# Patient Record
Sex: MALE | Race: WHITE | NOT HISPANIC OR LATINO | Employment: OTHER | URBAN - METROPOLITAN AREA
[De-identification: names, ages, dates, MRNs, and addresses within clinical notes are randomized per-mention and may not be internally consistent; named-entity substitution may affect disease eponyms.]

---

## 2017-12-07 ENCOUNTER — APPOINTMENT (OUTPATIENT)
Dept: GENERAL RADIOLOGY | Facility: HOSPITAL | Age: 82
End: 2017-12-07

## 2017-12-07 ENCOUNTER — HOSPITAL ENCOUNTER (OUTPATIENT)
Facility: HOSPITAL | Age: 82
Setting detail: OBSERVATION
Discharge: NURSING, REHAB OR LONG-TERM CARE | End: 2017-12-12
Attending: EMERGENCY MEDICINE | Admitting: INTERNAL MEDICINE

## 2017-12-07 ENCOUNTER — APPOINTMENT (OUTPATIENT)
Dept: CT IMAGING | Facility: HOSPITAL | Age: 82
End: 2017-12-07

## 2017-12-07 DIAGNOSIS — Z74.09 MOBILITY IMPAIRED: Primary | ICD-10-CM

## 2017-12-07 DIAGNOSIS — Z74.09 IMPAIRED FUNCTIONAL MOBILITY, BALANCE, GAIT, AND ENDURANCE: ICD-10-CM
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
## 2017-12-07 DIAGNOSIS — Z74.09 IMPAIRED MOBILITY AND ADLS: ICD-10-CM

## 2017-12-07 DIAGNOSIS — S70.02XA CONTUSION OF LEFT HIP, INITIAL ENCOUNTER: ICD-10-CM

## 2017-12-07 DIAGNOSIS — Z78.9 IMPAIRED MOBILITY AND ADLS: ICD-10-CM

## 2017-12-07 PROBLEM — F03.90 DEMENTIA (HCC): Status: ACTIVE | Noted: 2017-12-07

## 2017-12-07 PROBLEM — E03.9 HYPOTHYROID: Status: ACTIVE | Noted: 2017-12-07

## 2017-12-07 LAB
ANION GAP SERPL CALCULATED.3IONS-SCNC: 4 MMOL/L (ref 3–11)
BASOPHILS # BLD AUTO: 0.03 10*3/MM3 (ref 0–0.2)
BASOPHILS NFR BLD AUTO: 0.4 % (ref 0–1)
BUN BLD-MCNC: 15 MG/DL (ref 9–23)
BUN/CREAT SERPL: 10 (ref 7–25)
CALCIUM SPEC-SCNC: 8.9 MG/DL (ref 8.7–10.4)
CHLORIDE SERPL-SCNC: 108 MMOL/L (ref 99–109)
CO2 SERPL-SCNC: 25 MMOL/L (ref 20–31)
CREAT BLD-MCNC: 1.5 MG/DL (ref 0.6–1.3)
DEPRECATED RDW RBC AUTO: 47.7 FL (ref 37–54)
EOSINOPHIL # BLD AUTO: 0.17 10*3/MM3 (ref 0–0.3)
EOSINOPHIL NFR BLD AUTO: 2.3 % (ref 0–3)
ERYTHROCYTE [DISTWIDTH] IN BLOOD BY AUTOMATED COUNT: 13.6 % (ref 11.3–14.5)
GFR SERPL CREATININE-BSD FRML MDRD: 44 ML/MIN/1.73
GLUCOSE BLD-MCNC: 110 MG/DL (ref 70–100)
HCT VFR BLD AUTO: 40.4 % (ref 38.9–50.9)
HGB BLD-MCNC: 13.2 G/DL (ref 13.1–17.5)
IMM GRANULOCYTES # BLD: 0.01 10*3/MM3 (ref 0–0.03)
IMM GRANULOCYTES NFR BLD: 0.1 % (ref 0–0.6)
LYMPHOCYTES # BLD AUTO: 1.77 10*3/MM3 (ref 0.6–4.8)
LYMPHOCYTES NFR BLD AUTO: 23.5 % (ref 24–44)
MCH RBC QN AUTO: 31.4 PG (ref 27–31)
MCHC RBC AUTO-ENTMCNC: 32.7 G/DL (ref 32–36)
MCV RBC AUTO: 96 FL (ref 80–99)
MONOCYTES # BLD AUTO: 0.66 10*3/MM3 (ref 0–1)
MONOCYTES NFR BLD AUTO: 8.8 % (ref 0–12)
NEUTROPHILS # BLD AUTO: 4.89 10*3/MM3 (ref 1.5–8.3)
NEUTROPHILS NFR BLD AUTO: 64.9 % (ref 41–71)
PLATELET # BLD AUTO: 168 10*3/MM3 (ref 150–450)
PMV BLD AUTO: 10.6 FL (ref 6–12)
POTASSIUM BLD-SCNC: 4.4 MMOL/L (ref 3.5–5.5)
RBC # BLD AUTO: 4.21 10*6/MM3 (ref 4.2–5.76)
SODIUM BLD-SCNC: 137 MMOL/L (ref 132–146)
WBC NRBC COR # BLD: 7.53 10*3/MM3 (ref 3.5–10.8)

## 2017-12-07 PROCEDURE — G0378 HOSPITAL OBSERVATION PER HR: HCPCS

## 2017-12-07 PROCEDURE — 85025 COMPLETE CBC W/AUTO DIFF WBC: CPT | Performed by: HOSPITALIST

## 2017-12-07 PROCEDURE — 25010000002 FENTANYL CITRATE (PF) 100 MCG/2ML SOLUTION: Performed by: EMERGENCY MEDICINE

## 2017-12-07 PROCEDURE — 96374 THER/PROPH/DIAG INJ IV PUSH: CPT

## 2017-12-07 PROCEDURE — 96372 THER/PROPH/DIAG INJ SC/IM: CPT

## 2017-12-07 PROCEDURE — 94640 AIRWAY INHALATION TREATMENT: CPT

## 2017-12-07 PROCEDURE — 71010 HC CHEST PA OR AP: CPT

## 2017-12-07 PROCEDURE — 94799 UNLISTED PULMONARY SVC/PX: CPT

## 2017-12-07 PROCEDURE — 80048 BASIC METABOLIC PNL TOTAL CA: CPT | Performed by: HOSPITALIST

## 2017-12-07 PROCEDURE — 93005 ELECTROCARDIOGRAM TRACING: CPT | Performed by: HOSPITALIST

## 2017-12-07 PROCEDURE — 93010 ELECTROCARDIOGRAM REPORT: CPT | Performed by: INTERNAL MEDICINE

## 2017-12-07 PROCEDURE — 72192 CT PELVIS W/O DYE: CPT

## 2017-12-07 PROCEDURE — 94760 N-INVAS EAR/PLS OXIMETRY 1: CPT

## 2017-12-07 PROCEDURE — 73502 X-RAY EXAM HIP UNI 2-3 VIEWS: CPT

## 2017-12-07 PROCEDURE — 25010000002 HEPARIN (PORCINE) PER 1000 UNITS: Performed by: HOSPITALIST

## 2017-12-07 PROCEDURE — 99220 PR INITIAL OBSERVATION CARE/DAY 70 MINUTES: CPT | Performed by: HOSPITALIST

## 2017-12-07 PROCEDURE — 99285 EMERGENCY DEPT VISIT HI MDM: CPT

## 2017-12-07 RX ORDER — MELATONIN
1000 DAILY
Status: ON HOLD | COMMUNITY
End: 2019-01-01

## 2017-12-07 RX ORDER — HEPARIN SODIUM 5000 [USP'U]/ML
5000 INJECTION, SOLUTION INTRAVENOUS; SUBCUTANEOUS EVERY 8 HOURS SCHEDULED
Status: DISCONTINUED | OUTPATIENT
Start: 2017-12-07 | End: 2017-12-12 | Stop reason: HOSPADM

## 2017-12-07 RX ORDER — MIRTAZAPINE 30 MG/1
30 TABLET, FILM COATED ORAL NIGHTLY
COMMUNITY
End: 2019-02-22 | Stop reason: SDUPTHER

## 2017-12-07 RX ORDER — ATORVASTATIN CALCIUM 10 MG/1
10 TABLET, FILM COATED ORAL NIGHTLY
Status: DISCONTINUED | OUTPATIENT
Start: 2017-12-07 | End: 2017-12-12 | Stop reason: HOSPADM

## 2017-12-07 RX ORDER — PRAVASTATIN SODIUM 20 MG
20 TABLET ORAL DAILY
COMMUNITY
End: 2018-11-28 | Stop reason: SDUPTHER

## 2017-12-07 RX ORDER — SODIUM CHLORIDE 0.9 % (FLUSH) 0.9 %
1-10 SYRINGE (ML) INJECTION AS NEEDED
Status: DISCONTINUED | OUTPATIENT
Start: 2017-12-07 | End: 2017-12-12 | Stop reason: HOSPADM

## 2017-12-07 RX ORDER — IPRATROPIUM BROMIDE AND ALBUTEROL SULFATE 2.5; .5 MG/3ML; MG/3ML
3 SOLUTION RESPIRATORY (INHALATION)
Status: DISCONTINUED | OUTPATIENT
Start: 2017-12-07 | End: 2017-12-09

## 2017-12-07 RX ORDER — CETIRIZINE HYDROCHLORIDE 10 MG/1
10 TABLET ORAL DAILY PRN
Status: DISCONTINUED | OUTPATIENT
Start: 2017-12-07 | End: 2017-12-12 | Stop reason: HOSPADM

## 2017-12-07 RX ORDER — ACETAMINOPHEN 500 MG
500 TABLET ORAL EVERY 6 HOURS PRN
COMMUNITY
End: 2017-12-12 | Stop reason: HOSPADM

## 2017-12-07 RX ORDER — MONTELUKAST SODIUM 10 MG/1
10 TABLET ORAL NIGHTLY
COMMUNITY
End: 2019-02-20

## 2017-12-07 RX ORDER — HALOPERIDOL 0.5 MG/1
0.5 TABLET ORAL NIGHTLY
COMMUNITY
End: 2017-12-12 | Stop reason: HOSPADM

## 2017-12-07 RX ORDER — VITAMIN E 268 MG
400 CAPSULE ORAL DAILY
Status: ON HOLD | COMMUNITY
End: 2019-01-01

## 2017-12-07 RX ORDER — LEVOTHYROXINE SODIUM 0.07 MG/1
75 TABLET ORAL NIGHTLY
Status: DISCONTINUED | OUTPATIENT
Start: 2017-12-07 | End: 2017-12-12 | Stop reason: HOSPADM

## 2017-12-07 RX ORDER — LEVOTHYROXINE SODIUM 0.07 MG/1
75 TABLET ORAL DAILY
COMMUNITY
End: 2019-01-01 | Stop reason: SDUPTHER

## 2017-12-07 RX ORDER — PANTOPRAZOLE SODIUM 40 MG/1
40 TABLET, DELAYED RELEASE ORAL
Status: DISCONTINUED | OUTPATIENT
Start: 2017-12-08 | End: 2017-12-12 | Stop reason: HOSPADM

## 2017-12-07 RX ORDER — ASPIRIN 81 MG/1
81 TABLET, CHEWABLE ORAL DAILY
Status: DISCONTINUED | OUTPATIENT
Start: 2017-12-08 | End: 2017-12-12 | Stop reason: HOSPADM

## 2017-12-07 RX ORDER — ACETAMINOPHEN 325 MG/1
650 TABLET ORAL ONCE
Status: COMPLETED | OUTPATIENT
Start: 2017-12-07 | End: 2017-12-07

## 2017-12-07 RX ORDER — MONTELUKAST SODIUM 10 MG/1
10 TABLET ORAL DAILY
Status: DISCONTINUED | OUTPATIENT
Start: 2017-12-08 | End: 2017-12-12 | Stop reason: HOSPADM

## 2017-12-07 RX ORDER — ACETAMINOPHEN 325 MG/1
650 TABLET ORAL EVERY 6 HOURS PRN
Status: DISCONTINUED | OUTPATIENT
Start: 2017-12-07 | End: 2017-12-12 | Stop reason: HOSPADM

## 2017-12-07 RX ORDER — ASPIRIN 81 MG/1
81 TABLET, CHEWABLE ORAL DAILY
COMMUNITY
End: 2018-07-20

## 2017-12-07 RX ORDER — NITROGLYCERIN 0.4 MG/1
0.4 TABLET SUBLINGUAL
COMMUNITY

## 2017-12-07 RX ORDER — CLOPIDOGREL BISULFATE 75 MG/1
75 TABLET ORAL DAILY
Status: DISCONTINUED | OUTPATIENT
Start: 2017-12-08 | End: 2017-12-12 | Stop reason: HOSPADM

## 2017-12-07 RX ORDER — FENTANYL CITRATE 50 UG/ML
25 INJECTION, SOLUTION INTRAMUSCULAR; INTRAVENOUS ONCE
Status: COMPLETED | OUTPATIENT
Start: 2017-12-07 | End: 2017-12-07

## 2017-12-07 RX ORDER — TRAMADOL HYDROCHLORIDE 50 MG/1
50 TABLET ORAL EVERY 6 HOURS PRN
Status: DISCONTINUED | OUTPATIENT
Start: 2017-12-07 | End: 2017-12-12 | Stop reason: HOSPADM

## 2017-12-07 RX ORDER — CETIRIZINE HYDROCHLORIDE 10 MG/1
10 TABLET ORAL DAILY
COMMUNITY
End: 2017-12-29

## 2017-12-07 RX ORDER — FINASTERIDE 5 MG/1
5 TABLET, FILM COATED ORAL DAILY
Status: DISCONTINUED | OUTPATIENT
Start: 2017-12-08 | End: 2017-12-12 | Stop reason: HOSPADM

## 2017-12-07 RX ORDER — CLOPIDOGREL BISULFATE 75 MG/1
75 TABLET ORAL DAILY
COMMUNITY
End: 2018-07-20 | Stop reason: CLARIF

## 2017-12-07 RX ORDER — OMEPRAZOLE 20 MG/1
20 CAPSULE, DELAYED RELEASE ORAL DAILY
COMMUNITY
End: 2018-11-19 | Stop reason: SDUPTHER

## 2017-12-07 RX ORDER — VITAMIN E 268 MG
400 CAPSULE ORAL DAILY
Status: DISCONTINUED | OUTPATIENT
Start: 2017-12-08 | End: 2017-12-12 | Stop reason: HOSPADM

## 2017-12-07 RX ORDER — MIRTAZAPINE 15 MG/1
30 TABLET, FILM COATED ORAL NIGHTLY
Status: DISCONTINUED | OUTPATIENT
Start: 2017-12-07 | End: 2017-12-12 | Stop reason: HOSPADM

## 2017-12-07 RX ORDER — DUTASTERIDE 0.5 MG/1
0.5 CAPSULE, LIQUID FILLED ORAL DAILY
COMMUNITY
End: 2018-11-09 | Stop reason: SDUPTHER

## 2017-12-07 RX ADMIN — IPRATROPIUM BROMIDE AND ALBUTEROL SULFATE 3 ML: .5; 3 SOLUTION RESPIRATORY (INHALATION) at 20:18

## 2017-12-07 RX ADMIN — LEVOTHYROXINE SODIUM 75 MCG: 75 TABLET ORAL at 22:32

## 2017-12-07 RX ADMIN — HEPARIN SODIUM 5000 UNITS: 5000 INJECTION, SOLUTION INTRAVENOUS; SUBCUTANEOUS at 22:32

## 2017-12-07 RX ADMIN — ACETAMINOPHEN 650 MG: 325 TABLET, FILM COATED ORAL at 16:00

## 2017-12-07 RX ADMIN — ATORVASTATIN CALCIUM 10 MG: 10 TABLET, FILM COATED ORAL at 22:32

## 2017-12-07 RX ADMIN — FENTANYL CITRATE 25 MCG: 50 INJECTION INTRAMUSCULAR; INTRAVENOUS at 18:13

## 2017-12-07 NOTE — ED PROVIDER NOTES
Subjective   HPI Comments: Murali Dennis is a 88 y.o.male who presents to the ED after a fall. Shortly PTA the pt fell while standing. He hit his head on the wall and then landed on his buttocks. Since the accident his only pain is in his left hip and buttock. His family brought him to the ED for evaluation. At the ED he denies headache, neck pain, back pain, LoC or any other acute sx at this time.    The pt takes Plavix and Aspirin daily.      Patient is a 88 y.o. male presenting with fall.   History provided by:  Patient  Fall   Mechanism of injury: fall    Injury location:  Pelvis  Pelvic injury location:  L hip  Time since incident: Just PTA.  Arrived directly from scene: yes    Fall:     Fall occurred:  Standing    Impact surface:  Hard floor and wall    Point of impact:  Head and buttocks    Entrapped after fall: no    Protective equipment: none    Suspicion of alcohol use: no    Suspicion of drug use: no    Prior to arrival data:     Loss of consciousness: no      Amnesic to event: no    Associated symptoms: no back pain, no headaches, no loss of consciousness, no nausea, no neck pain and no vomiting        Review of Systems   Gastrointestinal: Negative for nausea and vomiting.   Musculoskeletal: Positive for arthralgias. Negative for back pain and neck pain.   Neurological: Negative for loss of consciousness and headaches.   All other systems reviewed and are negative.      Past Medical History:   Diagnosis Date   • Alzheimer disease    • Warren esophagus    • COPD (chronic obstructive pulmonary disease)    • Coronary artery disease    • Dementia    • Disease of thyroid gland    • GERD (gastroesophageal reflux disease)    • Sleep apnea    • Stroke        Allergies   Allergen Reactions   • Codeine    • Lipitor [Atorvastatin]    • Penicillins    • Prozac [Fluoxetine Hcl]    • Sulfa Antibiotics    • Valium [Diazepam]        Past Surgical History:   Procedure Laterality Date   • CARPAL TUNNEL RELEASE Right     • HEMORRHOIDECTOMY     • HERNIA REPAIR         History reviewed. No pertinent family history.    Social History     Social History   • Marital status:      Spouse name: N/A   • Number of children: N/A   • Years of education: N/A     Social History Main Topics   • Smoking status: Unknown If Ever Smoked   • Smokeless tobacco: None   • Alcohol use No   • Drug use: No   • Sexual activity: Not Asked     Other Topics Concern   • None     Social History Narrative   • None         Objective   Physical Exam   Constitutional: He is oriented to person, place, and time. He appears well-developed and well-nourished. No distress.   HENT:   Head: Normocephalic and atraumatic.   Mouth/Throat: No oropharyngeal exudate.   Scalp non tender.   Eyes: Conjunctivae are normal. No scleral icterus.   Neck: Normal range of motion. Neck supple. No JVD present.   Cardiovascular: Normal rate, regular rhythm and normal heart sounds.  Exam reveals no gallop and no friction rub.    No murmur heard.  Pulmonary/Chest: Effort normal and breath sounds normal. No respiratory distress. He has no wheezes. He has no rales.   Abdominal: Soft. Bowel sounds are normal. He exhibits no distension. There is no tenderness. There is no rebound and no guarding.   Musculoskeletal: Normal range of motion. He exhibits tenderness. He exhibits no edema or deformity.   TTP over the left greater trochanter. No shortening. No rotation. Pelvis stable. Chest non tender. Cervical spine non tender.   Neurological: He is alert and oriented to person, place, and time.   Skin: Skin is warm and dry. He is not diaphoretic.   Psychiatric: He has a normal mood and affect. His behavior is normal.   Nursing note and vitals reviewed.      Procedures         ED Course  ED Course     XR negative.  Pain improved on recheck but and ROM was decent but we tried standing him up and he couldn't due to pain. Family not excited about narcotics due to previous side effects.  Tylenol  given, pt again reports pain better but unable to stand.  CT ordered, is negative.  Family feels it would not be safe to take pt home tonight so I called the hospitalist for admission.                MDM  Number of Diagnoses or Management Options  Contusion of left hip, initial encounter:   Fall from standing, initial encounter:      Amount and/or Complexity of Data Reviewed  Tests in the radiology section of CPT®: ordered and reviewed  Independent visualization of images, tracings, or specimens: yes        Final diagnoses:   Contusion of left hip, initial encounter   Fall from standing, initial encounter       Documentation assistance provided by karla Vigil.  Information recorded by the karla was done at my direction and has been verified and validated by me.     Carlos Vigil  12/07/17 1340       Gregor Montoya MD  12/07/17 2008

## 2017-12-07 NOTE — PROGRESS NOTES
Continued Stay Note  [facility]     Patient Name: Murali Dennis  MRN: [id number]  Today's Date: 12/7/2017    Admit Date: 12/7/2017          Discharge Plan       12/07/17 1616    Case Management/Social Work Plan    Plan home    Patient/Family In Agreement With Plan yes    Additional Comments CM was requested to order rolling walker for pt by Dr. oMntoya as pt  needs more than his cane for stability at this time. Pt has no preference to DME provider. CM contacted Jane with [employer] and rolling walker will be delivered to pt's bedside prior to discharge home.              Discharge Codes     None            Flaca Guido

## 2017-12-07 NOTE — H&P
[facility] Medicine Services  HISTORY AND PHYSICAL    Patient Name: Murali Dennis  : 1929  MRN: [id number]  Primary Care Physician: No Known Provider    Subjective   Subjective     Chief Complaint:  S/P Fall    HPI:  Murali Dennis is a 88 y.o. male who lost his balance and fell today. His hip is sore. No fracture found in ED. But couldn't tolerate walking and has steps at home. Admitted for pain control and PT.    Per family has shuffling gait, rigidity, and difficulty arising from a chair. Has an intermittent tremor. Diagnosed with dementia, but no parkinson's.    Intermittent shortness of breath with wheezing. No chest pain. No sputum. NO f/c. No n/v. No difficulty going to the bathroom.  Review of Systems   Constitutional: Positive for activity change and fatigue.   HENT: Negative.    Respiratory: Positive for shortness of breath and wheezing.    Cardiovascular: Negative for chest pain, palpitations and leg swelling.   Gastrointestinal: Negative.    Genitourinary: Negative.    Neurological: Negative.           Otherwise 10-system ROS reviewed and is negative except as mentioned in the HPI.    Personal History     Past Medical History:   Diagnosis Date   • Alzheimer disease    • Warren esophagus    • COPD (chronic obstructive pulmonary disease)    • Coronary artery disease    • Dementia    • Disease of thyroid gland    • GERD (gastroesophageal reflux disease)    • Sleep apnea    • Stroke    Hx of CVA in past, possibly secondary to Afib, previously on coumadin, but took himself off of it    Past Surgical History:   Procedure Laterality Date   • CARPAL TUNNEL RELEASE Right    • HEMORRHOIDECTOMY     • HERNIA REPAIR     Hx of CABG    Family History: NC/Reviewed    Social History:  reports that he does not drink alcohol or use illicit drugs.    Medications:  Reviewed and reconciled    (Not in a hospital admission)    Allergies   Allergen Reactions   • Codeine    • Lipitor  [Atorvastatin]    • Penicillins    • Prozac [Fluoxetine Hcl]    • Sulfa Antibiotics    • Valium [Diazepam]        Objective   Objective     Vital Signs:   Temp:  [97.4 °F (36.3 °C)] 97.4 °F (36.3 °C)  Heart Rate:  [64-71] 64  Resp:  [15-22] 19  BP: (120-169)/(83-95) 161/85        Physical Exam   NAD, alert and oriented  OP clear, PERRL  Neck supple  RRR  CTAB  +BS, ND, NT  SHINE, but L hip tender, and LLE limited by pain  NO rashes  Normal to flat affect  NO c/c/e    Results Reviewed:  I have personally reviewed current lab, radiology, and data and agree.              Invalid input(s):  ALKPHOS, TROPONININT  No results found for: BNP  No results found for: PHART  Imaging Results (last 24 hours)     Procedure Component Value Units Date/Time    XR Hip With or Without Pelvis 2 - 3 View Left [[id number]] Collected:  12/07/17 1402     Updated:  12/07/17 1513    Narrative:       EXAMINATION:  AP VIEW OF THE PELVIS AND OBLIQUE VIEW OF THE LEFT  HIP-12/07/2017:     HISTORY: Hip pain.     FINDINGS: The bony pelvis is intact. There are mild degenerative changes  of both hips. Additional views of the left hip reveal no fracture,  dislocation or acute finding.       Impression:       Mild osteoarthritic changes of the left hip. There are no  acute findings.     D:  12/07/2017  E:  12/07/2017     This report was finalized on 12/7/2017 3:11 PM by Dr. Bismark Goff MD.       CT Pelvis Without Contrast [[id number]] Collected:  12/07/17 1637     Updated:  12/07/17 1654    Narrative:       EXAMINATION: CT PELVIS WO CONTRAST-12/07/2017:      INDICATION: Fall, left posterior/lateral hip pain, XR negative, unable  to bear weight due to pain.         TECHNIQUE: CT scan of the pelvis was performed without contrast. Images  were acquired in the axial plane and are displayed in the axial as well  as the coronal reconstructed projection.     The radiation dose reduction device was turned on for each scan per the  ALARA (As Low as Reasonably  Achievable) protocol.     COMPARISON: NONE.     FINDINGS: There is sigmoid diverticulosis. There is no pelvic mass or  fluid. There is no inguinal lymphadenopathy. There is no pelvic  fracture. The hips are also intact with no evidence of fracture or  dislocation.       Impression:       There are no pathological findings.     D:  12/07/2017  E:  12/07/2017           This report was finalized on 12/7/2017 4:51 PM by Dr. Bismark Goff MD.                Assessment/Plan   Assessment / Plan     Hospital Problem List     * (Principal)Contusion of left hip    Dementia    Hypothyroid            Assessment & Plan:  Fall/hip contusion  --pain control/PT  WEakness/ataxia  --neurology evaluation  --check B12/TSH  --?Parkinson's  Probable COPD with bronchospasm  --check CXR  --nebs  Hx of dementia  Hx of ANGELA/CPAP      DVT prophylaxis:  RENETTA    CODE STATUS:  No Order    Admission Status:  I believe this patient meets observation criteria.    Stiven Friedman MD   12/07/17   6:09 PM

## 2017-12-08 ENCOUNTER — APPOINTMENT (OUTPATIENT)
Dept: CARDIOLOGY | Facility: HOSPITAL | Age: 82
End: 2017-12-08

## 2017-12-08 PROBLEM — R53.1 GENERALIZED WEAKNESS: Status: ACTIVE | Noted: 2017-12-08

## 2017-12-08 PROBLEM — I50.9 CHRONIC CONGESTIVE HEART FAILURE (HCC): Status: ACTIVE | Noted: 2017-12-08

## 2017-12-08 PROBLEM — R42 DIZZINESS: Status: ACTIVE | Noted: 2017-12-08

## 2017-12-08 PROBLEM — I25.10 CAD (CORONARY ARTERY DISEASE): Status: ACTIVE | Noted: 2017-12-08

## 2017-12-08 PROBLEM — N18.9 CKD (CHRONIC KIDNEY DISEASE): Status: ACTIVE | Noted: 2017-12-08

## 2017-12-08 LAB
ANION GAP SERPL CALCULATED.3IONS-SCNC: 10 MMOL/L (ref 3–11)
BASOPHILS # BLD AUTO: 0.03 10*3/MM3 (ref 0–0.2)
BASOPHILS NFR BLD AUTO: 0.6 % (ref 0–1)
BH CV ECHO MEAS - BSA(HAYCOCK): 2.1 M^2
BH CV ECHO MEAS - BSA: 2.1 M^2
BH CV ECHO MEAS - BZI_BMI: 29.7 KILOGRAMS/M^2
BH CV ECHO MEAS - BZI_METRIC_HEIGHT: 175.3 CM
BH CV ECHO MEAS - BZI_METRIC_WEIGHT: 91.2 KG
BH CV XLRA MEAS LEFT DIST CCA EDV: 16.8 CM/SEC
BH CV XLRA MEAS LEFT DIST CCA PSV: 66.3 CM/SEC
BH CV XLRA MEAS LEFT DIST ICA EDV: 27.2 CM/SEC
BH CV XLRA MEAS LEFT DIST ICA PSV: 85.9 CM/SEC
BH CV XLRA MEAS LEFT ICA/CCA RATIO: 1.4
BH CV XLRA MEAS LEFT MID CCA EDV: 16.8 CM/SEC
BH CV XLRA MEAS LEFT MID CCA PSV: 76.8 CM/SEC
BH CV XLRA MEAS LEFT MID ICA EDV: 21.6 CM/SEC
BH CV XLRA MEAS LEFT MID ICA PSV: 93.6 CM/SEC
BH CV XLRA MEAS LEFT PROX CCA EDV: 14 CM/SEC
BH CV XLRA MEAS LEFT PROX CCA PSV: 86.6 CM/SEC
BH CV XLRA MEAS LEFT PROX ECA EDV: 8.4 CM/SEC
BH CV XLRA MEAS LEFT PROX ECA PSV: 90.1 CM/SEC
BH CV XLRA MEAS LEFT PROX ICA EDV: 25.1 CM/SEC
BH CV XLRA MEAS LEFT PROX ICA PSV: 94.3 CM/SEC
BH CV XLRA MEAS LEFT PROX SCLA EDV: 0 CM/SEC
BH CV XLRA MEAS LEFT PROX SCLA PSV: 51.9 CM/SEC
BH CV XLRA MEAS LEFT VERTEBRAL A EDV: 9.8 CM/SEC
BH CV XLRA MEAS LEFT VERTEBRAL A PSV: 49.6 CM/SEC
BH CV XLRA MEAS RIGHT DIST CCA EDV: 13.8 CM/SEC
BH CV XLRA MEAS RIGHT DIST CCA PSV: 59.4 CM/SEC
BH CV XLRA MEAS RIGHT DIST ICA EDV: 20.4 CM/SEC
BH CV XLRA MEAS RIGHT DIST ICA PSV: 71.5 CM/SEC
BH CV XLRA MEAS RIGHT ICA/CCA RATIO: 2
BH CV XLRA MEAS RIGHT MID CCA EDV: 16.2 CM/SEC
BH CV XLRA MEAS RIGHT MID CCA PSV: 66.8 CM/SEC
BH CV XLRA MEAS RIGHT MID ICA EDV: 29.9 CM/SEC
BH CV XLRA MEAS RIGHT MID ICA PSV: 116 CM/SEC
BH CV XLRA MEAS RIGHT PROX CCA EDV: 12.3 CM/SEC
BH CV XLRA MEAS RIGHT PROX CCA PSV: 67.8 CM/SEC
BH CV XLRA MEAS RIGHT PROX ECA EDV: 11.2 CM/SEC
BH CV XLRA MEAS RIGHT PROX ECA PSV: 166 CM/SEC
BH CV XLRA MEAS RIGHT PROX ICA EDV: 31.4 CM/SEC
BH CV XLRA MEAS RIGHT PROX ICA PSV: 97.4 CM/SEC
BH CV XLRA MEAS RIGHT PROX SCLA EDV: 0 CM/SEC
BH CV XLRA MEAS RIGHT PROX SCLA PSV: 46.2 CM/SEC
BH CV XLRA MEAS RIGHT VERTEBRAL A EDV: 10.5 CM/SEC
BH CV XLRA MEAS RIGHT VERTEBRAL A PSV: 26.2 CM/SEC
BUN BLD-MCNC: 16 MG/DL (ref 9–23)
BUN/CREAT SERPL: 10.7 (ref 7–25)
CALCIUM SPEC-SCNC: 9 MG/DL (ref 8.7–10.4)
CHLORIDE SERPL-SCNC: 105 MMOL/L (ref 99–109)
CO2 SERPL-SCNC: 25 MMOL/L (ref 20–31)
CREAT BLD-MCNC: 1.5 MG/DL (ref 0.6–1.3)
DEPRECATED RDW RBC AUTO: 46.5 FL (ref 37–54)
EOSINOPHIL # BLD AUTO: 0.33 10*3/MM3 (ref 0–0.3)
EOSINOPHIL NFR BLD AUTO: 6.7 % (ref 0–3)
ERYTHROCYTE [DISTWIDTH] IN BLOOD BY AUTOMATED COUNT: 13.3 % (ref 11.3–14.5)
FOLATE SERPL-MCNC: 20.52 NG/ML (ref 3.2–20)
GFR SERPL CREATININE-BSD FRML MDRD: 44 ML/MIN/1.73
GLUCOSE BLD-MCNC: 120 MG/DL (ref 70–100)
HCT VFR BLD AUTO: 38.5 % (ref 38.9–50.9)
HGB BLD-MCNC: 12.9 G/DL (ref 13.1–17.5)
IMM GRANULOCYTES # BLD: 0.01 10*3/MM3 (ref 0–0.03)
IMM GRANULOCYTES NFR BLD: 0.2 % (ref 0–0.6)
LYMPHOCYTES # BLD AUTO: 1.62 10*3/MM3 (ref 0.6–4.8)
LYMPHOCYTES NFR BLD AUTO: 32.7 % (ref 24–44)
MCH RBC QN AUTO: 31.9 PG (ref 27–31)
MCHC RBC AUTO-ENTMCNC: 33.5 G/DL (ref 32–36)
MCV RBC AUTO: 95.1 FL (ref 80–99)
MONOCYTES # BLD AUTO: 0.46 10*3/MM3 (ref 0–1)
MONOCYTES NFR BLD AUTO: 9.3 % (ref 0–12)
NEUTROPHILS # BLD AUTO: 2.51 10*3/MM3 (ref 1.5–8.3)
NEUTROPHILS NFR BLD AUTO: 50.5 % (ref 41–71)
PLATELET # BLD AUTO: 157 10*3/MM3 (ref 150–450)
PMV BLD AUTO: 10.7 FL (ref 6–12)
POTASSIUM BLD-SCNC: 3.8 MMOL/L (ref 3.5–5.5)
RBC # BLD AUTO: 4.05 10*6/MM3 (ref 4.2–5.76)
SODIUM BLD-SCNC: 140 MMOL/L (ref 132–146)
TSH SERPL DL<=0.05 MIU/L-ACNC: 1.79 MIU/ML (ref 0.35–5.35)
VIT B12 BLD-MCNC: 493 PG/ML (ref 211–911)
WBC NRBC COR # BLD: 4.96 10*3/MM3 (ref 3.5–10.8)

## 2017-12-08 PROCEDURE — 94799 UNLISTED PULMONARY SVC/PX: CPT

## 2017-12-08 PROCEDURE — 94640 AIRWAY INHALATION TREATMENT: CPT

## 2017-12-08 PROCEDURE — 97110 THERAPEUTIC EXERCISES: CPT

## 2017-12-08 PROCEDURE — G0378 HOSPITAL OBSERVATION PER HR: HCPCS

## 2017-12-08 PROCEDURE — 99233 SBSQ HOSP IP/OBS HIGH 50: CPT | Performed by: PHYSICIAN ASSISTANT

## 2017-12-08 PROCEDURE — 96361 HYDRATE IV INFUSION ADD-ON: CPT

## 2017-12-08 PROCEDURE — 82607 VITAMIN B-12: CPT | Performed by: HOSPITALIST

## 2017-12-08 PROCEDURE — G8978 MOBILITY CURRENT STATUS: HCPCS

## 2017-12-08 PROCEDURE — 97162 PT EVAL MOD COMPLEX 30 MIN: CPT

## 2017-12-08 PROCEDURE — 25010000002 HEPARIN (PORCINE) PER 1000 UNITS: Performed by: HOSPITALIST

## 2017-12-08 PROCEDURE — 82746 ASSAY OF FOLIC ACID SERUM: CPT | Performed by: HOSPITALIST

## 2017-12-08 PROCEDURE — 99204 OFFICE O/P NEW MOD 45 MIN: CPT | Performed by: PSYCHIATRY & NEUROLOGY

## 2017-12-08 PROCEDURE — 84443 ASSAY THYROID STIM HORMONE: CPT | Performed by: HOSPITALIST

## 2017-12-08 PROCEDURE — 80048 BASIC METABOLIC PNL TOTAL CA: CPT | Performed by: HOSPITALIST

## 2017-12-08 PROCEDURE — 96372 THER/PROPH/DIAG INJ SC/IM: CPT

## 2017-12-08 PROCEDURE — 93880 EXTRACRANIAL BILAT STUDY: CPT | Performed by: INTERNAL MEDICINE

## 2017-12-08 PROCEDURE — 85025 COMPLETE CBC W/AUTO DIFF WBC: CPT | Performed by: HOSPITALIST

## 2017-12-08 PROCEDURE — 93880 EXTRACRANIAL BILAT STUDY: CPT

## 2017-12-08 PROCEDURE — G8979 MOBILITY GOAL STATUS: HCPCS

## 2017-12-08 RX ORDER — SODIUM CHLORIDE 9 MG/ML
75 INJECTION, SOLUTION INTRAVENOUS CONTINUOUS
Status: DISCONTINUED | OUTPATIENT
Start: 2017-12-08 | End: 2017-12-09

## 2017-12-08 RX ADMIN — HEPARIN SODIUM 5000 UNITS: 5000 INJECTION, SOLUTION INTRAVENOUS; SUBCUTANEOUS at 21:12

## 2017-12-08 RX ADMIN — IPRATROPIUM BROMIDE AND ALBUTEROL SULFATE 3 ML: .5; 3 SOLUTION RESPIRATORY (INHALATION) at 07:44

## 2017-12-08 RX ADMIN — ASPIRIN 81 MG 81 MG: 81 TABLET ORAL at 09:53

## 2017-12-08 RX ADMIN — HEPARIN SODIUM 5000 UNITS: 5000 INJECTION, SOLUTION INTRAVENOUS; SUBCUTANEOUS at 05:07

## 2017-12-08 RX ADMIN — IPRATROPIUM BROMIDE AND ALBUTEROL SULFATE 3 ML: .5; 3 SOLUTION RESPIRATORY (INHALATION) at 11:49

## 2017-12-08 RX ADMIN — PANTOPRAZOLE SODIUM 40 MG: 40 TABLET, DELAYED RELEASE ORAL at 05:07

## 2017-12-08 RX ADMIN — VITAMIN E CAP 400 UNIT 400 UNITS: 400 CAP at 09:53

## 2017-12-08 RX ADMIN — CARBIDOPA AND LEVODOPA 1 TABLET: 10; 100 TABLET ORAL at 18:30

## 2017-12-08 RX ADMIN — MONTELUKAST SODIUM 10 MG: 10 TABLET, FILM COATED ORAL at 09:53

## 2017-12-08 RX ADMIN — LEVOTHYROXINE SODIUM 75 MCG: 75 TABLET ORAL at 21:12

## 2017-12-08 RX ADMIN — HEPARIN SODIUM 5000 UNITS: 5000 INJECTION, SOLUTION INTRAVENOUS; SUBCUTANEOUS at 15:35

## 2017-12-08 RX ADMIN — MIRTAZAPINE 30 MG: 15 TABLET, FILM COATED ORAL at 21:12

## 2017-12-08 RX ADMIN — SACUBITRIL AND VALSARTAN 1 TABLET: 24; 26 TABLET, FILM COATED ORAL at 00:06

## 2017-12-08 RX ADMIN — SODIUM CHLORIDE 75 ML/HR: 9 INJECTION, SOLUTION INTRAVENOUS at 16:27

## 2017-12-08 RX ADMIN — IPRATROPIUM BROMIDE AND ALBUTEROL SULFATE 3 ML: .5; 3 SOLUTION RESPIRATORY (INHALATION) at 15:36

## 2017-12-08 RX ADMIN — ATORVASTATIN CALCIUM 10 MG: 10 TABLET, FILM COATED ORAL at 21:11

## 2017-12-08 RX ADMIN — MIRTAZAPINE 30 MG: 15 TABLET, FILM COATED ORAL at 00:06

## 2017-12-08 RX ADMIN — CLOPIDOGREL BISULFATE 75 MG: 75 TABLET ORAL at 09:53

## 2017-12-08 RX ADMIN — FINASTERIDE 5 MG: 5 TABLET, FILM COATED ORAL at 09:53

## 2017-12-08 RX ADMIN — ACETAMINOPHEN 650 MG: 325 TABLET ORAL at 00:07

## 2017-12-08 NOTE — THERAPY EVALUATION
Acute Care - Physical Therapy Initial Evaluation  [facility]     Patient Name: Murali Dennis  : 1929  MRN: [id number]  Today's Date: 2017   Onset of Illness/Injury or Date of Surgery Date: 17  Date of Referral to PT: 17  Referring Physician: MD Rey      Admit Date: 2017     Visit Dx:    ICD-10-CM ICD-9-CM   1. Mobility impaired Z74.09 799.89   2. Contusion of left hip, initial encounter S70.02XA 924.01   3. Fall from standing, initial encounter W19.XXXA E888.9   4. Impaired functional mobility, balance, gait, and endurance Z74.09 V49.89     Patient Active Problem List   Diagnosis   • Contusion of left hip   • Dementia   • Hypothyroid     Past Medical History:   Diagnosis Date   • Alzheimer disease    • Warren esophagus    • COPD (chronic obstructive pulmonary disease)    • Coronary artery disease    • Dementia    • Disease of thyroid gland    • GERD (gastroesophageal reflux disease)    • Sleep apnea    • Stroke      Past Surgical History:   Procedure Laterality Date   • CARPAL TUNNEL RELEASE Right    • HEMORRHOIDECTOMY     • HERNIA REPAIR            PT ASSESSMENT (last 72 hours)      PT Evaluation       17 1100 17 2300    Rehab Evaluation    Document Type evaluation  -CLIVE     Subjective Information agree to therapy;complains of;pain  -CLIVE     Patient Effort, Rehab Treatment good  -CLIVE     Symptoms Noted During/After Treatment increased pain  -CLIVE     General Information    Patient Profile Review yes  -CLIVE     Onset of Illness/Injury or Date of Surgery Date 17  -CLIVE     Referring Physician MD Rey  -CLIVE     Pertinent History Of Current Problem patient admitted to the [facility]"" after falling at home and having pain in left hip with inability to ambulate  -CLIVE     Precautions/Limitations fall precautions  -CLIVE     Equipment Currently Used at Home walker, standard  -CLIVE walker, standard  -RH    Plans/Goals Discussed With patient;agreed upon  -CLIVE     Risks Reviewed  patient:;LOB;increased discomfort  -CLIVE     Benefits Reviewed patient:;improve function;increase strength;decrease risk of DVT  -CLIVE     Barriers to Rehab medically complex;previous functional deficit;cognitive status  -CLIVE     Living Environment    Lives With spouse  -CLIVE spouse  -RH    Living Arrangements house  -CLIVE house  -RH    Home Accessibility no concerns  -CLIVE no concerns  -RH    Stair Railings at Home  none  -RH    Type of Financial/Environmental Concern  none  -RH    Transportation Available  none  -RH    Clinical Impression    Date of Referral to PT 12/08/17  -CLIVE     PT Diagnosis impaired bed mobility transfer and gait, decreased strength and balance  -CLIVE     Patient/Family Goals Statement patient wants to go home  -CLIVE     Criteria for Skilled Therapeutic Interventions Met yes;treatment indicated  -CLIVE     Rehab Potential fair, will monitor progress closely  -CLIVE     Pain Assessment    Pain Assessment 0-10  -CLIVE     Pain Score 3  -CLIVE     Post Pain Score 5  -CLIVE     Pain Type Acute pain  -CLIVE     Pain Location Hip  -CLIVE     Pain Orientation Left  -CLIVE     Pain Intervention(s) Repositioned;Ambulation/increased activity  -CLIVE     Cognitive Assessment/Intervention    Current Cognitive/Communication Assessment impaired  -CLIVE     Orientation Status oriented to;person;place  -CLIVE     Follows Commands/Answers Questions 75% of the time;able to follow single-step instructions;needs cueing;needs repetition  -CLIVE     Personal Safety mild impairment;decreased awareness, need for assist;decreased awareness, need for safety;decreased insight to deficits  -CLIVE     Personal Safety Interventions fall prevention program maintained;gait belt;nonskid shoes/slippers when out of bed  -CLIVE     ROM (Range of Motion)    General ROM lower extremity range of motion deficits identified   LLE limited hip movement due to pain  -CLIVE     MMT (Manual Muscle Testing)    General MMT Assessment upper extremity strength deficits identified;lower extremity  strength deficits identified   generalized weakness 4-/5  -CLIVE     Bed Mobility, Assessment/Treatment    Bed Mobility, Scoot/Bridge, [place] minimum assist (75% patient effort)  -CLIVE     Bed Mob, Supine to Sit, [place] minimum assist (75% patient effort)  -CLIVE     Bed Mob, Sit to Supine, [place] minimum assist (75% patient effort)  -CLIVE     Bed Mobility, Safety Issues cognitive deficits limit understanding  -CLIVE     Bed Mobility, Impairments strength decreased;impaired balance  -CLIVE     Transfer Assessment/Treatment    Transfers, Sit-Stand [place] minimum assist (75% patient effort);1 person + 1 person to manage equipment   from high bed  -CLIVE     Transfers, Stand-Sit [place] minimum assist (75% patient effort);1 person + 1 person to manage equipment  -CLIVE     Transfer, Safety Issues balance decreased during turns;step length decreased;weight-shifting ability decreased   painful to bear weight on left LE   -CLIVE     Transfer, Impairments strength decreased;impaired balance  -CLIVE     Transfer, Comment patient needs cues for walker use and safety  -CLIVE     Gait Assessment/Treatment    Gait, [place] Level minimum assist (75% patient effort);2 person assist required  -CLIVE     Gait, Assistive Device rolling walker  -CLIVE     Gait, Distance (Feet) 40  -CLIVE     Gait, Gait Pattern Analysis swing-to gait  -CLIVE     Gait, Gait Deviations antalgic;step length decreased  -CLIVE     Gait, Safety Issues balance decreased during turns;step length decreased;weight-shifting ability decreased   decreased weight to LLE  -CLIVE     Gait, Impairments strength decreased;impaired balance  -CLIVE     Gait, Comment patient able to ambulate 40 ft but C/O pain to left hip   -CLIVE     Motor Skills/Interventions    Additional Documentation Balance Skills Training (Group)  -CLIEV     Balance Skills Training    Sitting-Level of Assistance Contact guard  -CLIVE     Sitting-Balance Support Feet supported  -CLIVE     Standing-Level of Assistance Minimum  assistance;x2  -CLIVE     Static Standing Balance Support assistive device  -CLIVE     Gait Balance-Level of Assistance Minimum assistance;x2  -CLIVE     Gait Balance Support assistive device  -CLIVE     Therapy Exercises    Bilateral Lower Extremities AROM:;10 reps;sitting;ankle pumps/circles;hip flexion;LAQ  -CLIVE     Positioning and Restraints    Pre-Treatment Position in bed  -CLIVE     Post Treatment Position bed  -CLIVE     In Bed notified nsg;supine;call light within reach;exit alarm on  -CLIVE       User Key  (r) = Recorded By, (t) = Taken By, (c) = Cosigned By    Initials Name Provider Type    CLIVE Kathleen PT Physical Therapist    MANJULA Crenshaw RN Registered Nurse          Physical Therapy Education     Title: PT OT SLP Therapies (Active)     Topic: Physical Therapy (Active)     Point: Mobility training (Active)    Learning Progress Summary    Learner Readiness Method Response Comment Documented by Status   Patient Acceptance E NR   12/08/17 1138 Active               Point: Home exercise program (Active)    Learning Progress Summary    Learner Readiness Method Response Comment Documented by Status   Patient Acceptance E NR   12/08/17 1138 Active               Point: Body mechanics (Active)    Learning Progress Summary    Learner Readiness Method Response Comment Documented by Status   Patient Acceptance E NR   12/08/17 1138 Active               Point: Precautions (Active)    Learning Progress Summary    Learner Readiness Method Response Comment Documented by Status   Patient Acceptance E NR   12/08/17 1138 Active                      User Key     Initials Effective Dates Name Provider Type Discipline     06/19/15 -  Nevin Kathleen PT Physical Therapist PT                PT Recommendation and Plan  Anticipated Equipment Needs At Discharge: front wheeled walker  Anticipated Discharge Disposition: skilled nursing facility  PT Frequency: daily, per priority policy  Plan of Care Review  Plan Of Care  Reviewed With: patient  Outcome Summary/Follow up Plan: patient able to ambulate 40 ft with walker with min assist patient has decreased weight bearing on LLE due to pain. Recommend SNF for continued rehab post hosp stay          IP PT Goals       12/08/17 1139          Bed Mobility PT LTG    Bed Mobility PT LTG, Date Established 12/08/17  -CLIVE      Bed Mobility PT LTG, Time to Achieve 2 wks  -CLIVE      Bed Mobility PT LTG, Activity Type supine to sit/sit to supine  -CLIVE      Bed Mobility PT LTG, [place] Level independent  -CLIVE      Bed Mobility PT LTG, Outcome goal ongoing  -CLIVE      Transfer Training PT LTG    Transfer Training PT LTG, Date Established 12/08/17  -CLIVE      Transfer Training PT LTG, Time to Achieve 2 wks  -CLIVE      Transfer Training PT LTG, Activity Type sit to stand/stand to sit  -CLIVE      Transfer Training PT LTG, [place] Level contact guard assist  -CLIVE      Transfer Training PT LTG, Assist Device walker, rolling  -CLIVE      Transfer Training PT LTG, Outcome goal ongoing  -CLIVE      Gait Training PT LTG    Gait Training Goal PT LTG, Date Established 12/08/17  -CLIVE      Gait Training Goal PT LTG, Time to Achieve 2 wks  -CLIVE      Gait Training Goal PT LTG, [place] Level contact guard assist  -CLIVE      Gait Training Goal PT LTG, Assist Device walker, rolling  -CLIVE      Gait Training Goal PT LTG, Distance to Achieve 300  -CLIVE      Gait Training Goal PT LTG, Outcome goal ongoing  -CLIVE        User Key  (r) = Recorded By, (t) = Taken By, (c) = Cosigned By    Initials Name Provider Type    CLIVE Kathleen, PT Physical Therapist                Outcome Measures       12/08/17 1100          How much help from another person do you currently need...    Turning from your back to your side while in flat bed without using bedrails? 3  -CLIVE      Moving from lying on back to sitting on the side of a flat bed without bedrails? 3  -CLIVE      Moving to and from a bed to a chair (including a wheelchair)? 3  -CLIVE       Standing up from a chair using your arms (e.g., wheelchair, bedside chair)? 3  -CLIVE      Climbing 3-5 steps with a railing? 1  -CLIVE      To walk in hospital room? 3  -CLIVE      AM-PAC 6 Clicks Score 16  -CLIVE      Functional Assessment    Outcome Measure Options AM-PAC 6 Clicks Basic Mobility (PT)  -CLIVE        User Key  (r) = Recorded By, (t) = Taken By, (c) = Cosigned By    Initials Name Provider Type    CLIVE Kathleen PT Physical Therapist           Time Calculation:         PT Charges       12/08/17 1141          Time Calculation    Start Time 1100  -CLIVE      PT Received On 12/08/17  -CLIVE      PT Goal Re-Cert Due Date 12/18/17  -CLIVE      Time Calculation- PT    Total Timed Code Minutes- PT 18 minute(s)  -CLIVE        User Key  (r) = Recorded By, (t) = Taken By, (c) = Cosigned By    Initials Name Provider Type    CLIVE Kathleen, PT Physical Therapist          Therapy Charges for Today     Code Description Service Date Service Provider Modifiers Qty    00991081942 HC PT MOBILITY CURRENT 12/8/2017 Nevin Kathleen, PT GP, CK 1    72351059229 HC PT MOBILITY PROJECTED 12/8/2017 Nevin Kathleen, PT GP, CJ 1    68872729810 HC PT EVAL MOD COMPLEXITY 3 12/8/2017 Nevin Kathleen, PT GP 1    04535308906 HC PT THER PROC EA 15 MIN 12/8/2017 Nevin Kathleen, PT GP 1    42100005490 HC PT THER SUPP EA 15 MIN 12/8/2017 Nevin Kathleen, PT GP 1          PT G-Codes  Outcome Measure Options: AM-PAC 6 Clicks Basic Mobility (PT)  Score: 16  Functional Limitation: Mobility: Walking and moving around  Mobility: Walking and Moving Around Current Status (): At least 40 percent but less than 60 percent impaired, limited or restricted  Mobility: Walking and Moving Around Goal Status (): At least 20 percent but less than 40 percent impaired, limited or restricted      Nevin Kathleen, PT  12/8/2017

## 2017-12-08 NOTE — PROGRESS NOTES
Continued Stay Note  [facility]     Patient Name: Murali Dennis  MRN: [id number]  Today's Date: 12/8/2017    Admit Date: 12/7/2017          Discharge Plan       12/08/17 1133    Case Management/Social Work Plan    Plan Social work notified Jane Denson that the patient rolling walker is missing from the ER and social work will notify her if another walker needs to be provided to the patient if the original one is not found by security.    Patient/Family In Agreement With Plan yes              Discharge Codes     None            BROCK Tran

## 2017-12-08 NOTE — PROGRESS NOTES
Adult Nutrition  Assessment/PES    Patient Name:  Muarli Dennis  YOB: 1929  MRN: [id number]  Admit Date:  12/7/2017    Assessment Date:  12/8/2017    Comments:            Reason for Assessment       12/08/17 1500    Reason for Assessment    Reason For Assessment/Visit identified at risk by screening criteria    Identified At Risk By Screening Criteria MST SCORE 2+    Time Spent (min) 20    Neurological Alzheimer's;Dementia    Other diagnosis Contusion of Left hip secondary to fall.              Nutrition/Diet History       12/08/17 1501    Nutrition/Diet History    Reported/Observed By Patient    Appetite Good            Anthropometrics       12/08/17 1502    Anthropometrics    RD Documented Weight on Admission 91.4 kg (201 lb 8 oz)   Bed weight on admission.    Usual Body Weight (UBW)    Usual Body Weight --   Pt reports no weight changes.                   Nutrition Prescription Ordered       12/08/17 1503    Nutrition Prescription PO    Current PO Diet Regular            Evaluation of Received Nutrient/Fluid Intake       12/08/17 1504    PO Evaluation    Number of Days PO Intake Evaluated Insufficient Data            Problem/Interventions:        Problem 1       12/08/17 1504    Nutrition Diagnoses Problem 1    Problem 1 No Nutrition Diagnosis at this Time                    Intervention Goal       12/08/17 1504    Intervention Goal    General Nutrition support treatment    PO Establish PO            Nutrition Intervention       12/08/17 1504    Nutrition Intervention    RD/Tech Action Follow Tx progress;Care plan reviewd;Advise alternate selection;Interview for preference              Education/Evaluation       12/08/17 1504    Monitor/Evaluation    Monitor Per protocol        Electronically signed by:  Mouna Noe  12/08/17 3:04 PM

## 2017-12-08 NOTE — PROGRESS NOTES
Discharge Planning Assessment  [facility]     Patient Name: Murali Dennis  MRN: [id number]  Today's Date: 12/8/2017    Admit Date: 12/7/2017          Discharge Needs Assessment       12/08/17 1600    Living Environment    Lives With spouse    Living Arrangements house    Home Accessibility no concerns    Type of Financial/Environmental Concern none    Transportation Available car    Living Environment    Quality Of Family Relationships supportive    Able to Return to Prior Living Arrangements yes    Discharge Needs Assessment    Concerns To Be Addressed no discharge needs identified    Readmission Within The Last 30 Days no previous admission in last 30 days    Anticipated Changes Related to Illness none    Equipment Currently Used at Home walker, rolling    Equipment Needed After Discharge walker, rolling            Discharge Plan       12/08/17 1601    Case Management/Social Work Plan    Plan Social work spoke with Mr. Dennis and his spouse and they are visiting [place]. Mr. Dennis may benefit from rehab and he is currently in observation status.  The family requested that he be referred to [facility] for rehab and social work called Glendy King and made a referral at the families request on Friday 12/8/17. Glendy King requested that the weekend phone number be contacted over the weekend to check on the status of the referral to [facility].    Patient/Family In Agreement With Plan yes        Discharge Placement     No information found                Demographic Summary     None            Functional Status       12/08/17 1559    Functional Status Current    Ambulation 1-->assistive equipment    Transferring 1-->assistive equipment    Toileting 1-->assistive equipment    Bathing 2-->assistive person    Dressing 2-->assistive person    Eating 0-->independent    Communication 0-->understands/communicates without difficulty    Swallowing (if score 2 or more for any item,  consult Rehab Services) 0-->swallows foods/liquids without difficulty    Change in Functional Status Since Onset of Current Illness/Injury yes    Functional Status Prior    Ambulation 1-->assistive equipment    Transferring 1-->assistive equipment    Toileting 1-->assistive equipment    Bathing 2-->assistive person    Dressing 2-->assistive person    Eating 0-->independent    Communication 0-->understands/communicates without difficulty    Swallowing 0-->swallows foods/liquids without difficulty    IADL    Medications assistive person    Meal Preparation assistive person    Housekeeping assistive person    Laundry assistive person    Shopping assistive person    Oral Care assistive person    Activity Tolerance    Current Activity Limitations none    Usual Activity Tolerance good    Current Activity Tolerance good    Cognitive/Perceptual/Developmental    Current Mental Status/Cognitive Functioning no deficits noted    Developmental Stage (Eriksson's Stages of Development) Stage 8 (65 years-death/Late Adulthood) Integrity vs. Despair            Psychosocial     None            Abuse/Neglect     None            Legal     None            Substance Abuse     None            Patient Forms     None          BROCK Tran

## 2017-12-08 NOTE — PLAN OF CARE
Problem: Fall Risk (Adult)  Goal: Identify Related Risk Factors and Signs and Symptoms  Outcome: Ongoing (interventions implemented as appropriate)    12/07/17 2005   Fall Risk   Fall Risk: Related Risk Factors history of falls;confusion/agitation;environment unfamiliar   Fall Risk: Signs and Symptoms presence of risk factors

## 2017-12-08 NOTE — CONSULTS
Neurology    Referring Provider: Dr. Friedman    Reason for Consultation: possible parkinson's      Chief complaint: gait disorder and falls    Subjective .     History of present illness:  Mr. Dennis is an 88-year-old male with a past medical history significant for Alzheimer's disease, CAD, COPD who is admitted to the hospitalist service for a fall and gait disorder.  He apparently lost his balance at home and fell, injuring his hip but no fracture found on x-ray imaging.  Family has reported long-standing history of shuffling gait when he walks and difficulty arising from a seated position.  They also complain of tremors in his hands.  He and his wife are from [place] but they are here visiting her daughter.  Reportedly he was diagnosed with Alzheimer's disease but when he was evaluated by neurologist in [place] was found to not have Parkinson's disease.    Review of Systems: Positive for fall and gait disorder.Otherwise complete review of systems was discussed with the patient and found to be negative except for that mentioned in history of present illness or in the initial H&P dated 12/07/2017    History  Past Medical History:   Diagnosis Date   • Alzheimer disease    • Warren esophagus    • COPD (chronic obstructive pulmonary disease)    • Coronary artery disease    • Dementia    • Disease of thyroid gland    • GERD (gastroesophageal reflux disease)    • Sleep apnea    • Stroke    ,   Past Surgical History:   Procedure Laterality Date   • CARPAL TUNNEL RELEASE Right    • HEMORRHOIDECTOMY     • HERNIA REPAIR     , History reviewed. No pertinent family history.,   Social History   Substance Use Topics   • Smoking status: Current Every Day Smoker   • Smokeless tobacco: Former User   • Alcohol use No   ,   Prescriptions Prior to Admission   Medication Sig Dispense Refill Last Dose   • acetaminophen (TYLENOL) 500 MG tablet Take 500 mg by mouth Every 6 (Six) Hours As Needed for Mild Pain .      • aspirin 81 MG  "chewable tablet Chew 81 mg Daily.      • cetirizine (zyrTEC) 10 MG tablet Take 10 mg by mouth Daily.      • cholecalciferol (VITAMIN D3) 1000 units tablet Take 1,000 Units by mouth Daily.      • clopidogrel (PLAVIX) 75 MG tablet Take 75 mg by mouth Daily.      • dutasteride (AVODART) 0.5 MG capsule Take 0.5 mg by mouth Daily.      • haloperidol (HALDOL) 0.5 MG tablet Take 0.5 mg by mouth Every Night. 1-2      • levothyroxine (SYNTHROID, LEVOTHROID) 75 MCG tablet Take 75 mcg by mouth Daily.      • magnesium oxide (MAGOX) 400 (241.3 Mg) MG tablet tablet Take 400 mg by mouth Daily.      • mirtazapine (REMERON) 30 MG tablet Take 30 mg by mouth Every Night.      • montelukast (SINGULAIR) 10 MG tablet Take 10 mg by mouth Every Night.      • Multiple Vitamins-Minerals (EYE HEALTH) capsule Take  by mouth.      • nitroglycerin (NITROSTAT) 0.4 MG SL tablet Place 0.4 mg under the tongue Every 5 (Five) Minutes As Needed for Chest Pain. Take no more than 3 doses in 15 minutes.      • omeprazole (priLOSEC) 20 MG capsule Take 20 mg by mouth Daily.      • pravastatin (PRAVACHOL) 20 MG tablet Take 20 mg by mouth Daily.      • sacubitril-valsartan (ENTRESTO) 24-26 MG tablet Take 1 tablet by mouth Every Night.      • vitamin E 400 UNIT capsule Take 400 Units by mouth Daily.       and Allergies:  Codeine; Lipitor [atorvastatin]; Penicillins; Prozac [fluoxetine hcl]; Sulfa antibiotics; and Valium [diazepam]    Objective     Vital Signs   Blood pressure 139/75, pulse 69, temperature 97.5 °F (36.4 °C), temperature source Oral, resp. rate 16, height 175.3 cm (69\"), weight 91.4 kg (201 lb 8 oz), SpO2 96 %.    Physical Exam:      Gen: Lying in bed with eyes open. In NAD. Appears stated age   Eyes: PERRL, conjuntivae/lids normal   ENT: External canals normal bilaterally. Dentition normal   Neck: Supple. No thyroid enlargement noted   Respiratory: CTA bilaterally. Respirations unlabored   CV: RRR, S1 and S2 nml. Radial pulses 2+ bilaterally. "    Skin: No rashes noted on exposed skin. Normal tugor.   MSK: Normal bulkIn the upper and proximal lower extremities.  There appears to be some mild atrophy in the distal bilateral lower extremities.  Hammertoes and high arches noted bilaterally. Nml ROM     Neurologic:   Mental status: Awake, alert, Follows commands. Speech fluent.Mild hypomimia    CN: PERRL, EOM intact, sensation intact in upper/mid/lower face bilaterally, facial movements symmetric, hearing intact to finger rub bilaterally, palate elevates symmetrically, tongue movements and SCMs strong bilaterally    Motor: Strength full (5/5) throughout in BUE and BLE    Reflexes: 1+ throughout.  Positive glabellar reflex.  No palmomental reflex elicited    Sensory: Intact to LT throughout   Coordination: Mild intention tremor seen in the upper extremities bilaterally with hands outstretched.  No significant resting tremor seen.  There is mild cogwheeling at both wrists but the underlying tone appears normal   Gait: Not tested        Results Reviewed:     Labs reviewed  EKG report reviewed  Chest x-ray report reviewed                 Assessment/Plan     1.  Tremor = his tremor appears to be more notable with action and posture rather than at rest. He does have mild cogwheeling and hypomimia, however. Some frontal release signs noted. Unclear if this is parkinsonism or purely intention tremor. Recommend trial of Sinemet 10/100mg 1 tab TID and monitor. Can titrate dose as needed.     2.  Gait disorder = differential includes parkinsonism vs gait abnormalities due to peripheral neuropathy vs other causes. On exam, he exhibits hammertoes, high arches, and mild atrophy in the distal BLE, suspicious for long-standing polyneuropathic process. Consider EMG/NCV if patient is still in-house on Monday (otherwise can be done as an outpatient). Recommend rehab eval. Trial of Sinemet as above.        Ave Helms MD  12/08/17  4:44 PM

## 2017-12-08 NOTE — PLAN OF CARE
Problem: Patient Care Overview (Adult)  Goal: Plan of Care Review  Outcome: Ongoing (interventions implemented as appropriate)    12/08/17 1139   Coping/Psychosocial Response Interventions   Plan Of Care Reviewed With patient   Outcome Evaluation   Outcome Summary/Follow up Plan patient able to ambulate 40 ft with walker with min assist patient has decreased weight bearing on LLE due to pain. Recommend SNF for continued rehab post hosp stay         Problem: Inpatient Physical Therapy  Goal: Bed Mobility Goal LTG- PT  Outcome: Ongoing (interventions implemented as appropriate)    12/08/17 1139   Bed Mobility PT LTG   Bed Mobility PT LTG, Date Established 12/08/17   Bed Mobility PT LTG, Time to Achieve 2 wks   Bed Mobility PT LTG, Activity Type supine to sit/sit to supine   Bed Mobility PT LTG, [place] Level independent   Bed Mobility PT LTG, Outcome goal ongoing       Goal: Transfer Training Goal 1 LTG- PT  Outcome: Ongoing (interventions implemented as appropriate)    12/08/17 1139   Transfer Training PT LTG   Transfer Training PT LTG, Date Established 12/08/17   Transfer Training PT LTG, Time to Achieve 2 wks   Transfer Training PT LTG, Activity Type sit to stand/stand to sit   Transfer Training PT LTG, [place] Level contact guard assist   Transfer Training PT LTG, Assist Device walker, rolling   Transfer Training PT LTG, Outcome goal ongoing       Goal: Gait Training Goal LTG- PT  Outcome: Ongoing (interventions implemented as appropriate)    12/08/17 1139   Gait Training PT LTG   Gait Training Goal PT LTG, Date Established 12/08/17   Gait Training Goal PT LTG, Time to Achieve 2 wks   Gait Training Goal PT LTG, [place] Level contact guard assist   Gait Training Goal PT LTG, Assist Device walker, rolling   Gait Training Goal PT LTG, Distance to Achieve 300   Gait Training Goal PT LTG, Outcome goal ongoing

## 2017-12-09 ENCOUNTER — APPOINTMENT (OUTPATIENT)
Dept: CARDIOLOGY | Facility: HOSPITAL | Age: 82
End: 2017-12-09

## 2017-12-09 LAB
ANION GAP SERPL CALCULATED.3IONS-SCNC: 8 MMOL/L (ref 3–11)
ASCENDING AORTA: 3.5 CM
BH CV ECHO MEAS - AI DEC SLOPE: 149.5 CM/SEC^2
BH CV ECHO MEAS - AI MAX PG: 49 MMHG
BH CV ECHO MEAS - AI MAX VEL: 350 CM/SEC
BH CV ECHO MEAS - AI P1/2T: 685.7 MSEC
BH CV ECHO MEAS - AO MAX PG (FULL): 4.6 MMHG
BH CV ECHO MEAS - AO MAX PG: 7 MMHG
BH CV ECHO MEAS - AO MEAN PG (FULL): 3 MMHG
BH CV ECHO MEAS - AO MEAN PG: 4 MMHG
BH CV ECHO MEAS - AO ROOT AREA (BSA CORRECTED): 1.8
BH CV ECHO MEAS - AO ROOT AREA: 10.8 CM^2
BH CV ECHO MEAS - AO ROOT DIAM: 3.7 CM
BH CV ECHO MEAS - AO V2 MAX: 132 CM/SEC
BH CV ECHO MEAS - AO V2 MEAN: 95.1 CM/SEC
BH CV ECHO MEAS - AO V2 VTI: 28.7 CM
BH CV ECHO MEAS - ASC AORTA: 3.5 CM
BH CV ECHO MEAS - AVA(I,A): 2.1 CM^2
BH CV ECHO MEAS - AVA(I,D): 2.1 CM^2
BH CV ECHO MEAS - AVA(V,A): 1.8 CM^2
BH CV ECHO MEAS - AVA(V,D): 1.8 CM^2
BH CV ECHO MEAS - BSA(HAYCOCK): 2.1 M^2
BH CV ECHO MEAS - BSA: 2.1 M^2
BH CV ECHO MEAS - BZI_BMI: 30.1 KILOGRAMS/M^2
BH CV ECHO MEAS - BZI_METRIC_HEIGHT: 175.3 CM
BH CV ECHO MEAS - BZI_METRIC_WEIGHT: 92.5 KG
BH CV ECHO MEAS - EDV(CUBED): 195.1 ML
BH CV ECHO MEAS - EDV(TEICH): 166.6 ML
BH CV ECHO MEAS - EF(CUBED): 19.3 %
BH CV ECHO MEAS - EF(TEICH): 15.2 %
BH CV ECHO MEAS - ESV(CUBED): 157.5 ML
BH CV ECHO MEAS - ESV(TEICH): 141.3 ML
BH CV ECHO MEAS - FS: 6.9 %
BH CV ECHO MEAS - IVS/LVPW: 0.97
BH CV ECHO MEAS - IVSD: 1 CM
BH CV ECHO MEAS - LA DIMENSION: 4.8 CM
BH CV ECHO MEAS - LA/AO: 1.3
BH CV ECHO MEAS - LV MASS(C)D: 235.2 GRAMS
BH CV ECHO MEAS - LV MASS(C)DI: 112.9 GRAMS/M^2
BH CV ECHO MEAS - LV MAX PG: 2.4 MMHG
BH CV ECHO MEAS - LV MEAN PG: 1 MMHG
BH CV ECHO MEAS - LV V1 MAX: 77.5 CM/SEC
BH CV ECHO MEAS - LV V1 MEAN: 58.1 CM/SEC
BH CV ECHO MEAS - LV V1 VTI: 18.8 CM
BH CV ECHO MEAS - LVIDD: 5.8 CM
BH CV ECHO MEAS - LVIDS: 5 CM
BH CV ECHO MEAS - LVOT AREA (M): 3.1 CM^2
BH CV ECHO MEAS - LVOT AREA: 3.1 CM^2
BH CV ECHO MEAS - LVOT DIAM: 2 CM
BH CV ECHO MEAS - LVPWD: 1 CM
BH CV ECHO MEAS - MV A MAX VEL: 88.4 CM/SEC
BH CV ECHO MEAS - MV DEC TIME: 0.22 SEC
BH CV ECHO MEAS - MV E MAX VEL: 46.4 CM/SEC
BH CV ECHO MEAS - MV E/A: 0.52
BH CV ECHO MEAS - PA MAX PG: 2.7 MMHG
BH CV ECHO MEAS - PA V2 MAX: 80.9 CM/SEC
BH CV ECHO MEAS - RVDD: 2.2 CM
BH CV ECHO MEAS - SI(AO): 148.1 ML/M^2
BH CV ECHO MEAS - SI(CUBED): 18.1 ML/M^2
BH CV ECHO MEAS - SI(LVOT): 28.4 ML/M^2
BH CV ECHO MEAS - SI(TEICH): 12.1 ML/M^2
BH CV ECHO MEAS - SV(AO): 308.6 ML
BH CV ECHO MEAS - SV(CUBED): 37.6 ML
BH CV ECHO MEAS - SV(LVOT): 59.1 ML
BH CV ECHO MEAS - SV(TEICH): 25.2 ML
BH CV XLRA - RV BASE: 3.1 CM
BH CV XLRA - RV LENGTH: 5.8 CM
BH CV XLRA - RV MID: 3.1 CM
BH CV XLRA - TDI S': 8.5 CM/SEC
BUN BLD-MCNC: 17 MG/DL (ref 9–23)
BUN/CREAT SERPL: 11.3 (ref 7–25)
CALCIUM SPEC-SCNC: 8.9 MG/DL (ref 8.7–10.4)
CHLORIDE SERPL-SCNC: 105 MMOL/L (ref 99–109)
CO2 SERPL-SCNC: 27 MMOL/L (ref 20–31)
CREAT BLD-MCNC: 1.5 MG/DL (ref 0.6–1.3)
GFR SERPL CREATININE-BSD FRML MDRD: 44 ML/MIN/1.73
GLUCOSE BLD-MCNC: 121 MG/DL (ref 70–100)
LV EF 2D ECHO EST: 28 %
POTASSIUM BLD-SCNC: 3.8 MMOL/L (ref 3.5–5.5)
SODIUM BLD-SCNC: 140 MMOL/L (ref 132–146)

## 2017-12-09 PROCEDURE — 96372 THER/PROPH/DIAG INJ SC/IM: CPT

## 2017-12-09 PROCEDURE — 80048 BASIC METABOLIC PNL TOTAL CA: CPT | Performed by: PHYSICIAN ASSISTANT

## 2017-12-09 PROCEDURE — G0378 HOSPITAL OBSERVATION PER HR: HCPCS

## 2017-12-09 PROCEDURE — 25010000002 SULFUR HEXAFLUORIDE MICROSPH 60.7-25 MG RECONSTITUTED SUSPENSION: Performed by: INTERNAL MEDICINE

## 2017-12-09 PROCEDURE — 97166 OT EVAL MOD COMPLEX 45 MIN: CPT

## 2017-12-09 PROCEDURE — 94760 N-INVAS EAR/PLS OXIMETRY 1: CPT

## 2017-12-09 PROCEDURE — 25010000002 HEPARIN (PORCINE) PER 1000 UNITS: Performed by: HOSPITALIST

## 2017-12-09 PROCEDURE — 99232 SBSQ HOSP IP/OBS MODERATE 35: CPT | Performed by: PHYSICIAN ASSISTANT

## 2017-12-09 PROCEDURE — G8988 SELF CARE GOAL STATUS: HCPCS

## 2017-12-09 PROCEDURE — 94640 AIRWAY INHALATION TREATMENT: CPT

## 2017-12-09 PROCEDURE — 93306 TTE W/DOPPLER COMPLETE: CPT | Performed by: INTERNAL MEDICINE

## 2017-12-09 PROCEDURE — G8987 SELF CARE CURRENT STATUS: HCPCS

## 2017-12-09 PROCEDURE — 96361 HYDRATE IV INFUSION ADD-ON: CPT

## 2017-12-09 PROCEDURE — 93306 TTE W/DOPPLER COMPLETE: CPT

## 2017-12-09 RX ORDER — LISINOPRIL 2.5 MG/1
2.5 TABLET ORAL
Status: DISCONTINUED | OUTPATIENT
Start: 2017-12-09 | End: 2017-12-10

## 2017-12-09 RX ORDER — IPRATROPIUM BROMIDE AND ALBUTEROL SULFATE 2.5; .5 MG/3ML; MG/3ML
3 SOLUTION RESPIRATORY (INHALATION) EVERY 4 HOURS PRN
Status: DISCONTINUED | OUTPATIENT
Start: 2017-12-09 | End: 2017-12-12 | Stop reason: HOSPADM

## 2017-12-09 RX ADMIN — CARBIDOPA AND LEVODOPA 1 TABLET: 10; 100 TABLET ORAL at 08:34

## 2017-12-09 RX ADMIN — ASPIRIN 81 MG 81 MG: 81 TABLET ORAL at 08:33

## 2017-12-09 RX ADMIN — CARBIDOPA AND LEVODOPA 1 TABLET: 10; 100 TABLET ORAL at 23:24

## 2017-12-09 RX ADMIN — LEVOTHYROXINE SODIUM 75 MCG: 75 TABLET ORAL at 20:42

## 2017-12-09 RX ADMIN — HEPARIN SODIUM 5000 UNITS: 5000 INJECTION, SOLUTION INTRAVENOUS; SUBCUTANEOUS at 14:50

## 2017-12-09 RX ADMIN — VITAMIN E CAP 400 UNIT 400 UNITS: 400 CAP at 08:33

## 2017-12-09 RX ADMIN — SULFUR HEXAFLUORIDE 2 ML: KIT at 15:23

## 2017-12-09 RX ADMIN — LISINOPRIL 2.5 MG: 2.5 TABLET ORAL at 11:46

## 2017-12-09 RX ADMIN — FINASTERIDE 5 MG: 5 TABLET, FILM COATED ORAL at 08:33

## 2017-12-09 RX ADMIN — CARBIDOPA AND LEVODOPA 1 TABLET: 10; 100 TABLET ORAL at 18:40

## 2017-12-09 RX ADMIN — HEPARIN SODIUM 5000 UNITS: 5000 INJECTION, SOLUTION INTRAVENOUS; SUBCUTANEOUS at 20:42

## 2017-12-09 RX ADMIN — MIRTAZAPINE 30 MG: 15 TABLET, FILM COATED ORAL at 20:42

## 2017-12-09 RX ADMIN — IPRATROPIUM BROMIDE AND ALBUTEROL SULFATE 3 ML: .5; 3 SOLUTION RESPIRATORY (INHALATION) at 07:51

## 2017-12-09 RX ADMIN — MONTELUKAST SODIUM 10 MG: 10 TABLET, FILM COATED ORAL at 08:33

## 2017-12-09 RX ADMIN — CLOPIDOGREL BISULFATE 75 MG: 75 TABLET ORAL at 08:33

## 2017-12-09 RX ADMIN — PANTOPRAZOLE SODIUM 40 MG: 40 TABLET, DELAYED RELEASE ORAL at 05:48

## 2017-12-09 RX ADMIN — ATORVASTATIN CALCIUM 10 MG: 10 TABLET, FILM COATED ORAL at 20:42

## 2017-12-09 RX ADMIN — HEPARIN SODIUM 5000 UNITS: 5000 INJECTION, SOLUTION INTRAVENOUS; SUBCUTANEOUS at 05:48

## 2017-12-09 NOTE — PLAN OF CARE
Problem: Patient Care Overview (Adult)  Goal: Plan of Care Review  Outcome: Ongoing (interventions implemented as appropriate)  Goal: Adult Individualization and Mutuality  Outcome: Ongoing (interventions implemented as appropriate)  Goal: Discharge Needs Assessment  Outcome: Ongoing (interventions implemented as appropriate)    Problem: Fall Risk (Adult)  Goal: Identify Related Risk Factors and Signs and Symptoms  Outcome: Ongoing (interventions implemented as appropriate)  Goal: Absence of Falls  Outcome: Ongoing (interventions implemented as appropriate)    Problem: Skin Integrity Impairment, Risk/Actual (Adult)  Goal: Identify Related Risk Factors and Signs and Symptoms  Outcome: Ongoing (interventions implemented as appropriate)  Goal: Skin Integrity/Wound Healing  Outcome: Ongoing (interventions implemented as appropriate)

## 2017-12-09 NOTE — PLAN OF CARE
Problem: Patient Care Overview (Adult)  Goal: Plan of Care Review  Outcome: Ongoing (interventions implemented as appropriate)    12/09/17 1055   Coping/Psychosocial Response Interventions   Plan Of Care Reviewed With patient   Outcome Evaluation   Outcome Summary/Follow up Plan OT IE completed. Pt presents with generalized weakness, confusion and unsteady gait. Min A bed mobility; Mod A transfer. OT to follow. Recommend IRF at d/c for best outcome and to support safe return to home with spouse.         Problem: Inpatient Occupational Therapy  Goal: Bed Mobility Goal LTG- OT  Outcome: Ongoing (interventions implemented as appropriate)    12/09/17 1055   Bed Mobility OT LTG   Bed Mobility OT LTG, Time to Achieve by discharge   Bed Mobility OT LTG, Activity Type all bed mobility   Bed Mobility OT LTG, [place] Level supervision required;verbal cues required       Goal: Transfer Training Goal 1 LTG- OT  Outcome: Ongoing (interventions implemented as appropriate)    12/09/17 1055   Transfer Training OT LTG   Transfer Training OT LTG, Time to Achieve by discharge   Transfer Training OT LTG, Activity Type sit to stand/stand to sit;bed to chair /chair to bed;toilet   Transfer Training OT LTG, [place] Level minimum assist (75% patient effort);verbal cues required   Transfer Training OT LTG, Assist Device walker, rolling       Goal: Strength Goal LTG- OT  Outcome: Ongoing (interventions implemented as appropriate)    12/09/17 1055   Strength OT LTG   Strength Goal OT LTG, Functional Goal Pt completes written HEP 3x10 reps with SBA/demonstration and cues prn to support ADLs       Goal: Toileting Goal LTG- OT  Outcome: Ongoing (interventions implemented as appropriate)    12/09/17 1055   Toileting OT LTG   Toileting Goal OT LTG, Time to Achieve by discharge   Toileting Goal OT LTG, [place] Level minimum assist (75% patient effort);verbal cues required   Toileting Goal OT LTG, Additional Goal Pt able to manage  clothing and hygiene with Min A to steady and cues for task completion       Goal: Functional Mobility Goal LTG- OT  Outcome: Ongoing (interventions implemented as appropriate)    12/09/17 1055   Functional Mobility OT LTG   Functional Mobility Goal OT LTG, Time to Achieve by discharge   Functional Mobility Goal OT LTG, [place] Level min assist   Functional Mobility Goal OT LTG, Assist Device rolling walker   Functional Mobility Goal OT LTG, Distance to Achieve to the bathroom

## 2017-12-09 NOTE — PROGRESS NOTES
[facility]  PROGRESS NOTE    Patient Name: Murali Dennis  : 1929  MRN: [id number]    Date of Admission: 2017  Length of Stay: 0  Primary Care Physician: No Known Provider    Subjective   Subjective     CC:  Hip pain    HPI:  Patient sitting up in chair at time of visit, no family present.  Patient has some dementia, is oriented to personat a hospital in [place].  Patient is feeling very well today no further complaints of dizziness or lightheadedness ambulated with PT earlier.  Is not complaining of hip pain.  No shortness of air, chest discomfort.    Review of Systems  Gen- No fevers, chills or wt gain  CV- No chest pain, palpitations, orthopnea  Resp- No cough, dyspnea  GI- No N/V/D, abd pain  MSK- left hip pain only with ambulation has improved  Neuro- dizziness without syncope upon ambulation has improved    Otherwise ROS is negative except as mentioned in the HPI.    Objective   Objective     Vital Signs:   Temp:  [97.5 °F (36.4 °C)-98.2 °F (36.8 °C)] 97.7 °F (36.5 °C)  Heart Rate:  [64-96] 70  Resp:  [16-18] 18  BP: (103-146)/(63-90) 130/80        Physical Exam:  Constitutional: Sitting up in chair, no acute distress, awake, alert, looks comfortable  Eyes: PERRLA, sclerae anicteric, no conjunctival injection  HENT: NCAT, mucous membranes dry  Neck: Supple, trachea midline, no carotid bruits  Respiratory: Clear to auscultation bilaterally, nonlabored respirations   Cardiovascular: RRR, no murmurs, rubs, or gallops, palpable pedal pulses bilaterally  Gastrointestinal: Positive bowel sounds, soft, nontender, nondistended  Musculoskeletal:  No bilateral ankle edema, no clubbing or cyanosis to extremities, no malrotation of Left LE  Psychiatric: Appropriate affect, cooperative, cheerful  Neurologic: Oriented x person and place, strength symmetric in all extremities, Cranial Nerves grossly intact to confrontation, Muscle strength symmetric bilat UE/LE.  speech clear.  No tremor noted today   Skin: warm, dry. No rashes    Results Reviewed:  I have personally reviewed current lab, radiology, and data and agree.      Results from last 7 days  Lab Units 12/08/17  0519 12/07/17  1812   WBC 10*3/mm3 4.96 7.53   HEMOGLOBIN g/dL 12.9* 13.2   HEMATOCRIT % 38.5* 40.4   PLATELETS 10*3/mm3 157 168       Results from last 7 days  Lab Units 12/09/17  0328 12/08/17  0519 12/07/17  1812   SODIUM mmol/L 140 140 137   POTASSIUM mmol/L 3.8 3.8 4.4   CHLORIDE mmol/L 105 105 108   CO2 mmol/L 27.0 25.0 25.0   BUN mg/dL 17 16 15   CREATININE mg/dL 1.50* 1.50* 1.50*   GLUCOSE mg/dL 121* 120* 110*   CALCIUM mg/dL 8.9 9.0 8.9     No results found for: BNP  No results found for: PHART    Microbiology Results Abnormal     None          Imaging Results (last 24 hours)     ** No results found for the last 24 hours. **             I have reviewed the medications.    Assessment/Plan   Assessment / Plan     Hospital Problem List     * (Principal)Contusion of left hip    Dizziness    Dementia    Hypothyroid    CKD (chronic kidney disease)    Chronic congestive heart failure    CAD (coronary artery disease)    Generalized weakness             Brief Hospital Course to date:  Murali Dennis is a 88 y.o. male with hx of CHF (data deficient), CAD s/p CABG who fell 2nd to dizziness with ambulation now with left hip pain 2nd to contusion      Assessment & Plan  -Neurology Consulted, appreciate recommendations, Recommends trial Sinemet 10/100mg 1 tab TID, titrate prn. D/t Gait d/o and Physical findings of hammertoes, high arches, and mild atrophy in the distal BLE, suspicious for long-standing polyneuropathic process. Consider EMG/NCV if patient is still in-house on Monday (otherwise can be done as an outpatient). Recommend rehab eval.   -B12/folate and TSH ok  -Hip views negative, needs PT/OT. Supportive care  -? Baseline, Cr stable x48hr has hx CKD.   -Holding Entresto for now, suspect over  diuresing/orthostatic hypotension leading to dizziness/fall.  -Dizziness has resolved with 24 hours of gentle hydration  -Will start low dose Lisinopril see how he does, follow up with PCP for further management of CHF (currently compensated).  -Pending results of echo and exam consider low-dose Lasix  --recent orthostatic BP negative, carotid duplex showed no obstructive stenosis, await echo   -strict I/O, daily wt  -PT recommends SNF for rehab, will consult OT and CM. Pt agrees to rehab inpt or outpt depending on what insurance willling to arrange  -Obtaining records from PCP  -reviewed Hip and CXR imaging without acute findings.   DVT Prophylaxis:  heparin    CODE STATUS: Full Code    Disposition: I expect the patient to be discharged to rehabilitation when bed available      Casie M Mayne, PA-C  12/09/17  10:30 AM

## 2017-12-09 NOTE — THERAPY EVALUATION
Acute Care - Occupational Therapy Initial Evaluation  [facility]     Patient Name: Murali Dennis  : 1929  MRN: [id number]  Today's Date: 2017  Onset of Illness/Injury or Date of Surgery Date: 17  Date of Referral to OT: 17  Referring Physician: Mayne, PA-C    Admit Date: 2017       ICD-10-CM ICD-9-CM   1. Mobility impaired Z74.09 799.89   2. Contusion of left hip, initial encounter S70.02XA 924.01   3. Fall from standing, initial encounter W19.XXXA E888.9   4. Impaired functional mobility, balance, gait, and endurance Z74.09 V49.89   5. Impaired mobility and ADLs Z74.09 799.89     Patient Active Problem List   Diagnosis   • Contusion of left hip   • Dementia   • Hypothyroid   • CKD (chronic kidney disease)   • Chronic congestive heart failure   • CAD (coronary artery disease)   • Dizziness   • Generalized weakness     Past Medical History:   Diagnosis Date   • Alzheimer disease    • Warren esophagus    • COPD (chronic obstructive pulmonary disease)    • Coronary artery disease    • Dementia    • Disease of thyroid gland    • GERD (gastroesophageal reflux disease)    • Sleep apnea    • Stroke      Past Surgical History:   Procedure Laterality Date   • CARPAL TUNNEL RELEASE Right    • HEMORRHOIDECTOMY     • HERNIA REPAIR            OT ASSESSMENT FLOWSHEET (last 72 hours)      OT Evaluation       17 1004 17 1600 17 1559 17 1100 17 2300    Rehab Evaluation    Document Type evaluation  -TB   evaluation  -CLIVE     Subjective Information no complaints;agree to therapy  -TB   agree to therapy;complains of;pain  -CLIVE     Patient Effort, Rehab Treatment good  -TB   good  -CLIVE     Symptoms Noted During/After Treatment increased pain  -TB   increased pain  -CLIVE     General Information    Patient Profile Review yes  -TB   yes  -CLIVE     Onset of Illness/Injury or Date of Surgery Date 17  -TB   17  -CLIVE     Referring Physician Mayne, PA-C  -TB   MD Rey  -CLIVE      General Observations Pt rec'vd in high fowlers, room air, IV access intact; no family present  -TB        Pertinent History Of Current Problem Pt to ED s/p fall with c/o acute L hip pain and inability to walk; XR (-) for fracture. Pt admitted for further evaluation of falls  -TB   patient admitted to the hosp after falling at home and having pain in left hip with inability to ambulate  -CLIVE     Precautions/Limitations fall precautions   exit alarms  -TB   fall precautions  -CLIVE     Prior Level of Function min assist:;all household mobility;transfer;bed mobility;ADL's   Pt is poor historian; no family present to clarify  -TB        Equipment Currently Used at Home walker, rolling  -TB walker, rolling  -AW  walker, standard  -CLIVE walker, standard  -RH    Plans/Goals Discussed With patient;agreed upon  -TB   patient;agreed upon  -CLIVE     Risks Reviewed patient:;LOB;increased discomfort;lines disloged  -TB   patient:;LOB;increased discomfort  -CLIVE     Benefits Reviewed patient:;improve function;increase independence;decrease pain;increase knowledge  -TB   patient:;improve function;increase strength;decrease risk of DVT  -CLIVE     Barriers to Rehab medically complex;previous functional deficit;cognitive status  -TB   medically complex;previous functional deficit;cognitive status  -CLIVE     Living Environment    Lives With spouse  -TB spouse  -AW  spouse  -CLIVE spouse  -RH    Living Arrangements house  -TB house  -AW  house  -CLIVE house  -RH    Home Accessibility no concerns  -TB no concerns  -AW  no concerns  -CLIVE no concerns  -RH    Stair Railings at Home     none  -RH    Type of Financial/Environmental Concern  none  -AW   none  -RH    Transportation Available  car  -AW   none  -RH    Living Environment Comment Pt lives with spouse in [place]; visiting dtr in [place] at time of fall   Pt is poor historian; no family present to clarify  -TB        Clinical Impression    Date of Referral to OT 12/08/17  -TB        OT Diagnosis  Impaired mobility, transfer and ADL  -TB        Patient/Family Goals Statement Family requesting rehab at d/c  -TB        Criteria for Skilled Therapeutic Interventions Met yes;treatment indicated  -TB        Rehab Potential fair, will monitor progress closely  -TB        Therapy Frequency daily  -TB        Anticipated Equipment Needs At Discharge --   TBD; Pt is poor historian; no family present to clarify  -TB        Anticipated Discharge Disposition inpatient rehabilitation facility  -TB        Functional Level Prior    Ambulation   1-->assistive equipment  -AW  1-->assistive equipment  -RH    Transferring   1-->assistive equipment  -AW  1-->assistive equipment  -RH    Toileting   1-->assistive equipment  -AW  1-->assistive equipment  -RH    Bathing   2-->assistive person  -AW  2-->assistive person  -RH    Dressing   2-->assistive person  -AW  2-->assistive person  -RH    Eating   0-->independent  -AW  0-->independent  -RH    Communication   0-->understands/communicates without difficulty  -AW  0-->understands/communicates without difficulty  -RH    Swallowing   0-->swallows foods/liquids without difficulty  -AW  0-->swallows foods/liquids without difficulty  -RH    Prior Functional Level Comment Min A at baseline  -TB    N/A  -RH    Vital Signs    Pre Systolic BP Rehab --   stable; RN cleared OT  -TB        Post SpO2 (%) 92  -TB        O2 Delivery Post Treatment room air  -TB        Pre Patient Position Supine  -TB        Intra Patient Position Standing  -TB        Post Patient Position Sitting  -TB        Pain Assessment    Pain Assessment Meek-Valles FACES  -TB   0-10  -CLIVE     Meek-Valles FACES Pain Rating 2  -TB        Pain Score    3  -CLIVE     Post Pain Score    5  -CLIVE     Pain Type Acute pain  -TB   Acute pain  -CLIVE     Pain Location Hip  -TB   Hip  -CLIVE     Pain Orientation Left  -TB   Left  -CLIVE     Pain Intervention(s) Ambulation/increased activity;Repositioned  -TB   Repositioned;Ambulation/increased activity  " -CLIVE     Response to Interventions Pt denied pain at rest; minor pain in standing  -TB        Vision Assessment/Intervention    Visual Impairment WFL  -TB        Cognitive Assessment/Intervention    Current Cognitive/Communication Assessment impaired  -TB   impaired  -CLIVE     Orientation Status oriented to;person;place;required verbal cueing (specifiy in comments);disoriented to;time;situation   \"hospital\"  -TB   oriented to;person;place  -CLIVE     Follows Commands/Answers Questions able to follow single-step instructions;needs cueing;needs repetition;75% of the time  -TB   75% of the time;able to follow single-step instructions;needs cueing;needs repetition  -CLIVE     Personal Safety decreased awareness, need for assist;decreased awareness, need for safety;decreased insight to deficits;moderate impairment;impulsive  -TB   mild impairment;decreased awareness, need for assist;decreased awareness, need for safety;decreased insight to deficits  -CLIVE     Personal Safety Interventions fall prevention program maintained;gait belt;nonskid shoes/slippers when out of bed;other (see comments)   exit alarms  -TB   fall prevention program maintained;gait belt;nonskid shoes/slippers when out of bed  -CLIVE     Short/Long Term Memory moderate impairment, short term memory;mild impairment, long term memory  -TB        ROM (Range of Motion)    General ROM no range of motion deficits identified  -TB   lower extremity range of motion deficits identified   LLE limited hip movement due to pain  -CLIVE     MMT (Manual Muscle Testing)    General MMT Assessment upper extremity strength deficits identified  -TB   upper extremity strength deficits identified;lower extremity strength deficits identified   generalized weakness 4-/5  -CLIVE     General MMT Assessment Detail B UE 4-/5  -TB        Bed Mobility, Assessment/Treatment    Bed Mobility, Scoot/Bridge, [place] minimum assist (75% patient effort);verbal cues required  -TB   minimum assist (75% " patient effort)  -CLIVE     Bed Mob, Supine to Sit, [place] minimum assist (75% patient effort);verbal cues required  -TB   minimum assist (75% patient effort)  -CLIVE     Bed Mob, Sit to Supine, [place] not tested  -TB   minimum assist (75% patient effort)  -CLIVE     Bed Mobility, Safety Issues cognitive deficits limit understanding  -TB   cognitive deficits limit understanding  -CLIVE     Bed Mobility, Impairments strength decreased;impaired balance;pain  -TB   strength decreased;impaired balance  -CLIVE     Bed Mobility, Comment cues and assist to sequence  -TB        Transfer Assessment/Treatment    Transfers, Bed-Chair [place] minimum assist (75% patient effort);verbal cues required  -TB        Transfers, Bed-Chair-Bed, Assist Device rolling walker  -TB        Transfers, Sit-Stand [place] moderate assist (50% patient effort);verbal cues required  -TB   minimum assist (75% patient effort);1 person + 1 person to manage equipment   from high bed  -CLIVE     Transfers, Stand-Sit [place] minimum assist (75% patient effort);verbal cues required  -TB   minimum assist (75% patient effort);1 person + 1 person to manage equipment  -CLIVE     Transfers, Sit-Stand-Sit, Assist Device rolling walker  -TB        Transfer, Safety Issues loses balance backward;balance decreased during turns;sequencing ability decreased;step length decreased  -TB   balance decreased during turns;step length decreased;weight-shifting ability decreased   painful to bear weight on left LE   -CLIVE     Transfer, Impairments strength decreased;impaired balance;pain  -TB   strength decreased;impaired balance  -CLIVE     Transfer, Comment cues and assist for hand placement/sequencing and RW safety  -TB   patient needs cues for walker use and safety  -CLIVE     Functional Mobility    Functional Mobility- Ind. Level minimum assist (75% patient effort);verbal cues required  -TB        Functional Mobility- Device rolling walker  -TB        Functional  Mobility-Distance (Feet) 5  -TB        Functional Mobility- Safety Issues loses balance backward;balance decreased during turns;sequencing ability decreased;step length decreased  -TB        Functional Mobility- Comment cues and assist for sequencing and RW safety  -TB        Upper Body Dressing Assessment/Training    UB Dressing Assess/Train, Clothing Type donning:;hospital gown  -TB        UB Dressing Assess/Train, Position sitting  -TB        UB Dressing Assess/Train, [place] minimum assist (75% patient effort);verbal cues required  -TB        UB Dressing Assess/Train, Comment assist for lines  -TB        Lower Body Dressing Assessment/Training    LB Dressing Assess/Train, Clothing Type donning:;slipper socks  -TB        LB Dressing Assess/Train, Position sitting  -TB        LB Dressing Assess/Train, [place] contact guard assist  -TB        LB Dressing Assess/Train, Comment No AE need indicated  -TB        Toileting Assessment/Training    Toileting Assess/Train, Assistive Device urinal  -TB        Toileting Assess/Train, Position sitting  -TB        Toileting Assess/Train, Indepen Level minimum assist (75% patient effort)  -TB        Grooming Assessment/Training    Grooming Assess/Train, Position sitting  -TB        Grooming Assess/Train, Indepen Level set up required  -TB        Grooming Assess/Train, Comment to wash hands  -TB        Motor Skills/Interventions    Additional Documentation    Balance Skills Training (Group)  -CLIVE     Balance Skills Training    Sitting-Level of Assistance Contact guard  -TB   Contact guard  -CLIVE     Sitting-Balance Support Feet supported  -TB   Feet supported  -CLIVE     Standing-Level of Assistance Minimum assistance  -TB   Minimum assistance;x2  -CLIVE     Static Standing Balance Support assistive device  -TB   assistive device  -CLIVE     Gait Balance-Level of Assistance    Minimum assistance;x2  -CLIVE     Gait Balance Support    assistive device  -CLIVE     Therapy Exercises     Bilateral Lower Extremities    AROM:;10 reps;sitting;ankle pumps/circles;hip flexion;LAQ  -CLIVE     Bilateral Upper Extremity AROM:;sitting;shoulder extension/flexion;shoulder abduction/adduction;shoulder horizontal abd/add;shoulder ER/IR;elbow flexion/extension;pronation/supination;hand pumps  -TB        Fine Motor Coordination Training    Opposition Right:;Left:;intact  -TB        Sensory Assessment/Intervention    Light Touch LUE;RUE  -TB        LUE Light Touch WNL  -TB        RUE Light Touch WNL  -TB        Positioning and Restraints    Pre-Treatment Position in bed  -TB   in bed  -CLIVE     Post Treatment Position chair  -TB   bed  -CLIVE     In Bed    notified nsg;supine;call light within reach;exit alarm on  -CLIVE     In Chair notified nsg;reclined;call light within reach;encouraged to call for assist;exit alarm on;legs elevated;with other staff   PCT present   -TB          User Key  (r) = Recorded By, (t) = Taken By, (c) = Cosigned By    Initials Name Effective Dates     Michelle Perez OT 06/22/15 -     CLIVE Kathleen, PT 06/19/15 -     AW BROCK Tran 03/14/16 -     MANJULA Crenshaw RN 08/04/17 -            Occupational Therapy Education     Title: PT OT SLP Therapies (Done)     Topic: Occupational Therapy (Done)     Point: ADL training (Done)    Description: Instruct learner(s) on proper safety adaptation and remediation techniques during self care or transfers.   Instruct in proper use of assistive devices.    Learning Progress Summary    Learner Readiness Method Response Comment Documented by Status   Patient Acceptance E,D,TB VU,NR Education initiated for benefits of OOB activity/therapy, role of OT and recommendations for IRF at d/c to support safe return to home with spouse. TB 12/09/17 1054 Done                      User Key     Initials Effective Dates Name Provider Type Discipline     06/22/15 -  Michelle Perez OT Occupational Therapist OT                  OT  Recommendation and Plan  Anticipated Equipment Needs At Discharge:  (TBD; Pt is poor historian; no family present to clarify)  Anticipated Discharge Disposition: inpatient rehabilitation facility  Therapy Frequency: daily  Plan of Care Review  Plan Of Care Reviewed With: patient  Outcome Summary/Follow up Plan: OT IE completed. Pt presents with generalized weakness, confusion and unsteady gait. Min A bed mobility; Mod A transfer. OT to follow. Recommend IRF at d/c for best outcome and to support safe return to home with spouse.          OT Goals       12/09/17 1055          Bed Mobility OT LTG    Bed Mobility OT LTG, Time to Achieve by discharge  -TB      Bed Mobility OT LTG, Activity Type all bed mobility  -TB      Bed Mobility OT LTG, [place] Level supervision required;verbal cues required  -TB      Transfer Training OT LTG    Transfer Training OT LTG, Time to Achieve by discharge  -TB      Transfer Training OT LTG, Activity Type sit to stand/stand to sit;bed to chair /chair to bed;toilet  -TB      Transfer Training OT LTG, [place] Level minimum assist (75% patient effort);verbal cues required  -TB      Transfer Training OT LTG, Assist Device walker, rolling  -TB      Strength OT LTG    Strength Goal OT LTG, Functional Goal Pt completes written HEP 3x10 reps with SBA/demonstration and cues prn to support ADLs  -TB      Toileting OT LTG    Toileting Goal OT LTG, Time to Achieve by discharge  -TB      Toileting Goal OT LTG, [place] Level minimum assist (75% patient effort);verbal cues required  -TB      Toileting Goal OT LTG, Additional Goal Pt able to manage clothing and hygiene with Min A to steady and cues for task completion  -TB      Functional Mobility OT LTG    Functional Mobility Goal OT LTG, Time to Achieve by discharge  -TB      Functional Mobility Goal OT LTG, [place] Level min assist  -TB      Functional Mobility Goal OT LTG, Assist Device rolling walker  -TB      Functional Mobility  Goal OT LTG, Distance to Achieve to the bathroom  -TB        User Key  (r) = Recorded By, (t) = Taken By, (c) = Cosigned By    Initials Name Provider Type    TB Michelle Perez OT Occupational Therapist                Outcome Measures       12/09/17 1004 12/08/17 1100       How much help from another person do you currently need...    Turning from your back to your side while in flat bed without using bedrails?  3  -CLIVE     Moving from lying on back to sitting on the side of a flat bed without bedrails?  3  -CLIVE     Moving to and from a bed to a chair (including a wheelchair)?  3  -CLIVE     Standing up from a chair using your arms (e.g., wheelchair, bedside chair)?  3  -CLIVE     Climbing 3-5 steps with a railing?  1  -CLIVE     To walk in hospital room?  3  -CLIVE     AM-PAC 6 Clicks Score  16  -CLIVE     How much help from another is currently needed...    Putting on and taking off regular lower body clothing? 3  -TB      Bathing (including washing, rinsing, and drying) 2  -TB      Toileting (which includes using toilet bed pan or urinal) 2  -TB      Putting on and taking off regular upper body clothing 3  -TB      Taking care of personal grooming (such as brushing teeth) 3  -TB      Eating meals 3  -TB      Score 16  -TB      Functional Assessment    Outcome Measure Options AM-PAC 6 Clicks Daily Activity (OT)  -TB AM-PAC 6 Clicks Basic Mobility (PT)  -CLIVE       User Key  (r) = Recorded By, (t) = Taken By, (c) = Cosigned By    Initials Name Provider Type    TB Michelle Perez OT Occupational Therapist    CLIVE Kathleen, PT Physical Therapist          Time Calculation:   OT Start Time: 1004    Therapy Charges for Today     Code Description Service Date Service Provider Modifiers Qty    85598490533 HC OT SELFCARE CURRENT 12/9/2017 RAUL Fry CK 1    75878919383 HC OT SELFCARE PROJECTED 12/9/2017 RAUL Fry CK 1    [id number]  OT EVAL MOD COMPLEXITY 4 12/9/2017 Michelle  Nina Perez, OT GO, KX 1          OT G-codes  OT Professional Judgement Used?: Yes  OT Functional Scales Options: AM-PAC 6 Clicks Daily Activity (OT)  Score: 16  Functional Limitation: Self care  Self Care Current Status (): At least 40 percent but less than 60 percent impaired, limited or restricted  Self Care Goal Status (): At least 40 percent but less than 60 percent impaired, limited or restricted    Michelle Perez OT  12/9/2017

## 2017-12-10 PROCEDURE — 99213 OFFICE O/P EST LOW 20 MIN: CPT | Performed by: PSYCHIATRY & NEUROLOGY

## 2017-12-10 PROCEDURE — 99233 SBSQ HOSP IP/OBS HIGH 50: CPT | Performed by: INTERNAL MEDICINE

## 2017-12-10 PROCEDURE — 25010000002 HEPARIN (PORCINE) PER 1000 UNITS: Performed by: HOSPITALIST

## 2017-12-10 PROCEDURE — G0378 HOSPITAL OBSERVATION PER HR: HCPCS

## 2017-12-10 PROCEDURE — 96372 THER/PROPH/DIAG INJ SC/IM: CPT

## 2017-12-10 RX ADMIN — HEPARIN SODIUM 5000 UNITS: 5000 INJECTION, SOLUTION INTRAVENOUS; SUBCUTANEOUS at 22:10

## 2017-12-10 RX ADMIN — MIRTAZAPINE 30 MG: 15 TABLET, FILM COATED ORAL at 20:33

## 2017-12-10 RX ADMIN — CARBIDOPA AND LEVODOPA 1 TABLET: 25; 100 TABLET ORAL at 16:50

## 2017-12-10 RX ADMIN — ATORVASTATIN CALCIUM 10 MG: 10 TABLET, FILM COATED ORAL at 20:32

## 2017-12-10 RX ADMIN — CLOPIDOGREL BISULFATE 75 MG: 75 TABLET ORAL at 08:29

## 2017-12-10 RX ADMIN — FINASTERIDE 5 MG: 5 TABLET, FILM COATED ORAL at 08:29

## 2017-12-10 RX ADMIN — HEPARIN SODIUM 5000 UNITS: 5000 INJECTION, SOLUTION INTRAVENOUS; SUBCUTANEOUS at 14:03

## 2017-12-10 RX ADMIN — CARBIDOPA AND LEVODOPA 1 TABLET: 10; 100 TABLET ORAL at 05:37

## 2017-12-10 RX ADMIN — MONTELUKAST SODIUM 10 MG: 10 TABLET, FILM COATED ORAL at 08:29

## 2017-12-10 RX ADMIN — LEVOTHYROXINE SODIUM 75 MCG: 75 TABLET ORAL at 20:33

## 2017-12-10 RX ADMIN — CARBIDOPA AND LEVODOPA 1 TABLET: 25; 100 TABLET ORAL at 20:33

## 2017-12-10 RX ADMIN — HEPARIN SODIUM 5000 UNITS: 5000 INJECTION, SOLUTION INTRAVENOUS; SUBCUTANEOUS at 05:36

## 2017-12-10 RX ADMIN — PANTOPRAZOLE SODIUM 40 MG: 40 TABLET, DELAYED RELEASE ORAL at 05:37

## 2017-12-10 RX ADMIN — ASPIRIN 81 MG 81 MG: 81 TABLET ORAL at 08:29

## 2017-12-10 RX ADMIN — VITAMIN E CAP 400 UNIT 400 UNITS: 400 CAP at 08:29

## 2017-12-10 RX ADMIN — LISINOPRIL 2.5 MG: 2.5 TABLET ORAL at 08:28

## 2017-12-10 NOTE — PROGRESS NOTES
[facility] Medicine Services  PROGRESS NOTE    Patient Name: Murali Dennis  : 1929  MRN: [id number]    Date of Admission: 2017  Length of Stay: 0  Primary Care Physician: No Known Provider    Subjective   Subjective     CC:  Hip pain    HPI:    Denies current pain. Unsure why he is hospitalized but says he is comfortable, eating ok.    Review of Systems  Gen- No fevers, chills  CV- No chest pain, palpitations  Resp- No cough, dyspnea  GI- No N/V/D, abd pain      Otherwise ROS is negative except as mentioned in the HPI.    Objective   Objective     Vital Signs:   Temp:  [97.5 °F (36.4 °C)-98 °F (36.7 °C)] 97.5 °F (36.4 °C)  Heart Rate:  [71-76] 74  Resp:  [18-20] 20  BP: (109-134)/(55-77) 134/72        Physical Exam:  Constitutional -no acute distress, non toxic, up in chair  HEENT-NCAT, mucous membranes moist  CV-RRR, S1 S2 normal, no m/r/g  Resp-CTAB, no wheezes, rhonchi or rales  Abd-soft, non-tender, non-distended, normo active bowel sounds  Ext-No lower extremity cyanosis, clubbing or edema bilaterally  Neuro-alert but with mild confusion, speech clear, moves all extremities, mild cogwheeling in both upper extremities.   Psych-normal affect   Skin- No rash on exposed UE or LE bilaterally      Results Reviewed:  I have personally reviewed current lab, radiology, and data and agree.      Results from last 7 days  Lab Units 17  0519 17  1812   WBC 10*3/mm3 4.96 7.53   HEMOGLOBIN g/dL 12.9* 13.2   HEMATOCRIT % 38.5* 40.4   PLATELETS 10*3/mm3 157 168       Results from last 7 days  Lab Units 17  0328 17  0519 17  1812   SODIUM mmol/L 140 140 137   POTASSIUM mmol/L 3.8 3.8 4.4   CHLORIDE mmol/L 105 105 108   CO2 mmol/L 27.0 25.0 25.0   BUN mg/dL 17 16 15   CREATININE mg/dL 1.50* 1.50* 1.50*   GLUCOSE mg/dL 121* 120* 110*   CALCIUM mg/dL 8.9 9.0 8.9     No results found for: BNP  No results found for: PHART    Microbiology Results Abnormal      None          Imaging Results (last 24 hours)     ** No results found for the last 24 hours. **        Results for orders placed during the hospital encounter of 12/07/17   Adult Transthoracic Echo Complete W/ Cont if Necessary Per Protocol    Narrative · The left ventricular cavity is mildly dilated.  · Left atrial cavity size is moderately dilated.  · Mild aortic valve regurgitation is present.  · Mild-to-moderate mitral valve regurgitation is present  · Left ventricular systolic function is severely decreased. Estimated EF =   28%.  · Left ventricular diastolic dysfunction (grade I a) consistent with   impaired relaxation.  · Mild tricuspid valve regurgitation is present.  · There is no evidence of pericardial effusion.  · No evidence of pulmonary hypertension is present.  · The aortic valve exhibits sclerosis  · Normal right ventricular cavity size and wall thickness noted with   borderline low right ventricular systolic function.          I have reviewed the medications.      aspirin 81 mg Oral Daily   atorvastatin 10 mg Oral Nightly   carbidopa-levodopa 1 tablet Oral TID   clopidogrel 75 mg Oral Daily   finasteride 5 mg Oral Daily   heparin (porcine) 5,000 Units Subcutaneous Q8H   levothyroxine 75 mcg Oral Nightly   lisinopril 2.5 mg Oral Q24H   mirtazapine 30 mg Oral Nightly   montelukast 10 mg Oral Daily   pantoprazole 40 mg Oral Q AM   vitamin E 400 Units Oral Daily         Assessment/Plan   Assessment / Plan     Hospital Problem List     * (Principal)Contusion of left hip    Dementia    Hypothyroid    CKD (chronic kidney disease)    Chronic congestive heart failure    CAD (coronary artery disease)    Dizziness    Generalized weakness             Brief Hospital Course to date:  Murali Dennis is a 88 y.o. male with hx of CHF (data deficient), CAD s/p CABG who fell 2nd to dizziness with ambulation now with left hip pain 2nd to contusion      Assessment & Plan    Tremor  - suspect parkinsons, trial sinemet,  can titrate as tolerated    Gait disorder  - may be multifactorial including parkinsons  - possible EMG/NCV Monday  - PT/OT fall precautions    Fall  - imaging negative for fracture    CHF, systolic  - EF 28%  - currently asymptomatic  - has been treated by PCP since CABG in 2003  - will try to get back on entresto in am (home med)  - needs [facility] ([facility])    Hypothyroid  - tsh wnl    Dementia  - intolerant to Namenda or Aricept    CKD  - monitor creatinine    DVT Prophylaxis:  heparin    CODE STATUS: Full Code    Disposition: I expect the patient to be discharged to rehabilitation when bed available      Discussed with spouse and daughter by phone.    Trevin Souza MD  12/10/17  10:05 AM

## 2017-12-10 NOTE — PROGRESS NOTES
Daily Progress Note  Neurology     LOS: 0 days     Subjective     Chief Complaint: Gait disorder and falls    Interval History:  No acute events overnight.  Family feels that his tremor has lessened in intensity with the initiation of Sinemet    ROS: Negative for fever    Objective     Vital signs in last 24 hours:  Temp:  [97.5 °F (36.4 °C)-98 °F (36.7 °C)] 97.5 °F (36.4 °C)  Heart Rate:  [74-76] 74  Resp:  [18-20] 20  BP: (109-134)/(55-72) 134/72      Physical Exam:   General: Sitting in bedside chair with eyes open. In NAD.     Respiratory: Respirations unlabored   CV: RRR       Neurologic Exam:   Mental status: Awake, alert, Follows commands.    CN: II-XII intact to detailed exam    Coordination: Mild cogwheeling present at both wrists but appears somewhat less intense compared to previous evaluation                      Results from last 7 days  Lab Units 12/08/17  0519   WBC 10*3/mm3 4.96   HEMOGLOBIN g/dL 12.9*   HEMATOCRIT % 38.5*   PLATELETS 10*3/mm3 157           Results Review:  Labs reviewed      Assessment/Plan     Principal Problem:    Contusion of left hip  Active Problems:    Dementia    Hypothyroid    CKD (chronic kidney disease)    Chronic congestive heart failure    CAD (coronary artery disease)    Dizziness    Generalized weakness        1.  Tremor = his tremor appears to be more notable with action and posture rather than at rest. He does have mild cogwheeling and hypomimia, however. Some frontal release signs noted. Unclear if this is parkinsonism or purely intention tremor. Some improvement with trial of Sinemet. Will increase dose to 25/100mg 1 tab TID and monitor.      2.  Gait disorder = differential includes parkinsonism vs gait abnormalities due to peripheral neuropathy vs other causes. On exam, he exhibits hammertoes, high arches, and mild atrophy in the distal BLE, suspicious for long-standing polyneuropathic process. Will get EMG/NCV in AM.      Ave Helms MD  12/10/17  1:24  PM

## 2017-12-10 NOTE — PLAN OF CARE
Problem: Patient Care Overview (Adult)  Goal: Plan of Care Review  Outcome: Ongoing (interventions implemented as appropriate)    12/10/17 1647   Coping/Psychosocial Response Interventions   Plan Of Care Reviewed With patient;family   Outcome Evaluation   Outcome Summary/Follow up Plan Pt says he feels much better today, got up and walked, no pain, VSS, waiting on placement.    Patient Care Overview   Progress improving         Problem: Fall Risk (Adult)  Goal: Absence of Falls  Outcome: Ongoing (interventions implemented as appropriate)    12/10/17 1647   Fall Risk (Adult)   Absence of Falls making progress toward outcome         Problem: Skin Integrity Impairment, Risk/Actual (Adult)  Goal: Identify Related Risk Factors and Signs and Symptoms  Outcome: Ongoing (interventions implemented as appropriate)    12/10/17 1647   Skin Integrity Impairment, Risk/Actual   Skin Integrity Impairment, Risk/Actual: Related Risk Factors immobility;age extremes;fluid/nutrition status       Goal: Skin Integrity/Wound Healing  Outcome: Ongoing (interventions implemented as appropriate)    12/10/17 1647   Skin Integrity Impairment, Risk/Actual (Adult)   Skin Integrity/Wound Healing making progress toward outcome

## 2017-12-10 NOTE — PLAN OF CARE
Problem: Patient Care Overview (Adult)  Goal: Plan of Care Review  Outcome: Ongoing (interventions implemented as appropriate)    12/10/17 0246   Coping/Psychosocial Response Interventions   Plan Of Care Reviewed With patient   Outcome Evaluation   Outcome Summary/Follow up Plan pt did not rest well, no c/o pain, VSS, wants to be discharged so he can start to get better.    Patient Care Overview   Progress no change       Goal: Adult Individualization and Mutuality  Outcome: Ongoing (interventions implemented as appropriate)  Goal: Discharge Needs Assessment  Outcome: Ongoing (interventions implemented as appropriate)    Problem: Fall Risk (Adult)  Goal: Identify Related Risk Factors and Signs and Symptoms  Outcome: Outcome(s) achieved Date Met:  12/10/17  Goal: Absence of Falls  Outcome: Ongoing (interventions implemented as appropriate)    Problem: Skin Integrity Impairment, Risk/Actual (Adult)  Goal: Identify Related Risk Factors and Signs and Symptoms  Outcome: Ongoing (interventions implemented as appropriate)  Goal: Skin Integrity/Wound Healing  Outcome: Ongoing (interventions implemented as appropriate)

## 2017-12-10 NOTE — PROGRESS NOTES
Continued Stay Note  [facility]     Patient Name: Murali Dennis  MRN: [id number]  Today's Date: 12/10/2017    Admit Date: 12/7/2017          Discharge Plan       12/10/17 1005    Case Management/Social Work Plan    Plan [facility] update    Patient/Family In Agreement With Plan other (see comments)    Additional Comments Per Viet Thakur at [facility]. Weekend admitting MD at [facility] stated pt is not appropriate for [facility] Acute unit. However, she wanted pt to be re-evaluated on Monday for acute again. Pt remains Observation status per insurance. CM will need to f/u w [facility] weekday rep on Monday to see if there are changes in bed placement w them re: acute bed.               Discharge Codes     None            Lorraine Hennessy RN

## 2017-12-11 ENCOUNTER — APPOINTMENT (OUTPATIENT)
Dept: NEUROLOGY | Facility: HOSPITAL | Age: 82
End: 2017-12-11
Attending: PSYCHIATRY & NEUROLOGY

## 2017-12-11 LAB
ANION GAP SERPL CALCULATED.3IONS-SCNC: 10 MMOL/L (ref 3–11)
BUN BLD-MCNC: 19 MG/DL (ref 9–23)
BUN/CREAT SERPL: 11.9 (ref 7–25)
CALCIUM SPEC-SCNC: 9 MG/DL (ref 8.7–10.4)
CHLORIDE SERPL-SCNC: 106 MMOL/L (ref 99–109)
CO2 SERPL-SCNC: 24 MMOL/L (ref 20–31)
CREAT BLD-MCNC: 1.6 MG/DL (ref 0.6–1.3)
GFR SERPL CREATININE-BSD FRML MDRD: 41 ML/MIN/1.73
GLUCOSE BLD-MCNC: 116 MG/DL (ref 70–100)
POTASSIUM BLD-SCNC: 3.9 MMOL/L (ref 3.5–5.5)
SODIUM BLD-SCNC: 140 MMOL/L (ref 132–146)

## 2017-12-11 PROCEDURE — G0378 HOSPITAL OBSERVATION PER HR: HCPCS

## 2017-12-11 PROCEDURE — 97116 GAIT TRAINING THERAPY: CPT

## 2017-12-11 PROCEDURE — 99225 PR SBSQ OBSERVATION CARE/DAY 25 MINUTES: CPT | Performed by: NURSE PRACTITIONER

## 2017-12-11 PROCEDURE — 99212 OFFICE O/P EST SF 10 MIN: CPT | Performed by: PSYCHIATRY & NEUROLOGY

## 2017-12-11 PROCEDURE — 25010000002 HEPARIN (PORCINE) PER 1000 UNITS: Performed by: HOSPITALIST

## 2017-12-11 PROCEDURE — 80048 BASIC METABOLIC PNL TOTAL CA: CPT | Performed by: INTERNAL MEDICINE

## 2017-12-11 PROCEDURE — 96372 THER/PROPH/DIAG INJ SC/IM: CPT

## 2017-12-11 PROCEDURE — 95886 MUSC TEST DONE W/N TEST COMP: CPT

## 2017-12-11 PROCEDURE — 95909 NRV CNDJ TST 5-6 STUDIES: CPT

## 2017-12-11 RX ADMIN — ATORVASTATIN CALCIUM 10 MG: 10 TABLET, FILM COATED ORAL at 21:16

## 2017-12-11 RX ADMIN — PANTOPRAZOLE SODIUM 40 MG: 40 TABLET, DELAYED RELEASE ORAL at 05:46

## 2017-12-11 RX ADMIN — CARBIDOPA AND LEVODOPA 1 TABLET: 25; 100 TABLET ORAL at 15:45

## 2017-12-11 RX ADMIN — VITAMIN E CAP 400 UNIT 400 UNITS: 400 CAP at 08:59

## 2017-12-11 RX ADMIN — HEPARIN SODIUM 5000 UNITS: 5000 INJECTION, SOLUTION INTRAVENOUS; SUBCUTANEOUS at 21:18

## 2017-12-11 RX ADMIN — MIRTAZAPINE 30 MG: 15 TABLET, FILM COATED ORAL at 21:15

## 2017-12-11 RX ADMIN — ASPIRIN 81 MG 81 MG: 81 TABLET ORAL at 09:00

## 2017-12-11 RX ADMIN — LEVOTHYROXINE SODIUM 75 MCG: 75 TABLET ORAL at 21:16

## 2017-12-11 RX ADMIN — SACUBITRIL AND VALSARTAN 1 TABLET: 24; 26 TABLET, FILM COATED ORAL at 21:16

## 2017-12-11 RX ADMIN — MONTELUKAST SODIUM 10 MG: 10 TABLET, FILM COATED ORAL at 09:00

## 2017-12-11 RX ADMIN — CLOPIDOGREL BISULFATE 75 MG: 75 TABLET ORAL at 09:00

## 2017-12-11 RX ADMIN — CARBIDOPA AND LEVODOPA 1 TABLET: 25; 100 TABLET ORAL at 09:02

## 2017-12-11 RX ADMIN — CARBIDOPA AND LEVODOPA 1 TABLET: 25; 100 TABLET ORAL at 21:16

## 2017-12-11 RX ADMIN — FINASTERIDE 5 MG: 5 TABLET, FILM COATED ORAL at 09:00

## 2017-12-11 RX ADMIN — HEPARIN SODIUM 5000 UNITS: 5000 INJECTION, SOLUTION INTRAVENOUS; SUBCUTANEOUS at 15:45

## 2017-12-11 RX ADMIN — HEPARIN SODIUM 5000 UNITS: 5000 INJECTION, SOLUTION INTRAVENOUS; SUBCUTANEOUS at 05:46

## 2017-12-11 NOTE — PROGRESS NOTES
Continued Stay Note  [facility]     Patient Name: Murali Dennis  MRN: [id number]  Today's Date: 12/11/2017    Admit Date: 12/7/2017          Discharge Plan       12/11/17 1418    Case Management/Social Work Plan    Plan discharge plan    Patient/Family In Agreement With Plan yes    Additional Comments Spoke with Glendy from [facility] and they can offer pt a bed tomorrow(Tues) on acute, pulmonary unit. Spoke with pt and pt family in room and they accepted bed offer. Family will transport via private vehicle. CM will follow up Tues              Discharge Codes     None        Expected Discharge Date and Time     Expected Discharge Date Expected Discharge Time    Dec 12, 2017             Louise Bautista RN

## 2017-12-11 NOTE — THERAPY TREATMENT NOTE
Acute Care - Physical Therapy Treatment Note  [facility]     Patient Name: Murali Dennis  : 1929  MRN: [id number]  Today's Date: 2017  Onset of Illness/Injury or Date of Surgery Date: 17  Date of Referral to PT: 17  Referring Physician: Mayne, PA-C    Admit Date: 2017    Visit Dx:    ICD-10-CM ICD-9-CM   1. Mobility impaired Z74.09 799.89   2. Contusion of left hip, initial encounter S70.02XA 924.01   3. Fall from standing, initial encounter W19.XXXA E888.9   4. Impaired functional mobility, balance, gait, and endurance Z74.09 V49.89   5. Impaired mobility and ADLs Z74.09 799.89     Patient Active Problem List   Diagnosis   • Contusion of left hip   • Dementia   • Hypothyroid   • CKD (chronic kidney disease)   • Chronic congestive heart failure   • CAD (coronary artery disease)   • Dizziness   • Generalized weakness               Adult Rehabilitation Note       17 0945          Rehab Assessment/Intervention    Discipline physical therapy assistant  -AS      Document Type therapy note (daily note)  -AS      Subjective Information agree to therapy;complains of;pain  -AS      Patient Effort, Rehab Treatment good  -AS      Precautions/Limitations fall precautions;other (see comments)   exit alarm  -AS      Recorded by [AS] Vicky Rose PTA      Pain Assessment    Pain Assessment Meek-Valles FACES  -AS      Meek-Valles FACES Pain Rating 2  -AS      Pain Type Acute pain  -AS      Pain Location Hip  -AS      Pain Orientation Left  -AS      Pain Intervention(s) Repositioned;Ambulation/increased activity  -AS      Recorded by [AS] Vicky Rose PTA      Cognitive Assessment/Intervention    Current Cognitive/Communication Assessment functional  -AS      Orientation Status oriented to;person;place  -AS      Follows Commands/Answers Questions 75% of the time;able to follow single-step instructions  -AS      Personal Safety mild impairment;decreased awareness, need for  assist;decreased awareness, need for safety  -AS      Personal Safety Interventions fall prevention program maintained;gait belt;nonskid shoes/slippers when out of bed;other (see comments)   exit alarm  -AS      Recorded by [AS] Vicky Rose PTA      Bed Mobility, Assessment/Treatment    Bed Mobility, Comment sitting EOB prior to arrival  -AS      Recorded by [AS] Vicky Rose PTA      Transfer Assessment/Treatment    Transfers, Sit-Stand Gable verbal cues required;contact guard assist;2 person assist required  -AS      Transfers, Stand-Sit Gable verbal cues required;contact guard assist;2 person assist required  -AS      Transfers, Sit-Stand-Sit, Assist Device rolling walker  -AS      Transfer, Safety Issues step length decreased;loses balance backward  -AS      Transfer, Impairments strength decreased;impaired balance  -AS      Transfer, Comment LOB posterior on initial stand assist to correct  -AS      Recorded by [AS] Vicky Rose PTA      Gait Assessment/Treatment    Gait, Gable Level verbal cues required;contact guard assist;2 person assist required  -AS      Gait, Assistive Device rolling walker  -AS      Gait, Distance (Feet) 90  -AS      Gait, Gait Deviations lucas decreased;forward flexed posture;step length decreased  -AS      Gait, Safety Issues step length decreased;weight-shifting ability decreased;balance decreased during turns  -AS      Gait, Impairments strength decreased;impaired balance  -AS      Gait, Comment verbal cues for walker placement and posture, assist to control walker at times  -AS      Recorded by [AS] Vicky Rose PTA      Therapy Exercises    Bilateral Lower Extremities AROM:;10 reps;LAQ  -AS      Recorded by [AS] Vicky Rose PTA      Positioning and Restraints    Pre-Treatment Position in bed  -AS      Post Treatment Position chair  -AS      In Chair reclined;call light within reach;encouraged to call for assist;exit alarm  on;with family/caregiver  -AS      Recorded by [AS] Vicky Rose PTA        User Key  (r) = Recorded By, (t) = Taken By, (c) = Cosigned By    Initials Name Effective Dates    AS Vicky Rose PTA 06/22/15 -                 IP PT Goals       12/11/17 1018 12/08/17 1139       Bed Mobility PT LTG    Bed Mobility PT LTG, Date Established  12/08/17  -CLIVE     Bed Mobility PT LTG, Time to Achieve  2 wks  -CLIVE     Bed Mobility PT LTG, Activity Type  supine to sit/sit to supine  -CLIVE     Bed Mobility PT LTG, [place] Level  independent  -CLIVE     Bed Mobility PT LTG, Outcome  goal ongoing  -CLIVE     Transfer Training PT LTG    Transfer Training PT LTG, Date Established  12/08/17  -CLIVE     Transfer Training PT LTG, Time to Achieve  2 wks  -CLIVE     Transfer Training PT LTG, Activity Type  sit to stand/stand to sit  -CLIVE     Transfer Training PT LTG, [place] Level  contact guard assist  -CLIVE     Transfer Training PT LTG, Assist Device  walker, rolling  -CLIVE     Transfer Training PT LTG, Outcome  goal ongoing  -CLIVE     Gait Training PT LTG    Gait Training Goal PT LTG, Date Established  12/08/17  -CLIVE     Gait Training Goal PT LTG, Time to Achieve  2 wks  -CLIVE     Gait Training Goal PT LTG, [place] Level  contact guard assist  -CLIVE     Gait Training Goal PT LTG, Assist Device  walker, rolling  -CLIVE     Gait Training Goal PT LTG, Distance to Achieve  300  -CLIVE     Gait Training Goal PT LTG, Date Goal Reviewed 12/11/17  -AS      Gait Training Goal PT LTG, Outcome goal ongoing  -AS goal ongoing  -CLIVE       User Key  (r) = Recorded By, (t) = Taken By, (c) = Cosigned By    Initials Name Provider Type    CLIVE Nevin Kathleen, PT Physical Therapist    AS Vicky Rose PTA Physical Therapy Assistant          Physical Therapy Education     Title: PT OT SLP Therapies (Done)     Topic: Physical Therapy (Resolved)     Point: Mobility training (Resolved)    Learning Progress Summary    Learner Readiness Method Response Comment  Documented by Status   Patient Acceptance E NR   12/08/17 1138 Active               Point: Home exercise program (Resolved)    Learning Progress Summary    Learner Readiness Method Response Comment Documented by Status   Patient Acceptance E NR   12/08/17 1138 Active               Point: Body mechanics (Resolved)    Learning Progress Summary    Learner Readiness Method Response Comment Documented by Status   Patient Acceptance E NR   12/08/17 1138 Active               Point: Precautions (Resolved)    Learning Progress Summary    Learner Readiness Method Response Comment Documented by Status   Patient Acceptance E NR   12/08/17 1138 Active                      User Key     Initials Effective Dates Name Provider Type Discipline     06/19/15 -  eNvin Kathleen, PT Physical Therapist PT                    PT Recommendation and Plan  Anticipated Equipment Needs At Discharge: front wheeled walker  Anticipated Discharge Disposition: skilled nursing facility  PT Frequency: daily, per priority policy  Plan of Care Review  Plan Of Care Reviewed With: patient  Progress: improving  Outcome Summary/Follow up Plan: patient with improve gait mechanics with verbal cueing, complaints of left hip pain during mobility. Resolved once sitting in recliner. Walker adjusted to correct height.          Outcome Measures       12/11/17 0945 12/09/17 1004 12/08/17 1100    How much help from another person do you currently need...    Turning from your back to your side while in flat bed without using bedrails? 3  -AS  3  -CLIVE    Moving from lying on back to sitting on the side of a flat bed without bedrails? 3  -AS  3  -CLIVE    Moving to and from a bed to a chair (including a wheelchair)? 3  -AS  3  -CLIVE    Standing up from a chair using your arms (e.g., wheelchair, bedside chair)? 3  -AS  3  -CLIVE    Climbing 3-5 steps with a railing? 2  -AS  1  -CLIVE    To walk in hospital room? 3  -AS  3  -CLIVE    AM-PAC 6 Clicks Score 17  -AS  16  -CLIVE     How much help from another is currently needed...    Putting on and taking off regular lower body clothing?  3  -TB     Bathing (including washing, rinsing, and drying)  2  -TB     Toileting (which includes using toilet bed pan or urinal)  2  -TB     Putting on and taking off regular upper body clothing  3  -TB     Taking care of personal grooming (such as brushing teeth)  3  -TB     Eating meals  3  -TB     Score  16  -TB     Functional Assessment    Outcome Measure Options AM-PAC 6 Clicks Basic Mobility (PT)  -AS AM-PAC 6 Clicks Daily Activity (OT)  -TB AM-PAC 6 Clicks Basic Mobility (PT)  -CLIVE      User Key  (r) = Recorded By, (t) = Taken By, (c) = Cosigned By    Initials Name Provider Type    TB Michelle Perez, OT Occupational Therapist    CLIVE Nevin Kathleen, PT Physical Therapist    AS Vicky Rose PTA Physical Therapy Assistant           Time Calculation:         PT Charges       12/11/17 1020          Time Calculation    Start Time 0945  -AS      PT Received On 12/11/17  -AS      PT Goal Re-Cert Due Date 12/18/17  -AS      Time Calculation- PT    Total Timed Code Minutes- PT 15 minute(s)  -AS        User Key  (r) = Recorded By, (t) = Taken By, (c) = Cosigned By    Initials Name Provider Type    AS Vicky Rose PTA Physical Therapy Assistant          Therapy Charges for Today     Code Description Service Date Service Provider Modifiers Qty    [id number] HC GAIT TRAINING EA 15 MIN 12/11/2017 Vicky Rose PTA GP, KX 1          PT G-Codes  Outcome Measure Options: AM-PAC 6 Clicks Basic Mobility (PT)  Score: 16  Functional Limitation: Mobility: Walking and moving around  Mobility: Walking and Moving Around Current Status (): At least 40 percent but less than 60 percent impaired, limited or restricted  Mobility: Walking and Moving Around Goal Status (): At least 20 percent but less than 40 percent impaired, limited or restricted    Vicky Rose PTA  12/11/2017

## 2017-12-11 NOTE — PLAN OF CARE
Problem: Patient Care Overview (Adult)  Goal: Plan of Care Review  Outcome: Ongoing (interventions implemented as appropriate)    12/10/17 1647 12/10/17 2206   Coping/Psychosocial Response Interventions   Plan Of Care Reviewed With --  patient   Patient Care Overview   Progress improving --        Goal: Adult Individualization and Mutuality  Outcome: Ongoing (interventions implemented as appropriate)    12/10/17 0246   Individualization   Patient Specific Preferences one pillow under head, extra blankets, lights out   Patient Specific Goals to get better and go home   Patient Specific Interventions pain control, frequent checks, warm room   Mutuality/Individual Preferences   What Anxieties, Fears or Concerns Do You Have About Your Health or Care? placement concerns    What Questions Do You Have About Your Health or Care? none at this time   What Information Would Help Us Give You More Personalized Care? none       Goal: Discharge Needs Assessment  Outcome: Ongoing (interventions implemented as appropriate)    12/08/17 1600 12/09/17 0309 12/09/17 1004   Discharge Needs Assessment   Concerns To Be Addressed --  --  --    Readmission Within The Last 30 Days no previous admission in last 30 days --  --    Equipment Needed After Discharge walker, rolling --  --    Discharge Facility/Level Of Care Needs --  rehabilitation facility --    Discharge Disposition --  --  --    Current Health   Anticipated Changes Related to Illness --  inability to care for self --    Living Environment   Transportation Available --  --  --    Self-Care   Equipment Currently Used at Home --  --  walker, rolling     12/10/17 0246   Discharge Needs Assessment   Concerns To Be Addressed discharge planning concerns   Readmission Within The Last 30 Days --    Equipment Needed After Discharge --    Discharge Facility/Level Of Care Needs --    Discharge Disposition still a patient   Current Health   Anticipated Changes Related to Illness --    Living  Environment   Transportation Available car;family or friend will provide   Self-Care   Equipment Currently Used at Home --          Problem: Fall Risk (Adult)  Goal: Absence of Falls  Outcome: Ongoing (interventions implemented as appropriate)    12/10/17 1647   Fall Risk (Adult)   Absence of Falls making progress toward outcome         Problem: Skin Integrity Impairment, Risk/Actual (Adult)  Goal: Identify Related Risk Factors and Signs and Symptoms  Outcome: Ongoing (interventions implemented as appropriate)    12/10/17 1647   Skin Integrity Impairment, Risk/Actual   Skin Integrity Impairment, Risk/Actual: Related Risk Factors immobility;age extremes;fluid/nutrition status       Goal: Skin Integrity/Wound Healing  Outcome: Ongoing (interventions implemented as appropriate)    12/10/17 1647   Skin Integrity Impairment, Risk/Actual (Adult)   Skin Integrity/Wound Healing making progress toward outcome

## 2017-12-11 NOTE — PROGRESS NOTES
[facility] Medicine Services  PROGRESS NOTE    Patient Name: Murali Dennis  : 1929  MRN: [id number]    Date of Admission: 2017  Length of Stay: 0  Primary Care Physician: No Known Provider    Subjective   Subjective     CC:  Hip pain    HPI:  Late note entry saw patient at 1100. Patient sitting up in chair in NAD with wife at bedside. He slept well last night with cpap per nurse. Wife states confusion better this am. Appetite good. Walked with PT in ash.    Review of Systems  Gen- No fevers, chills  CV- No chest pain, palpitations  Resp- No cough, dyspnea  GI- No N/V/D, abd pain      Otherwise ROS is negative except as mentioned in the HPI.    Objective   Objective     Vital Signs:   Temp:  [97.8 °F (36.6 °C)-98 °F (36.7 °C)] 98 °F (36.7 °C)  Heart Rate:  [68-74] 68  Resp:  [18] 18  BP: (135-144)/(79-91) 144/79        Physical Exam:  Constitutional -no acute distress, non toxic, up in chair  HEENT-NCAT, mucous membranes moist  CV-RRR, S1 S2 normal, no m/r/g  Resp-CTAB, no wheezes, rhonchi or rales  Abd-soft, non-tender, non-distended, normo active bowel sounds  Ext-No lower extremity cyanosis, clubbing or edema bilaterally  Neuro-alert but with mild confusion, speech clear, moves all extremities, mild cogwheeling in both upper extremities.   Psych-normal affect   Skin- No rash on exposed UE or LE bilaterally      Results Reviewed:  I have personally reviewed current lab, radiology, and data and agree.      Results from last 7 days  Lab Units 17  0519 17  1812   WBC 10*3/mm3 4.96 7.53   HEMOGLOBIN g/dL 12.9* 13.2   HEMATOCRIT % 38.5* 40.4   PLATELETS 10*3/mm3 157 168       Results from last 7 days  Lab Units 17  0337 17  0328 17  0519   SODIUM mmol/L 140 140 140   POTASSIUM mmol/L 3.9 3.8 3.8   CHLORIDE mmol/L 106 105 105   CO2 mmol/L 24.0 27.0 25.0   BUN mg/dL 19 17 16   CREATININE mg/dL 1.60* 1.50* 1.50*   GLUCOSE mg/dL 116* 121* 120*    CALCIUM mg/dL 9.0 8.9 9.0     No results found for: BNP  No results found for: PHART    Microbiology Results Abnormal     None          Imaging Results (last 24 hours)     ** No results found for the last 24 hours. **        Results for orders placed during the hospital encounter of 12/07/17   Adult Transthoracic Echo Complete W/ Cont if Necessary Per Protocol    Narrative · The left ventricular cavity is mildly dilated.  · Left atrial cavity size is moderately dilated.  · Mild aortic valve regurgitation is present.  · Mild-to-moderate mitral valve regurgitation is present  · Left ventricular systolic function is severely decreased. Estimated EF =   28%.  · Left ventricular diastolic dysfunction (grade I a) consistent with   impaired relaxation.  · Mild tricuspid valve regurgitation is present.  · There is no evidence of pericardial effusion.  · No evidence of pulmonary hypertension is present.  · The aortic valve exhibits sclerosis  · Normal right ventricular cavity size and wall thickness noted with   borderline low right ventricular systolic function.          I have reviewed the medications.      aspirin 81 mg Oral Daily   atorvastatin 10 mg Oral Nightly   carbidopa-levodopa 1 tablet Oral TID   clopidogrel 75 mg Oral Daily   finasteride 5 mg Oral Daily   heparin (porcine) 5,000 Units Subcutaneous Q8H   levothyroxine 75 mcg Oral Nightly   mirtazapine 30 mg Oral Nightly   montelukast 10 mg Oral Daily   pantoprazole 40 mg Oral Q AM   sacubitril-valsartan 1 tablet Oral Nightly   vitamin E 400 Units Oral Daily         Assessment/Plan   Assessment / Plan     Hospital Problem List     * (Principal)Contusion of left hip    Dementia    Hypothyroid    CKD (chronic kidney disease)    Chronic congestive heart failure    CAD (coronary artery disease)    Dizziness    Generalized weakness             Brief Hospital Course to date:  Murali Dennis is a 88 y.o. male with hx of CHF (data deficient), CAD s/p CABG who fell 2nd  to dizziness with ambulation now with left hip pain 2nd to contusion      Assessment & Plan    Tremor  - suspect parkinsons, trial sinemet, can titrate as tolerated    Gait disorder  - may be multifactorial including parkinsons  -  EMG/NCV Monday  - PT/OT fall precautions    Fall  - imaging negative for fracture    CHF, systolic  - EF 28%  - currently asymptomatic  - has been treated by PCP since CABG in 2003  -  entresto restarted (home med)  - [facility]) wife unsure if they are moving her. Will be here for at least 6 weeks     Hypothyroid  - tsh wnl    Dementia  - intolerant to Namenda or Aricept    CKD  - monitor creatinine    DVT Prophylaxis:  heparin    CODE STATUS: Full Code    Disposition: I expect the patient to be discharged to rehabilitation when bed available, Glendy at [facility] says may be approved tomorrow.       Discussed with spouse and daughter by phone.    Connie Youngblood, [place]CADIO  12/11/17  12:51 PM

## 2017-12-11 NOTE — PLAN OF CARE
Problem: Fall Risk (Adult)  Goal: Absence of Falls  Outcome: Ongoing (interventions implemented as appropriate)    Problem: Skin Integrity Impairment, Risk/Actual (Adult)  Goal: Identify Related Risk Factors and Signs and Symptoms  Outcome: Ongoing (interventions implemented as appropriate)

## 2017-12-11 NOTE — THERAPY TREATMENT NOTE
Acute Care - Occupational Therapy Treatment Note  [facility]     Patient Name: Murali Dennis  : 1929  MRN: [id number]  Today's Date: 2017  Onset of Illness/Injury or Date of Surgery Date: 17  Date of Referral to OT: 17  Referring Physician: Mayne, PA-C      Admit Date: 2017    Visit Dx:     ICD-10-CM ICD-9-CM   1. Mobility impaired Z74.09 799.89   2. Contusion of left hip, initial encounter S70.02XA 924.01   3. Fall from standing, initial encounter W19.XXXA E888.9   4. Impaired functional mobility, balance, gait, and endurance Z74.09 V49.89   5. Impaired mobility and ADLs Z74.09 799.89     Patient Active Problem List   Diagnosis   • Contusion of left hip   • Dementia   • Hypothyroid   • CKD (chronic kidney disease)   • Chronic congestive heart failure   • CAD (coronary artery disease)   • Dizziness   • Generalized weakness             Adult Rehabilitation Note       17 0946 17 0945       Rehab Assessment/Intervention    Discipline occupational therapy assistant  -AC physical therapy assistant  -AS     Document Type therapy note (daily note)  -AC therapy note (daily note)  -AS     Subjective Information agree to therapy;complains of;pain  -AC agree to therapy;complains of;pain  -AS     Patient Effort, Rehab Treatment good  -AC good  -AS     Precautions/Limitations fall precautions  -AC fall precautions;other (see comments)   exit alarm  -AS     Recorded by [AC] Kateryna Hernandez OT [AS] Vicky Rose PTA     Pain Assessment    Pain Assessment Meek-Valles FACES  -AC Meek-Baker FACES  -AS     Meek-Valles FACES Pain Rating 2  - 2  -AS     Pain Type Acute pain  -AC Acute pain  -AS     Pain Location Hip  -AC Hip  -AS     Pain Orientation Left  -AC Left  -AS     Pain Intervention(s) Repositioned  -AC Repositioned;Ambulation/increased activity  -AS     Recorded by [AC] Kateryna Hernandez OT [AS] Vicky Rose PTA     Cognitive Assessment/Intervention    Current  Cognitive/Communication Assessment impaired   [place], confusion  -AC functional  -AS     Orientation Status oriented to;person   pt knew yr, but not month  -AC oriented to;person;place  -AS     Follows Commands/Answers Questions 75% of the time;needs cueing;needs repetition  -AC 75% of the time;able to follow single-step instructions  -AS     Personal Safety mild impairment;decreased awareness, need for assist;decreased awareness, need for safety  -AC mild impairment;decreased awareness, need for assist;decreased awareness, need for safety  -AS     Personal Safety Interventions fall prevention program maintained;gait belt;nonskid shoes/slippers when out of bed  -AC fall prevention program maintained;gait belt;nonskid shoes/slippers when out of bed;other (see comments)   exit alarm  -AS     Recorded by [AC] Kateryna Hernandez, OT [AS] Vicky Rose PTA     Bed Mobility, Assessment/Treatment    Bed Mobility, Comment sititng EOB upon arrival  -AC sitting EOB prior to arrival  -AS     Recorded by [AC] Kateryna Hernandez, RAUL [AS] Vicky Rose, JONNY     Transfer Assessment/Treatment    Transfers, Sit-Stand Romance verbal cues required;contact guard assist;2 person assist required  -AC verbal cues required;contact guard assist;2 person assist required  -AS     Transfers, Stand-Sit Romance verbal cues required;contact guard assist;2 person assist required  -AC verbal cues required;contact guard assist;2 person assist required  -AS     Transfers, Sit-Stand-Sit, Assist Device rolling walker  -AC rolling walker  -AS     Transfer, Safety Issues  step length decreased;loses balance backward  -AS     Transfer, Impairments impaired balance;strength decreased  -AC strength decreased;impaired balance  -AS     Transfer, Comment Vcs for hand placement, mild lOB upon standing  -AC LOB posterior on initial stand assist to correct  -AS     Recorded by [AC] Kateryna Hernandez, OT [AS] Vicky Rose, JONNY     Gait  Assessment/Treatment    Gait, [place] Level  verbal cues required;contact guard assist;2 person assist required  -AS     Gait, Assistive Device  rolling walker  -AS     Gait, Distance (Feet)  90  -AS     Gait, Gait Deviations  lucas decreased;forward flexed posture;step length decreased  -AS     Gait, Safety Issues  step length decreased;weight-shifting ability decreased;balance decreased during turns  -AS     Gait, Impairments  strength decreased;impaired balance  -AS     Gait, Comment  verbal cues for walker placement and posture, assist to control walker at times  -AS     Recorded by  [AS] Vicky Rose PTA     Functional Mobility    Functional Mobility- Ind. Level verbal cues required;contact guard assist  -AC      Functional Mobility- Device rolling walker  -AC      Functional Mobility-Distance (Feet) 90  -AC      Functional Mobility- Comment Vcs for upright posture  -AC      Recorded by [AC] Kateryna Hernandez OT      Motor Skills/Interventions    Additional Documentation Balance Skills Training (Group)  -AC      Recorded by [AC] Kateryna Hernandez OT      Balance Skills Training    Sitting-Level of Assistance Distant supervision  -AC      Sitting-Balance Support Right upper extremity supported;Left upper extremity supported  -AC      Standing-Level of Assistance Contact guard;x2  -AC      Static Standing Balance Support assistive device  -AC      Gait Balance-Level of Assistance Contact guard;x2  -AC      Gait Balance Support assistive device  -AC      Recorded by [AC] Kateryna Hernandez OT      Therapy Exercises    Bilateral Lower Extremities  AROM:;10 reps;LAQ  -AS     Bilateral Upper Extremity AROM:;10 reps;sitting;elbow flexion/extension;shoulder extension/flexion;shoulder horizontal abd/add  -AC      Recorded by [AC] Kateryna Hernandez OT [AS] Vicky Rose PTA     Positioning and Restraints    Pre-Treatment Position in bed  -AC in bed  -AS     Post Treatment Position chair  -AC chair  -AS     In  Chair reclined;sitting;call light within reach;exit alarm on;with family/caregiver  -AC reclined;call light within reach;encouraged to call for assist;exit alarm on;with family/caregiver  -AS     Recorded by [AC] Kateryna Hernandez OT [AS] Vicky Rose PTA       User Key  (r) = Recorded By, (t) = Taken By, (c) = Cosigned By    Initials Name Effective Dates    AC Kateryna Hernandez OT 06/23/15 -     AS Vicky Rose PTA 06/22/15 -                 OT Goals       12/11/17 1129 12/09/17 1055       Bed Mobility OT LTG    Bed Mobility OT LTG, Time to Achieve  by discharge  -TB     Bed Mobility OT LTG, Activity Type  all bed mobility  -TB     Bed Mobility OT LTG, [place] Level  supervision required;verbal cues required  -TB     Bed Mobility OT LTG, Outcome goal ongoing  -AC      Transfer Training OT LTG    Transfer Training OT LTG, Time to Achieve  by discharge  -TB     Transfer Training OT LTG, Activity Type  sit to stand/stand to sit;bed to chair /chair to bed;toilet  -TB     Transfer Training OT LTG, [place] Level  minimum assist (75% patient effort);verbal cues required  -TB     Transfer Training OT LTG, Assist Device  walker, rolling  -TB     Transfer Training OT LTG, Outcome goal partially met   met STS and bed to chair  -AC      Strength OT LTG    Strength Goal OT LTG, Functional Goal  Pt completes written HEP 3x10 reps with SBA/demonstration and cues prn to support ADLs  -TB     Strength Goal OT LTG, Outcome goal ongoing  -AC      Toileting OT LTG    Toileting Goal OT LTG, Time to Achieve  by discharge  -TB     Toileting Goal OT LTG, [place] Level  minimum assist (75% patient effort);verbal cues required  -TB     Toileting Goal OT LTG, Additional Goal  Pt able to manage clothing and hygiene with Min A to steady and cues for task completion  -TB     Toileting Goal OT LTG, Outcome goal ongoing  -AC      Functional Mobility OT LTG    Functional Mobility Goal OT LTG, Time to Achieve  by discharge   -     Functional Mobility Goal OT LTG, [place] Level  min assist  -     Functional Mobility Goal OT LTG, Assist Device  rolling walker  -     Functional Mobility Goal OT LTG, Distance to Achieve  to the bathroom  -     Functional Mobility Goal OT LTG, Outcome goal met  -        User Key  (r) = Recorded By, (t) = Taken By, (c) = Cosigned By    Initials Name Provider Type     Michelle Perez, OT Occupational Therapist    AC Kateryna Hernandez, OT Occupational Therapist          Occupational Therapy Education     Title: PT OT SLP Therapies (Active)     Topic: Occupational Therapy (Active)     Point: ADL training (Active)    Description: Instruct learner(s) on proper safety adaptation and remediation techniques during self care or transfers.   Instruct in proper use of assistive devices.    Learning Progress Summary    Learner Readiness Method Response Comment Documented by Status   Patient Acceptance E NR benefits of Ue ther ex, safety with use of walker  12/11/17 1128 Active    Acceptance E,D,TB VU,NR Education initiated for benefits of OOB activity/therapy, role of OT and recommendations for IRF at d/c to support safe return to home with spouse.  12/09/17 1054 Done                      User Key     Initials Effective Dates Name Provider Type Discipline     06/22/15 -  Michelle Perez, OT Occupational Therapist OT     06/23/15 -  Kateryna Hernandez, OT Occupational Therapist OT                  OT Recommendation and Plan  Anticipated Equipment Needs At Discharge:  (TBD; Pt is poor historian; no family present to clarify)  Anticipated Discharge Disposition: inpatient rehabilitation facility  Therapy Frequency: daily  Plan of Care Review  Plan Of Care Reviewed With: patient  Progress: improving  Outcome Summary/Follow up Plan: Pt with good progress meeting functional mobility goal, and gave good effort with UE ther ex.  Pt with L hip pain and decreased strength and safety awareness limiting  independence        Outcome Measures       12/11/17 0946 12/11/17 0945 12/09/17 1004    How much help from another person do you currently need...    Turning from your back to your side while in flat bed without using bedrails?  3  -AS     Moving from lying on back to sitting on the side of a flat bed without bedrails?  3  -AS     Moving to and from a bed to a chair (including a wheelchair)?  3  -AS     Standing up from a chair using your arms (e.g., wheelchair, bedside chair)?  3  -AS     Climbing 3-5 steps with a railing?  2  -AS     To walk in hospital room?  3  -AS     AM-PAC 6 Clicks Score  17  -AS     How much help from another is currently needed...    Putting on and taking off regular lower body clothing? 4  -AC  3  -TB    Bathing (including washing, rinsing, and drying) 2  -AC  2  -TB    Toileting (which includes using toilet bed pan or urinal) 2  -AC  2  -TB    Putting on and taking off regular upper body clothing 3  -AC  3  -TB    Taking care of personal grooming (such as brushing teeth) 3  -AC  3  -TB    Eating meals 3  -AC  3  -TB    Score 17  -AC  16  -TB    Functional Assessment    Outcome Measure Options AM-PAC 6 Clicks Daily Activity (OT)  -AC AM-PAC 6 Clicks Basic Mobility (PT)  -AS AM-PAC 6 Clicks Daily Activity (OT)  -TB      User Key  (r) = Recorded By, (t) = Taken By, (c) = Cosigned By    Initials Name Provider Type    TB Michelle Perez, OT Occupational Therapist    ELIS Hernandez, OT Occupational Therapist    AS Vicky Rose [facility] Physical Therapy Assistant           Time Calculation:         Time Calculation- OT       12/11/17 1134          Time Calculation- OT    OT Start Time 0946  -      Total Timed Code Minutes- OT 15 minute(s)  -      OT Received On 12/11/17  -      OT Goal Re-Cert Due Date 12/19/17  -        User Key  (r) = Recorded By, (t) = Taken By, (c) = Cosigned By    Initials Name Provider Type    ELIS Hernandez, OT Occupational Therapist               OT  G-codes  OT Professional Judgement Used?: Yes  OT Functional Scales Options: AM-PAC 6 Clicks Daily Activity (OT)  Score: 16  Functional Limitation: Self care  Self Care Current Status (): At least 40 percent but less than 60 percent impaired, limited or restricted  Self Care Goal Status (): At least 40 percent but less than 60 percent impaired, limited or restricted    Kateryna Hernandez OT  12/11/2017

## 2017-12-11 NOTE — PROGRESS NOTES
Daily Progress Note  Neurology     LOS: 0 days     Subjective     Chief Complaint: Gait disorder and falls    Interval History:  No acute events overnight.  His tremor appears improved with slight increase in Sinemet    ROS: Negative for fever    Objective     Vital signs in last 24 hours:  Temp:  [97.8 °F (36.6 °C)-98 °F (36.7 °C)] 98 °F (36.7 °C)  Heart Rate:  [68-74] 68  Resp:  [18] 18  BP: (135-144)/(79-91) 144/79      Physical Exam:   General: Sitting in bedside chair with eyes open. In NAD.     Respiratory: Respirations unlabored   CV: RRR       Neurologic Exam:   Mental status: Awake, alert, Follows commands.    CN: II-XII intact                      Results from last 7 days  Lab Units 12/08/17  0519   WBC 10*3/mm3 4.96   HEMOGLOBIN g/dL 12.9*   HEMATOCRIT % 38.5*   PLATELETS 10*3/mm3 157           Results Review:  Labs reviewed      Assessment/Plan     Principal Problem:    Contusion of left hip  Active Problems:    Dementia    Hypothyroid    CKD (chronic kidney disease)    Chronic congestive heart failure    CAD (coronary artery disease)    Dizziness    Generalized weakness        1.  Tremor = Some improvement with trial of Sinemet. Continue 25/100mg 1 tab TID and monitor.      2.  Gait disorder = differential includes parkinsonism vs gait abnormalities due to peripheral neuropathy. 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 Agree with rehab eval      No further recommendations. Okay from neuro standpoint for discharge when okay with primary team. Recommend follow-up in [facility] when [place]      Ave Helms MD  12/11/17  3:36 PM

## 2017-12-11 NOTE — PLAN OF CARE
Problem: Inpatient Occupational Therapy  Goal: Bed Mobility Goal LTG- OT  Outcome: Ongoing (interventions implemented as appropriate)    12/09/17 1055 12/11/17 1129   Bed Mobility OT LTG   Bed Mobility OT LTG, Time to Achieve by discharge --    Bed Mobility OT LTG, Activity Type all bed mobility --    Bed Mobility OT LTG, [place] Level supervision required;verbal cues required --    Bed Mobility OT LTG, Outcome --  goal ongoing       Goal: Transfer Training Goal 1 LTG- OT  Outcome: Ongoing (interventions implemented as appropriate)    12/09/17 1055 12/11/17 1129   Transfer Training OT LTG   Transfer Training OT LTG, Time to Achieve by discharge --    Transfer Training OT LTG, Activity Type sit to stand/stand to sit;bed to chair /chair to bed;toilet --    Transfer Training OT LTG, [place] Level minimum assist (75% patient effort);verbal cues required --    Transfer Training OT LTG, Assist Device walker, rolling --    Transfer Training OT LTG, Outcome --  goal partially met  (met STS and bed to chair)       Goal: Strength Goal LTG- OT  Outcome: Ongoing (interventions implemented as appropriate)    12/09/17 1055 12/11/17 1129   Strength OT LTG   Strength Goal OT LTG, Functional Goal Pt completes written HEP 3x10 reps with SBA/demonstration and cues prn to support ADLs --    Strength Goal OT LTG, Outcome --  goal ongoing       Goal: Toileting Goal LTG- OT  Outcome: Ongoing (interventions implemented as appropriate)    12/09/17 1055 12/11/17 1129   Toileting OT LTG   Toileting Goal OT LTG, Time to Achieve by discharge --    Toileting Goal OT LTG, [place] Level minimum assist (75% patient effort);verbal cues required --    Toileting Goal OT LTG, Additional Goal Pt able to manage clothing and hygiene with Min A to steady and cues for task completion --    Toileting Goal OT LTG, Outcome --  goal ongoing       Goal: Functional Mobility Goal LTG- OT  Outcome: Outcome(s) achieved Date Met:  12/11/17 12/09/17  1055 12/11/17 1129   Functional Mobility OT LTG   Functional Mobility Goal OT LTG, Time to Achieve by discharge --    Functional Mobility Goal OT LTG, [place] Level min assist --    Functional Mobility Goal OT LTG, Assist Device rolling walker --    Functional Mobility Goal OT LTG, Distance to Achieve to the bathroom --    Functional Mobility Goal OT LTG, Outcome --  goal met

## 2017-12-11 NOTE — PLAN OF CARE
Problem: Patient Care Overview (Adult)  Goal: Plan of Care Review  Outcome: Ongoing (interventions implemented as appropriate)    12/11/17 1018   Coping/Psychosocial Response Interventions   Plan Of Care Reviewed With patient   Outcome Evaluation   Outcome Summary/Follow up Plan patient with improve gait mechanics with verbal cueing, complaints of left hip pain during mobility. Resolved once sitting in recliner. Walker adjusted to correct height.   Patient Care Overview   Progress improving         Problem: Inpatient Physical Therapy  Goal: Gait Training Goal LTG- PT  Outcome: Ongoing (interventions implemented as appropriate)    12/08/17 1139 12/11/17 1018   Gait Training PT LTG   Gait Training Goal PT LTG, Date Established 12/08/17 --    Gait Training Goal PT LTG, Time to Achieve 2 wks --    Gait Training Goal PT LTG, [place] Level contact guard assist --    Gait Training Goal PT LTG, Assist Device walker, rolling --    Gait Training Goal PT LTG, Distance to Achieve 300 --    Gait Training Goal PT LTG, Date Goal Reviewed --  12/11/17   Gait Training Goal PT LTG, Outcome --  goal ongoing

## 2017-12-11 NOTE — PLAN OF CARE
Problem: Patient Care Overview (Adult)  Goal: Plan of Care Review  Outcome: Ongoing (interventions implemented as appropriate)    12/11/17 1131   Coping/Psychosocial Response Interventions   Plan Of Care Reviewed With patient   Outcome Evaluation   Outcome Summary/Follow up Plan Pt with good progress meeting functional mobility goal, and gave good effort with UE ther ex. Pt with L hip pain and decreased strength and safety awareness limiting independence   Patient Care Overview   Progress improving

## 2017-12-12 VITALS
OXYGEN SATURATION: 93 % | HEIGHT: 69 IN | WEIGHT: 185.6 LBS | RESPIRATION RATE: 18 BRPM | BODY MASS INDEX: 27.49 KG/M2 | DIASTOLIC BLOOD PRESSURE: 86 MMHG | SYSTOLIC BLOOD PRESSURE: 136 MMHG | HEART RATE: 65 BPM | TEMPERATURE: 97.6 F

## 2017-12-12 LAB
ANION GAP SERPL CALCULATED.3IONS-SCNC: 7 MMOL/L (ref 3–11)
BUN BLD-MCNC: 22 MG/DL (ref 9–23)
BUN/CREAT SERPL: 14.7 (ref 7–25)
CALCIUM SPEC-SCNC: 9.2 MG/DL (ref 8.7–10.4)
CHLORIDE SERPL-SCNC: 105 MMOL/L (ref 99–109)
CO2 SERPL-SCNC: 25 MMOL/L (ref 20–31)
CREAT BLD-MCNC: 1.5 MG/DL (ref 0.6–1.3)
GFR SERPL CREATININE-BSD FRML MDRD: 44 ML/MIN/1.73
GLUCOSE BLD-MCNC: 111 MG/DL (ref 70–100)
POTASSIUM BLD-SCNC: 3.7 MMOL/L (ref 3.5–5.5)
SODIUM BLD-SCNC: 137 MMOL/L (ref 132–146)

## 2017-12-12 PROCEDURE — 99217 PR OBSERVATION CARE DISCHARGE MANAGEMENT: CPT | Performed by: NURSE PRACTITIONER

## 2017-12-12 PROCEDURE — 96372 THER/PROPH/DIAG INJ SC/IM: CPT

## 2017-12-12 PROCEDURE — 25010000002 HEPARIN (PORCINE) PER 1000 UNITS: Performed by: HOSPITALIST

## 2017-12-12 PROCEDURE — G0378 HOSPITAL OBSERVATION PER HR: HCPCS

## 2017-12-12 PROCEDURE — 80048 BASIC METABOLIC PNL TOTAL CA: CPT | Performed by: NURSE PRACTITIONER

## 2017-12-12 RX ORDER — ACETAMINOPHEN 325 MG/1
650 TABLET ORAL EVERY 6 HOURS PRN
Status: ON HOLD
Start: 2017-12-12 | End: 2019-02-24

## 2017-12-12 RX ORDER — IPRATROPIUM BROMIDE AND ALBUTEROL SULFATE 2.5; .5 MG/3ML; MG/3ML
3 SOLUTION RESPIRATORY (INHALATION) EVERY 4 HOURS PRN
Qty: 360 ML
Start: 2017-12-12

## 2017-12-12 RX ADMIN — HEPARIN SODIUM 5000 UNITS: 5000 INJECTION, SOLUTION INTRAVENOUS; SUBCUTANEOUS at 06:30

## 2017-12-12 RX ADMIN — CLOPIDOGREL BISULFATE 75 MG: 75 TABLET ORAL at 08:24

## 2017-12-12 RX ADMIN — VITAMIN E CAP 400 UNIT 400 UNITS: 400 CAP at 08:25

## 2017-12-12 RX ADMIN — MONTELUKAST SODIUM 10 MG: 10 TABLET, FILM COATED ORAL at 08:25

## 2017-12-12 RX ADMIN — CARBIDOPA AND LEVODOPA 1 TABLET: 25; 100 TABLET ORAL at 08:23

## 2017-12-12 RX ADMIN — PANTOPRAZOLE SODIUM 40 MG: 40 TABLET, DELAYED RELEASE ORAL at 06:31

## 2017-12-12 RX ADMIN — FINASTERIDE 5 MG: 5 TABLET, FILM COATED ORAL at 08:24

## 2017-12-12 RX ADMIN — ASPIRIN 81 MG 81 MG: 81 TABLET ORAL at 08:23

## 2017-12-12 NOTE — PLAN OF CARE
Problem: Fall Risk (Adult)  Goal: Absence of Falls  Outcome: Outcome(s) achieved Date Met:  12/12/17    Problem: Skin Integrity Impairment, Risk/Actual (Adult)  Goal: Identify Related Risk Factors and Signs and Symptoms  Outcome: Outcome(s) achieved Date Met:  12/12/17  Goal: Skin Integrity/Wound Healing  Outcome: Outcome(s) achieved Date Met:  12/12/17

## 2017-12-12 NOTE — DISCHARGE SUMMARY
[facility] Medicine Services  DISCHARGE SUMMARY    Patient Name: Murali Dennis  : 1929  MRN: [id number]    Date of Admission: 2017  Date of Discharge:    Length of Stay: 0  Primary Care Physician: No Known Provider    Consults     Date and Time Order Name Status Description    2017 1807 Inpatient Consult to Neurology Completed         Hospital Course     Presenting Problem:   Contusion of left hip, initial encounter [S70.02XA]    Active Hospital Problems (** Indicates Principal Problem)    Diagnosis Date Noted   • **Contusion of left hip [S70.02XA] 2017   • CKD (chronic kidney disease) [N18.9] 2017   • Chronic congestive heart failure [I50.9] 2017   • CAD (coronary artery disease) [I25.10] 2017   • Dizziness [R42] 2017   • Generalized weakness [R53.1] 2017   • Dementia [F03.90] 2017   • Hypothyroid [E03.9] 2017      Resolved Hospital Problems    Diagnosis Date Noted Date Resolved   No resolved problems to display.          Hospital Course:  Murali Dennis is a 88 y.o. male with history of CHF, CAD, CK D who presented to the emergency department after a fall.  He lost his balance and fell.  Complaint of left hip pain.  X-rays obtained in emergency department were negative for fracture.  Family did mention that he suffers from a shuffling gait with intermittent tremors.  Had never been diagnosed with Parkinson's.  He was admitted with a neurology consultation.  Neurology started him on Sinemet due to his tremor and gait disorder.  Tremor has improved some.  He is currently tolerating Sinemet.  Differentials for his gait disorder include Parkinsonian syndrome versus gait abnormalities due to peripheral neuropathy.  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  Physical therapy has worked with him this admission and feels he would benefit from  inpatient rehabilitation.  This will take place at [facility].    He does have history of congestive heart failure.  Has been asymptomatic this admission.  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 He has never seen a cardiologist. He does take Entresto.  Echocardiogram this hospitalization revealed an ejection fraction of 28%.  Patient and wife are in town for at least the next 6 weeks.  He needs a cardiology follow-up due to his CHF.  Recommend he be seen in the heart and valve clinic next week.  Beta-blockade therapy was not initiated this hospitalization due to marginal blood pressure.  He was also started on Sinemet which can cause orthostasis.  Did not want to worsen this by adding beta-blockade at this time.  We'll defer to heart and valve clinic/cardiology.           Day of Discharge     HPI: Feels constipated.  Did have a small bowel movement this morning.  No other complaints.      Review of Systems  Gen- No fevers, chills  CV- No chest pain, palpitations  Resp- No cough, dyspnea  GI- No N/V/D, abd pain      Otherwise ROS is negative except as mentioned in the HPI.    Vital Signs:   Temp:  [97.4 °F (36.3 °C)-97.6 °F (36.4 °C)] 97.6 °F (36.4 °C)  Heart Rate:  [65-70] 65  Resp:  [18-20] 18  BP: (136-153)/(84-87) 136/86     Physical Exam:  Gen-no acute distress, sitting up in bed eating breakfast  CV-RRR, S1 S2 normal, no m/r/g  Resp-CTAB, no wheezes  Abd-soft, NT, ND, +BS  Ext-no edema  Neuro-A&Ox3, no focal deficits  Psych-appropriate mood      Pertinent  and/or Most Recent Results         Results from last 7 days  Lab Units 12/12/17  0337 12/11/17  0337 12/09/17  0328 12/08/17  0519 12/07/17  1812   WBC 10*3/mm3  --   --   --  4.96 7.53   HEMOGLOBIN g/dL  --   --   --  12.9* 13.2   HEMATOCRIT %  --   --   --  38.5* 40.4   PLATELETS 10*3/mm3  --   --   --  157 168   SODIUM mmol/L 137 140 140 140 137   POTASSIUM mmol/L 3.7 3.9 3.8 3.8 4.4   CHLORIDE mmol/L 105 106 105 105 108    CO2 mmol/L 25.0 24.0 27.0 25.0 25.0   BUN mg/dL 22 19 17 16 15   CREATININE mg/dL 1.50* 1.60* 1.50* 1.50* 1.50*   GLUCOSE mg/dL 111* 116* 121* 120* 110*   CALCIUM mg/dL 9.2 9.0 8.9 9.0 8.9           Results from last 7 days  Lab Units 12/08/17  0519   TSH mIU/mL 1.787             Imaging Results (all)     Procedure Component Value Units Date/Time    XR Hip With or Without Pelvis 2 - 3 View Left [[id number]] Collected:  12/07/17 1402     Updated:  12/07/17 1513    Narrative:       EXAMINATION:  AP VIEW OF THE PELVIS AND OBLIQUE VIEW OF THE LEFT  HIP-12/07/2017:     HISTORY: Hip pain.     FINDINGS: The bony pelvis is intact. There are mild degenerative changes  of both hips. Additional views of the left hip reveal no fracture,  dislocation or acute finding.       Impression:       Mild osteoarthritic changes of the left hip. There are no  acute findings.     D:  12/07/2017  E:  12/07/2017     This report was finalized on 12/7/2017 3:11 PM by Dr. Bismark Goff MD.       CT Pelvis Without Contrast [[id number]] Collected:  12/07/17 1637     Updated:  12/07/17 1654    Narrative:       EXAMINATION: CT PELVIS WO CONTRAST-12/07/2017:      INDICATION: Fall, left posterior/lateral hip pain, XR negative, unable  to bear weight due to pain.         TECHNIQUE: CT scan of the pelvis was performed without contrast. Images  were acquired in the axial plane and are displayed in the axial as well  as the coronal reconstructed projection.     The radiation dose reduction device was turned on for each scan per the  ALARA (As Low as Reasonably Achievable) protocol.     COMPARISON: NONE.     FINDINGS: There is sigmoid diverticulosis. There is no pelvic mass or  fluid. There is no inguinal lymphadenopathy. There is no pelvic  fracture. The hips are also intact with no evidence of fracture or  dislocation.       Impression:       There are no pathological findings.     D:  12/07/2017  E:  12/07/2017           This report was finalized on  12/7/2017 4:51 PM by Dr. Bismark Goff MD.       XR Chest 1 View [[id number]] Collected:  12/07/17 1811     Updated:  12/07/17 1852    Narrative:       EXAM:    XR Chest, 1 View    CLINICAL HISTORY:    88 years old, male; Contusion of left hip, initial encounter; Unspecified   fall, initial encounter; Other reduced mobility; Signs and symptoms; Other:   Dyspnea    TECHNIQUE:    Frontal view of the chest.    COMPARISON:    No relevant prior studies available.    FINDINGS:    Lungs:  There is mild nonspecific prominence of the pulmonary vasculature. No   pneumonia.    Pleural space:  Unremarkable.  No pneumothorax.    Heart:  The heart demonstrates mild diffuse enlargement.    Mediastinum:  Unremarkable.    Bones/joints:  There are sternal wires consistent with previous sternotomy   incision.  There are degenerative changes of the right and left shoulder.      Impression:         Remainder with mild congestive changes.    THIS DOCUMENT HAS BEEN ELECTRONICALLY SIGNED BY WALDO CASTAÑEDA MD          Results for orders placed during the hospital encounter of 12/07/17   Adult Transthoracic Echo Complete W/ Cont if Necessary Per Protocol    Narrative · The left ventricular cavity is mildly dilated.  · Left atrial cavity size is moderately dilated.  · Mild aortic valve regurgitation is present.  · Mild-to-moderate mitral valve regurgitation is present  · Left ventricular systolic function is severely decreased. Estimated EF =   28%.  · Left ventricular diastolic dysfunction (grade I a) consistent with   impaired relaxation.  · Mild tricuspid valve regurgitation is present.  · There is no evidence of pericardial effusion.  · No evidence of pulmonary hypertension is present.  · The aortic valve exhibits sclerosis  · Normal right ventricular cavity size and wall thickness noted with   borderline low right ventricular systolic function.            Discharge Details      Murali Dennis   Home Medication Instructions  [facility]:[id number]    Printed on:12/12/17 5991   Medication Information                      acetaminophen (TYLENOL) 325 MG tablet  Take 2 tablets by mouth Every 6 (Six) Hours As Needed for Mild Pain .             aspirin 81 MG chewable tablet  Chew 81 mg Daily.             carbidopa-levodopa (SINEMET)  MG per tablet  Take 1 tablet by mouth 3 (Three) Times a Day.             cetirizine (zyrTEC) 10 MG tablet  Take 10 mg by mouth Daily.             cholecalciferol (VITAMIN D3) 1000 units tablet  Take 1,000 Units by mouth Daily.             clopidogrel (PLAVIX) 75 MG tablet  Take 75 mg by mouth Daily.             dutasteride (AVODART) 0.5 MG capsule  Take 0.5 mg by mouth Daily.             ipratropium-albuterol (DUO-NEB) 0.5-2.5 mg/mL nebulizer  Take 3 mL by nebulization Every 4 (Four) Hours As Needed for Shortness of Air (CBS PROTOCOL).             levothyroxine (SYNTHROID, LEVOTHROID) 75 MCG tablet  Take 75 mcg by mouth Daily.             magnesium oxide (MAGOX) 400 (241.3 Mg) MG tablet tablet  Take 400 mg by mouth Daily.             mirtazapine (REMERON) 30 MG tablet  Take 30 mg by mouth Every Night.             montelukast (SINGULAIR) 10 MG tablet  Take 10 mg by mouth Every Night.             Multiple Vitamins-Minerals (EYE HEALTH) capsule  Take  by mouth.             nitroglycerin (NITROSTAT) 0.4 MG SL tablet  Place 0.4 mg under the tongue Every 5 (Five) Minutes As Needed for Chest Pain. Take no more than 3 doses in 15 minutes.             omeprazole (priLOSEC) 20 MG capsule  Take 20 mg by mouth Daily.             pravastatin (PRAVACHOL) 20 MG tablet  Take 20 mg by mouth Daily.             sacubitril-valsartan (ENTRESTO) 24-26 MG tablet  Take 1 tablet by mouth Every Night.             vitamin E 400 UNIT capsule  Take 400 Units by mouth Daily.    Miralax 17GM powder  Take once daily                   Discharge Disposition:  [facility]    Discharge Diet: cardiac, low sodium    Discharge Activity: as  tolerated      No future appointments.    Additional Instructions for the Follow-ups that You Need to Schedule     Discharge Follow-up with Specialty: cardiology ([facility] or [facility]); 1 Month    As directed    Specialty:  cardiology ([facility] or [facility])    Follow Up:  1 Month    Follow Up Details:  CHF, known diagnosis, new patient visit           Discharge Follow-up with Specialty: heart and valve clinic in 1 week, needs neurology follow up when back home [place], PCP can arrange    As directed    Specialty:  heart and valve clinic in 1 week, needs neurology follow up when back home in [place], PCP can arrange                     Time Spent on Discharge: 45 minutes    ARCADIO Mejia  12/12/17  8:54 AM

## 2017-12-12 NOTE — PROGRESS NOTES
Continued Stay Note  [facility]     Patient Name: Murali Dennis  MRN: [id number]  Today's Date: 12/12/2017    Admit Date: 12/7/2017          Discharge Plan       12/12/17 1155    Case Management/Social Work Plan    Plan discharge summary    Patient/Family In Agreement With Plan yes    Additional Comments Discharge summary faxed to [facility] pulmonary unit at [phone].      12/12/17 0947    Case Management/Social Work Plan    Plan discharge plan    Patient/Family In Agreement With Plan yes    Additional Comments Per provider note, pt medically ready for discharge today and family will provide transportation to [facility], pulmonary unit. Nurse will need to call report to [phone] and send a copy of discharge summary and any scripts with pt.               Discharge Codes     None        Expected Discharge Date and Time     Expected Discharge Date Expected Discharge Time    Dec 12, 2017             Louise Bautista RN

## 2017-12-12 NOTE — PROGRESS NOTES
Continued Stay Note  [facility]     Patient Name: Murali Dennis  MRN: [id number]  Today's Date: 12/12/2017    Admit Date: 12/7/2017          Discharge Plan       12/12/17 0947    Case Management/Social Work Plan    Plan discharge plan    Patient/Family In Agreement With Plan yes    Additional Comments Per provider note, pt medically ready for discharge today and family will provide transportation to [facility], pulmonary unit. Nurse will need to call report to [phone] and send a copy of discharge summary and any scripts with pt.               Discharge Codes     None        Expected Discharge Date and Time     Expected Discharge Date Expected Discharge Time    Dec 12, 2017             Louise Bautista RN

## 2017-12-12 NOTE — NURSING NOTE
Discharged to [facility] for rehab. Report was called prior to his discharge. His discharge summary was sent along with his follow-up appointments  + the family was informed that Dr. Dyer office will call them with a date for his follow-up appointment.

## 2017-12-12 NOTE — PLAN OF CARE
Problem: Patient Care Overview (Adult)  Goal: Plan of Care Review  Outcome: Outcome(s) achieved Date Met:  12/12/17  Goal: Adult Individualization and Mutuality  Outcome: Outcome(s) achieved Date Met:  12/12/17  Goal: Discharge Needs Assessment  Outcome: Outcome(s) achieved Date Met:  12/12/17

## 2017-12-12 NOTE — DISCHARGE PLACEMENT REQUEST
"Murali Dennis (88 y.o. Male)     To [facility]    From [facility]() [phone]    Date of Birth Social Security Number Address Home Phone MRN    [id number]  [id number] [place] [address] [phone] [id number]    Spiritism Marital Status          None        Admission Date Admission Type Admitting Provider Attending Provider Department, Room/Bed    17 Emergency Trevin Souza MD Sloan, Walker E, MD [facility] 5H, S582/1    Discharge Date Discharge Disposition Discharge Destination         Rehab Facility or Unit (DC - External)             Attending Provider: Trevin Souza MD     Allergies:  Codeine, Lipitor [Atorvastatin], Penicillins, Prozac [Fluoxetine Hcl], Sulfa Antibiotics, Valium [Diazepam]    Isolation:  None   Infection:  None   Code Status:  FULL    Ht:  175.3 cm (69\")   Wt:  84.2 kg (185 lb 9.6 oz)    Admission Cmt:  None   Principal Problem:  Contusion of left hip [S70.02XA]                 Active Insurance as of 2017     Primary Coverage     Payor Plan Insurance Group Employer/Plan Group    MEDICARE MEDICARE A & B      Payor Plan Address Payor Plan Phone Number Effective From Effective To    [address] [phone] [id number]     [place] [id number]       Subscriber Name Subscriber Birth Date Member ID       MURALI DENNIS 1929 [id number]                 Emergency Contacts      (Rel.) Home Phone Work Phone Mobile Phone    SnonyNelly (Power of ) [phone] -- [phone]    TalaveraSanta rockwell (Daughter) [phone] -- --               Discharge Summary      ARCADIO Mejia at 2017  8:53 AM              [facility] Services  DISCHARGE SUMMARY    Patient Name: Murali Dennis  : 1929  MRN: [id number]    Date of Admission: 2017  Date of Discharge:    Length of Stay: 0  Primary Care Physician: No Known Provider    Consults     Date and Time " Order Name Status Description    12/7/2017 1807 Inpatient Consult to Neurology Completed         Hospital Course     Presenting Problem:   Contusion of left hip, initial encounter [S70.02XA]    Active Hospital Problems (** Indicates Principal Problem)    Diagnosis Date Noted   • **Contusion of left hip [S70.02XA] 12/07/2017   • CKD (chronic kidney disease) [N18.9] 12/08/2017   • Chronic congestive heart failure [I50.9] 12/08/2017   • CAD (coronary artery disease) [I25.10] 12/08/2017   • Dizziness [R42] 12/08/2017   • Generalized weakness [R53.1] 12/08/2017   • Dementia [F03.90] 12/07/2017   • Hypothyroid [E03.9] 12/07/2017      Resolved Hospital Problems    Diagnosis Date Noted Date Resolved   No resolved problems to display.          Hospital Course:  Murali Dennis is a 88 y.o. male with history of CHF, CAD, CK D who presented to the emergency department after a fall.  He lost his balance and fell.  Complaint of left hip pain.  X-rays obtained in emergency department were negative for fracture.  Family did mention that he suffers from a shuffling gait with intermittent tremors.  Had never been diagnosed with Parkinson's.  He was admitted with a neurology consultation.  Neurology started him on Sinemet due to his tremor and gait disorder.  Tremor has improved some.  He is currently tolerating Sinemet.  Differentials for his gait disorder include Parkinsonian syndrome versus gait abnormalities due to peripheral neuropathy.  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  Physical therapy has worked with him this admission and feels he would benefit from inpatient rehabilitation.  This will take place at [facility].    He does have history of congestive heart failure.  Has been asymptomatic this admission.  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 He has never seen a cardiologist. He does take Entresto.   Echocardiogram this hospitalization revealed an ejection fraction of 28%.  Patient and wife are in town for at least the next 6 weeks.  He needs a cardiology follow-up due to his CHF.  Recommend he be seen in the heart and valve clinic next week.  Beta-blockade therapy was not initiated this hospitalization due to marginal blood pressure.  He was also started on Sinemet which can cause orthostasis.  Did not want to worsen this by adding beta-blockade at this time.  We'll defer to heart and valve clinic/cardiology.           Day of Discharge     HPI: Feels constipated.  Did have a small bowel movement this morning.  No other complaints.      Review of Systems  Gen- No fevers, chills  CV- No chest pain, palpitations  Resp- No cough, dyspnea  GI- No N/V/D, abd pain      Otherwise ROS is negative except as mentioned in the HPI.    Vital Signs:   Temp:  [97.4 °F (36.3 °C)-97.6 °F (36.4 °C)] 97.6 °F (36.4 °C)  Heart Rate:  [65-70] 65  Resp:  [18-20] 18  BP: (136-153)/(84-87) 136/86     Physical Exam:  Gen-no acute distress, sitting up in bed eating breakfast  CV-RRR, S1 S2 normal, no m/r/g  Resp-CTAB, no wheezes  Abd-soft, NT, ND, +BS  Ext-no edema  Neuro-A&Ox3, no focal deficits  Psych-appropriate mood      Pertinent  and/or Most Recent Results         Results from last 7 days  Lab Units 12/12/17  0337 12/11/17  0337 12/09/17  0328 12/08/17  0519 12/07/17  1812   WBC 10*3/mm3  --   --   --  4.96 7.53   HEMOGLOBIN g/dL  --   --   --  12.9* 13.2   HEMATOCRIT %  --   --   --  38.5* 40.4   PLATELETS 10*3/mm3  --   --   --  157 168   SODIUM mmol/L 137 140 140 140 137   POTASSIUM mmol/L 3.7 3.9 3.8 3.8 4.4   CHLORIDE mmol/L 105 106 105 105 108   CO2 mmol/L 25.0 24.0 27.0 25.0 25.0   BUN mg/dL 22 19 17 16 15   CREATININE mg/dL 1.50* 1.60* 1.50* 1.50* 1.50*   GLUCOSE mg/dL 111* 116* 121* 120* 110*   CALCIUM mg/dL 9.2 9.0 8.9 9.0 8.9           Results from last 7 days  Lab Units 12/08/17  0519   TSH mIU/mL 1.787              Imaging Results (all)     Procedure Component Value Units Date/Time    XR Hip With or Without Pelvis 2 - 3 View Left [[id number]] Collected:  12/07/17 1402     Updated:  12/07/17 1513    Narrative:       EXAMINATION:  AP VIEW OF THE PELVIS AND OBLIQUE VIEW OF THE LEFT  HIP-12/07/2017:     HISTORY: Hip pain.     FINDINGS: The bony pelvis is intact. There are mild degenerative changes  of both hips. Additional views of the left hip reveal no fracture,  dislocation or acute finding.       Impression:       Mild osteoarthritic changes of the left hip. There are no  acute findings.     D:  12/07/2017  E:  12/07/2017     This report was finalized on 12/7/2017 3:11 PM by Dr. Bismark Goff MD.       CT Pelvis Without Contrast [[id number]] Collected:  12/07/17 1637     Updated:  12/07/17 1654    Narrative:       EXAMINATION: CT PELVIS WO CONTRAST-12/07/2017:      INDICATION: Fall, left posterior/lateral hip pain, XR negative, unable  to bear weight due to pain.         TECHNIQUE: CT scan of the pelvis was performed without contrast. Images  were acquired in the axial plane and are displayed in the axial as well  as the coronal reconstructed projection.     The radiation dose reduction device was turned on for each scan per the  ALARA (As Low as Reasonably Achievable) protocol.     COMPARISON: NONE.     FINDINGS: There is sigmoid diverticulosis. There is no pelvic mass or  fluid. There is no inguinal lymphadenopathy. There is no pelvic  fracture. The hips are also intact with no evidence of fracture or  dislocation.       Impression:       There are no pathological findings.     D:  12/07/2017  E:  12/07/2017           This report was finalized on 12/7/2017 4:51 PM by Dr. Bismark Goff MD.       XR Chest 1 View [[id number]] Collected:  12/07/17 1811     Updated:  12/07/17 1852    Narrative:       EXAM:    XR Chest, 1 View    CLINICAL HISTORY:    88 years old, male; Contusion of left hip, initial encounter; Unspecified    fall, initial encounter; Other reduced mobility; Signs and symptoms; Other:   Dyspnea    TECHNIQUE:    Frontal view of the chest.    COMPARISON:    No relevant prior studies available.    FINDINGS:    Lungs:  There is mild nonspecific prominence of the pulmonary vasculature. No   pneumonia.    Pleural space:  Unremarkable.  No pneumothorax.    Heart:  The heart demonstrates mild diffuse enlargement.    Mediastinum:  Unremarkable.    Bones/joints:  There are sternal wires consistent with previous sternotomy   incision.  There are degenerative changes of the right and left shoulder.      Impression:         Remainder with mild congestive changes.    THIS DOCUMENT HAS BEEN ELECTRONICALLY SIGNED BY WALDO CASTAÑEDA MD          Results for orders placed during the hospital encounter of 12/07/17   Adult Transthoracic Echo Complete W/ Cont if Necessary Per Protocol    Narrative · The left ventricular cavity is mildly dilated.  · Left atrial cavity size is moderately dilated.  · Mild aortic valve regurgitation is present.  · Mild-to-moderate mitral valve regurgitation is present  · Left ventricular systolic function is severely decreased. Estimated EF =   28%.  · Left ventricular diastolic dysfunction (grade I a) consistent with   impaired relaxation.  · Mild tricuspid valve regurgitation is present.  · There is no evidence of pericardial effusion.  · No evidence of pulmonary hypertension is present.  · The aortic valve exhibits sclerosis  · Normal right ventricular cavity size and wall thickness noted with   borderline low right ventricular systolic function.            Discharge Details      Murali Dennis   Home Medication Instructions ORIANA:[id number]    Printed on:12/12/17 7406   Medication Information                      acetaminophen (TYLENOL) 325 MG tablet  Take 2 tablets by mouth Every 6 (Six) Hours As Needed for Mild Pain .             aspirin 81 MG chewable tablet  Chew 81 mg Daily.              carbidopa-levodopa (SINEMET)  MG per tablet  Take 1 tablet by mouth 3 (Three) Times a Day.             cetirizine (zyrTEC) 10 MG tablet  Take 10 mg by mouth Daily.             cholecalciferol (VITAMIN D3) 1000 units tablet  Take 1,000 Units by mouth Daily.             clopidogrel (PLAVIX) 75 MG tablet  Take 75 mg by mouth Daily.             dutasteride (AVODART) 0.5 MG capsule  Take 0.5 mg by mouth Daily.             ipratropium-albuterol (DUO-NEB) 0.5-2.5 mg/mL nebulizer  Take 3 mL by nebulization Every 4 (Four) Hours As Needed for Shortness of Air (CBS PROTOCOL).             levothyroxine (SYNTHROID, LEVOTHROID) 75 MCG tablet  Take 75 mcg by mouth Daily.             magnesium oxide (MAGOX) 400 (241.3 Mg) MG tablet tablet  Take 400 mg by mouth Daily.             mirtazapine (REMERON) 30 MG tablet  Take 30 mg by mouth Every Night.             montelukast (SINGULAIR) 10 MG tablet  Take 10 mg by mouth Every Night.             Multiple Vitamins-Minerals (EYE HEALTH) capsule  Take  by mouth.             nitroglycerin (NITROSTAT) 0.4 MG SL tablet  Place 0.4 mg under the tongue Every 5 (Five) Minutes As Needed for Chest Pain. Take no more than 3 doses in 15 minutes.             omeprazole (priLOSEC) 20 MG capsule  Take 20 mg by mouth Daily.             pravastatin (PRAVACHOL) 20 MG tablet  Take 20 mg by mouth Daily.             sacubitril-valsartan (ENTRESTO) 24-26 MG tablet  Take 1 tablet by mouth Every Night.             vitamin E 400 UNIT capsule  Take 400 Units by mouth Daily.    Miralax 17GM powder  Take once daily                   Discharge Disposition:  [facility]    Discharge Diet: cardiac, low sodium    Discharge Activity: as tolerated      No future appointments.    Additional Instructions for the Follow-ups that You Need to Schedule     Discharge Follow-up with Specialty: cardiology ([facility] or [facility]); 1 Month    As directed    Specialty:  cardiology ([facility] or [facility])    Follow Up:  1 Month    Follow  Up Details:  CHF, known diagnosis, new patient visit           Discharge Follow-up with Specialty: heart and valve clinic in 1 week, needs neurology follow up [place], PCP can arrange    As directed    Specialty:  heart and valve clinic in 1 week, needs neurology follow up when back home in [place], PCP can arrange                     Time Spent on Discharge: 45 minutes    ARCADIO Mejia  12/12/17  8:54 AM         Electronically signed by ARCADIO Mejia at 12/12/2017 10:12 AM

## 2017-12-13 NOTE — THERAPY DISCHARGE NOTE
Acute Care - Occupational Therapy Discharge Summary  [facility]     Patient Name: Murali Dennis  : 1929  MRN: [id number]    Today's Date: 2017  Onset of Illness/Injury or Date of Surgery Date: 17    Date of Referral to OT: 17  Referring Physician: Mayne, PA-C      Admit Date: 2017        OT Recommendation and Plan    Visit Dx:    ICD-10-CM ICD-9-CM   1. Mobility impaired Z74.09 799.89   2. Contusion of left hip, initial encounter S70.02XA 924.01   3. Fall from standing, initial encounter W19.XXXA E888.9   4. Impaired functional mobility, balance, gait, and endurance Z74.09 V49.89   5. Impaired mobility and ADLs Z74.09 799.89                     OT Goals       17 1129 17 1055       Bed Mobility OT LTG    Bed Mobility OT LTG, Time to Achieve  by discharge  -TB     Bed Mobility OT LTG, Activity Type  all bed mobility  -TB     Bed Mobility OT LTG, [place] Level  supervision required;verbal cues required  -TB     Bed Mobility OT LTG, Outcome goal ongoing  -AC      Transfer Training OT LTG    Transfer Training OT LTG, Time to Achieve  by discharge  -TB     Transfer Training OT LTG, Activity Type  sit to stand/stand to sit;bed to chair /chair to bed;toilet  -TB     Transfer Training OT LTG, [place] Level  minimum assist (75% patient effort);verbal cues required  -TB     Transfer Training OT LTG, Assist Device  walker, rolling  -TB     Transfer Training OT LTG, Outcome goal partially met   met STS and bed to chair  -AC      Strength OT LTG    Strength Goal OT LTG, Functional Goal  Pt completes written HEP 3x10 reps with SBA/demonstration and cues prn to support ADLs  -TB     Strength Goal OT LTG, Outcome goal ongoing  -AC      Toileting OT LTG    Toileting Goal OT LTG, Time to Achieve  by discharge  -TB     Toileting Goal OT LTG, [place] Level  minimum assist (75% patient effort);verbal cues required  -TB     Toileting Goal OT LTG, Additional Goal  Pt able to  manage clothing and hygiene with Min A to steady and cues for task completion  -TB     Toileting Goal OT LTG, Outcome goal ongoing  -AC      Functional Mobility OT LTG    Functional Mobility Goal OT LTG, Time to Achieve  by discharge  -TB     Functional Mobility Goal OT LTG, [place] Level  min assist  -TB     Functional Mobility Goal OT LTG, Assist Device  rolling walker  -TB     Functional Mobility Goal OT LTG, Distance to Achieve  to the bathroom  -TB     Functional Mobility Goal OT LTG, Outcome goal met  -AC        User Key  (r) = Recorded By, (t) = Taken By, (c) = Cosigned By    Initials Name Provider Type    TB Michelle Perez, OT Occupational Therapist    AC Kateryna Hernandez, OT Occupational Therapist                Outcome Measures       12/11/17 0946 12/11/17 0945       How much help from another person do you currently need...    Turning from your back to your side while in flat bed without using bedrails?  3  -AS     Moving from lying on back to sitting on the side of a flat bed without bedrails?  3  -AS     Moving to and from a bed to a chair (including a wheelchair)?  3  -AS     Standing up from a chair using your arms (e.g., wheelchair, bedside chair)?  3  -AS     Climbing 3-5 steps with a railing?  2  -AS     To walk in hospital room?  3  -AS     AM-PAC 6 Clicks Score  17  -AS     How much help from another is currently needed...    Putting on and taking off regular lower body clothing? 4  -AC      Bathing (including washing, rinsing, and drying) 2  -AC      Toileting (which includes using toilet bed pan or urinal) 2  -AC      Putting on and taking off regular upper body clothing 3  -AC      Taking care of personal grooming (such as brushing teeth) 3  -AC      Eating meals 3  -AC      Score 17  -AC      Functional Assessment    Outcome Measure Options AM-PAC 6 Clicks Daily Activity (OT)  -AC AM-PAC 6 Clicks Basic Mobility (PT)  -AS       User Key  (r) = Recorded By, (t) = Taken By, (c) =  Cosigned By    Initials Name Provider Type    AC Kateryna Hernandez, OT Occupational Therapist    AS Vicky Rose PTA Physical Therapy Assistant              OT Discharge Summary  Reason for Discharge: Discharge from facility  Outcomes Achieved: Refer to plan of care for updates on goals achieved  Discharge Destination: Inpatient rehabilitation facility      Sonal Glass OT  12/13/2017

## 2017-12-29 ENCOUNTER — HOSPITAL ENCOUNTER (OUTPATIENT)
Dept: CARDIOLOGY | Facility: HOSPITAL | Age: 82
Discharge: HOME | End: 2017-12-29
Admitting: NURSE PRACTITIONER

## 2017-12-29 ENCOUNTER — OFFICE VISIT (OUTPATIENT)
Dept: CARDIOLOGY | Facility: HOSPITAL | Age: 82
End: 2017-12-29

## 2017-12-29 VITALS
TEMPERATURE: 97.8 F | BODY MASS INDEX: 27.55 KG/M2 | SYSTOLIC BLOOD PRESSURE: 92 MMHG | HEART RATE: 85 BPM | WEIGHT: 186 LBS | DIASTOLIC BLOOD PRESSURE: 54 MMHG | RESPIRATION RATE: 18 BRPM | HEIGHT: 69 IN | OXYGEN SATURATION: 97 %

## 2017-12-29 DIAGNOSIS — G45.9 TRANSIENT CEREBRAL ISCHEMIA, UNSPECIFIED TYPE: ICD-10-CM

## 2017-12-29 DIAGNOSIS — N18.9 CHRONIC KIDNEY DISEASE, UNSPECIFIED CKD STAGE: ICD-10-CM

## 2017-12-29 DIAGNOSIS — Z86.79 HISTORY OF ATRIAL FIBRILLATION: ICD-10-CM

## 2017-12-29 DIAGNOSIS — I50.9 CHRONIC CONGESTIVE HEART FAILURE, UNSPECIFIED CONGESTIVE HEART FAILURE TYPE: ICD-10-CM

## 2017-12-29 DIAGNOSIS — I25.10 CORONARY ARTERY DISEASE INVOLVING NATIVE CORONARY ARTERY OF NATIVE HEART WITHOUT ANGINA PECTORIS: ICD-10-CM

## 2017-12-29 DIAGNOSIS — I50.22 CHRONIC SYSTOLIC CONGESTIVE HEART FAILURE (HCC): Primary | ICD-10-CM

## 2017-12-29 DIAGNOSIS — I95.9 HYPOTENSION, UNSPECIFIED HYPOTENSION TYPE: ICD-10-CM

## 2017-12-29 PROBLEM — I48.91 A-FIB (HCC): Status: ACTIVE | Noted: 2017-12-29

## 2017-12-29 LAB
ANION GAP SERPL CALCULATED.3IONS-SCNC: 10 MMOL/L (ref 3–11)
BUN BLD-MCNC: 39 MG/DL (ref 9–23)
BUN/CREAT SERPL: 17.7 (ref 7–25)
CALCIUM SPEC-SCNC: 8.7 MG/DL (ref 8.7–10.4)
CHLORIDE SERPL-SCNC: 103 MMOL/L (ref 99–109)
CO2 SERPL-SCNC: 22 MMOL/L (ref 20–31)
CREAT BLD-MCNC: 2.2 MG/DL (ref 0.6–1.3)
GFR SERPL CREATININE-BSD FRML MDRD: 28 ML/MIN/1.73
GLUCOSE BLD-MCNC: 135 MG/DL (ref 70–100)
POTASSIUM BLD-SCNC: 5.2 MMOL/L (ref 3.5–5.5)
SODIUM BLD-SCNC: 135 MMOL/L (ref 132–146)

## 2017-12-29 PROCEDURE — 93010 ELECTROCARDIOGRAM REPORT: CPT | Performed by: INTERNAL MEDICINE

## 2017-12-29 PROCEDURE — 80048 BASIC METABOLIC PNL TOTAL CA: CPT | Performed by: NURSE PRACTITIONER

## 2017-12-29 PROCEDURE — 99204 OFFICE O/P NEW MOD 45 MIN: CPT | Performed by: NURSE PRACTITIONER

## 2017-12-29 PROCEDURE — 93005 ELECTROCARDIOGRAM TRACING: CPT | Performed by: NURSE PRACTITIONER

## 2017-12-29 RX ORDER — LORATADINE 10 MG/1
1 CAPSULE, LIQUID FILLED ORAL DAILY PRN
COMMUNITY

## 2017-12-29 NOTE — PROGRESS NOTES
[facility]  Heart and Valve Center      Encounter Date:12/29/2017     Murali Dennis  [address] [place] [address]  [phone]    4/20/1929    No Known Provider    Murali Dennis is a 88 y.o. male.      Subjective:     Chief Complaint:  Establish Care (Chf)       HPI     88-year-old male admitted to [facility] 12/7/17 with contusion of left hip status post fall. Patient is a resident in [place].  Recently came to [place] while his wife was having surgery.  He is staying with a daughter.  Patient has a history of CHF, CAD, chronic kidney disease.  X-rays were negative for fracture.  Neurology consult did during hospital stay due to reported history of shuffling gait and intermittent tremors.  He was started on Sinemet.  Differentials for his gait disorder included Parkinsonian syndrome versus gait abnormalities due to peripheral neuropathy.  He had an EMG/MCV significant for moderate to severe polyneuropathy and his bilateral lower extremities.  Patient was discharged to [facility] for inpatient rehabilitation.  Patient reports his tremor has significantly improved.  Rehabilitation has improved with better activity tolerance and steady gait.  Feels that his strength has improved as well.    Patient does have a history of congestive heart failure.  He is asymptomatic during admission.  History of CABG in 2003 never seen by cardiologist.  Currently on Entresto.  EF during hospitalization was 28%.  Reports his primary care provider had been in managing his CHF.  Denies seeing a cardiologist in many years.  Reports having worsening dyspnea over the last 6 months.  That is when Entresto was started.  During his visit at [facility] he'll his Entresto was decreased to once a day due to hypotension.  Wife reports that she will give him intermittent as needed Lasix 20 mg for abdominal swelling.  Reports using once every one to 2 weeks.  Beta blocker has not been initiated due to  marginal blood pressures during hospital stay. Pt reports he has not been able to tolerate BB in the past due to bradycarida.  Pt reports a hx of post operative AFib after CABG surgery 2003.  Reports a hx of TIAs.  NO reported recent AFib, but has not worn heart monitor.  Wife states she has not had a recent stress test, LHC, or an echo since 2003.  Orthostatics are monitor closely due to adding Sinemet.  Patient may refer to the heart and valve Center for follow-up evaluation. Pt plans to see  for evaluation  In 1-2 weeks.    Pt denies CP, pressure, palpations.  Dyspnea over the 6 months has not worsened, but has not improved with Enstresto.  Described at moderate with moderate exertion.  Denies dizziness, syncope, edema.  Weight has been stable.  No hx of Lifevest or ICD.      Patient Active Problem List    Diagnosis   • A-fib [I48.91]     Overview Note:     · Postoperative 2003     • TIA (transient ischemic attack) [G45.9]   • CKD (chronic kidney disease) [N18.9]   • Chronic congestive heart failure [I50.9]   • Coronary artery disease involving native coronary artery of native heart without angina pectoris [I25.10]   • Dizziness [R42]   • Generalized weakness [R53.1]   • Contusion of left hip [S70.02XA]   • Dementia [F03.90]   • Hypothyroid [E03.9]         Past Surgical History:   Procedure Laterality Date   • CARDIAC CATHETERIZATION  2003   • CARPAL TUNNEL RELEASE Right    • CATARACT EXTRACTION Bilateral    • CORONARY ARTERY BYPASS GRAFT  2003    Triple Bypass, @ [facility] @ [place], [place]   • HEMORRHOIDECTOMY     • HERNIA REPAIR         Allergies   Allergen Reactions   • Codeine    • Lipitor [Atorvastatin]    • Penicillins Swelling   • Prozac [Fluoxetine Hcl]    • Valium [Diazepam]    • Sulfa Antibiotics Rash         Current Outpatient Prescriptions:   •  acetaminophen (TYLENOL) 325 MG tablet, Take 2 tablets by mouth Every 6 (Six) Hours As Needed for Mild Pain ., Disp: , Rfl:   •  aspirin  81 MG chewable tablet, Chew 81 mg Daily., Disp: , Rfl:   •  carbidopa-levodopa (SINEMET)  MG per tablet, Take 1 tablet by mouth 3 (Three) Times a Day., Disp: , Rfl:   •  cholecalciferol (VITAMIN D3) 1000 units tablet, Take 1,000 Units by mouth Daily., Disp: , Rfl:   •  clopidogrel (PLAVIX) 75 MG tablet, Take 75 mg by mouth Daily., Disp: , Rfl:   •  dutasteride (AVODART) 0.5 MG capsule, Take 0.5 mg by mouth Daily., Disp: , Rfl:   •  ipratropium-albuterol (DUO-NEB) 0.5-2.5 mg/mL nebulizer, Take 3 mL by nebulization Every 4 (Four) Hours As Needed for Shortness of Air (CBS PROTOCOL)., Disp: 360 mL, Rfl:   •  levothyroxine (SYNTHROID, LEVOTHROID) 75 MCG tablet, Take 75 mcg by mouth Daily., Disp: , Rfl:   •  Loratadine 10 MG capsule, Take 1 capsule by mouth Daily., Disp: , Rfl:   •  magnesium oxide (MAGOX) 400 (241.3 Mg) MG tablet tablet, Take 400 mg by mouth Daily., Disp: , Rfl:   •  mirtazapine (REMERON) 30 MG tablet, Take 30 mg by mouth Every Night., Disp: , Rfl:   •  montelukast (SINGULAIR) 10 MG tablet, Take 10 mg by mouth Every Night., Disp: , Rfl:   •  Multiple Vitamins-Minerals (EYE HEALTH) capsule, Take  by mouth., Disp: , Rfl:   •  nitroglycerin (NITROSTAT) 0.4 MG SL tablet, Place 0.4 mg under the tongue Every 5 (Five) Minutes As Needed for Chest Pain. Take no more than 3 doses in 15 minutes., Disp: , Rfl:   •  omeprazole (priLOSEC) 20 MG capsule, Take 20 mg by mouth Daily., Disp: , Rfl:   •  pravastatin (PRAVACHOL) 20 MG tablet, Take 20 mg by mouth Daily., Disp: , Rfl:   •  sacubitril-valsartan (ENTRESTO) 24-26 MG tablet, Take 1 tablet by mouth Every Night., Disp: , Rfl:   •  vitamin E 400 UNIT capsule, Take 400 Units by mouth Daily., Disp: , Rfl:     The following portions of the patient's history were reviewed and updated as appropriate: allergies, current medications, past family history, past medical history, past social history, past surgical history and problem list.    Review of Systems    Constitution: Negative for chills, decreased appetite, diaphoresis, fever, weakness, malaise/fatigue, night sweats, weight gain and weight loss.   HENT: Positive for hearing loss. Negative for congestion and nosebleeds.    Eyes: Negative for blurred vision, visual disturbance and visual halos.   Cardiovascular: Positive for dyspnea on exertion and irregular heartbeat. Negative for chest pain, claudication, cyanosis, leg swelling, near-syncope, orthopnea, palpitations, paroxysmal nocturnal dyspnea and syncope.   Respiratory: Positive for sputum production. Negative for cough, hemoptysis, shortness of breath, sleep disturbances due to breathing, snoring and wheezing.    Endocrine: Positive for cold intolerance. Negative for heat intolerance, polydipsia, polyphagia and polyuria.   Hematologic/Lymphatic: Bruises/bleeds easily.   Skin: Positive for color change. Negative for dry skin, itching and rash.   Musculoskeletal: Positive for falls and muscle weakness. Negative for joint pain, joint swelling and myalgias.   Gastrointestinal: Positive for heartburn. Negative for bloating, abdominal pain, constipation, diarrhea, dysphagia, melena, nausea and vomiting.   Genitourinary: Negative for dysuria, flank pain, hematuria and nocturia.   Neurological: Positive for excessive daytime sleepiness and loss of balance. Negative for difficulty with concentration, dizziness and headaches.   Psychiatric/Behavioral: Positive for altered mental status, depression and memory loss. The patient is nervous/anxious.    Allergic/Immunologic: Positive for environmental allergies.        Seasonal allergies         Objective:     Vitals:    12/29/17 0939 12/29/17 0945 12/29/17 0947   BP: 108/63 100/62 92/54   BP Location: Right arm Left arm Left arm   Patient Position: Sitting Sitting Standing   Pulse: (!) 123 74 85   Resp: 18     Temp: 97.8 °F (36.6 °C)     TempSrc: Temporal Artery      SpO2: 97%     Weight: 84.4 kg (186 lb)     Height: 175.3  "cm (69\")           Physical Exam   Constitutional: He is oriented to person, place, and time. He appears well-developed and well-nourished. No distress.   HENT:   Head: Normocephalic and atraumatic.   Mouth/Throat: Oropharynx is clear and moist.   Eyes: Conjunctivae are normal. Pupils are equal, round, and reactive to light. No scleral icterus.   Neck: No hepatojugular reflux and no JVD present. Carotid bruit is not present. No tracheal deviation present. No thyromegaly present.   Cardiovascular: Normal rate, regular rhythm, normal heart sounds and intact distal pulses.  Exam reveals no friction rub.    No murmur heard.  Pulmonary/Chest: Effort normal and breath sounds normal.   Abdominal: Soft. Bowel sounds are normal. He exhibits no distension. There is no tenderness.   Musculoskeletal: He exhibits no edema.   Lymphadenopathy:     He has no cervical adenopathy.   Neurological: He is alert and oriented to person, place, and time.   Skin: Skin is warm, dry and intact. No rash noted. No cyanosis or erythema. No pallor.   Psychiatric: He has a normal mood and affect. His behavior is normal. Thought content normal.   Vitals reviewed.      Lab and Diagnostic Review:  Results for orders placed during the hospital encounter of 12/07/17   Adult Transthoracic Echo Complete W/ Cont if Necessary Per Protocol     Narrative · The left ventricular cavity is mildly dilated.  · Left atrial cavity size is moderately dilated.  · Mild aortic valve regurgitation is present.  · Mild-to-moderate mitral valve regurgitation is present  · Left ventricular systolic function is severely decreased. Estimated EF =   28%.  · Left ventricular diastolic dysfunction (grade I a) consistent with   impaired relaxation.  · Mild tricuspid valve regurgitation is present.  · There is no evidence of pericardial effusion.  · No evidence of pulmonary hypertension is present.  · The aortic valve exhibits sclerosis  · Normal right ventricular cavity size and " wall thickness noted with   borderline low right ventricular systolic function.     Lab Results   Component Value Date    GLUCOSE 111 (H) 12/12/2017    CALCIUM 9.2 12/12/2017     12/12/2017    K 3.7 12/12/2017    CO2 25.0 12/12/2017     12/12/2017    BUN 22 12/12/2017    CREATININE 1.50 (H) 12/12/2017    EGFRIFNONA 44 (L) 12/12/2017    BCR 14.7 12/12/2017    ANIONGAP 7.0 12/12/2017     Lab Results   Component Value Date    TSH 1.787 12/08/2017     Lab Results   Component Value Date    WBC 4.96 12/08/2017    HGB 12.9 (L) 12/08/2017    HCT 38.5 (L) 12/08/2017    MCV 95.1 12/08/2017     12/08/2017     Results for orders placed or performed in visit on 12/29/17   Basic Metabolic Panel   Result Value Ref Range    Glucose 135 (H) 70 - 100 mg/dL    BUN 39 (H) 9 - 23 mg/dL    Creatinine 2.20 (H) 0.60 - 1.30 mg/dL    Sodium 135 132 - 146 mmol/L    Potassium 5.2 3.5 - 5.5 mmol/L    Chloride 103 99 - 109 mmol/L    CO2 22.0 20.0 - 31.0 mmol/L    Calcium 8.7 8.7 - 10.4 mg/dL    eGFR Non African Amer 28 (L) >60 mL/min/1.73    BUN/Creatinine Ratio 17.7 7.0 - 25.0    Anion Gap 10.0 3.0 - 11.0 mmol/L       Assessment and Plan:         1. Chronic systolic congestive heart failure  EF during hospital stay 28%, no life vest/no ICD.  Baseline EF unknown.  Requesting records.  Discuss indications for lifevest and ICD.  Pt wants to be evalutated by Dr. Ugarte    - ECG 12 Lead; sinus rhythm 85 bpm    Heart failure education discussed: What is heart failure, causes, signs and symptoms, medication management, daily weight monitoring, low-sodium diet of less than 2 g per day, daily exercise, role the heart failure center.    2. Coronary artery disease involving native coronary artery of native heart without angina pectoris  Bypass surgery 2003.  Currently does not have a cardiologist.  Asa, statin    ? Needs for repeat ischemic eval per Dr. Ugarte.    3. Chronic kidney disease, unspecified CKD stage    - Basic Metabolic  Panel  Worsening Creatinine.  Hold Enstresto for 2 days.  Then restart 1/2 tablet daily  Only use Lasix for weight gain 3-5 lbs.    Recheck BmP 1 week.    4. Hypotension, unspecified hypotension type      5. History of atrial fibrillation  Post of afib  Pt may need cardiac monitor to evaluation arrythmia with hx of TIA, high risk for afib recurrance.    6. Transient cerebral ischemia, unspecified type    F/u Dr. Ugarte as scheduled.  [facility]/u [facility] as needed or as determined by Dr. Ugarte.  Return to [place] pending.            *Please note that portions of this note were completed with a voice recognition program. Efforts were made to edit the dictations, but occasionally words are mistranscribed.

## 2018-01-02 ENCOUNTER — DOCUMENTATION (OUTPATIENT)
Dept: CARDIOLOGY | Facility: HOSPITAL | Age: 83
End: 2018-01-02

## 2018-01-02 NOTE — PROGRESS NOTES
See medical records from primary care provider.  Reviewed echo report from 8/2/17  EF 60-65%, mild TR, mild AR

## 2018-01-03 ENCOUNTER — LAB (OUTPATIENT)
Dept: LAB | Facility: HOSPITAL | Age: 83
End: 2018-01-03

## 2018-01-03 DIAGNOSIS — I50.22 CHRONIC SYSTOLIC CONGESTIVE HEART FAILURE (HCC): ICD-10-CM

## 2018-01-03 DIAGNOSIS — N18.9 CHRONIC KIDNEY DISEASE, UNSPECIFIED CKD STAGE: ICD-10-CM

## 2018-01-03 LAB
ANION GAP SERPL CALCULATED.3IONS-SCNC: 11 MMOL/L (ref 3–11)
BUN BLD-MCNC: 19 MG/DL (ref 9–23)
BUN/CREAT SERPL: 11.9 (ref 7–25)
CALCIUM SPEC-SCNC: 9.1 MG/DL (ref 8.7–10.4)
CHLORIDE SERPL-SCNC: 101 MMOL/L (ref 99–109)
CO2 SERPL-SCNC: 24 MMOL/L (ref 20–31)
CREAT BLD-MCNC: 1.6 MG/DL (ref 0.6–1.3)
GFR SERPL CREATININE-BSD FRML MDRD: 41 ML/MIN/1.73
GLUCOSE BLD-MCNC: 118 MG/DL (ref 70–100)
POTASSIUM BLD-SCNC: 4.7 MMOL/L (ref 3.5–5.5)
SODIUM BLD-SCNC: 136 MMOL/L (ref 132–146)

## 2018-01-03 PROCEDURE — 80048 BASIC METABOLIC PNL TOTAL CA: CPT

## 2018-01-03 PROCEDURE — 36415 COLL VENOUS BLD VENIPUNCTURE: CPT

## 2018-01-05 ENCOUNTER — TELEPHONE (OUTPATIENT)
Dept: CARDIOLOGY | Facility: HOSPITAL | Age: 83
End: 2018-01-05

## 2018-01-05 NOTE — TELEPHONE ENCOUNTER
Pt c/o weakness.  Offered appointment on Monday, to be scheduled.  Encouraged pt to go to ED if s/s worsen.    ----- Message from Jaylon Hernández MA sent at 1/5/2018  2:39 PM EST -----  Regarding: FW: dizziness  Contact: [phone]  Called pt and pt's wife said that pt had a spell of dizziness when he was at therapy this morning. She stated that pt is still dizzy and not feeling well. Pt had mentioned to his daughter that he feels like his heart has stopped and started back. Please see message below.  ----- Message -----     From: Dana Whitehead     Sent: 1/5/2018   2:16 PM       To: Jaylon Lopez MA  Subject: dizziness                                        Patient's wife Della called asking to speak to Lucina. Says Murali was dizzy and not feeling well today.

## 2018-01-08 ENCOUNTER — HOSPITAL ENCOUNTER (OUTPATIENT)
Dept: CARDIOLOGY | Facility: HOSPITAL | Age: 83
Discharge: HOME | End: 2018-01-08
Admitting: NURSE PRACTITIONER

## 2018-01-08 ENCOUNTER — OFFICE VISIT (OUTPATIENT)
Dept: CARDIOLOGY | Facility: HOSPITAL | Age: 83
End: 2018-01-08

## 2018-01-08 VITALS
DIASTOLIC BLOOD PRESSURE: 73 MMHG | WEIGHT: 188 LBS | HEIGHT: 69 IN | TEMPERATURE: 98.9 F | RESPIRATION RATE: 16 BRPM | OXYGEN SATURATION: 96 % | SYSTOLIC BLOOD PRESSURE: 129 MMHG | HEART RATE: 71 BPM | BODY MASS INDEX: 27.85 KG/M2

## 2018-01-08 DIAGNOSIS — I50.23 ACUTE ON CHRONIC SYSTOLIC HEART FAILURE (HCC): ICD-10-CM

## 2018-01-08 DIAGNOSIS — I48.91 ATRIAL FIBRILLATION, UNSPECIFIED TYPE (HCC): Primary | ICD-10-CM

## 2018-01-08 DIAGNOSIS — G45.9 TRANSIENT CEREBRAL ISCHEMIA, UNSPECIFIED TYPE: ICD-10-CM

## 2018-01-08 DIAGNOSIS — N18.9 CHRONIC KIDNEY DISEASE, UNSPECIFIED CKD STAGE: ICD-10-CM

## 2018-01-08 DIAGNOSIS — I48.91 ATRIAL FIBRILLATION, UNSPECIFIED TYPE (HCC): ICD-10-CM

## 2018-01-08 DIAGNOSIS — I25.10 CORONARY ARTERY DISEASE INVOLVING NATIVE CORONARY ARTERY OF NATIVE HEART WITHOUT ANGINA PECTORIS: ICD-10-CM

## 2018-01-08 LAB
ANION GAP SERPL CALCULATED.3IONS-SCNC: 5 MMOL/L (ref 3–11)
BUN BLD-MCNC: 26 MG/DL (ref 9–23)
BUN/CREAT SERPL: 15.3 (ref 7–25)
CALCIUM SPEC-SCNC: 9.2 MG/DL (ref 8.7–10.4)
CHLORIDE SERPL-SCNC: 110 MMOL/L (ref 99–109)
CO2 SERPL-SCNC: 25 MMOL/L (ref 20–31)
CREAT BLD-MCNC: 1.7 MG/DL (ref 0.6–1.3)
GFR SERPL CREATININE-BSD FRML MDRD: 38 ML/MIN/1.73
GLUCOSE BLD-MCNC: 104 MG/DL (ref 70–100)
POTASSIUM BLD-SCNC: 5.6 MMOL/L (ref 3.5–5.5)
SODIUM BLD-SCNC: 140 MMOL/L (ref 132–146)

## 2018-01-08 PROCEDURE — 99214 OFFICE O/P EST MOD 30 MIN: CPT | Performed by: NURSE PRACTITIONER

## 2018-01-08 PROCEDURE — 93005 ELECTROCARDIOGRAM TRACING: CPT | Performed by: NURSE PRACTITIONER

## 2018-01-08 PROCEDURE — 93010 ELECTROCARDIOGRAM REPORT: CPT | Performed by: INTERNAL MEDICINE

## 2018-01-08 PROCEDURE — 80048 BASIC METABOLIC PNL TOTAL CA: CPT | Performed by: NURSE PRACTITIONER

## 2018-01-08 NOTE — PROGRESS NOTES
[facility]  Heart and Valve [facility]      Encounter Date:01/08/2018     Murali Dennis  [address] [place] [address]  [phone]    4/20/1929    No Known Provider    Murali Dennis is a 88 y.o. male.      Subjective:     Chief Complaint:  Follow-up (SHF.  c/o dizziness and weakness.)       HPI     88-year-old male admitted to [facility] 12/7/17 with contusion of left hip status post fall. Patient is a resident in [place].  Recently came to [place] while his wife was having surgery.  He is staying with a daughter.  Patient has a history of CHF, CAD, chronic kidney disease, PAF.  Hx of unsteady gait.  Currently on plavix and ASA.  Hx of coumadin use, but stopped due to pts choice.   Neurology consulted during hospital stay due to reported history of shuffling gait and intermittent tremors.  He was started on Sinemet.  He had an EMG/MCV significant for moderate to severe polyneuropathy and his bilateral lower extremities.  Patient was discharged to [facility] for inpatient rehabilitation.  Patient reports his tremor has significantly improved on Sinemet.       Patient does have a history of congestive heart failure.  He was asymptomatic during admission.  History of CABG in 2003, is not followed  by cardiologist.  Currently on Entresto.  EF during hospitalization was 28%.  Reviewed requested medical records with EF normal in 8/2017.  Reports his primary care provider had been in managing his CHF.  Denies seeing a cardiologist in many years.  Reports having worsening dyspnea over the last 6 months.   During his visit at [facility] he'll his Entresto was decreased to once a day due to hypotension.  Wife reports that she will give him intermittent as needed Lasix 20 mg for abdominal swelling.  Reports using once every one to 2 weeks.  Beta blocker has not been initiated due to marginal blood pressures during hospital stay. Pt reports he has not been able to tolerate BB in the past  due to bradycarida (per medical records).   Pt reports a hx of post operative AFib after CABG surgery 2003.  Reports a hx of TIAs.  NO reported recent AFib, but has not worn heart monitor.  Wife states she has not had a recent stress test, LHC, or an echo since 2003.     Pt plans to see Dr. Ugarte in 1 week.    Pt call requesting to be seen.  Last week after physical therapy, he felt weak and SOB. Reports intermittent dizziness.  Family states he has not been himself.       Pt denies CP, pressure, palpations, edema, or abdominal fullness.  Dyspnea over the 6 months has not worsened, but has not improved with Enstresto.  Described at moderate with moderate exertion.    Weight has been stable.  No hx of Lifevest or ICD.      Patient Active Problem List    Diagnosis   • A-fib [I48.91]     Overview Note:     · Postoperative 2003     • TIA (transient ischemic attack) [G45.9]   • CKD (chronic kidney disease) [N18.9]   • Chronic congestive heart failure [I50.9]     Overview Note:     ·  echo report from 8/2/17 EF 60-65%, mild TR, mild AR ([place], [place])  · Echocardiogram 12/9/17: EF 28%, mild to moderate MR  ·      • Coronary artery disease involving native coronary artery of native heart without angina pectoris [I25.10]   • Dizziness [R42]   • Generalized weakness [R53.1]   • Contusion of left hip [S70.02XA]   • Dementia [F03.90]   • Hypothyroid [E03.9]           Past Surgical History:   Procedure Laterality Date   • CARDIAC CATHETERIZATION  2003   • CARPAL TUNNEL RELEASE Right    • CATARACT EXTRACTION Bilateral    • CORONARY ARTERY BYPASS GRAFT  2003    Triple Bypass, @ [facility] @ [place], [place]   • HEMORRHOIDECTOMY     • HERNIA REPAIR         Allergies   Allergen Reactions   • Codeine    • Lipitor [Atorvastatin]    • Penicillins Swelling   • Prozac [Fluoxetine Hcl]    • Valium [Diazepam]    • Sulfa Antibiotics Rash         Current Outpatient Prescriptions:   •  acetaminophen (TYLENOL)  325 MG tablet, Take 2 tablets by mouth Every 6 (Six) Hours As Needed for Mild Pain ., Disp: , Rfl:   •  aspirin 81 MG chewable tablet, Chew 81 mg Daily., Disp: , Rfl:   •  carbidopa-levodopa (SINEMET)  MG per tablet, Take 1 tablet by mouth 3 (Three) Times a Day., Disp: , Rfl:   •  cholecalciferol (VITAMIN D3) 1000 units tablet, Take 1,000 Units by mouth Daily., Disp: , Rfl:   •  clopidogrel (PLAVIX) 75 MG tablet, Take 75 mg by mouth Daily., Disp: , Rfl:   •  dutasteride (AVODART) 0.5 MG capsule, Take 0.5 mg by mouth Daily., Disp: , Rfl:   •  ipratropium-albuterol (DUO-NEB) 0.5-2.5 mg/mL nebulizer, Take 3 mL by nebulization Every 4 (Four) Hours As Needed for Shortness of Air (CBS PROTOCOL)., Disp: 360 mL, Rfl:   •  levothyroxine (SYNTHROID, LEVOTHROID) 75 MCG tablet, Take 75 mcg by mouth Daily., Disp: , Rfl:   •  Loratadine 10 MG capsule, Take 1 capsule by mouth Daily., Disp: , Rfl:   •  magnesium oxide (MAGOX) 400 (241.3 Mg) MG tablet tablet, Take 400 mg by mouth Daily., Disp: , Rfl:   •  mirtazapine (REMERON) 30 MG tablet, Take 30 mg by mouth Every Night., Disp: , Rfl:   •  montelukast (SINGULAIR) 10 MG tablet, Take 10 mg by mouth Every Night., Disp: , Rfl:   •  Multiple Vitamins-Minerals (EYE HEALTH) capsule, Take  by mouth., Disp: , Rfl:   •  nitroglycerin (NITROSTAT) 0.4 MG SL tablet, Place 0.4 mg under the tongue Every 5 (Five) Minutes As Needed for Chest Pain. Take no more than 3 doses in 15 minutes., Disp: , Rfl:   •  omeprazole (priLOSEC) 20 MG capsule, Take 20 mg by mouth Daily., Disp: , Rfl:   •  pravastatin (PRAVACHOL) 20 MG tablet, Take 20 mg by mouth Daily., Disp: , Rfl:   •  sacubitril-valsartan (ENTRESTO) 24-26 MG tablet, Take 0.5 tablets by mouth Every Night., Disp: , Rfl:   •  vitamin E 400 UNIT capsule, Take 400 Units by mouth Daily., Disp: , Rfl:     The following portions of the patient's history were reviewed and updated as appropriate: allergies, current medications, past family history,  past medical history, past social history, past surgical history and problem list.    Review of Systems   Constitution: Positive for weakness and malaise/fatigue. Negative for chills, decreased appetite, diaphoresis, fever, night sweats, weight gain and weight loss.   HENT: Negative for congestion and nosebleeds.    Eyes: Negative for blurred vision, visual disturbance and visual halos.   Cardiovascular: Positive for dyspnea on exertion, irregular heartbeat and near-syncope. Negative for chest pain, claudication, cyanosis, leg swelling, orthopnea, palpitations, paroxysmal nocturnal dyspnea and syncope.   Respiratory: Positive for shortness of breath. Negative for cough, hemoptysis, sleep disturbances due to breathing, snoring, sputum production and wheezing.    Endocrine: Positive for cold intolerance. Negative for heat intolerance, polydipsia, polyphagia and polyuria.   Hematologic/Lymphatic: Bruises/bleeds easily.   Skin: Negative for dry skin, itching and rash.   Musculoskeletal: Positive for falls (December 7, 2017) and muscle weakness. Negative for joint pain, joint swelling and myalgias.   Gastrointestinal: Positive for nausea and vomiting. Negative for bloating, abdominal pain, constipation, diarrhea, dysphagia, heartburn and melena.   Genitourinary: Negative for dysuria, flank pain, hematuria and nocturia.   Neurological: Positive for excessive daytime sleepiness, dizziness, light-headedness and loss of balance. Negative for difficulty with concentration and headaches.   Psychiatric/Behavioral: Positive for altered mental status, depression and memory loss. The patient is nervous/anxious.    Allergic/Immunologic: Positive for environmental allergies.       Objective:     Vitals:    01/08/18 1046 01/08/18 1050 01/08/18 1051   BP: 140/69 128/66 129/73   BP Location: Right arm Left arm Left arm   Patient Position: Sitting Sitting Standing   Pulse: 68 69 71   Resp: 16     Temp: 98.9 °F (37.2 °C)     TempSrc:  "Temporal Artery      SpO2: 96%     Weight: 85.3 kg (188 lb)     Height: 175.3 cm (69\")           Physical Exam   Constitutional: He is oriented to person, place, and time. He appears well-developed and well-nourished. No distress.   HENT:   Head: Normocephalic and atraumatic.   Mouth/Throat: Oropharynx is clear and moist.   Eyes: Conjunctivae are normal. Pupils are equal, round, and reactive to light. No scleral icterus.   Neck: No hepatojugular reflux and no JVD present. Carotid bruit is not present. No tracheal deviation present. No thyromegaly present.   Cardiovascular: Normal rate, regular rhythm, normal heart sounds and intact distal pulses.  Exam reveals no friction rub.    No murmur heard.  Pulmonary/Chest: Effort normal and breath sounds normal.   Abdominal: Soft. Bowel sounds are normal. He exhibits no distension. There is no tenderness.   Musculoskeletal: He exhibits no edema.   Lymphadenopathy:     He has no cervical adenopathy.   Neurological: He is alert and oriented to person, place, and time.   Skin: Skin is warm, dry and intact. No rash noted. No cyanosis or erythema. No pallor.   Psychiatric: He has a normal mood and affect. His behavior is normal. Thought content normal.   Vitals reviewed.      Lab and Diagnostic Review:    Assessment and Plan:         1. Acute on chronic systolic heart failure  EF depressed 28%, from normal EF 8/2017  No s/s volume overload today  Entresto, restart 24/26 mg 1/2 tab BID  Hx of bradycardia with BB.    Lasix PRN  Pt to f/u with Dr. Perez next week  Possible ischemic eval    - Basic Metabolic Panel    2. Coronary artery disease involving native coronary artery of native heart without angina pectoris  S/p CAGB 2003  Newly depressed EF  ? Ischemic eval per Dr. Perez  Discussed life vest (indications, risks SCD, ICD indications).  Pt declined.  Family wants to follow pts wishes at this time (hx of dementia).    3. Atrial fibrillation, unspecified type  Hx of reported " AFib after bypass. Unclear if recurrent    - ECG 12 Lead; SR 68 bpm today with PVC  Plavix and ASA  Chadsvasc 6.  Discuss Noac use.  Pt declined changing meds until evaluated by Dr. Ugarte  Hx of falls.      4. Transient cerebral ischemia, unspecified type      5. Chronic kidney disease, unspecified CKD stage    F/u with Dr. Ugarte as scheduled.  [facility]u [facility] as needed or as determined by Dr. Ugarte.        *Please note that portions of this note were completed with a voice recognition program. Efforts were made to edit the dictations, but occasionally words are mistranscribed.

## 2018-01-09 DIAGNOSIS — E87.5 HYPERKALEMIA: Primary | ICD-10-CM

## 2018-01-09 NOTE — PROGRESS NOTES
Called and reviewed labs    Results for orders placed or performed in visit on 01/08/18   Basic Metabolic Panel   Result Value Ref Range    Glucose 104 (H) 70 - 100 mg/dL    BUN 26 (H) 9 - 23 mg/dL    Creatinine 1.70 (H) 0.60 - 1.30 mg/dL    Sodium 140 132 - 146 mmol/L    Potassium 5.6 (H) 3.5 - 5.5 mmol/L    Chloride 110 (H) 99 - 109 mmol/L    CO2 25.0 20.0 - 31.0 mmol/L    Calcium 9.2 8.7 - 10.4 mg/dL    eGFR Non African Amer 38 (L) >60 mL/min/1.73    BUN/Creatinine Ratio 15.3 7.0 - 25.0    Anion Gap 5.0 3.0 - 11.0 mmol/L     Pt will hold multivitamin at this time.  Confirmed he is not on any other potassium supplements.  He will continue Enstresto 1/2 tab BID.  Repeat lab work in 1 week.

## 2018-01-15 ENCOUNTER — TRANSCRIBE ORDERS (OUTPATIENT)
Dept: LAB | Facility: HOSPITAL | Age: 83
End: 2018-01-15

## 2018-01-15 ENCOUNTER — APPOINTMENT (OUTPATIENT)
Dept: LAB | Facility: HOSPITAL | Age: 83
End: 2018-01-15

## 2018-01-15 DIAGNOSIS — N18.9 CHRONIC KIDNEY DISEASE, UNSPECIFIED CKD STAGE: Primary | ICD-10-CM

## 2018-01-15 LAB
ANION GAP SERPL CALCULATED.3IONS-SCNC: 10 MMOL/L (ref 3–11)
BNP SERPL-MCNC: 105 PG/ML (ref 0–100)
BUN BLD-MCNC: 33 MG/DL (ref 9–23)
BUN/CREAT SERPL: 20.6 (ref 7–25)
CALCIUM SPEC-SCNC: 9.1 MG/DL (ref 8.7–10.4)
CHLORIDE SERPL-SCNC: 107 MMOL/L (ref 99–109)
CO2 SERPL-SCNC: 23 MMOL/L (ref 20–31)
CREAT BLD-MCNC: 1.6 MG/DL (ref 0.6–1.3)
GFR SERPL CREATININE-BSD FRML MDRD: 41 ML/MIN/1.73
GLUCOSE BLD-MCNC: 118 MG/DL (ref 70–100)
POTASSIUM BLD-SCNC: 4.5 MMOL/L (ref 3.5–5.5)
SODIUM BLD-SCNC: 140 MMOL/L (ref 132–146)

## 2018-01-15 PROCEDURE — 80048 BASIC METABOLIC PNL TOTAL CA: CPT | Performed by: INTERNAL MEDICINE

## 2018-01-15 PROCEDURE — 83880 ASSAY OF NATRIURETIC PEPTIDE: CPT | Performed by: INTERNAL MEDICINE

## 2018-01-15 PROCEDURE — 36415 COLL VENOUS BLD VENIPUNCTURE: CPT | Performed by: INTERNAL MEDICINE

## 2018-01-18 ENCOUNTER — TRANSCRIBE ORDERS (OUTPATIENT)
Dept: ADMINISTRATIVE | Facility: HOSPITAL | Age: 83
End: 2018-01-18

## 2018-01-18 DIAGNOSIS — R94.39 ABNORMAL STRESS TEST: Primary | ICD-10-CM

## 2018-01-19 ENCOUNTER — HOSPITAL ENCOUNTER (OUTPATIENT)
Facility: HOSPITAL | Age: 83
Discharge: HOME | End: 2018-01-19
Attending: INTERNAL MEDICINE | Admitting: INTERNAL MEDICINE

## 2018-01-19 VITALS
TEMPERATURE: 97.8 F | OXYGEN SATURATION: 99 % | HEART RATE: 61 BPM | SYSTOLIC BLOOD PRESSURE: 135 MMHG | DIASTOLIC BLOOD PRESSURE: 80 MMHG | RESPIRATION RATE: 16 BRPM

## 2018-01-19 DIAGNOSIS — R94.39 ABNORMAL STRESS TEST: ICD-10-CM

## 2018-01-19 LAB
DEPRECATED RDW RBC AUTO: 47.7 FL (ref 37–54)
ERYTHROCYTE [DISTWIDTH] IN BLOOD BY AUTOMATED COUNT: 13.4 % (ref 11.3–14.5)
HCT VFR BLD AUTO: 39.1 % (ref 38.9–50.9)
HGB BLD-MCNC: 13 G/DL (ref 13.1–17.5)
MCH RBC QN AUTO: 32.2 PG (ref 27–31)
MCHC RBC AUTO-ENTMCNC: 33.2 G/DL (ref 32–36)
MCV RBC AUTO: 96.8 FL (ref 80–99)
PLATELET # BLD AUTO: 183 10*3/MM3 (ref 150–450)
PMV BLD AUTO: 10.4 FL (ref 6–12)
RBC # BLD AUTO: 4.04 10*6/MM3 (ref 4.2–5.76)
WBC NRBC COR # BLD: 5.46 10*3/MM3 (ref 3.5–10.8)

## 2018-01-19 PROCEDURE — 92978 ENDOLUMINL IVUS OCT C 1ST: CPT | Performed by: INTERNAL MEDICINE

## 2018-01-19 PROCEDURE — C1769 GUIDE WIRE: HCPCS | Performed by: INTERNAL MEDICINE

## 2018-01-19 PROCEDURE — C1884 EMBOLIZATION PROTECT SYST: HCPCS | Performed by: INTERNAL MEDICINE

## 2018-01-19 PROCEDURE — 25010000002 BIVALIRUDIN PER 1 MG: Performed by: INTERNAL MEDICINE

## 2018-01-19 PROCEDURE — 36415 COLL VENOUS BLD VENIPUNCTURE: CPT

## 2018-01-19 PROCEDURE — C1894 INTRO/SHEATH, NON-LASER: HCPCS | Performed by: INTERNAL MEDICINE

## 2018-01-19 PROCEDURE — 25010000002 HEPARIN (PORCINE) PER 1000 UNITS: Performed by: INTERNAL MEDICINE

## 2018-01-19 PROCEDURE — 93005 ELECTROCARDIOGRAM TRACING: CPT | Performed by: INTERNAL MEDICINE

## 2018-01-19 PROCEDURE — C1753 CATH, INTRAVAS ULTRASOUND: HCPCS | Performed by: INTERNAL MEDICINE

## 2018-01-19 PROCEDURE — C1874 STENT, COATED/COV W/DEL SYS: HCPCS | Performed by: INTERNAL MEDICINE

## 2018-01-19 PROCEDURE — 93459 L HRT ART/GRFT ANGIO: CPT | Performed by: INTERNAL MEDICINE

## 2018-01-19 PROCEDURE — 0 IOPAMIDOL PER 1 ML: Performed by: INTERNAL MEDICINE

## 2018-01-19 PROCEDURE — 85027 COMPLETE CBC AUTOMATED: CPT | Performed by: INTERNAL MEDICINE

## 2018-01-19 PROCEDURE — C1887 CATHETER, GUIDING: HCPCS | Performed by: INTERNAL MEDICINE

## 2018-01-19 PROCEDURE — C9604 PERC D-E COR REVASC T CABG S: HCPCS | Performed by: INTERNAL MEDICINE

## 2018-01-19 DEVICE — XIENCE ALPINE EVEROLIMUS ELUTING CORONARY STENT SYSTEM 4.00 MM X 18 MM / RAPID-EXCHANGE
Type: IMPLANTABLE DEVICE | Status: FUNCTIONAL
Brand: XIENCE ALPINE

## 2018-01-19 RX ORDER — SODIUM CHLORIDE 9 MG/ML
250 INJECTION, SOLUTION INTRAVENOUS CONTINUOUS
Status: ACTIVE | OUTPATIENT
Start: 2018-01-19 | End: 2018-01-19

## 2018-01-19 RX ORDER — ACETAMINOPHEN 325 MG/1
650 TABLET ORAL EVERY 4 HOURS PRN
Status: DISCONTINUED | OUTPATIENT
Start: 2018-01-19 | End: 2018-01-19 | Stop reason: HOSPADM

## 2018-01-19 RX ORDER — ASPIRIN 325 MG
325 TABLET, DELAYED RELEASE (ENTERIC COATED) ORAL DAILY
Status: DISCONTINUED | OUTPATIENT
Start: 2018-01-19 | End: 2018-01-19 | Stop reason: HOSPADM

## 2018-01-19 RX ORDER — LIDOCAINE HYDROCHLORIDE 10 MG/ML
INJECTION, SOLUTION EPIDURAL; INFILTRATION; INTRACAUDAL; PERINEURAL AS NEEDED
Status: DISCONTINUED | OUTPATIENT
Start: 2018-01-19 | End: 2018-01-19 | Stop reason: HOSPADM

## 2018-01-19 RX ADMIN — ASPIRIN 325 MG: 325 TABLET, DELAYED RELEASE ORAL at 07:24

## 2018-02-27 ENCOUNTER — OFFICE VISIT (OUTPATIENT)
Dept: NEUROLOGY | Facility: CLINIC | Age: 83
End: 2018-02-27

## 2018-02-27 VITALS — BODY MASS INDEX: 27.85 KG/M2 | WEIGHT: 188 LBS | HEIGHT: 69 IN

## 2018-02-27 DIAGNOSIS — R41.3 MEMORY LOSS: ICD-10-CM

## 2018-02-27 DIAGNOSIS — R26.89 IMPAIRMENT OF BALANCE: Primary | ICD-10-CM

## 2018-02-27 PROCEDURE — 99215 OFFICE O/P EST HI 40 MIN: CPT | Performed by: PHYSICIAN ASSISTANT

## 2018-02-27 NOTE — PROGRESS NOTES
Subjective     Chief Complaint: balance impairment     History of Present Illness   Murali Dennis is a 88 y.o. male who comes to clinic today for evaluation of balance impairment. He was hospitalized at [facility] in 12/17 after he fell and hit his hip. His family has noted symptoms of balance impairment since early 2017. He notes a shuffling gait as well as bradykinesia. He also notes an associated tremor in his hands bilaterally, though this is primarily intentional. He denies any modifying factors.     Additionally, his family has noted cognitive impairment for several years marked initially by forgetfulness. He was reportedly diagnosed with Alzheimer's Disease in [place] in 2003. His symptoms have worsened over time, though his family note marked fluctuations. Additional symptoms have included impairments in executive function. There have been associated  symptoms of visual hallucinations. He notes  impairments in ADL's. His family manages his medications and finances. He is currently residing with his wife in [place], though they are currently staying with family in [place].     Prior evaluation has included screening blood work  which was notable for an elevated BUN and Creatinine, though was otherwise unremarkable. 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 He is currently taking Sinemet 25/100mg BID, which has been significantly beneficial for his tremor. He previously took donepezil and Namenda, though these were discontinued due to an episode of bradykinesia.     He denies any numbness, tingling, or pain in his lower extremities.       I have reviewed and confirmed the past family, social and medical history as accurate on 2/27/18.     Review of Systems   Constitutional: Negative.    HENT: Negative.    Eyes: Negative.    Respiratory: Negative.    Cardiovascular: Negative.    Gastrointestinal: Negative.    Endocrine: Negative.    Genitourinary: Negative.   "  Musculoskeletal: Negative.    Skin: Negative.    Allergic/Immunologic: Negative.    Neurological:        As noted in HPI   Hematological: Negative.    Psychiatric/Behavioral:        As noted in HPI       Objective     Ht 175.3 cm (69\")  Wt 85.3 kg (188 lb)  BMI 27.76 kg/m2    General appearance today is normal.       Physical Exam   Neurological: He has normal strength. He has a normal Finger-Nose-Finger Test.   Reflex Scores:       Tricep reflexes are 1+ on the right side and 1+ on the left side.       Bicep reflexes are 1+ on the right side and 1+ on the left side.       Brachioradialis reflexes are 1+ on the right side and 1+ on the left side.       Patellar reflexes are 1+ on the right side and 1+ on the left side.  Psychiatric: His speech is normal.        Neurologic Exam     Mental Status   Speech: speech is normal   Level of consciousness: alert  Normal comprehension.     Cranial Nerves   Cranial nerves II through XII intact.     Motor Exam   Muscle bulk: normal  Overall muscle tone: normal    Strength   Strength 5/5 throughout.     Sensory Exam   Light touch normal.     Gait, Coordination, and Reflexes     Gait  Gait: shuffling    Coordination   Finger to nose coordination: normal    Tremor   Resting tremor: absent    Reflexes   Right brachioradialis: 1+  Left brachioradialis: 1+  Right biceps: 1+  Left biceps: 1+  Right triceps: 1+  Left triceps: 1+  Right patellar: 1+  Left patellar: 1+            Assessment/Plan   Murali was seen today for tremors.    Diagnoses and all orders for this visit:    Impairment of balance  -     CT Head Without Contrast  -     Ambulatory Referral to Physical Therapy    Memory loss          Discussion/Summary   Murali Dennis comes to clinic today for evaluation of multiple issues. As for his balance impairment, it is possible that his symptoms are related to a parkinsonism. However, I am concerned that his history of neuropathy could also be contributing to his balance " impairment. This was discussed in detail. It was elected to obtain a head CT . After discussing potential treatment options, it was elected to continue on Sinemet unchanged as this has been significantly beneficial. I have also made a referral to PT.    As for his tremor, it is possible that this could also be related to a parkinsonism. 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     In terms of his cognitive impairment, I am concerned about a dementia, possibly due to underlying Lewy Body Disease.     He will then follow up in 2 months, at which point we will more formally evaluate his memory.        I spent 30 minutes out of 45 minutes face to face with the patient and family and discussing diagnosis, prognosis, diagnostic testing, evaluation, current status, treatment options and management.    As part of this visit I reviewed prior lab results, obtained additional history from the family which is incorporated in the HPI and reviewed records from prior hospitalizations which is incorporated in the HPI.      Francesca Hardy PA-C

## 2018-03-05 ENCOUNTER — HOSPITAL ENCOUNTER (OUTPATIENT)
Dept: CT IMAGING | Facility: HOSPITAL | Age: 83
Discharge: HOME | End: 2018-03-05
Admitting: PHYSICIAN ASSISTANT

## 2018-03-05 PROCEDURE — 70450 CT HEAD/BRAIN W/O DYE: CPT

## 2018-05-04 ENCOUNTER — OFFICE VISIT (OUTPATIENT)
Dept: NEUROLOGY | Facility: CLINIC | Age: 83
End: 2018-05-04

## 2018-05-04 VITALS — HEIGHT: 69 IN | BODY MASS INDEX: 26.96 KG/M2 | WEIGHT: 182 LBS

## 2018-05-04 DIAGNOSIS — F02.818 LATE ONSET ALZHEIMER'S DISEASE WITH BEHAVIORAL DISTURBANCE (HCC): Primary | ICD-10-CM

## 2018-05-04 DIAGNOSIS — G30.1 LATE ONSET ALZHEIMER'S DISEASE WITH BEHAVIORAL DISTURBANCE (HCC): Primary | ICD-10-CM

## 2018-05-04 PROCEDURE — 99214 OFFICE O/P EST MOD 30 MIN: CPT | Performed by: PSYCHIATRY & NEUROLOGY

## 2018-05-04 RX ORDER — CARVEDILOL 25 MG/1
25 TABLET ORAL 2 TIMES DAILY WITH MEALS
Status: ON HOLD | COMMUNITY
End: 2019-02-25

## 2018-05-04 NOTE — PROGRESS NOTES
Murali Dennis    Subjective     CC: cognitive impairment, balance impairment    History of Present Illness   Murali Dennis returns to clinic today with a complex history. He was reportedly diagnosed with Alzheimer's Disease in [place] in 2003. He has had a gradually progressive cognitive decline over time with associated ADL impairment and visual hallucinations. Donepezil and memantine were stopped previously due to bradycardia.    He has also had symptoms since 2017 of shuffling gait, hand tremor and bradykinesia. This has reportedly improved with institution of SInemet.     Prior evaluation has included screening blood work  and a head CT  which was unremarkable . EMG/NCV studies on 12/11/17 showed a moderate-severe axonal sensorimotor neuropathy during a prior hospitalization.       Since his last visit on 2/27/18 he has been largely unchanged. He has not started with PT yet. He is more interactive and his hallucinations are improved.     I have reviewed and confirmed the past family, social and medical history as accurate on 5/4/18.     Review of Systems   Constitutional: Negative.        Objective     There were no vitals taken for this visit.     Neurologic Exam     Mental Status   Oriented to person.   Disoriented to place.   Disoriented to time.   Attention: normal.   Speech: speech is normal   Level of consciousness: alert    Cranial Nerves   Cranial nerves II through XII intact.     Motor Exam   Muscle bulk: normal  Overall muscle tone: normal    Strength   Strength 5/5 throughout.       MMSE=      Assessment/Plan   Murali was seen today for tremors.    Diagnoses and all orders for this visit:    Late onset Alzheimer's disease with behavioral disturbance          Murali Dennis returns to clinic today with a history of Dementia . This is likely advanced. I am also uncertain regarding a diagnosis of PD, though his wife feels that Sinemet has been helpful and would like to increase his dosing  from bid to tid, which I think is reasonable. I also encouraged PT. He will then follow-up in 1-2 months at which time we can further consider any benefit of Sinemet and possible cautious reintroduction of Namenda, or possibly Exelon patch.       I spent 25 minutes face to face with the patient and family. I   spent 15 minutes of this time counseling and discussing diagnosis, diagnostic testing, evaluation, current status, treatment options and management.    As part of this visit I obtained additional history from the family which is incorporated in the HPI.    Bruno Garvin MD

## 2018-05-14 ENCOUNTER — TELEPHONE (OUTPATIENT)
Dept: NEUROLOGY | Facility: CLINIC | Age: 83
End: 2018-05-14

## 2018-05-14 NOTE — TELEPHONE ENCOUNTER
In PT chart, SINEMET has been discontinued. Wanted to verify with you its ok to send in refill. thanks

## 2018-05-14 NOTE — TELEPHONE ENCOUNTER
----- Message from Liliana Jimenez sent at 5/14/2018  8:47 AM EDT -----  Contact: Wife  Bharathi     Patient's wife needs SINEMET called in. States Dr. Garvin said for him to have it 3x daily. Callback number is [phone]. They are in [place].

## 2018-05-16 ENCOUNTER — TELEPHONE (OUTPATIENT)
Dept: NEUROLOGY | Facility: CLINIC | Age: 83
End: 2018-05-16

## 2018-05-16 NOTE — TELEPHONE ENCOUNTER
----- Message from Liliana Jimenez sent at 5/15/2018  3:47 PM EDT -----  Contact: WIFE   MICHAEL     RX had not been called in yet. There is communication between michael and Nithya.

## 2018-05-16 NOTE — TELEPHONE ENCOUNTER
Looks like a new Rx has already been sent in by . Called the pharmacy to verify this and pharmacist stated it is ready to be picked up!Pt notified.

## 2018-07-20 ENCOUNTER — OFFICE VISIT (OUTPATIENT)
Dept: NEUROLOGY | Facility: CLINIC | Age: 83
End: 2018-07-20

## 2018-07-20 VITALS
BODY MASS INDEX: 26.97 KG/M2 | SYSTOLIC BLOOD PRESSURE: 126 MMHG | WEIGHT: 182.1 LBS | DIASTOLIC BLOOD PRESSURE: 82 MMHG | HEIGHT: 69 IN

## 2018-07-20 DIAGNOSIS — F02.818 LATE ONSET ALZHEIMER'S DISEASE WITH BEHAVIORAL DISTURBANCE (HCC): Primary | ICD-10-CM

## 2018-07-20 DIAGNOSIS — G30.1 LATE ONSET ALZHEIMER'S DISEASE WITH BEHAVIORAL DISTURBANCE (HCC): Primary | ICD-10-CM

## 2018-07-20 PROCEDURE — 99214 OFFICE O/P EST MOD 30 MIN: CPT | Performed by: PHYSICIAN ASSISTANT

## 2018-07-20 RX ORDER — PRAMIPEXOLE DIHYDROCHLORIDE 0.25 MG/1
0.75 TABLET ORAL
COMMUNITY
Start: 2018-05-29 | End: 2018-07-20 | Stop reason: SINTOL

## 2018-07-20 RX ORDER — MEMANTINE HYDROCHLORIDE 10 MG/1
10 TABLET ORAL 2 TIMES DAILY
Qty: 30 TABLET | Refills: 11 | Status: SHIPPED | OUTPATIENT
Start: 2018-07-20 | End: 2018-11-14 | Stop reason: SDUPTHER

## 2018-07-20 NOTE — PROGRESS NOTES
"Subjective     Chief Complaint: cognitive impairment, balance impairment     History of Present Illness   Murali Dennis is a 89 y.o. male who returns to clinic today with a complex history. He was reportedly diagnosed with Alzheimer's Disease in [place] in 2003. He has had a gradually progressive cognitive decline over time with associated ADL impairment and visual hallucinations. Donepezil and memantine were stopped previously due to bradycardia.     He has also had symptoms since 2017 of shuffling gait, hand tremor and bradykinesia. This has reportedly improved with institution of SInemet.      Prior evaluation has included screening blood work  and a head CT  which was unremarkable . EMG/NCV studies on 12/11/17 showed a moderate-severe axonal sensorimotor neuropathy during a prior hospitalization.     Today: Since his last visit in 5/18, he and his family feel that his cognition is unchanged. His gait, tremor and bradykinesia have improved since his Sinemet was increased to TID.        I have reviewed and confirmed the past family, social and medical history as accurate on 7/20/18.     Review of Systems   Constitutional: Negative.    HENT: Negative.    Eyes: Negative.    Respiratory: Negative.    Cardiovascular: Negative.    Gastrointestinal: Negative.    Endocrine: Negative.    Genitourinary: Negative.    Musculoskeletal: Negative.    Skin: Negative.    Allergic/Immunologic: Negative.    Neurological: Positive for tremors.        Memory loss     Hematological: Negative.        Objective     /82   Ht 175.3 cm (69.02\")   Wt 82.6 kg (182 lb 1.6 oz)   BMI 26.88 kg/m²     General appearance today is normal.       Physical Exam   Neurological: He has normal strength. He has a normal Finger-Nose-Finger Test. Gait normal.   Psychiatric: His speech is normal.        Neurologic Exam     Mental Status   Speech: speech is normal   Level of consciousness: alert  Normal comprehension.     Cranial Nerves "   Cranial nerves II through XII intact.     Motor Exam   Muscle bulk: normal  Overall muscle tone: normal    Strength   Strength 5/5 throughout.     Sensory Exam   Light touch normal.     Gait, Coordination, and Reflexes     Gait  Gait: normal    Coordination   Finger to nose coordination: normal    Tremor   Resting tremor: absent           Assessment/Plan   Murali was seen today for alzheimer's disease.    Diagnoses and all orders for this visit:    Late onset Alzheimer's disease with behavioral disturbance    Other orders  -     memantine (NAMENDA) 10 MG tablet; Take 1 tablet by mouth 2 (Two) Times a Day.          Discussion/Summary   Murali Dennis returns to clinic today with a history of Dementia and possible PD. This is likely advanced. I again reviewed his current status and treatment options. After discussing potential treatment options, it was elected to continue on Sinemet unchanged as this has been significantly beneficial and cautiously restart Namenda, titrating the dose up to 10mg daily. He will then follow up in 2-3 months, or sooner if needed.   I spent 15 minutes out of 25 minutes face to face with the patient and family and discussing diagnosis, evaluation, current status, treatment options and management as discussed above.       As part of this visit I obtained additional history from the family which is incorporated in the HPI.      Francesca Hardy PA-C

## 2018-10-12 ENCOUNTER — OFFICE VISIT (OUTPATIENT)
Dept: INTERNAL MEDICINE | Facility: CLINIC | Age: 83
End: 2018-10-12

## 2018-10-12 VITALS
TEMPERATURE: 97.7 F | WEIGHT: 159 LBS | HEART RATE: 66 BPM | SYSTOLIC BLOOD PRESSURE: 146 MMHG | RESPIRATION RATE: 12 BRPM | OXYGEN SATURATION: 94 % | BODY MASS INDEX: 23.55 KG/M2 | DIASTOLIC BLOOD PRESSURE: 72 MMHG | HEIGHT: 69 IN

## 2018-10-12 DIAGNOSIS — R63.0 DECREASED APPETITE: ICD-10-CM

## 2018-10-12 DIAGNOSIS — F02.818 LATE ONSET ALZHEIMER'S DISEASE WITH BEHAVIORAL DISTURBANCE (HCC): ICD-10-CM

## 2018-10-12 DIAGNOSIS — N40.0 BENIGN PROSTATIC HYPERPLASIA WITHOUT LOWER URINARY TRACT SYMPTOMS: ICD-10-CM

## 2018-10-12 DIAGNOSIS — I10 ESSENTIAL HYPERTENSION: ICD-10-CM

## 2018-10-12 DIAGNOSIS — G30.1 LATE ONSET ALZHEIMER'S DISEASE WITH BEHAVIORAL DISTURBANCE (HCC): ICD-10-CM

## 2018-10-12 DIAGNOSIS — Z91.09 ENVIRONMENTAL ALLERGIES: ICD-10-CM

## 2018-10-12 DIAGNOSIS — I25.10 CORONARY ARTERY DISEASE INVOLVING NATIVE CORONARY ARTERY OF NATIVE HEART WITHOUT ANGINA PECTORIS: ICD-10-CM

## 2018-10-12 DIAGNOSIS — N18.9 CHRONIC KIDNEY DISEASE, UNSPECIFIED CKD STAGE: ICD-10-CM

## 2018-10-12 DIAGNOSIS — G45.9 TIA (TRANSIENT ISCHEMIC ATTACK): ICD-10-CM

## 2018-10-12 DIAGNOSIS — I50.9 CHRONIC CONGESTIVE HEART FAILURE, UNSPECIFIED HEART FAILURE TYPE (HCC): ICD-10-CM

## 2018-10-12 DIAGNOSIS — E55.9 VITAMIN D DEFICIENCY: ICD-10-CM

## 2018-10-12 DIAGNOSIS — E03.9 ACQUIRED HYPOTHYROIDISM: Primary | ICD-10-CM

## 2018-10-12 DIAGNOSIS — I48.91 ATRIAL FIBRILLATION, UNSPECIFIED TYPE (HCC): ICD-10-CM

## 2018-10-12 DIAGNOSIS — R63.4 UNINTENDED WEIGHT LOSS: ICD-10-CM

## 2018-10-12 LAB
25(OH)D3 SERPL-MCNC: 30.5 NG/ML
ALBUMIN SERPL-MCNC: 4.36 G/DL (ref 3.2–4.8)
ALBUMIN/GLOB SERPL: 1.7 G/DL (ref 1.5–2.5)
ALP SERPL-CCNC: 86 U/L (ref 25–100)
ALT SERPL W P-5'-P-CCNC: 19 U/L (ref 7–40)
ANION GAP SERPL CALCULATED.3IONS-SCNC: 7 MMOL/L (ref 3–11)
ARTICHOKE IGE QN: 81 MG/DL (ref 0–130)
AST SERPL-CCNC: 21 U/L (ref 0–33)
BILIRUB SERPL-MCNC: 0.9 MG/DL (ref 0.3–1.2)
BUN BLD-MCNC: 19 MG/DL (ref 9–23)
BUN/CREAT SERPL: 12.8 (ref 7–25)
CALCIUM SPEC-SCNC: 9.6 MG/DL (ref 8.7–10.4)
CHLORIDE SERPL-SCNC: 104 MMOL/L (ref 99–109)
CHOLEST SERPL-MCNC: 144 MG/DL (ref 0–200)
CO2 SERPL-SCNC: 28 MMOL/L (ref 20–31)
CREAT BLD-MCNC: 1.48 MG/DL (ref 0.6–1.3)
DEPRECATED RDW RBC AUTO: 49.3 FL (ref 37–54)
ERYTHROCYTE [DISTWIDTH] IN BLOOD BY AUTOMATED COUNT: 13.6 % (ref 11.3–14.5)
GFR SERPL CREATININE-BSD FRML MDRD: 45 ML/MIN/1.73
GLOBULIN UR ELPH-MCNC: 2.6 GM/DL
GLUCOSE BLD-MCNC: 124 MG/DL (ref 70–100)
HCT VFR BLD AUTO: 40.2 % (ref 38.9–50.9)
HDLC SERPL-MCNC: 42 MG/DL (ref 40–60)
HGB BLD-MCNC: 13.4 G/DL (ref 13.1–17.5)
MCH RBC QN AUTO: 33 PG (ref 27–31)
MCHC RBC AUTO-ENTMCNC: 33.3 G/DL (ref 32–36)
MCV RBC AUTO: 99 FL (ref 80–99)
PLATELET # BLD AUTO: 197 10*3/MM3 (ref 150–450)
PMV BLD AUTO: 11.2 FL (ref 6–12)
POTASSIUM BLD-SCNC: 4.9 MMOL/L (ref 3.5–5.5)
PROT SERPL-MCNC: 7 G/DL (ref 5.7–8.2)
RBC # BLD AUTO: 4.06 10*6/MM3 (ref 4.2–5.76)
SODIUM BLD-SCNC: 139 MMOL/L (ref 132–146)
TRIGL SERPL-MCNC: 140 MG/DL (ref 0–150)
TSH SERPL DL<=0.05 MIU/L-ACNC: 1.09 MIU/ML (ref 0.35–5.35)
WBC NRBC COR # BLD: 6.18 10*3/MM3 (ref 3.5–10.8)

## 2018-10-12 PROCEDURE — 99214 OFFICE O/P EST MOD 30 MIN: CPT | Performed by: NURSE PRACTITIONER

## 2018-10-12 PROCEDURE — 82306 VITAMIN D 25 HYDROXY: CPT | Performed by: NURSE PRACTITIONER

## 2018-10-12 PROCEDURE — 84443 ASSAY THYROID STIM HORMONE: CPT | Performed by: NURSE PRACTITIONER

## 2018-10-12 PROCEDURE — 85027 COMPLETE CBC AUTOMATED: CPT | Performed by: NURSE PRACTITIONER

## 2018-10-12 PROCEDURE — 80053 COMPREHEN METABOLIC PANEL: CPT | Performed by: NURSE PRACTITIONER

## 2018-10-12 PROCEDURE — 80061 LIPID PANEL: CPT | Performed by: NURSE PRACTITIONER

## 2018-10-12 NOTE — PROGRESS NOTES
Subjective   Murali Dennis is a 89 y.o. male here to establish care.  Chief Complaint   Patient presents with   • Establish Care     New Patient   • Dementia   • Hypertension     htn/chf   • Benign Prostatic Hypertrophy   • Heartburn   • Allergies   • Vitamin D Deficiency   • Anorexia       History of Present Illness     Murali is here to establish care today. He is accompanied by his wife. Wife reports that he has a history of alzheimers (diagnosed in 2003), Chronic kidney disease, BPH, GERD, hyperlipidemia, hypothyroidism, vitamin D deficiency, HTN, CHF, a-fib, and TIA/CVA    Murali is currently followed by Dr. Garvin for her Alzheimer's dementia and questionable Parkinson's disease.  He last saw him on 5/18/2018 where he increase his Sinemet to 3 times a day.  Patient and wife believe this has made some improvement in his symptoms of tremor and bradykinesia.  He will follow-up with Dr. Garvin on 10/26/2018.  Currently taking Namenda, remeron, and sinemet.    CKD- Last labs 1/2018. GFR 41, creat 1.6.     BPH-chronic, well controlled with dutasteride.    GERD- chronic well controlled with omerazole 20 mg daily.     Hyperlipidemia-chronic. Reportedly stable with pravastatin. Last lipids unknown.     Hypothyroidism- chronic, Last TSH 12/2018 (euthyroid). Currently taking levothyroxine 75 mch daily.     Allergies- chronic and well controlled with Singulair and loratadine.     HTN/CHF/A-fib- currently anticoagulated with eliquis. Denies any bleeding issues. BP controlled with entresto. Denies any headaches, dizziness, visual disturbances, chest pain, dyspnea, leg pain, and edema. Does not have a local cardiologist. Wants referred.     his of vit d def- currently taking vit D3 1000 u daily.     Has had decreased appetite for a few months. Has lost about 20 lbs in 3-4 months. Just started consuming ensure daily. Doing well with it.     The following portions of the patient's history were reviewed and updated as  "appropriate: allergies, current medications, past family history, past medical history, past social history, past surgical history and problem list.    Review of Systems  Blood pressure 146/72, pulse 66, temperature 97.7 °F (36.5 °C), temperature source Temporal Artery , resp. rate 12, height 175.3 cm (69\"), weight 72.1 kg (159 lb), SpO2 94 %.    Allergies   Allergen Reactions   • Benzodiazepines Other (See Comments)     Paradoxical response   • Codeine Other (See Comments)     unknown   • Fluoxetine Other (See Comments)     Paradoxical response   • Lipitor [Atorvastatin] Other (See Comments)     Myalgias     • Metoclopramide Other (See Comments)     Unknown   • Nabumetone    • Penicillins Swelling and Other (See Comments)   • Prozac [Fluoxetine Hcl]    • Tramadol Other (See Comments)     unknown   • Valium [Diazepam] Other (See Comments)     Paradoxical response     • Sulfa Antibiotics Rash     Past Medical History:   Diagnosis Date   • Alzheimer disease    • Warren esophagus     followed  with GI in [place]   • COPD (chronic obstructive pulmonary disease) (CMS/[facility])    • Coronary artery disease 2003   • Dementia 2003   • GERD (gastroesophageal reflux disease)    • Hypothyroid     started after amiodarone use in 2003   • Sleep apnea     wears cpap   • Stroke (CMS/[facility]) 2003     Past Surgical History:   Procedure Laterality Date   • CARDIAC CATHETERIZATION  2003   • CARDIAC CATHETERIZATION N/A 1/19/2018    Procedure: Left Heart Cath;  Surgeon: Hollis Ugarte MD;  Location: [facility] CATH INVASIVE LOCATION;  Service:    • CARPAL TUNNEL RELEASE Right    • CATARACT EXTRACTION Bilateral    • CORONARY ARTERY BYPASS GRAFT  2003    Triple Bypass, @ [facility] @ [place], [place]   • HEMORRHOIDECTOMY     • HERNIA REPAIR       Family History   Problem Relation Age of Onset   • Cancer Mother    • Heart disease Sister    • Diabetes Sister    • Heart disease Brother    • Cancer Brother    • Stroke Sister      Social " History     Social History   • Marital status:      Spouse name: N/A   • Number of children: N/A   • Years of education: N/A     Occupational History   • Retired      Social History Main Topics   • Smoking status: Former Smoker     Quit date: 12/29/1967   • Smokeless tobacco: Never Used   • Alcohol use No   • Drug use: No   • Sexual activity: Not on file     Other Topics Concern   • Not on file     Social History Narrative    Caffeine use: 1 serving daily.    Patient lives at home with family.     Lives with wife, daughter close by for any needs       There is no immunization history on file for this patient.    Current Outpatient Prescriptions:   •  acetaminophen (TYLENOL) 325 MG tablet, Take 2 tablets by mouth Every 6 (Six) Hours As Needed for Mild Pain ., Disp: , Rfl:   •  apixaban (ELIQUIS) 2.5 MG tablet tablet, Take 2.5 mg by mouth., Disp: , Rfl:   •  carbidopa-levodopa (SINEMET)  MG per tablet, Take 1 tablet by mouth 3 (Three) Times a Day., Disp: 90 tablet, Rfl: 11  •  cholecalciferol (VITAMIN D3) 1000 units tablet, Take 1,000 Units by mouth Daily., Disp: , Rfl:   •  dutasteride (AVODART) 0.5 MG capsule, Take 0.5 mg by mouth Daily., Disp: , Rfl:   •  ipratropium-albuterol (DUO-NEB) 0.5-2.5 mg/mL nebulizer, Take 3 mL by nebulization Every 4 (Four) Hours As Needed for Shortness of Air (CBS PROTOCOL)., Disp: 360 mL, Rfl:   •  levothyroxine (SYNTHROID, LEVOTHROID) 75 MCG tablet, Take 75 mcg by mouth Daily., Disp: , Rfl:   •  memantine (NAMENDA) 10 MG tablet, Take 1 tablet by mouth 2 (Two) Times a Day., Disp: 30 tablet, Rfl: 11  •  mirtazapine (REMERON) 30 MG tablet, Take 30 mg by mouth Every Night., Disp: , Rfl:   •  nitroglycerin (NITROSTAT) 0.4 MG SL tablet, Place 0.4 mg under the tongue Every 5 (Five) Minutes As Needed for Chest Pain. Take no more than 3 doses in 15 minutes., Disp: , Rfl:   •  omeprazole (priLOSEC) 20 MG capsule, Take 20 mg by mouth Daily., Disp: , Rfl:   •  pravastatin (PRAVACHOL)  20 MG tablet, Take 20 mg by mouth Daily., Disp: , Rfl:   •  sacubitril-valsartan (ENTRESTO) 24-26 MG tablet, Take 0.5 tablets by mouth 2 (Two) Times a Day., Disp: 60 tablet, Rfl:   •  vitamin E 400 UNIT capsule, Take 400 Units by mouth Daily., Disp: , Rfl:   •  carvedilol (COREG) 25 MG tablet, Take 25 mg by mouth 2 (Two) Times a Day With Meals., Disp: , Rfl:   •  diclofenac (VOLTAREN) 1 % gel gel, Voltaren 1%, Transdermal 1 Gel four times daily for 90 days, Disp: , Rfl:   •  Loratadine 10 MG capsule, Take 1 capsule by mouth Daily., Disp: , Rfl:   •  montelukast (SINGULAIR) 10 MG tablet, Take 10 mg by mouth Every Night., Disp: , Rfl:     Objective   Physical Exam   Constitutional: He is oriented to person, place, and time. He appears well-developed and well-nourished. No distress.   HENT:   Head: Normocephalic and atraumatic.   Right Ear: External ear normal.   Left Ear: External ear normal.   Nose: Nose normal.   Mouth/Throat: Oropharynx is clear and moist.   Eyes: Pupils are equal, round, and reactive to light. Conjunctivae and EOM are normal. Right eye exhibits no discharge. Left eye exhibits no discharge. No scleral icterus.   Neck: Normal range of motion. Neck supple. No JVD present. Carotid bruit is not present. No tracheal deviation present. No thyromegaly present.   Cardiovascular: Normal rate, regular rhythm, normal heart sounds and intact distal pulses.  Exam reveals no gallop and no friction rub.    No murmur heard.  Pulses:       Dorsalis pedis pulses are 2+ on the right side, and 2+ on the left side.        Posterior tibial pulses are 2+ on the right side, and 2+ on the left side.   Pulmonary/Chest: Effort normal and breath sounds normal. No respiratory distress. He has no wheezes. He has no rales. He exhibits no tenderness. Right breast exhibits no inverted nipple, no mass, no nipple discharge, no skin change and no tenderness. Left breast exhibits no inverted nipple, no mass, no nipple discharge, no  skin change and no tenderness. Breasts are symmetrical.   Abdominal: Soft. Normal appearance and bowel sounds are normal. He exhibits no distension and no mass. There is no hepatosplenomegaly. There is no tenderness. There is no rebound and no guarding. No hernia.   Musculoskeletal: Normal range of motion. He exhibits no edema, tenderness or deformity.   Lymphadenopathy:     He has no cervical adenopathy.   Neurological: He is alert and oriented to person, place, and time. He has normal reflexes. He displays tremor. He displays normal reflexes. No cranial nerve deficit or sensory deficit. Gait abnormal. Coordination normal.   Skin: Skin is warm and dry. Capillary refill takes less than 2 seconds. No rash noted. He is not diaphoretic. No erythema. No pallor.   Psychiatric: He has a normal mood and affect. His behavior is normal. Judgment and thought content normal.   Nursing note and vitals reviewed.      Assessment/Plan   Murali was seen today for establish care, dementia, hypertension, benign prostatic hypertrophy, heartburn, allergies, vitamin d deficiency and anorexia.    Diagnoses and all orders for this visit:    Acquired hypothyroidism  -     CBC (No Diff)  -     Comprehensive Metabolic Panel  -     Lipid Panel  -     TSH    Late onset Alzheimer's disease with behavioral disturbance  -     CBC (No Diff)  -     Comprehensive Metabolic Panel  -     Lipid Panel  -     TSH    Chronic kidney disease, unspecified CKD stage  -     CBC (No Diff)  -     Comprehensive Metabolic Panel  -     Lipid Panel  -     TSH    Decreased appetite  -     CBC (No Diff)  -     Comprehensive Metabolic Panel  -     Lipid Panel  -     TSH    Unintended weight loss    Vitamin D deficiency  -     Vitamin D 25 Hydroxy    Chronic congestive heart failure, unspecified heart failure type (CMS/HCC)  -     Ambulatory Referral to Cardiology    Coronary artery disease involving native coronary artery of native heart without angina pectoris  -      Ambulatory Referral to Cardiology    Atrial fibrillation, unspecified type (CMS/HCC)  -     Ambulatory Referral to Cardiology    TIA (transient ischemic attack)    Benign prostatic hyperplasia without lower urinary tract symptoms    Essential hypertension    Environmental allergies        Outpatient Encounter Prescriptions as of 10/12/2018   Medication Sig Dispense Refill   • acetaminophen (TYLENOL) 325 MG tablet Take 2 tablets by mouth Every 6 (Six) Hours As Needed for Mild Pain .     • apixaban (ELIQUIS) 2.5 MG tablet tablet Take 2.5 mg by mouth.     • carbidopa-levodopa (SINEMET)  MG per tablet Take 1 tablet by mouth 3 (Three) Times a Day. 90 tablet 11   • cholecalciferol (VITAMIN D3) 1000 units tablet Take 1,000 Units by mouth Daily.     • dutasteride (AVODART) 0.5 MG capsule Take 0.5 mg by mouth Daily.     • ipratropium-albuterol (DUO-NEB) 0.5-2.5 mg/mL nebulizer Take 3 mL by nebulization Every 4 (Four) Hours As Needed for Shortness of Air (CBS PROTOCOL). 360 mL    • levothyroxine (SYNTHROID, LEVOTHROID) 75 MCG tablet Take 75 mcg by mouth Daily.     • memantine (NAMENDA) 10 MG tablet Take 1 tablet by mouth 2 (Two) Times a Day. 30 tablet 11   • mirtazapine (REMERON) 30 MG tablet Take 30 mg by mouth Every Night.     • nitroglycerin (NITROSTAT) 0.4 MG SL tablet Place 0.4 mg under the tongue Every 5 (Five) Minutes As Needed for Chest Pain. Take no more than 3 doses in 15 minutes.     • omeprazole (priLOSEC) 20 MG capsule Take 20 mg by mouth Daily.     • pravastatin (PRAVACHOL) 20 MG tablet Take 20 mg by mouth Daily.     • sacubitril-valsartan (ENTRESTO) 24-26 MG tablet Take 0.5 tablets by mouth 2 (Two) Times a Day. 60 tablet    • vitamin E 400 UNIT capsule Take 400 Units by mouth Daily.     • carvedilol (COREG) 25 MG tablet Take 25 mg by mouth 2 (Two) Times a Day With Meals.     • diclofenac (VOLTAREN) 1 % gel gel Voltaren 1%, Transdermal 1 Gel four times daily for 90 days     • Loratadine 10 MG capsule Take  1 capsule by mouth Daily.     • montelukast (SINGULAIR) 10 MG tablet Take 10 mg by mouth Every Night.       No facility-administered encounter medications on file as of 10/12/2018.        Labs sent, will notify of results.   Refer to cardiology.  Keep appt with neurology.   Encourage PO intake/ensure-will monitor weight.   Continue current medications  Return in about 4 weeks (around 11/9/2018) for Recheck.     Plan of care discussed with pt. They verbalized understanding and agreement.

## 2018-10-17 DIAGNOSIS — R73.01 IMPAIRED FASTING GLUCOSE: Primary | ICD-10-CM

## 2018-10-17 PROBLEM — I10 ESSENTIAL HYPERTENSION: Status: ACTIVE | Noted: 2018-10-17

## 2018-10-17 PROBLEM — N40.0 BENIGN PROSTATIC HYPERPLASIA WITHOUT LOWER URINARY TRACT SYMPTOMS: Status: ACTIVE | Noted: 2018-10-17

## 2018-10-17 PROBLEM — Z91.09 ENVIRONMENTAL ALLERGIES: Status: ACTIVE | Noted: 2018-10-17

## 2018-10-17 PROBLEM — R63.4 UNINTENDED WEIGHT LOSS: Status: ACTIVE | Noted: 2018-10-17

## 2018-10-25 ENCOUNTER — LAB (OUTPATIENT)
Dept: INTERNAL MEDICINE | Facility: CLINIC | Age: 83
End: 2018-10-25

## 2018-10-25 DIAGNOSIS — R73.01 IMPAIRED FASTING GLUCOSE: ICD-10-CM

## 2018-10-25 DIAGNOSIS — E11.9 TYPE 2 DIABETES MELLITUS WITHOUT COMPLICATION, WITHOUT LONG-TERM CURRENT USE OF INSULIN (HCC): Primary | ICD-10-CM

## 2018-10-25 LAB — HBA1C MFR BLD: 6.9 % (ref 4.8–5.6)

## 2018-10-25 PROCEDURE — 83036 HEMOGLOBIN GLYCOSYLATED A1C: CPT | Performed by: NURSE PRACTITIONER

## 2018-10-26 ENCOUNTER — TELEPHONE (OUTPATIENT)
Dept: INTERNAL MEDICINE | Facility: CLINIC | Age: 83
End: 2018-10-26

## 2018-10-26 ENCOUNTER — OFFICE VISIT (OUTPATIENT)
Dept: NEUROLOGY | Facility: CLINIC | Age: 83
End: 2018-10-26

## 2018-10-26 DIAGNOSIS — F02.818 LATE ONSET ALZHEIMER'S DISEASE WITH BEHAVIORAL DISTURBANCE (HCC): Primary | ICD-10-CM

## 2018-10-26 DIAGNOSIS — G30.1 LATE ONSET ALZHEIMER'S DISEASE WITH BEHAVIORAL DISTURBANCE (HCC): Primary | ICD-10-CM

## 2018-10-26 PROCEDURE — 99213 OFFICE O/P EST LOW 20 MIN: CPT | Performed by: PSYCHIATRY & NEUROLOGY

## 2018-10-26 NOTE — PROGRESS NOTES
Subjective     Chief Complaint: cognitive impairment, balance impairment     History of Present Illness   Murali Dennis is a 89 y.o. male who returns to clinic today with a complex history. He was reportedly diagnosed with Alzheimer's Disease in [place] in 2003. He has had a gradually progressive cognitive decline over time with associated ADL impairment and visual hallucinations. Donepezil and memantine were stopped previously due to bradycardia.     He has also had symptoms since 2017 of shuffling gait, hand tremor and bradykinesia. This has reportedly improved with institution of SInemet.      Prior evaluation has included screening blood work  and a head CT  which was unremarkable . EMG/NCV studies on 12/11/17 showed a moderate-severe axonal sensorimotor neuropathy during a prior hospitalization.     Since his last visit on 7/20/18, he has successfully restarted Namenda at 10 mg daily. He is tolerating this well and his wife feels that this is helpful. He is eating better, and his tremors are relatively well controlled.      I have reviewed and confirmed the past family, social and medical history as accurate on 10/26/18.     Review of Systems   Constitutional: Negative.        Objective     There were no vitals taken for this visit.    General appearance today is normal.       Physical Exam   Psychiatric: His speech is normal.        Neurologic Exam     Mental Status   Oriented to person.   Disoriented to place.   Disoriented to time.   Registration: recalls 2 of 3 objects. Recall of objects at 5 minutes: 0/3.   Attention: normal.   Speech: speech is normal   Level of consciousness: alert  Unable to name object. Able to read. Unable to repeat. Unable to write. Normal comprehension.     Cranial Nerves   Cranial nerves II through XII intact.     MMSE=7      Assessment/Plan   Murali was seen today for memory loss.    Diagnoses and all orders for this visit:    Late onset Alzheimer's disease with behavioral  disturbance          Discussion/Summary   Murali Dennis returns to clinic today with a history of advanced Dementia and possible PD. I again reviewed his current status and treatment options. After discussing potential treatment options, it was elected to continue on Sinemet unchanged as this has been significantly beneficial and cautiously increase Namenda, titrating the dose up to 20 mg daily. He will then follow up in 6 months, or sooner if needed.     I spent 20 minutes face to face with the patient and family. I spent 15 minutes counseling and discussing current status, treatment options and management.    As part of this visit I obtained additional history from the family which is incorporated in the HPI.      Bruno Garvin MD

## 2018-10-26 NOTE — TELEPHONE ENCOUNTER
No, the A1C is a 3 month average of the glucose. Steroids can increase glucose, but I would not expect them to affect the A1C as much unless he was taking them consistently.

## 2018-10-26 NOTE — TELEPHONE ENCOUNTER
----- Message from ARCADIO Mccrary sent at 10/25/2018  2:10 PM EDT -----  Please let him know that his A1C indicates that he is a diabetic.   I would like to add metformin for glucose control. Side effects can include gi upset/diarrhea.  I will send in to his pharmacy.  He has an appt in the next week or so, we can discuss then too.

## 2018-10-26 NOTE — TELEPHONE ENCOUNTER
Pt wife notified of results.  She says that he had cortisone injections around that time and wants to know if it is possible that it may have affected results.

## 2018-11-09 ENCOUNTER — OFFICE VISIT (OUTPATIENT)
Dept: INTERNAL MEDICINE | Facility: CLINIC | Age: 83
End: 2018-11-09

## 2018-11-09 VITALS
DIASTOLIC BLOOD PRESSURE: 70 MMHG | WEIGHT: 172.6 LBS | TEMPERATURE: 98.4 F | BODY MASS INDEX: 25.56 KG/M2 | SYSTOLIC BLOOD PRESSURE: 138 MMHG | OXYGEN SATURATION: 96 % | HEART RATE: 79 BPM | HEIGHT: 69 IN

## 2018-11-09 DIAGNOSIS — E03.9 ACQUIRED HYPOTHYROIDISM: ICD-10-CM

## 2018-11-09 DIAGNOSIS — I10 ESSENTIAL HYPERTENSION: ICD-10-CM

## 2018-11-09 DIAGNOSIS — F02.818 LATE ONSET ALZHEIMER'S DISEASE WITH BEHAVIORAL DISTURBANCE (HCC): ICD-10-CM

## 2018-11-09 DIAGNOSIS — I50.9 CHRONIC CONGESTIVE HEART FAILURE, UNSPECIFIED HEART FAILURE TYPE (HCC): ICD-10-CM

## 2018-11-09 DIAGNOSIS — E11.9 TYPE 2 DIABETES MELLITUS WITHOUT COMPLICATION, WITHOUT LONG-TERM CURRENT USE OF INSULIN (HCC): Primary | ICD-10-CM

## 2018-11-09 DIAGNOSIS — N40.0 BENIGN PROSTATIC HYPERPLASIA WITHOUT LOWER URINARY TRACT SYMPTOMS: ICD-10-CM

## 2018-11-09 DIAGNOSIS — I48.91 ATRIAL FIBRILLATION, UNSPECIFIED TYPE (HCC): ICD-10-CM

## 2018-11-09 DIAGNOSIS — N18.30 STAGE 3 CHRONIC KIDNEY DISEASE (HCC): ICD-10-CM

## 2018-11-09 DIAGNOSIS — G30.1 LATE ONSET ALZHEIMER'S DISEASE WITH BEHAVIORAL DISTURBANCE (HCC): ICD-10-CM

## 2018-11-09 DIAGNOSIS — I25.10 CORONARY ARTERY DISEASE INVOLVING NATIVE CORONARY ARTERY OF NATIVE HEART WITHOUT ANGINA PECTORIS: ICD-10-CM

## 2018-11-09 LAB — GLUCOSE BLDC GLUCOMTR-MCNC: 170 MG/DL (ref 70–130)

## 2018-11-09 PROCEDURE — 82962 GLUCOSE BLOOD TEST: CPT | Performed by: NURSE PRACTITIONER

## 2018-11-09 PROCEDURE — 99213 OFFICE O/P EST LOW 20 MIN: CPT | Performed by: NURSE PRACTITIONER

## 2018-11-09 RX ORDER — BLOOD-GLUCOSE METER
1 KIT MISCELLANEOUS DAILY
Qty: 1 EACH | Refills: 0 | Status: ON HOLD | OUTPATIENT
Start: 2018-11-09 | End: 2020-01-01

## 2018-11-09 RX ORDER — DUTASTERIDE 0.5 MG/1
0.5 CAPSULE, LIQUID FILLED ORAL DAILY
Qty: 30 CAPSULE | Refills: 3 | Status: SHIPPED | OUTPATIENT
Start: 2018-11-09 | End: 2019-02-08 | Stop reason: SDUPTHER

## 2018-11-09 RX ORDER — LANCETS 28 GAUGE
1 EACH MISCELLANEOUS DAILY
Qty: 100 EACH | Refills: 12 | Status: ON HOLD | OUTPATIENT
Start: 2018-11-09 | End: 2020-01-01

## 2018-11-09 NOTE — PROGRESS NOTES
Subjective   Murali Dennis is a 89 y.o. male.     History of Present Illness     Murali is accompanied by his wife today. They both report that he has been doing well since last visit.   Had decreased appetite for a few months. Had lost about 20 lbs in 3-4 months.. Weight improving- drinking ensure,  Doing well with it    currently followed by Dr. Garvin for her Alzheimer's dementia and questionable Parkinson's disease.  He last saw him on 10/26/2018 where he continued the  Sinemet  And increased his namneda to 20 mg daily. He will FU in 6 m.     CKD-  1/2018. GFR 41, creat 1.6.   Gfr up to 45 on 10/12/18 labs and  creat down to 1.48     BPH-chronic, well controlled with dutasteride. Need refill on this today     GERD- chronic well controlled with omerazole 20 mg daily.      Hyperlipidemia-chronic, stable with pravastatin. Last lipids 10/12/18 were normal.      Hypothyroidism- chronic, Last TSH 10/2018 (euthyroid). Currently taking levothyroxine 75 mcg daily.      Allergies- chronic and well controlled with Singulair and loratadine.      history of vit d def- currently taking vit D3 1000 u daily.      Diabetes: newly diagnosed 10/2018  With A1C 6.9%. Working on diet and increased activity before adding medications. Wife is also a diabetic    HTN/CHF/A-fib- currently anticoagulated with eliquis. Denies any bleeding issues. BP controlled with entresto. Denies any headaches, dizziness, visual disturbances, chest pain, dyspnea, leg pain, and edema. Has FU with Dr. Ugarte in early December.   [facility] cardiac diagnostics:   1/19/2018: [facility] with Stent  3 vessel CAD  3/3 patent bypass grafts with critical stenosis in SVG to circumflex flex obtuse marginal treated with a Alpine ANITA guided by IVUS  Recommendations:    Antiplatelet therapy Plavix and aspirin and risk factor modification for atherosclerotic disease     Echo: 12/2017  · The left ventricular cavity is mildly dilated.  · Left atrial cavity size is moderately  dilated.  · Mild aortic valve regurgitation is present.  · Mild-to-moderate mitral valve regurgitation is present  · Left ventricular systolic function is severely decreased. Estimated EF = 28%.  · Left ventricular diastolic dysfunction (grade I a) consistent with impaired relaxation.  · Mild tricuspid valve regurgitation is present.  · There is no evidence of pericardial effusion.  · No evidence of pulmonary hypertension is present.  · The aortic valve exhibits sclerosis  · Normal right ventricular cavity size and wall thickness noted with borderline low right ventricular systolic function    The following portions of the patient's history were reviewed and updated as appropriate: allergies, current medications, past family history, past medical history, past social history, past surgical history and problem list.    Review of Systems   Constitutional: Positive for appetite change and unexpected weight change. Negative for chills, fatigue and fever.   HENT: Negative for congestion, ear pain, rhinorrhea, sinus pressure and sore throat.    Eyes: Negative for itching and visual disturbance.   Respiratory: Negative for cough, shortness of breath and wheezing.    Cardiovascular: Negative for chest pain, palpitations and leg swelling.   Gastrointestinal: Negative for abdominal pain, constipation, diarrhea, nausea and vomiting.   Endocrine: Negative for cold intolerance and heat intolerance.   Genitourinary: Negative for difficulty urinating, dysuria and hematuria.   Musculoskeletal: Negative for arthralgias, back pain, joint swelling and myalgias.   Skin: Negative for rash and wound.   Allergic/Immunologic: Negative for environmental allergies and food allergies.   Neurological: Negative for dizziness, numbness and headaches.   Hematological: Negative for adenopathy. Does not bruise/bleed easily.   Psychiatric/Behavioral: Negative for dysphoric mood and sleep disturbance. The patient is not nervous/anxious.         Objective   Physical Exam   Constitutional: He is oriented to person, place, and time. He appears well-developed and well-nourished. No distress.   HENT:   Head: Normocephalic and atraumatic.   Right Ear: External ear normal.   Left Ear: External ear normal.   Nose: Nose normal.   Mouth/Throat: Oropharynx is clear and moist.   Eyes: Conjunctivae and EOM are normal. Pupils are equal, round, and reactive to light. Right eye exhibits no discharge. Left eye exhibits no discharge. No scleral icterus.   Neck: Normal range of motion. Neck supple. No JVD present. Carotid bruit is not present. No tracheal deviation present. No thyromegaly present.   Cardiovascular: Normal rate, regular rhythm, normal heart sounds and intact distal pulses. Exam reveals no gallop and no friction rub.   No murmur heard.  Pulses:       Dorsalis pedis pulses are 2+ on the right side, and 2+ on the left side.        Posterior tibial pulses are 2+ on the right side, and 2+ on the left side.   Pulmonary/Chest: Effort normal and breath sounds normal. No respiratory distress. He has no wheezes. He has no rales. He exhibits no tenderness. Right breast exhibits no inverted nipple, no mass, no nipple discharge, no skin change and no tenderness. Left breast exhibits no inverted nipple, no mass, no nipple discharge, no skin change and no tenderness. Breasts are symmetrical.   Abdominal: Soft. Normal appearance and bowel sounds are normal. He exhibits no distension and no mass. There is no hepatosplenomegaly. There is no tenderness. There is no rebound and no guarding. No hernia.   Musculoskeletal: Normal range of motion. He exhibits no edema, tenderness or deformity.   Lymphadenopathy:     He has no cervical adenopathy.   Neurological: He is alert and oriented to person, place, and time. He has normal reflexes. He displays tremor. He displays normal reflexes. No cranial nerve deficit or sensory deficit. Gait abnormal. Coordination normal.   Skin:  Skin is warm and dry. Capillary refill takes less than 2 seconds. No rash noted. He is not diaphoretic. No erythema. No pallor.   Psychiatric: He has a normal mood and affect. His behavior is normal. Judgment and thought content normal.   Nursing note and vitals reviewed.      Assessment/Plan      Murali was seen today for hypertension, hyperlipidemia and diabetes.    Diagnoses and all orders for this visit:    Type 2 diabetes mellitus without complication, without long-term current use of insulin (CMS/[facility])  -     POCT Glucose  -     glucose blood test strip; 1 each by Other route Daily. Use as instructed  -     glucose monitor monitoring kit; 1 each Daily.  -     Lancets (FREESTYLE) lancets; 1 each by Other route Daily. May dispense any brand needed E11.9    Benign prostatic hyperplasia without lower urinary tract symptoms  -     dutasteride (AVODART) 0.5 MG capsule; Take 1 capsule by mouth Daily.    Chronic congestive heart failure, unspecified heart failure type (CMS/HCC)    Atrial fibrillation, unspecified type (CMS/HCC)    Coronary artery disease involving native coronary artery of native heart without angina pectoris    Essential hypertension    Acquired hypothyroidism    Late onset Alzheimer's disease with behavioral disturbance    Stage 3 chronic kidney disease (CMS/HCC)    Other orders  -     Cancel: Basic Metabolic Panel      Meter and supplies sent- will check daily and keep log. Bring to FU  Glucose 170 today will hold off on metformin and work on lifestyle changes, recheck A1c in 3 m  Keep appt with dr. Ugarte  Continue current medications  Return in about 3 months (around 2/9/2019) for chronic condition follow up.  Plan of care discussed with pt. They verbalized understanding and agreement.

## 2018-11-14 ENCOUNTER — TELEPHONE (OUTPATIENT)
Dept: NEUROLOGY | Facility: CLINIC | Age: 83
End: 2018-11-14

## 2018-11-14 RX ORDER — MEMANTINE HYDROCHLORIDE 10 MG/1
10 TABLET ORAL 2 TIMES DAILY
Qty: 60 TABLET | Refills: 11 | Status: SHIPPED | OUTPATIENT
Start: 2018-11-14 | End: 2018-12-28 | Stop reason: SDUPTHER

## 2018-11-14 RX ORDER — OMEPRAZOLE 40 MG/1
CAPSULE, DELAYED RELEASE ORAL
Qty: 30 CAPSULE | Refills: 0 | OUTPATIENT
Start: 2018-11-14

## 2018-11-14 NOTE — TELEPHONE ENCOUNTER
----- Message from Parth White sent at 11/14/2018 11:25 AM EST -----  Contact: PHARMACY  LEXI:    memantine (NAMENDA) 10 MG tablet, PHARMACY CALLED REGARDING THIS RX, THE LATEST SCRIPT THEY HAVE HAS HIM TAKING IT ONCE A DAY, BUT PT SAYS IT SHOULD BE TWICE A DAY. SO THEY NEED A NEW UPDATED SCRIPT PUT IN.    PHARMACY: [facility] ON [address]  CALLBACK:[phone]

## 2018-11-14 NOTE — TELEPHONE ENCOUNTER
Directions were already correct on script. Per 's note would like him to titrate up to 20 mg, so 10 mg BID. Adjusted Quantity and sent to [facility].

## 2018-11-19 ENCOUNTER — TELEPHONE (OUTPATIENT)
Dept: INTERNAL MEDICINE | Facility: CLINIC | Age: 83
End: 2018-11-19

## 2018-11-19 DIAGNOSIS — K21.9 GASTROESOPHAGEAL REFLUX DISEASE, ESOPHAGITIS PRESENCE NOT SPECIFIED: Primary | ICD-10-CM

## 2018-11-19 RX ORDER — OMEPRAZOLE 20 MG/1
20 CAPSULE, DELAYED RELEASE ORAL DAILY
Qty: 30 CAPSULE | Refills: 3 | Status: SHIPPED | OUTPATIENT
Start: 2018-11-19 | End: 2019-02-22 | Stop reason: SDUPTHER

## 2018-11-28 RX ORDER — PRAVASTATIN SODIUM 20 MG
TABLET ORAL
Qty: 180 TABLET | Refills: 0 | Status: SHIPPED | OUTPATIENT
Start: 2018-11-28 | End: 2019-01-01 | Stop reason: SDUPTHER

## 2018-12-04 DIAGNOSIS — Z71.89 HEARING AID CONSULTATION: Primary | ICD-10-CM

## 2018-12-28 ENCOUNTER — TELEPHONE (OUTPATIENT)
Dept: NEUROLOGY | Facility: CLINIC | Age: 83
End: 2018-12-28

## 2018-12-28 RX ORDER — MEMANTINE HYDROCHLORIDE 10 MG/1
10 TABLET ORAL 2 TIMES DAILY
Qty: 60 TABLET | Refills: 11 | Status: SHIPPED | OUTPATIENT
Start: 2018-12-28 | End: 2019-04-26 | Stop reason: SDUPTHER

## 2018-12-28 NOTE — TELEPHONE ENCOUNTER
SPOKE WITH TIFFANY, PT WIFE, AND INFORMED HER THAT THE CORRECT RX, MEMANTINE 10 MG TABLET TWICE DAILY, HAD BEEN CALLED IN AND CORRECTED AT THE PHARMACY.    TIFFANY STATES SHE UNDERSTANDS AND WILL BE PICKING IT UP.

## 2018-12-28 NOTE — TELEPHONE ENCOUNTER
PT WIFE,, TIFFANY, CALLED IN REGARDS TO RX FOR MEMANTINE 10 MG. TIFFANY STATES THE PHARMACY ONLY GAVE THEM 30 TABLETS WHEN SHE PICKED UP THE RX IN THE BEGINNING OF December AND IT SAID DIRECTIONS SAID TO TAKE ONE A DAY, BUT SHE KNOWS PT IS SUPPOSED TO BE TAKING 2 A DAY NOW.    I INFORMED PT I WOULD CALL THE PHARMACY AND GET RX FILLED FOR CORRECT AMOUNT.    PHARMACY- [id number]  TIFFANY- [phone]

## 2018-12-28 NOTE — TELEPHONE ENCOUNTER
SPOKE WITH PHARMACY AND INFORMED THEM THAT THE PT SHOULD HAVE A PRESCRIPTION FOR MEMANTINE 10 MG TWICE DAILY WITH REFILLS. PHARMACY REALIZED THEY NEVER FILLED THE PRESCRIPTION FOR THE NEW DOSE AND WOULD FILL IT RIGHT AWAY.

## 2019-01-01 ENCOUNTER — OFFICE VISIT (OUTPATIENT)
Dept: INTERNAL MEDICINE | Facility: CLINIC | Age: 84
End: 2019-01-01

## 2019-01-01 ENCOUNTER — PRIOR AUTHORIZATION (OUTPATIENT)
Dept: ORTHOPEDIC SURGERY | Facility: CLINIC | Age: 84
End: 2019-01-01

## 2019-01-01 ENCOUNTER — TREATMENT (OUTPATIENT)
Dept: PHYSICAL THERAPY | Facility: CLINIC | Age: 84
End: 2019-01-01

## 2019-01-01 ENCOUNTER — OFFICE VISIT (OUTPATIENT)
Dept: ORTHOPEDIC SURGERY | Facility: CLINIC | Age: 84
End: 2019-01-01

## 2019-01-01 ENCOUNTER — HOSPITAL ENCOUNTER (OUTPATIENT)
Dept: PHYSICAL THERAPY | Facility: HOSPITAL | Age: 84
Setting detail: THERAPIES SERIES
Discharge: HOME | End: 2019-08-26

## 2019-01-01 ENCOUNTER — APPOINTMENT (OUTPATIENT)
Dept: CARDIOLOGY | Facility: HOSPITAL | Age: 84
End: 2019-01-01

## 2019-01-01 ENCOUNTER — HOSPITAL ENCOUNTER (OUTPATIENT)
Dept: PHYSICAL THERAPY | Facility: HOSPITAL | Age: 84
Setting detail: THERAPIES SERIES
Discharge: HOME | End: 2019-08-23

## 2019-01-01 ENCOUNTER — OFFICE VISIT (OUTPATIENT)
Dept: NEUROLOGY | Facility: CLINIC | Age: 84
End: 2019-01-01

## 2019-01-01 ENCOUNTER — TELEPHONE (OUTPATIENT)
Dept: INTERNAL MEDICINE | Facility: CLINIC | Age: 84
End: 2019-01-01

## 2019-01-01 ENCOUNTER — APPOINTMENT (OUTPATIENT)
Dept: MRI IMAGING | Facility: HOSPITAL | Age: 84
End: 2019-01-01

## 2019-01-01 ENCOUNTER — APPOINTMENT (OUTPATIENT)
Dept: CT IMAGING | Facility: HOSPITAL | Age: 84
End: 2019-01-01

## 2019-01-01 ENCOUNTER — LAB (OUTPATIENT)
Dept: INTERNAL MEDICINE | Facility: CLINIC | Age: 84
End: 2019-01-01

## 2019-01-01 ENCOUNTER — ANESTHESIA EVENT (OUTPATIENT)
Dept: GASTROENTEROLOGY | Facility: HOSPITAL | Age: 84
End: 2019-01-01

## 2019-01-01 ENCOUNTER — HOSPITAL ENCOUNTER (INPATIENT)
Facility: HOSPITAL | Age: 84
LOS: 13 days | Discharge: NURSING, REHAB OR LONG-TERM CARE | End: 2020-01-09
Attending: EMERGENCY MEDICINE | Admitting: INTERNAL MEDICINE

## 2019-01-01 ENCOUNTER — HOSPITAL ENCOUNTER (OUTPATIENT)
Dept: PHYSICAL THERAPY | Facility: HOSPITAL | Age: 84
Setting detail: THERAPIES SERIES
Discharge: HOME | End: 2019-08-08

## 2019-01-01 ENCOUNTER — APPOINTMENT (OUTPATIENT)
Dept: ULTRASOUND IMAGING | Facility: HOSPITAL | Age: 84
End: 2019-01-01

## 2019-01-01 ENCOUNTER — APPOINTMENT (OUTPATIENT)
Dept: GENERAL RADIOLOGY | Facility: HOSPITAL | Age: 84
End: 2019-01-01

## 2019-01-01 ENCOUNTER — HOSPITAL ENCOUNTER (OUTPATIENT)
Dept: PHYSICAL THERAPY | Facility: HOSPITAL | Age: 84
Setting detail: THERAPIES SERIES
Discharge: HOME | End: 2019-08-06

## 2019-01-01 ENCOUNTER — HOSPITAL ENCOUNTER (OUTPATIENT)
Dept: GENERAL RADIOLOGY | Facility: HOSPITAL | Age: 84
Discharge: HOME | End: 2019-07-23
Admitting: NURSE PRACTITIONER

## 2019-01-01 ENCOUNTER — ANESTHESIA (OUTPATIENT)
Dept: GASTROENTEROLOGY | Facility: HOSPITAL | Age: 84
End: 2019-01-01

## 2019-01-01 ENCOUNTER — HOSPITAL ENCOUNTER (OUTPATIENT)
Dept: PHYSICAL THERAPY | Facility: HOSPITAL | Age: 84
Setting detail: THERAPIES SERIES
Discharge: HOME | End: 2019-08-19

## 2019-01-01 ENCOUNTER — HOSPITAL ENCOUNTER (OUTPATIENT)
Dept: PHYSICAL THERAPY | Facility: HOSPITAL | Age: 84
Setting detail: THERAPIES SERIES
Discharge: HOME | End: 2019-08-12

## 2019-01-01 VITALS
OXYGEN SATURATION: 97 % | DIASTOLIC BLOOD PRESSURE: 62 MMHG | HEIGHT: 69 IN | SYSTOLIC BLOOD PRESSURE: 126 MMHG | BODY MASS INDEX: 25.48 KG/M2 | TEMPERATURE: 97.9 F | RESPIRATION RATE: 16 BRPM | WEIGHT: 172 LBS | HEART RATE: 58 BPM

## 2019-01-01 VITALS — BODY MASS INDEX: 24.29 KG/M2 | WEIGHT: 164 LBS | OXYGEN SATURATION: 98 % | HEART RATE: 59 BPM | HEIGHT: 69 IN

## 2019-01-01 VITALS — WEIGHT: 164 LBS | OXYGEN SATURATION: 96 % | BODY MASS INDEX: 24.29 KG/M2 | HEART RATE: 61 BPM | HEIGHT: 69 IN

## 2019-01-01 VITALS
OXYGEN SATURATION: 94 % | RESPIRATION RATE: 16 BRPM | DIASTOLIC BLOOD PRESSURE: 60 MMHG | BODY MASS INDEX: 25.33 KG/M2 | HEART RATE: 61 BPM | TEMPERATURE: 99 F | SYSTOLIC BLOOD PRESSURE: 118 MMHG | WEIGHT: 171 LBS | HEIGHT: 69 IN

## 2019-01-01 VITALS — OXYGEN SATURATION: 92 % | HEART RATE: 111 BPM | HEIGHT: 69 IN | WEIGHT: 171.96 LBS | BODY MASS INDEX: 25.47 KG/M2

## 2019-01-01 DIAGNOSIS — G89.29 CHRONIC PAIN OF BOTH KNEES: ICD-10-CM

## 2019-01-01 DIAGNOSIS — E11.9 TYPE 2 DIABETES MELLITUS WITHOUT COMPLICATION, WITHOUT LONG-TERM CURRENT USE OF INSULIN (HCC): Primary | ICD-10-CM

## 2019-01-01 DIAGNOSIS — M25.561 CHRONIC PAIN OF BOTH KNEES: ICD-10-CM

## 2019-01-01 DIAGNOSIS — Z74.09 IMPAIRED FUNCTIONAL MOBILITY, BALANCE, GAIT, AND ENDURANCE: ICD-10-CM

## 2019-01-01 DIAGNOSIS — I47.20 VENTRICULAR TACHYCARDIA (HCC): ICD-10-CM

## 2019-01-01 DIAGNOSIS — E78.2 MIXED HYPERLIPIDEMIA: ICD-10-CM

## 2019-01-01 DIAGNOSIS — N40.0 BENIGN PROSTATIC HYPERPLASIA WITHOUT LOWER URINARY TRACT SYMPTOMS: ICD-10-CM

## 2019-01-01 DIAGNOSIS — G89.29 CHRONIC PAIN OF BOTH KNEES: Primary | ICD-10-CM

## 2019-01-01 DIAGNOSIS — K21.9 GASTROESOPHAGEAL REFLUX DISEASE, ESOPHAGITIS PRESENCE NOT SPECIFIED: ICD-10-CM

## 2019-01-01 DIAGNOSIS — M25.562 CHRONIC PAIN OF BOTH KNEES: ICD-10-CM

## 2019-01-01 DIAGNOSIS — E03.9 ACQUIRED HYPOTHYROIDISM: ICD-10-CM

## 2019-01-01 DIAGNOSIS — Z78.9 IMPAIRED MOBILITY AND ADLS: ICD-10-CM

## 2019-01-01 DIAGNOSIS — K85.90 ACUTE PANCREATITIS, UNSPECIFIED COMPLICATION STATUS, UNSPECIFIED PANCREATITIS TYPE: Primary | ICD-10-CM

## 2019-01-01 DIAGNOSIS — M25.562 CHRONIC PAIN OF BOTH KNEES: Primary | ICD-10-CM

## 2019-01-01 DIAGNOSIS — F32.A DEPRESSION, UNSPECIFIED DEPRESSION TYPE: ICD-10-CM

## 2019-01-01 DIAGNOSIS — M25.561 CHRONIC PAIN OF BOTH KNEES: Primary | ICD-10-CM

## 2019-01-01 DIAGNOSIS — F02.80 LATE ONSET ALZHEIMER'S DISEASE WITHOUT BEHAVIORAL DISTURBANCE (HCC): ICD-10-CM

## 2019-01-01 DIAGNOSIS — Z74.09 IMPAIRED FUNCTIONAL MOBILITY, BALANCE, GAIT, AND ENDURANCE: Primary | ICD-10-CM

## 2019-01-01 DIAGNOSIS — N18.30 STAGE 3 CHRONIC KIDNEY DISEASE (HCC): Primary | ICD-10-CM

## 2019-01-01 DIAGNOSIS — M17.0 PRIMARY OSTEOARTHRITIS OF BOTH KNEES: Primary | ICD-10-CM

## 2019-01-01 DIAGNOSIS — R53.83 FATIGUE, UNSPECIFIED TYPE: ICD-10-CM

## 2019-01-01 DIAGNOSIS — N17.9 AKI (ACUTE KIDNEY INJURY) (HCC): ICD-10-CM

## 2019-01-01 DIAGNOSIS — N18.30 STAGE 3 CHRONIC KIDNEY DISEASE (HCC): ICD-10-CM

## 2019-01-01 DIAGNOSIS — G20 PARKINSON'S DISEASE (HCC): ICD-10-CM

## 2019-01-01 DIAGNOSIS — G30.1 LATE ONSET ALZHEIMER'S DISEASE WITHOUT BEHAVIORAL DISTURBANCE (HCC): Primary | ICD-10-CM

## 2019-01-01 DIAGNOSIS — R13.10 DYSPHAGIA, UNSPECIFIED TYPE: ICD-10-CM

## 2019-01-01 DIAGNOSIS — G30.1 LATE ONSET ALZHEIMER'S DISEASE WITHOUT BEHAVIORAL DISTURBANCE (HCC): ICD-10-CM

## 2019-01-01 DIAGNOSIS — E11.9 TYPE 2 DIABETES MELLITUS WITHOUT COMPLICATION, WITHOUT LONG-TERM CURRENT USE OF INSULIN (HCC): ICD-10-CM

## 2019-01-01 DIAGNOSIS — D64.9 ANEMIA, UNSPECIFIED TYPE: ICD-10-CM

## 2019-01-01 DIAGNOSIS — L60.2 OVERGROWN TOENAILS: ICD-10-CM

## 2019-01-01 DIAGNOSIS — F02.80 LATE ONSET ALZHEIMER'S DISEASE WITHOUT BEHAVIORAL DISTURBANCE (HCC): Primary | ICD-10-CM

## 2019-01-01 DIAGNOSIS — Z74.09 IMPAIRED MOBILITY AND ADLS: ICD-10-CM

## 2019-01-01 DIAGNOSIS — I48.91 ATRIAL FIBRILLATION, UNSPECIFIED TYPE (HCC): ICD-10-CM

## 2019-01-01 LAB
ABO GROUP BLD: NORMAL
ABO GROUP BLD: NORMAL
ALBUMIN SERPL-MCNC: 2.5 G/DL (ref 3.5–5.2)
ALBUMIN SERPL-MCNC: 2.8 G/DL (ref 3.5–5.2)
ALBUMIN SERPL-MCNC: 3.1 G/DL (ref 3.5–5.2)
ALBUMIN SERPL-MCNC: 3.6 G/DL (ref 3.5–5.2)
ALBUMIN SERPL-MCNC: 4.2 G/DL (ref 3.5–5.2)
ALBUMIN SERPL-MCNC: 4.4 G/DL (ref 3.5–5.2)
ALBUMIN SERPL-MCNC: 4.6 G/DL (ref 3.5–5.2)
ALBUMIN/GLOB SERPL: 0.7 G/DL
ALBUMIN/GLOB SERPL: 0.9 G/DL
ALBUMIN/GLOB SERPL: 1 G/DL
ALBUMIN/GLOB SERPL: 1.3 G/DL
ALBUMIN/GLOB SERPL: 1.4 G/DL
ALBUMIN/GLOB SERPL: 1.4 G/DL
ALBUMIN/GLOB SERPL: 1.6 G/DL
ALP SERPL-CCNC: 103 U/L (ref 39–117)
ALP SERPL-CCNC: 145 U/L (ref 39–117)
ALP SERPL-CCNC: 177 U/L (ref 39–117)
ALP SERPL-CCNC: 202 U/L (ref 39–117)
ALP SERPL-CCNC: 223 U/L (ref 39–117)
ALP SERPL-CCNC: 238 U/L (ref 39–117)
ALP SERPL-CCNC: 97 U/L (ref 39–117)
ALT SERPL W P-5'-P-CCNC: 125 U/L (ref 1–41)
ALT SERPL W P-5'-P-CCNC: 14 U/L (ref 1–41)
ALT SERPL W P-5'-P-CCNC: 14 U/L (ref 1–41)
ALT SERPL W P-5'-P-CCNC: 24 U/L (ref 1–41)
ALT SERPL W P-5'-P-CCNC: 33 U/L (ref 1–41)
ALT SERPL W P-5'-P-CCNC: 66 U/L (ref 1–41)
ALT SERPL W P-5'-P-CCNC: 76 U/L (ref 1–41)
ANION GAP SERPL CALCULATED.3IONS-SCNC: 11 MMOL/L (ref 5–15)
ANION GAP SERPL CALCULATED.3IONS-SCNC: 13 MMOL/L (ref 5–15)
ANION GAP SERPL CALCULATED.3IONS-SCNC: 13 MMOL/L (ref 5–15)
ANION GAP SERPL CALCULATED.3IONS-SCNC: 13.7 MMOL/L
ANION GAP SERPL CALCULATED.3IONS-SCNC: 14 MMOL/L (ref 5–15)
ANION GAP SERPL CALCULATED.3IONS-SCNC: 15 MMOL/L (ref 5–15)
ANION GAP SERPL CALCULATED.3IONS-SCNC: 15 MMOL/L (ref 5–15)
ANION GAP SERPL CALCULATED.3IONS-SCNC: 16 MMOL/L (ref 5–15)
ANION GAP SERPL CALCULATED.3IONS-SCNC: 16.5 MMOL/L (ref 5–15)
AST SERPL-CCNC: 181 U/L (ref 1–40)
AST SERPL-CCNC: 189 U/L (ref 1–40)
AST SERPL-CCNC: 19 U/L (ref 1–40)
AST SERPL-CCNC: 25 U/L (ref 1–40)
AST SERPL-CCNC: 36 U/L (ref 1–40)
AST SERPL-CCNC: 395 U/L (ref 1–40)
AST SERPL-CCNC: 65 U/L (ref 1–40)
BACTERIA BLD CULT: ABNORMAL
BACTERIA SPEC AEROBE CULT: ABNORMAL
BACTERIA SPEC AEROBE CULT: ABNORMAL
BACTERIA UR QL AUTO: ABNORMAL /HPF
BASOPHILS # BLD AUTO: 0.02 10*3/MM3 (ref 0–0.2)
BASOPHILS # BLD AUTO: 0.02 10*3/MM3 (ref 0–0.2)
BASOPHILS # BLD AUTO: 0.04 10*3/MM3 (ref 0–0.2)
BASOPHILS # BLD MANUAL: 0 10*3/MM3 (ref 0–0.2)
BASOPHILS # BLD MANUAL: 0 10*3/MM3 (ref 0–0.2)
BASOPHILS NFR BLD AUTO: 0 % (ref 0–1.5)
BASOPHILS NFR BLD AUTO: 0 % (ref 0–1.5)
BASOPHILS NFR BLD AUTO: 0.1 % (ref 0–1.5)
BASOPHILS NFR BLD AUTO: 0.2 % (ref 0–1.5)
BASOPHILS NFR BLD AUTO: 0.5 % (ref 0–1.5)
BH CV ECHO MEAS - AI DEC SLOPE: 250.3 CM/SEC^2
BH CV ECHO MEAS - AI MAX PG: 58.4 MMHG
BH CV ECHO MEAS - AI MAX VEL: 382 CM/SEC
BH CV ECHO MEAS - AI P1/2T: 447.1 MSEC
BH CV ECHO MEAS - AO ROOT AREA (BSA CORRECTED): 1.7
BH CV ECHO MEAS - AO ROOT AREA: 8.3 CM^2
BH CV ECHO MEAS - AO ROOT DIAM: 3.3 CM
BH CV ECHO MEAS - BSA(HAYCOCK): 2 M^2
BH CV ECHO MEAS - BSA: 2 M^2
BH CV ECHO MEAS - BZI_BMI: 24.7 KILOGRAMS/M^2
BH CV ECHO MEAS - BZI_METRIC_HEIGHT: 177.8 CM
BH CV ECHO MEAS - BZI_METRIC_WEIGHT: 78 KG
BH CV ECHO MEAS - EDV(CUBED): 287.7 ML
BH CV ECHO MEAS - EDV(MOD-SP2): 130 ML
BH CV ECHO MEAS - EDV(MOD-SP4): 140 ML
BH CV ECHO MEAS - EDV(TEICH): 223.7 ML
BH CV ECHO MEAS - EF(CUBED): 40.8 %
BH CV ECHO MEAS - EF(MOD-BP): 29 %
BH CV ECHO MEAS - EF(MOD-SP2): 33.8 %
BH CV ECHO MEAS - EF(MOD-SP4): 27.1 %
BH CV ECHO MEAS - EF(TEICH): 33 %
BH CV ECHO MEAS - ESV(CUBED): 170.2 ML
BH CV ECHO MEAS - ESV(MOD-SP2): 86 ML
BH CV ECHO MEAS - ESV(MOD-SP4): 102 ML
BH CV ECHO MEAS - ESV(TEICH): 150 ML
BH CV ECHO MEAS - FS: 16.1 %
BH CV ECHO MEAS - IVS/LVPW: 1.2
BH CV ECHO MEAS - IVSD: 1.1 CM
BH CV ECHO MEAS - LA DIMENSION: 4.7 CM
BH CV ECHO MEAS - LA/AO: 1.4
BH CV ECHO MEAS - LAD MAJOR: 6.9 CM
BH CV ECHO MEAS - LV DIASTOLIC VOL/BSA (35-75): 71.5 ML/M^2
BH CV ECHO MEAS - LV MASS(C)D: 298.1 GRAMS
BH CV ECHO MEAS - LV MASS(C)DI: 152.3 GRAMS/M^2
BH CV ECHO MEAS - LV SYSTOLIC VOL/BSA (12-30): 52.1 ML/M^2
BH CV ECHO MEAS - LVIDD: 6.6 CM
BH CV ECHO MEAS - LVIDS: 5.5 CM
BH CV ECHO MEAS - LVLD AP2: 7.6 CM
BH CV ECHO MEAS - LVLD AP4: 7.9 CM
BH CV ECHO MEAS - LVLS AP2: 6.9 CM
BH CV ECHO MEAS - LVLS AP4: 7.5 CM
BH CV ECHO MEAS - LVPWD: 0.9 CM
BH CV ECHO MEAS - MV A MAX VEL: 44.9 CM/SEC
BH CV ECHO MEAS - MV DEC TIME: 0.24 SEC
BH CV ECHO MEAS - MV E MAX VEL: 56.8 CM/SEC
BH CV ECHO MEAS - MV E/A: 1.3
BH CV ECHO MEAS - PA ACC SLOPE: 376.4 CM/SEC^2
BH CV ECHO MEAS - PA ACC TIME: 0.16 SEC
BH CV ECHO MEAS - PA PR(ACCEL): 8.5 MMHG
BH CV ECHO MEAS - PULM DIAS VEL: 47.2 CM/SEC
BH CV ECHO MEAS - PULM S/D: 1.2
BH CV ECHO MEAS - PULM SYS VEL: 57.2 CM/SEC
BH CV ECHO MEAS - RAP SYSTOLE: 5 MMHG
BH CV ECHO MEAS - RVDD: 2.3 CM
BH CV ECHO MEAS - RVSP: 35 MMHG
BH CV ECHO MEAS - SI(CUBED): 60 ML/M^2
BH CV ECHO MEAS - SI(MOD-SP2): 22.5 ML/M^2
BH CV ECHO MEAS - SI(MOD-SP4): 19.4 ML/M^2
BH CV ECHO MEAS - SI(TEICH): 37.7 ML/M^2
BH CV ECHO MEAS - SV(CUBED): 117.5 ML
BH CV ECHO MEAS - SV(MOD-SP2): 44 ML
BH CV ECHO MEAS - SV(MOD-SP4): 38 ML
BH CV ECHO MEAS - SV(TEICH): 73.7 ML
BH CV ECHO MEAS - TAPSE (>1.6): 1.6 CM2
BH CV ECHO MEAS - TR MAX PG: 30 MMHG
BH CV ECHO MEAS - TR MAX VEL: 273.5 CM/SEC
BH CV VAS BP RIGHT ARM: NORMAL MMHG
BH CV XLRA - RV BASE: 3.8 CM
BH CV XLRA - RV MID: 3.9 CM
BILIRUB SERPL-MCNC: 0.6 MG/DL (ref 0.2–1.2)
BILIRUB SERPL-MCNC: 0.7 MG/DL (ref 0.2–1.2)
BILIRUB SERPL-MCNC: 1.3 MG/DL (ref 0.2–1.2)
BILIRUB SERPL-MCNC: 1.4 MG/DL (ref 0.2–1.2)
BILIRUB SERPL-MCNC: 1.4 MG/DL (ref 0.2–1.2)
BILIRUB SERPL-MCNC: 2.8 MG/DL (ref 0.2–1.2)
BILIRUB SERPL-MCNC: 3.4 MG/DL (ref 0.2–1.2)
BILIRUB UR QL STRIP: ABNORMAL
BLD GP AB SCN SERPL QL: NEGATIVE
BUN BLD-MCNC: 23 MG/DL (ref 8–23)
BUN BLD-MCNC: 25 MG/DL (ref 8–23)
BUN BLD-MCNC: 26 MG/DL (ref 8–23)
BUN BLD-MCNC: 26 MG/DL (ref 8–23)
BUN BLD-MCNC: 31 MG/DL (ref 8–23)
BUN BLD-MCNC: 34 MG/DL (ref 8–23)
BUN BLD-MCNC: 43 MG/DL (ref 8–23)
BUN BLD-MCNC: 57 MG/DL (ref 8–23)
BUN BLD-MCNC: 60 MG/DL (ref 8–23)
BUN/CREAT SERPL: 13 (ref 7–25)
BUN/CREAT SERPL: 13.1 (ref 7–25)
BUN/CREAT SERPL: 13.5 (ref 7–25)
BUN/CREAT SERPL: 13.7 (ref 7–25)
BUN/CREAT SERPL: 15.2 (ref 7–25)
BUN/CREAT SERPL: 15.9 (ref 7–25)
BUN/CREAT SERPL: 17.3 (ref 7–25)
BUN/CREAT SERPL: 23.1 (ref 7–25)
BUN/CREAT SERPL: 27.6 (ref 7–25)
CA-I SERPL ISE-MCNC: 1.31 MMOL/L (ref 1.12–1.32)
CALCIUM SPEC-SCNC: 8.6 MG/DL (ref 8.2–9.6)
CALCIUM SPEC-SCNC: 8.7 MG/DL (ref 8.2–9.6)
CALCIUM SPEC-SCNC: 8.8 MG/DL (ref 8.2–9.6)
CALCIUM SPEC-SCNC: 8.8 MG/DL (ref 8.2–9.6)
CALCIUM SPEC-SCNC: 9.3 MG/DL (ref 8.2–9.6)
CALCIUM SPEC-SCNC: 9.4 MG/DL (ref 8.2–9.6)
CALCIUM SPEC-SCNC: 9.8 MG/DL (ref 8.2–9.6)
CHLORIDE SERPL-SCNC: 102 MMOL/L (ref 98–107)
CHLORIDE SERPL-SCNC: 103 MMOL/L (ref 98–107)
CHLORIDE SERPL-SCNC: 103 MMOL/L (ref 98–107)
CHLORIDE SERPL-SCNC: 104 MMOL/L (ref 98–107)
CHLORIDE SERPL-SCNC: 106 MMOL/L (ref 98–107)
CHLORIDE SERPL-SCNC: 106 MMOL/L (ref 98–107)
CHLORIDE SERPL-SCNC: 107 MMOL/L (ref 98–107)
CHLORIDE SERPL-SCNC: 107 MMOL/L (ref 98–107)
CHLORIDE SERPL-SCNC: 109 MMOL/L (ref 98–107)
CLARITY UR: ABNORMAL
CO2 SERPL-SCNC: 19 MMOL/L (ref 22–29)
CO2 SERPL-SCNC: 20 MMOL/L (ref 22–29)
CO2 SERPL-SCNC: 20 MMOL/L (ref 22–29)
CO2 SERPL-SCNC: 21 MMOL/L (ref 22–29)
CO2 SERPL-SCNC: 21 MMOL/L (ref 22–29)
CO2 SERPL-SCNC: 23 MMOL/L (ref 22–29)
CO2 SERPL-SCNC: 23.5 MMOL/L (ref 22–29)
CO2 SERPL-SCNC: 24.3 MMOL/L (ref 22–29)
CO2 SERPL-SCNC: 25 MMOL/L (ref 22–29)
COARSE GRAN CASTS URNS QL MICRO: ABNORMAL /LPF
COLOR UR: ABNORMAL
CREAT BLD-MCNC: 1.76 MG/DL (ref 0.76–1.27)
CREAT BLD-MCNC: 1.9 MG/DL (ref 0.76–1.27)
CREAT BLD-MCNC: 1.92 MG/DL (ref 0.76–1.27)
CREAT BLD-MCNC: 1.93 MG/DL (ref 0.76–1.27)
CREAT BLD-MCNC: 2.04 MG/DL (ref 0.76–1.27)
CREAT BLD-MCNC: 2.14 MG/DL (ref 0.76–1.27)
CREAT BLD-MCNC: 2.17 MG/DL (ref 0.76–1.27)
CREAT BLD-MCNC: 2.47 MG/DL (ref 0.76–1.27)
CREAT BLD-MCNC: 2.48 MG/DL (ref 0.76–1.27)
D-LACTATE SERPL-SCNC: 1.3 MMOL/L (ref 0.5–2)
D-LACTATE SERPL-SCNC: 2.9 MMOL/L (ref 0.5–2)
D-LACTATE SERPL-SCNC: 3 MMOL/L (ref 0.5–2)
DEPRECATED RDW RBC AUTO: 48 FL (ref 37–54)
DEPRECATED RDW RBC AUTO: 51.8 FL (ref 37–54)
DEPRECATED RDW RBC AUTO: 53.6 FL (ref 37–54)
DEPRECATED RDW RBC AUTO: 54.5 FL (ref 37–54)
DEPRECATED RDW RBC AUTO: 55.9 FL (ref 37–54)
DEPRECATED RDW RBC AUTO: 56.5 FL (ref 37–54)
EOSINOPHIL # BLD AUTO: 0 10*3/MM3 (ref 0–0.4)
EOSINOPHIL # BLD AUTO: 0.05 10*3/MM3 (ref 0–0.4)
EOSINOPHIL # BLD AUTO: 0.36 10*3/MM3 (ref 0–0.4)
EOSINOPHIL # BLD MANUAL: 0 10*3/MM3 (ref 0–0.4)
EOSINOPHIL # BLD MANUAL: 0.15 10*3/MM3 (ref 0–0.4)
EOSINOPHIL NFR BLD AUTO: 0 % (ref 0.3–6.2)
EOSINOPHIL NFR BLD AUTO: 0.6 % (ref 0.3–6.2)
EOSINOPHIL NFR BLD AUTO: 4.3 % (ref 0.3–6.2)
EOSINOPHIL NFR BLD MANUAL: 0 % (ref 0.3–6.2)
EOSINOPHIL NFR BLD MANUAL: 1 % (ref 0.3–6.2)
ERYTHROCYTE [DISTWIDTH] IN BLOOD BY AUTOMATED COUNT: 13 % (ref 12.3–15.4)
ERYTHROCYTE [DISTWIDTH] IN BLOOD BY AUTOMATED COUNT: 13.6 % (ref 12.3–15.4)
ERYTHROCYTE [DISTWIDTH] IN BLOOD BY AUTOMATED COUNT: 14 % (ref 12.3–15.4)
ERYTHROCYTE [DISTWIDTH] IN BLOOD BY AUTOMATED COUNT: 14.6 % (ref 12.3–15.4)
ETHANOL BLD-MCNC: <10 MG/DL (ref 0–10)
FERRITIN SERPL-MCNC: 110 NG/ML (ref 30–400)
FINE GRAN CASTS URNS QL MICRO: ABNORMAL /LPF
GFR SERPL CREATININE-BSD FRML MDRD: 25 ML/MIN/1.73
GFR SERPL CREATININE-BSD FRML MDRD: 25 ML/MIN/1.73
GFR SERPL CREATININE-BSD FRML MDRD: 29 ML/MIN/1.73
GFR SERPL CREATININE-BSD FRML MDRD: 29 ML/MIN/1.73
GFR SERPL CREATININE-BSD FRML MDRD: 31 ML/MIN/1.73
GFR SERPL CREATININE-BSD FRML MDRD: 33 ML/MIN/1.73
GFR SERPL CREATININE-BSD FRML MDRD: 37 ML/MIN/1.73
GLOBULIN UR ELPH-MCNC: 2.8 GM/DL
GLOBULIN UR ELPH-MCNC: 2.8 GM/DL
GLOBULIN UR ELPH-MCNC: 3 GM/DL
GLOBULIN UR ELPH-MCNC: 3.1 GM/DL
GLOBULIN UR ELPH-MCNC: 3.1 GM/DL
GLOBULIN UR ELPH-MCNC: 3.4 GM/DL
GLOBULIN UR ELPH-MCNC: 3.4 GM/DL
GLUCOSE BLD-MCNC: 115 MG/DL (ref 65–99)
GLUCOSE BLD-MCNC: 116 MG/DL (ref 65–99)
GLUCOSE BLD-MCNC: 125 MG/DL (ref 65–99)
GLUCOSE BLD-MCNC: 129 MG/DL (ref 65–99)
GLUCOSE BLD-MCNC: 148 MG/DL (ref 65–99)
GLUCOSE BLD-MCNC: 162 MG/DL (ref 65–99)
GLUCOSE BLD-MCNC: 167 MG/DL (ref 65–99)
GLUCOSE BLD-MCNC: 176 MG/DL (ref 65–99)
GLUCOSE BLD-MCNC: 179 MG/DL (ref 65–99)
GLUCOSE BLDC GLUCOMTR-MCNC: 144 MG/DL (ref 70–130)
GLUCOSE BLDC GLUCOMTR-MCNC: 145 MG/DL (ref 70–130)
GLUCOSE BLDC GLUCOMTR-MCNC: 150 MG/DL (ref 70–130)
GLUCOSE UR STRIP-MCNC: NEGATIVE MG/DL
GRAM STN SPEC: ABNORMAL
GRAM STN SPEC: ABNORMAL
HBA1C MFR BLD: 5.9 %
HBA1C MFR BLD: 6.4 % (ref 4.8–5.6)
HCT VFR BLD AUTO: 34.5 % (ref 37.5–51)
HCT VFR BLD AUTO: 34.7 % (ref 37.5–51)
HCT VFR BLD AUTO: 35 % (ref 37.5–51)
HCT VFR BLD AUTO: 37.4 % (ref 37.5–51)
HCT VFR BLD AUTO: 38.6 % (ref 37.5–51)
HCT VFR BLD AUTO: 40.2 % (ref 37.5–51)
HGB BLD-MCNC: 11 G/DL (ref 13–17.7)
HGB BLD-MCNC: 11.1 G/DL (ref 13–17.7)
HGB BLD-MCNC: 11.2 G/DL (ref 13–17.7)
HGB BLD-MCNC: 12 G/DL (ref 13–17.7)
HGB BLD-MCNC: 12.6 G/DL (ref 13–17.7)
HGB BLD-MCNC: 13 G/DL (ref 13–17.7)
HGB UR QL STRIP.AUTO: NEGATIVE
HOLD SPECIMEN: NORMAL
HYALINE CASTS UR QL AUTO: ABNORMAL /LPF
IMM GRANULOCYTES # BLD AUTO: 0.02 10*3/MM3 (ref 0–0.05)
IMM GRANULOCYTES # BLD AUTO: 0.06 10*3/MM3 (ref 0–0.05)
IMM GRANULOCYTES # BLD AUTO: 0.09 10*3/MM3 (ref 0–0.05)
IMM GRANULOCYTES NFR BLD AUTO: 0.2 % (ref 0–0.5)
IMM GRANULOCYTES NFR BLD AUTO: 0.7 % (ref 0–0.5)
IMM GRANULOCYTES NFR BLD AUTO: 0.7 % (ref 0–0.5)
IRON 24H UR-MRATE: 75 MCG/DL (ref 59–158)
IRON SATN MFR SERPL: 25 % (ref 20–50)
KETONES UR QL STRIP: ABNORMAL
LEFT ATRIUM VOLUME INDEX: 55.2 ML/M^2
LEFT ATRIUM VOLUME: 108 ML
LEUKOCYTE ESTERASE UR QL STRIP.AUTO: ABNORMAL
LIPASE SERPL-CCNC: 18 U/L (ref 13–60)
LIPASE SERPL-CCNC: 240 U/L (ref 13–60)
LIPASE SERPL-CCNC: 9 U/L (ref 13–60)
LIPASE SERPL-CCNC: >3000 U/L (ref 13–60)
LV EF 2D ECHO EST: 30 %
LYMPHOCYTES # BLD AUTO: 0.26 10*3/MM3 (ref 0.7–3.1)
LYMPHOCYTES # BLD AUTO: 0.73 10*3/MM3 (ref 0.7–3.1)
LYMPHOCYTES # BLD AUTO: 2.02 10*3/MM3 (ref 0.7–3.1)
LYMPHOCYTES # BLD MANUAL: 0.53 10*3/MM3 (ref 0.7–3.1)
LYMPHOCYTES # BLD MANUAL: 0.93 10*3/MM3 (ref 0.7–3.1)
LYMPHOCYTES NFR BLD AUTO: 1.9 % (ref 19.6–45.3)
LYMPHOCYTES NFR BLD AUTO: 24.3 % (ref 19.6–45.3)
LYMPHOCYTES NFR BLD AUTO: 8.4 % (ref 19.6–45.3)
LYMPHOCYTES NFR BLD MANUAL: 3 % (ref 5–12)
LYMPHOCYTES NFR BLD MANUAL: 4 % (ref 19.6–45.3)
LYMPHOCYTES NFR BLD MANUAL: 4 % (ref 5–12)
LYMPHOCYTES NFR BLD MANUAL: 6 % (ref 19.6–45.3)
MACROCYTES BLD QL SMEAR: ABNORMAL
MACROCYTES BLD QL SMEAR: ABNORMAL
MAGNESIUM SERPL-MCNC: 1.8 MG/DL (ref 1.6–2.4)
MAGNESIUM SERPL-MCNC: 1.9 MG/DL (ref 1.6–2.4)
MAXIMAL PREDICTED HEART RATE: 130 BPM
MCH RBC QN AUTO: 32.8 PG (ref 26.6–33)
MCH RBC QN AUTO: 32.9 PG (ref 26.6–33)
MCH RBC QN AUTO: 32.9 PG (ref 26.6–33)
MCH RBC QN AUTO: 33 PG (ref 26.6–33)
MCH RBC QN AUTO: 33.3 PG (ref 26.6–33)
MCH RBC QN AUTO: 33.3 PG (ref 26.6–33)
MCHC RBC AUTO-ENTMCNC: 31.7 G/DL (ref 31.5–35.7)
MCHC RBC AUTO-ENTMCNC: 31.9 G/DL (ref 31.5–35.7)
MCHC RBC AUTO-ENTMCNC: 32.1 G/DL (ref 31.5–35.7)
MCHC RBC AUTO-ENTMCNC: 32.3 G/DL (ref 31.5–35.7)
MCHC RBC AUTO-ENTMCNC: 32.3 G/DL (ref 31.5–35.7)
MCHC RBC AUTO-ENTMCNC: 32.6 G/DL (ref 31.5–35.7)
MCV RBC AUTO: 100.8 FL (ref 79–97)
MCV RBC AUTO: 101.8 FL (ref 79–97)
MCV RBC AUTO: 102 FL (ref 79–97)
MCV RBC AUTO: 103.9 FL (ref 79–97)
MCV RBC AUTO: 103.9 FL (ref 79–97)
MCV RBC AUTO: 104.5 FL (ref 79–97)
METAMYELOCYTES NFR BLD MANUAL: 3 % (ref 0–0)
MONOCYTES # BLD AUTO: 0.38 10*3/MM3 (ref 0.1–0.9)
MONOCYTES # BLD AUTO: 0.46 10*3/MM3 (ref 0.1–0.9)
MONOCYTES # BLD AUTO: 0.51 10*3/MM3 (ref 0.1–0.9)
MONOCYTES # BLD AUTO: 0.53 10*3/MM3 (ref 0.1–0.9)
MONOCYTES # BLD AUTO: 0.75 10*3/MM3 (ref 0.1–0.9)
MONOCYTES NFR BLD AUTO: 4.6 % (ref 5–12)
MONOCYTES NFR BLD AUTO: 5.5 % (ref 5–12)
MONOCYTES NFR BLD AUTO: 5.9 % (ref 5–12)
NEUTROPHILS # BLD AUTO: 12.23 10*3/MM3 (ref 1.7–7)
NEUTROPHILS # BLD AUTO: 12.55 10*3/MM3 (ref 1.7–7)
NEUTROPHILS # BLD AUTO: 13.48 10*3/MM3 (ref 1.7–7)
NEUTROPHILS # BLD AUTO: 5.49 10*3/MM3 (ref 1.7–7)
NEUTROPHILS # BLD AUTO: 7.33 10*3/MM3 (ref 1.7–7)
NEUTROPHILS NFR BLD AUTO: 66.1 % (ref 42.7–76)
NEUTROPHILS NFR BLD AUTO: 84.2 % (ref 42.7–76)
NEUTROPHILS NFR BLD AUTO: 91.8 % (ref 42.7–76)
NEUTROPHILS NFR BLD MANUAL: 59 % (ref 42.7–76)
NEUTROPHILS NFR BLD MANUAL: 80 % (ref 42.7–76)
NEUTS BAND NFR BLD MANUAL: 12 % (ref 0–5)
NEUTS BAND NFR BLD MANUAL: 28 % (ref 0–5)
NITRITE UR QL STRIP: NEGATIVE
NRBC BLD AUTO-RTO: 0 /100 WBC (ref 0–0.2)
PH UR STRIP.AUTO: 5.5 [PH] (ref 5–8)
PLAT MORPH BLD: NORMAL
PLAT MORPH BLD: NORMAL
PLATELET # BLD AUTO: 106 10*3/MM3 (ref 140–450)
PLATELET # BLD AUTO: 113 10*3/MM3 (ref 140–450)
PLATELET # BLD AUTO: 142 10*3/MM3 (ref 140–450)
PLATELET # BLD AUTO: 175 10*3/MM3 (ref 140–450)
PLATELET # BLD AUTO: 185 10*3/MM3 (ref 140–450)
PLATELET # BLD AUTO: 99 10*3/MM3 (ref 140–450)
PMV BLD AUTO: 11.4 FL (ref 6–12)
PMV BLD AUTO: 11.6 FL (ref 6–12)
PMV BLD AUTO: 12 FL (ref 6–12)
PMV BLD AUTO: 12.1 FL (ref 6–12)
PMV BLD AUTO: 12.4 FL (ref 6–12)
PMV BLD AUTO: 12.5 FL (ref 6–12)
POTASSIUM BLD-SCNC: 3.8 MMOL/L (ref 3.5–5.2)
POTASSIUM BLD-SCNC: 3.8 MMOL/L (ref 3.5–5.2)
POTASSIUM BLD-SCNC: 3.9 MMOL/L (ref 3.5–5.2)
POTASSIUM BLD-SCNC: 4.2 MMOL/L (ref 3.5–5.2)
POTASSIUM BLD-SCNC: 4.3 MMOL/L (ref 3.5–5.2)
POTASSIUM BLD-SCNC: 4.4 MMOL/L (ref 3.5–5.2)
POTASSIUM BLD-SCNC: 4.5 MMOL/L (ref 3.5–5.2)
POTASSIUM BLD-SCNC: 4.5 MMOL/L (ref 3.5–5.2)
POTASSIUM BLD-SCNC: 5.3 MMOL/L (ref 3.5–5.2)
PROCALCITONIN SERPL-MCNC: 17.44 NG/ML (ref 0.1–0.25)
PROCALCITONIN SERPL-MCNC: 21.53 NG/ML (ref 0.1–0.25)
PROT SERPL-MCNC: 5.9 G/DL (ref 6–8.5)
PROT SERPL-MCNC: 5.9 G/DL (ref 6–8.5)
PROT SERPL-MCNC: 6.1 G/DL (ref 6–8.5)
PROT SERPL-MCNC: 6.4 G/DL (ref 6–8.5)
PROT SERPL-MCNC: 7.2 G/DL (ref 6–8.5)
PROT SERPL-MCNC: 7.3 G/DL (ref 6–8.5)
PROT SERPL-MCNC: 8 G/DL (ref 6–8.5)
PROT UR QL STRIP: ABNORMAL
RBC # BLD AUTO: 3.3 10*6/MM3 (ref 4.14–5.8)
RBC # BLD AUTO: 3.37 10*6/MM3 (ref 4.14–5.8)
RBC # BLD AUTO: 3.41 10*6/MM3 (ref 4.14–5.8)
RBC # BLD AUTO: 3.6 10*6/MM3 (ref 4.14–5.8)
RBC # BLD AUTO: 3.83 10*6/MM3 (ref 4.14–5.8)
RBC # BLD AUTO: 3.94 10*6/MM3 (ref 4.14–5.8)
RBC # UR: ABNORMAL /HPF
REF LAB TEST METHOD: ABNORMAL
RH BLD: POSITIVE
RH BLD: POSITIVE
SODIUM BLD-SCNC: 139 MMOL/L (ref 136–145)
SODIUM BLD-SCNC: 140 MMOL/L (ref 136–145)
SODIUM BLD-SCNC: 140 MMOL/L (ref 136–145)
SODIUM BLD-SCNC: 142 MMOL/L (ref 136–145)
SP GR UR STRIP: 1.02 (ref 1–1.03)
SQUAMOUS #/AREA URNS HPF: ABNORMAL /HPF
STRESS TARGET HR: 111 BPM
T&S EXPIRATION DATE: NORMAL
TIBC SERPL-MCNC: 301 MCG/DL (ref 298–536)
TRANS CELLS #/AREA URNS HPF: ABNORMAL /HPF
TRANSFERRIN SERPL-MCNC: 202 MG/DL (ref 200–360)
TROPONIN T SERPL-MCNC: 0.01 NG/ML (ref 0–0.03)
TSH SERPL DL<=0.05 MIU/L-ACNC: 1.5 MIU/ML (ref 0.27–4.2)
UROBILINOGEN UR QL STRIP: ABNORMAL
VIT B12 BLD-MCNC: 492 PG/ML (ref 211–946)
WBC MORPH BLD: NORMAL
WBC MORPH BLD: NORMAL
WBC NRBC COR # BLD: 13.29 10*3/MM3 (ref 3.4–10.8)
WBC NRBC COR # BLD: 13.67 10*3/MM3 (ref 3.4–10.8)
WBC NRBC COR # BLD: 15.49 10*3/MM3 (ref 3.4–10.8)
WBC NRBC COR # BLD: 4.89 10*3/MM3 (ref 3.4–10.8)
WBC NRBC COR # BLD: 8.31 10*3/MM3 (ref 3.4–10.8)
WBC NRBC COR # BLD: 8.7 10*3/MM3 (ref 3.4–10.8)
WBC UR QL AUTO: ABNORMAL /HPF
WHOLE BLOOD HOLD SPECIMEN: NORMAL
WHOLE BLOOD HOLD SPECIMEN: NORMAL

## 2019-01-01 PROCEDURE — 25010000002 CEFTRIAXONE PER 250 MG: Performed by: INTERNAL MEDICINE

## 2019-01-01 PROCEDURE — 85025 COMPLETE CBC W/AUTO DIFF WBC: CPT | Performed by: INTERNAL MEDICINE

## 2019-01-01 PROCEDURE — 97116 GAIT TRAINING THERAPY: CPT | Performed by: PHYSICAL THERAPIST

## 2019-01-01 PROCEDURE — 83690 ASSAY OF LIPASE: CPT | Performed by: INTERNAL MEDICINE

## 2019-01-01 PROCEDURE — 97110 THERAPEUTIC EXERCISES: CPT | Performed by: PHYSICAL THERAPIST

## 2019-01-01 PROCEDURE — 97162 PT EVAL MOD COMPLEX 30 MIN: CPT | Performed by: PHYSICAL THERAPIST

## 2019-01-01 PROCEDURE — 83605 ASSAY OF LACTIC ACID: CPT | Performed by: INTERNAL MEDICINE

## 2019-01-01 PROCEDURE — 25010000002 CEFEPIME PER 500 MG: Performed by: INTERNAL MEDICINE

## 2019-01-01 PROCEDURE — 84145 PROCALCITONIN (PCT): CPT | Performed by: INTERNAL MEDICINE

## 2019-01-01 PROCEDURE — 25010000002 PROPOFOL 10 MG/ML EMULSION: Performed by: NURSE ANESTHETIST, CERTIFIED REGISTERED

## 2019-01-01 PROCEDURE — 85025 COMPLETE CBC W/AUTO DIFF WBC: CPT | Performed by: NURSE PRACTITIONER

## 2019-01-01 PROCEDURE — 43264 ERCP REMOVE DUCT CALCULI: CPT | Performed by: INTERNAL MEDICINE

## 2019-01-01 PROCEDURE — 97140 MANUAL THERAPY 1/> REGIONS: CPT | Performed by: PHYSICAL THERAPIST

## 2019-01-01 PROCEDURE — 80048 BASIC METABOLIC PNL TOTAL CA: CPT | Performed by: NURSE PRACTITIONER

## 2019-01-01 PROCEDURE — 87040 BLOOD CULTURE FOR BACTERIA: CPT | Performed by: INTERNAL MEDICINE

## 2019-01-01 PROCEDURE — 83735 ASSAY OF MAGNESIUM: CPT | Performed by: INTERNAL MEDICINE

## 2019-01-01 PROCEDURE — 25010000002 FUROSEMIDE PER 20 MG: Performed by: INTERNAL MEDICINE

## 2019-01-01 PROCEDURE — 25010000002 IOPAMIDOL 61 % SOLUTION: Performed by: INTERNAL MEDICINE

## 2019-01-01 PROCEDURE — 25010000002 ONDANSETRON PER 1 MG: Performed by: EMERGENCY MEDICINE

## 2019-01-01 PROCEDURE — 99232 SBSQ HOSP IP/OBS MODERATE 35: CPT | Performed by: INTERNAL MEDICINE

## 2019-01-01 PROCEDURE — 83036 HEMOGLOBIN GLYCOSYLATED A1C: CPT | Performed by: INTERNAL MEDICINE

## 2019-01-01 PROCEDURE — 93010 ELECTROCARDIOGRAM REPORT: CPT | Performed by: INTERNAL MEDICINE

## 2019-01-01 PROCEDURE — 80053 COMPREHEN METABOLIC PANEL: CPT

## 2019-01-01 PROCEDURE — 82607 VITAMIN B-12: CPT | Performed by: NURSE PRACTITIONER

## 2019-01-01 PROCEDURE — 80053 COMPREHEN METABOLIC PANEL: CPT | Performed by: INTERNAL MEDICINE

## 2019-01-01 PROCEDURE — 25010000002 MORPHINE PER 10 MG: Performed by: NURSE PRACTITIONER

## 2019-01-01 PROCEDURE — 74176 CT ABD & PELVIS W/O CONTRAST: CPT

## 2019-01-01 PROCEDURE — 84443 ASSAY THYROID STIM HORMONE: CPT | Performed by: NURSE PRACTITIONER

## 2019-01-01 PROCEDURE — 92610 EVALUATE SWALLOWING FUNCTION: CPT

## 2019-01-01 PROCEDURE — 99233 SBSQ HOSP IP/OBS HIGH 50: CPT | Performed by: INTERNAL MEDICINE

## 2019-01-01 PROCEDURE — 81001 URINALYSIS AUTO W/SCOPE: CPT | Performed by: EMERGENCY MEDICINE

## 2019-01-01 PROCEDURE — 85007 BL SMEAR W/DIFF WBC COUNT: CPT | Performed by: NURSE PRACTITIONER

## 2019-01-01 PROCEDURE — 82728 ASSAY OF FERRITIN: CPT | Performed by: NURSE PRACTITIONER

## 2019-01-01 PROCEDURE — 80307 DRUG TEST PRSMV CHEM ANLYZR: CPT | Performed by: INTERNAL MEDICINE

## 2019-01-01 PROCEDURE — 0FC98ZZ EXTIRPATION OF MATTER FROM COMMON BILE DUCT, VIA NATURAL OR ARTIFICIAL OPENING ENDOSCOPIC: ICD-10-PCS | Performed by: INTERNAL MEDICINE

## 2019-01-01 PROCEDURE — 87186 SC STD MICRODIL/AGAR DIL: CPT | Performed by: INTERNAL MEDICINE

## 2019-01-01 PROCEDURE — 76705 ECHO EXAM OF ABDOMEN: CPT

## 2019-01-01 PROCEDURE — 99214 OFFICE O/P EST MOD 30 MIN: CPT | Performed by: PSYCHIATRY & NEUROLOGY

## 2019-01-01 PROCEDURE — 93306 TTE W/DOPPLER COMPLETE: CPT

## 2019-01-01 PROCEDURE — 71045 X-RAY EXAM CHEST 1 VIEW: CPT

## 2019-01-01 PROCEDURE — 85027 COMPLETE CBC AUTOMATED: CPT | Performed by: NURSE PRACTITIONER

## 2019-01-01 PROCEDURE — 99222 1ST HOSP IP/OBS MODERATE 55: CPT | Performed by: INTERNAL MEDICINE

## 2019-01-01 PROCEDURE — 83540 ASSAY OF IRON: CPT | Performed by: NURSE PRACTITIONER

## 2019-01-01 PROCEDURE — 97112 NEUROMUSCULAR REEDUCATION: CPT | Performed by: PHYSICAL THERAPIST

## 2019-01-01 PROCEDURE — 82330 ASSAY OF CALCIUM: CPT | Performed by: INTERNAL MEDICINE

## 2019-01-01 PROCEDURE — 94799 UNLISTED PULMONARY SVC/PX: CPT

## 2019-01-01 PROCEDURE — 74181 MRI ABDOMEN W/O CONTRAST: CPT

## 2019-01-01 PROCEDURE — 84466 ASSAY OF TRANSFERRIN: CPT | Performed by: NURSE PRACTITIONER

## 2019-01-01 PROCEDURE — 93005 ELECTROCARDIOGRAM TRACING: CPT

## 2019-01-01 PROCEDURE — 99231 SBSQ HOSP IP/OBS SF/LOW 25: CPT | Performed by: PHYSICIAN ASSISTANT

## 2019-01-01 PROCEDURE — C1769 GUIDE WIRE: HCPCS | Performed by: INTERNAL MEDICINE

## 2019-01-01 PROCEDURE — 99214 OFFICE O/P EST MOD 30 MIN: CPT | Performed by: NURSE PRACTITIONER

## 2019-01-01 PROCEDURE — 84484 ASSAY OF TROPONIN QUANT: CPT | Performed by: INTERNAL MEDICINE

## 2019-01-01 PROCEDURE — 93005 ELECTROCARDIOGRAM TRACING: CPT | Performed by: EMERGENCY MEDICINE

## 2019-01-01 PROCEDURE — 25010000002 HYDROMORPHONE PER 4 MG: Performed by: NURSE PRACTITIONER

## 2019-01-01 PROCEDURE — 74330 X-RAY BILE/PANC ENDOSCOPY: CPT

## 2019-01-01 PROCEDURE — 99285 EMERGENCY DEPT VISIT HI MDM: CPT

## 2019-01-01 PROCEDURE — 86901 BLOOD TYPING SEROLOGIC RH(D): CPT

## 2019-01-01 PROCEDURE — 83036 HEMOGLOBIN GLYCOSYLATED A1C: CPT | Performed by: NURSE PRACTITIONER

## 2019-01-01 PROCEDURE — 93306 TTE W/DOPPLER COMPLETE: CPT | Performed by: INTERNAL MEDICINE

## 2019-01-01 PROCEDURE — 87150 DNA/RNA AMPLIFIED PROBE: CPT | Performed by: INTERNAL MEDICINE

## 2019-01-01 PROCEDURE — 86850 RBC ANTIBODY SCREEN: CPT | Performed by: NURSE PRACTITIONER

## 2019-01-01 PROCEDURE — 85007 BL SMEAR W/DIFF WBC COUNT: CPT | Performed by: INTERNAL MEDICINE

## 2019-01-01 PROCEDURE — 86901 BLOOD TYPING SEROLOGIC RH(D): CPT | Performed by: NURSE PRACTITIONER

## 2019-01-01 PROCEDURE — 25010000002 MORPHINE PER 10 MG: Performed by: EMERGENCY MEDICINE

## 2019-01-01 PROCEDURE — 80053 COMPREHEN METABOLIC PANEL: CPT | Performed by: NURSE PRACTITIONER

## 2019-01-01 PROCEDURE — 99213 OFFICE O/P EST LOW 20 MIN: CPT | Performed by: ORTHOPAEDIC SURGERY

## 2019-01-01 PROCEDURE — 73560 X-RAY EXAM OF KNEE 1 OR 2: CPT

## 2019-01-01 PROCEDURE — 25010000002 VANCOMYCIN 10 G RECONSTITUTED SOLUTION

## 2019-01-01 PROCEDURE — 99204 OFFICE O/P NEW MOD 45 MIN: CPT | Performed by: ORTHOPAEDIC SURGERY

## 2019-01-01 PROCEDURE — 25010000002 MORPHINE PER 10 MG: Performed by: INTERNAL MEDICINE

## 2019-01-01 PROCEDURE — 86900 BLOOD TYPING SEROLOGIC ABO: CPT | Performed by: NURSE PRACTITIONER

## 2019-01-01 PROCEDURE — 20610 DRAIN/INJ JOINT/BURSA W/O US: CPT | Performed by: ORTHOPAEDIC SURGERY

## 2019-01-01 PROCEDURE — 82962 GLUCOSE BLOOD TEST: CPT

## 2019-01-01 PROCEDURE — 99223 1ST HOSP IP/OBS HIGH 75: CPT | Performed by: INTERNAL MEDICINE

## 2019-01-01 PROCEDURE — 43262 ENDO CHOLANGIOPANCREATOGRAPH: CPT | Performed by: INTERNAL MEDICINE

## 2019-01-01 PROCEDURE — 86900 BLOOD TYPING SEROLOGIC ABO: CPT

## 2019-01-01 PROCEDURE — 93005 ELECTROCARDIOGRAM TRACING: CPT | Performed by: INTERNAL MEDICINE

## 2019-01-01 PROCEDURE — 83690 ASSAY OF LIPASE: CPT

## 2019-01-01 PROCEDURE — 85025 COMPLETE CBC W/AUTO DIFF WBC: CPT

## 2019-01-01 RX ORDER — LIDOCAINE HYDROCHLORIDE 10 MG/ML
0.5 INJECTION, SOLUTION EPIDURAL; INFILTRATION; INTRACAUDAL; PERINEURAL ONCE AS NEEDED
Status: DISCONTINUED | OUTPATIENT
Start: 2019-01-01 | End: 2019-01-01 | Stop reason: HOSPADM

## 2019-01-01 RX ORDER — VITAMIN E 268 MG
400 CAPSULE ORAL DAILY
Status: DISCONTINUED | OUTPATIENT
Start: 2019-01-01 | End: 2019-01-01

## 2019-01-01 RX ORDER — HYDROMORPHONE HYDROCHLORIDE 1 MG/ML
0.5 INJECTION, SOLUTION INTRAMUSCULAR; INTRAVENOUS; SUBCUTANEOUS ONCE
Status: COMPLETED | OUTPATIENT
Start: 2019-01-01 | End: 2019-01-01

## 2019-01-01 RX ORDER — DEXTROSE, SODIUM CHLORIDE, AND POTASSIUM CHLORIDE 5; .45; .15 G/100ML; G/100ML; G/100ML
50 INJECTION INTRAVENOUS CONTINUOUS
Status: DISCONTINUED | OUTPATIENT
Start: 2019-01-01 | End: 2019-01-01

## 2019-01-01 RX ORDER — LEVOTHYROXINE SODIUM 0.07 MG/1
75 TABLET ORAL DAILY
Qty: 30 TABLET | Refills: 5 | Status: SHIPPED | OUTPATIENT
Start: 2019-01-01 | End: 2019-01-01 | Stop reason: SDUPTHER

## 2019-01-01 RX ORDER — FENTANYL CITRATE 50 UG/ML
50 INJECTION, SOLUTION INTRAMUSCULAR; INTRAVENOUS
Status: DISCONTINUED | OUTPATIENT
Start: 2019-01-01 | End: 2020-01-01

## 2019-01-01 RX ORDER — DUTASTERIDE 0.5 MG/1
CAPSULE, LIQUID FILLED ORAL
Qty: 30 CAPSULE | Refills: 0 | Status: SHIPPED | OUTPATIENT
Start: 2019-01-01 | End: 2019-01-01 | Stop reason: SDUPTHER

## 2019-01-01 RX ORDER — SODIUM CHLORIDE 9 MG/ML
125 INJECTION, SOLUTION INTRAVENOUS CONTINUOUS
Status: DISCONTINUED | OUTPATIENT
Start: 2019-01-01 | End: 2019-01-01

## 2019-01-01 RX ORDER — SODIUM CHLORIDE, SODIUM LACTATE, POTASSIUM CHLORIDE, CALCIUM CHLORIDE 600; 310; 30; 20 MG/100ML; MG/100ML; MG/100ML; MG/100ML
100 INJECTION, SOLUTION INTRAVENOUS CONTINUOUS
Status: ACTIVE | OUTPATIENT
Start: 2019-01-01 | End: 2019-01-01

## 2019-01-01 RX ORDER — LEVOTHYROXINE SODIUM 20 UG/ML
50 INJECTION, SOLUTION INTRAVENOUS
Status: DISCONTINUED | OUTPATIENT
Start: 2019-01-01 | End: 2019-01-01

## 2019-01-01 RX ORDER — MIRTAZAPINE 30 MG/1
TABLET, FILM COATED ORAL
Qty: 90 TABLET | Refills: 0 | Status: SHIPPED | OUTPATIENT
Start: 2019-01-01 | End: 2020-01-01 | Stop reason: HOSPADM

## 2019-01-01 RX ORDER — ASPIRIN 81 MG/1
81 TABLET, CHEWABLE ORAL DAILY
Status: DISCONTINUED | OUTPATIENT
Start: 2019-01-01 | End: 2020-01-01 | Stop reason: HOSPADM

## 2019-01-01 RX ORDER — PRAVASTATIN SODIUM 20 MG
TABLET ORAL
Qty: 90 TABLET | Refills: 0 | OUTPATIENT
Start: 2019-01-01

## 2019-01-01 RX ORDER — PRAVASTATIN SODIUM 20 MG
TABLET ORAL
Qty: 90 TABLET | Refills: 0 | Status: SHIPPED | OUTPATIENT
Start: 2019-01-01

## 2019-01-01 RX ORDER — SODIUM CHLORIDE 9 MG/ML
100 INJECTION, SOLUTION INTRAVENOUS CONTINUOUS
Status: ACTIVE | OUTPATIENT
Start: 2019-01-01 | End: 2019-01-01

## 2019-01-01 RX ORDER — PANTOPRAZOLE SODIUM 40 MG/1
40 TABLET, DELAYED RELEASE ORAL DAILY
Status: DISCONTINUED | OUTPATIENT
Start: 2019-01-01 | End: 2019-01-01

## 2019-01-01 RX ORDER — LEVOTHYROXINE SODIUM 0.07 MG/1
75 TABLET ORAL
Status: DISCONTINUED | OUTPATIENT
Start: 2019-01-01 | End: 2019-01-01

## 2019-01-01 RX ORDER — MORPHINE SULFATE 2 MG/ML
2 INJECTION, SOLUTION INTRAMUSCULAR; INTRAVENOUS
Status: DISCONTINUED | OUTPATIENT
Start: 2019-01-01 | End: 2020-01-01 | Stop reason: HOSPADM

## 2019-01-01 RX ORDER — SODIUM CHLORIDE 0.9 % (FLUSH) 0.9 %
10 SYRINGE (ML) INJECTION EVERY 12 HOURS SCHEDULED
Status: DISCONTINUED | OUTPATIENT
Start: 2019-01-01 | End: 2019-01-01 | Stop reason: HOSPADM

## 2019-01-01 RX ORDER — AMIODARONE HYDROCHLORIDE 200 MG/1
200 TABLET ORAL DAILY
Status: DISCONTINUED | OUTPATIENT
Start: 2019-01-01 | End: 2020-01-01 | Stop reason: HOSPADM

## 2019-01-01 RX ORDER — SODIUM CHLORIDE 0.9 % (FLUSH) 0.9 %
10 SYRINGE (ML) INJECTION AS NEEDED
Status: DISCONTINUED | OUTPATIENT
Start: 2019-01-01 | End: 2020-01-01 | Stop reason: HOSPADM

## 2019-01-01 RX ORDER — TRIAMCINOLONE ACETONIDE 40 MG/ML
80 INJECTION, SUSPENSION INTRA-ARTICULAR; INTRAMUSCULAR
Status: COMPLETED | OUTPATIENT
Start: 2019-01-01 | End: 2019-01-01

## 2019-01-01 RX ORDER — SODIUM CHLORIDE 9 MG/ML
100 INJECTION, SOLUTION INTRAVENOUS CONTINUOUS
Status: DISCONTINUED | OUTPATIENT
Start: 2019-01-01 | End: 2019-01-01

## 2019-01-01 RX ORDER — LEVOTHYROXINE SODIUM 0.07 MG/1
TABLET ORAL
Qty: 90 TABLET | Refills: 0 | Status: SHIPPED | OUTPATIENT
Start: 2019-01-01 | End: 2020-01-01 | Stop reason: SDUPTHER

## 2019-01-01 RX ORDER — ATROPA BELLADONNA AND OPIUM 16.2; 3 MG/1; MG/1
30 SUPPOSITORY RECTAL ONCE
Status: COMPLETED | OUTPATIENT
Start: 2019-01-01 | End: 2019-01-01

## 2019-01-01 RX ORDER — OMEPRAZOLE 20 MG/1
20 CAPSULE, DELAYED RELEASE ORAL DAILY
Qty: 30 CAPSULE | Refills: 3 | Status: SHIPPED | OUTPATIENT
Start: 2019-01-01 | End: 2019-01-01 | Stop reason: SDUPTHER

## 2019-01-01 RX ORDER — LEVOTHYROXINE SODIUM 0.07 MG/1
75 TABLET ORAL
Status: DISCONTINUED | OUTPATIENT
Start: 2019-01-01 | End: 2020-01-01 | Stop reason: HOSPADM

## 2019-01-01 RX ORDER — SODIUM CHLORIDE 0.9 % (FLUSH) 0.9 %
10 SYRINGE (ML) INJECTION AS NEEDED
Status: DISCONTINUED | OUTPATIENT
Start: 2019-01-01 | End: 2019-01-01 | Stop reason: HOSPADM

## 2019-01-01 RX ORDER — ONDANSETRON 2 MG/ML
4 INJECTION INTRAMUSCULAR; INTRAVENOUS ONCE
Status: COMPLETED | OUTPATIENT
Start: 2019-01-01 | End: 2019-01-01

## 2019-01-01 RX ORDER — FINASTERIDE 5 MG/1
5 TABLET, FILM COATED ORAL DAILY
Status: DISCONTINUED | OUTPATIENT
Start: 2019-01-01 | End: 2020-01-01 | Stop reason: HOSPADM

## 2019-01-01 RX ORDER — MIRTAZAPINE 15 MG/1
15 TABLET, FILM COATED ORAL ONCE
Status: COMPLETED | OUTPATIENT
Start: 2019-01-01 | End: 2019-01-01

## 2019-01-01 RX ORDER — PRAVASTATIN SODIUM 20 MG
20 TABLET ORAL NIGHTLY
Status: DISCONTINUED | OUTPATIENT
Start: 2019-01-01 | End: 2019-01-01

## 2019-01-01 RX ORDER — PROPOFOL 10 MG/ML
VIAL (ML) INTRAVENOUS AS NEEDED
Status: DISCONTINUED | OUTPATIENT
Start: 2019-01-01 | End: 2019-01-01 | Stop reason: SURG

## 2019-01-01 RX ORDER — FAMOTIDINE 20 MG/1
20 TABLET, FILM COATED ORAL ONCE
Status: DISCONTINUED | OUTPATIENT
Start: 2019-01-01 | End: 2019-01-01 | Stop reason: HOSPADM

## 2019-01-01 RX ORDER — LIDOCAINE HYDROCHLORIDE 10 MG/ML
3 INJECTION, SOLUTION EPIDURAL; INFILTRATION; INTRACAUDAL; PERINEURAL
Status: COMPLETED | OUTPATIENT
Start: 2019-01-01 | End: 2019-01-01

## 2019-01-01 RX ORDER — CETIRIZINE HYDROCHLORIDE 10 MG/1
5 TABLET ORAL DAILY
Status: DISCONTINUED | OUTPATIENT
Start: 2019-01-01 | End: 2019-01-01

## 2019-01-01 RX ORDER — OXYCODONE AND ACETAMINOPHEN 7.5; 325 MG/1; MG/1
1 TABLET ORAL EVERY 4 HOURS PRN
Status: DISPENSED | OUTPATIENT
Start: 2019-01-01 | End: 2020-01-01

## 2019-01-01 RX ORDER — MEMANTINE HYDROCHLORIDE 10 MG/1
10 TABLET ORAL 2 TIMES DAILY
Status: DISCONTINUED | OUTPATIENT
Start: 2019-01-01 | End: 2019-01-01

## 2019-01-01 RX ORDER — SACUBITRIL AND VALSARTAN 24; 26 MG/1; MG/1
TABLET, FILM COATED ORAL
Qty: 30 TABLET | Refills: 8 | OUTPATIENT
Start: 2019-01-01

## 2019-01-01 RX ORDER — OMEPRAZOLE 20 MG/1
CAPSULE, DELAYED RELEASE ORAL
Qty: 30 CAPSULE | Refills: 5 | Status: ON HOLD | OUTPATIENT
Start: 2019-01-01 | End: 2020-01-01 | Stop reason: SDUPTHER

## 2019-01-01 RX ORDER — BISACODYL 10 MG
10 SUPPOSITORY, RECTAL RECTAL ONCE
Status: COMPLETED | OUTPATIENT
Start: 2019-01-01 | End: 2019-01-01

## 2019-01-01 RX ORDER — MELATONIN
1000 DAILY
Status: DISCONTINUED | OUTPATIENT
Start: 2019-01-01 | End: 2019-01-01

## 2019-01-01 RX ORDER — ONDANSETRON 2 MG/ML
4 INJECTION INTRAMUSCULAR; INTRAVENOUS EVERY 6 HOURS PRN
Status: DISCONTINUED | OUTPATIENT
Start: 2019-01-01 | End: 2020-01-01 | Stop reason: HOSPADM

## 2019-01-01 RX ORDER — ONDANSETRON 4 MG/1
4 TABLET, FILM COATED ORAL EVERY 6 HOURS PRN
Status: DISCONTINUED | OUTPATIENT
Start: 2019-01-01 | End: 2020-01-01 | Stop reason: HOSPADM

## 2019-01-01 RX ORDER — IPRATROPIUM BROMIDE AND ALBUTEROL SULFATE 2.5; .5 MG/3ML; MG/3ML
3 SOLUTION RESPIRATORY (INHALATION) EVERY 4 HOURS PRN
Status: DISCONTINUED | OUTPATIENT
Start: 2019-01-01 | End: 2020-01-01 | Stop reason: HOSPADM

## 2019-01-01 RX ORDER — MORPHINE SULFATE 4 MG/ML
4 INJECTION, SOLUTION INTRAMUSCULAR; INTRAVENOUS ONCE
Status: COMPLETED | OUTPATIENT
Start: 2019-01-01 | End: 2019-01-01

## 2019-01-01 RX ORDER — ACETAMINOPHEN 325 MG/1
650 TABLET ORAL EVERY 6 HOURS PRN
Status: DISCONTINUED | OUTPATIENT
Start: 2019-01-01 | End: 2020-01-01 | Stop reason: HOSPADM

## 2019-01-01 RX ORDER — FUROSEMIDE 10 MG/ML
20 INJECTION INTRAMUSCULAR; INTRAVENOUS ONCE
Status: COMPLETED | OUTPATIENT
Start: 2019-01-01 | End: 2019-01-01

## 2019-01-01 RX ORDER — PRAVASTATIN SODIUM 20 MG
TABLET ORAL
Qty: 90 TABLET | Refills: 0 | Status: SHIPPED | OUTPATIENT
Start: 2019-01-01 | End: 2019-01-01 | Stop reason: SDUPTHER

## 2019-01-01 RX ORDER — SODIUM CHLORIDE, SODIUM LACTATE, POTASSIUM CHLORIDE, CALCIUM CHLORIDE 600; 310; 30; 20 MG/100ML; MG/100ML; MG/100ML; MG/100ML
9 INJECTION, SOLUTION INTRAVENOUS CONTINUOUS
Status: DISCONTINUED | OUTPATIENT
Start: 2019-01-01 | End: 2019-01-01

## 2019-01-01 RX ORDER — SODIUM CHLORIDE 9 MG/ML
50 INJECTION, SOLUTION INTRAVENOUS ONCE
Status: COMPLETED | OUTPATIENT
Start: 2019-01-01 | End: 2019-01-01

## 2019-01-01 RX ORDER — FAMOTIDINE 10 MG/ML
20 INJECTION, SOLUTION INTRAVENOUS ONCE
Status: COMPLETED | OUTPATIENT
Start: 2019-01-01 | End: 2019-01-01

## 2019-01-01 RX ORDER — SODIUM CHLORIDE, SODIUM LACTATE, POTASSIUM CHLORIDE, CALCIUM CHLORIDE 600; 310; 30; 20 MG/100ML; MG/100ML; MG/100ML; MG/100ML
125 INJECTION, SOLUTION INTRAVENOUS CONTINUOUS
Status: DISCONTINUED | OUTPATIENT
Start: 2019-01-01 | End: 2019-01-01

## 2019-01-01 RX ORDER — MULTIPLE VITAMINS W/ MINERALS TAB 9MG-400MCG
1 TAB ORAL DAILY
Status: DISCONTINUED | OUTPATIENT
Start: 2019-01-01 | End: 2020-01-01 | Stop reason: HOSPADM

## 2019-01-01 RX ORDER — MIDODRINE HYDROCHLORIDE 5 MG/1
10 TABLET ORAL
Status: DISCONTINUED | OUTPATIENT
Start: 2019-01-01 | End: 2020-01-01

## 2019-01-01 RX ORDER — EPHEDRINE SULFATE 50 MG/ML
INJECTION, SOLUTION INTRAVENOUS AS NEEDED
Status: DISCONTINUED | OUTPATIENT
Start: 2019-01-01 | End: 2019-01-01 | Stop reason: SURG

## 2019-01-01 RX ORDER — DUTASTERIDE 0.5 MG/1
CAPSULE, LIQUID FILLED ORAL
Qty: 30 CAPSULE | Refills: 2 | Status: ON HOLD | OUTPATIENT
Start: 2019-01-01 | End: 2019-01-01

## 2019-01-01 RX ORDER — DUTASTERIDE 0.5 MG/1
CAPSULE, LIQUID FILLED ORAL
Qty: 30 CAPSULE | Refills: 0 | Status: SHIPPED | OUTPATIENT
Start: 2019-01-01

## 2019-01-01 RX ORDER — MIRTAZAPINE 30 MG/1
TABLET, FILM COATED ORAL
Qty: 90 TABLET | Refills: 0 | Status: SHIPPED | OUTPATIENT
Start: 2019-01-01 | End: 2019-01-01 | Stop reason: SDUPTHER

## 2019-01-01 RX ORDER — CLOPIDOGREL BISULFATE 75 MG/1
75 TABLET ORAL DAILY
Status: DISCONTINUED | OUTPATIENT
Start: 2019-01-01 | End: 2019-01-01

## 2019-01-01 RX ADMIN — CEFEPIME 1 G: 1 INJECTION, POWDER, FOR SOLUTION INTRAMUSCULAR; INTRAVENOUS at 08:29

## 2019-01-01 RX ADMIN — OXYCODONE HYDROCHLORIDE AND ACETAMINOPHEN 1 TABLET: 7.5; 325 TABLET ORAL at 21:21

## 2019-01-01 RX ADMIN — MORPHINE SULFATE 2 MG: 2 INJECTION, SOLUTION INTRAMUSCULAR; INTRAVENOUS at 20:13

## 2019-01-01 RX ADMIN — METRONIDAZOLE 500 MG: 500 INJECTION, SOLUTION INTRAVENOUS at 06:18

## 2019-01-01 RX ADMIN — AMIODARONE HYDROCHLORIDE 200 MG: 200 TABLET ORAL at 09:43

## 2019-01-01 RX ADMIN — MULTIPLE VITAMINS W/ MINERALS TAB 1 TABLET: TAB ORAL at 16:45

## 2019-01-01 RX ADMIN — SODIUM CHLORIDE, POTASSIUM CHLORIDE, SODIUM LACTATE AND CALCIUM CHLORIDE 125 ML/HR: 600; 310; 30; 20 INJECTION, SOLUTION INTRAVENOUS at 14:57

## 2019-01-01 RX ADMIN — MORPHINE SULFATE 2 MG: 2 INJECTION, SOLUTION INTRAMUSCULAR; INTRAVENOUS at 02:46

## 2019-01-01 RX ADMIN — FAMOTIDINE 20 MG: 10 INJECTION INTRAVENOUS at 09:55

## 2019-01-01 RX ADMIN — MORPHINE SULFATE 2 MG: 2 INJECTION, SOLUTION INTRAMUSCULAR; INTRAVENOUS at 02:30

## 2019-01-01 RX ADMIN — MIDODRINE HYDROCHLORIDE 10 MG: 5 TABLET ORAL at 13:03

## 2019-01-01 RX ADMIN — CLOPIDOGREL BISULFATE 75 MG: 75 TABLET ORAL at 10:19

## 2019-01-01 RX ADMIN — CARBIDOPA AND LEVODOPA 1 TABLET: 25; 100 TABLET ORAL at 10:29

## 2019-01-01 RX ADMIN — METRONIDAZOLE 500 MG: 500 INJECTION, SOLUTION INTRAVENOUS at 14:57

## 2019-01-01 RX ADMIN — MULTIPLE VITAMINS W/ MINERALS TAB 1 TABLET: TAB ORAL at 10:29

## 2019-01-01 RX ADMIN — CEFTRIAXONE 2 G: 2 INJECTION, POWDER, FOR SOLUTION INTRAMUSCULAR; INTRAVENOUS at 16:39

## 2019-01-01 RX ADMIN — MORPHINE SULFATE 2 MG: 2 INJECTION, SOLUTION INTRAMUSCULAR; INTRAVENOUS at 13:04

## 2019-01-01 RX ADMIN — BISACODYL 10 MG: 10 SUPPOSITORY RECTAL at 11:09

## 2019-01-01 RX ADMIN — ASPIRIN 81 MG 81 MG: 81 TABLET ORAL at 10:21

## 2019-01-01 RX ADMIN — LEVOTHYROXINE SODIUM 50 MCG: 20 INJECTION, SOLUTION INTRAVENOUS at 16:44

## 2019-01-01 RX ADMIN — ATROPA BELLADONNA AND OPIUM 30 MG: 16.2; 3 SUPPOSITORY RECTAL at 04:31

## 2019-01-01 RX ADMIN — MIDODRINE HYDROCHLORIDE 10 MG: 5 TABLET ORAL at 10:21

## 2019-01-01 RX ADMIN — METRONIDAZOLE 500 MG: 500 INJECTION, SOLUTION INTRAVENOUS at 22:24

## 2019-01-01 RX ADMIN — MIDODRINE HYDROCHLORIDE 10 MG: 5 TABLET ORAL at 15:58

## 2019-01-01 RX ADMIN — OXYCODONE HYDROCHLORIDE AND ACETAMINOPHEN 1 TABLET: 7.5; 325 TABLET ORAL at 08:18

## 2019-01-01 RX ADMIN — MEMANTINE 10 MG: 10 TABLET ORAL at 08:17

## 2019-01-01 RX ADMIN — MORPHINE SULFATE 2 MG: 2 INJECTION, SOLUTION INTRAMUSCULAR; INTRAVENOUS at 00:09

## 2019-01-01 RX ADMIN — ASPIRIN 81 MG 81 MG: 81 TABLET ORAL at 09:43

## 2019-01-01 RX ADMIN — CEFEPIME 1 G: 1 INJECTION, POWDER, FOR SOLUTION INTRAMUSCULAR; INTRAVENOUS at 10:11

## 2019-01-01 RX ADMIN — LEVOTHYROXINE SODIUM 50 MCG: 20 INJECTION, SOLUTION INTRAVENOUS at 13:03

## 2019-01-01 RX ADMIN — MEMANTINE 10 MG: 10 TABLET ORAL at 10:20

## 2019-01-01 RX ADMIN — PRAVASTATIN SODIUM 20 MG: 20 TABLET ORAL at 19:23

## 2019-01-01 RX ADMIN — EPHEDRINE SULFATE 10 MG: 50 INJECTION INTRAVENOUS at 10:29

## 2019-01-01 RX ADMIN — FINASTERIDE 5 MG: 5 TABLET, FILM COATED ORAL at 08:19

## 2019-01-01 RX ADMIN — METRONIDAZOLE 500 MG: 500 INJECTION, SOLUTION INTRAVENOUS at 14:49

## 2019-01-01 RX ADMIN — MEMANTINE 10 MG: 10 TABLET ORAL at 19:21

## 2019-01-01 RX ADMIN — MIRTAZAPINE 15 MG: 15 TABLET, FILM COATED ORAL at 23:14

## 2019-01-01 RX ADMIN — OXYCODONE HYDROCHLORIDE AND ACETAMINOPHEN 1 TABLET: 7.5; 325 TABLET ORAL at 12:58

## 2019-01-01 RX ADMIN — MIDODRINE HYDROCHLORIDE 10 MG: 5 TABLET ORAL at 11:10

## 2019-01-01 RX ADMIN — CEFEPIME 1 G: 1 INJECTION, POWDER, FOR SOLUTION INTRAMUSCULAR; INTRAVENOUS at 19:22

## 2019-01-01 RX ADMIN — METRONIDAZOLE 500 MG: 500 INJECTION, SOLUTION INTRAVENOUS at 05:45

## 2019-01-01 RX ADMIN — POTASSIUM CHLORIDE, DEXTROSE MONOHYDRATE AND SODIUM CHLORIDE 50 ML/HR: 150; 5; 450 INJECTION, SOLUTION INTRAVENOUS at 23:14

## 2019-01-01 RX ADMIN — MULTIPLE VITAMINS W/ MINERALS TAB 1 TABLET: TAB ORAL at 10:21

## 2019-01-01 RX ADMIN — SODIUM CHLORIDE, POTASSIUM CHLORIDE, SODIUM LACTATE AND CALCIUM CHLORIDE: 600; 310; 30; 20 INJECTION, SOLUTION INTRAVENOUS at 10:12

## 2019-01-01 RX ADMIN — OXYCODONE HYDROCHLORIDE AND ACETAMINOPHEN 1 TABLET: 7.5; 325 TABLET ORAL at 02:46

## 2019-01-01 RX ADMIN — HYDROMORPHONE HYDROCHLORIDE 0.5 MG: 1 INJECTION, SOLUTION INTRAMUSCULAR; INTRAVENOUS; SUBCUTANEOUS at 22:45

## 2019-01-01 RX ADMIN — MORPHINE SULFATE 2 MG: 2 INJECTION, SOLUTION INTRAMUSCULAR; INTRAVENOUS at 15:26

## 2019-01-01 RX ADMIN — METRONIDAZOLE 500 MG: 500 INJECTION, SOLUTION INTRAVENOUS at 21:21

## 2019-01-01 RX ADMIN — MIDODRINE HYDROCHLORIDE 10 MG: 5 TABLET ORAL at 13:28

## 2019-01-01 RX ADMIN — CARBIDOPA AND LEVODOPA 1 TABLET: 25; 100 TABLET ORAL at 15:26

## 2019-01-01 RX ADMIN — MORPHINE SULFATE 2 MG: 2 INJECTION, SOLUTION INTRAMUSCULAR; INTRAVENOUS at 05:44

## 2019-01-01 RX ADMIN — OXYCODONE HYDROCHLORIDE AND ACETAMINOPHEN 1 TABLET: 7.5; 325 TABLET ORAL at 00:24

## 2019-01-01 RX ADMIN — TRIAMCINOLONE ACETONIDE 80 MG: 40 INJECTION, SUSPENSION INTRA-ARTICULAR; INTRAMUSCULAR at 10:09

## 2019-01-01 RX ADMIN — FUROSEMIDE 20 MG: 10 INJECTION, SOLUTION INTRAMUSCULAR; INTRAVENOUS at 17:19

## 2019-01-01 RX ADMIN — ONDANSETRON 4 MG: 2 INJECTION INTRAMUSCULAR; INTRAVENOUS at 21:15

## 2019-01-01 RX ADMIN — MIDODRINE HYDROCHLORIDE 10 MG: 5 TABLET ORAL at 09:42

## 2019-01-01 RX ADMIN — METRONIDAZOLE 500 MG: 500 INJECTION, SOLUTION INTRAVENOUS at 22:30

## 2019-01-01 RX ADMIN — ACETAMINOPHEN 650 MG: 325 TABLET ORAL at 05:44

## 2019-01-01 RX ADMIN — VITAMIN D, TAB 1000IU (100/BT) 1000 UNITS: 25 TAB at 10:19

## 2019-01-01 RX ADMIN — CARBIDOPA AND LEVODOPA 1 TABLET: 25; 100 TABLET ORAL at 16:46

## 2019-01-01 RX ADMIN — HYDROMORPHONE HYDROCHLORIDE 0.5 MG: 1 INJECTION, SOLUTION INTRAMUSCULAR; INTRAVENOUS; SUBCUTANEOUS at 22:56

## 2019-01-01 RX ADMIN — FINASTERIDE 5 MG: 5 TABLET, FILM COATED ORAL at 10:18

## 2019-01-01 RX ADMIN — IPRATROPIUM BROMIDE AND ALBUTEROL SULFATE 3 ML: 2.5; .5 SOLUTION RESPIRATORY (INHALATION) at 03:45

## 2019-01-01 RX ADMIN — MORPHINE SULFATE 4 MG: 4 INJECTION, SOLUTION INTRAMUSCULAR; INTRAVENOUS at 21:15

## 2019-01-01 RX ADMIN — CEFEPIME HYDROCHLORIDE 2 G: 2 INJECTION, POWDER, FOR SOLUTION INTRAVENOUS at 13:28

## 2019-01-01 RX ADMIN — SODIUM CHLORIDE 50 ML/HR: 9 INJECTION, SOLUTION INTRAVENOUS at 09:55

## 2019-01-01 RX ADMIN — LEVOTHYROXINE SODIUM 75 MCG: 75 TABLET ORAL at 13:05

## 2019-01-01 RX ADMIN — MORPHINE SULFATE 2 MG: 2 INJECTION, SOLUTION INTRAMUSCULAR; INTRAVENOUS at 15:55

## 2019-01-01 RX ADMIN — SODIUM CHLORIDE 1000 ML: 9 INJECTION, SOLUTION INTRAVENOUS at 21:17

## 2019-01-01 RX ADMIN — MIDODRINE HYDROCHLORIDE 10 MG: 5 TABLET ORAL at 16:40

## 2019-01-01 RX ADMIN — AMIODARONE HYDROCHLORIDE 200 MG: 200 TABLET ORAL at 10:19

## 2019-01-01 RX ADMIN — PANTOPRAZOLE SODIUM 40 MG: 40 TABLET, DELAYED RELEASE ORAL at 08:18

## 2019-01-01 RX ADMIN — CARBIDOPA AND LEVODOPA 1 TABLET: 25; 100 TABLET ORAL at 17:23

## 2019-01-01 RX ADMIN — CEFEPIME 1 G: 1 INJECTION, POWDER, FOR SOLUTION INTRAMUSCULAR; INTRAVENOUS at 09:44

## 2019-01-01 RX ADMIN — OXYCODONE HYDROCHLORIDE AND ACETAMINOPHEN 1 TABLET: 7.5; 325 TABLET ORAL at 22:29

## 2019-01-01 RX ADMIN — MORPHINE SULFATE 2 MG: 2 INJECTION, SOLUTION INTRAMUSCULAR; INTRAVENOUS at 13:12

## 2019-01-01 RX ADMIN — CEFEPIME 1 G: 1 INJECTION, POWDER, FOR SOLUTION INTRAMUSCULAR; INTRAVENOUS at 20:13

## 2019-01-01 RX ADMIN — MIDODRINE HYDROCHLORIDE 10 MG: 5 TABLET ORAL at 08:18

## 2019-01-01 RX ADMIN — LIDOCAINE HYDROCHLORIDE 3 ML: 10 INJECTION, SOLUTION EPIDURAL; INFILTRATION; INTRACAUDAL; PERINEURAL at 10:09

## 2019-01-01 RX ADMIN — AMIODARONE HYDROCHLORIDE 200 MG: 200 TABLET ORAL at 08:18

## 2019-01-01 RX ADMIN — CARBIDOPA AND LEVODOPA 1 TABLET: 25; 100 TABLET ORAL at 22:32

## 2019-01-01 RX ADMIN — MORPHINE SULFATE 2 MG: 2 INJECTION, SOLUTION INTRAMUSCULAR; INTRAVENOUS at 07:57

## 2019-01-01 RX ADMIN — CARBIDOPA AND LEVODOPA 1 TABLET: 25; 100 TABLET ORAL at 08:17

## 2019-01-01 RX ADMIN — MORPHINE SULFATE 2 MG: 2 INJECTION, SOLUTION INTRAMUSCULAR; INTRAVENOUS at 04:46

## 2019-01-01 RX ADMIN — MORPHINE SULFATE 2 MG: 2 INJECTION, SOLUTION INTRAMUSCULAR; INTRAVENOUS at 22:26

## 2019-01-01 RX ADMIN — MORPHINE SULFATE 2 MG: 2 INJECTION, SOLUTION INTRAMUSCULAR; INTRAVENOUS at 01:37

## 2019-01-01 RX ADMIN — MIDODRINE HYDROCHLORIDE 10 MG: 5 TABLET ORAL at 06:19

## 2019-01-01 RX ADMIN — METRONIDAZOLE 500 MG: 500 INJECTION, SOLUTION INTRAVENOUS at 05:55

## 2019-01-01 RX ADMIN — PROPOFOL 20 MG: 10 INJECTION, EMULSION INTRAVENOUS at 10:24

## 2019-01-01 RX ADMIN — MORPHINE SULFATE 2 MG: 2 INJECTION, SOLUTION INTRAMUSCULAR; INTRAVENOUS at 23:58

## 2019-01-01 RX ADMIN — CARBIDOPA AND LEVODOPA 1 TABLET: 25; 100 TABLET ORAL at 10:18

## 2019-01-01 RX ADMIN — MORPHINE SULFATE 2 MG: 2 INJECTION, SOLUTION INTRAMUSCULAR; INTRAVENOUS at 08:16

## 2019-01-01 RX ADMIN — CARBIDOPA AND LEVODOPA 1 TABLET: 25; 100 TABLET ORAL at 15:57

## 2019-01-01 RX ADMIN — OXYCODONE HYDROCHLORIDE AND ACETAMINOPHEN 1 TABLET: 7.5; 325 TABLET ORAL at 04:28

## 2019-01-01 RX ADMIN — FINASTERIDE 5 MG: 5 TABLET, FILM COATED ORAL at 13:04

## 2019-01-01 RX ADMIN — ASPIRIN 81 MG 81 MG: 81 TABLET ORAL at 08:18

## 2019-01-01 RX ADMIN — LEVOTHYROXINE SODIUM 75 MCG: 75 TABLET ORAL at 05:44

## 2019-01-01 RX ADMIN — CARBIDOPA AND LEVODOPA 1 TABLET: 25; 100 TABLET ORAL at 21:21

## 2019-01-01 RX ADMIN — SODIUM CHLORIDE 100 ML/HR: 9 INJECTION, SOLUTION INTRAVENOUS at 01:45

## 2019-01-01 RX ADMIN — MORPHINE SULFATE 2 MG: 2 INJECTION, SOLUTION INTRAMUSCULAR; INTRAVENOUS at 13:20

## 2019-01-01 RX ADMIN — OXYCODONE HYDROCHLORIDE AND ACETAMINOPHEN 1 TABLET: 7.5; 325 TABLET ORAL at 19:21

## 2019-01-01 RX ADMIN — OXYCODONE HYDROCHLORIDE AND ACETAMINOPHEN 1 TABLET: 7.5; 325 TABLET ORAL at 16:39

## 2019-01-01 RX ADMIN — AMIODARONE HYDROCHLORIDE 200 MG: 200 TABLET ORAL at 16:46

## 2019-01-01 RX ADMIN — CARBIDOPA AND LEVODOPA 1 TABLET: 25; 100 TABLET ORAL at 20:13

## 2019-01-01 RX ADMIN — MULTIPLE VITAMINS W/ MINERALS TAB 1 TABLET: TAB ORAL at 08:17

## 2019-01-01 RX ADMIN — METRONIDAZOLE 500 MG: 500 INJECTION, SOLUTION INTRAVENOUS at 22:36

## 2019-01-01 RX ADMIN — SODIUM CHLORIDE, POTASSIUM CHLORIDE, SODIUM LACTATE AND CALCIUM CHLORIDE 100 ML/HR: 600; 310; 30; 20 INJECTION, SOLUTION INTRAVENOUS at 19:21

## 2019-01-01 RX ADMIN — SODIUM CHLORIDE, POTASSIUM CHLORIDE, SODIUM LACTATE AND CALCIUM CHLORIDE 100 ML/HR: 600; 310; 30; 20 INJECTION, SOLUTION INTRAVENOUS at 04:30

## 2019-01-01 RX ADMIN — MORPHINE SULFATE 2 MG: 2 INJECTION, SOLUTION INTRAMUSCULAR; INTRAVENOUS at 11:09

## 2019-01-01 RX ADMIN — CEFEPIME 1 G: 1 INJECTION, POWDER, FOR SOLUTION INTRAMUSCULAR; INTRAVENOUS at 22:36

## 2019-01-01 RX ADMIN — OXYCODONE HYDROCHLORIDE AND ACETAMINOPHEN 1 TABLET: 7.5; 325 TABLET ORAL at 17:32

## 2019-01-01 RX ADMIN — PANTOPRAZOLE SODIUM 40 MG: 40 TABLET, DELAYED RELEASE ORAL at 10:21

## 2019-01-01 RX ADMIN — SACUBITRIL AND VALSARTAN 0.5 TABLET: 24; 26 TABLET, FILM COATED ORAL at 10:19

## 2019-01-01 RX ADMIN — OXYCODONE HYDROCHLORIDE AND ACETAMINOPHEN 1 TABLET: 7.5; 325 TABLET ORAL at 06:18

## 2019-01-01 RX ADMIN — METRONIDAZOLE 500 MG: 500 INJECTION, SOLUTION INTRAVENOUS at 16:45

## 2019-01-01 RX ADMIN — CARBIDOPA AND LEVODOPA 1 TABLET: 25; 100 TABLET ORAL at 22:24

## 2019-01-01 RX ADMIN — VANCOMYCIN HYDROCHLORIDE 1500 MG: 10 INJECTION, POWDER, LYOPHILIZED, FOR SOLUTION INTRAVENOUS at 08:18

## 2019-01-01 RX ADMIN — VITAMIN D, TAB 1000IU (100/BT) 1000 UNITS: 25 TAB at 08:17

## 2019-01-01 RX ADMIN — CETIRIZINE HYDROCHLORIDE 5 MG: 10 TABLET, FILM COATED ORAL at 08:18

## 2019-01-01 RX ADMIN — MIDODRINE HYDROCHLORIDE 10 MG: 5 TABLET ORAL at 17:25

## 2019-01-01 RX ADMIN — PROPOFOL 10 MG: 10 INJECTION, EMULSION INTRAVENOUS at 10:29

## 2019-01-01 RX ADMIN — FINASTERIDE 5 MG: 5 TABLET, FILM COATED ORAL at 16:45

## 2019-01-01 RX ADMIN — SODIUM CHLORIDE, PRESERVATIVE FREE 10 ML: 5 INJECTION INTRAVENOUS at 21:22

## 2019-01-01 RX ADMIN — ASPIRIN 81 MG 81 MG: 81 TABLET ORAL at 16:46

## 2019-01-04 DIAGNOSIS — E11.9 TYPE 2 DIABETES MELLITUS WITHOUT COMPLICATION, WITHOUT LONG-TERM CURRENT USE OF INSULIN (HCC): ICD-10-CM

## 2019-01-24 ENCOUNTER — TELEPHONE (OUTPATIENT)
Dept: INTERNAL MEDICINE | Facility: CLINIC | Age: 84
End: 2019-01-24

## 2019-01-25 NOTE — TELEPHONE ENCOUNTER
Pt wife says that Dr. Ugarte took him off the Eliquis and put him on Plavix.  She will call them for the refill.  I added Dr. Ugarte to his care team

## 2019-02-08 ENCOUNTER — OFFICE VISIT (OUTPATIENT)
Dept: INTERNAL MEDICINE | Facility: CLINIC | Age: 84
End: 2019-02-08

## 2019-02-08 VITALS
OXYGEN SATURATION: 96 % | SYSTOLIC BLOOD PRESSURE: 130 MMHG | BODY MASS INDEX: 24.54 KG/M2 | DIASTOLIC BLOOD PRESSURE: 76 MMHG | HEART RATE: 83 BPM | TEMPERATURE: 96.8 F | WEIGHT: 171 LBS | RESPIRATION RATE: 16 BRPM

## 2019-02-08 DIAGNOSIS — E11.9 TYPE 2 DIABETES MELLITUS WITHOUT COMPLICATION, WITHOUT LONG-TERM CURRENT USE OF INSULIN (HCC): Primary | ICD-10-CM

## 2019-02-08 DIAGNOSIS — E03.9 ACQUIRED HYPOTHYROIDISM: ICD-10-CM

## 2019-02-08 DIAGNOSIS — I25.10 CORONARY ARTERY DISEASE INVOLVING NATIVE CORONARY ARTERY OF NATIVE HEART WITHOUT ANGINA PECTORIS: ICD-10-CM

## 2019-02-08 DIAGNOSIS — G30.1 LATE ONSET ALZHEIMER'S DISEASE WITHOUT BEHAVIORAL DISTURBANCE (HCC): ICD-10-CM

## 2019-02-08 DIAGNOSIS — I50.9 CHRONIC CONGESTIVE HEART FAILURE, UNSPECIFIED HEART FAILURE TYPE (HCC): ICD-10-CM

## 2019-02-08 DIAGNOSIS — I48.91 ATRIAL FIBRILLATION, UNSPECIFIED TYPE (HCC): ICD-10-CM

## 2019-02-08 DIAGNOSIS — N18.30 STAGE 3 CHRONIC KIDNEY DISEASE (HCC): ICD-10-CM

## 2019-02-08 DIAGNOSIS — I10 ESSENTIAL HYPERTENSION: ICD-10-CM

## 2019-02-08 DIAGNOSIS — N40.0 BENIGN PROSTATIC HYPERPLASIA WITHOUT LOWER URINARY TRACT SYMPTOMS: ICD-10-CM

## 2019-02-08 DIAGNOSIS — F02.80 LATE ONSET ALZHEIMER'S DISEASE WITHOUT BEHAVIORAL DISTURBANCE (HCC): ICD-10-CM

## 2019-02-08 DIAGNOSIS — K21.9 GASTROESOPHAGEAL REFLUX DISEASE, ESOPHAGITIS PRESENCE NOT SPECIFIED: ICD-10-CM

## 2019-02-08 LAB
ALBUMIN SERPL-MCNC: 4.53 G/DL (ref 3.2–4.8)
ALBUMIN/GLOB SERPL: 1.8 G/DL (ref 1.5–2.5)
ALP SERPL-CCNC: 96 U/L (ref 25–100)
ALT SERPL W P-5'-P-CCNC: 10 U/L (ref 7–40)
ANION GAP SERPL CALCULATED.3IONS-SCNC: 6 MMOL/L (ref 3–11)
AST SERPL-CCNC: 22 U/L (ref 0–33)
BILIRUB SERPL-MCNC: 0.9 MG/DL (ref 0.3–1.2)
BUN BLD-MCNC: 19 MG/DL (ref 9–23)
BUN/CREAT SERPL: 12.5 (ref 7–25)
CALCIUM SPEC-SCNC: 9.4 MG/DL (ref 8.7–10.4)
CHLORIDE SERPL-SCNC: 105 MMOL/L (ref 99–109)
CO2 SERPL-SCNC: 26 MMOL/L (ref 20–31)
CREAT BLD-MCNC: 1.52 MG/DL (ref 0.6–1.3)
GFR SERPL CREATININE-BSD FRML MDRD: 43 ML/MIN/1.73
GLOBULIN UR ELPH-MCNC: 2.5 GM/DL
GLUCOSE BLD-MCNC: 112 MG/DL (ref 70–100)
HBA1C MFR BLD: 6 %
POTASSIUM BLD-SCNC: 4.3 MMOL/L (ref 3.5–5.5)
PROT SERPL-MCNC: 7 G/DL (ref 5.7–8.2)
SODIUM BLD-SCNC: 137 MMOL/L (ref 132–146)

## 2019-02-08 PROCEDURE — 99214 OFFICE O/P EST MOD 30 MIN: CPT | Performed by: NURSE PRACTITIONER

## 2019-02-08 PROCEDURE — 82043 UR ALBUMIN QUANTITATIVE: CPT | Performed by: NURSE PRACTITIONER

## 2019-02-08 PROCEDURE — 83036 HEMOGLOBIN GLYCOSYLATED A1C: CPT | Performed by: NURSE PRACTITIONER

## 2019-02-08 PROCEDURE — 80053 COMPREHEN METABOLIC PANEL: CPT | Performed by: NURSE PRACTITIONER

## 2019-02-08 PROCEDURE — 82570 ASSAY OF URINE CREATININE: CPT | Performed by: NURSE PRACTITIONER

## 2019-02-08 RX ORDER — DUTASTERIDE 0.5 MG/1
0.5 CAPSULE, LIQUID FILLED ORAL DAILY
Qty: 30 CAPSULE | Refills: 3 | Status: SHIPPED | OUTPATIENT
Start: 2019-02-08 | End: 2019-01-01 | Stop reason: SDUPTHER

## 2019-02-08 RX ORDER — ASPIRIN 81 MG/1
81 TABLET, CHEWABLE ORAL DAILY
COMMUNITY

## 2019-02-08 RX ORDER — CLOPIDOGREL BISULFATE 75 MG/1
75 TABLET ORAL DAILY
COMMUNITY

## 2019-02-08 NOTE — PROGRESS NOTES
Subjective   Murali Dennis is a 89 y.o. male.     Chief Complaint   Patient presents with   • Diabetes     3 month follow up       History of Present Illness     Murali is accompanied by his wife today. They both report that he has been doing well since last visit.      currently followed by Dr. Garvin for her Alzheimer's dementia and questionable Parkinson's disease.  He last saw him on 10/26/2018 where he continued the  Sinemet and increased his namneda to 20 mg daily. He will FU in April 2019      BPH-chronic, well controlled with dutasteride.      GERD- chronic well controlled with omerazole 20 mg daily.      Hyperlipidemia-chronic, stable with pravastatin. Last lipids 10/12/18 were normal.      Hypothyroidism- chronic, Last TSH 10/2018 (euthyroid). Currently taking levothyroxine 75 mcg daily.      Allergies- chronic and well controlled with Singulair and loratadine.       history of vit d def- has not been taking his vit D3 1000 u daily.      Diabetes: newly diagnosed 10/2018  With A1C 6.9%. Pt never started the metformin. Working on diet and increased activity before adding medications. Wife is also a diabetic.   Glucose averaging 100-110, rarely up to 140. A1C is down to 6% today.      CKD-  Noted  1/2018 with GFR 41 and  creat 1.6. REpeat labs 10/2018 Gfr up to 45  and  creat down to 1.48     HTN/CHF/A-fib- Denies any bleeding issues. BP controlled with entresto. Denies any headaches, dizziness, visual disturbances, chest pain, dyspnea, leg pain, and edema. Had FU with Dr. Ugarte in December 2018, he stopped the eliquis and put him back on plavix and ASA. Will see every 6 months  [facility] cardiac diagnostics:   1/19/2018: C with Stent: 3 vessel CAD  Last Echo: 12/2017: mild AR, mild MR, Estimated EF = 28%, mild TR    The following portions of the patient's history were reviewed and updated as appropriate: allergies, current medications, past family history, past medical history, past social history,  past surgical history and problem list.    Review of Systems   Constitutional: Negative for appetite change, chills, fatigue, fever and unexpected weight change.   HENT: Negative for congestion, ear pain, rhinorrhea, sinus pressure and sore throat.    Eyes: Negative for itching and visual disturbance.   Respiratory: Negative for cough, shortness of breath and wheezing.    Cardiovascular: Negative for chest pain, palpitations and leg swelling.   Gastrointestinal: Negative for abdominal pain, constipation, diarrhea, nausea and vomiting.   Endocrine: Negative for cold intolerance and heat intolerance.   Genitourinary: Negative for difficulty urinating, dysuria and hematuria.   Musculoskeletal: Negative for arthralgias, back pain, joint swelling and myalgias.   Skin: Negative for rash and wound.   Allergic/Immunologic: Negative for environmental allergies and food allergies.   Neurological: Negative for dizziness, numbness and headaches.   Hematological: Negative for adenopathy. Does not bruise/bleed easily.   Psychiatric/Behavioral: Negative for dysphoric mood and sleep disturbance. The patient is not nervous/anxious.        Outpatient Medications Marked as Taking for the 2/8/19 encounter (Office Visit) with Diana Null APRN   Medication Sig Dispense Refill   • acetaminophen (TYLENOL) 325 MG tablet Take 2 tablets by mouth Every 6 (Six) Hours As Needed for Mild Pain .     • aspirin 81 MG chewable tablet Chew 81 mg Daily.     • carbidopa-levodopa (SINEMET)  MG per tablet Take 1 tablet by mouth 3 (Three) Times a Day. 90 tablet 11   • carvedilol (COREG) 25 MG tablet Take 25 mg by mouth 2 (Two) Times a Day With Meals.     • cholecalciferol (VITAMIN D3) 1000 units tablet Take 1,000 Units by mouth Daily.     • clopidogrel (PLAVIX) 75 MG tablet Take 75 mg by mouth Daily.     • diclofenac (VOLTAREN) 1 % gel gel Voltaren 1%, Transdermal 1 Gel four times daily for 90 days     • dutasteride (AVODART) 0.5 MG capsule  Take 1 capsule by mouth Daily. 30 capsule 3   • glucose blood test strip 1 each by Other route Daily. Use as instructed 100 each 12   • glucose monitor monitoring kit 1 each Daily. 1 each 0   • ipratropium-albuterol (DUO-NEB) 0.5-2.5 mg/mL nebulizer Take 3 mL by nebulization Every 4 (Four) Hours As Needed for Shortness of Air (CBS PROTOCOL). 360 mL    • Lancets (FREESTYLE) lancets 1 each by Other route Daily. May dispense any brand needed E11.9 100 each 12   • levothyroxine (SYNTHROID, LEVOTHROID) 75 MCG tablet Take 75 mcg by mouth Daily.     • Loratadine 10 MG capsule Take 1 capsule by mouth Daily.     • memantine (NAMENDA) 10 MG tablet Take 1 tablet by mouth 2 (Two) Times a Day. 60 tablet 11   • mirtazapine (REMERON) 30 MG tablet Take 30 mg by mouth Every Night.     • montelukast (SINGULAIR) 10 MG tablet Take 10 mg by mouth Every Night.     • Multiple Vitamins-Minerals (MULTIVITAL PO) Take 1 tablet by mouth Daily.     • nitroglycerin (NITROSTAT) 0.4 MG SL tablet Place 0.4 mg under the tongue Every 5 (Five) Minutes As Needed for Chest Pain. Take no more than 3 doses in 15 minutes.     • omeprazole (priLOSEC) 20 MG capsule Take 1 capsule by mouth Daily. 30 capsule 3   • pravastatin (PRAVACHOL) 20 MG tablet take 1 tablet by mouth once daily as directed by prescriber 180 tablet 0   • sacubitril-valsartan (ENTRESTO) 24-26 MG tablet Take 0.5 tablets by mouth 2 (Two) Times a Day. (Patient taking differently: Take 0.5 tablets by mouth Daily.) 60 tablet    • vitamin E 400 UNIT capsule Take 400 Units by mouth Daily.     • [DISCONTINUED] apixaban (ELIQUIS) 2.5 MG tablet tablet Take 2.5 mg by mouth.     • [DISCONTINUED] dutasteride (AVODART) 0.5 MG capsule Take 1 capsule by mouth Daily. 30 capsule 3   • [DISCONTINUED] metFORMIN (GLUCOPHAGE) 500 MG tablet Take 1 tablet by mouth Daily With Breakfast. 30 tablet 3         Objective   Physical Exam   Constitutional: He is oriented to person, place, and time. He appears  well-developed and well-nourished. No distress.   HENT:   Head: Normocephalic and atraumatic.   Right Ear: External ear normal.   Left Ear: External ear normal.   Nose: Nose normal.   Mouth/Throat: Oropharynx is clear and moist.   Eyes: Conjunctivae and EOM are normal. Pupils are equal, round, and reactive to light. Right eye exhibits no discharge. Left eye exhibits no discharge. No scleral icterus.   Neck: Normal range of motion. Neck supple. No JVD present. Carotid bruit is not present. No tracheal deviation present. No thyromegaly present.   Cardiovascular: Normal rate, regular rhythm, normal heart sounds and intact distal pulses. Exam reveals no gallop and no friction rub.   No murmur heard.  Pulses:       Dorsalis pedis pulses are 2+ on the right side, and 2+ on the left side.        Posterior tibial pulses are 2+ on the right side, and 2+ on the left side.   Pulmonary/Chest: Effort normal and breath sounds normal. No respiratory distress. He has no wheezes. He has no rales. He exhibits no tenderness. Right breast exhibits no inverted nipple, no mass, no nipple discharge, no skin change and no tenderness. Left breast exhibits no inverted nipple, no mass, no nipple discharge, no skin change and no tenderness. Breasts are symmetrical.   Abdominal: Soft. Normal appearance and bowel sounds are normal. He exhibits no distension and no mass. There is no hepatosplenomegaly. There is no tenderness. There is no rebound and no guarding. No hernia.   Musculoskeletal: Normal range of motion. He exhibits no edema, tenderness or deformity.   Lymphadenopathy:     He has no cervical adenopathy.   Neurological: He is alert and oriented to person, place, and time. He has normal reflexes. He displays tremor. He displays normal reflexes. No cranial nerve deficit or sensory deficit. Gait abnormal. Coordination normal.   Skin: Skin is warm and dry. Capillary refill takes less than 2 seconds. No rash noted. He is not diaphoretic. No  erythema. No pallor.   Psychiatric: He has a normal mood and affect. His behavior is normal. Judgment and thought content normal.   Nursing note and vitals reviewed.      Vitals:    02/08/19 1004   BP: 130/76   Pulse: 83   Resp: 16   Temp: 96.8 °F (36 °C)   SpO2: 96%         Assessment/Plan   Murali was seen today for diabetes.    Diagnoses and all orders for this visit:    Type 2 diabetes mellitus without complication, without long-term current use of insulin (CMS/[facility])  -     POC Glycosylated Hemoglobin (Hb A1C)  -     Comprehensive Metabolic Panel  -     Microalbumin / Creatinine Urine Ratio - Urine, Clean Catch    Chronic congestive heart failure, unspecified heart failure type (CMS/HCC)    Coronary artery disease involving native coronary artery of native heart without angina pectoris    Atrial fibrillation, unspecified type (CMS/HCC)    Essential hypertension    Gastroesophageal reflux disease, esophagitis presence not specified    Acquired hypothyroidism    Late onset Alzheimer's disease without behavioral disturbance    Stage 3 chronic kidney disease (CMS/HCC)    Benign prostatic hyperplasia without lower urinary tract symptoms  -     dutasteride (AVODART) 0.5 MG capsule; Take 1 capsule by mouth Daily.       labs sent as above.   Cont to FU with neuro and cardiology  No med changes today   hold of on metformin ( A1C improving and pt with CKD)  Encouraged diabetic diet  Return in about 3 months (around 5/8/2019).  Plan of care discussed with pt. They verbalized understanding and agreement.

## 2019-02-10 LAB
CREAT 24H UR-MCNC: 89.1 MG/DL
MICROALBUMIN UR-MCNC: 4.6 UG/ML
MICROALBUMIN/CREAT UR: 5.2 MG/G CREAT (ref 0–30)

## 2019-02-11 ENCOUNTER — TELEPHONE (OUTPATIENT)
Dept: INTERNAL MEDICINE | Facility: CLINIC | Age: 84
End: 2019-02-11

## 2019-02-11 NOTE — TELEPHONE ENCOUNTER
Tried to call pt to go over lab results.  Someone picked up phone and asked who it was.  I identified myself and they hung up

## 2019-02-11 NOTE — TELEPHONE ENCOUNTER
----- Message from ARCADIO Mccrary sent at 2/11/2019  1:17 PM EST -----  Please let pt/wife know microalbumin  was normal.

## 2019-02-12 ENCOUNTER — TELEPHONE (OUTPATIENT)
Dept: INTERNAL MEDICINE | Facility: CLINIC | Age: 84
End: 2019-02-12

## 2019-02-19 ENCOUNTER — TELEPHONE (OUTPATIENT)
Dept: INTERNAL MEDICINE | Facility: CLINIC | Age: 84
End: 2019-02-19

## 2019-02-19 NOTE — TELEPHONE ENCOUNTER
MS. BARNES, SAYS THAT LAKISHA IS STILL HAVING DIARRHEA, SAYS RADHA HAD DISCUSSED A DIFFERENT ANTIBIOTIC IF HIS SYMPTOMS WERE NO BETTER. PLEASE ADVISE.

## 2019-02-19 NOTE — TELEPHONE ENCOUNTER
Please call and check whats going on.  I do not have him on an antibiotic and I believe we agreed he could hold off on the metformin since A1C was controlled.

## 2019-02-20 ENCOUNTER — OFFICE VISIT (OUTPATIENT)
Dept: INTERNAL MEDICINE | Facility: CLINIC | Age: 84
End: 2019-02-20

## 2019-02-20 VITALS
BODY MASS INDEX: 24.34 KG/M2 | TEMPERATURE: 97.5 F | HEART RATE: 98 BPM | DIASTOLIC BLOOD PRESSURE: 78 MMHG | SYSTOLIC BLOOD PRESSURE: 136 MMHG | WEIGHT: 170 LBS | RESPIRATION RATE: 16 BRPM | HEIGHT: 70 IN | OXYGEN SATURATION: 98 %

## 2019-02-20 DIAGNOSIS — R19.7 DIARRHEA, UNSPECIFIED TYPE: Primary | ICD-10-CM

## 2019-02-20 PROCEDURE — 99213 OFFICE O/P EST LOW 20 MIN: CPT | Performed by: NURSE PRACTITIONER

## 2019-02-20 RX ORDER — SACCHAROMYCES BOULARDII 250 MG
250 CAPSULE ORAL 2 TIMES DAILY
Qty: 60 CAPSULE | Refills: 11 | Status: SHIPPED | OUTPATIENT
Start: 2019-02-20 | End: 2019-01-01

## 2019-02-20 NOTE — PROGRESS NOTES
Subjective   Murali Dennis is a 89 y.o. male.     Chief Complaint   Patient presents with   • Diarrhea     intermittent since antibiotic (12/30)Tried immodium a few times     Diarrhea    This is a new problem. The current episode started more than 1 month ago. The problem occurs 2 to 4 times per day. The problem has been waxing and waning. The stool consistency is described as watery. The patient states that diarrhea does not awaken him from sleep. Pertinent negatives include no abdominal pain, arthralgias, bloating, chills, coughing, fever, headaches, increased  flatus, myalgias, sweats, URI, vomiting or weight loss. Nothing aggravates the symptoms. Risk factors include recent antibiotic use. He has tried anti-motility drug for the symptoms. The treatment provided mild relief. There is no history of bowel resection, inflammatory bowel disease, irritable bowel syndrome, malabsorption, a recent abdominal surgery or short gut syndrome.        The following portions of the patient's history were reviewed and updated as appropriate: allergies, current medications, past family history, past medical history, past social history, past surgical history and problem list.    Review of Systems   Constitutional: Negative for appetite change, chills, diaphoresis, fatigue, fever, unexpected weight change and weight loss.   Respiratory: Negative for cough, choking, chest tightness and shortness of breath.    Cardiovascular: Negative for chest pain.   Gastrointestinal: Positive for diarrhea. Negative for abdominal distention, abdominal pain, bloating, blood in stool, constipation, flatus, nausea and vomiting.   Musculoskeletal: Negative for arthralgias and myalgias.   Skin: Negative.    Neurological: Negative for headaches.       Outpatient Medications Marked as Taking for the 2/20/19 encounter (Office Visit) with Diana Null APRN   Medication Sig Dispense Refill   • acetaminophen (TYLENOL) 325 MG tablet Take 2 tablets  by mouth Every 6 (Six) Hours As Needed for Mild Pain .     • aspirin 81 MG chewable tablet Chew 81 mg Daily.     • carbidopa-levodopa (SINEMET)  MG per tablet Take 1 tablet by mouth 3 (Three) Times a Day. 90 tablet 11   • cholecalciferol (VITAMIN D3) 1000 units tablet Take 1,000 Units by mouth Daily.     • clopidogrel (PLAVIX) 75 MG tablet Take 75 mg by mouth Daily.     • diclofenac (VOLTAREN) 1 % gel gel Voltaren 1%, Transdermal 1 Gel four times daily for 90 days     • dutasteride (AVODART) 0.5 MG capsule Take 1 capsule by mouth Daily. 30 capsule 3   • glucose blood test strip 1 each by Other route Daily. Use as instructed 100 each 12   • glucose monitor monitoring kit 1 each Daily. 1 each 0   • ipratropium-albuterol (DUO-NEB) 0.5-2.5 mg/mL nebulizer Take 3 mL by nebulization Every 4 (Four) Hours As Needed for Shortness of Air (CBS PROTOCOL). 360 mL    • Lancets (FREESTYLE) lancets 1 each by Other route Daily. May dispense any brand needed E11.9 100 each 12   • levothyroxine (SYNTHROID, LEVOTHROID) 75 MCG tablet Take 75 mcg by mouth Daily.     • Loratadine 10 MG capsule Take 1 capsule by mouth Daily.     • memantine (NAMENDA) 10 MG tablet Take 1 tablet by mouth 2 (Two) Times a Day. 60 tablet 11   • mirtazapine (REMERON) 30 MG tablet Take 30 mg by mouth Every Night.     • Multiple Vitamins-Minerals (MULTIVITAL PO) Take 1 tablet by mouth Daily.     • nitroglycerin (NITROSTAT) 0.4 MG SL tablet Place 0.4 mg under the tongue Every 5 (Five) Minutes As Needed for Chest Pain. Take no more than 3 doses in 15 minutes.     • omeprazole (priLOSEC) 20 MG capsule Take 1 capsule by mouth Daily. 30 capsule 3   • pravastatin (PRAVACHOL) 20 MG tablet take 1 tablet by mouth once daily as directed by prescriber 180 tablet 0   • sacubitril-valsartan (ENTRESTO) 24-26 MG tablet Take 0.5 tablets by mouth 2 (Two) Times a Day. (Patient taking differently: Take 0.5 tablets by mouth Daily.) 60 tablet    • vitamin E 400 UNIT capsule Take  400 Units by mouth Daily.     • [DISCONTINUED] montelukast (SINGULAIR) 10 MG tablet Take 10 mg by mouth Every Night.           Objective   Physical Exam   Constitutional: He appears well-developed and well-nourished. No distress.   HENT:   Mouth/Throat: Oropharynx is clear and moist.   Eyes: Conjunctivae are normal. No scleral icterus.   Neck: Normal range of motion. Neck supple.   Cardiovascular: Normal rate, regular rhythm and normal heart sounds.   Pulmonary/Chest: Effort normal and breath sounds normal.   Abdominal: Soft. Normal appearance and bowel sounds are normal. He exhibits no shifting dullness, no distension, no abdominal bruit and no mass. There is no tenderness. There is no rigidity, no rebound, no guarding, no tenderness at McBurney's point and negative Rubin's sign. No hernia.   Skin: Skin is warm and dry. Capillary refill takes less than 2 seconds. No rash noted. He is not diaphoretic. No erythema. No pallor.   Psychiatric: He has a normal mood and affect. His behavior is normal.   Nursing note and vitals reviewed.      Vitals:    02/20/19 1440   BP: 136/78   Pulse: 98   Resp: 16   Temp: 97.5 °F (36.4 °C)   SpO2: 98%         Assessment/Plan   Murali was seen today for diarrhea.    Diagnoses and all orders for this visit:    Diarrhea, unspecified type  -     Ova & Parasite Examination - Stool, Per Rectum; Future  -     Stool Culture - Stool, Per Rectum; Future  -     Clostridium Difficile Toxin - Stool, Per Rectum; Future  -     Fecal Leukocytes - Stool, Per Rectum; Future  -     saccharomyces boulardii (FLORASTOR) 250 MG capsule; Take 1 capsule by mouth 2 (Two) Times a Day.         Stool studies.   Will start on probiotic.   Return if symptoms worsen or fail to improve.  Plan of care discussed with pt. They verbalized understanding and agreement.

## 2019-02-22 ENCOUNTER — TELEPHONE (OUTPATIENT)
Dept: INTERNAL MEDICINE | Facility: CLINIC | Age: 84
End: 2019-02-22

## 2019-02-22 DIAGNOSIS — F32.A DEPRESSION, UNSPECIFIED DEPRESSION TYPE: Primary | ICD-10-CM

## 2019-02-22 DIAGNOSIS — K21.9 GASTROESOPHAGEAL REFLUX DISEASE, ESOPHAGITIS PRESENCE NOT SPECIFIED: ICD-10-CM

## 2019-02-22 RX ORDER — MIRTAZAPINE 30 MG/1
30 TABLET, FILM COATED ORAL NIGHTLY
Qty: 30 TABLET | Refills: 3 | Status: SHIPPED | OUTPATIENT
Start: 2019-02-22 | End: 2019-01-01 | Stop reason: SDUPTHER

## 2019-02-22 RX ORDER — OMEPRAZOLE 20 MG/1
CAPSULE, DELAYED RELEASE ORAL
Qty: 30 CAPSULE | Refills: 1 | Status: ON HOLD | OUTPATIENT
Start: 2019-02-22 | End: 2019-02-24

## 2019-02-24 ENCOUNTER — HOSPITAL ENCOUNTER (INPATIENT)
Facility: HOSPITAL | Age: 84
LOS: 3 days | Discharge: HOME HEALTH | End: 2019-02-27
Attending: EMERGENCY MEDICINE | Admitting: INTERNAL MEDICINE

## 2019-02-24 ENCOUNTER — APPOINTMENT (OUTPATIENT)
Dept: GENERAL RADIOLOGY | Facility: HOSPITAL | Age: 84
End: 2019-02-24

## 2019-02-24 ENCOUNTER — APPOINTMENT (OUTPATIENT)
Dept: CARDIOLOGY | Facility: HOSPITAL | Age: 84
End: 2019-02-24

## 2019-02-24 DIAGNOSIS — Z74.09 IMPAIRED FUNCTIONAL MOBILITY, BALANCE, GAIT, AND ENDURANCE: ICD-10-CM

## 2019-02-24 DIAGNOSIS — E86.9 VOLUME DEPLETION: ICD-10-CM

## 2019-02-24 DIAGNOSIS — G20 PARKINSON'S DISEASE (HCC): ICD-10-CM

## 2019-02-24 DIAGNOSIS — R06.02 SHORTNESS OF BREATH: ICD-10-CM

## 2019-02-24 DIAGNOSIS — I47.20 VENTRICULAR TACHYCARDIA (HCC): Primary | ICD-10-CM

## 2019-02-24 DIAGNOSIS — F02.80 LATE ONSET ALZHEIMER'S DISEASE WITHOUT BEHAVIORAL DISTURBANCE (HCC): ICD-10-CM

## 2019-02-24 DIAGNOSIS — R07.9 CHEST PAIN, UNSPECIFIED TYPE: ICD-10-CM

## 2019-02-24 DIAGNOSIS — G30.1 LATE ONSET ALZHEIMER'S DISEASE WITHOUT BEHAVIORAL DISTURBANCE (HCC): ICD-10-CM

## 2019-02-24 PROBLEM — R41.3 MEMORY LOSS: Status: RESOLVED | Noted: 2018-02-27 | Resolved: 2019-02-24

## 2019-02-24 PROBLEM — R94.39 ABNORMAL STRESS TEST: Status: RESOLVED | Noted: 2018-01-18 | Resolved: 2019-02-24

## 2019-02-24 PROBLEM — Z91.09 ENVIRONMENTAL ALLERGIES: Status: RESOLVED | Noted: 2018-10-17 | Resolved: 2019-02-24

## 2019-02-24 PROBLEM — R53.1 GENERALIZED WEAKNESS: Status: RESOLVED | Noted: 2017-12-08 | Resolved: 2019-02-24

## 2019-02-24 PROBLEM — R42 DIZZINESS: Status: RESOLVED | Noted: 2017-12-08 | Resolved: 2019-02-24

## 2019-02-24 PROBLEM — R19.7 DIARRHEA: Status: ACTIVE | Noted: 2019-02-24

## 2019-02-24 PROBLEM — I10 ESSENTIAL HYPERTENSION: Status: RESOLVED | Noted: 2018-10-17 | Resolved: 2019-02-24

## 2019-02-24 PROBLEM — K21.9 GASTROESOPHAGEAL REFLUX DISEASE: Status: RESOLVED | Noted: 2018-11-19 | Resolved: 2019-02-24

## 2019-02-24 PROBLEM — G45.9 TIA (TRANSIENT ISCHEMIC ATTACK): Status: RESOLVED | Noted: 2017-12-29 | Resolved: 2019-02-24

## 2019-02-24 PROBLEM — G47.33 OSA ON CPAP: Status: ACTIVE | Noted: 2019-02-24

## 2019-02-24 PROBLEM — N40.0 BENIGN PROSTATIC HYPERPLASIA WITHOUT LOWER URINARY TRACT SYMPTOMS: Status: RESOLVED | Noted: 2018-10-17 | Resolved: 2019-02-24

## 2019-02-24 PROBLEM — R26.89 IMPAIRMENT OF BALANCE: Status: RESOLVED | Noted: 2018-02-27 | Resolved: 2019-02-24

## 2019-02-24 PROBLEM — S70.02XA CONTUSION OF LEFT HIP: Status: RESOLVED | Noted: 2017-12-07 | Resolved: 2019-02-24

## 2019-02-24 PROBLEM — G20.A1 PARKINSON'S DISEASE: Status: ACTIVE | Noted: 2019-02-24

## 2019-02-24 PROBLEM — Z99.89 OSA ON CPAP: Status: ACTIVE | Noted: 2019-02-24

## 2019-02-24 LAB
ALBUMIN SERPL-MCNC: 3.73 G/DL (ref 3.2–4.8)
ALBUMIN SERPL-MCNC: 4.14 G/DL (ref 3.2–4.8)
ALBUMIN/GLOB SERPL: 1.6 G/DL (ref 1.5–2.5)
ALP SERPL-CCNC: 86 U/L (ref 25–100)
ALT SERPL W P-5'-P-CCNC: 7 U/L (ref 7–40)
ANION GAP SERPL CALCULATED.3IONS-SCNC: 10 MMOL/L (ref 3–11)
ANION GAP SERPL CALCULATED.3IONS-SCNC: 13 MMOL/L (ref 3–11)
APTT PPP: 124.7 SECONDS (ref 85–120)
AST SERPL-CCNC: 20 U/L (ref 0–33)
BASOPHILS # BLD AUTO: 0.02 10*3/MM3 (ref 0–0.2)
BASOPHILS NFR BLD AUTO: 0.3 % (ref 0–1)
BH CV ECHO MEAS - AI DEC SLOPE: 120.8 CM/SEC^2
BH CV ECHO MEAS - AI MAX PG: 19.2 MMHG
BH CV ECHO MEAS - AI MAX VEL: 219.4 CM/SEC
BH CV ECHO MEAS - AI P1/2T: 532 MSEC
BH CV ECHO MEAS - AO MAX PG (FULL): 2.6 MMHG
BH CV ECHO MEAS - AO MAX PG: 3.3 MMHG
BH CV ECHO MEAS - AO MEAN PG (FULL): 1.5 MMHG
BH CV ECHO MEAS - AO MEAN PG: 2 MMHG
BH CV ECHO MEAS - AO ROOT AREA (BSA CORRECTED): 1.9
BH CV ECHO MEAS - AO ROOT AREA: 10.7 CM^2
BH CV ECHO MEAS - AO ROOT DIAM: 3.7 CM
BH CV ECHO MEAS - AO V2 MAX: 91.2 CM/SEC
BH CV ECHO MEAS - AO V2 MEAN: 66.4 CM/SEC
BH CV ECHO MEAS - AO V2 VTI: 19.2 CM
BH CV ECHO MEAS - ASC AORTA: 3.4 CM
BH CV ECHO MEAS - AVA(I,A): 2 CM^2
BH CV ECHO MEAS - AVA(I,D): 2 CM^2
BH CV ECHO MEAS - AVA(V,A): 1.7 CM^2
BH CV ECHO MEAS - AVA(V,D): 1.7 CM^2
BH CV ECHO MEAS - BSA(HAYCOCK): 2 M^2
BH CV ECHO MEAS - BSA: 1.9 M^2
BH CV ECHO MEAS - BZI_BMI: 25.3 KILOGRAMS/M^2
BH CV ECHO MEAS - BZI_METRIC_HEIGHT: 175.3 CM
BH CV ECHO MEAS - BZI_METRIC_WEIGHT: 77.6 KG
BH CV ECHO MEAS - EDV(CUBED): 187.4 ML
BH CV ECHO MEAS - EDV(MOD-SP4): 152 ML
BH CV ECHO MEAS - EDV(TEICH): 161.5 ML
BH CV ECHO MEAS - EF(CUBED): 10.9 %
BH CV ECHO MEAS - EF(MOD-SP4): 27.6 %
BH CV ECHO MEAS - EF(TEICH): 8.5 %
BH CV ECHO MEAS - ESV(CUBED): 167 ML
BH CV ECHO MEAS - ESV(MOD-SP4): 110 ML
BH CV ECHO MEAS - ESV(TEICH): 147.8 ML
BH CV ECHO MEAS - FS: 3.8 %
BH CV ECHO MEAS - IVS/LVPW: 1.2
BH CV ECHO MEAS - IVSD: 1.2 CM
BH CV ECHO MEAS - LA DIMENSION: 4.2 CM
BH CV ECHO MEAS - LA/AO: 1.1
BH CV ECHO MEAS - LAD MAJOR: 6.8 CM
BH CV ECHO MEAS - LAT PEAK E' VEL: 7.5 CM/SEC
BH CV ECHO MEAS - LATERAL E/E' RATIO: 10.2
BH CV ECHO MEAS - LV DIASTOLIC VOL/BSA (35-75): 78.6 ML/M^2
BH CV ECHO MEAS - LV MASS(C)D: 241.7 GRAMS
BH CV ECHO MEAS - LV MASS(C)DI: 125.1 GRAMS/M^2
BH CV ECHO MEAS - LV MAX PG: 0.74 MMHG
BH CV ECHO MEAS - LV MEAN PG: 0.44 MMHG
BH CV ECHO MEAS - LV SYSTOLIC VOL/BSA (12-30): 56.9 ML/M^2
BH CV ECHO MEAS - LV V1 MAX: 42.9 CM/SEC
BH CV ECHO MEAS - LV V1 MEAN: 31 CM/SEC
BH CV ECHO MEAS - LV V1 VTI: 10.9 CM
BH CV ECHO MEAS - LVIDD: 5.7 CM
BH CV ECHO MEAS - LVIDS: 5.5 CM
BH CV ECHO MEAS - LVLD AP4: 8.3 CM
BH CV ECHO MEAS - LVLS AP4: 7.8 CM
BH CV ECHO MEAS - LVOT AREA (M): 3.5 CM^2
BH CV ECHO MEAS - LVOT AREA: 3.6 CM^2
BH CV ECHO MEAS - LVOT DIAM: 2.1 CM
BH CV ECHO MEAS - LVPWD: 0.93 CM
BH CV ECHO MEAS - MED PEAK E' VEL: 5.3 CM/SEC
BH CV ECHO MEAS - MEDIAL E/E' RATIO: 14.4
BH CV ECHO MEAS - MR ALIAS VEL: 30.1 CM/SEC
BH CV ECHO MEAS - MR ERO: 0.51 CM^2
BH CV ECHO MEAS - MR FLOW RATE: 185.8 CM^3/SEC
BH CV ECHO MEAS - MR MAX PG: 54 MMHG
BH CV ECHO MEAS - MR MAX VEL: 362.9 CM/SEC
BH CV ECHO MEAS - MR MEAN PG: 31.9 MMHG
BH CV ECHO MEAS - MR MEAN VEL: 256.7 CM/SEC
BH CV ECHO MEAS - MR PISA RADIUS: 0.99 CM
BH CV ECHO MEAS - MR PISA: 6.2 CM^2
BH CV ECHO MEAS - MR VOLUME: 64.5 ML
BH CV ECHO MEAS - MR VTI: 126.1 CM
BH CV ECHO MEAS - MV AREA (1 DIAM): 15 CM^2
BH CV ECHO MEAS - MV DEC TIME: 0.07 SEC
BH CV ECHO MEAS - MV DIAM: 4.4 CM
BH CV ECHO MEAS - MV E MAX VEL: 78 CM/SEC
BH CV ECHO MEAS - MV FLOW AREA(1DIAM): 15 CM^2
BH CV ECHO MEAS - PA ACC SLOPE: 338.4 CM/SEC^2
BH CV ECHO MEAS - PA ACC TIME: 0.1 SEC
BH CV ECHO MEAS - PA MAX PG: 0.91 MMHG
BH CV ECHO MEAS - PA PR(ACCEL): 33.1 MMHG
BH CV ECHO MEAS - PA V2 MAX: 47.7 CM/SEC
BH CV ECHO MEAS - RAP SYSTOLE: 8 MMHG
BH CV ECHO MEAS - RVDD: 2.8 CM
BH CV ECHO MEAS - RVSP: 35 MMHG
BH CV ECHO MEAS - SI(AO): 106.2 ML/M^2
BH CV ECHO MEAS - SI(CUBED): 10.6 ML/M^2
BH CV ECHO MEAS - SI(LVOT): 20.2 ML/M^2
BH CV ECHO MEAS - SI(MOD-SP4): 21.7 ML/M^2
BH CV ECHO MEAS - SI(TEICH): 7.1 ML/M^2
BH CV ECHO MEAS - SV(AO): 205.3 ML
BH CV ECHO MEAS - SV(CUBED): 20.4 ML
BH CV ECHO MEAS - SV(LVOT): 39 ML
BH CV ECHO MEAS - SV(MOD-SP4): 42 ML
BH CV ECHO MEAS - SV(TEICH): 13.7 ML
BH CV ECHO MEAS - TAPSE (>1.6): 1.6 CM2
BH CV ECHO MEAS - TR MAX PG: 27 MMHG
BH CV ECHO MEAS - TR MAX VEL: 258.2 CM/SEC
BH CV ECHO MEAS - TV MAX PG: 0.81 MMHG
BH CV ECHO MEAS - TV V2 MAX: 44.9 CM/SEC
BH CV ECHO MEASUREMENTS AVERAGE E/E' RATIO: 12.19
BH CV XLRA - RV BASE: 4.3 CM
BH CV XLRA - RV LENGTH: 5.1 CM
BH CV XLRA - RV MID: 3.2 CM
BH CV XLRA - TDI S': 7.35 CM/SEC
BILIRUB SERPL-MCNC: 0.8 MG/DL (ref 0.3–1.2)
BNP SERPL-MCNC: 533 PG/ML (ref 0–100)
BUN BLD-MCNC: 26 MG/DL (ref 9–23)
BUN BLD-MCNC: 27 MG/DL (ref 9–23)
BUN/CREAT SERPL: 11.7 (ref 7–25)
BUN/CREAT SERPL: 12.4 (ref 7–25)
CALCIUM SPEC-SCNC: 8.5 MG/DL (ref 8.7–10.4)
CALCIUM SPEC-SCNC: 9.3 MG/DL (ref 8.7–10.4)
CHLORIDE SERPL-SCNC: 106 MMOL/L (ref 99–109)
CHLORIDE SERPL-SCNC: 109 MMOL/L (ref 99–109)
CO2 SERPL-SCNC: 19 MMOL/L (ref 20–31)
CO2 SERPL-SCNC: 19 MMOL/L (ref 20–31)
CREAT BLD-MCNC: 2.1 MG/DL (ref 0.6–1.3)
CREAT BLD-MCNC: 2.3 MG/DL (ref 0.6–1.3)
DEPRECATED RDW RBC AUTO: 47.9 FL (ref 37–54)
EOSINOPHIL # BLD AUTO: 0.15 10*3/MM3 (ref 0–0.3)
EOSINOPHIL NFR BLD AUTO: 2.3 % (ref 0–3)
ERYTHROCYTE [DISTWIDTH] IN BLOOD BY AUTOMATED COUNT: 13.1 % (ref 11.3–14.5)
GFR SERPL CREATININE-BSD FRML MDRD: 27 ML/MIN/1.73
GFR SERPL CREATININE-BSD FRML MDRD: 30 ML/MIN/1.73
GLOBULIN UR ELPH-MCNC: 2.6 GM/DL
GLUCOSE BLD-MCNC: 151 MG/DL (ref 70–100)
GLUCOSE BLD-MCNC: 154 MG/DL (ref 70–100)
GLUCOSE BLDC GLUCOMTR-MCNC: 114 MG/DL (ref 70–130)
GLUCOSE BLDC GLUCOMTR-MCNC: 117 MG/DL (ref 70–130)
GLUCOSE BLDC GLUCOMTR-MCNC: 134 MG/DL (ref 70–130)
GLUCOSE BLDC GLUCOMTR-MCNC: 169 MG/DL (ref 70–130)
HCT VFR BLD AUTO: 38.5 % (ref 38.9–50.9)
HGB BLD-MCNC: 12.6 G/DL (ref 13.1–17.5)
HOLD SPECIMEN: NORMAL
HOLD SPECIMEN: NORMAL
IMM GRANULOCYTES # BLD AUTO: 0.01 10*3/MM3 (ref 0–0.05)
IMM GRANULOCYTES NFR BLD AUTO: 0.2 % (ref 0–0.6)
INR PPP: 1.2 (ref 0.85–1.16)
LYMPHOCYTES # BLD AUTO: 1.29 10*3/MM3 (ref 0.6–4.8)
LYMPHOCYTES NFR BLD AUTO: 20.1 % (ref 24–44)
MAGNESIUM SERPL-MCNC: 1.8 MG/DL (ref 1.3–2.7)
MCH RBC QN AUTO: 33 PG (ref 27–31)
MCHC RBC AUTO-ENTMCNC: 32.7 G/DL (ref 32–36)
MCV RBC AUTO: 100.8 FL (ref 80–99)
MONOCYTES # BLD AUTO: 0.51 10*3/MM3 (ref 0–1)
MONOCYTES NFR BLD AUTO: 8 % (ref 0–12)
NEUTROPHILS # BLD AUTO: 4.44 10*3/MM3 (ref 1.5–8.3)
NEUTROPHILS NFR BLD AUTO: 69.3 % (ref 41–71)
PHOSPHATE SERPL-MCNC: 3.7 MG/DL (ref 2.4–5.1)
PLATELET # BLD AUTO: 164 10*3/MM3 (ref 150–450)
PMV BLD AUTO: 11 FL (ref 6–12)
POTASSIUM BLD-SCNC: 4 MMOL/L (ref 3.5–5.5)
POTASSIUM BLD-SCNC: 4.2 MMOL/L (ref 3.5–5.5)
PROT SERPL-MCNC: 6.7 G/DL (ref 5.7–8.2)
PROTHROMBIN TIME: 14.6 SECONDS (ref 11.2–14.3)
RBC # BLD AUTO: 3.82 10*6/MM3 (ref 4.2–5.76)
SODIUM BLD-SCNC: 138 MMOL/L (ref 132–146)
SODIUM BLD-SCNC: 138 MMOL/L (ref 132–146)
TROPONIN I SERPL-MCNC: 0.05 NG/ML (ref 0–0.07)
TROPONIN I SERPL-MCNC: 0.1 NG/ML
TROPONIN I SERPL-MCNC: 1.55 NG/ML
TROPONIN I SERPL-MCNC: 1.73 NG/ML
TROPONIN I SERPL-MCNC: 2.2 NG/ML
TSH SERPL DL<=0.05 MIU/L-ACNC: 1.98 MIU/ML (ref 0.35–5.35)
UFH PPP CHRO-ACNC: 0.35 IU/ML (ref 0.3–0.7)
UFH PPP CHRO-ACNC: 0.62 IU/ML (ref 0.3–0.7)
WBC NRBC COR # BLD: 6.41 10*3/MM3 (ref 3.5–10.8)
WHOLE BLOOD HOLD SPECIMEN: NORMAL
WHOLE BLOOD HOLD SPECIMEN: NORMAL

## 2019-02-24 PROCEDURE — 84443 ASSAY THYROID STIM HORMONE: CPT | Performed by: EMERGENCY MEDICINE

## 2019-02-24 PROCEDURE — 93306 TTE W/DOPPLER COMPLETE: CPT

## 2019-02-24 PROCEDURE — 93306 TTE W/DOPPLER COMPLETE: CPT | Performed by: INTERNAL MEDICINE

## 2019-02-24 PROCEDURE — 83735 ASSAY OF MAGNESIUM: CPT | Performed by: EMERGENCY MEDICINE

## 2019-02-24 PROCEDURE — 25010000002 HEPARIN (PORCINE) PER 1000 UNITS: Performed by: EMERGENCY MEDICINE

## 2019-02-24 PROCEDURE — 71045 X-RAY EXAM CHEST 1 VIEW: CPT

## 2019-02-24 PROCEDURE — 94799 UNLISTED PULMONARY SVC/PX: CPT

## 2019-02-24 PROCEDURE — 99291 CRITICAL CARE FIRST HOUR: CPT

## 2019-02-24 PROCEDURE — 85520 HEPARIN ASSAY: CPT

## 2019-02-24 PROCEDURE — 85610 PROTHROMBIN TIME: CPT | Performed by: EMERGENCY MEDICINE

## 2019-02-24 PROCEDURE — 99222 1ST HOSP IP/OBS MODERATE 55: CPT | Performed by: INTERNAL MEDICINE

## 2019-02-24 PROCEDURE — 25010000002 AMIODARONE PER 30 MG

## 2019-02-24 PROCEDURE — 94660 CPAP INITIATION&MGMT: CPT

## 2019-02-24 PROCEDURE — 85730 THROMBOPLASTIN TIME PARTIAL: CPT | Performed by: EMERGENCY MEDICINE

## 2019-02-24 PROCEDURE — 99291 CRITICAL CARE FIRST HOUR: CPT | Performed by: INTERNAL MEDICINE

## 2019-02-24 PROCEDURE — 93010 ELECTROCARDIOGRAM REPORT: CPT | Performed by: INTERNAL MEDICINE

## 2019-02-24 PROCEDURE — 25010000002 SULFUR HEXAFLUORIDE MICROSPH 60.7-25 MG RECONSTITUTED SUSPENSION: Performed by: INTERNAL MEDICINE

## 2019-02-24 PROCEDURE — 84484 ASSAY OF TROPONIN QUANT: CPT

## 2019-02-24 PROCEDURE — 80069 RENAL FUNCTION PANEL: CPT | Performed by: INTERNAL MEDICINE

## 2019-02-24 PROCEDURE — 85025 COMPLETE CBC W/AUTO DIFF WBC: CPT | Performed by: EMERGENCY MEDICINE

## 2019-02-24 PROCEDURE — 83880 ASSAY OF NATRIURETIC PEPTIDE: CPT | Performed by: EMERGENCY MEDICINE

## 2019-02-24 PROCEDURE — 93005 ELECTROCARDIOGRAM TRACING: CPT | Performed by: EMERGENCY MEDICINE

## 2019-02-24 PROCEDURE — 93005 ELECTROCARDIOGRAM TRACING: CPT | Performed by: INTERNAL MEDICINE

## 2019-02-24 PROCEDURE — 80053 COMPREHEN METABOLIC PANEL: CPT | Performed by: EMERGENCY MEDICINE

## 2019-02-24 PROCEDURE — 82962 GLUCOSE BLOOD TEST: CPT

## 2019-02-24 PROCEDURE — 84484 ASSAY OF TROPONIN QUANT: CPT | Performed by: INTERNAL MEDICINE

## 2019-02-24 PROCEDURE — 25010000002 AMIODARONE IN DEXTROSE 5% 360-4.14 MG/200ML-% SOLUTION: Performed by: EMERGENCY MEDICINE

## 2019-02-24 RX ORDER — MAGNESIUM SULFATE HEPTAHYDRATE 40 MG/ML
2 INJECTION, SOLUTION INTRAVENOUS AS NEEDED
Status: DISCONTINUED | OUTPATIENT
Start: 2019-02-24 | End: 2019-02-27 | Stop reason: HOSPADM

## 2019-02-24 RX ORDER — SODIUM CHLORIDE 9 MG/ML
100 INJECTION, SOLUTION INTRAVENOUS CONTINUOUS
Status: DISCONTINUED | OUTPATIENT
Start: 2019-02-24 | End: 2019-02-24

## 2019-02-24 RX ORDER — SODIUM CHLORIDE 0.9 % (FLUSH) 0.9 %
10 SYRINGE (ML) INJECTION AS NEEDED
Status: DISCONTINUED | OUTPATIENT
Start: 2019-02-24 | End: 2019-02-27 | Stop reason: HOSPADM

## 2019-02-24 RX ORDER — POTASSIUM CHLORIDE 750 MG/1
40 CAPSULE, EXTENDED RELEASE ORAL AS NEEDED
Status: DISCONTINUED | OUTPATIENT
Start: 2019-02-24 | End: 2019-02-27 | Stop reason: HOSPADM

## 2019-02-24 RX ORDER — ASPIRIN 81 MG/1
81 TABLET ORAL DAILY
Status: DISCONTINUED | OUTPATIENT
Start: 2019-02-24 | End: 2019-02-27 | Stop reason: HOSPADM

## 2019-02-24 RX ORDER — LEVOTHYROXINE SODIUM 0.07 MG/1
75 TABLET ORAL
Status: DISCONTINUED | OUTPATIENT
Start: 2019-02-24 | End: 2019-02-27 | Stop reason: HOSPADM

## 2019-02-24 RX ORDER — ACETAMINOPHEN 325 MG/1
650 TABLET ORAL NIGHTLY PRN
COMMUNITY

## 2019-02-24 RX ORDER — LOPERAMIDE HYDROCHLORIDE 2 MG/1
2 CAPSULE ORAL 4 TIMES DAILY PRN
Status: DISCONTINUED | OUTPATIENT
Start: 2019-02-24 | End: 2019-02-27 | Stop reason: HOSPADM

## 2019-02-24 RX ORDER — ACETAMINOPHEN 325 MG/1
650 TABLET ORAL EVERY 4 HOURS PRN
Status: DISCONTINUED | OUTPATIENT
Start: 2019-02-24 | End: 2019-02-27 | Stop reason: HOSPADM

## 2019-02-24 RX ORDER — POTASSIUM CHLORIDE 1.5 G/1.77G
40 POWDER, FOR SOLUTION ORAL AS NEEDED
Status: DISCONTINUED | OUTPATIENT
Start: 2019-02-24 | End: 2019-02-27 | Stop reason: HOSPADM

## 2019-02-24 RX ORDER — NALOXONE HCL 0.4 MG/ML
0.4 VIAL (ML) INJECTION
Status: DISCONTINUED | OUTPATIENT
Start: 2019-02-24 | End: 2019-02-27 | Stop reason: HOSPADM

## 2019-02-24 RX ORDER — AMIODARONE HYDROCHLORIDE 50 MG/ML
150 INJECTION, SOLUTION INTRAVENOUS ONCE
Status: COMPLETED | OUTPATIENT
Start: 2019-02-24 | End: 2019-02-24

## 2019-02-24 RX ORDER — SACCHAROMYCES BOULARDII 250 MG
250 CAPSULE ORAL 2 TIMES DAILY
Status: DISCONTINUED | OUTPATIENT
Start: 2019-02-24 | End: 2019-02-27 | Stop reason: HOSPADM

## 2019-02-24 RX ORDER — HEPARIN SODIUM 1000 [USP'U]/ML
30 INJECTION, SOLUTION INTRAVENOUS; SUBCUTANEOUS AS NEEDED
Status: DISCONTINUED | OUTPATIENT
Start: 2019-02-24 | End: 2019-02-24

## 2019-02-24 RX ORDER — CALCIUM CARBONATE 200(500)MG
2 TABLET,CHEWABLE ORAL 2 TIMES DAILY PRN
Status: DISCONTINUED | OUTPATIENT
Start: 2019-02-24 | End: 2019-02-27 | Stop reason: HOSPADM

## 2019-02-24 RX ORDER — NITROGLYCERIN 0.4 MG/1
0.4 TABLET SUBLINGUAL
Status: DISCONTINUED | OUTPATIENT
Start: 2019-02-24 | End: 2019-02-27 | Stop reason: HOSPADM

## 2019-02-24 RX ORDER — MIDODRINE HYDROCHLORIDE 5 MG/1
10 TABLET ORAL EVERY 8 HOURS
Status: DISCONTINUED | OUTPATIENT
Start: 2019-02-24 | End: 2019-02-27 | Stop reason: HOSPADM

## 2019-02-24 RX ORDER — CLOPIDOGREL BISULFATE 75 MG/1
75 TABLET ORAL DAILY
Status: DISCONTINUED | OUTPATIENT
Start: 2019-02-24 | End: 2019-02-27 | Stop reason: HOSPADM

## 2019-02-24 RX ORDER — MAGNESIUM SULFATE HEPTAHYDRATE 40 MG/ML
4 INJECTION, SOLUTION INTRAVENOUS AS NEEDED
Status: DISCONTINUED | OUTPATIENT
Start: 2019-02-24 | End: 2019-02-27 | Stop reason: HOSPADM

## 2019-02-24 RX ORDER — POTASSIUM CHLORIDE 7.45 MG/ML
10 INJECTION INTRAVENOUS
Status: DISCONTINUED | OUTPATIENT
Start: 2019-02-24 | End: 2019-02-27 | Stop reason: HOSPADM

## 2019-02-24 RX ORDER — PANTOPRAZOLE SODIUM 40 MG/1
40 TABLET, DELAYED RELEASE ORAL
Status: DISCONTINUED | OUTPATIENT
Start: 2019-02-24 | End: 2019-02-27 | Stop reason: HOSPADM

## 2019-02-24 RX ORDER — HEPARIN SODIUM 1000 [USP'U]/ML
4000 INJECTION, SOLUTION INTRAVENOUS; SUBCUTANEOUS ONCE
Status: COMPLETED | OUTPATIENT
Start: 2019-02-24 | End: 2019-02-24

## 2019-02-24 RX ORDER — HEPARIN SODIUM 1000 [USP'U]/ML
60 INJECTION, SOLUTION INTRAVENOUS; SUBCUTANEOUS AS NEEDED
Status: DISCONTINUED | OUTPATIENT
Start: 2019-02-24 | End: 2019-02-24

## 2019-02-24 RX ORDER — SODIUM CHLORIDE 0.9 % (FLUSH) 0.9 %
3-10 SYRINGE (ML) INJECTION AS NEEDED
Status: DISCONTINUED | OUTPATIENT
Start: 2019-02-24 | End: 2019-02-27 | Stop reason: HOSPADM

## 2019-02-24 RX ORDER — MORPHINE SULFATE 2 MG/ML
1 INJECTION, SOLUTION INTRAMUSCULAR; INTRAVENOUS EVERY 4 HOURS PRN
Status: DISCONTINUED | OUTPATIENT
Start: 2019-02-24 | End: 2019-02-27 | Stop reason: HOSPADM

## 2019-02-24 RX ORDER — OMEPRAZOLE 40 MG/1
40 CAPSULE, DELAYED RELEASE ORAL DAILY
COMMUNITY
End: 2019-04-26 | Stop reason: DRUGHIGH

## 2019-02-24 RX ORDER — SODIUM CHLORIDE 0.9 % (FLUSH) 0.9 %
3 SYRINGE (ML) INJECTION EVERY 12 HOURS SCHEDULED
Status: DISCONTINUED | OUTPATIENT
Start: 2019-02-24 | End: 2019-02-27 | Stop reason: HOSPADM

## 2019-02-24 RX ORDER — AMIODARONE HYDROCHLORIDE 50 MG/ML
INJECTION, SOLUTION INTRAVENOUS
Status: COMPLETED
Start: 2019-02-24 | End: 2019-02-24

## 2019-02-24 RX ORDER — MIRTAZAPINE 15 MG/1
30 TABLET, FILM COATED ORAL NIGHTLY
Status: DISCONTINUED | OUTPATIENT
Start: 2019-02-24 | End: 2019-02-27 | Stop reason: HOSPADM

## 2019-02-24 RX ORDER — MEMANTINE HYDROCHLORIDE 10 MG/1
10 TABLET ORAL 2 TIMES DAILY
Status: DISCONTINUED | OUTPATIENT
Start: 2019-02-24 | End: 2019-02-27 | Stop reason: HOSPADM

## 2019-02-24 RX ADMIN — MIDODRINE HYDROCHLORIDE 10 MG: 5 TABLET ORAL at 12:09

## 2019-02-24 RX ADMIN — LIDOCAINE HYDROCHLORIDE: 20 JELLY TOPICAL at 18:25

## 2019-02-24 RX ADMIN — AMIODARONE HYDROCHLORIDE 1 MG/MIN: 1.8 INJECTION, SOLUTION INTRAVENOUS at 18:45

## 2019-02-24 RX ADMIN — CARBIDOPA AND LEVODOPA 1 TABLET: 25; 100 TABLET ORAL at 21:04

## 2019-02-24 RX ADMIN — AMIODARONE HYDROCHLORIDE 150 MG: 50 INJECTION, SOLUTION INTRAVENOUS at 01:35

## 2019-02-24 RX ADMIN — MIDODRINE HYDROCHLORIDE 10 MG: 5 TABLET ORAL at 21:04

## 2019-02-24 RX ADMIN — MEMANTINE 10 MG: 10 TABLET ORAL at 21:04

## 2019-02-24 RX ADMIN — MIRTAZAPINE 30 MG: 15 TABLET, FILM COATED ORAL at 21:04

## 2019-02-24 RX ADMIN — SODIUM CHLORIDE, PRESERVATIVE FREE 3 ML: 5 INJECTION INTRAVENOUS at 09:04

## 2019-02-24 RX ADMIN — HEPARIN SODIUM 12 UNITS/KG/HR: 10000 INJECTION, SOLUTION INTRAVENOUS at 03:17

## 2019-02-24 RX ADMIN — MAGNESIUM SULFATE HEPTAHYDRATE 4 G: 40 INJECTION, SOLUTION INTRAVENOUS at 10:37

## 2019-02-24 RX ADMIN — ASPIRIN 81 MG: 81 TABLET, COATED ORAL at 09:04

## 2019-02-24 RX ADMIN — CARBIDOPA AND LEVODOPA 1 TABLET: 25; 100 TABLET ORAL at 09:04

## 2019-02-24 RX ADMIN — Medication 250 MG: at 09:04

## 2019-02-24 RX ADMIN — PANTOPRAZOLE SODIUM 40 MG: 40 TABLET, DELAYED RELEASE ORAL at 09:04

## 2019-02-24 RX ADMIN — LEVOTHYROXINE SODIUM 75 MCG: 75 TABLET ORAL at 09:04

## 2019-02-24 RX ADMIN — HEPARIN SODIUM 4000 UNITS: 1000 INJECTION, SOLUTION INTRAVENOUS; SUBCUTANEOUS at 03:16

## 2019-02-24 RX ADMIN — SODIUM CHLORIDE 1000 ML: 9 INJECTION, SOLUTION INTRAVENOUS at 01:54

## 2019-02-24 RX ADMIN — CARBIDOPA AND LEVODOPA 1 TABLET: 25; 100 TABLET ORAL at 16:11

## 2019-02-24 RX ADMIN — SODIUM CHLORIDE 100 ML/HR: 9 INJECTION, SOLUTION INTRAVENOUS at 05:39

## 2019-02-24 RX ADMIN — Medication 250 MG: at 21:04

## 2019-02-24 RX ADMIN — SULFUR HEXAFLUORIDE 2 ML: KIT at 11:00

## 2019-02-24 RX ADMIN — CLOPIDOGREL 75 MG: 75 TABLET, FILM COATED ORAL at 09:04

## 2019-02-24 RX ADMIN — AMIODARONE HYDROCHLORIDE 1 MG/MIN: 1.8 INJECTION, SOLUTION INTRAVENOUS at 07:14

## 2019-02-24 RX ADMIN — AMIODARONE HYDROCHLORIDE 1 MG/MIN: 1.8 INJECTION, SOLUTION INTRAVENOUS at 01:54

## 2019-02-24 RX ADMIN — MEMANTINE 10 MG: 10 TABLET ORAL at 09:04

## 2019-02-24 RX ADMIN — AMIODARONE HYDROCHLORIDE 1 MG/MIN: 1.8 INJECTION, SOLUTION INTRAVENOUS at 13:16

## 2019-02-25 LAB
ALBUMIN SERPL-MCNC: 3.54 G/DL (ref 3.2–4.8)
ALBUMIN/GLOB SERPL: 1.6 G/DL (ref 1.5–2.5)
ALP SERPL-CCNC: 75 U/L (ref 25–100)
ALT SERPL W P-5'-P-CCNC: 6 U/L (ref 7–40)
ANION GAP SERPL CALCULATED.3IONS-SCNC: 7 MMOL/L (ref 3–11)
AST SERPL-CCNC: 21 U/L (ref 0–33)
BASOPHILS # BLD AUTO: 0.02 10*3/MM3 (ref 0–0.2)
BASOPHILS NFR BLD AUTO: 0.3 % (ref 0–1)
BILIRUB SERPL-MCNC: 0.3 MG/DL (ref 0.3–1.2)
BNP SERPL-MCNC: 650 PG/ML (ref 0–100)
BUN BLD-MCNC: 33 MG/DL (ref 9–23)
BUN/CREAT SERPL: 16.3 (ref 7–25)
CALCIUM SPEC-SCNC: 8.3 MG/DL (ref 8.7–10.4)
CHLORIDE SERPL-SCNC: 104 MMOL/L (ref 99–109)
CO2 SERPL-SCNC: 23 MMOL/L (ref 20–31)
CREAT BLD-MCNC: 2.03 MG/DL (ref 0.6–1.3)
D-LACTATE SERPL-SCNC: 1.7 MMOL/L (ref 0.5–2)
DEPRECATED RDW RBC AUTO: 48.3 FL (ref 37–54)
EOSINOPHIL # BLD AUTO: 0.35 10*3/MM3 (ref 0–0.3)
EOSINOPHIL NFR BLD AUTO: 4.7 % (ref 0–3)
ERYTHROCYTE [DISTWIDTH] IN BLOOD BY AUTOMATED COUNT: 13.2 % (ref 11.3–14.5)
GFR SERPL CREATININE-BSD FRML MDRD: 31 ML/MIN/1.73
GLOBULIN UR ELPH-MCNC: 2.2 GM/DL
GLUCOSE BLD-MCNC: 124 MG/DL (ref 70–100)
GLUCOSE BLDC GLUCOMTR-MCNC: 102 MG/DL (ref 70–130)
HCT VFR BLD AUTO: 32.5 % (ref 38.9–50.9)
HGB BLD-MCNC: 10.7 G/DL (ref 13.1–17.5)
IMM GRANULOCYTES # BLD AUTO: 0.02 10*3/MM3 (ref 0–0.05)
IMM GRANULOCYTES NFR BLD AUTO: 0.3 % (ref 0–0.6)
LYMPHOCYTES # BLD AUTO: 1.47 10*3/MM3 (ref 0.6–4.8)
LYMPHOCYTES NFR BLD AUTO: 19.8 % (ref 24–44)
MAGNESIUM SERPL-MCNC: 2.4 MG/DL (ref 1.3–2.7)
MCH RBC QN AUTO: 33 PG (ref 27–31)
MCHC RBC AUTO-ENTMCNC: 32.9 G/DL (ref 32–36)
MCV RBC AUTO: 100.3 FL (ref 80–99)
MONOCYTES # BLD AUTO: 0.64 10*3/MM3 (ref 0–1)
MONOCYTES NFR BLD AUTO: 8.6 % (ref 0–12)
NEUTROPHILS # BLD AUTO: 4.95 10*3/MM3 (ref 1.5–8.3)
NEUTROPHILS NFR BLD AUTO: 66.6 % (ref 41–71)
PHOSPHATE SERPL-MCNC: 3.9 MG/DL (ref 2.4–5.1)
PLATELET # BLD AUTO: 157 10*3/MM3 (ref 150–450)
PMV BLD AUTO: 11.2 FL (ref 6–12)
POTASSIUM BLD-SCNC: 4 MMOL/L (ref 3.5–5.5)
PROT SERPL-MCNC: 5.7 G/DL (ref 5.7–8.2)
RBC # BLD AUTO: 3.24 10*6/MM3 (ref 4.2–5.76)
SODIUM BLD-SCNC: 134 MMOL/L (ref 132–146)
TROPONIN I SERPL-MCNC: 1.66 NG/ML
UFH PPP CHRO-ACNC: 0.35 IU/ML (ref 0.3–0.7)
WBC NRBC COR # BLD: 7.43 10*3/MM3 (ref 3.5–10.8)

## 2019-02-25 PROCEDURE — 25010000002 AMIODARONE IN DEXTROSE 5% 360-4.14 MG/200ML-% SOLUTION: Performed by: EMERGENCY MEDICINE

## 2019-02-25 PROCEDURE — 94799 UNLISTED PULMONARY SVC/PX: CPT

## 2019-02-25 PROCEDURE — 25010000002 HEPARIN (PORCINE) PER 1000 UNITS

## 2019-02-25 PROCEDURE — 99233 SBSQ HOSP IP/OBS HIGH 50: CPT | Performed by: INTERNAL MEDICINE

## 2019-02-25 PROCEDURE — 85025 COMPLETE CBC W/AUTO DIFF WBC: CPT | Performed by: INTERNAL MEDICINE

## 2019-02-25 PROCEDURE — 82962 GLUCOSE BLOOD TEST: CPT

## 2019-02-25 PROCEDURE — 25010000002 HEPARIN (PORCINE) IN NACL 25000-0.45 UT/250ML-% SOLUTION: Performed by: EMERGENCY MEDICINE

## 2019-02-25 PROCEDURE — 84100 ASSAY OF PHOSPHORUS: CPT | Performed by: INTERNAL MEDICINE

## 2019-02-25 PROCEDURE — 80053 COMPREHEN METABOLIC PANEL: CPT | Performed by: INTERNAL MEDICINE

## 2019-02-25 PROCEDURE — 83880 ASSAY OF NATRIURETIC PEPTIDE: CPT | Performed by: INTERNAL MEDICINE

## 2019-02-25 PROCEDURE — 94660 CPAP INITIATION&MGMT: CPT

## 2019-02-25 PROCEDURE — 85520 HEPARIN ASSAY: CPT

## 2019-02-25 PROCEDURE — 84484 ASSAY OF TROPONIN QUANT: CPT | Performed by: INTERNAL MEDICINE

## 2019-02-25 PROCEDURE — 83735 ASSAY OF MAGNESIUM: CPT | Performed by: INTERNAL MEDICINE

## 2019-02-25 PROCEDURE — 83605 ASSAY OF LACTIC ACID: CPT | Performed by: INTERNAL MEDICINE

## 2019-02-25 RX ORDER — AMIODARONE HYDROCHLORIDE 200 MG/1
200 TABLET ORAL EVERY 8 HOURS SCHEDULED
Status: DISCONTINUED | OUTPATIENT
Start: 2019-02-25 | End: 2019-02-27

## 2019-02-25 RX ORDER — HEPARIN SODIUM 5000 [USP'U]/.5ML
5000 INJECTION, SOLUTION INTRAVENOUS; SUBCUTANEOUS EVERY 8 HOURS SCHEDULED
Status: DISCONTINUED | OUTPATIENT
Start: 2019-02-25 | End: 2019-02-25

## 2019-02-25 RX ORDER — HEPARIN SODIUM 5000 [USP'U]/ML
5000 INJECTION, SOLUTION INTRAVENOUS; SUBCUTANEOUS EVERY 8 HOURS SCHEDULED
Status: DISCONTINUED | OUTPATIENT
Start: 2019-02-25 | End: 2019-02-27 | Stop reason: HOSPADM

## 2019-02-25 RX ORDER — AMIODARONE HYDROCHLORIDE 200 MG/1
200 TABLET ORAL EVERY 12 HOURS SCHEDULED
Status: DISCONTINUED | OUTPATIENT
Start: 2019-02-25 | End: 2019-02-25

## 2019-02-25 RX ADMIN — AMIODARONE HYDROCHLORIDE 200 MG: 200 TABLET ORAL at 10:27

## 2019-02-25 RX ADMIN — CARBIDOPA AND LEVODOPA 1 TABLET: 25; 100 TABLET ORAL at 08:08

## 2019-02-25 RX ADMIN — MIRTAZAPINE 30 MG: 15 TABLET, FILM COATED ORAL at 20:24

## 2019-02-25 RX ADMIN — Medication 250 MG: at 20:24

## 2019-02-25 RX ADMIN — SODIUM CHLORIDE, PRESERVATIVE FREE 3 ML: 5 INJECTION INTRAVENOUS at 08:08

## 2019-02-25 RX ADMIN — HEPARIN SODIUM 5000 UNITS: 5000 INJECTION INTRAVENOUS; SUBCUTANEOUS at 14:51

## 2019-02-25 RX ADMIN — AMIODARONE HYDROCHLORIDE 200 MG: 200 TABLET ORAL at 23:14

## 2019-02-25 RX ADMIN — MEMANTINE 10 MG: 10 TABLET ORAL at 08:08

## 2019-02-25 RX ADMIN — MIDODRINE HYDROCHLORIDE 10 MG: 5 TABLET ORAL at 20:24

## 2019-02-25 RX ADMIN — HEPARIN SODIUM 12 UNITS/KG/HR: 10000 INJECTION, SOLUTION INTRAVENOUS at 03:37

## 2019-02-25 RX ADMIN — MIDODRINE HYDROCHLORIDE 10 MG: 5 TABLET ORAL at 06:00

## 2019-02-25 RX ADMIN — CARBIDOPA AND LEVODOPA 1 TABLET: 25; 100 TABLET ORAL at 20:24

## 2019-02-25 RX ADMIN — ASPIRIN 81 MG: 81 TABLET, COATED ORAL at 08:08

## 2019-02-25 RX ADMIN — PANTOPRAZOLE SODIUM 40 MG: 40 TABLET, DELAYED RELEASE ORAL at 06:00

## 2019-02-25 RX ADMIN — MEMANTINE 10 MG: 10 TABLET ORAL at 20:24

## 2019-02-25 RX ADMIN — Medication 250 MG: at 08:08

## 2019-02-25 RX ADMIN — AMIODARONE HYDROCHLORIDE 1 MG/MIN: 1.8 INJECTION, SOLUTION INTRAVENOUS at 06:07

## 2019-02-25 RX ADMIN — AMIODARONE HYDROCHLORIDE 1 MG/MIN: 1.8 INJECTION, SOLUTION INTRAVENOUS at 00:31

## 2019-02-25 RX ADMIN — LEVOTHYROXINE SODIUM 75 MCG: 75 TABLET ORAL at 06:01

## 2019-02-25 RX ADMIN — CARBIDOPA AND LEVODOPA 1 TABLET: 25; 100 TABLET ORAL at 16:50

## 2019-02-25 RX ADMIN — HEPARIN SODIUM 5000 UNITS: 5000 INJECTION INTRAVENOUS; SUBCUTANEOUS at 23:14

## 2019-02-25 RX ADMIN — MIDODRINE HYDROCHLORIDE 10 MG: 5 TABLET ORAL at 12:04

## 2019-02-25 RX ADMIN — CLOPIDOGREL 75 MG: 75 TABLET, FILM COATED ORAL at 08:08

## 2019-02-26 PROBLEM — I42.9 CARDIOMYOPATHY (HCC): Status: ACTIVE | Noted: 2019-02-26

## 2019-02-26 LAB
GLUCOSE BLDC GLUCOMTR-MCNC: 152 MG/DL (ref 70–130)
GLUCOSE BLDC GLUCOMTR-MCNC: 94 MG/DL (ref 70–130)

## 2019-02-26 PROCEDURE — 25010000002 HEPARIN (PORCINE) PER 1000 UNITS

## 2019-02-26 PROCEDURE — 25010000002 FUROSEMIDE PER 20 MG: Performed by: PHYSICIAN ASSISTANT

## 2019-02-26 PROCEDURE — 82962 GLUCOSE BLOOD TEST: CPT

## 2019-02-26 PROCEDURE — 99232 SBSQ HOSP IP/OBS MODERATE 35: CPT | Performed by: INTERNAL MEDICINE

## 2019-02-26 PROCEDURE — 94799 UNLISTED PULMONARY SVC/PX: CPT

## 2019-02-26 PROCEDURE — 94660 CPAP INITIATION&MGMT: CPT

## 2019-02-26 RX ORDER — FUROSEMIDE 10 MG/ML
40 INJECTION INTRAMUSCULAR; INTRAVENOUS ONCE
Status: COMPLETED | OUTPATIENT
Start: 2019-02-26 | End: 2019-02-26

## 2019-02-26 RX ADMIN — PANTOPRAZOLE SODIUM 40 MG: 40 TABLET, DELAYED RELEASE ORAL at 05:46

## 2019-02-26 RX ADMIN — CARBIDOPA AND LEVODOPA 1 TABLET: 25; 100 TABLET ORAL at 16:26

## 2019-02-26 RX ADMIN — MIRTAZAPINE 30 MG: 15 TABLET, FILM COATED ORAL at 20:16

## 2019-02-26 RX ADMIN — HEPARIN SODIUM 5000 UNITS: 5000 INJECTION INTRAVENOUS; SUBCUTANEOUS at 05:47

## 2019-02-26 RX ADMIN — MIDODRINE HYDROCHLORIDE 10 MG: 5 TABLET ORAL at 05:47

## 2019-02-26 RX ADMIN — HEPARIN SODIUM 5000 UNITS: 5000 INJECTION INTRAVENOUS; SUBCUTANEOUS at 14:25

## 2019-02-26 RX ADMIN — SODIUM CHLORIDE, PRESERVATIVE FREE 3 ML: 5 INJECTION INTRAVENOUS at 20:17

## 2019-02-26 RX ADMIN — HEPARIN SODIUM 5000 UNITS: 5000 INJECTION INTRAVENOUS; SUBCUTANEOUS at 22:23

## 2019-02-26 RX ADMIN — LEVOTHYROXINE SODIUM 75 MCG: 75 TABLET ORAL at 05:47

## 2019-02-26 RX ADMIN — Medication 250 MG: at 08:46

## 2019-02-26 RX ADMIN — FUROSEMIDE 40 MG: 10 INJECTION, SOLUTION INTRAMUSCULAR; INTRAVENOUS at 11:33

## 2019-02-26 RX ADMIN — CLOPIDOGREL 75 MG: 75 TABLET, FILM COATED ORAL at 08:46

## 2019-02-26 RX ADMIN — MIDODRINE HYDROCHLORIDE 10 MG: 5 TABLET ORAL at 11:33

## 2019-02-26 RX ADMIN — MEMANTINE 10 MG: 10 TABLET ORAL at 20:16

## 2019-02-26 RX ADMIN — Medication 250 MG: at 20:16

## 2019-02-26 RX ADMIN — MIDODRINE HYDROCHLORIDE 10 MG: 5 TABLET ORAL at 20:15

## 2019-02-26 RX ADMIN — ASPIRIN 81 MG: 81 TABLET, COATED ORAL at 08:46

## 2019-02-26 RX ADMIN — AMIODARONE HYDROCHLORIDE 200 MG: 200 TABLET ORAL at 14:25

## 2019-02-26 RX ADMIN — AMIODARONE HYDROCHLORIDE 200 MG: 200 TABLET ORAL at 05:47

## 2019-02-26 RX ADMIN — CARBIDOPA AND LEVODOPA 1 TABLET: 25; 100 TABLET ORAL at 08:46

## 2019-02-26 RX ADMIN — MEMANTINE 10 MG: 10 TABLET ORAL at 08:46

## 2019-02-26 RX ADMIN — CARBIDOPA AND LEVODOPA 1 TABLET: 25; 100 TABLET ORAL at 20:16

## 2019-02-27 VITALS
RESPIRATION RATE: 16 BRPM | HEIGHT: 69 IN | WEIGHT: 181.4 LBS | SYSTOLIC BLOOD PRESSURE: 111 MMHG | TEMPERATURE: 98.2 F | HEART RATE: 62 BPM | OXYGEN SATURATION: 93 % | BODY MASS INDEX: 26.87 KG/M2 | DIASTOLIC BLOOD PRESSURE: 59 MMHG

## 2019-02-27 LAB
ANION GAP SERPL CALCULATED.3IONS-SCNC: 10 MMOL/L (ref 3–11)
BUN BLD-MCNC: 37 MG/DL (ref 9–23)
BUN/CREAT SERPL: 18.3 (ref 7–25)
CALCIUM SPEC-SCNC: 8.7 MG/DL (ref 8.7–10.4)
CHLORIDE SERPL-SCNC: 106 MMOL/L (ref 99–109)
CO2 SERPL-SCNC: 21 MMOL/L (ref 20–31)
CREAT BLD-MCNC: 2.02 MG/DL (ref 0.6–1.3)
GFR SERPL CREATININE-BSD FRML MDRD: 31 ML/MIN/1.73
GLUCOSE BLD-MCNC: 101 MG/DL (ref 70–100)
POTASSIUM BLD-SCNC: 4 MMOL/L (ref 3.5–5.5)
SODIUM BLD-SCNC: 137 MMOL/L (ref 132–146)

## 2019-02-27 PROCEDURE — 94799 UNLISTED PULMONARY SVC/PX: CPT

## 2019-02-27 PROCEDURE — 97161 PT EVAL LOW COMPLEX 20 MIN: CPT | Performed by: PHYSICAL THERAPIST

## 2019-02-27 PROCEDURE — 80048 BASIC METABOLIC PNL TOTAL CA: CPT | Performed by: PHYSICIAN ASSISTANT

## 2019-02-27 PROCEDURE — 94660 CPAP INITIATION&MGMT: CPT

## 2019-02-27 PROCEDURE — 25010000002 HEPARIN (PORCINE) PER 1000 UNITS

## 2019-02-27 RX ORDER — AMIODARONE HYDROCHLORIDE 200 MG/1
200 TABLET ORAL DAILY
Qty: 30 TABLET | Refills: 5 | Status: SHIPPED | OUTPATIENT
Start: 2019-03-14

## 2019-02-27 RX ORDER — AMIODARONE HYDROCHLORIDE 200 MG/1
200 TABLET ORAL EVERY 12 HOURS SCHEDULED
Status: DISCONTINUED | OUTPATIENT
Start: 2019-02-27 | End: 2019-02-27 | Stop reason: HOSPADM

## 2019-02-27 RX ORDER — MIDODRINE HYDROCHLORIDE 10 MG/1
10 TABLET ORAL EVERY 8 HOURS
Qty: 90 TABLET | Refills: 5 | Status: SHIPPED | OUTPATIENT
Start: 2019-02-27 | End: 2020-01-01 | Stop reason: HOSPADM

## 2019-02-27 RX ORDER — AMIODARONE HYDROCHLORIDE 200 MG/1
200 TABLET ORAL EVERY 12 HOURS SCHEDULED
Qty: 28 TABLET | Refills: 0 | Status: SHIPPED | OUTPATIENT
Start: 2019-02-27 | End: 2019-03-05

## 2019-02-27 RX ADMIN — HEPARIN SODIUM 5000 UNITS: 5000 INJECTION INTRAVENOUS; SUBCUTANEOUS at 13:34

## 2019-02-27 RX ADMIN — MIDODRINE HYDROCHLORIDE 10 MG: 5 TABLET ORAL at 03:34

## 2019-02-27 RX ADMIN — MEMANTINE 10 MG: 10 TABLET ORAL at 08:17

## 2019-02-27 RX ADMIN — CARBIDOPA AND LEVODOPA 1 TABLET: 25; 100 TABLET ORAL at 10:21

## 2019-02-27 RX ADMIN — SODIUM CHLORIDE, PRESERVATIVE FREE 3 ML: 5 INJECTION INTRAVENOUS at 08:18

## 2019-02-27 RX ADMIN — MIDODRINE HYDROCHLORIDE 10 MG: 5 TABLET ORAL at 13:34

## 2019-02-27 RX ADMIN — LEVOTHYROXINE SODIUM 75 MCG: 75 TABLET ORAL at 06:10

## 2019-02-27 RX ADMIN — PANTOPRAZOLE SODIUM 40 MG: 40 TABLET, DELAYED RELEASE ORAL at 06:10

## 2019-02-27 RX ADMIN — Medication 250 MG: at 08:17

## 2019-02-27 RX ADMIN — ASPIRIN 81 MG: 81 TABLET, COATED ORAL at 08:17

## 2019-02-27 RX ADMIN — AMIODARONE HYDROCHLORIDE 200 MG: 200 TABLET ORAL at 06:09

## 2019-02-27 RX ADMIN — CLOPIDOGREL 75 MG: 75 TABLET, FILM COATED ORAL at 08:17

## 2019-02-27 RX ADMIN — HEPARIN SODIUM 5000 UNITS: 5000 INJECTION INTRAVENOUS; SUBCUTANEOUS at 06:10

## 2019-02-28 ENCOUNTER — TRANSITIONAL CARE MANAGEMENT TELEPHONE ENCOUNTER (OUTPATIENT)
Dept: INTERNAL MEDICINE | Facility: CLINIC | Age: 84
End: 2019-02-28

## 2019-02-28 ENCOUNTER — READMISSION MANAGEMENT (OUTPATIENT)
Dept: CALL CENTER | Facility: HOSPITAL | Age: 84
End: 2019-02-28

## 2019-02-28 NOTE — OUTREACH NOTE
ANTONIO call completed.  Please refer to TCM call flowsheet for call documentation.  Patient's wife reports that he is starting to improve.  She denies n/v/d/c, pain, and questions regarding meds and d/c instructions from hospital.  Appointment confirmed.

## 2019-02-28 NOTE — OUTREACH NOTE
Prep Survey      Responses   Facility patient discharged from?  [facility]   Is patient eligible?  Yes   Discharge diagnosis  ventricular tachycardia, s/p externally cardioverted x2   Does the patient have one of the following disease processes/diagnoses(primary or secondary)?  Other   Does the patient have Home health ordered?  Yes   What is the Home health agency?   [facility].    Prep survey completed?  Yes          Erica Mtz RN

## 2019-03-05 ENCOUNTER — READMISSION MANAGEMENT (OUTPATIENT)
Dept: CALL CENTER | Facility: HOSPITAL | Age: 84
End: 2019-03-05

## 2019-03-05 ENCOUNTER — OFFICE VISIT (OUTPATIENT)
Dept: INTERNAL MEDICINE | Facility: CLINIC | Age: 84
End: 2019-03-05

## 2019-03-05 VITALS
OXYGEN SATURATION: 94 % | BODY MASS INDEX: 25.92 KG/M2 | TEMPERATURE: 97.8 F | WEIGHT: 175 LBS | RESPIRATION RATE: 16 BRPM | HEIGHT: 69 IN | DIASTOLIC BLOOD PRESSURE: 70 MMHG | SYSTOLIC BLOOD PRESSURE: 118 MMHG | HEART RATE: 59 BPM

## 2019-03-05 DIAGNOSIS — I25.10 CORONARY ARTERY DISEASE INVOLVING NATIVE CORONARY ARTERY OF NATIVE HEART WITHOUT ANGINA PECTORIS: ICD-10-CM

## 2019-03-05 DIAGNOSIS — F02.80 LATE ONSET ALZHEIMER'S DISEASE WITHOUT BEHAVIORAL DISTURBANCE (HCC): ICD-10-CM

## 2019-03-05 DIAGNOSIS — G30.1 LATE ONSET ALZHEIMER'S DISEASE WITHOUT BEHAVIORAL DISTURBANCE (HCC): ICD-10-CM

## 2019-03-05 DIAGNOSIS — R19.7 DIARRHEA, UNSPECIFIED TYPE: ICD-10-CM

## 2019-03-05 DIAGNOSIS — E11.9 TYPE 2 DIABETES MELLITUS WITHOUT COMPLICATION, WITHOUT LONG-TERM CURRENT USE OF INSULIN (HCC): ICD-10-CM

## 2019-03-05 DIAGNOSIS — I47.20 VENTRICULAR TACHYCARDIA (HCC): Primary | ICD-10-CM

## 2019-03-05 DIAGNOSIS — N18.30 STAGE 3 CHRONIC KIDNEY DISEASE (HCC): ICD-10-CM

## 2019-03-05 DIAGNOSIS — I42.9 CARDIOMYOPATHY, UNSPECIFIED TYPE (HCC): ICD-10-CM

## 2019-03-05 DIAGNOSIS — R53.1 WEAKNESS: ICD-10-CM

## 2019-03-05 DIAGNOSIS — G20 PARKINSON'S DISEASE (HCC): ICD-10-CM

## 2019-03-05 PROCEDURE — 99495 TRANSJ CARE MGMT MOD F2F 14D: CPT | Performed by: NURSE PRACTITIONER

## 2019-03-05 NOTE — OUTREACH NOTE
Medical Week 1 Survey      Responses   Facility patient discharged from?  [facility]   Does the patient have one of the following disease processes/diagnoses(primary or secondary)?  Other   Is there a successful TCM telephone encounter documented?  Yes [TCM completed 2/28/19. ]          Stephenie Cruz RN

## 2019-03-05 NOTE — PROGRESS NOTES
Transitional Care Follow Up Visit  Subjective     Murali Dennis is a 89 y.o. male who presents for a transitional care management visit.    Within 48 business hours after discharge our office contacted him via telephone to coordinate his care and needs.      I reviewed and discussed the details of that call along with the discharge summary, hospital problems, inpatient lab results, inpatient diagnostic studies, and consultation reports with Murali.     Current outpatient and discharge medications have been reconciled for the patient.    Date of TCM Phone Call 2/28/2019   Hospital [facility]   Date of Discharge 2/27/2019   Discharge Disposition Home or Self Care     Risk for Readmission (LACE) Score: 12 (2/27/2019  6:00 AM)      History of Present Illness   Course During Hospital Stay:      Murali has a history of  chronic systolic CHF and ischemic cardiomyopathy as well as Parkinson's dementia.  He was admitted to [facility] on 2/24/2019 after sustaining monomorphic ventricular tachycardia and noting elevated troponins.  He was externally cardioverted twice and was placed on amiodarone.  He was maintained on IV amiodarone with subsequent transition over to oral amiodarone during hospitalization. He was offered the option of defibrillator and he and his wife declined. Echo:  EF~25-30%  He was discharged home with [facility] for PT and OT.    stage 3 CKD remained unchanged with hospital labs.  Chest x-ray revealed no acute findings.    Has some bruising to his right upper arm from labs and IV attempts. Wife reports he is doing well but is generally weak. No falls.     Will FU with Dr. Ugarte within a month- no appt yet, wife will call tomorrow to FU on scheduling    Wife reports glucose Is running good:  90 this am and was 117 yesterday.   Reports he has had improvement in loose stools since starting on the probiotic as well.      The following portions of the patient's history  were reviewed and updated as appropriate: allergies, current medications, past family history, past medical history, past social history, past surgical history and problem list.    Review of Systems   Constitutional: Negative for appetite change, chills, diaphoresis, fatigue, fever and unexpected weight change.   HENT: Negative for congestion, ear pain, sore throat and trouble swallowing.    Respiratory: Negative for cough, choking, chest tightness and shortness of breath.    Cardiovascular: Negative for chest pain, palpitations and leg swelling.   Gastrointestinal: Positive for diarrhea (improved). Negative for abdominal distention, abdominal pain, blood in stool, constipation, nausea and vomiting.   Musculoskeletal: Negative for arthralgias, back pain and myalgias.   Skin: Negative.    Neurological: Negative for dizziness, syncope and headaches.   Psychiatric/Behavioral: Negative for dysphoric mood. The patient is not nervous/anxious.        Objective   Physical Exam   Constitutional: He appears well-developed and well-nourished. No distress.   HENT:   Mouth/Throat: Oropharynx is clear and moist.   Eyes: Conjunctivae are normal. No scleral icterus.   Neck: Normal range of motion. Neck supple. No JVD present.   Cardiovascular: Normal rate, regular rhythm, normal heart sounds and intact distal pulses.   No murmur heard.  Pulses:       Radial pulses are 2+ on the right side, and 2+ on the left side.   + brachial pulses bilaterally.   Pulmonary/Chest: Effort normal and breath sounds normal. No respiratory distress. He exhibits no tenderness.   Abdominal: Soft. Normal appearance and bowel sounds are normal. He exhibits no shifting dullness, no distension, no abdominal bruit and no mass. There is no tenderness. There is no rigidity, no rebound, no guarding, no tenderness at McBurney's point and negative Rubin's sign. No hernia.   Musculoskeletal: He exhibits no edema.   Neurological: He is alert.   Skin: Skin is warm  and dry. Capillary refill takes less than 2 seconds. No rash noted. He is not diaphoretic. No erythema. No pallor.        Psychiatric: He has a normal mood and affect. His behavior is normal.   Nursing note and vitals reviewed.      Assessment/Plan      Murali was seen today for transitional care management, ventricular tachycardia and bleeding/bruising.    Diagnoses and all orders for this visit:    Ventricular tachycardia (CMS/[facility])    Type 2 diabetes mellitus without complication, without long-term current use of insulin (CMS/[facility])    Stage 3 chronic kidney disease (CMS/[facility])    Weakness  -     Ambulatory Referral to Home Health    Parkinson's disease (CMS/[facility])    Cardiomyopathy, unspecified type (CMS/[facility])    Coronary artery disease involving native coronary artery of native heart without angina pectoris    Diarrhea, unspecified type    Late onset Alzheimer's disease without behavioral disturbance        Patient stable and in no acute distress at today's visit.  Continue current plan and treatment.  Wife will call and get appointment with cardiology scheduled.  Plan on repeat labs in 3-4 weeks. Wife will let me know if not done by cariology.   Referral entered to continue home health for PT and OT services.  Return for Next scheduled follow up.  Plan of care discussed with pt. They verbalized understanding and agreement.

## 2019-03-08 ENCOUNTER — READMISSION MANAGEMENT (OUTPATIENT)
Dept: CALL CENTER | Facility: HOSPITAL | Age: 84
End: 2019-03-08

## 2019-03-08 NOTE — OUTREACH NOTE
Medical Week 2 Survey      Responses   Facility patient discharged from?  [facility]   Does the patient have one of the following disease processes/diagnoses(primary or secondary)?  Other   Week 2 attempt successful?  Yes   Call start time  0739   Rescheduled  Rescheduled-pt requested   Call end time  0741          Radha Coreas, RN

## 2019-03-11 ENCOUNTER — READMISSION MANAGEMENT (OUTPATIENT)
Dept: CALL CENTER | Facility: HOSPITAL | Age: 84
End: 2019-03-11

## 2019-03-11 NOTE — OUTREACH NOTE
Medical Week 2 Survey      Responses   Facility patient discharged from?  [facility]   Does the patient have one of the following disease processes/diagnoses(primary or secondary)?  Other   Week 2 attempt successful?  No   Unsuccessful attempts  Attempt 2   Rescheduled  Revoked          April Franks RN

## 2019-04-11 ENCOUNTER — TRANSCRIBE ORDERS (OUTPATIENT)
Dept: LAB | Facility: HOSPITAL | Age: 84
End: 2019-04-11

## 2019-04-11 ENCOUNTER — LAB (OUTPATIENT)
Dept: LAB | Facility: HOSPITAL | Age: 84
End: 2019-04-11

## 2019-04-11 DIAGNOSIS — E03.9 HYPOTHYROIDISM, UNSPECIFIED TYPE: ICD-10-CM

## 2019-04-11 DIAGNOSIS — R53.1 ASTHENIA: ICD-10-CM

## 2019-04-11 DIAGNOSIS — G62.0 AMIODARONE INDUCED NEUROPATHY (HCC): Primary | ICD-10-CM

## 2019-04-11 DIAGNOSIS — G62.0 AMIODARONE INDUCED NEUROPATHY (HCC): ICD-10-CM

## 2019-04-11 DIAGNOSIS — T46.2X5A AMIODARONE INDUCED NEUROPATHY (HCC): ICD-10-CM

## 2019-04-11 DIAGNOSIS — T46.2X5A AMIODARONE INDUCED NEUROPATHY (HCC): Primary | ICD-10-CM

## 2019-04-11 LAB
T4 SERPL-MCNC: 9.39 MCG/DL (ref 4.5–11.7)
TSH SERPL DL<=0.05 MIU/L-ACNC: 1.3 MIU/ML (ref 0.27–4.2)

## 2019-04-11 PROCEDURE — 84443 ASSAY THYROID STIM HORMONE: CPT

## 2019-04-11 PROCEDURE — 84436 ASSAY OF TOTAL THYROXINE: CPT

## 2019-04-11 PROCEDURE — 36415 COLL VENOUS BLD VENIPUNCTURE: CPT

## 2019-04-26 ENCOUNTER — OFFICE VISIT (OUTPATIENT)
Dept: NEUROLOGY | Facility: CLINIC | Age: 84
End: 2019-04-26

## 2019-04-26 VITALS
DIASTOLIC BLOOD PRESSURE: 72 MMHG | SYSTOLIC BLOOD PRESSURE: 126 MMHG | WEIGHT: 170 LBS | BODY MASS INDEX: 25.18 KG/M2 | HEIGHT: 69 IN | HEART RATE: 68 BPM

## 2019-04-26 DIAGNOSIS — G30.1 LATE ONSET ALZHEIMER'S DISEASE WITH BEHAVIORAL DISTURBANCE (HCC): Primary | ICD-10-CM

## 2019-04-26 DIAGNOSIS — F02.818 LATE ONSET ALZHEIMER'S DISEASE WITH BEHAVIORAL DISTURBANCE (HCC): Primary | ICD-10-CM

## 2019-04-26 PROCEDURE — 99214 OFFICE O/P EST MOD 30 MIN: CPT | Performed by: PHYSICIAN ASSISTANT

## 2019-04-26 RX ORDER — MEMANTINE HYDROCHLORIDE 10 MG/1
10 TABLET ORAL 2 TIMES DAILY
Qty: 60 TABLET | Refills: 11 | Status: SHIPPED | OUTPATIENT
Start: 2019-04-26 | End: 2020-01-01

## 2019-04-26 RX ORDER — OMEPRAZOLE 20 MG/1
20 CAPSULE, DELAYED RELEASE ORAL DAILY
Refills: 1 | COMMUNITY
Start: 2019-04-03 | End: 2019-01-01

## 2019-04-26 NOTE — PROGRESS NOTES
"Subjective     Chief Complaint: cognitive impairment, balance impairment      History of Present Illness   Murali Dennis is a 89 y.o. male who returns to clinic today with a complex history. He was reportedly diagnosed with Alzheimer's Disease in [place] in 2003. He has had a gradually progressive cognitive decline over time with associated ADL impairment and visual hallucinations. Donepezil and memantine were stopped previously due to bradycardia.     He has also had symptoms since 2017 of shuffling gait, hand tremor and bradykinesia. This has reportedly improved with institution of SInemet.      Prior evaluation has included screening blood work  and a head CT  which was unremarkable . EMG/NCV studies on 12/11/17 showed a moderate-severe axonal sensorimotor neuropathy during a prior hospitalization.     Today: Since his last visit in 10/18, he and his family feel that he has somewhat improved cognitively and physically. His wife has again noted a lack of appetite. He was hospitalized at [facility] in 2/19 for ventricular tachycardia. Since the initiation of amiodarone and midodrine, his family notes that he has felt better overall.       I have reviewed and confirmed the past family, social and medical history as accurate on 4/26/19.     Review of Systems   Constitutional: Negative.    HENT: Negative.    Eyes: Negative.    Respiratory: Negative.    Cardiovascular: Negative.    Gastrointestinal: Negative.    Endocrine: Negative.    Genitourinary: Negative.    Musculoskeletal: Negative.    Skin: Negative.    Allergic/Immunologic: Negative.    Neurological: Positive for tremors.        Memory loss    Hematological: Negative.    Psychiatric/Behavioral: Negative.        Objective     /72   Pulse 68   Ht 175.3 cm (69.02\")   Wt 77.1 kg (170 lb)   BMI 25.09 kg/m²     General appearance today is normal.     Physical Exam   Neurological: He has normal strength. He has a normal Finger-Nose-Finger Test. "   Psychiatric: His speech is normal.        Neurologic Exam     Mental Status   Oriented to person.   Disoriented to place.   Disoriented to time.   Registration: recalls 3 of 3 objects. Recall of objects at 5 minutes: 0/3 recall. Follows 3 step commands.   Attention: decreased.   Speech: speech is normal   Level of consciousness: alert  Able to name object. Able to read. Able to repeat. Able to write. Normal comprehension.     Cranial Nerves   Cranial nerves II through XII intact.     Motor Exam   Muscle bulk: normal  Overall muscle tone: normal    Strength   Strength 5/5 throughout.     Sensory Exam   Light touch normal.     Gait, Coordination, and Reflexes     Coordination   Finger to nose coordination: normal        Results  MMSE=13      Assessment/Plan   Murali was seen today for alzheimer's disease.    Diagnoses and all orders for this visit:    Late onset Alzheimer's disease with behavioral disturbance    Other orders  -     memantine (NAMENDA) 10 MG tablet; Take 1 tablet by mouth 2 (Two) Times a Day.  -     carbidopa-levodopa (SINEMET)  MG per tablet; Take 1 tablet by mouth 3 (Three) Times a Day.          Discussion/Summary   Murali Dennis returns to clinic today with a history of advanced Dementia and possible PD. I again reviewed his current status and treatment options. After discussing potential treatment options, it was elected to continue on Namenda and Sinemet unchanged.He will then follow up in 6 months, or sooner if needed.   I spent 25 minutes face to face with the patient and family with 20 minutes spent on discussing diagnosis, evaluation, current status, treatment options and management as discussed above.       As part of this visit I obtained additional history from the family which is incorporated in the HPI.      Francesca Hardy PA-C

## 2019-05-08 NOTE — PROGRESS NOTES
Subjective   Murali Dennis is a 89 y.o. male.     Chief Complaint   Patient presents with   • Hypothyroidism   • Diabetes     last A1C on2/8=6.0       History of Present Illness     Murali is accompanied by his wife today. They both report that he has been doing well since last visit.      He is currently followed by Dr. Garvin for her Alzheimer's dementia and questionable Parkinson's disease.  He last saw neuro on 4/26/2019 where thyr continued the  Sinemet and his namenda .      BPH-chronic, well controlled with dutasteride.      GERD- chronic well controlled with omerazole 20 mg daily.      Hyperlipidemia-chronic, stable with pravastatin. Last lipids 10/12/18 were normal.      Hypothyroidism- chronic, Last TSH 10/2018 (euthyroid). Currently taking levothyroxine 75 mcg daily. Complaint with dosing and denies AE's or any hypo/hyperthryroid sx's     Allergies- chronic and well controlled with Singulair and loratadine.      Diabetes: newly diagnosed 10/2018  With A1C 6.9%. Pt never started the metformin.   Patient and wife wish to hold off on medication and are working on diet and increased activity before adding medications. Wife is also a diabetic.   Glucose averaging 100-110, rarely up to 130-140. A1C is down to 5.9% today.     CKD-  Noted  1/2018 with GFR 41 and  creat 1.6. REpeat labs 10/2018 Gfr up to 45  and  creat down to 1.48     HTN/CHF/T-fwv-dwqpfgr by Dr. Ugarte.  Denies any bleeding issues.  He is currently on amiodarone, Plavix, and Entresto.  Compliant with dosing and denies adverse effects.. Denies any headaches, dizziness, visual disturbances, chest pain, dyspnea, leg pain, and edema. Had FU with Dr. Ugarte in December 2018, he stopped the eliquis and put him back on plavix and ASA. Will see every 6 months  [facility] cardiac diagnostics:   1/19/2018: [facility] with Stent: 3 vessel CAD  Last Echo: 12/2017: mild AR, mild MR, Estimated EF = 28%, mild TR  Right reports that they have seen Dr. Ugarte since  last visit as well and that he checks 80s TSH because he is on amiodarone.  They think that this was normal.      He is fatigued and having knee pain . Has had cortisone injections in the past. None in the last 5 months.       The following portions of the patient's history were reviewed and updated as appropriate: allergies, current medications, past family history, past medical history, past social history, past surgical history and problem list.    Review of Systems   Constitutional: Positive for fatigue. Negative for appetite change, chills, diaphoresis and unexpected weight change.   HENT: Negative for ear pain and sore throat.    Eyes: Negative for visual disturbance.   Respiratory: Negative for cough, chest tightness, shortness of breath and wheezing.    Cardiovascular: Negative for chest pain, palpitations and leg swelling.   Gastrointestinal: Negative for abdominal distention, abdominal pain, constipation, diarrhea, nausea and vomiting.             Endocrine: Negative for polydipsia, polyphagia and polyuria.   Genitourinary: Negative for difficulty urinating and dysuria.   Musculoskeletal: Positive for arthralgias (knee pain ). Negative for back pain.   Skin: Negative for color change and rash.   Neurological: Negative for dizziness, syncope, weakness, light-headedness, numbness and headaches.   Psychiatric/Behavioral: Negative for sleep disturbance.       Outpatient Medications Marked as Taking for the 5/8/19 encounter (Office Visit) with Diana Null APRN   Medication Sig Dispense Refill   • acetaminophen (TYLENOL) 325 MG tablet Take 650 mg by mouth At Night As Needed for Mild Pain .     • amiodarone (PACERONE) 200 MG tablet Take 1 tablet by mouth Daily. 30 tablet 5   • aspirin 81 MG chewable tablet Chew 81 mg Daily.     • carbidopa-levodopa (SINEMET)  MG per tablet Take 1 tablet by mouth 3 (Three) Times a Day. 90 tablet 11   • cholecalciferol (VITAMIN D3) 1000 units tablet Take 1,000 Units by  mouth Daily.     • clopidogrel (PLAVIX) 75 MG tablet Take 75 mg by mouth Daily.     • diclofenac (VOLTAREN) 1 % gel gel Voltaren 1%, Transdermal 1 Gel as needed     • dutasteride (AVODART) 0.5 MG capsule Take 1 capsule by mouth Daily. 30 capsule 3   • glucose blood test strip 1 each by Other route Daily. Use as instructed 100 each 12   • glucose monitor monitoring kit 1 each Daily. 1 each 0   • ipratropium-albuterol (DUO-NEB) 0.5-2.5 mg/mL nebulizer Take 3 mL by nebulization Every 4 (Four) Hours As Needed for Shortness of Air (CBS PROTOCOL). 360 mL    • Lancets (FREESTYLE) lancets 1 each by Other route Daily. May dispense any brand needed E11.9 100 each 12   • levothyroxine (SYNTHROID, LEVOTHROID) 75 MCG tablet Take 75 mcg by mouth Daily.     • Loratadine 10 MG capsule Take 1 capsule by mouth Daily As Needed.     • memantine (NAMENDA) 10 MG tablet Take 1 tablet by mouth 2 (Two) Times a Day. 60 tablet 11   • midodrine (PROAMATINE) 10 MG tablet Take 1 tablet by mouth Every 8 (Eight) Hours. 90 tablet 5   • mirtazapine (REMERON) 30 MG tablet Take 1 tablet by mouth Every Night. 30 tablet 3   • Multiple Vitamins-Minerals (MULTIVITAL PO) Take 1 tablet by mouth Daily.     • nitroglycerin (NITROSTAT) 0.4 MG SL tablet Place 0.4 mg under the tongue Every 5 (Five) Minutes As Needed for Chest Pain. Take no more than 3 doses in 15 minutes.     • omeprazole (priLOSEC) 20 MG capsule Take 1 capsule by mouth Daily. 30 capsule 3   • pravastatin (PRAVACHOL) 20 MG tablet take 1 tablet by mouth once daily as directed by prescriber 180 tablet 0   • sacubitril-valsartan (ENTRESTO) 24-26 MG tablet Take 0.5 tablets by mouth 2 (Two) Times a Day. 60 tablet    • vitamin E 400 UNIT capsule Take 400 Units by mouth Daily.           Objective   Physical Exam   Constitutional: He is oriented to person, place, and time. He appears well-developed and well-nourished. No distress.   HENT:   Head: Normocephalic and atraumatic.   Mouth/Throat: Oropharynx  is clear and moist.   Eyes: Conjunctivae are normal. Pupils are equal, round, and reactive to light. No scleral icterus.   Neck: Normal range of motion. Neck supple. No JVD present.   Cardiovascular: Normal rate, regular rhythm, normal heart sounds and intact distal pulses.   No murmur heard.  Pulses:       Radial pulses are 2+ on the right side, and 2+ on the left side.        Dorsalis pedis pulses are 2+ on the right side, and 2+ on the left side.        Posterior tibial pulses are 2+ on the right side, and 2+ on the left side.   Pulmonary/Chest: Effort normal and breath sounds normal. No respiratory distress. He exhibits no tenderness.   Abdominal: Soft. Normal appearance and bowel sounds are normal. He exhibits no shifting dullness, no distension, no abdominal bruit and no mass. There is no tenderness. There is no rigidity, no rebound, no guarding, no tenderness at McBurney's point and negative Rubin's sign. No hernia.   Musculoskeletal: Normal range of motion. He exhibits no edema.        Right knee: Normal.        Left knee: Normal.   Neurological: He is alert and oriented to person, place, and time. Coordination normal.   Skin: Skin is warm and dry. Capillary refill takes less than 2 seconds. No rash noted. He is not diaphoretic. No erythema. No pallor.   Psychiatric: He has a normal mood and affect. His speech is normal and behavior is normal. Judgment and thought content normal.   Nursing note and vitals reviewed.      Vitals:    05/08/19 0952   BP: 118/60   Pulse: 61   Resp: 16   Temp: 99 °F (37.2 °C)   SpO2: 94%         Assessment/Plan   Murali was seen today for hypothyroidism and diabetes.    Diagnoses and all orders for this visit:    Type 2 diabetes mellitus without complication, without long-term current use of insulin (CMS/[facility])  -     POC Glycosylated Hemoglobin (Hb A1C)    Fatigue, unspecified type  -     CBC (No Diff)  -     Comprehensive Metabolic Panel  -     TSH Rfx On Abnormal To Free T4  -      Ferritin  -     Iron Profile  -     Vitamin B12    Anemia, unspecified type  -     CBC (No Diff)  -     Comprehensive Metabolic Panel  -     TSH Rfx On Abnormal To Free T4  -     Ferritin  -     Iron Profile  -     Vitamin B12    Stage 3 chronic kidney disease (CMS/HCC)  -     CBC (No Diff)  -     Comprehensive Metabolic Panel  -     TSH Rfx On Abnormal To Free T4  -     Ferritin  -     Iron Profile  -     Vitamin B12    Acquired hypothyroidism  -     CBC (No Diff)  -     Comprehensive Metabolic Panel  -     TSH Rfx On Abnormal To Free T4  -     Ferritin  -     Iron Profile  -     Vitamin B12    Parkinson's disease (CMS/HCC)    Late onset Alzheimer's disease without behavioral disturbance    Ventricular tachycardia (CMS/HCC)    Atrial fibrillation, unspecified type (CMS/HCC)              Labs sent as above to evaluate ongoing fatigue.  Continue current medications.  No refills needed today they will call with any refills needed.  Continue to follow with Dr. Ugarte for management of cardiovascular conditions.  A1c well controlled with lifestyle.  No need for antidiabetic agents at this time.    Return in about 3 months (around 8/8/2019).  Plan of care discussed with pt. They verbalized understanding and agreement.       * Please note that portions of this note were completed with a voice recognition program. Efforts were made to edit the dictation but occasionally words are erroneously transcribed.

## 2019-05-31 NOTE — TELEPHONE ENCOUNTER
Pt last seen 5/8/19 and is requesting a refill on his Levothyroxine. I do not see that we have filled this recently. RX pended. Please advise.

## 2019-07-12 NOTE — TELEPHONE ENCOUNTER
Patient's wife called and would like to talk to you about a new procedure that is being done for knee pain. I tried to get more details and thought an appointment might be a good idea to discuss with Diana but they have asked you to call them first.

## 2019-07-23 NOTE — PROGRESS NOTES
Subjective   Murali Dennis is a 89 y.o. male.     Chief Complaint   Patient presents with   • Hypothyroidism   • Diabetes     last A1C on 5/8=5.9-not due       History of Present Illness   Murali is accompanied by his wife today for follow-up on all chronic conditions. They both report that he has been doing well since last visit and feel like everything is currently controlled.      He is  followed by Dr. Garvin for her Alzheimer's dementia and Parkinson's disease.  He last saw neuro on 4/26/2019 where they continued the  Sinemet and his namenda .  He has FU in a couple of months.       BPH-chronic, well controlled with dutasteride.      GERD- chronic well controlled with omerazole 20 mg daily.      Hyperlipidemia-chronic, stable with pravastatin. Last lipids 10/12/18 were normal.      Hypothyroidism- chronic, Last TSH  4/2019 (euthyroid). Currently taking levothyroxine 75 mcg daily. Complaint with dosing and denies AE's or any hypo/hyperthryroid sx's     Allergies- chronic and well controlled with Singulair and loratadine.     Diabetes: diagnosed 10/2018  With A1C 6.9%. Pt never started the metformin.   Wife is also a diabetic. Well controlled with diet and exercise  Glucose averaging 100-110, A1C 5.9% 5/8/19     CKD-  Noted  1/2018 with GFR 41 and  creat 1.6. Creat 1.76 at last visit.     HTN/CHF/V-wti-xefvenc by Dr. Ugarte. He is currently on amiodarone, Plavix, and Entresto.  Compliant with dosing and denies adverse effects.. Denies any headaches, dizziness, visual disturbances, chest pain, dyspnea, leg pain, and edema.enies any bleeding issues.   Most recent cardiac diagnostics:   1/19/2018: [facility] with Stent: 3 vessel CAD  Last Echo: 12/2017: mild AR, mild MR, Estimated EF = 28%, mild TR  He saw Dr. Ugarte last week. Will FU in 6 months. No med changes.     He is fatigued and having knee pain . Has had cortisone injections in the past. None in the last 5 months. Wife would like him to see ortho again for  injections    Needs toenails cut. They are thick and spouse cannot cut them.        The following portions of the patient's history were reviewed and updated as appropriate: allergies, current medications, past family history, past medical history, past social history, past surgical history and problem list.    Review of Systems   Constitutional: Negative for appetite change, chills, diaphoresis, fatigue and unexpected weight change.   Eyes: Negative for visual disturbance.   Respiratory: Negative for cough, chest tightness, shortness of breath and wheezing.    Cardiovascular: Negative for chest pain, palpitations and leg swelling.   Gastrointestinal: Negative for constipation, diarrhea, nausea and vomiting.   Endocrine: Negative for polydipsia, polyphagia and polyuria.   Genitourinary: Negative for difficulty urinating and dysuria.   Musculoskeletal: Positive for arthralgias.   Skin: Negative for color change and rash.        Thick toenails   Neurological: Negative for dizziness, syncope, weakness, light-headedness, numbness and headaches.   Psychiatric/Behavioral: Negative for sleep disturbance.       Outpatient Medications Marked as Taking for the 7/23/19 encounter (Office Visit) with Diana Null APRN   Medication Sig Dispense Refill   • acetaminophen (TYLENOL) 325 MG tablet Take 650 mg by mouth At Night As Needed for Mild Pain .     • amiodarone (PACERONE) 200 MG tablet Take 1 tablet by mouth Daily. 30 tablet 5   • aspirin 81 MG chewable tablet Chew 81 mg Daily.     • carbidopa-levodopa (SINEMET)  MG per tablet Take 1 tablet by mouth 3 (Three) Times a Day. 90 tablet 11   • cholecalciferol (VITAMIN D3) 1000 units tablet Take 1,000 Units by mouth Daily.     • clopidogrel (PLAVIX) 75 MG tablet Take 75 mg by mouth Daily.     • diclofenac (VOLTAREN) 1 % gel gel Voltaren 1%, Transdermal 1 Gel as needed     • dutasteride (AVODART) 0.5 MG capsule TAKE 1 CAPSULE BY MOUTH EVERY DAY 30 capsule 0   • glucose  blood test strip 1 each by Other route Daily. Use as instructed 100 each 12   • glucose monitor monitoring kit 1 each Daily. 1 each 0   • ipratropium-albuterol (DUO-NEB) 0.5-2.5 mg/mL nebulizer Take 3 mL by nebulization Every 4 (Four) Hours As Needed for Shortness of Air (CBS PROTOCOL). 360 mL    • Lancets (FREESTYLE) lancets 1 each by Other route Daily. May dispense any brand needed E11.9 100 each 12   • levothyroxine (SYNTHROID, LEVOTHROID) 75 MCG tablet Take 1 tablet by mouth Daily. 30 tablet 5   • Loratadine 10 MG capsule Take 1 capsule by mouth Daily As Needed.     • memantine (NAMENDA) 10 MG tablet Take 1 tablet by mouth 2 (Two) Times a Day. 60 tablet 11   • midodrine (PROAMATINE) 10 MG tablet Take 1 tablet by mouth Every 8 (Eight) Hours. 90 tablet 5   • mirtazapine (REMERON) 30 MG tablet Take 1 tablet by mouth Every Night. 30 tablet 3   • Multiple Vitamins-Minerals (MULTIVITAL PO) Take 1 tablet by mouth Daily.     • nitroglycerin (NITROSTAT) 0.4 MG SL tablet Place 0.4 mg under the tongue Every 5 (Five) Minutes As Needed for Chest Pain. Take no more than 3 doses in 15 minutes.     • omeprazole (priLOSEC) 20 MG capsule Take 1 capsule by mouth Daily. 30 capsule 3   • pravastatin (PRAVACHOL) 20 MG tablet TAKE 1 TABLET BY MOUTH EVERY DAY 90 tablet 0   • sacubitril-valsartan (ENTRESTO) 24-26 MG tablet Take 0.5 tablets by mouth 2 (Two) Times a Day. 60 tablet    • vitamin E 400 UNIT capsule Take 400 Units by mouth Daily.           Objective   Physical Exam   Constitutional: He is oriented to person, place, and time. He appears well-developed and well-nourished.   HENT:   Head: Normocephalic and atraumatic.   Eyes: Conjunctivae are normal. Pupils are equal, round, and reactive to light.   Neck: Normal range of motion.   Cardiovascular: Normal rate, regular rhythm and normal heart sounds.   Pulmonary/Chest: Effort normal and breath sounds normal. No respiratory distress.   Abdominal: Soft. Normal appearance and bowel  sounds are normal. He exhibits no distension. There is no tenderness.   Musculoskeletal: He exhibits tenderness. He exhibits no edema or deformity.        Right knee: Normal.        Left knee: Normal.   Neurological: He is alert and oriented to person, place, and time. He exhibits normal muscle tone. Coordination normal.   Skin: Skin is warm and dry. No rash noted. No erythema. No pallor.   Psychiatric: He has a normal mood and affect. His speech is normal and behavior is normal. Judgment and thought content normal.   Nursing note and vitals reviewed.      Vitals:    07/23/19 1102   BP: 126/62   Pulse: 58   Resp: 16   Temp: 97.9 °F (36.6 °C)   SpO2: 97%         Assessment/Plan   Murali was seen today for hypothyroidism and diabetes.    Diagnoses and all orders for this visit:    Chronic pain of both knees  -     Comprehensive Metabolic Panel  -     XR Knee 1 or 2 View Bilateral; Future  -     Ambulatory Referral to Orthopedic Surgery    Overgrown toenails  -     Comprehensive Metabolic Panel  -     Ambulatory Referral to Podiatry    Type 2 diabetes mellitus without complication, without long-term current use of insulin (CMS/HCC)  -     Comprehensive Metabolic Panel    Stage 3 chronic kidney disease (CMS/HCC)  -     Comprehensive Metabolic Panel    Acquired hypothyroidism  -     Comprehensive Metabolic Panel    Benign prostatic hyperplasia without lower urinary tract symptoms  -     Comprehensive Metabolic Panel    Gastroesophageal reflux disease, esophagitis presence not specified  -     Comprehensive Metabolic Panel    Mixed hyperlipidemia  -     Comprehensive Metabolic Panel                Continue all current medications.  They will call when refills are needed  Will check CMP to evaluate renal function.  Refer to podiatry to cut dystrophic toenails.  Check knee x-ray.  Refer to Ortho to evaluate and manage the knee pain.    Return in about 6 months (around 1/23/2020).  Plan of care discussed with pt. They  verbalized understanding and agreement.       * Please note that portions of this note were completed with a voice recognition program. Efforts were made to edit the dictation but occasionally words are erroneously transcribed.

## 2019-07-24 NOTE — TELEPHONE ENCOUNTER
----- Message from ARCADIO Mccrary sent at 7/24/2019  7:53 AM EDT -----  Please let themknow his renal function is declined a bit. Make sure he is drinking enough water. Also, lets repeat lab in 6 weeks. Order placed.

## 2019-07-25 NOTE — TELEPHONE ENCOUNTER
----- Message from ARCADIO Mccrary sent at 7/25/2019  2:32 PM EDT -----  Please let them know knee XR shows degenerative changes. FU with ortho as planned

## 2019-07-28 PROBLEM — R19.7 DIARRHEA: Status: RESOLVED | Noted: 2019-02-24 | Resolved: 2019-01-01

## 2019-07-28 PROBLEM — R63.4 UNINTENDED WEIGHT LOSS: Status: RESOLVED | Noted: 2018-10-17 | Resolved: 2019-01-01

## 2019-07-28 PROBLEM — E78.2 MIXED HYPERLIPIDEMIA: Status: ACTIVE | Noted: 2019-01-01

## 2019-07-30 NOTE — PROGRESS NOTES
Orthopaedic Clinic Note: Knee New Patient    Chief Complaint   Patient presents with   • Left Knee - Pain   • Right Knee - Pain        HPI    Murali Dennis is a 89 y.o. male who presents with bilateral knee pain for 10 year(s). Onset has been atraumatic and gradual nature.  Pain is localized primarily to the anterior aspect of his knee.  He rates the pain 9/10 on the pain scale.  His pain is worse with walking, standing, climbing stairs.  Sitting and resting eases his pain.  Previous treatments have included use of a cane which she has with him today as well as Tylenol.  He also had a history of prior cortisone injections in both knees about 6 months ago at [facility].  The injections provided only trace relief.  He is here to discuss treatment options for his ongoing knee pain.    Past Medical History:   Diagnosis Date   • Alzheimer disease    • Warren esophagus     followed  with GI in [place]   • Benign prostatic hyperplasia without lower urinary tract symptoms 10/17/2018   • COPD (chronic obstructive pulmonary disease) (CMS/[facility])    • Coronary artery disease 2003   • Dementia 2003   • Diarrhea 2/24/2019   • Environmental allergies 10/17/2018   • Essential hypertension 10/17/2018   • Gastroesophageal reflux disease 11/19/2018   • GERD (gastroesophageal reflux disease)    • Hypothyroid     started after amiodarone use in 2003   • Sleep apnea     wears cpap   • Stroke (CMS/[facility]) 2003   • TIA (transient ischemic attack) 12/29/2017   • Unintended weight loss 10/17/2018      Past Surgical History:   Procedure Laterality Date   • CARDIAC CATHETERIZATION  2003   • CARDIAC CATHETERIZATION N/A 1/19/2018    Procedure: Left Heart Cath;  Surgeon: Hollis Ugarte MD;  Location: [facility] CATH INVASIVE LOCATION;  Service:    • CARPAL TUNNEL RELEASE Right    • CATARACT EXTRACTION Bilateral    • CORONARY ARTERY BYPASS GRAFT  2003    Triple Bypass, @ [facility] @ [place], [place]   • HEMORRHOIDECTOMY      • HERNIA REPAIR        Family History   Problem Relation Age of Onset   • Cancer Mother    • Heart disease Sister    • Diabetes Sister    • Heart disease Brother    • Cancer Brother    • Stroke Sister      Social History     Socioeconomic History   • Marital status:      Spouse name: Not on file   • Number of children: Not on file   • Years of education: Not on file   • Highest education level: Not on file   Occupational History   • Occupation: Retired   Tobacco Use   • Smoking status: Former Smoker     Last attempt to quit: 1967     Years since quittin.6   • Smokeless tobacco: Never Used   Substance and Sexual Activity   • Alcohol use: No   • Drug use: No   Social History Narrative    Caffeine use: 1 serving daily.    Patient lives at home with family.     Lives with wife, daughter close by for any needs      Current Outpatient Medications on File Prior to Visit   Medication Sig Dispense Refill   • acetaminophen (TYLENOL) 325 MG tablet Take 650 mg by mouth At Night As Needed for Mild Pain .     • amiodarone (PACERONE) 200 MG tablet Take 1 tablet by mouth Daily. 30 tablet 5   • aspirin 81 MG chewable tablet Chew 81 mg Daily.     • carbidopa-levodopa (SINEMET)  MG per tablet Take 1 tablet by mouth 3 (Three) Times a Day. 90 tablet 11   • cholecalciferol (VITAMIN D3) 1000 units tablet Take 1,000 Units by mouth Daily.     • clopidogrel (PLAVIX) 75 MG tablet Take 75 mg by mouth Daily.     • dutasteride (AVODART) 0.5 MG capsule TAKE 1 CAPSULE BY MOUTH EVERY DAY 30 capsule 0   • glucose blood test strip 1 each by Other route Daily. Use as instructed 100 each 12   • glucose monitor monitoring kit 1 each Daily. 1 each 0   • ipratropium-albuterol (DUO-NEB) 0.5-2.5 mg/mL nebulizer Take 3 mL by nebulization Every 4 (Four) Hours As Needed for Shortness of Air (CBS PROTOCOL). 360 mL    • Lancets (FREESTYLE) lancets 1 each by Other route Daily. May dispense any brand needed E11.9 100 each 12   •  levothyroxine (SYNTHROID, LEVOTHROID) 75 MCG tablet Take 1 tablet by mouth Daily. 30 tablet 5   • Loratadine 10 MG capsule Take 1 capsule by mouth Daily As Needed.     • memantine (NAMENDA) 10 MG tablet Take 1 tablet by mouth 2 (Two) Times a Day. 60 tablet 11   • midodrine (PROAMATINE) 10 MG tablet Take 1 tablet by mouth Every 8 (Eight) Hours. 90 tablet 5   • mirtazapine (REMERON) 30 MG tablet Take 1 tablet by mouth Every Night. 30 tablet 3   • Multiple Vitamins-Minerals (MULTIVITAL PO) Take 1 tablet by mouth Daily.     • nitroglycerin (NITROSTAT) 0.4 MG SL tablet Place 0.4 mg under the tongue Every 5 (Five) Minutes As Needed for Chest Pain. Take no more than 3 doses in 15 minutes.     • omeprazole (priLOSEC) 20 MG capsule Take 1 capsule by mouth Daily. 30 capsule 3   • pravastatin (PRAVACHOL) 20 MG tablet TAKE 1 TABLET BY MOUTH EVERY DAY 90 tablet 0   • sacubitril-valsartan (ENTRESTO) 24-26 MG tablet Take 0.5 tablets by mouth 2 (Two) Times a Day. 60 tablet    • vitamin E 400 UNIT capsule Take 400 Units by mouth Daily.     • [DISCONTINUED] diclofenac (VOLTAREN) 1 % gel gel Voltaren 1%, Transdermal 1 Gel as needed       No current facility-administered medications on file prior to visit.       Allergies   Allergen Reactions   • Benzodiazepines Other (See Comments)     Paradoxical response   • Codeine Other (See Comments)     unknown   • Fluoxetine Other (See Comments)     Paradoxical response   • Lipitor [Atorvastatin] Other (See Comments)     Myalgias     • Metoclopramide Other (See Comments)     Unknown   • Nabumetone    • Penicillins Swelling and Other (See Comments)   • Prozac [Fluoxetine Hcl]    • Tramadol Other (See Comments)     unknown   • Valium [Diazepam] Other (See Comments)     Paradoxical response     • Sulfa Antibiotics Rash        Review of Systems   Constitutional: Negative.    HENT: Negative.    Eyes: Negative.    Respiratory: Negative.    Cardiovascular: Negative.    Gastrointestinal: Negative.   "  Endocrine: Negative.    Genitourinary: Negative.    Musculoskeletal: Positive for arthralgias.   Skin: Negative.    Allergic/Immunologic: Negative.    Neurological: Negative.    Hematological: Negative.    Psychiatric/Behavioral: Negative.         The patient's Review of Systems was personally reviewed and confirmed as accurate.    The following portions of the patient's history were reviewed and updated as appropriate: allergies, current medications, past family history, past medical history, past social history, past surgical history and problem list.    Physical Exam  Pulse 111, height 175.3 cm (69\"), weight 78 kg (171 lb 15.3 oz), SpO2 92 %.    Body mass index is 25.39 kg/m².    GENERAL APPEARANCE: awake, alert & oriented x 3, in no acute distress and well developed, well nourished  PSYCH: normal affect  LUNGS:  breathing nonlabored  EYES: sclera anicteric  CARDIOVASCULAR: palpable dorsalis pedis, palpable posterior tibial bilaterally. Capillary refill less than 2 seconds  EXTREMITIES: no clubbing, cyanosis  GAIT:  Antalgic            Right Lower Extremity Exam:   ----------  Hip Exam  ----------  FLEXION CONTRACTURE: None  FLEXION: 110 degrees  INTERNAL ROTATION: 20 degrees at 90 degrees of flexion   EXTERNAL ROTATION: 40 degrees at 90 degrees of flexion    PAIN WITH HIP MOTION: no  ----------  Knee Exam  ----------  ALIGNMENT: 3 degrees varus, correctible to neutral    RANGE OF MOTION:  Decreased (5 - 110 degrees) with no extensor lag  LIGAMENTOUS STABILITY:   stable to varus and valgus stress at terminal extension and 30 degrees; slight retensioning of the MCL is appreciated with valgus stress at 30 degrees consistent with medial compartment degeneration     STRENGTH:  4/5 knee flexion, extension. 5/5 ankle dorsiflexion and plantarflexion.     PAIN WITH PALPATION: medial joint line and anterior knee  KNEE EFFUSION: yes, trace effusion  PAIN WITH KNEE ROM: yes, anteriorly  PATELLAR CREPITUS: yes, painful and " symptomatic  SPECIAL EXAM FINDINGS:  none    REFLEXES:  PATELLAR 2+/4  ACHILLES 2+/4    CLONUS: no  STRAIGHT LEG TEST:   negative    SENSATION TO LIGHT TOUCH:  DEEP PERONEAL/SUPERFICIAL PERONEAL/SURAL/SAPHENOUS/TIBIAL:   intact    EDEMA:  no  ERYTHEMA:  no  WOUNDS/INCISIONS: no        Left Lower Extremity Exam:   ----------  Hip Exam  ----------  FLEXION CONTRACTURE: None  FLEXION: 110 degrees  INTERNAL ROTATION: 20 degrees at 90 degrees of flexion   EXTERNAL ROTATION: 40 degrees at 90 degrees of flexion    PAIN WITH HIP MOTION: no  ----------  Knee Exam  ----------  ALIGNMENT: 3 degrees varus, correctible to neutral    RANGE OF MOTION:  Decreased (5 - 110 degrees) with no extensor lag  LIGAMENTOUS STABILITY:   stable to varus and valgus stress at terminal extension and 30 degrees; slight retensioning of the MCL is appreciated with valgus stress at 30 degrees consistent with medial compartment degeneration     STRENGTH:  4/5 knee flexion, extension. 5/5 ankle dorsiflexion and plantarflexion.     PAIN WITH PALPATION: medial joint line and anterior knee  KNEE EFFUSION: yes, trace effusion  PAIN WITH KNEE ROM: yes, anteriorly  PATELLAR CREPITUS: yes, painful and symptomatic  SPECIAL EXAM FINDINGS:  none    REFLEXES:  PATELLAR 2+/4  ACHILLES 2+/4    CLONUS: no  STRAIGHT LEG TEST:   negative    SENSATION TO LIGHT TOUCH:  DEEP PERONEAL/SUPERFICIAL PERONEAL/SURAL/SAPHENOUS/TIBIAL:   intact    EDEMA:  no  ERYTHEMA:  no  WOUNDS/INCISIONS: no      ______________________________________________________________________  ______________________________________________________________________    RADIOGRAPHIC FINDINGS:   Bilateral knee x-rays from 7/23/2019 were personally reviewed.  Bilateral knee radiographs demonstrate moderate to severe tricompartmental arthritis with genu varum alignment, periarticular osteophytes visualized in all compartments.  No acute bony injury or fracture.      Assessment/Plan:   Diagnosis Plan   1.  Primary osteoarthritis of both knees  Ambulatory Referral to Physical Therapy Evaluate and treat    diclofenac (VOLTAREN) 1 % gel gel     Patient symptoms are consistent with osteoarthritis as well as deconditioning.  I recommend referral to physical therapy as well as topical Voltaren gel.  He is agreeable to this.  Follow-up in 3 months.    Agusto Reddy MD  07/30/19  10:46 AM

## 2019-08-06 NOTE — THERAPY EVALUATION
Outpatient Physical Therapy Ortho Initial Evaluation  [facility]     Patient Name: Murali Dennis  : 1929  MRN: [id number]  Today's Date: 2019      Visit Date: 2019    Patient Active Problem List   Diagnosis   • Dementia   • Hypothyroid   • Stage 3 chronic kidney disease (CMS/[facility])   • Chronic congestive heart failure (CMS/[facility])   • Coronary artery disease involving native coronary artery of native heart without angina pectoris   • A-fib (CMS/[facility])   • Benign prostatic hyperplasia without lower urinary tract symptoms   • Type 2 diabetes mellitus without complication, without long-term current use of insulin (CMS/[facility])   • Gastroesophageal reflux disease   • Ventricular tachycardia (CMS/[facility])   • Parkinson's disease (CMS/[facility])   • ANGELA on CPAP   • Cardiomyopathy (EF 30%)   • Mixed hyperlipidemia        Past Medical History:   Diagnosis Date   • Alzheimer disease    • Warren esophagus     followed  with GI in [place]   • Benign prostatic hyperplasia without lower urinary tract symptoms 10/17/2018   • COPD (chronic obstructive pulmonary disease) (CMS/[facility])    • Coronary artery disease    • Dementia    • Diarrhea 2019   • Environmental allergies 10/17/2018   • Essential hypertension 10/17/2018   • Gastroesophageal reflux disease 2018   • GERD (gastroesophageal reflux disease)    • Hypothyroid     started after amiodarone use in    • Sleep apnea     wears cpap   • Stroke (CMS/[facility])    • TIA (transient ischemic attack) 2017   • Unintended weight loss 10/17/2018        Past Surgical History:   Procedure Laterality Date   • CARDIAC CATHETERIZATION     • CARDIAC CATHETERIZATION N/A 2018    Procedure: Left Heart Cath;  Surgeon: Hollis Ugarte MD;  Location: [facility] INVASIVE LOCATION;  Service:    • CARPAL TUNNEL RELEASE Right    • CATARACT EXTRACTION Bilateral    • CORONARY ARTERY BYPASS GRAFT      Triple Bypass, @ [facility] @  [place]   • HEMORRHOIDECTOMY     • HERNIA REPAIR         Visit Dx:     ICD-10-CM ICD-9-CM   1. Chronic pain of both knees M25.561 719.46    M25.562 338.29    G89.29    2. Impaired functional mobility, balance, gait, and endurance Z74.09 V49.89         Patient History     Row Name 08/06/19 0900             History    Chief Complaint  Pain;Difficulty with daily activities;Balance Problems;Difficulty Walking;Muscle weakness  -NEHEMIAS      Type of Pain  Knee pain R>L  -NEHEMIAS      Date Current Problem(s) Began  -- chronic  -NEHEMIAS      Brief Description of Current Complaint  This 89 year-old male is accompanied by his wife today.  He has a long history of knee pain related to OA.  He is also concerned about increasing difficulty with mobility.  -NEHEMIAS      Patient/Caregiver Goals  Improve strength;Improve mobility  -NEHEMIAS      Hand Dominance  right-handed  -NEHEMIAS      Occupation/sports/leisure activities  Retired teacher.  Limited participation in recreational activities in the past several months.  Moved here from [place] about 1 year ago.  -NEHEMIAS      Patient seeing anyone else for problem(s)?  ortho MD  -NEHEMIAS      How has patient tried to help current problem?  injections, home health PT/OT  -NEHEMIAS      What clinical tests have you had for this problem?  X-ray  -NEHEMIAS      Results of Clinical Tests  moderate to severe OA B knees.  -NEHEMIAS         Pain     Pain Location  Knee bilatera,l R>L  -NEHEMIAS         Fall Risk Assessment    Any falls in the past year:  Yes  -NEHEMIAS      Number of falls reported in the last 12 months  2  -NEHEMIAS      Factors that contributed to the fall:  Tripped;Lost balance falls into chair, per wife's report  -NEHEMIAS         Daily Activities    Primary Language  English  -NEHEMIAS      How does patient learn best?  Listening;Demonstration  -NEHEMIAS      Teaching needs identified  Home Exercise Program;Management of Condition;Falls Prevention  -NEHEMIAS      Patient is concerned about/has problems with  Climbing Stairs;Flexibility;Walking;Transfers  (getting out of a chair, bed);Standing  -NEHEMIAS      Does patient have problems with the following?  Depression  -NEHEMIAS      Pt Participated in POC and Goals  Yes  -NEHEMIAS         Safety    Are you being hurt, hit, or frightened by anyone at home or in your life?  No  -NEHEMIAS      Are you being neglected by a caregiver  No  -NEHEMIAS        User Key  (r) = Recorded By, (t) = Taken By, (c) = Cosigned By    Initials Name Provider Type    Ambreen Velasco, PT Physical Therapist          PT Ortho     Row Name 08/06/19 0900       Posture/Observations    Posture/Observations Comments  Kwesi forward in standing, lacks full knee extension.  In supine, LE are in ER and knees remain flexed  -NEHEMIAS       General ROM    GENERAL ROM COMMENTS  lacks MTP flexion bilaterally  -NEHEMIAS       Right Lower Ext    Rt Knee Extension/Flexion AROM    -NEHEMIAS       Left Lower Ext    Lt Knee Extension/Flexion AROM    -NEHEMIAS       MMT Right Lower Ext    Rt Hip Flexion MMT, Gross Movement  (4/5) good  -NEHEMIAS    Rt Hip Extension MMT, Gross Movement  (3/5) fair modified test position  -NEHEMIAS    Rt Hip ABduction MMT, Gross Movement  (3+/5) fair plus  -NEHEMIAS    Rt Hip ADduction MMT, Gross Movement  (4/5) good  -NEHEMIAS    Rt Knee Extension MMT, Gross Movement  (5/5) normal  -NEHEMIAS    Rt Knee Flexion MMT, Gross Movement  (4+/5) good plus  -NEHEMIAS    Rt Ankle Plantarflexion MMT, Gross Movement  (3-/5) fair minus  -NEHEMIAS    Rt Ankle Dorsiflexion MMT, Gross Movement  (3/5) fair  -NEHEMIAS       MMT Left Lower Ext    Lt Hip Flexion MMT, Gross Movement  (4/5) good  -NEHEMIAS    Lt Hip Extension MMT, Gross Movement  (3-/5) fair minus modified test position  -NEHEMIAS    Lt Hip ABduction MMT, Gross Movement  (3/5) fair  -NEHEMIAS    Lt Hip ADduction MMT, Gross Movement  (4/5) good  -NEHEMIAS    Lt Knee Extension MMT, Gross Movement  (5/5) normal  -NEHEMIAS    Lt Knee Flexion MMT, Gross Movement  (4+/5) good plus  -NEHEMIAS    Lt Ankle Plantarflexion MMT, Gross Movement  (3-/5) fair minus  -NEHEMIAS    Lt Ankle Dorsiflexion MMT, Gross Movement   (3/5) fair  -NEHEMIAS       Lower Extremity Flexibility    Hamstrings  Bilateral:;Moderately limited  -NEHEMIAS    Gastrocnemius  Bilateral:;Severely limited -10 degrees bilaterally  -NEHEMIAS       Balance Skills Training    Balance Comments  lost balance posteriorly when initially coming to stand, compensated by leaning against table  -NEHEMIAS       Gait/Stairs Assessment/Training    Comment (Gait/Stairs)  Ambulates with straight cane in R hand.  Steps are of equal length, but with shuffling and stooped posture.  Pt. uses cane and 1 rail on stairs, with non-reciprocal pattern, turning to side.  Has supervision on stairs at home and has only 2 steps at each entrance to his home.  -NEHEMIAS      User Key  (r) = Recorded By, (t) = Taken By, (c) = Cosigned By    Initials Name Provider Type    Ambreen Velasco, PT Physical Therapist                      Therapy Education  Given: HEP  Program: New  How Provided: Verbal, Demonstration, Written  Provided to: Patient, Caregiver  Level of Understanding: Verbalized, Demonstrated     PT OP Goals     Row Name 08/06/19 1100          PT Short Term Goals    STG Date to Achieve  09/03/19  -     STG 1  Pt. demonstrates independence in initial HEP.  -     STG 2  Pt. demonstrates improving control and safety with sit<>stand transfer.  -NEHEMIAS     STG 3  Pt. demonstrates reciprocal pattern on stairs, using cane and 1 handrail.  -     STG 4  Pt. demonstrates improving gait quality with increased toe clearance, upright posture.  -        Long Term Goals    LTG Date to Achieve  10/01/19  -NEHEMIAS     LTG 1  Pt. demonstrates independence in advanced HEP for ongoing improvement.  -NEHEMIAS     LTG 2  Pt. is independent is safe sit<>stand transfer without LOB.  -     LTG 3  Pt. demonstrates safe ambulation on level surfaces and stairs using cane and 1 handrail.  -     LTG 3 Progress Comments  LE strength and flexibility are sufficient for performance of daily activities.  -        Time Calculation    PT Goal  Re-Cert Due Date  11/04/19  -NEHEMIAS       User Key  (r) = Recorded By, (t) = Taken By, (c) = Cosigned By    Initials Name Provider Type    Ambreen Velasco PT Physical Therapist          PT Assessment/Plan     Row Name 08/06/19 1144          PT Assessment    Functional Limitations  Decreased safety during functional activities;Limitations in functional capacity and performance;Impaired gait  -NEHEMIAS     Impairments  Balance;Gait;Impaired flexibility;Muscle strength;Pain;Range of motion;Posture;Endurance;Joint mobility  -NEHEMIAS     Assessment Comments  Pt. presents with loss of joint mobility, flexibility, and strength affecting safety and ability to perform basic daily activities.  He will benefit from PT intervention to educate in HEP, and to improve safety and performance of functional abilities.  -NEHEMIAS     Please refer to paper survey for additional self-reported information  Yes  -NEHEMIAS     Rehab Potential  Good  -NEHEMIAS     Patient/caregiver participated in establishment of treatment plan and goals  Yes  -NEHEMIAS     Patient would benefit from skilled therapy intervention  Yes  -NEHEMIAS        PT Plan    PT Frequency  2x/week  -NEHEMIAS     Predicted Duration of Therapy Intervention (Therapy Eval)  16 visits  -NEHEMIAS     Planned CPT's?  PT EVAL MOD COMPLELITY: 65130;PT THER PROC EA 15 MIN: 59814;PT NEUROMUSC RE-EDUCATION EA 15 MIN: 16561;PT MANUAL THERAPY EA 15 MIN: 88250;PT GAIT TRAINING EA 15 MIN: 75346;PT HOT OR COLD PACK TREAT MCARE  -NEHEMIAS     Physical Therapy Interventions (Optional Details)  gait training;balance training;stair training;strengthening;stretching;transfer training;ROM (Range of Motion);postural re-education;patient/family education;neuromuscular re-education;home exercise program;joint mobilization;manual therapy techniques  -NEHEMIAS     PT Plan Comments  PT up to 2x/week per POC.  -NEHEMIAS       User Key  (r) = Recorded By, (t) = Taken By, (c) = Cosigned By    Initials Name Provider Type    Ambreen Velasco PT Physical Therapist                               Outcome Measure Options: Lower Extremity Functional Scale (LEFS)  Lower Extremity Functional Index  Any of your usual work, housework or school activities: Quite a bit of difficulty  Your usual hobbies, recreational or sporting activities: Extreme difficulty or unable to perform activity  Getting into or out of the bath: Extreme difficulty or unable to perform activity  Walking between rooms: Quite a bit of difficulty  Putting on your shoes or socks: Quite a bit of difficulty  Squatting: Extreme difficulty or unable to perform activity  Lifting an object, like a bag of groceries from the floor: Quite a bit of difficulty  Performing light activities around your home: Quite a bit of difficulty  Performing heavy activities around your home: Extreme difficulty or unable to perform activity  Getting into or out of a car: Quite a bit of difficulty  Walking 2 blocks: Extreme difficulty or unable to perform activity  Walking a mile: Extreme difficulty or unable to perform activity  Going up or down 10 stairs (about 1 flight of stairs): Extreme difficulty or unable to perform activity  Standing for 1 hour: Extreme difficulty or unable to perform activity  Sitting for 1 hour: Moderate difficulty  Running on even ground: Extreme difficulty or unable to perform activity  Running on uneven ground: Extreme difficulty or unable to perform activity  Making sharp turns while running fast: Extreme difficulty or unable to perform activity  Hopping: Extreme difficulty or unable to perform activity  Rolling over in bed: Quite a bit of difficulty  Total: 9      Time Calculation:           Therapy Charges for Today     Code Description Service Date Service Provider Modifiers Qty    [id number] HC PT EVAL MOD COMPLEXITY 3 8/6/2019 Ambreen Mcghee, PT GP 1          PT G-Codes  Outcome Measure Options: Lower Extremity Functional Scale (LEFS)  Total: 9         Ambreen Mcghee PT  8/6/2019

## 2019-08-08 NOTE — THERAPY TREATMENT NOTE
Outpatient Physical Therapy Ortho Treatment Note   Asha     Patient Name: Murali Dennis  : 1929  MRN: [id number]  Today's Date: 2019      Visit Date: 2019    Visit Dx:    ICD-10-CM ICD-9-CM   1. Chronic pain of both knees M25.561 719.46    M25.562 338.29    G89.29    2. Impaired functional mobility, balance, gait, and endurance Z74.09 V49.89       Patient Active Problem List   Diagnosis   • Dementia   • Hypothyroid   • Stage 3 chronic kidney disease (CMS/[facility])   • Chronic congestive heart failure (CMS/[facility])   • Coronary artery disease involving native coronary artery of native heart without angina pectoris   • A-fib (CMS/[facility])   • Benign prostatic hyperplasia without lower urinary tract symptoms   • Type 2 diabetes mellitus without complication, without long-term current use of insulin (CMS/[facility])   • Gastroesophageal reflux disease   • Ventricular tachycardia (CMS/[facility])   • Parkinson's disease (CMS/[facility])   • ANGELA on CPAP   • Cardiomyopathy (EF 30%)   • Mixed hyperlipidemia        Past Medical History:   Diagnosis Date   • Alzheimer disease    • Warren esophagus     followed  with GI in [place]   • Benign prostatic hyperplasia without lower urinary tract symptoms 10/17/2018   • COPD (chronic obstructive pulmonary disease) (CMS/[facility])    • Coronary artery disease    • Dementia    • Diarrhea 2019   • Environmental allergies 10/17/2018   • Essential hypertension 10/17/2018   • Gastroesophageal reflux disease 2018   • GERD (gastroesophageal reflux disease)    • Hypothyroid     started after amiodarone use in    • Sleep apnea     wears cpap   • Stroke (CMS/[facility])    • TIA (transient ischemic attack) 2017   • Unintended weight loss 10/17/2018        Past Surgical History:   Procedure Laterality Date   • CARDIAC CATHETERIZATION     • CARDIAC CATHETERIZATION N/A 2018    Procedure: Left Heart Cath;  Surgeon: Hollis Ugarte MD;  Location: [facility]  INVASIVE LOCATION;  Service:    • CARPAL TUNNEL RELEASE Right    • CATARACT EXTRACTION Bilateral    • CORONARY ARTERY BYPASS GRAFT  2003    Triple Bypass, @ [facility] @ [place], [place]   • HEMORRHOIDECTOMY     • HERNIA REPAIR         PT Ortho     Row Name 08/08/19 1100       Subjective Comments    Subjective Comments  Pt. reports no knee pain upon arrival.  He reported R knee pain during closed-chain exercises.  -NEHEMIAS    Row Name 08/06/19 0900       Posture/Observations    Posture/Observations Comments  Kwesi forward in standing, lacks full knee extension.  In supine, LE are in ER and knees remain flexed  -NEHEMIAS       General ROM    GENERAL ROM COMMENTS  lacks MTP flexion bilaterally  -NEHEMIAS       Right Lower Ext    Rt Knee Extension/Flexion AROM    -NEHEMIAS       Left Lower Ext    Lt Knee Extension/Flexion AROM    -NEHEMIAS       MMT Right Lower Ext    Rt Hip Flexion MMT, Gross Movement  (4/5) good  -NEHEMIAS    Rt Hip Extension MMT, Gross Movement  (3/5) fair modified test position  -NEHEMIAS    Rt Hip ABduction MMT, Gross Movement  (3+/5) fair plus  -NEHEMIAS    Rt Hip ADduction MMT, Gross Movement  (4/5) good  -NEHEMIAS    Rt Knee Extension MMT, Gross Movement  (5/5) normal  -NEHEMIAS    Rt Knee Flexion MMT, Gross Movement  (4+/5) good plus  -NEHEMIAS    Rt Ankle Plantarflexion MMT, Gross Movement  (3-/5) fair minus  -NEHEMIAS    Rt Ankle Dorsiflexion MMT, Gross Movement  (3/5) fair  -NEHEMIAS       MMT Left Lower Ext    Lt Hip Flexion MMT, Gross Movement  (4/5) good  -NEHEMIAS    Lt Hip Extension MMT, Gross Movement  (3-/5) fair minus modified test position  -NEHEMIAS    Lt Hip ABduction MMT, Gross Movement  (3/5) fair  -NEHEMIAS    Lt Hip ADduction MMT, Gross Movement  (4/5) good  -NEHEMIAS    Lt Knee Extension MMT, Gross Movement  (5/5) normal  -NEHEMIAS    Lt Knee Flexion MMT, Gross Movement  (4+/5) good plus  -NEHEMIAS    Lt Ankle Plantarflexion MMT, Gross Movement  (3-/5) fair minus  -NEHEMIAS    Lt Ankle Dorsiflexion MMT, Gross Movement  (3/5) fair  -NEHEMIAS       Lower Extremity Flexibility  "   Hamstrings  Bilateral:;Moderately limited  -NEHEMIAS    Gastrocnemius  Bilateral:;Severely limited -10 degrees bilaterally  -NEHEMIAS       Balance Skills Training    Balance Comments  lost balance posteriorly when initially coming to stand, compensated by leaning against table  -NEHEMIAS       Gait/Stairs Assessment/Training    Comment (Gait/Stairs)  Ambulates with straight cane in R hand.  Steps are of equal length, but with shuffling and stooped posture.  Pt. uses cane and 1 rail on stairs, with non-reciprocal pattern, turning to side.  Has supervision on stairs at home and has only 2 steps at each entrance to his home.  -      User Key  (r) = Recorded By, (t) = Taken By, (c) = Cosigned By    Initials Name Provider Type    Ambreen Velasco, PT Physical Therapist                      PT Assessment/Plan     Row Name 08/08/19 1100          PT Assessment    Assessment Comments  Pt. has functional hip and knee weakness affecting functional mobility and contributing to knee joint stress in weight-bearing activities.  -NEHEMIAS        PT Plan    PT Plan Comments  Continue to work on knee ROM, flexibility, and functional strengthening.  -       User Key  (r) = Recorded By, (t) = Taken By, (c) = Cosigned By    Initials Name Provider Type    Ambreen Velasco, PT Physical Therapist            Exercises     Row Name 08/08/19 1100             Subjective Comments    Subjective Comments  Pt. reports no knee pain upon arrival.  He reported R knee pain during closed-chain exercises.  -NEHEMIAS         Total Minutes    77989 - PT Therapeutic Exercise Minutes  40  -NEHEMIAS         Exercise 1    Exercise Name 1  Initiated exercise in clinical setting, beginning with active warm up on Nu-Step, followed by calf and hamstring stretching, sit<>stand practice, weight-shifting and 6\" step up, foam square balance.  Practiced walking in corridor, with cues for posture, step length, and toe clearance.  -        User Key  (r) = Recorded By, (t) = Taken By, " (c) = Cosigned By    Initials Name Provider Type    Ambreen Velasco, PT Physical Therapist                                          Time Calculation:   Start Time: 0900  Therapy Charges for Today     Code Description Service Date Service Provider Modifiers Qty    87681639522 HC PT THER PROC EA 15 MIN 8/8/2019 Ambreen Mcghee, PT GP 3                    Ambreen Mcghee, PT  8/8/2019

## 2019-08-12 NOTE — THERAPY TREATMENT NOTE
Outpatient Physical Therapy Ortho Treatment Note   Asha     Patient Name: Murali Dennis  : 1929  MRN: [id number]  Today's Date: 2019      Visit Date: 2019    Visit Dx:    ICD-10-CM ICD-9-CM   1. Chronic pain of both knees M25.561 719.46    M25.562 338.29    G89.29    2. Impaired functional mobility, balance, gait, and endurance Z74.09 V49.89       Patient Active Problem List   Diagnosis   • Dementia   • Hypothyroid   • Stage 3 chronic kidney disease (CMS/[facility])   • Chronic congestive heart failure (CMS/[facility])   • Coronary artery disease involving native coronary artery of native heart without angina pectoris   • A-fib (CMS/[facility])   • Benign prostatic hyperplasia without lower urinary tract symptoms   • Type 2 diabetes mellitus without complication, without long-term current use of insulin (CMS/[facility])   • Gastroesophageal reflux disease   • Ventricular tachycardia (CMS/[facility])   • Parkinson's disease (CMS/[facility])   • ANGELA on CPAP   • Cardiomyopathy (EF 30%)   • Mixed hyperlipidemia        Past Medical History:   Diagnosis Date   • Alzheimer disease    • Warren esophagus     followed  with GI in [place]   • Benign prostatic hyperplasia without lower urinary tract symptoms 10/17/2018   • COPD (chronic obstructive pulmonary disease) (CMS/[facility])    • Coronary artery disease    • Dementia    • Diarrhea 2019   • Environmental allergies 10/17/2018   • Essential hypertension 10/17/2018   • Gastroesophageal reflux disease 2018   • GERD (gastroesophageal reflux disease)    • Hypothyroid     started after amiodarone use in    • Sleep apnea     wears cpap   • Stroke (CMS/[facility])    • TIA (transient ischemic attack) 2017   • Unintended weight loss 10/17/2018        Past Surgical History:   Procedure Laterality Date   • CARDIAC CATHETERIZATION     • CARDIAC CATHETERIZATION N/A 2018    Procedure: Left Heart Cath;  Surgeon: Hollis Ugarte MD;  Location: [facility]  INVASIVE LOCATION;  Service:    • CARPAL TUNNEL RELEASE Right    • CATARACT EXTRACTION Bilateral    • CORONARY ARTERY BYPASS GRAFT  2003    Triple Bypass, @ [facility] @ [place], [place]   • HEMORRHOIDECTOMY     • HERNIA REPAIR         PT Ortho     Row Name 08/12/19 1300       Subjective Comments    Subjective Comments  Pt. is not sure about numeric pain rating.  He says his R knee is no good.  -      User Key  (r) = Recorded By, (t) = Taken By, (c) = Cosigned By    Initials Name Provider Type    Ambreen Velasco PT Physical Therapist                      PT Assessment/Plan     Row Name 08/12/19 1300          PT Assessment    Assessment Comments  Pt. demonstrated improvement in posture and quad activation after treatment today.  -NEHEMIAS        PT Plan    PT Plan Comments  Continue PT, emphasizing gait quality and safety and functional LE strength and flexibility.  -       User Key  (r) = Recorded By, (t) = Taken By, (c) = Cosigned By    Initials Name Provider Type    Ambreen Velasco PT Physical Therapist            Exercises     Row Name 08/12/19 1300             Subjective Comments    Subjective Comments  Pt. is not sure about numeric pain rating.  He says his R knee is no good.  -         Total Minutes    57337 - PT Therapeutic Exercise Minutes  25  -NEHEMIAS      76485 - PT Manual Therapy Minutes  10  -NEHEMIAS         Exercise 1    Exercise Name 1  Active warm up on bicycle followed by work on standing posture and weight shifting onto R LE.  Pt. had difficulty keeping R knee extended in standing.  Moved to exercise mat and focused on terminal knee extension and bridging.  In sitting, performed hamstring stretch, QS, and LAQ.  Ended with gait woth cane, emphasizing posture, step length, and heel strike.  -        User Key  (r) = Recorded By, (t) = Taken By, (c) = Cosigned By    Initials Name Provider Type    Ambreen Velasco, PT Physical Therapist                      Manual Rx (last 36 hours)       Manual Treatments     Row Name 08/12/19 1300             Total Minutes    35021 - PT Manual Therapy Minutes  10  -NEHEMIAS         Manual Rx 1    Manual Rx 1 Location  R knee  -NEHEMIAS      Manual Rx 1 Type  patellar mobilization, passive hamstring stretch and passive knee extension.  -NEHEMIAS        User Key  (r) = Recorded By, (t) = Taken By, (c) = Cosigned By    Initials Name Provider Type    Ambreen Velasco, PT Physical Therapist                             Time Calculation:   Start Time: 0955  Total Timed Code Minutes- PT: 35 minute(s)  Therapy Charges for Today     Code Description Service Date Service Provider Modifiers Qty    20697977081  PT THER PROC EA 15 MIN 8/12/2019 Ambreen Mcghee, PT GP 2                    Ambreen Mcghee, PT  8/12/2019

## 2019-08-19 NOTE — THERAPY TREATMENT NOTE
Outpatient Physical Therapy Ortho Treatment Note   Asha     Patient Name: Murali Dennis  : 1929  MRN: [id number]  Today's Date: 2019      Visit Date: 2019    Visit Dx:    ICD-10-CM ICD-9-CM   1. Impaired functional mobility, balance, gait, and endurance Z74.09 V49.89   2. Chronic pain of both knees M25.561 719.46    M25.562 338.29    G89.29        Patient Active Problem List   Diagnosis   • Dementia   • Hypothyroid   • Stage 3 chronic kidney disease (CMS/[facility])   • Chronic congestive heart failure (CMS/[facility])   • Coronary artery disease involving native coronary artery of native heart without angina pectoris   • A-fib (CMS/[facility])   • Benign prostatic hyperplasia without lower urinary tract symptoms   • Type 2 diabetes mellitus without complication, without long-term current use of insulin (CMS/[facility])   • Gastroesophageal reflux disease   • Ventricular tachycardia (CMS/[facility])   • Parkinson's disease (CMS/[facility])   • ANGELA on CPAP   • Cardiomyopathy (EF 30%)   • Mixed hyperlipidemia        Past Medical History:   Diagnosis Date   • Alzheimer disease    • Warren esophagus     followed  with GI in [place]   • Benign prostatic hyperplasia without lower urinary tract symptoms 10/17/2018   • COPD (chronic obstructive pulmonary disease) (CMS/[facility])    • Coronary artery disease    • Dementia    • Diarrhea 2019   • Environmental allergies 10/17/2018   • Essential hypertension 10/17/2018   • Gastroesophageal reflux disease 2018   • GERD (gastroesophageal reflux disease)    • Hypothyroid     started after amiodarone use in    • Sleep apnea     wears cpap   • Stroke (CMS/[facility])    • TIA (transient ischemic attack) 2017   • Unintended weight loss 10/17/2018        Past Surgical History:   Procedure Laterality Date   • CARDIAC CATHETERIZATION     • CARDIAC CATHETERIZATION N/A 2018    Procedure: Left Heart Cath;  Surgeon: Hollis Ugarte MD;  Location: [facility]  "INVASIVE LOCATION;  Service:    • CARPAL TUNNEL RELEASE Right    • CATARACT EXTRACTION Bilateral    • CORONARY ARTERY BYPASS GRAFT  2003    Triple Bypass, @ [facility] @ [place], [place]   • HEMORRHOIDECTOMY     • HERNIA REPAIR         PT Ortho     Row Name 08/19/19 1200       Subjective Comments    Subjective Comments  Pt. says his knee is \"worn out\".  -NEHEMIAS      User Key  (r) = Recorded By, (t) = Taken By, (c) = Cosigned By    Initials Name Provider Type    Ambreen Velasco PT Physical Therapist                      PT Assessment/Plan     Row Name 08/19/19 1200          PT Assessment    Assessment Comments  Pt. improved with sit to stand after practice in clinic today.  Mant verbal and tactile cues are required during session to achieve effective, quality motion during exercises.  -NEHEMIAS        PT Plan    PT Plan Comments  Continue to address quad/LE weakness as wel as flexibility deficits in order to improve overall mobility skills.  -NEHEMIAS       User Key  (r) = Recorded By, (t) = Taken By, (c) = Cosigned By    Initials Name Provider Type    Ambreen Velasco, PT Physical Therapist            Exercises     Row Name 08/19/19 1200 08/19/19 1100          Subjective Comments    Subjective Comments  Pt. says his knee is \"worn out\".  -NEHEMIAS  --        Total Minutes    54406 - Gait Training Minutes   5  -NEHEMIAS  --     26258 - PT Therapeutic Exercise Minutes  15  -NEHEMIAS  --     39606 -  PT Neuromuscular Reeducation Minutes  5  -NEHEMIAS  --     47968 - PT Therapeutic Activity Minutes  5  -NEHEMIAS  --     30087 - PT Manual Therapy Minutes  --  10  -NEHEMIAS        Exercise 1    Exercise Name 1  Active warm up on Nu-Step followed by calf and hanstring stretching with cueing required for effective performance of the exercises.  Practiced sit to stand to improve safety and quality/control of motion.  Worked on quad strengthening in open and closed chain.  Practiced balancing with one foot on step and the other on the floor.  Practiced " improvement in gait quality on level surfaces and stairs.  -NEHEMIAS  --       User Key  (r) = Recorded By, (t) = Taken By, (c) = Cosigned By    Initials Name Provider Type    Ambreen Velasco PT Physical Therapist                      Manual Rx (last 36 hours)      Manual Treatments     Row Name 08/19/19 1100             Total Minutes    98047 - PT Manual Therapy Minutes  10  -NEHEMIAS         Manual Rx 1    Manual Rx 1 Location  R knee  -NEHEMIAS      Manual Rx 1 Type  patellar mobilization, passive hamstring stretch and passive knee extension.  -NEHEMIAS        User Key  (r) = Recorded By, (t) = Taken By, (c) = Cosigned By    Initials Name Provider Type    Ambreen Velasco PT Physical Therapist                             Time Calculation:   Start Time: 0945  Total Timed Code Minutes- PT: 40 minute(s)  Therapy Charges for Today     Code Description Service Date Service Provider Modifiers Qty    58648561072  PT NEUROMUSC RE EDUCATION EA 15 MIN 8/19/2019 Ambreen Mcghee, PT GP 1    87664831327 HC PT THER PROC EA 15 MIN 8/19/2019 Ambreen Mcghee PT GP 1    69609740977 HC PT MANUAL THERAPY EA 15 MIN 8/19/2019 Ambreen Mcghee PT GP 1                    Ambreen Mcghee PT  8/19/2019

## 2019-08-23 NOTE — THERAPY TREATMENT NOTE
Outpatient Physical Therapy Ortho Treatment Note   Asha     Patient Name: Murali Dennis  : 1929  MRN: [id number]  Today's Date: 2019      Visit Date: 2019    Visit Dx:    ICD-10-CM ICD-9-CM   1. Impaired functional mobility, balance, gait, and endurance Z74.09 V49.89   2. Chronic pain of both knees M25.561 719.46    M25.562 338.29    G89.29        Patient Active Problem List   Diagnosis   • Dementia   • Hypothyroid   • Stage 3 chronic kidney disease (CMS/[facility])   • Chronic congestive heart failure (CMS/[facility])   • Coronary artery disease involving native coronary artery of native heart without angina pectoris   • A-fib (CMS/[facility])   • Benign prostatic hyperplasia without lower urinary tract symptoms   • Type 2 diabetes mellitus without complication, without long-term current use of insulin (CMS/[facility])   • Gastroesophageal reflux disease   • Ventricular tachycardia (CMS/[facility])   • Parkinson's disease (CMS/[facility])   • ANGELA on CPAP   • Cardiomyopathy (EF 30%)   • Mixed hyperlipidemia        Past Medical History:   Diagnosis Date   • Alzheimer disease    • Warren esophagus     followed  with GI in [place]   • Benign prostatic hyperplasia without lower urinary tract symptoms 10/17/2018   • COPD (chronic obstructive pulmonary disease) (CMS/[facility])    • Coronary artery disease    • Dementia    • Diarrhea 2019   • Environmental allergies 10/17/2018   • Essential hypertension 10/17/2018   • Gastroesophageal reflux disease 2018   • GERD (gastroesophageal reflux disease)    • Hypothyroid     started after amiodarone use in    • Sleep apnea     wears cpap   • Stroke (CMS/[facility])    • TIA (transient ischemic attack) 2017   • Unintended weight loss 10/17/2018        Past Surgical History:   Procedure Laterality Date   • CARDIAC CATHETERIZATION     • CARDIAC CATHETERIZATION N/A 2018    Procedure: Left Heart Cath;  Surgeon: Hollis Ugarte MD;  Location: [facility]  INVASIVE LOCATION;  Service:    • CARPAL TUNNEL RELEASE Right    • CATARACT EXTRACTION Bilateral    • CORONARY ARTERY BYPASS GRAFT  2003    Triple Bypass, @ [facility] @ [place], [place]   • HEMORRHOIDECTOMY     • HERNIA REPAIR         PT Ortho     Row Name 08/23/19 1300       Subjective Comments    Subjective Comments  Pt. continues to have R knee pain.  His wife says he is moving a little better at home.  -NEHEMIAS      User Key  (r) = Recorded By, (t) = Taken By, (c) = Cosigned By    Initials Name Provider Type    Ambreen Velasco PT Physical Therapist                      PT Assessment/Plan     Row Name 08/23/19 1300          PT Assessment    Assessment Comments  Stair climbing is improving, but still challenging, particularly when using reciprocal pattern.  Pt. will benefit from continuing PT intervention to address noted deficits.  -NEHEMIAS        PT Plan    PT Plan Comments  Continue and progress as pt. tolerates.  -NEHEMIAS       User Key  (r) = Recorded By, (t) = Taken By, (c) = Cosigned By    Initials Name Provider Type    Ambreen Vleasco, DARREL Physical Therapist            Exercises     Row Name 08/23/19 1300             Subjective Comments    Subjective Comments  Pt. continues to have R knee pain.  His wife says he is moving a little better at home.  -NEHEMIAS         Total Minutes    12849 - Gait Training Minutes   10  -NEHEMIAS      75058 - PT Therapeutic Exercise Minutes  25  -NEHEMIAS      37414 - PT Therapeutic Activity Minutes  5  -NEHEMIAS         Exercise 1    Exercise Name 1  Continued exercise in clinical setting per flow sheet, emphasizing hamstring flexibility, LE strengthening, and functional mobility skills such as sit<>stand and stair climbing.  -NEHEMIAS        User Key  (r) = Recorded By, (t) = Taken By, (c) = Cosigned By    Initials Name Provider Type    Ambreen Velasco, PT Physical Therapist                                          Time Calculation:   Start Time: 0945  Total Timed Code Minutes- PT: 40  minute(s)  Therapy Charges for Today     Code Description Service Date Service Provider Modifiers Qty    07962469214  PT THER PROC EA 15 MIN 8/23/2019 Ambreen Mcghee, PT GP 2    [id number]  GAIT TRAINING EA 15 MIN 8/23/2019 Ambreen Mcghee, PT GP 1                    Ambreen Mcghee, PT  8/23/2019

## 2019-08-26 NOTE — THERAPY TREATMENT NOTE
Outpatient Physical Therapy Ortho Treatment Note   Asha     Patient Name: Murali Dennis  : 1929  MRN: [id number]  Today's Date: 2019      Visit Date: 2019    Visit Dx:    ICD-10-CM ICD-9-CM   1. Impaired functional mobility, balance, gait, and endurance Z74.09 V49.89   2. Chronic pain of both knees M25.561 719.46    M25.562 338.29    G89.29        Patient Active Problem List   Diagnosis   • Dementia   • Hypothyroid   • Stage 3 chronic kidney disease (CMS/[facility])   • Chronic congestive heart failure (CMS/[facility])   • Coronary artery disease involving native coronary artery of native heart without angina pectoris   • A-fib (CMS/[facility])   • Benign prostatic hyperplasia without lower urinary tract symptoms   • Type 2 diabetes mellitus without complication, without long-term current use of insulin (CMS/[facility])   • Gastroesophageal reflux disease   • Ventricular tachycardia (CMS/[facility])   • Parkinson's disease (CMS/[facility])   • ANGELA on CPAP   • Cardiomyopathy (EF 30%)   • Mixed hyperlipidemia        Past Medical History:   Diagnosis Date   • Alzheimer disease    • Warren esophagus     followed  with GI in [place]   • Benign prostatic hyperplasia without lower urinary tract symptoms 10/17/2018   • COPD (chronic obstructive pulmonary disease) (CMS/[facility])    • Coronary artery disease    • Dementia    • Diarrhea 2019   • Environmental allergies 10/17/2018   • Essential hypertension 10/17/2018   • Gastroesophageal reflux disease 2018   • GERD (gastroesophageal reflux disease)    • Hypothyroid     started after amiodarone use in    • Sleep apnea     wears cpap   • Stroke (CMS/[facility])    • TIA (transient ischemic attack) 2017   • Unintended weight loss 10/17/2018        Past Surgical History:   Procedure Laterality Date   • CARDIAC CATHETERIZATION     • CARDIAC CATHETERIZATION N/A 2018    Procedure: Left Heart Cath;  Surgeon: Hollis Ugarte MD;  Location: [facility]  "INVASIVE LOCATION;  Service:    • CARPAL TUNNEL RELEASE Right    • CATARACT EXTRACTION Bilateral    • CORONARY ARTERY BYPASS GRAFT  2003    Triple Bypass, @ [facility] [place], [place]   • HEMORRHOIDECTOMY     • HERNIA REPAIR         PT Ortho     Row Name 08/26/19 1600       Subjective Comments    Subjective Comments  Pt. indicates less intense R knee pain during treatment today.  -      User Key  (r) = Recorded By, (t) = Taken By, (c) = Cosigned By    Initials Name Provider Type    Ambreen Velasco, PT Physical Therapist                      PT Assessment/Plan     Row Name 08/26/19 1600          PT Assessment    Assessment Comments  Pt. requires many cues during treatment due to combination of hearing loss in spite of hearing aides, and memory deficits.  He does demonstrate gradual improvement in mobility skills   -        PT Plan    PT Plan Comments  Continue PT, addressing noted deficits.  -       User Key  (r) = Recorded By, (t) = Taken By, (c) = Cosigned By    Initials Name Provider Type    Ambreen Velasco, PT Physical Therapist            Exercises     Row Name 08/26/19 1600             Subjective Comments    Subjective Comments  Pt. indicates less intense R knee pain during treatment today.  -         Total Minutes    43252 - Gait Training Minutes   5  -NEHEMIAS      00556 - PT Therapeutic Exercise Minutes  30  -NEHEMIAS      66149 - PT Therapeutic Activity Minutes  5  -NEHEMIAS         Exercise 1    Exercise Name 1  Continued to emphasize knee flexibility and strength per flow sheet.  Worked on 8\" step ups as well as flight of stairs.  Practiced backward and sidestepping with cane.  Practiced sit<>stand encouraging decreasing reliance on hands or pushing against seat.  Assisted with hamstring stretching followed by isolated quad activation in sitting.  Worked on posture and weight shifting while facing wall and reaching up to each side.  -        User Key  (r) = Recorded By, (t) = Taken By, " (c) = Cosigned By    Initials Name Provider Type    Ambreen Velasco, PT Physical Therapist                                          Time Calculation:   Start Time: 0945  Total Timed Code Minutes- PT: 40 minute(s)  Therapy Charges for Today     Code Description Service Date Service Provider Modifiers Qty    [id number]  PT THER PROC EA 15 MIN 8/26/2019 Ambreen Mcghee, PT GP 2    [id number]  GAIT TRAINING EA 15 MIN 8/26/2019 Ambreen Mcghee, PT GP 1                    Ambreen Mcghee, PT  8/26/2019

## 2019-09-03 NOTE — PROGRESS NOTES
"   Physical Therapy Daily Progress Note    Patient: Murali Dennis   : 1929  Diagnosis/ICD-10 Code:  The primary encounter diagnosis was Impaired functional mobility, balance, gait, and endurance. A diagnosis of Chronic pain of both knees was also pertinent to this visit.   Referring practitioner: Agusto Reddy MD  Date of Initial Visit: Type: THERAPY  Noted: 2019  Today's Date: 9/3/2019  Patient seen for 7 sessions         Murali Dennis reports: soreness in both knees upon arrival today.    Subjective   Pre-treatment pain:   NT  Post-treatment pain:  NT    Objective   Exercises  Exercise 1  Exercise Name 1: Bicycle  Equipment/Resistance 1: L1  Time: 4 minutes  Exercise 2  Exercise Name 2: Sit<>Stand  Sets/Reps 2: 5x  Exercise 3  Exercise Name 3: LAQ bilaterally  Sets/Reps 3: 10x B  Exercise 4  Exercise Name 4: Hamstring stretch B  Sets/Reps 4: 3x30 B  Exercise 5  Exercise Name 5: 8\" step up  Sets/Reps 5: 5x B  Exercise 6  Exercise Name 6: 6\" side step up/down  Sets/Reps 6: 5x B  Exercise 7  Exercise Name 7: Total Gym  Equipment/Resistance 7: L10  Sets/Reps 7: 2'  Exercise 8  Exercise Name 8: 4-way walk with cane  Sets/Reps 8: 15 feet each direction  Exercise 9  Exercise Name 9: Shallow wall squat  Sets/Reps 9: 5x  Time 9: 5 seconds  Manual Rx  Manual Rx 1  Manual Rx 1 Location: R knee  Manual Rx 1 Type: patellar mobilization, passive hamstring stretch and passive knee extension.  Modalities          Assessment & Plan     Assessment  Assessment details: Pt fatigues quickly with exercises today.    Plan  Plan details: Continue to progress strengthening and endurance to restore function.               Timed:  Manual Therapy:    5     mins  46987;  Therapeutic Exercise:    25     mins  18581;     Neuromuscular Jorge A:        mins  03206;    Therapeutic Activity:          mins  47314;     Gait Training:           mins  95663;     Ultrasound:          mins  56990;    Electrical Stimulation:        "  mins  72825 ( );    Untimed:  Electrical Stimulation:         mins  85277 ( );  Mechanical Traction:         mins  45849;     Timed Treatment:   30   mins   Total Treatment:     30   mins  Ambreen Mcghee PT  Physical Therapist

## 2019-09-06 NOTE — TELEPHONE ENCOUNTER
----- Message from ARCADIO Mccrary sent at 9/6/2019  8:07 AM EDT -----  Please let patient know that renal function is still abnormal.  I think we should go ahead and refer to nephrology for evaluation-referral entered

## 2019-09-06 NOTE — PROGRESS NOTES
Re-Assessment / Re-Certification      Patient: Murali Dennis   : 1929  Diagnosis/ICD-10 Code:  The primary encounter diagnosis was Impaired functional mobility, balance, gait, and endurance. A diagnosis of Chronic pain of both knees was also pertinent to this visit.   Referring practitioner: Agusto Reddy MD  Date of Initial Visit: Type: THERAPY  Noted: 2019  Today's Date: 2019  Patient seen for 8 sessions      Subjective:   Murali Dennis reports: Not feeling too bad today.  Wife reports continued difficulty with gait and steadiness/balance  Subjective Questionnaire: LEFS:     Clinical Progress: improved  Home Program Compliance: No  Treatment has included: therapeutic exercise, neuromuscular re-education, manual therapy, therapeutic activity and gait training    Subjective     Treatment  Exercise 1  Exercise Name 1: reassessment  Exercise 2  Exercise Name 2: Sit<>Stand  Sets/Reps 2: 5x  Exercise 3  Exercise Name 3: foam square balance  Exercise 4  Sets/Reps 4: HEP review with pt. and wife  Exercise 5  Exercise Name 5: stairs  Sets/Reps 5: 1 flight  Exercise 6  Exercise Name 6: posture ex with back to wall  Exercise 7  Exercise Name 7: hip extension facing wall  Sets/Reps 7: 10x bilaterally, alternating  Exercise 8  Exercise Name 8: forward and backward walking with cane  Sets/Reps 8: 15 feet each direction             Objective   Assessment/Plan  Progress toward previous goals: Partially Met    New Goals  Short-term goals (STG): partially met   Long-term goals (LTG): progressing      Recommendations: Continue as planned  Timeframe: 1 month  Prognosis to achieve goals: fair    PT Signature: Ambreen Mcghee PT      Based upon review of the patient's progress and continued therapy plan, it is my medical opinion that Murali Dennis should continue physical therapy treatment at [facility]  [address] [place] [address]  [id number]  [phone].    Signature: __________________________________  Agusto Reddy MD    Timed:  Manual Therapy:         mins  86630;  Therapeutic Exercise:    35    mins  45969;     Neuromuscular Jorge A:        mins  52425;    Therapeutic Activity:          mins  14682;     Gait Training:           mins  03973;     Ultrasound:          mins  96576;    Electrical Stimulation:         mins  88931 ( );    Untimed:  Electrical Stimulation:         mins  06572 ( );  Mechanical Traction:         mins  17178;     Timed Treatment:   35   mins   Total Treatment:     35   mins

## 2019-09-09 NOTE — PROGRESS NOTES
"   Physical Therapy Daily Progress Note  VISIT: 9      Murali Dennis reports: knees are a little sore today.  His wife reports that he seemed a little better during the weekend.      Subjective     Treatment  Pre-treatment pain:    Post-treatment pain:    Exercise 1  Exercise Name 1: bicycle  Time: 5 min  Exercise 2  Exercise Name 2: Sit<>Stand(extensive practice on wt.shift and balance during sit<>stand)  Exercise 3  Exercise Name 3: foam square balance  Exercise 4  Exercise Name 4: Hamstring stretch B  Exercise 5  Exercise Name 5: 6\" step up/down  Exercise 6  Exercise Name 6: LAQ after hamstring stretch  Exercise 7  Exercise Name 7: hip extension facing bar  Exercise 8  Exercise Name 8: forward and backward walking with cane  Exercise 9  Exercise Name 9: Calf stretch  Equipment/Resistance 9: incline board             Objective     Assessment & Plan     Assessment  Assessment details: Pt had difficulty sustaining forward weight shift during sit to stand even after repetitive practice today.  He requires many cues to perform exercises slowly and to achieve maximum muscle contraction.    Plan  Plan details: Continue to address functional mobility deficits to maximum potential.               Timed:  Manual Therapy:    5     mins  53959;  Therapeutic Exercise:    35     mins  01091;     Neuromuscular Jorge A:        mins  44370;    Therapeutic Activity:          mins  81306;     Gait Training:           mins  41528;     Ultrasound:          mins  28673;    Electrical Stimulation:         mins  11930 ( );    Untimed:  Electrical Stimulation:         mins  35559 ( );  Mechanical Traction:         mins  30668;     Timed Treatment:   40   mins   Total Treatment:     40   mins      Ambreen Mcghee, PT  Physical Therapist                    "

## 2019-09-09 NOTE — PROGRESS NOTES
Physical Therapy Daily Progress Note  VISIT: 9      Muralimeredith Dennis reports: knees are a little sore today.  His wife reports that he seemed a little better during the weekend.    Subjective     Treatment             Objective     Assessment/Plan           Timed:  Manual Therapy:    5      mins  13442;  Therapeutic Exercise:    35     mins  35657;     Neuromuscular Jorge A:        mins  88660;    Therapeutic Activity:          mins  97971;     Gait Training:           mins  60187;     Ultrasound:          mins  02595;    Electrical Stimulation:         mins  25912 ( );    Untimed:  Electrical Stimulation:         mins  25270 ( );  Mechanical Traction:         mins  01840;     Timed Treatment:   40   mins   Total Treatment:     40   mins      Ambreen Mcghee PT  Physical Therapist

## 2019-09-13 NOTE — PROGRESS NOTES
Physical Therapy Daily Progress Note  VISIT: 10      Murali ARIELLE Dennis reports: Not feeling too bad today and doing better at home with mobility.    Subjective     Treatment  Exercises in clinic per flow sheet addressing strength, functional mobility, and balance activities.           Objective     Assessment & Plan     Assessment  Assessment details: Pt demonstrates improving functional mobility skills and mild to moderate knee pain related to degenerative changes.    Plan  Plan details: Continue PT addressing strength and mobility deficits as tolerated.               Timed:  Manual Therapy:         mins  64518;  Therapeutic Exercise:    30    mins  07118;     Neuromuscular Jorge A:    10    mins  05063;    Therapeutic Activity:          mins  26595;     Gait Training:           mins  78616;     Ultrasound:          mins  37811;    Electrical Stimulation:         mins  62180 ( );    Untimed:  Electrical Stimulation:         mins  12405 ( );  Mechanical Traction:         mins  64642;     Timed Treatment:   40   mins   Total Treatment:     40   mins      Ambreen Mcghee PT  Physical Therapist

## 2019-09-16 NOTE — PROGRESS NOTES
"   Physical Therapy Daily Progress Note  VISIT: 11      Murali Dennis reports: His knees always hurt, but they are not too bad this morning.    Subjective     Treatment  Exercise 1  Exercise Name 1: Nu-Step  Equipment/Resistance 1: level 6  Time: 6 min  Exercise 2  Exercise Name 2: Sit<>Stand  Exercise 3  Exercise Name 3: foam square balance  Exercise 4  Exercise Name 4: Glider knee flex/ext in sitting  Exercise 5  Exercise Name 5: foam to 8\" step balance in lunge stance  Time 5: 30\" ea  Exercise 6  Exercise Name 6: LAQ after hamstring stretch  Equipment/Resistance 6: 4# cuff B  Exercise 7  Exercise Name 7: hip extension facing bar  Sets/Reps 7: 15x ea  Exercise 8  Exercise Name 8: Fitter knee extension  Equipment/Resistance 8: 3 bands  Sets/Reps 8: 10x  Exercise 9  Exercise Name 9: ball roll forward weight shift with hip lift  Exercise 10  Exercise Name 10: 4 way walk with cane  Sets/Reps 10: 10x B             Objective     Assessment & Plan     Assessment  Assessment details: Knee joint pain limits tolerance for exercise.    Plan  Plan details: Continue PT addressing functional strength and mobility skills.               Timed:  Manual Therapy:         mins  96614;  Therapeutic Exercise:    30     mins  59515;     Neuromuscular Jorge A:    10    mins  04012;    Therapeutic Activity:          mins  16111;     Gait Training:           mins  03864;     Ultrasound:          mins  58304;    Electrical Stimulation:         mins  69252 ( );    Untimed:  Electrical Stimulation:         mins  39990 ( );  Mechanical Traction:         mins  25792;     Timed Treatment:   40   mins   Total Treatment:     40   mins      Ambreen Mcghee, PT  Physical Therapist                    "

## 2019-09-20 NOTE — PROGRESS NOTES
Physical Therapy Daily Progress Note  VISIT: 12      Murali Dennis reports: his knees are sore this morning    Subjective     Treatment  Exercise 1  Exercise Name 1: Nu-Step  Equipment/Resistance 1: level 6  Time: 6 min  Exercise 2  Exercise Name 2: Sit<>Stand  Exercise 3  Exercise Name 3: foam square balance  Exercise 4  Exercise Name 4: supine ball roll hip/knee flexion  Exercise 5  Exercise Name 5: ball bridge  Sets/Reps 5: 10x5 seconds  Exercise 6  Exercise Name 6: ball roll LTR, hip mobility  Exercise 7  Exercise Name 7: hip extension facing wall  Sets/Reps 7: 15x ea  Exercise 8  Exercise Name 8: 4-way walk with cane  Sets/Reps 8: 20' each direction    Manual Rx 1  Manual Rx 1 Location: B knees  Manual Rx 1 Type: patellar mobilization, passive knee extension with oscillation        Objective     Assessment & Plan     Assessment  Assessment details: Pt's wife feels he is moving better at home.  Pt continues to have knee pain related to advanced degenerative changes.    Plan  Plan details: Continue PT for 2 more weeks, addressing strength and mobility deficits and knee pain.               Timed:  Manual Therapy:    10     mins  47149;  Therapeutic Exercise:    30     mins  42017;     Neuromuscular Jorge A:        mins  60549;    Therapeutic Activity:          mins  13294;     Gait Training:           mins  33417;     Ultrasound:          mins  09687;    Electrical Stimulation:         mins  10910 ( );    Untimed:  Electrical Stimulation:         mins  90946 ( );  Mechanical Traction:         mins  33841;     Timed Treatment:   40   mins   Total Treatment:     40   mins      Ambreen Mcghee, PT  Physical Therapist

## 2019-09-24 NOTE — PROGRESS NOTES
"   Physical Therapy Daily Progress Note  VISIT: 13      Murali Dennis reports: knee pain \"as usual\".  Spoke with wife after treatment regarding pt not doing as well today.  She says she had noticed he is not having a good day.  He has good days and bad days, she reports.    Subjective     Treatment  Exercise 1  Exercise Name 1: Nu-Step  Equipment/Resistance 1: level 6  Time: 6 min  Exercise 2  Exercise Name 2: Sit<>Stand  Exercise 3  Exercise Name 3: supine QS over 1/2 foam roll  Exercise 4  Exercise Name 4: hip adduction ball squeeze  Exercise 5  Exercise Name 5: bridging  Sets/Reps 5: 2x10, 5 sec hold  Exercise 6  Exercise Name 6: LTR with ball between knees, hip mobility  Exercise 7  Exercise Name 7: LAQ   Equipment/Resistance 7: 4# cuffs             Objective   BP check 151/72  HR 53    Assessment & Plan     Assessment  Assessment details: Pt is having increased difficulty following commands today.  Even after moving to quieter treatment space, he had difficulty sustaining activity, requiring repeated cues for each repetition.    Plan  Plan details: Continue for up to 3 more sessions.  Reinforce/progress HEP as indicated.               Timed:  Manual Therapy:         mins  55135;  Therapeutic Exercise:    30     mins  55555;     Neuromuscular Jorge A:        mins  46494;    Therapeutic Activity:          mins  46250;     Gait Training:           mins  83983;     Ultrasound:          mins  26865;    Electrical Stimulation:         mins  41885 ( );    Untimed:  Electrical Stimulation:         mins  82337 ( );  Mechanical Traction:         mins  71951;     Timed Treatment:   30   mins   Total Treatment:     30   mins      Ambreen Mcghee, PT  Physical Therapist                    "

## 2019-09-27 NOTE — PROGRESS NOTES
Physical Therapy Daily Progress Note  VISIT: 14      Murali Dennis reports: mild knee soreness this morning    Subjective     Treatment  Exercise 1  Exercise Name 1: Nu-Step  Equipment/Resistance 1: level 6  Time: 6 min  Exercise 2  Exercise Name 2: Sit<>Stand  Exercise 3  Exercise Name 3: rocker board calf stretch  Exercise 4  Exercise Name 4: seated HS stretch  Exercise 5  Exercise Name 5: bridging on ball  Sets/Reps 5: 2x10, 5 sec hold  Exercise 6  Exercise Name 6: LTR on ball  Exercise 7  Exercise Name 7: LAQ   Equipment/Resistance 7: 4# cuffs  Exercise 8  Exercise Name 8: hip extension facing wall  Exercise 9  Exercise Name 9: overhead arm slide on wall to improve posture/extension  Exercise 10  Exercise Name 10: mini wall slide squat             Objective     Assessment & Plan     Assessment  Assessment details: Pt seems to be feeling better in general compared to last session.  He continues to have difficulty maintaining forward weight shift during sit to stand unless heavily cued.    Plan  Plan details: Continue PT through next week, addressing mobility, flexibility, and reinforcing HEP with pt and caregiver.               Timed:  Manual Therapy:         mins  58565;  Therapeutic Exercise:    35     mins  56396;     Neuromuscular Jorge A:        mins  76544;    Therapeutic Activity:          mins  91978;     Gait Training:           mins  95240;     Ultrasound:          mins  18876;    Electrical Stimulation:         mins  08470 ( );    Untimed:  Electrical Stimulation:         mins  75570 ( );  Mechanical Traction:         mins  92442;     Timed Treatment:   35   mins   Total Treatment:     35   mins      Ambreen Mcghee, PT  Physical Therapist

## 2019-09-30 NOTE — PROGRESS NOTES
"   Physical Therapy Daily Progress Note  VISIT: 15      Murali Dennis reports: knees are sore.  When stepping up with R LE, he has increased R knee pain and instability.    Subjective     Treatment  Exercise 1  Exercise Name 1: Nu-Step  Equipment/Resistance 1: level 7  Time: 5 min  Exercise 2  Exercise Name 2: Sit<>Stand  Equipment/Resistance 2: hands on chair for forward wt. shift  Exercise 3  Exercise Name 3: rocker board calf stretch  Exercise 4  Exercise Name 4: SLR with distraction for hamstring flexibility  Exercise 5  Exercise Name 5: bridging  Exercise 6  Exercise Name 6: LTR   Exercise 7  Exercise Name 7: 8\" step up with UE support  Exercise 8  Exercise Name 8: hip extension facing bar  Exercise 9  Exercise Name 9: Gait with cane, cues for posture             Objective     Assessment & Plan     Assessment  Assessment details: Pt is reaching plateau in progress.  Advanced degenerative changes in knee limits ability to progress exercises.    Plan  Plan details: Continue for 1 remaining visit.  Finalize and reinforce HEP with pt and his wife.               Timed:  Manual Therapy:    5     mins  22038;  Therapeutic Exercise:    30     mins  99654;     Neuromuscular Jorge A:        mins  51719;    Therapeutic Activity:          mins  44831;     Gait Training:           mins  59843;     Ultrasound:          mins  97524;    Electrical Stimulation:         mins  68319 ( );    Untimed:  Electrical Stimulation:         mins  87622 ( );  Mechanical Traction:         mins  88768;     Timed Treatment:   35   mins   Total Treatment:     35   mins      Ambreen Mcghee, PT  Physical Therapist                    "

## 2019-10-04 NOTE — PROGRESS NOTES
Physical Therapy Daily Progress Note/Discharge Note    Patient: Murali Dennis   : 1929  Diagnosis/ICD-10 Code:  The primary encounter diagnosis was Impaired functional mobility, balance, gait, and endurance. A diagnosis of Chronic pain of both knees was also pertinent to this visit.   Referring practitioner: Agusto Reddy MD  Date of Initial Visit: Type: THERAPY  Noted: 2019  Today's Date: 10/4/2019  Visit:  16     Murali Dennis reports: R knee bothers him more than left, but not as bad as they used to.    Subjective     Treatment   Reviewed HEP with patient and wife/caregiver.        Objective     Assessment/Plan  STG 1  Pt. demonstrates independence in initial HEP.  -NEHEMIAS  partially met, with caregiver assist      STG 2  Pt. demonstrates improving control and safety with sit<>stand transfer.  -NEHEMIAS  progressing     STG 3  Pt. demonstrates reciprocal pattern on stairs, using cane and 1 handrail.  -NEHEMIAS  not met due to advanced knee OA     STG 4  Pt. demonstrates improving gait quality with increased toe clearance, upright posture.  -NEHEMIAS  met                Long Term Goals     LTG Date to Achieve  10/01/19  -NEHEMIAS       LTG 1  Pt. demonstrates independence in advanced HEP for ongoing improvement.  -NEHEMIAS  not met     LTG 2  Pt. is independent is safe sit<>stand transfer without LOB.  -NEHEMIAS  progressing     LTG 3  Pt. demonstrates safe ambulation on level surfaces and stairs using cane and 1 handrail.  -NEHEMIAS  partially met     LTG 3 Progress Comments  LE strength and flexibility are sufficient for performance of daily activities.  -NEHEMIAS partially met               Timed:  Manual Therapy:         mins  34436;  Therapeutic Exercise:    25     mins  82139;     Neuromuscular Jorge A:        mins  11934;    Therapeutic Activity:          mins  71754;     Gait Training:           mins  97778;     Ultrasound:          mins  53642;    Electrical Stimulation:         mins  06721 ( );    Untimed:  Electrical  Stimulation:         mins  03685 ( );  Mechanical Traction:         mins  21588;     Timed Treatment:   25   mins   Total Treatment:     25   mins      Ambreen Mcghee PT  Physical Therapist

## 2019-10-29 NOTE — PROGRESS NOTES
Orthopaedic Clinic Note: Knee Established Patient    Chief Complaint   Patient presents with   • Left Knee - Follow-up     3 month f/u  Primary Osteoarthritis of Both Knees    • Right Knee - Follow-up     3 month f/u  Primary Osteoarthritis of Both Knees         HPI    It has been 3  month(s) since Mr. Dennis's last visit. He returns to clinic today for follow-up bilateral knee arthritis.  At his last visit he was prescribed topical Voltaren gel as well as physical therapy for strengthening of the knees.  He returns to clinic today stating that his left knee pain has significantly improved.  He still having pain in the right knee which he rates a 6/10 on the pain scale with weightbearing activities.  Sitting and resting eases his pain.  He is using a cane to assist with ambulation.  Overall, his left knee is doing better.  His right knee remains painful and is limiting daily activities.  He is requesting further treatment for his right knee.    Past Medical History:   Diagnosis Date   • Alzheimer disease (CMS/Formerly [facility])    • Warren esophagus     followed  with GI in [place]   • Benign prostatic hyperplasia without lower urinary tract symptoms 10/17/2018   • COPD (chronic obstructive pulmonary disease) (CMS/Formerly [facility])    • Coronary artery disease 2003   • Dementia (CMS/Formerly [facility]) 2003   • Diarrhea 2/24/2019   • Environmental allergies 10/17/2018   • Essential hypertension 10/17/2018   • Gastroesophageal reflux disease 11/19/2018   • GERD (gastroesophageal reflux disease)    • Hypothyroid     started after amiodarone use in 2003   • Sleep apnea     wears cpap   • Stroke (CMS/Formerly [facility]) 2003   • TIA (transient ischemic attack) 12/29/2017   • Unintended weight loss 10/17/2018      Past Surgical History:   Procedure Laterality Date   • CARDIAC CATHETERIZATION  2003   • CARDIAC CATHETERIZATION N/A 1/19/2018    Procedure: Left Heart Cath;  Surgeon: Hollis Ugarte MD;  [facility];  Service:    • [facility]  TUNNEL RELEASE Right    • CATARACT EXTRACTION Bilateral    • CORONARY ARTERY BYPASS GRAFT      Triple Bypass, @ [facility] @ [place], [place]   • HEMORRHOIDECTOMY     • HERNIA REPAIR        Family History   Problem Relation Age of Onset   • Cancer Mother    • Heart disease Sister    • Diabetes Sister    • Heart disease Brother    • Cancer Brother    • Stroke Sister      Social History     Socioeconomic History   • Marital status:      Spouse name: Not on file   • Number of children: Not on file   • Years of education: Not on file   • Highest education level: Not on file   Occupational History   • Occupation: Retired   Tobacco Use   • Smoking status: Former Smoker     Last attempt to quit: 1967     Years since quittin.8   • Smokeless tobacco: Never Used   Substance and Sexual Activity   • Alcohol use: No   • Drug use: No   Social History Narrative    Caffeine use: 1 serving daily.    Patient lives at home with family.     Lives with wife, daughter close by for any needs      Current Outpatient Medications on File Prior to Visit   Medication Sig Dispense Refill   • acetaminophen (TYLENOL) 325 MG tablet Take 650 mg by mouth At Night As Needed for Mild Pain .     • amiodarone (PACERONE) 200 MG tablet Take 1 tablet by mouth Daily. 30 tablet 5   • aspirin 81 MG chewable tablet Chew 81 mg Daily.     • carbidopa-levodopa (SINEMET)  MG per tablet Take 1 tablet by mouth 3 (Three) Times a Day. 90 tablet 11   • cholecalciferol (VITAMIN D3) 1000 units tablet Take 1,000 Units by mouth Daily.     • clopidogrel (PLAVIX) 75 MG tablet Take 75 mg by mouth Daily.     • diclofenac (VOLTAREN) 1 % gel gel Apply 4 g topically to the appropriate area as directed 4 (Four) Times a Day. Small amount to affected area 300 g 3   • dutasteride (AVODART) 0.5 MG capsule TAKE 1 CAPSULE BY MOUTH EVERY DAY 30 capsule 0   • dutasteride (AVODART) 0.5 MG capsule TAKE 1 CAPSULE BY MOUTH EVERY DAY 30 capsule 2   •  glucose blood test strip 1 each by Other route Daily. Use as instructed 100 each 12   • glucose monitor monitoring kit 1 each Daily. 1 each 0   • ipratropium-albuterol (DUO-NEB) 0.5-2.5 mg/mL nebulizer Take 3 mL by nebulization Every 4 (Four) Hours As Needed for Shortness of Air (CBS PROTOCOL). 360 mL    • Lancets (FREESTYLE) lancets 1 each by Other route Daily. May dispense any brand needed E11.9 100 each 12   • levothyroxine (SYNTHROID, LEVOTHROID) 75 MCG tablet Take 1 tablet by mouth Daily. 30 tablet 5   • Loratadine 10 MG capsule Take 1 capsule by mouth Daily As Needed.     • memantine (NAMENDA) 10 MG tablet Take 1 tablet by mouth 2 (Two) Times a Day. 60 tablet 11   • midodrine (PROAMATINE) 10 MG tablet Take 1 tablet by mouth Every 8 (Eight) Hours. 90 tablet 5   • mirtazapine (REMERON) 30 MG tablet TAKE 1 TABLET BY MOUTH EVERYDAY AT BEDTIME 90 tablet 0   • Multiple Vitamins-Minerals (MULTIVITAL PO) Take 1 tablet by mouth Daily.     • nitroglycerin (NITROSTAT) 0.4 MG SL tablet Place 0.4 mg under the tongue Every 5 (Five) Minutes As Needed for Chest Pain. Take no more than 3 doses in 15 minutes.     • omeprazole (priLOSEC) 20 MG capsule TAKE 1 CAPSULE BY MOUTH EVERY DAY 30 capsule 5   • pravastatin (PRAVACHOL) 20 MG tablet TAKE 1 TABLET BY MOUTH EVERY DAY 90 tablet 0   • sacubitril-valsartan (ENTRESTO) 24-26 MG tablet Take 0.5 tablets by mouth 2 (Two) Times a Day. 60 tablet    • vitamin E 400 UNIT capsule Take 400 Units by mouth Daily.       No current facility-administered medications on file prior to visit.       Allergies   Allergen Reactions   • Benzodiazepines Other (See Comments)     Paradoxical response   • Codeine Other (See Comments)     unknown   • Fluoxetine Other (See Comments)     Paradoxical response   • Lipitor [Atorvastatin] Other (See Comments)     Myalgias     • Metoclopramide Other (See Comments)     Unknown   • Nabumetone    • Penicillins Swelling and Other (See Comments)   • Prozac [Fluoxetine  "Hcl]    • Tramadol Other (See Comments)     unknown   • Valium [Diazepam] Other (See Comments)     Paradoxical response     • Sulfa Antibiotics Rash        Review of Systems   Constitutional: Positive for activity change, appetite change, fatigue and unexpected weight change.   HENT: Negative.    Eyes: Negative.    Respiratory: Negative.    Cardiovascular: Negative.    Gastrointestinal: Negative.    Endocrine: Positive for cold intolerance.   Genitourinary: Negative.    Musculoskeletal: Positive for arthralgias.   Skin: Negative.    Allergic/Immunologic: Negative.    Neurological: Positive for dizziness and weakness.   Hematological: Bruises/bleeds easily.   Psychiatric/Behavioral: Positive for confusion.        The patient's Review of Systems was personally reviewed and confirmed as accurate.    Physical Exam  Pulse 59, height 175.3 cm (69.02\"), weight 74.4 kg (164 lb), SpO2 98 %.    Body mass index is 24.21 kg/m².    GENERAL APPEARANCE: awake, alert, oriented, in no acute distress and well developed, well nourished  LUNGS:  breathing nonlabored  EXTREMITIES: no clubbing, cyanosis  PERIPHERAL PULSES: palpable dorsalis pedis and posterior tibial pulses bilaterally.    GAIT:  Antalgic        ----------  Bilateral Knee Exam:  ----------  ALIGNMENT: severe varus, correctable to neutral  ----------  RANGE OF MOTION:  Decreased (10 - 120 degrees) with no extensor lag  LIGAMENTOUS STABILITY:   stable to varus and valgus stress at terminal extension and 30 degrees; slight retensioning of the MCL is appreciated with valgus stress at 30 degrees consistent with medial compartment degeneration  ----------  STRENGTH:  KNEE FLEXION 5/5  KNEE EXTENSION  5/5  ANKLE DORSIFLEXION  5/5  ANKLE PLANTARFLEXION  5/5  ----------  PAIN WITH PALPATION:medial joint line  KNEE EFFUSION: yes, trace effusion  PAIN WITH KNEE ROM: yes  PATELLAR CREPITUS:  yes, painful and symptomatic  ----------  SENSATION TO LIGHT TOUCH:  DEEP " PERONEAL/SUPERFICIAL PERONEAL/SURAL/SAPHENOUS/TIBIAL:    intact  ----------  EDEMA:  no  ERYTHEMA:    no  WOUNDS/INCISIONS:   no  _____________________________________________________________________  _____________________________________________________________________    RADIOGRAPHIC FINDINGS:   No new imaging today    Assessment/Plan:   Diagnosis Plan   1. Primary osteoarthritis of both knees  Large Joint Arthrocentesis: R knee     Patient's left knee is doing better with conservative treatment.  The right knee is still causing limitations in daily activities.  I discussed proceeding with cortisone injection the right knee.  He is agreeable to this.  He will follow-up in 3 months.    Procedure Note:  I discussed with the patient the potential benefits of performing a therapeutic injection of the right knee as well as potential risks including but not limited to infection, swelling, pain, bleeding, bruising, nerve/vessel damage, skin color changes, transient elevation in blood glucose levels, and fat atrophy. After informed consent and after the area was prepped with alcohol, ethyl chloride was used to numb the skin. Via the superior lateral approach, 3cc of 1% lidocaine, 3cc of 0.25% bupivicaine and 2 cc of 40mg/ml of Kenalog were injected into the right knee. The patient tolerated the procedure well. There were no complications. A sterile dressing was placed over the injection site.        Agusto Reddy MD  10/29/19  10:23 AM

## 2019-10-29 NOTE — PROGRESS NOTES
Procedure   Large Joint Arthrocentesis: R knee  Date/Time: 10/29/2019 10:09 AM  Consent given by: patient  Site marked: site marked  Timeout: Immediately prior to procedure a time out was called to verify the correct patient, procedure, equipment, support staff and site/side marked as required   Supporting Documentation  Indications: pain   Procedure Details  Location: knee - R knee  Preparation: Patient was prepped and draped in the usual sterile fashion  Needle size: 22 G  Approach: anterolateral  Medications administered: 3 mL lidocaine PF 1% 1 %; 80 mg triamcinolone acetonide 40 MG/ML (bupicavaine 0.25% ndc: [id number] lot: [id number] exp: 04/2022 [facility])  Patient tolerance: patient tolerated the procedure well with no immediate complications

## 2019-11-01 NOTE — PROGRESS NOTES
"Subjective     Chief Complaint: cognitive impairment, balance impairment      History of Present Illness   Murali Dennis is a 89 y.o. male who returns to clinic today with a complex history. He was reportedly diagnosed with Alzheimer's Disease in [place] in 2003. He has had a gradually progressive cognitive decline over time with associated ADL impairment and visual hallucinations. Donepezil and memantine were stopped previously due to bradycardia.     He has also had symptoms since 2017 of shuffling gait, hand tremor and bradykinesia. This has reportedly improved with institution of SInemet.      Prior evaluation has included screening blood work and a head CT  which was unremarkable . EMG/NCV studies on 12/11/17 showed a moderate-severe axonal sensorimotor neuropathy during a prior hospitalization.     Since his last visit on 4/26/19 he has continued to have worsening impairments across all spheres of cognition, improved with rest. His appetite is poor and he has lost weight. He has just completed a course of physical therapy.    I have reviewed and confirmed the past family, social and medical history as accurate on 11/1/19.     Review of Systems   Constitutional: Negative.        Objective     Pulse 61   Ht 175.3 cm (69\")   Wt 74.4 kg (164 lb)   SpO2 96%   BMI 24.22 kg/m²     General appearance today is normal.     Physical Exam   Neurological: He has normal strength.   Psychiatric: His speech is normal.        Neurologic Exam     Mental Status   Oriented to person.   Disoriented to place.   Disoriented to time.   Registration: recalls 3 of 3 objects. Recall of objects at 5 minutes: 0/3 recall. Follows 3 step commands.   Attention: decreased.   Speech: speech is normal   Level of consciousness: alert  Able to name object. Able to read. Able to repeat. Able to write. Normal comprehension.     Cranial Nerves   Cranial nerves II through XII intact.     Motor Exam   Muscle bulk: normal  Overall muscle " tone: normal    Strength   Strength 5/5 throughout.         Results  MMSE=12      Assessment/Plan   Murali was seen today for follow-up and alzheimer's disease.    Diagnoses and all orders for this visit:    Late onset Alzheimer's disease without behavioral disturbance (CMS/HCC)          Discussion/Summary   Murali Dennis returns to clinic today with a history of advanced Dementia and possible PD. I again reviewed his current status and treatment options. After discussing potential treatment options, it was elected to continue on Namenda and Sinemet unchanged. For appetite I discussed strategies including increased caloric intake, as well as increasing mirtazapine or adding periactin or Megace. He will then follow up in 6 months, or sooner if needed.     I spent 25 minutes face to face with the patient and family. I spent 15 minutes counseling and discussing evaluation, current status, treatment options and management.    As part of this visit I obtained additional history from the family which is incorporated in the HPI.      Bruno Garvin MD

## 2019-12-27 PROBLEM — K85.90 PANCREATITIS: Status: ACTIVE | Noted: 2019-01-01

## 2019-12-27 PROBLEM — R00.1 SINUS BRADYCARDIA: Status: ACTIVE | Noted: 2019-01-01

## 2019-12-27 PROBLEM — R74.01 ELEVATED TRANSAMINASE LEVEL: Status: ACTIVE | Noted: 2019-01-01

## 2019-12-28 PROBLEM — A41.9 SEPSIS (HCC): Status: ACTIVE | Noted: 2019-01-01

## 2019-12-28 PROBLEM — R74.8 ELEVATED LIVER ENZYMES: Status: ACTIVE | Noted: 2019-01-01

## 2019-12-29 PROBLEM — R78.81 BACTEREMIA DUE TO STREPTOCOCCUS: Status: ACTIVE | Noted: 2019-01-01

## 2019-12-29 PROBLEM — N18.30 ACUTE RENAL FAILURE SUPERIMPOSED ON STAGE 3 CHRONIC KIDNEY DISEASE (HCC): Status: ACTIVE | Noted: 2019-01-01

## 2019-12-29 PROBLEM — K81.0 ACUTE CHOLECYSTITIS: Status: ACTIVE | Noted: 2019-01-01

## 2019-12-29 PROBLEM — K85.90 ACUTE PANCREATITIS: Status: ACTIVE | Noted: 2019-01-01

## 2019-12-29 PROBLEM — N17.9 ACUTE RENAL FAILURE SUPERIMPOSED ON STAGE 3 CHRONIC KIDNEY DISEASE (HCC): Status: ACTIVE | Noted: 2019-01-01

## 2019-12-29 PROBLEM — B95.5 BACTEREMIA DUE TO STREPTOCOCCUS: Status: ACTIVE | Noted: 2019-01-01

## 2019-12-29 PROBLEM — R00.1 SINUS BRADYCARDIA: Status: RESOLVED | Noted: 2019-01-01 | Resolved: 2019-01-01

## 2019-12-30 NOTE — ANESTHESIA PREPROCEDURE EVALUATION
Anesthesia Evaluation                  Airway   Mallampati: II  Dental      Pulmonary    Cardiovascular     (+) hypertension, CABG,       Neuro/Psych  GI/Hepatic/Renal/Endo    (+)   renal disease,     Musculoskeletal     Abdominal    Substance History      OB/GYN          Other                        Anesthesia Plan    ASA 3     general     intravenous induction     Anesthetic plan, all risks, benefits, and alternatives have been provided, discussed and informed consent has been obtained with: patient.

## 2019-12-30 NOTE — ANESTHESIA POSTPROCEDURE EVALUATION
Patient: Murali Dennis    Procedure Summary     Date:  12/30/19 Room / Location:   [facility]    Anesthesia Start:  1012 Anesthesia Stop:  1059    Procedure:  ENDOSCOPIC RETROGRADE CHOLANGIOPANCREATOGRAPHY (N/A ) Diagnosis:       Acute pancreatitis, unspecified complication status, unspecified pancreatitis type      (Acute pancreatitis, unspecified complication status, unspecified pancreatitis type [K85.90])    Surgeon:  Arsh White MD Provider:  Bismark Mujica MD    Anesthesia Type:  general ASA Status:  3          Anesthesia Type: general    Vitals  No vitals data found for the desired time range.          Post Anesthesia Care and Evaluation    Patient location during evaluation: PACU  Patient participation: complete - patient participated  Level of consciousness: awake and responsive to verbal stimuli  Pain score: 2  Pain management: adequate  Airway patency: patent  Anesthetic complications: No anesthetic complications    Cardiovascular status: acceptable  Respiratory status: acceptable  Hydration status: acceptable    Comments: Pt awake and responsive. SV. VSS. Report to RN. Patient Vitals in the past 24 hrs:  12/30/19 0931, BP:134/69, Temp:98.1 °F (36.7 °C), Temp src:Temporal, Pulse:57, Resp:18, SpO2:98 %  12/30/19 0710, Pulse:57, SpO2:95 %  12/30/19 0709, BP:99/61, Temp:98.3 °F (36.8 °C), Temp src:Axillary, Pulse:58, Resp:16, SpO2:95 %  12/30/19 0618, Pulse:70  12/30/19 0439, Weight:77.1 kg (170 lb)  12/30/19 0352, BP:110/65, Temp:97.4 °F (36.3 °C), Temp src:Axillary, SpO2:93 %  12/30/19 0345, Pulse:71, Resp:15  12/30/19 0155, BP:119/61, Temp:97.8 °F (36.6 °C), Temp src:Axillary, Pulse:58, Resp:16, SpO2:96 %  12/30/19 0002, BP:101/51, Temp:98.1 °F (36.7 °C), Temp src:Axillary, Pulse:63, Resp:16, SpO2:96 %  12/29/19 2204, BP:106/55, Pulse:64, Resp:16, SpO2:95 %  12/29/19 2202, SpO2:95 %  12/29/19 2201, SpO2:95 %  12/29/19 2200, SpO2:94 %  12/29/19 2159,  SpO2:97 %  12/29/19 2025, BP:119/62, Temp:98.5 °F (36.9 °C), Temp src:Axillary, Pulse:62, Resp:16, SpO2:93 %  12/29/19 1554, Weight:78 kg (172 lb)  12/29/19 1116, BP:123/73, Temp:97.9 °F (36.6 °C), Temp src:Oral, Pulse:59, Resp:20, SpO2:94 %  133/78. p 72. r 16. t 98.1

## 2020-01-01 ENCOUNTER — TELEPHONE (OUTPATIENT)
Dept: INTERNAL MEDICINE | Facility: CLINIC | Age: 85
End: 2020-01-01

## 2020-01-01 ENCOUNTER — TRANSITIONAL CARE MANAGEMENT TELEPHONE ENCOUNTER (OUTPATIENT)
Dept: INTERNAL MEDICINE | Facility: CLINIC | Age: 85
End: 2020-01-01

## 2020-01-01 ENCOUNTER — APPOINTMENT (OUTPATIENT)
Dept: CT IMAGING | Facility: HOSPITAL | Age: 85
End: 2020-01-01

## 2020-01-01 ENCOUNTER — APPOINTMENT (OUTPATIENT)
Dept: GENERAL RADIOLOGY | Facility: HOSPITAL | Age: 85
End: 2020-01-01

## 2020-01-01 ENCOUNTER — TELEPHONE (OUTPATIENT)
Dept: PEDIATRICS | Facility: OTHER | Age: 85
End: 2020-01-01

## 2020-01-01 ENCOUNTER — HOSPITAL ENCOUNTER (INPATIENT)
Facility: HOSPITAL | Age: 85
LOS: 2 days | Discharge: HOSPICE | End: 2020-02-18
Attending: EMERGENCY MEDICINE | Admitting: FAMILY MEDICINE

## 2020-01-01 ENCOUNTER — READMISSION MANAGEMENT (OUTPATIENT)
Dept: CALL CENTER | Facility: HOSPITAL | Age: 85
End: 2020-01-01

## 2020-01-01 ENCOUNTER — OFFICE VISIT (OUTPATIENT)
Dept: INTERNAL MEDICINE | Facility: CLINIC | Age: 85
End: 2020-01-01

## 2020-01-01 ENCOUNTER — OUTSIDE FACILITY SERVICE (OUTPATIENT)
Dept: INTERNAL MEDICINE | Facility: CLINIC | Age: 85
End: 2020-01-01

## 2020-01-01 ENCOUNTER — HOSPITAL ENCOUNTER (INPATIENT)
Facility: HOSPITAL | Age: 85
LOS: 7 days | Discharge: NURSING, REHAB OR LONG-TERM CARE | End: 2020-02-14
Attending: HOSPITALIST | Admitting: INTERNAL MEDICINE

## 2020-01-01 VITALS
RESPIRATION RATE: 20 BRPM | WEIGHT: 178 LBS | SYSTOLIC BLOOD PRESSURE: 139 MMHG | HEIGHT: 70 IN | DIASTOLIC BLOOD PRESSURE: 61 MMHG | BODY MASS INDEX: 25.48 KG/M2 | TEMPERATURE: 98.3 F | OXYGEN SATURATION: 99 % | HEART RATE: 52 BPM

## 2020-01-01 VITALS
SYSTOLIC BLOOD PRESSURE: 130 MMHG | HEART RATE: 58 BPM | RESPIRATION RATE: 18 BRPM | OXYGEN SATURATION: 96 % | TEMPERATURE: 97.8 F | BODY MASS INDEX: 27.55 KG/M2 | HEIGHT: 70 IN | DIASTOLIC BLOOD PRESSURE: 62 MMHG

## 2020-01-01 VITALS
OXYGEN SATURATION: 92 % | HEIGHT: 70 IN | BODY MASS INDEX: 25.11 KG/M2 | RESPIRATION RATE: 22 BRPM | HEART RATE: 100 BPM | SYSTOLIC BLOOD PRESSURE: 132 MMHG | TEMPERATURE: 97.9 F | DIASTOLIC BLOOD PRESSURE: 64 MMHG

## 2020-01-01 VITALS
OXYGEN SATURATION: 98 % | HEIGHT: 70 IN | WEIGHT: 150 LBS | SYSTOLIC BLOOD PRESSURE: 151 MMHG | BODY MASS INDEX: 21.47 KG/M2 | HEART RATE: 54 BPM | TEMPERATURE: 95.2 F | DIASTOLIC BLOOD PRESSURE: 73 MMHG | RESPIRATION RATE: 14 BRPM

## 2020-01-01 VITALS
HEART RATE: 62 BPM | RESPIRATION RATE: 18 BRPM | HEIGHT: 70 IN | SYSTOLIC BLOOD PRESSURE: 116 MMHG | DIASTOLIC BLOOD PRESSURE: 58 MMHG | WEIGHT: 192 LBS | OXYGEN SATURATION: 96 % | BODY MASS INDEX: 27.49 KG/M2 | TEMPERATURE: 97.6 F

## 2020-01-01 DIAGNOSIS — R77.8 ELEVATED TROPONIN: ICD-10-CM

## 2020-01-01 DIAGNOSIS — Z74.09 IMPAIRED MOBILITY AND ADLS: ICD-10-CM

## 2020-01-01 DIAGNOSIS — K21.9 GASTROESOPHAGEAL REFLUX DISEASE, ESOPHAGITIS PRESENCE NOT SPECIFIED: ICD-10-CM

## 2020-01-01 DIAGNOSIS — K85.10 ACUTE BILIARY PANCREATITIS WITHOUT INFECTION OR NECROSIS: Primary | ICD-10-CM

## 2020-01-01 DIAGNOSIS — R07.9 NONSPECIFIC CHEST PAIN: Primary | ICD-10-CM

## 2020-01-01 DIAGNOSIS — Z78.9 IMPAIRED MOBILITY AND ADLS: ICD-10-CM

## 2020-01-01 DIAGNOSIS — F32.A DEPRESSION, UNSPECIFIED DEPRESSION TYPE: ICD-10-CM

## 2020-01-01 DIAGNOSIS — I48.91 ATRIAL FIBRILLATION, UNSPECIFIED TYPE (HCC): ICD-10-CM

## 2020-01-01 DIAGNOSIS — I50.9 ACUTE ON CHRONIC CONGESTIVE HEART FAILURE, UNSPECIFIED HEART FAILURE TYPE (HCC): ICD-10-CM

## 2020-01-01 DIAGNOSIS — I42.9 CARDIOMYOPATHY, UNSPECIFIED TYPE (HCC): ICD-10-CM

## 2020-01-01 DIAGNOSIS — N17.9 ACUTE RENAL FAILURE SUPERIMPOSED ON STAGE 3 CHRONIC KIDNEY DISEASE, UNSPECIFIED ACUTE RENAL FAILURE TYPE: ICD-10-CM

## 2020-01-01 DIAGNOSIS — Z78.9 IMPAIRED MOBILITY AND ADLS: Primary | ICD-10-CM

## 2020-01-01 DIAGNOSIS — Z86.79 HISTORY OF CORONARY ARTERY DISEASE: ICD-10-CM

## 2020-01-01 DIAGNOSIS — D64.9 ANEMIA, UNSPECIFIED TYPE: ICD-10-CM

## 2020-01-01 DIAGNOSIS — H10.33 ACUTE BACTERIAL CONJUNCTIVITIS OF BOTH EYES: ICD-10-CM

## 2020-01-01 DIAGNOSIS — Z74.09 IMPAIRED MOBILITY AND ADLS: Primary | ICD-10-CM

## 2020-01-01 DIAGNOSIS — R60.0 LOWER EXTREMITY EDEMA: ICD-10-CM

## 2020-01-01 DIAGNOSIS — Z87.01 HISTORY OF RECENT PNEUMONIA: ICD-10-CM

## 2020-01-01 DIAGNOSIS — R09.02 HYPOXIA: Primary | ICD-10-CM

## 2020-01-01 DIAGNOSIS — R17 JAUNDICE: ICD-10-CM

## 2020-01-01 DIAGNOSIS — K81.0 ACUTE CHOLECYSTITIS: ICD-10-CM

## 2020-01-01 DIAGNOSIS — E03.9 ACQUIRED HYPOTHYROIDISM: ICD-10-CM

## 2020-01-01 DIAGNOSIS — E11.9 TYPE 2 DIABETES MELLITUS WITHOUT COMPLICATION, WITHOUT LONG-TERM CURRENT USE OF INSULIN (HCC): ICD-10-CM

## 2020-01-01 DIAGNOSIS — F02.80 LATE ONSET ALZHEIMER'S DISEASE WITHOUT BEHAVIORAL DISTURBANCE (HCC): ICD-10-CM

## 2020-01-01 DIAGNOSIS — Z99.89 OSA ON CPAP: ICD-10-CM

## 2020-01-01 DIAGNOSIS — G47.33 OSA ON CPAP: ICD-10-CM

## 2020-01-01 DIAGNOSIS — G30.1 LATE ONSET ALZHEIMER'S DISEASE WITHOUT BEHAVIORAL DISTURBANCE (HCC): ICD-10-CM

## 2020-01-01 DIAGNOSIS — R00.0 TACHYCARDIA: ICD-10-CM

## 2020-01-01 DIAGNOSIS — R06.02 SHORT OF BREATH ON EXERTION: ICD-10-CM

## 2020-01-01 DIAGNOSIS — R53.81 DEBILITY: ICD-10-CM

## 2020-01-01 DIAGNOSIS — N18.3 ACUTE RENAL FAILURE SUPERIMPOSED ON STAGE 3 CHRONIC KIDNEY DISEASE, UNSPECIFIED ACUTE RENAL FAILURE TYPE: ICD-10-CM

## 2020-01-01 DIAGNOSIS — G20 PARKINSON'S DISEASE (HCC): ICD-10-CM

## 2020-01-01 LAB
ALBUMIN SERPL-MCNC: 2.6 G/DL (ref 3.5–5.2)
ALBUMIN SERPL-MCNC: 3 G/DL (ref 3.5–5.2)
ALBUMIN SERPL-MCNC: 3.3 G/DL (ref 3.5–5.2)
ALBUMIN SERPL-MCNC: 3.5 G/DL (ref 3.5–5.2)
ALBUMIN SERPL-MCNC: 3.8 G/DL (ref 3.5–5.2)
ALBUMIN/GLOB SERPL: 0.9 G/DL
ALBUMIN/GLOB SERPL: 0.9 G/DL
ALBUMIN/GLOB SERPL: 1 G/DL
ALBUMIN/GLOB SERPL: 1.1 G/DL
ALBUMIN/GLOB SERPL: 1.1 G/DL
ALP SERPL-CCNC: 175 U/L (ref 39–117)
ALP SERPL-CCNC: 195 U/L (ref 39–117)
ALP SERPL-CCNC: 205 U/L (ref 39–117)
ALP SERPL-CCNC: 226 U/L (ref 39–117)
ALP SERPL-CCNC: 237 U/L (ref 39–117)
ALT SERPL W P-5'-P-CCNC: 12 U/L (ref 1–41)
ALT SERPL W P-5'-P-CCNC: 17 U/L (ref 1–41)
ALT SERPL W P-5'-P-CCNC: 28 U/L (ref 1–41)
ALT SERPL W P-5'-P-CCNC: 9 U/L (ref 1–41)
ALT SERPL W P-5'-P-CCNC: 9 U/L (ref 1–41)
ANION GAP SERPL CALCULATED.3IONS-SCNC: 10 MMOL/L (ref 5–15)
ANION GAP SERPL CALCULATED.3IONS-SCNC: 12 MMOL/L (ref 5–15)
ANION GAP SERPL CALCULATED.3IONS-SCNC: 12 MMOL/L (ref 5–15)
ANION GAP SERPL CALCULATED.3IONS-SCNC: 13 MMOL/L (ref 5–15)
ANION GAP SERPL CALCULATED.3IONS-SCNC: 14 MMOL/L (ref 5–15)
ANION GAP SERPL CALCULATED.3IONS-SCNC: 14 MMOL/L (ref 5–15)
ANION GAP SERPL CALCULATED.3IONS-SCNC: 16 MMOL/L (ref 5–15)
APTT PPP: 31.2 SECONDS (ref 85–120)
AST SERPL-CCNC: 38 U/L (ref 1–40)
AST SERPL-CCNC: 39 U/L (ref 1–40)
AST SERPL-CCNC: 40 U/L (ref 1–40)
AST SERPL-CCNC: 51 U/L (ref 1–40)
AST SERPL-CCNC: 60 U/L (ref 1–40)
B PARAPERT DNA SPEC QL NAA+PROBE: NOT DETECTED
B PERT DNA SPEC QL NAA+PROBE: NOT DETECTED
BACTERIA SPEC AEROBE CULT: ABNORMAL
BACTERIA SPEC AEROBE CULT: NORMAL
BACTERIA SPEC AEROBE CULT: NORMAL
BACTERIA UR QL AUTO: ABNORMAL /HPF
BASOPHILS # BLD AUTO: 0.01 10*3/MM3 (ref 0–0.2)
BASOPHILS # BLD AUTO: 0.02 10*3/MM3 (ref 0–0.2)
BASOPHILS # BLD AUTO: 0.02 10*3/MM3 (ref 0–0.2)
BASOPHILS # BLD AUTO: 0.04 10*3/MM3 (ref 0–0.2)
BASOPHILS # BLD AUTO: 0.04 10*3/MM3 (ref 0–0.2)
BASOPHILS # BLD MANUAL: 0 10*3/MM3 (ref 0–0.2)
BASOPHILS NFR BLD AUTO: 0 % (ref 0–1.5)
BASOPHILS NFR BLD AUTO: 0.1 % (ref 0–1.5)
BASOPHILS NFR BLD AUTO: 0.3 % (ref 0–1.5)
BASOPHILS NFR BLD AUTO: 0.4 % (ref 0–1.5)
BASOPHILS NFR BLD AUTO: 0.7 % (ref 0–1.5)
BASOPHILS NFR BLD AUTO: 0.8 % (ref 0–1.5)
BILIRUB SERPL-MCNC: 0.6 MG/DL (ref 0.2–1.2)
BILIRUB SERPL-MCNC: 0.8 MG/DL (ref 0.2–1.2)
BILIRUB SERPL-MCNC: 0.8 MG/DL (ref 0.2–1.2)
BILIRUB SERPL-MCNC: 0.9 MG/DL (ref 0.2–1.2)
BILIRUB SERPL-MCNC: 1 MG/DL (ref 0.2–1.2)
BILIRUB UR QL STRIP: NEGATIVE
BILIRUB UR QL STRIP: NEGATIVE
BUN BLD-MCNC: 18 MG/DL (ref 8–23)
BUN BLD-MCNC: 19 MG/DL (ref 8–23)
BUN BLD-MCNC: 20 MG/DL (ref 8–23)
BUN BLD-MCNC: 20 MG/DL (ref 8–23)
BUN BLD-MCNC: 21 MG/DL (ref 8–23)
BUN BLD-MCNC: 21 MG/DL (ref 8–23)
BUN BLD-MCNC: 33 MG/DL (ref 8–23)
BUN BLD-MCNC: 42 MG/DL (ref 8–23)
BUN BLD-MCNC: 51 MG/DL (ref 8–23)
BUN/CREAT SERPL: 11.2 (ref 7–25)
BUN/CREAT SERPL: 11.4 (ref 7–25)
BUN/CREAT SERPL: 12.3 (ref 7–25)
BUN/CREAT SERPL: 12.3 (ref 7–25)
BUN/CREAT SERPL: 12.5 (ref 7–25)
BUN/CREAT SERPL: 12.7 (ref 7–25)
BUN/CREAT SERPL: 12.8 (ref 7–25)
BUN/CREAT SERPL: 13.1 (ref 7–25)
BUN/CREAT SERPL: 14.3 (ref 7–25)
BUN/CREAT SERPL: 14.5 (ref 7–25)
BUN/CREAT SERPL: 22.9 (ref 7–25)
BUN/CREAT SERPL: 31.8 (ref 7–25)
BUN/CREAT SERPL: 33.3 (ref 7–25)
C PNEUM DNA NPH QL NAA+NON-PROBE: NOT DETECTED
CALCIUM SPEC-SCNC: 8.2 MG/DL (ref 8.2–9.6)
CALCIUM SPEC-SCNC: 8.2 MG/DL (ref 8.2–9.6)
CALCIUM SPEC-SCNC: 8.4 MG/DL (ref 8.2–9.6)
CALCIUM SPEC-SCNC: 8.5 MG/DL (ref 8.2–9.6)
CALCIUM SPEC-SCNC: 8.5 MG/DL (ref 8.2–9.6)
CALCIUM SPEC-SCNC: 8.8 MG/DL (ref 8.2–9.6)
CALCIUM SPEC-SCNC: 8.8 MG/DL (ref 8.2–9.6)
CALCIUM SPEC-SCNC: 8.9 MG/DL (ref 8.2–9.6)
CALCIUM SPEC-SCNC: 8.9 MG/DL (ref 8.2–9.6)
CALCIUM SPEC-SCNC: 9 MG/DL (ref 8.2–9.6)
CHLORIDE SERPL-SCNC: 101 MMOL/L (ref 98–107)
CHLORIDE SERPL-SCNC: 101 MMOL/L (ref 98–107)
CHLORIDE SERPL-SCNC: 102 MMOL/L (ref 98–107)
CHLORIDE SERPL-SCNC: 103 MMOL/L (ref 98–107)
CHLORIDE SERPL-SCNC: 103 MMOL/L (ref 98–107)
CHLORIDE SERPL-SCNC: 107 MMOL/L (ref 98–107)
CHLORIDE SERPL-SCNC: 108 MMOL/L (ref 98–107)
CHLORIDE SERPL-SCNC: 109 MMOL/L (ref 98–107)
CHLORIDE SERPL-SCNC: 110 MMOL/L (ref 98–107)
CHLORIDE SERPL-SCNC: 99 MMOL/L (ref 98–107)
CLARITY UR: CLEAR
CLARITY UR: CLEAR
CO2 SERPL-SCNC: 18 MMOL/L (ref 22–29)
CO2 SERPL-SCNC: 19 MMOL/L (ref 22–29)
CO2 SERPL-SCNC: 20 MMOL/L (ref 22–29)
CO2 SERPL-SCNC: 21 MMOL/L (ref 22–29)
CO2 SERPL-SCNC: 22 MMOL/L (ref 22–29)
CO2 SERPL-SCNC: 22 MMOL/L (ref 22–29)
CO2 SERPL-SCNC: 23 MMOL/L (ref 22–29)
CO2 SERPL-SCNC: 25 MMOL/L (ref 22–29)
CO2 SERPL-SCNC: 26 MMOL/L (ref 22–29)
CO2 SERPL-SCNC: 26 MMOL/L (ref 22–29)
CO2 SERPL-SCNC: 30 MMOL/L (ref 22–29)
COLOR UR: YELLOW
COLOR UR: YELLOW
CREAT BLD-MCNC: 1.26 MG/DL (ref 0.76–1.27)
CREAT BLD-MCNC: 1.32 MG/DL (ref 0.76–1.27)
CREAT BLD-MCNC: 1.44 MG/DL (ref 0.76–1.27)
CREAT BLD-MCNC: 1.45 MG/DL (ref 0.76–1.27)
CREAT BLD-MCNC: 1.49 MG/DL (ref 0.76–1.27)
CREAT BLD-MCNC: 1.53 MG/DL (ref 0.76–1.27)
CREAT BLD-MCNC: 1.53 MG/DL (ref 0.76–1.27)
CREAT BLD-MCNC: 1.55 MG/DL (ref 0.76–1.27)
CREAT BLD-MCNC: 1.55 MG/DL (ref 0.76–1.27)
CREAT BLD-MCNC: 1.58 MG/DL (ref 0.76–1.27)
CREAT BLD-MCNC: 1.6 MG/DL (ref 0.76–1.27)
CREAT BLD-MCNC: 1.61 MG/DL (ref 0.76–1.27)
CREAT BLD-MCNC: 1.65 MG/DL (ref 0.76–1.27)
D-LACTATE SERPL-SCNC: 1.8 MMOL/L (ref 0.5–2)
DEPRECATED RDW RBC AUTO: 51.4 FL (ref 37–54)
DEPRECATED RDW RBC AUTO: 53.7 FL (ref 37–54)
DEPRECATED RDW RBC AUTO: 54 FL (ref 37–54)
DEPRECATED RDW RBC AUTO: 54.8 FL (ref 37–54)
DEPRECATED RDW RBC AUTO: 57.2 FL (ref 37–54)
DEPRECATED RDW RBC AUTO: 57.9 FL (ref 37–54)
DEPRECATED RDW RBC AUTO: 58.4 FL (ref 37–54)
DEPRECATED RDW RBC AUTO: 60 FL (ref 37–54)
DEPRECATED RDW RBC AUTO: 62.7 FL (ref 37–54)
DEPRECATED RDW RBC AUTO: 63.8 FL (ref 37–54)
DEPRECATED RDW RBC AUTO: 65.2 FL (ref 37–54)
EOSINOPHIL # BLD AUTO: 0.07 10*3/MM3 (ref 0–0.4)
EOSINOPHIL # BLD AUTO: 0.22 10*3/MM3 (ref 0–0.4)
EOSINOPHIL # BLD AUTO: 0.24 10*3/MM3 (ref 0–0.4)
EOSINOPHIL # BLD AUTO: 0.27 10*3/MM3 (ref 0–0.4)
EOSINOPHIL # BLD AUTO: 0.3 10*3/MM3 (ref 0–0.4)
EOSINOPHIL # BLD MANUAL: 0.25 10*3/MM3 (ref 0–0.4)
EOSINOPHIL NFR BLD AUTO: 0.9 % (ref 0.3–6.2)
EOSINOPHIL NFR BLD AUTO: 4 % (ref 0.3–6.2)
EOSINOPHIL NFR BLD AUTO: 4.6 % (ref 0.3–6.2)
EOSINOPHIL NFR BLD AUTO: 4.7 % (ref 0.3–6.2)
EOSINOPHIL NFR BLD AUTO: 5.5 % (ref 0.3–6.2)
EOSINOPHIL NFR BLD MANUAL: 5 % (ref 0.3–6.2)
ERYTHROCYTE [DISTWIDTH] IN BLOOD BY AUTOMATED COUNT: 14.2 % (ref 12.3–15.4)
ERYTHROCYTE [DISTWIDTH] IN BLOOD BY AUTOMATED COUNT: 14.4 % (ref 12.3–15.4)
ERYTHROCYTE [DISTWIDTH] IN BLOOD BY AUTOMATED COUNT: 14.4 % (ref 12.3–15.4)
ERYTHROCYTE [DISTWIDTH] IN BLOOD BY AUTOMATED COUNT: 14.7 % (ref 12.3–15.4)
ERYTHROCYTE [DISTWIDTH] IN BLOOD BY AUTOMATED COUNT: 14.8 % (ref 12.3–15.4)
ERYTHROCYTE [DISTWIDTH] IN BLOOD BY AUTOMATED COUNT: 14.8 % (ref 12.3–15.4)
ERYTHROCYTE [DISTWIDTH] IN BLOOD BY AUTOMATED COUNT: 15.1 % (ref 12.3–15.4)
ERYTHROCYTE [DISTWIDTH] IN BLOOD BY AUTOMATED COUNT: 15.2 % (ref 12.3–15.4)
ERYTHROCYTE [DISTWIDTH] IN BLOOD BY AUTOMATED COUNT: 15.8 % (ref 12.3–15.4)
ERYTHROCYTE [DISTWIDTH] IN BLOOD BY AUTOMATED COUNT: 15.9 % (ref 12.3–15.4)
ERYTHROCYTE [DISTWIDTH] IN BLOOD BY AUTOMATED COUNT: 16.1 % (ref 12.3–15.4)
FLUAV H1 2009 PAND RNA NPH QL NAA+PROBE: NOT DETECTED
FLUAV H1 HA GENE NPH QL NAA+PROBE: NOT DETECTED
FLUAV H3 RNA NPH QL NAA+PROBE: NOT DETECTED
FLUAV SUBTYP SPEC NAA+PROBE: NOT DETECTED
FLUBV RNA ISLT QL NAA+PROBE: NOT DETECTED
GFR SERPL CREATININE-BSD FRML MDRD: 39 ML/MIN/1.73
GFR SERPL CREATININE-BSD FRML MDRD: 41 ML/MIN/1.73
GFR SERPL CREATININE-BSD FRML MDRD: 42 ML/MIN/1.73
GFR SERPL CREATININE-BSD FRML MDRD: 42 ML/MIN/1.73
GFR SERPL CREATININE-BSD FRML MDRD: 43 ML/MIN/1.73
GFR SERPL CREATININE-BSD FRML MDRD: 43 ML/MIN/1.73
GFR SERPL CREATININE-BSD FRML MDRD: 44 ML/MIN/1.73
GFR SERPL CREATININE-BSD FRML MDRD: 46 ML/MIN/1.73
GFR SERPL CREATININE-BSD FRML MDRD: 46 ML/MIN/1.73
GFR SERPL CREATININE-BSD FRML MDRD: 51 ML/MIN/1.73
GFR SERPL CREATININE-BSD FRML MDRD: 54 ML/MIN/1.73
GLOBULIN UR ELPH-MCNC: 3 GM/DL
GLOBULIN UR ELPH-MCNC: 3.2 GM/DL
GLOBULIN UR ELPH-MCNC: 3.2 GM/DL
GLOBULIN UR ELPH-MCNC: 3.3 GM/DL
GLOBULIN UR ELPH-MCNC: 3.5 GM/DL
GLUCOSE BLD-MCNC: 112 MG/DL (ref 65–99)
GLUCOSE BLD-MCNC: 120 MG/DL (ref 65–99)
GLUCOSE BLD-MCNC: 122 MG/DL (ref 65–99)
GLUCOSE BLD-MCNC: 124 MG/DL (ref 65–99)
GLUCOSE BLD-MCNC: 126 MG/DL (ref 65–99)
GLUCOSE BLD-MCNC: 130 MG/DL (ref 65–99)
GLUCOSE BLD-MCNC: 133 MG/DL (ref 65–99)
GLUCOSE BLD-MCNC: 133 MG/DL (ref 65–99)
GLUCOSE BLD-MCNC: 134 MG/DL (ref 65–99)
GLUCOSE BLD-MCNC: 136 MG/DL (ref 65–99)
GLUCOSE BLD-MCNC: 137 MG/DL (ref 65–99)
GLUCOSE BLD-MCNC: 146 MG/DL (ref 65–99)
GLUCOSE BLD-MCNC: 174 MG/DL (ref 65–99)
GLUCOSE BLDC GLUCOMTR-MCNC: 108 MG/DL (ref 70–130)
GLUCOSE BLDC GLUCOMTR-MCNC: 113 MG/DL (ref 70–130)
GLUCOSE BLDC GLUCOMTR-MCNC: 115 MG/DL (ref 70–130)
GLUCOSE BLDC GLUCOMTR-MCNC: 128 MG/DL (ref 70–130)
GLUCOSE BLDC GLUCOMTR-MCNC: 130 MG/DL (ref 70–130)
GLUCOSE BLDC GLUCOMTR-MCNC: 131 MG/DL (ref 70–130)
GLUCOSE BLDC GLUCOMTR-MCNC: 158 MG/DL (ref 70–130)
GLUCOSE BLDC GLUCOMTR-MCNC: 176 MG/DL (ref 70–130)
GLUCOSE BLDC GLUCOMTR-MCNC: 200 MG/DL (ref 70–130)
GLUCOSE BLDC GLUCOMTR-MCNC: 68 MG/DL (ref 70–130)
GLUCOSE BLDC GLUCOMTR-MCNC: 96 MG/DL (ref 70–130)
GLUCOSE BLDC GLUCOMTR-MCNC: 98 MG/DL (ref 70–130)
GLUCOSE BLDC GLUCOMTR-MCNC: 99 MG/DL (ref 70–130)
GLUCOSE UR STRIP-MCNC: NEGATIVE MG/DL
GLUCOSE UR STRIP-MCNC: NEGATIVE MG/DL
HADV DNA SPEC NAA+PROBE: NOT DETECTED
HCOV 229E RNA SPEC QL NAA+PROBE: NOT DETECTED
HCOV HKU1 RNA SPEC QL NAA+PROBE: NOT DETECTED
HCOV NL63 RNA SPEC QL NAA+PROBE: NOT DETECTED
HCOV OC43 RNA SPEC QL NAA+PROBE: NOT DETECTED
HCT VFR BLD AUTO: 29 % (ref 37.5–51)
HCT VFR BLD AUTO: 30.3 % (ref 37.5–51)
HCT VFR BLD AUTO: 30.6 % (ref 37.5–51)
HCT VFR BLD AUTO: 31 % (ref 37.5–51)
HCT VFR BLD AUTO: 32.5 % (ref 37.5–51)
HCT VFR BLD AUTO: 32.6 % (ref 37.5–51)
HCT VFR BLD AUTO: 33.9 % (ref 37.5–51)
HCT VFR BLD AUTO: 35.3 % (ref 37.5–51)
HCT VFR BLD AUTO: 36.9 % (ref 37.5–51)
HCT VFR BLD AUTO: 39.7 % (ref 37.5–51)
HCT VFR BLD AUTO: 41.7 % (ref 37.5–51)
HGB BLD-MCNC: 10.1 G/DL (ref 13–17.7)
HGB BLD-MCNC: 10.3 G/DL (ref 13–17.7)
HGB BLD-MCNC: 10.8 G/DL (ref 13–17.7)
HGB BLD-MCNC: 10.9 G/DL (ref 13–17.7)
HGB BLD-MCNC: 10.9 G/DL (ref 13–17.7)
HGB BLD-MCNC: 11.6 G/DL (ref 13–17.7)
HGB BLD-MCNC: 12.2 G/DL (ref 13–17.7)
HGB BLD-MCNC: 13.4 G/DL (ref 13–17.7)
HGB BLD-MCNC: 9.8 G/DL (ref 13–17.7)
HGB BLD-MCNC: 9.9 G/DL (ref 13–17.7)
HGB BLD-MCNC: 9.9 G/DL (ref 13–17.7)
HGB UR QL STRIP.AUTO: NEGATIVE
HGB UR QL STRIP.AUTO: NEGATIVE
HMPV RNA NPH QL NAA+NON-PROBE: NOT DETECTED
HOLD SPECIMEN: NORMAL
HOLD SPECIMEN: NORMAL
HPIV1 RNA SPEC QL NAA+PROBE: NOT DETECTED
HPIV2 RNA SPEC QL NAA+PROBE: NOT DETECTED
HPIV3 RNA NPH QL NAA+PROBE: NOT DETECTED
HPIV4 P GENE NPH QL NAA+PROBE: NOT DETECTED
HYALINE CASTS UR QL AUTO: ABNORMAL /LPF
IMM GRANULOCYTES # BLD AUTO: 0.01 10*3/MM3 (ref 0–0.05)
IMM GRANULOCYTES # BLD AUTO: 0.02 10*3/MM3 (ref 0–0.05)
IMM GRANULOCYTES # BLD AUTO: 0.06 10*3/MM3 (ref 0–0.05)
IMM GRANULOCYTES # BLD AUTO: 0.11 10*3/MM3 (ref 0–0.05)
IMM GRANULOCYTES # BLD AUTO: 0.12 10*3/MM3 (ref 0–0.05)
IMM GRANULOCYTES NFR BLD AUTO: 0.2 % (ref 0–0.5)
IMM GRANULOCYTES NFR BLD AUTO: 0.4 % (ref 0–0.5)
IMM GRANULOCYTES NFR BLD AUTO: 0.7 % (ref 0–0.5)
IMM GRANULOCYTES NFR BLD AUTO: 1.9 % (ref 0–0.5)
IMM GRANULOCYTES NFR BLD AUTO: 2.2 % (ref 0–0.5)
INR PPP: 1.29 (ref 0.85–1.16)
KETONES UR QL STRIP: NEGATIVE
KETONES UR QL STRIP: NEGATIVE
LEUKOCYTE ESTERASE UR QL STRIP.AUTO: ABNORMAL
LEUKOCYTE ESTERASE UR QL STRIP.AUTO: NEGATIVE
LIPASE SERPL-CCNC: 15 U/L (ref 13–60)
LYMPHOCYTES # BLD AUTO: 0.91 10*3/MM3 (ref 0.7–3.1)
LYMPHOCYTES # BLD AUTO: 0.99 10*3/MM3 (ref 0.7–3.1)
LYMPHOCYTES # BLD AUTO: 1.02 10*3/MM3 (ref 0.7–3.1)
LYMPHOCYTES # BLD AUTO: 1.03 10*3/MM3 (ref 0.7–3.1)
LYMPHOCYTES # BLD AUTO: 1.53 10*3/MM3 (ref 0.7–3.1)
LYMPHOCYTES # BLD MANUAL: 1.37 10*3/MM3 (ref 0.7–3.1)
LYMPHOCYTES NFR BLD AUTO: 12.5 % (ref 19.6–45.3)
LYMPHOCYTES NFR BLD AUTO: 16.7 % (ref 19.6–45.3)
LYMPHOCYTES NFR BLD AUTO: 16.8 % (ref 19.6–45.3)
LYMPHOCYTES NFR BLD AUTO: 18.5 % (ref 19.6–45.3)
LYMPHOCYTES NFR BLD AUTO: 29.7 % (ref 19.6–45.3)
LYMPHOCYTES NFR BLD MANUAL: 27 % (ref 19.6–45.3)
LYMPHOCYTES NFR BLD MANUAL: 7 % (ref 5–12)
M PNEUMO IGG SER IA-ACNC: NOT DETECTED
MACROCYTES BLD QL SMEAR: NORMAL
MAGNESIUM SERPL-MCNC: 1.7 MG/DL (ref 1.6–2.4)
MAGNESIUM SERPL-MCNC: 1.7 MG/DL (ref 1.6–2.4)
MAGNESIUM SERPL-MCNC: 2.1 MG/DL (ref 1.6–2.4)
MAGNESIUM SERPL-MCNC: 2.6 MG/DL (ref 1.6–2.4)
MCH RBC QN AUTO: 33.3 PG (ref 26.6–33)
MCH RBC QN AUTO: 33.5 PG (ref 26.6–33)
MCH RBC QN AUTO: 33.8 PG (ref 26.6–33)
MCH RBC QN AUTO: 33.9 PG (ref 26.6–33)
MCH RBC QN AUTO: 34.1 PG (ref 26.6–33)
MCH RBC QN AUTO: 34.2 PG (ref 26.6–33)
MCH RBC QN AUTO: 34.3 PG (ref 26.6–33)
MCHC RBC AUTO-ENTMCNC: 30.7 G/DL (ref 31.5–35.7)
MCHC RBC AUTO-ENTMCNC: 30.9 G/DL (ref 31.5–35.7)
MCHC RBC AUTO-ENTMCNC: 31.4 G/DL (ref 31.5–35.7)
MCHC RBC AUTO-ENTMCNC: 31.7 G/DL (ref 31.5–35.7)
MCHC RBC AUTO-ENTMCNC: 31.9 G/DL (ref 31.5–35.7)
MCHC RBC AUTO-ENTMCNC: 32.1 G/DL (ref 31.5–35.7)
MCHC RBC AUTO-ENTMCNC: 32.2 G/DL (ref 31.5–35.7)
MCHC RBC AUTO-ENTMCNC: 32.7 G/DL (ref 31.5–35.7)
MCHC RBC AUTO-ENTMCNC: 33 G/DL (ref 31.5–35.7)
MCHC RBC AUTO-ENTMCNC: 33.1 G/DL (ref 31.5–35.7)
MCHC RBC AUTO-ENTMCNC: 33.8 G/DL (ref 31.5–35.7)
MCV RBC AUTO: 100 FL (ref 79–97)
MCV RBC AUTO: 101.2 FL (ref 79–97)
MCV RBC AUTO: 102 FL (ref 79–97)
MCV RBC AUTO: 102.7 FL (ref 79–97)
MCV RBC AUTO: 104.3 FL (ref 79–97)
MCV RBC AUTO: 104.4 FL (ref 79–97)
MCV RBC AUTO: 106.3 FL (ref 79–97)
MCV RBC AUTO: 107.6 FL (ref 79–97)
MCV RBC AUTO: 108 FL (ref 79–97)
MCV RBC AUTO: 109.1 FL (ref 79–97)
MCV RBC AUTO: 109.2 FL (ref 79–97)
MONOCYTES # BLD AUTO: 0.35 10*3/MM3 (ref 0.1–0.9)
MONOCYTES # BLD AUTO: 0.42 10*3/MM3 (ref 0.1–0.9)
MONOCYTES # BLD AUTO: 0.43 10*3/MM3 (ref 0.1–0.9)
MONOCYTES # BLD AUTO: 0.58 10*3/MM3 (ref 0.1–0.9)
MONOCYTES # BLD AUTO: 0.67 10*3/MM3 (ref 0.1–0.9)
MONOCYTES # BLD AUTO: 0.71 10*3/MM3 (ref 0.1–0.9)
MONOCYTES NFR BLD AUTO: 10.7 % (ref 5–12)
MONOCYTES NFR BLD AUTO: 12.2 % (ref 5–12)
MONOCYTES NFR BLD AUTO: 7.3 % (ref 5–12)
MONOCYTES NFR BLD AUTO: 8.2 % (ref 5–12)
MONOCYTES NFR BLD AUTO: 8.6 % (ref 5–12)
NEUTROPHILS # BLD AUTO: 2.91 10*3/MM3 (ref 1.7–7)
NEUTROPHILS # BLD AUTO: 3.09 10*3/MM3 (ref 1.7–7)
NEUTROPHILS # BLD AUTO: 3.45 10*3/MM3 (ref 1.7–7)
NEUTROPHILS # BLD AUTO: 3.59 10*3/MM3 (ref 1.7–7)
NEUTROPHILS # BLD AUTO: 4.09 10*3/MM3 (ref 1.7–7)
NEUTROPHILS # BLD AUTO: 6.33 10*3/MM3 (ref 1.7–7)
NEUTROPHILS NFR BLD AUTO: 56.4 % (ref 42.7–76)
NEUTROPHILS NFR BLD AUTO: 62.7 % (ref 42.7–76)
NEUTROPHILS NFR BLD AUTO: 66 % (ref 42.7–76)
NEUTROPHILS NFR BLD AUTO: 69.1 % (ref 42.7–76)
NEUTROPHILS NFR BLD AUTO: 77.2 % (ref 42.7–76)
NEUTROPHILS NFR BLD MANUAL: 60 % (ref 42.7–76)
NEUTS BAND NFR BLD MANUAL: 1 % (ref 0–5)
NITRITE UR QL STRIP: NEGATIVE
NITRITE UR QL STRIP: NEGATIVE
NRBC BLD AUTO-RTO: 0 /100 WBC (ref 0–0.2)
NT-PROBNP SERPL-MCNC: 4564 PG/ML (ref 5–1800)
NT-PROBNP SERPL-MCNC: 6041 PG/ML (ref 5–1800)
NT-PROBNP SERPL-MCNC: ABNORMAL PG/ML (ref 5–1800)
NT-PROBNP SERPL-MCNC: ABNORMAL PG/ML (ref 5–1800)
PH UR STRIP.AUTO: <=5 [PH] (ref 5–8)
PH UR STRIP.AUTO: <=5 [PH] (ref 5–8)
PLAT MORPH BLD: NORMAL
PLATELET # BLD AUTO: 107 10*3/MM3 (ref 140–450)
PLATELET # BLD AUTO: 114 10*3/MM3 (ref 140–450)
PLATELET # BLD AUTO: 136 10*3/MM3 (ref 140–450)
PLATELET # BLD AUTO: 141 10*3/MM3 (ref 140–450)
PLATELET # BLD AUTO: 143 10*3/MM3 (ref 140–450)
PLATELET # BLD AUTO: 149 10*3/MM3 (ref 140–450)
PLATELET # BLD AUTO: 150 10*3/MM3 (ref 140–450)
PLATELET # BLD AUTO: 157 10*3/MM3 (ref 140–450)
PLATELET # BLD AUTO: 159 10*3/MM3 (ref 140–450)
PLATELET # BLD AUTO: 186 10*3/MM3 (ref 140–450)
PLATELET # BLD AUTO: 217 10*3/MM3 (ref 140–450)
PMV BLD AUTO: 10.9 FL (ref 6–12)
PMV BLD AUTO: 11.3 FL (ref 6–12)
PMV BLD AUTO: 11.5 FL (ref 6–12)
PMV BLD AUTO: 11.7 FL (ref 6–12)
PMV BLD AUTO: 11.7 FL (ref 6–12)
PMV BLD AUTO: 11.8 FL (ref 6–12)
PMV BLD AUTO: 11.9 FL (ref 6–12)
PMV BLD AUTO: 11.9 FL (ref 6–12)
PMV BLD AUTO: 12.3 FL (ref 6–12)
PMV BLD AUTO: 12.3 FL (ref 6–12)
PMV BLD AUTO: 12.9 FL (ref 6–12)
POTASSIUM BLD-SCNC: 3.3 MMOL/L (ref 3.5–5.2)
POTASSIUM BLD-SCNC: 3.4 MMOL/L (ref 3.5–5.2)
POTASSIUM BLD-SCNC: 3.4 MMOL/L (ref 3.5–5.2)
POTASSIUM BLD-SCNC: 3.5 MMOL/L (ref 3.5–5.2)
POTASSIUM BLD-SCNC: 3.6 MMOL/L (ref 3.5–5.2)
POTASSIUM BLD-SCNC: 3.6 MMOL/L (ref 3.5–5.2)
POTASSIUM BLD-SCNC: 3.7 MMOL/L (ref 3.5–5.2)
POTASSIUM BLD-SCNC: 3.8 MMOL/L (ref 3.5–5.2)
POTASSIUM BLD-SCNC: 3.9 MMOL/L (ref 3.5–5.2)
POTASSIUM BLD-SCNC: 4.3 MMOL/L (ref 3.5–5.2)
POTASSIUM BLD-SCNC: 4.3 MMOL/L (ref 3.5–5.2)
POTASSIUM BLD-SCNC: 4.5 MMOL/L (ref 3.5–5.2)
PROCALCITONIN SERPL-MCNC: 0.1 NG/ML (ref 0.1–0.25)
PROT SERPL-MCNC: 5.6 G/DL (ref 6–8.5)
PROT SERPL-MCNC: 6.2 G/DL (ref 6–8.5)
PROT SERPL-MCNC: 6.5 G/DL (ref 6–8.5)
PROT SERPL-MCNC: 6.8 G/DL (ref 6–8.5)
PROT SERPL-MCNC: 7.3 G/DL (ref 6–8.5)
PROT UR QL STRIP: NEGATIVE
PROT UR QL STRIP: NEGATIVE
PROTHROMBIN TIME: 15.5 SECONDS (ref 11.2–14.3)
RBC # BLD AUTO: 2.9 10*6/MM3 (ref 4.14–5.8)
RBC # BLD AUTO: 2.97 10*6/MM3 (ref 4.14–5.8)
RBC # BLD AUTO: 2.97 10*6/MM3 (ref 4.14–5.8)
RBC # BLD AUTO: 2.98 10*6/MM3 (ref 4.14–5.8)
RBC # BLD AUTO: 3.02 10*6/MM3 (ref 4.14–5.8)
RBC # BLD AUTO: 3.19 10*6/MM3 (ref 4.14–5.8)
RBC # BLD AUTO: 3.22 10*6/MM3 (ref 4.14–5.8)
RBC # BLD AUTO: 3.27 10*6/MM3 (ref 4.14–5.8)
RBC # BLD AUTO: 3.38 10*6/MM3 (ref 4.14–5.8)
RBC # BLD AUTO: 3.64 10*6/MM3 (ref 4.14–5.8)
RBC # BLD AUTO: 4 10*6/MM3 (ref 4.14–5.8)
RBC # UR: ABNORMAL /HPF
REF LAB TEST METHOD: ABNORMAL
RHINOVIRUS RNA SPEC NAA+PROBE: NOT DETECTED
RSV RNA NPH QL NAA+NON-PROBE: NOT DETECTED
SODIUM BLD-SCNC: 137 MMOL/L (ref 136–145)
SODIUM BLD-SCNC: 137 MMOL/L (ref 136–145)
SODIUM BLD-SCNC: 138 MMOL/L (ref 136–145)
SODIUM BLD-SCNC: 139 MMOL/L (ref 136–145)
SODIUM BLD-SCNC: 140 MMOL/L (ref 136–145)
SODIUM BLD-SCNC: 140 MMOL/L (ref 136–145)
SODIUM BLD-SCNC: 141 MMOL/L (ref 136–145)
SODIUM BLD-SCNC: 142 MMOL/L (ref 136–145)
SODIUM BLD-SCNC: 144 MMOL/L (ref 136–145)
SP GR UR STRIP: 1.01 (ref 1–1.03)
SP GR UR STRIP: 1.02 (ref 1–1.03)
SQUAMOUS #/AREA URNS HPF: ABNORMAL /HPF
TROPONIN T SERPL-MCNC: 0.03 NG/ML (ref 0–0.03)
TROPONIN T SERPL-MCNC: 0.04 NG/ML (ref 0–0.03)
TSH SERPL DL<=0.05 MIU/L-ACNC: 2.5 UIU/ML (ref 0.27–4.2)
TSH SERPL DL<=0.05 MIU/L-ACNC: 3.11 UIU/ML (ref 0.27–4.2)
UFH PPP CHRO-ACNC: 0.1 IU/ML (ref 0.3–0.7)
UFH PPP CHRO-ACNC: 0.28 IU/ML (ref 0.3–0.7)
UROBILINOGEN UR QL STRIP: ABNORMAL
UROBILINOGEN UR QL STRIP: NORMAL
WBC MORPH BLD: NORMAL
WBC NRBC COR # BLD: 4.8 10*3/MM3 (ref 3.4–10.8)
WBC NRBC COR # BLD: 4.81 10*3/MM3 (ref 3.4–10.8)
WBC NRBC COR # BLD: 5.07 10*3/MM3 (ref 3.4–10.8)
WBC NRBC COR # BLD: 5.15 10*3/MM3 (ref 3.4–10.8)
WBC NRBC COR # BLD: 5.31 10*3/MM3 (ref 3.4–10.8)
WBC NRBC COR # BLD: 5.34 10*3/MM3 (ref 3.4–10.8)
WBC NRBC COR # BLD: 5.41 10*3/MM3 (ref 3.4–10.8)
WBC NRBC COR # BLD: 5.44 10*3/MM3 (ref 3.4–10.8)
WBC NRBC COR # BLD: 5.5 10*3/MM3 (ref 3.4–10.8)
WBC NRBC COR # BLD: 5.91 10*3/MM3 (ref 3.4–10.8)
WBC NRBC COR # BLD: 8.21 10*3/MM3 (ref 3.4–10.8)
WBC UR QL AUTO: ABNORMAL /HPF
WHOLE BLOOD HOLD SPECIMEN: NORMAL
WHOLE BLOOD HOLD SPECIMEN: NORMAL
YEAST URNS QL MICRO: ABNORMAL /HPF

## 2020-01-01 PROCEDURE — 94799 UNLISTED PULMONARY SVC/PX: CPT

## 2020-01-01 PROCEDURE — 84443 ASSAY THYROID STIM HORMONE: CPT | Performed by: INTERNAL MEDICINE

## 2020-01-01 PROCEDURE — 80048 BASIC METABOLIC PNL TOTAL CA: CPT | Performed by: INTERNAL MEDICINE

## 2020-01-01 PROCEDURE — 87186 SC STD MICRODIL/AGAR DIL: CPT | Performed by: INTERNAL MEDICINE

## 2020-01-01 PROCEDURE — 97530 THERAPEUTIC ACTIVITIES: CPT

## 2020-01-01 PROCEDURE — 99233 SBSQ HOSP IP/OBS HIGH 50: CPT | Performed by: INTERNAL MEDICINE

## 2020-01-01 PROCEDURE — 87040 BLOOD CULTURE FOR BACTERIA: CPT | Performed by: INTERNAL MEDICINE

## 2020-01-01 PROCEDURE — 63710000001 INSULIN LISPRO (HUMAN) PER 5 UNITS: Performed by: PHYSICIAN ASSISTANT

## 2020-01-01 PROCEDURE — 99223 1ST HOSP IP/OBS HIGH 75: CPT | Performed by: INTERNAL MEDICINE

## 2020-01-01 PROCEDURE — 97165 OT EVAL LOW COMPLEX 30 MIN: CPT

## 2020-01-01 PROCEDURE — 99232 SBSQ HOSP IP/OBS MODERATE 35: CPT | Performed by: INTERNAL MEDICINE

## 2020-01-01 PROCEDURE — 82962 GLUCOSE BLOOD TEST: CPT

## 2020-01-01 PROCEDURE — 25010000002 ONDANSETRON PER 1 MG: Performed by: INTERNAL MEDICINE

## 2020-01-01 PROCEDURE — 94660 CPAP INITIATION&MGMT: CPT

## 2020-01-01 PROCEDURE — 93010 ELECTROCARDIOGRAM REPORT: CPT | Performed by: INTERNAL MEDICINE

## 2020-01-01 PROCEDURE — 97167 OT EVAL HIGH COMPLEX 60 MIN: CPT

## 2020-01-01 PROCEDURE — 80053 COMPREHEN METABOLIC PANEL: CPT | Performed by: INTERNAL MEDICINE

## 2020-01-01 PROCEDURE — 84132 ASSAY OF SERUM POTASSIUM: CPT | Performed by: PHYSICIAN ASSISTANT

## 2020-01-01 PROCEDURE — 93005 ELECTROCARDIOGRAM TRACING: CPT | Performed by: INTERNAL MEDICINE

## 2020-01-01 PROCEDURE — 80048 BASIC METABOLIC PNL TOTAL CA: CPT | Performed by: NURSE PRACTITIONER

## 2020-01-01 PROCEDURE — 71045 X-RAY EXAM CHEST 1 VIEW: CPT

## 2020-01-01 PROCEDURE — 94640 AIRWAY INHALATION TREATMENT: CPT

## 2020-01-01 PROCEDURE — 99233 SBSQ HOSP IP/OBS HIGH 50: CPT | Performed by: NURSE PRACTITIONER

## 2020-01-01 PROCEDURE — 25010000002 CEFTRIAXONE PER 250 MG: Performed by: INTERNAL MEDICINE

## 2020-01-01 PROCEDURE — 93005 ELECTROCARDIOGRAM TRACING: CPT | Performed by: EMERGENCY MEDICINE

## 2020-01-01 PROCEDURE — 25010000002 HEPARIN (PORCINE) PER 1000 UNITS: Performed by: INTERNAL MEDICINE

## 2020-01-01 PROCEDURE — 85025 COMPLETE CBC W/AUTO DIFF WBC: CPT | Performed by: INTERNAL MEDICINE

## 2020-01-01 PROCEDURE — 25010000002 HEPARIN (PORCINE) 25000-0.45 UT/250ML-% SOLUTION

## 2020-01-01 PROCEDURE — C1894 INTRO/SHEATH, NON-LASER: HCPCS

## 2020-01-01 PROCEDURE — 97535 SELF CARE MNGMENT TRAINING: CPT

## 2020-01-01 PROCEDURE — 85027 COMPLETE CBC AUTOMATED: CPT | Performed by: INTERNAL MEDICINE

## 2020-01-01 PROCEDURE — 85025 COMPLETE CBC W/AUTO DIFF WBC: CPT | Performed by: NURSE PRACTITIONER

## 2020-01-01 PROCEDURE — 92610 EVALUATE SWALLOWING FUNCTION: CPT

## 2020-01-01 PROCEDURE — 99496 TRANSJ CARE MGMT HIGH F2F 7D: CPT | Performed by: NURSE PRACTITIONER

## 2020-01-01 PROCEDURE — 97110 THERAPEUTIC EXERCISES: CPT

## 2020-01-01 PROCEDURE — 25010000002 MORPHINE PER 10 MG: Performed by: NURSE PRACTITIONER

## 2020-01-01 PROCEDURE — 97162 PT EVAL MOD COMPLEX 30 MIN: CPT

## 2020-01-01 PROCEDURE — 99232 SBSQ HOSP IP/OBS MODERATE 35: CPT | Performed by: NURSE PRACTITIONER

## 2020-01-01 PROCEDURE — 84145 PROCALCITONIN (PCT): CPT | Performed by: PHYSICIAN ASSISTANT

## 2020-01-01 PROCEDURE — 97530 THERAPEUTIC ACTIVITIES: CPT | Performed by: PHYSICAL THERAPIST

## 2020-01-01 PROCEDURE — 85027 COMPLETE CBC AUTOMATED: CPT | Performed by: PHYSICIAN ASSISTANT

## 2020-01-01 PROCEDURE — 99239 HOSP IP/OBS DSCHRG MGMT >30: CPT | Performed by: FAMILY MEDICINE

## 2020-01-01 PROCEDURE — 25010000002 FUROSEMIDE PER 20 MG: Performed by: INTERNAL MEDICINE

## 2020-01-01 PROCEDURE — C1751 CATH, INF, PER/CENT/MIDLINE: HCPCS

## 2020-01-01 PROCEDURE — 83880 ASSAY OF NATRIURETIC PEPTIDE: CPT | Performed by: INTERNAL MEDICINE

## 2020-01-01 PROCEDURE — 85007 BL SMEAR W/DIFF WBC COUNT: CPT | Performed by: PHYSICIAN ASSISTANT

## 2020-01-01 PROCEDURE — 99232 SBSQ HOSP IP/OBS MODERATE 35: CPT | Performed by: HOSPITALIST

## 2020-01-01 PROCEDURE — 99214 OFFICE O/P EST MOD 30 MIN: CPT | Performed by: NURSE PRACTITIONER

## 2020-01-01 PROCEDURE — 83880 ASSAY OF NATRIURETIC PEPTIDE: CPT | Performed by: NURSE PRACTITIONER

## 2020-01-01 PROCEDURE — 83735 ASSAY OF MAGNESIUM: CPT | Performed by: PHYSICIAN ASSISTANT

## 2020-01-01 PROCEDURE — 83880 ASSAY OF NATRIURETIC PEPTIDE: CPT | Performed by: EMERGENCY MEDICINE

## 2020-01-01 PROCEDURE — 80053 COMPREHEN METABOLIC PANEL: CPT | Performed by: NURSE PRACTITIONER

## 2020-01-01 PROCEDURE — 83605 ASSAY OF LACTIC ACID: CPT | Performed by: INTERNAL MEDICINE

## 2020-01-01 PROCEDURE — 84443 ASSAY THYROID STIM HORMONE: CPT | Performed by: NURSE PRACTITIONER

## 2020-01-01 PROCEDURE — 97166 OT EVAL MOD COMPLEX 45 MIN: CPT

## 2020-01-01 PROCEDURE — 0T9B70Z DRAINAGE OF BLADDER WITH DRAINAGE DEVICE, VIA NATURAL OR ARTIFICIAL OPENING: ICD-10-PCS | Performed by: UROLOGY

## 2020-01-01 PROCEDURE — 25010000002 HEPARIN (PORCINE) PER 1000 UNITS

## 2020-01-01 PROCEDURE — 25010000002 MORPHINE PER 10 MG: Performed by: INTERNAL MEDICINE

## 2020-01-01 PROCEDURE — 84484 ASSAY OF TROPONIN QUANT: CPT | Performed by: INTERNAL MEDICINE

## 2020-01-01 PROCEDURE — 71250 CT THORAX DX C-: CPT

## 2020-01-01 PROCEDURE — 93005 ELECTROCARDIOGRAM TRACING: CPT | Performed by: PHYSICIAN ASSISTANT

## 2020-01-01 PROCEDURE — 25010000002 HALOPERIDOL LACTATE PER 5 MG: Performed by: INTERNAL MEDICINE

## 2020-01-01 PROCEDURE — 25010000003 POTASSIUM CHLORIDE 10 MEQ/100ML SOLUTION: Performed by: FAMILY MEDICINE

## 2020-01-01 PROCEDURE — 85520 HEPARIN ASSAY: CPT

## 2020-01-01 PROCEDURE — G0180 MD CERTIFICATION HHA PATIENT: HCPCS | Performed by: FAMILY MEDICINE

## 2020-01-01 PROCEDURE — 85730 THROMBOPLASTIN TIME PARTIAL: CPT

## 2020-01-01 PROCEDURE — 81001 URINALYSIS AUTO W/SCOPE: CPT | Performed by: INTERNAL MEDICINE

## 2020-01-01 PROCEDURE — 99222 1ST HOSP IP/OBS MODERATE 55: CPT | Performed by: INTERNAL MEDICINE

## 2020-01-01 PROCEDURE — 83690 ASSAY OF LIPASE: CPT | Performed by: EMERGENCY MEDICINE

## 2020-01-01 PROCEDURE — 93005 ELECTROCARDIOGRAM TRACING: CPT

## 2020-01-01 PROCEDURE — 83735 ASSAY OF MAGNESIUM: CPT | Performed by: INTERNAL MEDICINE

## 2020-01-01 PROCEDURE — 87086 URINE CULTURE/COLONY COUNT: CPT | Performed by: INTERNAL MEDICINE

## 2020-01-01 PROCEDURE — 81003 URINALYSIS AUTO W/O SCOPE: CPT | Performed by: INTERNAL MEDICINE

## 2020-01-01 PROCEDURE — 84484 ASSAY OF TROPONIN QUANT: CPT | Performed by: EMERGENCY MEDICINE

## 2020-01-01 PROCEDURE — 85025 COMPLETE CBC W/AUTO DIFF WBC: CPT | Performed by: EMERGENCY MEDICINE

## 2020-01-01 PROCEDURE — 99239 HOSP IP/OBS DSCHRG MGMT >30: CPT | Performed by: NURSE PRACTITIONER

## 2020-01-01 PROCEDURE — 99285 EMERGENCY DEPT VISIT HI MDM: CPT

## 2020-01-01 PROCEDURE — 97116 GAIT TRAINING THERAPY: CPT

## 2020-01-01 PROCEDURE — 0100U HC BIOFIRE FILMARRAY RESP PANEL 2: CPT | Performed by: INTERNAL MEDICINE

## 2020-01-01 PROCEDURE — 80048 BASIC METABOLIC PNL TOTAL CA: CPT | Performed by: PHYSICIAN ASSISTANT

## 2020-01-01 PROCEDURE — 85610 PROTHROMBIN TIME: CPT

## 2020-01-01 PROCEDURE — 80053 COMPREHEN METABOLIC PANEL: CPT | Performed by: EMERGENCY MEDICINE

## 2020-01-01 RX ORDER — POTASSIUM CHLORIDE 750 MG/1
40 CAPSULE, EXTENDED RELEASE ORAL ONCE
Status: COMPLETED | OUTPATIENT
Start: 2020-01-01 | End: 2020-01-01

## 2020-01-01 RX ORDER — FUROSEMIDE 20 MG/1
20 TABLET ORAL DAILY
Status: DISCONTINUED | OUTPATIENT
Start: 2020-01-01 | End: 2020-01-01 | Stop reason: HOSPADM

## 2020-01-01 RX ORDER — HALOPERIDOL 5 MG/ML
0.5 INJECTION INTRAMUSCULAR ONCE
Status: COMPLETED | OUTPATIENT
Start: 2020-01-01 | End: 2020-01-01

## 2020-01-01 RX ORDER — HYDROCODONE BITARTRATE AND ACETAMINOPHEN 5; 325 MG/1; MG/1
1 TABLET ORAL EVERY 8 HOURS PRN
Status: DISCONTINUED | OUTPATIENT
Start: 2020-01-01 | End: 2020-01-01 | Stop reason: HOSPADM

## 2020-01-01 RX ORDER — OMEPRAZOLE 20 MG/1
CAPSULE, DELAYED RELEASE ORAL
Qty: 30 CAPSULE | Refills: 0 | Status: SHIPPED | OUTPATIENT
Start: 2020-01-01 | End: 2020-01-01

## 2020-01-01 RX ORDER — MAGNESIUM SULFATE HEPTAHYDRATE 40 MG/ML
2 INJECTION, SOLUTION INTRAVENOUS AS NEEDED
Status: DISCONTINUED | OUTPATIENT
Start: 2020-01-01 | End: 2020-01-01 | Stop reason: HOSPADM

## 2020-01-01 RX ORDER — MULTIPLE VITAMINS W/ MINERALS TAB 9MG-400MCG
1 TAB ORAL DAILY
Status: DISCONTINUED | OUTPATIENT
Start: 2020-01-01 | End: 2020-01-01 | Stop reason: HOSPADM

## 2020-01-01 RX ORDER — SIMETHICONE 80 MG
80 TABLET,CHEWABLE ORAL 4 TIMES DAILY PRN
Start: 2020-01-01

## 2020-01-01 RX ORDER — ACETAMINOPHEN 160 MG/5ML
650 SOLUTION ORAL EVERY 4 HOURS PRN
Status: DISCONTINUED | OUTPATIENT
Start: 2020-01-01 | End: 2020-01-01 | Stop reason: HOSPADM

## 2020-01-01 RX ORDER — PANTOPRAZOLE SODIUM 40 MG/1
40 TABLET, DELAYED RELEASE ORAL
Status: DISCONTINUED | OUTPATIENT
Start: 2020-01-01 | End: 2020-01-01 | Stop reason: HOSPADM

## 2020-01-01 RX ORDER — FINASTERIDE 5 MG/1
5 TABLET, FILM COATED ORAL DAILY
Status: DISCONTINUED | OUTPATIENT
Start: 2020-01-01 | End: 2020-01-01 | Stop reason: HOSPADM

## 2020-01-01 RX ORDER — SODIUM CHLORIDE 0.9 % (FLUSH) 0.9 %
10 SYRINGE (ML) INJECTION EVERY 12 HOURS SCHEDULED
Status: DISCONTINUED | OUTPATIENT
Start: 2020-01-01 | End: 2020-01-01 | Stop reason: HOSPADM

## 2020-01-01 RX ORDER — NITROGLYCERIN 20 MG/100ML
5-200 INJECTION INTRAVENOUS
Status: DISCONTINUED | OUTPATIENT
Start: 2020-01-01 | End: 2020-01-01 | Stop reason: HOSPADM

## 2020-01-01 RX ORDER — CASTOR OIL AND BALSAM, PERU 788; 87 MG/G; MG/G
OINTMENT TOPICAL EVERY 12 HOURS SCHEDULED
Status: DISCONTINUED | OUTPATIENT
Start: 2020-01-01 | End: 2020-01-01 | Stop reason: HOSPADM

## 2020-01-01 RX ORDER — SACUBITRIL AND VALSARTAN 24; 26 MG/1; MG/1
0.5 TABLET, FILM COATED ORAL 2 TIMES DAILY
COMMUNITY
Start: 2020-01-01

## 2020-01-01 RX ORDER — ONDANSETRON 2 MG/ML
4 INJECTION INTRAMUSCULAR; INTRAVENOUS EVERY 6 HOURS PRN
Status: DISCONTINUED | OUTPATIENT
Start: 2020-01-01 | End: 2020-01-01 | Stop reason: HOSPADM

## 2020-01-01 RX ORDER — NICOTINE POLACRILEX 4 MG
15 LOZENGE BUCCAL
Status: DISCONTINUED | OUTPATIENT
Start: 2020-01-01 | End: 2020-01-01 | Stop reason: HOSPADM

## 2020-01-01 RX ORDER — ASPIRIN 81 MG/1
81 TABLET, CHEWABLE ORAL DAILY
Status: DISCONTINUED | OUTPATIENT
Start: 2020-01-01 | End: 2020-01-01 | Stop reason: HOSPADM

## 2020-01-01 RX ORDER — FUROSEMIDE 10 MG/ML
40 INJECTION INTRAMUSCULAR; INTRAVENOUS ONCE
Status: COMPLETED | OUTPATIENT
Start: 2020-01-01 | End: 2020-01-01

## 2020-01-01 RX ORDER — SODIUM CHLORIDE 0.9 % (FLUSH) 0.9 %
10 SYRINGE (ML) INJECTION AS NEEDED
Status: DISCONTINUED | OUTPATIENT
Start: 2020-01-01 | End: 2020-01-01 | Stop reason: HOSPADM

## 2020-01-01 RX ORDER — FUROSEMIDE 10 MG/ML
20 INJECTION INTRAMUSCULAR; INTRAVENOUS ONCE
Status: COMPLETED | OUTPATIENT
Start: 2020-01-01 | End: 2020-01-01

## 2020-01-01 RX ORDER — VANCOMYCIN HYDROCHLORIDE 125 MG/1
CAPSULE ORAL
Status: ON HOLD | COMMUNITY
Start: 2020-01-01 | End: 2020-01-01

## 2020-01-01 RX ORDER — AMIODARONE HYDROCHLORIDE 200 MG/1
200 TABLET ORAL DAILY
Status: DISCONTINUED | OUTPATIENT
Start: 2020-01-01 | End: 2020-01-01 | Stop reason: HOSPADM

## 2020-01-01 RX ORDER — FUROSEMIDE 20 MG/1
1 TABLET ORAL DAILY PRN
Status: ON HOLD | COMMUNITY
Start: 2020-01-01 | End: 2020-01-01 | Stop reason: SDUPTHER

## 2020-01-01 RX ORDER — FAMOTIDINE 20 MG/1
20 TABLET, FILM COATED ORAL 2 TIMES DAILY PRN
Status: DISCONTINUED | OUTPATIENT
Start: 2020-01-01 | End: 2020-01-01 | Stop reason: HOSPADM

## 2020-01-01 RX ORDER — HEPARIN SODIUM 5000 [USP'U]/ML
5000 INJECTION, SOLUTION INTRAVENOUS; SUBCUTANEOUS EVERY 8 HOURS SCHEDULED
Status: DISCONTINUED | OUTPATIENT
Start: 2020-01-01 | End: 2020-01-01

## 2020-01-01 RX ORDER — IPRATROPIUM BROMIDE AND ALBUTEROL SULFATE 2.5; .5 MG/3ML; MG/3ML
3 SOLUTION RESPIRATORY (INHALATION) EVERY 4 HOURS PRN
Status: DISCONTINUED | OUTPATIENT
Start: 2020-01-01 | End: 2020-01-01 | Stop reason: HOSPADM

## 2020-01-01 RX ORDER — QUETIAPINE FUMARATE 25 MG/1
50 TABLET, FILM COATED ORAL NIGHTLY
Status: DISCONTINUED | OUTPATIENT
Start: 2020-01-01 | End: 2020-01-01

## 2020-01-01 RX ORDER — HYDROCODONE BITARTRATE AND ACETAMINOPHEN 5; 325 MG/1; MG/1
1 TABLET ORAL EVERY 8 HOURS PRN
Qty: 12 TABLET | Refills: 0
Start: 2020-01-01 | End: 2020-01-01

## 2020-01-01 RX ORDER — CHOLECALCIFEROL (VITAMIN D3) 125 MCG
2.5 CAPSULE ORAL EVERY 24 HOURS
Status: DISCONTINUED | OUTPATIENT
Start: 2020-01-01 | End: 2020-01-01

## 2020-01-01 RX ORDER — LEVOTHYROXINE SODIUM 0.07 MG/1
75 TABLET ORAL DAILY
Status: DISCONTINUED | OUTPATIENT
Start: 2020-01-01 | End: 2020-01-01 | Stop reason: HOSPADM

## 2020-01-01 RX ORDER — SODIUM CHLORIDE 0.9 % (FLUSH) 0.9 %
20 SYRINGE (ML) INJECTION AS NEEDED
Status: DISCONTINUED | OUTPATIENT
Start: 2020-01-01 | End: 2020-01-01 | Stop reason: HOSPADM

## 2020-01-01 RX ORDER — DEXTROSE MONOHYDRATE 25 G/50ML
25 INJECTION, SOLUTION INTRAVENOUS
Status: DISCONTINUED | OUTPATIENT
Start: 2020-01-01 | End: 2020-01-01

## 2020-01-01 RX ORDER — CHOLECALCIFEROL (VITAMIN D3) 125 MCG
5 CAPSULE ORAL NIGHTLY PRN
Status: DISCONTINUED | OUTPATIENT
Start: 2020-01-01 | End: 2020-01-01 | Stop reason: HOSPADM

## 2020-01-01 RX ORDER — MEMANTINE HYDROCHLORIDE 10 MG/1
10 TABLET ORAL 2 TIMES DAILY
Status: DISCONTINUED | OUTPATIENT
Start: 2020-01-01 | End: 2020-01-01 | Stop reason: HOSPADM

## 2020-01-01 RX ORDER — FUROSEMIDE 20 MG/1
20 TABLET ORAL DAILY
Start: 2020-01-01

## 2020-01-01 RX ORDER — CASTOR OIL AND BALSAM, PERU 788; 87 MG/G; MG/G
1 OINTMENT TOPICAL EVERY 12 HOURS SCHEDULED
Start: 2020-01-01

## 2020-01-01 RX ORDER — LEVOTHYROXINE SODIUM 0.07 MG/1
TABLET ORAL
Qty: 90 TABLET | Refills: 0 | OUTPATIENT
Start: 2020-01-01

## 2020-01-01 RX ORDER — PANTOPRAZOLE SODIUM 40 MG/1
40 TABLET, DELAYED RELEASE ORAL EVERY MORNING
Status: DISCONTINUED | OUTPATIENT
Start: 2020-01-01 | End: 2020-01-01 | Stop reason: HOSPADM

## 2020-01-01 RX ORDER — SIMETHICONE 80 MG
80 TABLET,CHEWABLE ORAL 4 TIMES DAILY PRN
Status: DISCONTINUED | OUTPATIENT
Start: 2020-01-01 | End: 2020-01-01 | Stop reason: HOSPADM

## 2020-01-01 RX ORDER — DOCUSATE SODIUM 100 MG/1
100 CAPSULE, LIQUID FILLED ORAL 2 TIMES DAILY PRN
Status: DISCONTINUED | OUTPATIENT
Start: 2020-01-01 | End: 2020-01-01 | Stop reason: HOSPADM

## 2020-01-01 RX ORDER — IPRATROPIUM BROMIDE AND ALBUTEROL SULFATE 2.5; .5 MG/3ML; MG/3ML
3 SOLUTION RESPIRATORY (INHALATION) EVERY 4 HOURS PRN
Qty: 360 ML | Status: CANCELLED | OUTPATIENT
Start: 2020-01-01

## 2020-01-01 RX ORDER — POTASSIUM CHLORIDE 750 MG/1
40 CAPSULE, EXTENDED RELEASE ORAL AS NEEDED
Status: DISCONTINUED | OUTPATIENT
Start: 2020-01-01 | End: 2020-01-01 | Stop reason: HOSPADM

## 2020-01-01 RX ORDER — POTASSIUM CHLORIDE 1.5 G/1.77G
40 POWDER, FOR SOLUTION ORAL AS NEEDED
Status: DISCONTINUED | OUTPATIENT
Start: 2020-01-01 | End: 2020-01-01 | Stop reason: HOSPADM

## 2020-01-01 RX ORDER — POTASSIUM CHLORIDE 7.45 MG/ML
10 INJECTION INTRAVENOUS
Status: DISCONTINUED | OUTPATIENT
Start: 2020-01-01 | End: 2020-01-01 | Stop reason: HOSPADM

## 2020-01-01 RX ORDER — DIPHENOXYLATE HYDROCHLORIDE AND ATROPINE SULFATE 2.5; .025 MG/1; MG/1
1 TABLET ORAL DAILY
Status: DISCONTINUED | OUTPATIENT
Start: 2020-01-01 | End: 2020-01-01 | Stop reason: HOSPADM

## 2020-01-01 RX ORDER — POTASSIUM CHLORIDE 1500 MG/1
20 TABLET, EXTENDED RELEASE ORAL
COMMUNITY
Start: 2020-01-01 | End: 2020-01-01 | Stop reason: HOSPADM

## 2020-01-01 RX ORDER — CETIRIZINE HYDROCHLORIDE 10 MG/1
10 TABLET ORAL DAILY
Status: DISCONTINUED | OUTPATIENT
Start: 2020-01-01 | End: 2020-01-01 | Stop reason: HOSPADM

## 2020-01-01 RX ORDER — CLOPIDOGREL BISULFATE 75 MG/1
75 TABLET ORAL DAILY
Status: DISCONTINUED | OUTPATIENT
Start: 2020-01-01 | End: 2020-01-01 | Stop reason: HOSPADM

## 2020-01-01 RX ORDER — SODIUM CHLORIDE 9 MG/ML
125 INJECTION, SOLUTION INTRAVENOUS CONTINUOUS
Status: DISCONTINUED | OUTPATIENT
Start: 2020-01-01 | End: 2020-01-01

## 2020-01-01 RX ORDER — QUETIAPINE FUMARATE 25 MG/1
12.5 TABLET, FILM COATED ORAL EVERY 6 HOURS PRN
Status: DISCONTINUED | OUTPATIENT
Start: 2020-01-01 | End: 2020-01-01 | Stop reason: HOSPADM

## 2020-01-01 RX ORDER — HEPARIN SODIUM 1000 [USP'U]/ML
4000 INJECTION, SOLUTION INTRAVENOUS; SUBCUTANEOUS ONCE
Status: COMPLETED | OUTPATIENT
Start: 2020-01-01 | End: 2020-01-01

## 2020-01-01 RX ORDER — CALCIUM CARBONATE 750 MG/1
750 TABLET, CHEWABLE ORAL 3 TIMES DAILY PRN
Status: DISCONTINUED | OUTPATIENT
Start: 2020-01-01 | End: 2020-01-01 | Stop reason: HOSPADM

## 2020-01-01 RX ORDER — PRAVASTATIN SODIUM 20 MG
TABLET ORAL
Qty: 90 TABLET | Refills: 0 | OUTPATIENT
Start: 2020-01-01

## 2020-01-01 RX ORDER — ACETAMINOPHEN 325 MG/1
650 TABLET ORAL NIGHTLY PRN
Status: DISCONTINUED | OUTPATIENT
Start: 2020-01-01 | End: 2020-01-01 | Stop reason: HOSPADM

## 2020-01-01 RX ORDER — FUROSEMIDE 10 MG/ML
20 INJECTION INTRAMUSCULAR; INTRAVENOUS
Status: DISCONTINUED | OUTPATIENT
Start: 2020-01-01 | End: 2020-01-01

## 2020-01-01 RX ORDER — MORPHINE SULFATE 10 MG/5ML
2 SOLUTION ORAL EVERY 4 HOURS PRN
Status: DISCONTINUED | OUTPATIENT
Start: 2020-01-01 | End: 2020-01-01 | Stop reason: HOSPADM

## 2020-01-01 RX ORDER — BISACODYL 10 MG
10 SUPPOSITORY, RECTAL RECTAL DAILY PRN
Status: DISCONTINUED | OUTPATIENT
Start: 2020-01-01 | End: 2020-01-01 | Stop reason: HOSPADM

## 2020-01-01 RX ORDER — AMOXICILLIN 250 MG
2 CAPSULE ORAL NIGHTLY
Status: DISCONTINUED | OUTPATIENT
Start: 2020-01-01 | End: 2020-01-01 | Stop reason: HOSPADM

## 2020-01-01 RX ORDER — ONDANSETRON 4 MG/1
4 TABLET, FILM COATED ORAL EVERY 6 HOURS PRN
Status: DISCONTINUED | OUTPATIENT
Start: 2020-01-01 | End: 2020-01-01 | Stop reason: HOSPADM

## 2020-01-01 RX ORDER — MULTIPLE VITAMINS W/ MINERALS TAB 9MG-400MCG
1 TAB ORAL DAILY
Status: DISCONTINUED | OUTPATIENT
Start: 2020-01-01 | End: 2020-01-01

## 2020-01-01 RX ORDER — NICOTINE POLACRILEX 4 MG
15 LOZENGE BUCCAL
Status: DISCONTINUED | OUTPATIENT
Start: 2020-01-01 | End: 2020-01-01

## 2020-01-01 RX ORDER — MAGNESIUM SULFATE HEPTAHYDRATE 40 MG/ML
4 INJECTION, SOLUTION INTRAVENOUS AS NEEDED
Status: DISCONTINUED | OUTPATIENT
Start: 2020-01-01 | End: 2020-01-01 | Stop reason: HOSPADM

## 2020-01-01 RX ORDER — FUROSEMIDE 10 MG/ML
40 INJECTION INTRAMUSCULAR; INTRAVENOUS EVERY 12 HOURS
Status: DISCONTINUED | OUTPATIENT
Start: 2020-01-01 | End: 2020-01-01

## 2020-01-01 RX ORDER — MEMANTINE HYDROCHLORIDE 10 MG/1
10 TABLET ORAL EVERY 12 HOURS SCHEDULED
Status: DISCONTINUED | OUTPATIENT
Start: 2020-01-01 | End: 2020-01-01 | Stop reason: HOSPADM

## 2020-01-01 RX ORDER — LEVOTHYROXINE SODIUM 0.07 MG/1
75 TABLET ORAL DAILY
Qty: 30 TABLET | Refills: 0 | Status: SHIPPED | OUTPATIENT
Start: 2020-01-01

## 2020-01-01 RX ORDER — IPRATROPIUM BROMIDE AND ALBUTEROL SULFATE 2.5; .5 MG/3ML; MG/3ML
3 SOLUTION RESPIRATORY (INHALATION)
Status: DISCONTINUED | OUTPATIENT
Start: 2020-01-01 | End: 2020-01-01

## 2020-01-01 RX ORDER — ACETAMINOPHEN 325 MG/1
650 TABLET ORAL EVERY 4 HOURS PRN
Status: DISCONTINUED | OUTPATIENT
Start: 2020-01-01 | End: 2020-01-01 | Stop reason: HOSPADM

## 2020-01-01 RX ORDER — METRONIDAZOLE 500 MG/1
500 TABLET ORAL EVERY 8 HOURS SCHEDULED
Status: DISCONTINUED | OUTPATIENT
Start: 2020-01-01 | End: 2020-01-01

## 2020-01-01 RX ORDER — CALCIUM CARBONATE 750 MG/1
750 TABLET, CHEWABLE ORAL 3 TIMES DAILY PRN
Status: DISCONTINUED | OUTPATIENT
Start: 2020-01-01 | End: 2020-01-01

## 2020-01-01 RX ORDER — QUETIAPINE FUMARATE 100 MG/1
100 TABLET, FILM COATED ORAL NIGHTLY
Status: DISCONTINUED | OUTPATIENT
Start: 2020-01-01 | End: 2020-01-01

## 2020-01-01 RX ORDER — MIRTAZAPINE 30 MG/1
TABLET, FILM COATED ORAL
Qty: 90 TABLET | Refills: 0 | OUTPATIENT
Start: 2020-01-01

## 2020-01-01 RX ORDER — ACETAMINOPHEN 650 MG/1
650 SUPPOSITORY RECTAL EVERY 4 HOURS PRN
Status: DISCONTINUED | OUTPATIENT
Start: 2020-01-01 | End: 2020-01-01 | Stop reason: HOSPADM

## 2020-01-01 RX ORDER — DEXTROSE MONOHYDRATE 25 G/50ML
25 INJECTION, SOLUTION INTRAVENOUS
Status: DISCONTINUED | OUTPATIENT
Start: 2020-01-01 | End: 2020-01-01 | Stop reason: HOSPADM

## 2020-01-01 RX ORDER — CETIRIZINE HYDROCHLORIDE 10 MG/1
5 TABLET ORAL DAILY PRN
Status: DISCONTINUED | OUTPATIENT
Start: 2020-01-01 | End: 2020-01-01 | Stop reason: HOSPADM

## 2020-01-01 RX ORDER — LABETALOL HYDROCHLORIDE 5 MG/ML
10 INJECTION, SOLUTION INTRAVENOUS
Status: DISCONTINUED | OUTPATIENT
Start: 2020-01-01 | End: 2020-01-01 | Stop reason: HOSPADM

## 2020-01-01 RX ORDER — OMEPRAZOLE 20 MG/1
CAPSULE, DELAYED RELEASE ORAL
Qty: 30 CAPSULE | Refills: 0 | Status: SHIPPED | OUTPATIENT
Start: 2020-01-01

## 2020-01-01 RX ORDER — PRAVASTATIN SODIUM 20 MG
20 TABLET ORAL NIGHTLY
Status: DISCONTINUED | OUTPATIENT
Start: 2020-01-01 | End: 2020-01-01 | Stop reason: HOSPADM

## 2020-01-01 RX ORDER — CHOLECALCIFEROL (VITAMIN D3) 125 MCG
5 CAPSULE ORAL ONCE
Status: COMPLETED | OUTPATIENT
Start: 2020-01-01 | End: 2020-01-01

## 2020-01-01 RX ORDER — CASTOR OIL AND BALSAM, PERU 788; 87 MG/G; MG/G
1 OINTMENT TOPICAL EVERY 12 HOURS SCHEDULED
Status: DISCONTINUED | OUTPATIENT
Start: 2020-01-01 | End: 2020-01-01 | Stop reason: HOSPADM

## 2020-01-01 RX ORDER — HEPARIN SODIUM 10000 [USP'U]/100ML
12 INJECTION, SOLUTION INTRAVENOUS
Status: DISCONTINUED | OUTPATIENT
Start: 2020-01-01 | End: 2020-01-01

## 2020-01-01 RX ORDER — OMEPRAZOLE 20 MG/1
40 CAPSULE, DELAYED RELEASE ORAL DAILY
Start: 2020-01-01 | End: 2020-01-01

## 2020-01-01 RX ORDER — POLYVINYL ALCOHOL 14 MG/ML
2 SOLUTION/ DROPS OPHTHALMIC
Status: DISCONTINUED | OUTPATIENT
Start: 2020-01-01 | End: 2020-01-01 | Stop reason: ALTCHOICE

## 2020-01-01 RX ORDER — HALOPERIDOL 5 MG/ML
1 INJECTION INTRAMUSCULAR EVERY 6 HOURS PRN
Status: DISCONTINUED | OUTPATIENT
Start: 2020-01-01 | End: 2020-01-01

## 2020-01-01 RX ADMIN — MULTIPLE VITAMINS W/ MINERALS TAB 1 TABLET: TAB ORAL at 08:51

## 2020-01-01 RX ADMIN — DICLOFENAC 2 G: 10 GEL TOPICAL at 22:33

## 2020-01-01 RX ADMIN — LEVOTHYROXINE SODIUM 75 MCG: 75 TABLET ORAL at 05:56

## 2020-01-01 RX ADMIN — QUETIAPINE FUMARATE 50 MG: 25 TABLET ORAL at 21:43

## 2020-01-01 RX ADMIN — MEMANTINE 10 MG: 10 TABLET ORAL at 00:57

## 2020-01-01 RX ADMIN — QUETIAPINE FUMARATE 100 MG: 100 TABLET ORAL at 20:51

## 2020-01-01 RX ADMIN — SENNOSIDES AND DOCUSATE SODIUM 2 TABLET: 8.6; 5 TABLET ORAL at 21:46

## 2020-01-01 RX ADMIN — CLOPIDOGREL BISULFATE 75 MG: 75 TABLET ORAL at 09:23

## 2020-01-01 RX ADMIN — IPRATROPIUM BROMIDE AND ALBUTEROL SULFATE 3 ML: 2.5; .5 SOLUTION RESPIRATORY (INHALATION) at 04:17

## 2020-01-01 RX ADMIN — SODIUM CHLORIDE, PRESERVATIVE FREE 10 ML: 5 INJECTION INTRAVENOUS at 22:31

## 2020-01-01 RX ADMIN — POTASSIUM CHLORIDE 40 MEQ: 750 CAPSULE, EXTENDED RELEASE ORAL at 23:29

## 2020-01-01 RX ADMIN — MORPHINE SULFATE 2 MG: 2 INJECTION, SOLUTION INTRAMUSCULAR; INTRAVENOUS at 04:22

## 2020-01-01 RX ADMIN — MEMANTINE 10 MG: 10 TABLET ORAL at 10:04

## 2020-01-01 RX ADMIN — MULTIPLE VITAMINS W/ MINERALS TAB 1 TABLET: TAB ORAL at 09:55

## 2020-01-01 RX ADMIN — SODIUM CHLORIDE, PRESERVATIVE FREE 10 ML: 5 INJECTION INTRAVENOUS at 20:50

## 2020-01-01 RX ADMIN — PRAVASTATIN SODIUM 20 MG: 20 TABLET ORAL at 00:57

## 2020-01-01 RX ADMIN — MULTIPLE VITAMINS W/ MINERALS TAB 1 TABLET: TAB ORAL at 08:22

## 2020-01-01 RX ADMIN — CLOPIDOGREL BISULFATE 75 MG: 75 TABLET ORAL at 07:56

## 2020-01-01 RX ADMIN — CARBIDOPA AND LEVODOPA 1 TABLET: 25; 100 TABLET ORAL at 16:53

## 2020-01-01 RX ADMIN — ASPIRIN 81 MG CHEWABLE TABLET 81 MG: 81 TABLET CHEWABLE at 09:39

## 2020-01-01 RX ADMIN — PANTOPRAZOLE SODIUM 40 MG: 40 TABLET, DELAYED RELEASE ORAL at 06:35

## 2020-01-01 RX ADMIN — CARBIDOPA AND LEVODOPA 1 TABLET: 25; 100 TABLET ORAL at 15:13

## 2020-01-01 RX ADMIN — CARBIDOPA AND LEVODOPA 1 TABLET: 25; 100 TABLET ORAL at 10:07

## 2020-01-01 RX ADMIN — FUROSEMIDE 20 MG: 20 TABLET ORAL at 09:39

## 2020-01-01 RX ADMIN — CARBIDOPA AND LEVODOPA 1 TABLET: 25; 100 TABLET ORAL at 21:45

## 2020-01-01 RX ADMIN — CARBIDOPA AND LEVODOPA 1 TABLET: 25; 100 TABLET ORAL at 09:56

## 2020-01-01 RX ADMIN — METRONIDAZOLE 500 MG: 500 INJECTION, SOLUTION INTRAVENOUS at 02:53

## 2020-01-01 RX ADMIN — CASTOR OIL AND BALSAM, PERU: 788; 87 OINTMENT TOPICAL at 21:06

## 2020-01-01 RX ADMIN — POLYVINYL ALCOHOL, POVIDONE 2 DROP: 14; 6 SOLUTION/ DROPS OPHTHALMIC at 21:50

## 2020-01-01 RX ADMIN — SENNOSIDES AND DOCUSATE SODIUM 2 TABLET: 8.6; 5 TABLET ORAL at 21:06

## 2020-01-01 RX ADMIN — CARBIDOPA AND LEVODOPA 1 TABLET: 25; 100 TABLET ORAL at 09:59

## 2020-01-01 RX ADMIN — MEMANTINE 10 MG: 10 TABLET ORAL at 21:06

## 2020-01-01 RX ADMIN — METRONIDAZOLE 500 MG: 500 INJECTION, SOLUTION INTRAVENOUS at 17:35

## 2020-01-01 RX ADMIN — FINASTERIDE 5 MG: 5 TABLET, FILM COATED ORAL at 09:29

## 2020-01-01 RX ADMIN — ASPIRIN 81 MG 81 MG: 81 TABLET ORAL at 10:08

## 2020-01-01 RX ADMIN — PRAVASTATIN SODIUM 20 MG: 20 TABLET ORAL at 20:07

## 2020-01-01 RX ADMIN — SODIUM CHLORIDE 2 G: 900 INJECTION INTRAVENOUS at 13:14

## 2020-01-01 RX ADMIN — MEMANTINE 10 MG: 10 TABLET ORAL at 09:42

## 2020-01-01 RX ADMIN — ASPIRIN 81 MG 81 MG: 81 TABLET ORAL at 08:59

## 2020-01-01 RX ADMIN — IPRATROPIUM BROMIDE AND ALBUTEROL SULFATE 3 ML: 2.5; .5 SOLUTION RESPIRATORY (INHALATION) at 19:11

## 2020-01-01 RX ADMIN — LEVOTHYROXINE SODIUM 75 MCG: 75 TABLET ORAL at 05:40

## 2020-01-01 RX ADMIN — CARBIDOPA AND LEVODOPA 1 TABLET: 25; 100 TABLET ORAL at 20:51

## 2020-01-01 RX ADMIN — ASPIRIN 81 MG 81 MG: 81 TABLET ORAL at 10:24

## 2020-01-01 RX ADMIN — CARBIDOPA AND LEVODOPA 1 TABLET: 25; 100 TABLET ORAL at 09:45

## 2020-01-01 RX ADMIN — MIDODRINE HYDROCHLORIDE 10 MG: 5 TABLET ORAL at 06:33

## 2020-01-01 RX ADMIN — AMIODARONE HYDROCHLORIDE 200 MG: 200 TABLET ORAL at 08:23

## 2020-01-01 RX ADMIN — AMIODARONE HYDROCHLORIDE 200 MG: 200 TABLET ORAL at 10:07

## 2020-01-01 RX ADMIN — MULTIPLE VITAMINS W/ MINERALS TAB 1 TABLET: TAB ORAL at 09:23

## 2020-01-01 RX ADMIN — HEPARIN SODIUM 5000 UNITS: 5000 INJECTION, SOLUTION INTRAVENOUS; SUBCUTANEOUS at 06:35

## 2020-01-01 RX ADMIN — CASTOR OIL AND BALSAM, PERU 5 G: 788; 87 OINTMENT TOPICAL at 09:35

## 2020-01-01 RX ADMIN — CEFTRIAXONE 2 G: 2 INJECTION, POWDER, FOR SOLUTION INTRAMUSCULAR; INTRAVENOUS at 16:45

## 2020-01-01 RX ADMIN — SACUBITRIL AND VALSARTAN 0.5 TABLET: 24; 26 TABLET, FILM COATED ORAL at 22:31

## 2020-01-01 RX ADMIN — HEPARIN SODIUM 5000 UNITS: 5000 INJECTION, SOLUTION INTRAVENOUS; SUBCUTANEOUS at 13:37

## 2020-01-01 RX ADMIN — CASTOR OIL AND BALSAM, PERU 5 G: 788; 87 OINTMENT TOPICAL at 20:12

## 2020-01-01 RX ADMIN — AMIODARONE HYDROCHLORIDE 200 MG: 200 TABLET ORAL at 10:35

## 2020-01-01 RX ADMIN — CARBIDOPA AND LEVODOPA 1 TABLET: 25; 100 TABLET ORAL at 10:34

## 2020-01-01 RX ADMIN — CLOPIDOGREL BISULFATE 75 MG: 75 TABLET ORAL at 09:00

## 2020-01-01 RX ADMIN — SODIUM CHLORIDE 125 ML/HR: 9 INJECTION, SOLUTION INTRAVENOUS at 09:15

## 2020-01-01 RX ADMIN — AMIODARONE HYDROCHLORIDE 200 MG: 200 TABLET ORAL at 09:39

## 2020-01-01 RX ADMIN — MEMANTINE 10 MG: 10 TABLET ORAL at 08:50

## 2020-01-01 RX ADMIN — SODIUM CHLORIDE, PRESERVATIVE FREE 10 ML: 5 INJECTION INTRAVENOUS at 21:16

## 2020-01-01 RX ADMIN — CARBIDOPA AND LEVODOPA 1 TABLET: 25; 100 TABLET ORAL at 20:07

## 2020-01-01 RX ADMIN — CARBIDOPA AND LEVODOPA 1 TABLET: 25; 100 TABLET ORAL at 08:59

## 2020-01-01 RX ADMIN — NYSTATIN 5 ML: 100000 SUSPENSION ORAL at 13:14

## 2020-01-01 RX ADMIN — CLOPIDOGREL BISULFATE 75 MG: 75 TABLET ORAL at 09:35

## 2020-01-01 RX ADMIN — CARBIDOPA AND LEVODOPA 1 TABLET: 25; 100 TABLET ORAL at 09:14

## 2020-01-01 RX ADMIN — CASTOR OIL AND BALSAM, PERU 5 G: 788; 87 OINTMENT TOPICAL at 09:00

## 2020-01-01 RX ADMIN — HALOPERIDOL LACTATE 0.5 MG: 5 INJECTION INTRAMUSCULAR at 14:30

## 2020-01-01 RX ADMIN — CASTOR OIL AND BALSAM, PERU 1 G: 788; 87 OINTMENT TOPICAL at 20:50

## 2020-01-01 RX ADMIN — HEPARIN SODIUM 5000 UNITS: 5000 INJECTION, SOLUTION INTRAVENOUS; SUBCUTANEOUS at 20:07

## 2020-01-01 RX ADMIN — CARBIDOPA AND LEVODOPA 1 TABLET: 25; 100 TABLET ORAL at 18:29

## 2020-01-01 RX ADMIN — IPRATROPIUM BROMIDE AND ALBUTEROL SULFATE 3 ML: 2.5; .5 SOLUTION RESPIRATORY (INHALATION) at 12:07

## 2020-01-01 RX ADMIN — DICLOFENAC 2 G: 10 GEL TOPICAL at 21:03

## 2020-01-01 RX ADMIN — CARBIDOPA AND LEVODOPA 1 TABLET: 25; 100 TABLET ORAL at 15:29

## 2020-01-01 RX ADMIN — ACETAMINOPHEN 650 MG: 325 TABLET, FILM COATED ORAL at 17:38

## 2020-01-01 RX ADMIN — SODIUM CHLORIDE, PRESERVATIVE FREE 10 ML: 5 INJECTION INTRAVENOUS at 08:23

## 2020-01-01 RX ADMIN — MORPHINE SULFATE 2 MG: 2 INJECTION, SOLUTION INTRAMUSCULAR; INTRAVENOUS at 06:21

## 2020-01-01 RX ADMIN — POLYVINYL ALCOHOL, POVIDONE 2 DROP: 14; 6 SOLUTION/ DROPS OPHTHALMIC at 08:51

## 2020-01-01 RX ADMIN — MULTIPLE VITAMINS W/ MINERALS TAB 1 TABLET: TAB ORAL at 09:14

## 2020-01-01 RX ADMIN — MORPHINE SULFATE 2 MG: 2 INJECTION, SOLUTION INTRAMUSCULAR; INTRAVENOUS at 04:53

## 2020-01-01 RX ADMIN — HALOPERIDOL LACTATE 0.5 MG: 5 INJECTION, SOLUTION INTRAMUSCULAR at 17:33

## 2020-01-01 RX ADMIN — HEPARIN SODIUM 5000 UNITS: 5000 INJECTION, SOLUTION INTRAVENOUS; SUBCUTANEOUS at 14:18

## 2020-01-01 RX ADMIN — AMIODARONE HYDROCHLORIDE 200 MG: 200 TABLET ORAL at 08:59

## 2020-01-01 RX ADMIN — ONDANSETRON 4 MG: 2 INJECTION INTRAMUSCULAR; INTRAVENOUS at 08:12

## 2020-01-01 RX ADMIN — AMIODARONE HYDROCHLORIDE 200 MG: 200 TABLET ORAL at 07:57

## 2020-01-01 RX ADMIN — CARBIDOPA AND LEVODOPA 1 TABLET: 25; 100 TABLET ORAL at 16:13

## 2020-01-01 RX ADMIN — MORPHINE SULFATE 2 MG: 2 INJECTION, SOLUTION INTRAMUSCULAR; INTRAVENOUS at 02:42

## 2020-01-01 RX ADMIN — HEPARIN SODIUM 5000 UNITS: 5000 INJECTION, SOLUTION INTRAVENOUS; SUBCUTANEOUS at 21:06

## 2020-01-01 RX ADMIN — IPRATROPIUM BROMIDE AND ALBUTEROL SULFATE 3 ML: 2.5; .5 SOLUTION RESPIRATORY (INHALATION) at 11:09

## 2020-01-01 RX ADMIN — CEFTRIAXONE 2 G: 2 INJECTION, POWDER, FOR SOLUTION INTRAMUSCULAR; INTRAVENOUS at 17:25

## 2020-01-01 RX ADMIN — MULTIPLE VITAMINS W/ MINERALS TAB 1 TABLET: TAB ORAL at 08:59

## 2020-01-01 RX ADMIN — SODIUM CHLORIDE, PRESERVATIVE FREE 10 ML: 5 INJECTION INTRAVENOUS at 21:41

## 2020-01-01 RX ADMIN — SODIUM CHLORIDE, PRESERVATIVE FREE 10 ML: 5 INJECTION INTRAVENOUS at 10:01

## 2020-01-01 RX ADMIN — CASTOR OIL AND BALSAM, PERU: 788; 87 OINTMENT TOPICAL at 21:37

## 2020-01-01 RX ADMIN — POLYVINYL ALCOHOL, POVIDONE 2 DROP: 14; 6 SOLUTION/ DROPS OPHTHALMIC at 12:43

## 2020-01-01 RX ADMIN — SACUBITRIL AND VALSARTAN 0.5 TABLET: 24; 26 TABLET, FILM COATED ORAL at 20:13

## 2020-01-01 RX ADMIN — FINASTERIDE 5 MG: 5 TABLET, FILM COATED ORAL at 09:28

## 2020-01-01 RX ADMIN — MEMANTINE 10 MG: 10 TABLET ORAL at 20:51

## 2020-01-01 RX ADMIN — AMIODARONE HYDROCHLORIDE 200 MG: 200 TABLET ORAL at 09:59

## 2020-01-01 RX ADMIN — ACETAMINOPHEN 650 MG: 325 TABLET, FILM COATED ORAL at 21:37

## 2020-01-01 RX ADMIN — IPRATROPIUM BROMIDE AND ALBUTEROL SULFATE 3 ML: 2.5; .5 SOLUTION RESPIRATORY (INHALATION) at 12:17

## 2020-01-01 RX ADMIN — CLOPIDOGREL BISULFATE 75 MG: 75 TABLET ORAL at 09:43

## 2020-01-01 RX ADMIN — LEVOTHYROXINE SODIUM 75 MCG: 75 TABLET ORAL at 06:41

## 2020-01-01 RX ADMIN — CARBIDOPA AND LEVODOPA 1 TABLET: 25; 100 TABLET ORAL at 17:42

## 2020-01-01 RX ADMIN — POLYVINYL ALCOHOL, POVIDONE 2 DROP: 14; 6 SOLUTION/ DROPS OPHTHALMIC at 14:22

## 2020-01-01 RX ADMIN — FUROSEMIDE 20 MG: 20 TABLET ORAL at 09:35

## 2020-01-01 RX ADMIN — SACUBITRIL AND VALSARTAN 0.5 TABLET: 24; 26 TABLET, FILM COATED ORAL at 10:22

## 2020-01-01 RX ADMIN — MULTIPLE VITAMINS W/ MINERALS TAB 1 TABLET: TAB ORAL at 09:59

## 2020-01-01 RX ADMIN — POTASSIUM CHLORIDE 10 MEQ: 7.46 INJECTION, SOLUTION INTRAVENOUS at 03:36

## 2020-01-01 RX ADMIN — POTASSIUM CHLORIDE 10 MEQ: 7.46 INJECTION, SOLUTION INTRAVENOUS at 00:54

## 2020-01-01 RX ADMIN — LEVOTHYROXINE SODIUM 75 MCG: 0.07 TABLET ORAL at 09:28

## 2020-01-01 RX ADMIN — PANTOPRAZOLE SODIUM 40 MG: 40 TABLET, DELAYED RELEASE ORAL at 06:48

## 2020-01-01 RX ADMIN — CASTOR OIL AND BALSAM, PERU 1 EACH: 788; 87 OINTMENT TOPICAL at 20:10

## 2020-01-01 RX ADMIN — MEMANTINE 10 MG: 10 TABLET ORAL at 09:28

## 2020-01-01 RX ADMIN — IPRATROPIUM BROMIDE AND ALBUTEROL SULFATE 3 ML: 2.5; .5 SOLUTION RESPIRATORY (INHALATION) at 20:35

## 2020-01-01 RX ADMIN — SODIUM CHLORIDE, PRESERVATIVE FREE 10 ML: 5 INJECTION INTRAVENOUS at 21:07

## 2020-01-01 RX ADMIN — CARBIDOPA AND LEVODOPA 1 TABLET: 25; 100 TABLET ORAL at 17:38

## 2020-01-01 RX ADMIN — SODIUM CHLORIDE 125 ML/HR: 9 INJECTION, SOLUTION INTRAVENOUS at 14:30

## 2020-01-01 RX ADMIN — ACETAMINOPHEN 650 MG: 325 TABLET, FILM COATED ORAL at 22:31

## 2020-01-01 RX ADMIN — SENNOSIDES AND DOCUSATE SODIUM 2 TABLET: 8.6; 5 TABLET ORAL at 00:38

## 2020-01-01 RX ADMIN — SODIUM CHLORIDE, PRESERVATIVE FREE 10 ML: 5 INJECTION INTRAVENOUS at 08:13

## 2020-01-01 RX ADMIN — CARBIDOPA AND LEVODOPA 1 TABLET: 25; 100 TABLET ORAL at 21:05

## 2020-01-01 RX ADMIN — DICLOFENAC 2 G: 10 GEL TOPICAL at 09:44

## 2020-01-01 RX ADMIN — SODIUM CHLORIDE, PRESERVATIVE FREE 10 ML: 5 INJECTION INTRAVENOUS at 20:13

## 2020-01-01 RX ADMIN — CARBIDOPA AND LEVODOPA 1 TABLET: 25; 100 TABLET ORAL at 22:30

## 2020-01-01 RX ADMIN — CASTOR OIL AND BALSAM, PERU: 788; 87 OINTMENT TOPICAL at 22:32

## 2020-01-01 RX ADMIN — CARBIDOPA AND LEVODOPA 1 TABLET: 25; 100 TABLET ORAL at 00:38

## 2020-01-01 RX ADMIN — SODIUM CHLORIDE, PRESERVATIVE FREE 10 ML: 5 INJECTION INTRAVENOUS at 20:51

## 2020-01-01 RX ADMIN — PANTOPRAZOLE SODIUM 40 MG: 40 TABLET, DELAYED RELEASE ORAL at 10:56

## 2020-01-01 RX ADMIN — MEMANTINE 10 MG: 10 TABLET ORAL at 10:25

## 2020-01-01 RX ADMIN — MIDODRINE HYDROCHLORIDE 10 MG: 5 TABLET ORAL at 12:37

## 2020-01-01 RX ADMIN — ASPIRIN 81 MG 81 MG: 81 TABLET ORAL at 09:23

## 2020-01-01 RX ADMIN — DICLOFENAC 2 G: 10 GEL TOPICAL at 21:39

## 2020-01-01 RX ADMIN — SACUBITRIL AND VALSARTAN 0.5 TABLET: 24; 26 TABLET, FILM COATED ORAL at 09:35

## 2020-01-01 RX ADMIN — CARBIDOPA AND LEVODOPA 1 TABLET: 25; 100 TABLET ORAL at 09:38

## 2020-01-01 RX ADMIN — METRONIDAZOLE 500 MG: 500 INJECTION, SOLUTION INTRAVENOUS at 16:02

## 2020-01-01 RX ADMIN — POLYVINYL ALCOHOL, POVIDONE 2 DROP: 14; 6 SOLUTION/ DROPS OPHTHALMIC at 15:56

## 2020-01-01 RX ADMIN — CASTOR OIL AND BALSAM, PERU: 788; 87 OINTMENT TOPICAL at 09:37

## 2020-01-01 RX ADMIN — SACUBITRIL AND VALSARTAN 0.5 TABLET: 24; 26 TABLET, FILM COATED ORAL at 09:43

## 2020-01-01 RX ADMIN — LEVOTHYROXINE SODIUM 75 MCG: 0.07 TABLET ORAL at 09:39

## 2020-01-01 RX ADMIN — CLOPIDOGREL BISULFATE 75 MG: 75 TABLET ORAL at 10:21

## 2020-01-01 RX ADMIN — CASTOR OIL AND BALSAM, PERU 5 G: 788; 87 OINTMENT TOPICAL at 10:01

## 2020-01-01 RX ADMIN — CARBIDOPA AND LEVODOPA 1 TABLET: 25; 100 TABLET ORAL at 21:37

## 2020-01-01 RX ADMIN — CARBIDOPA AND LEVODOPA 1 TABLET: 25; 100 TABLET ORAL at 17:00

## 2020-01-01 RX ADMIN — SODIUM CHLORIDE, PRESERVATIVE FREE 10 ML: 5 INJECTION INTRAVENOUS at 10:25

## 2020-01-01 RX ADMIN — CASTOR OIL AND BALSAM, PERU: 788; 87 OINTMENT TOPICAL at 09:31

## 2020-01-01 RX ADMIN — CARBIDOPA AND LEVODOPA 1 TABLET: 25; 100 TABLET ORAL at 10:23

## 2020-01-01 RX ADMIN — CLOPIDOGREL BISULFATE 75 MG: 75 TABLET ORAL at 10:24

## 2020-01-01 RX ADMIN — POLYVINYL ALCOHOL, POVIDONE 2 DROP: 14; 6 SOLUTION/ DROPS OPHTHALMIC at 05:44

## 2020-01-01 RX ADMIN — CARBIDOPA AND LEVODOPA 1 TABLET: 25; 100 TABLET ORAL at 12:42

## 2020-01-01 RX ADMIN — SACUBITRIL AND VALSARTAN 0.5 TABLET: 24; 26 TABLET, FILM COATED ORAL at 00:57

## 2020-01-01 RX ADMIN — FUROSEMIDE 40 MG: 10 INJECTION, SOLUTION INTRAMUSCULAR; INTRAVENOUS at 08:42

## 2020-01-01 RX ADMIN — IPRATROPIUM BROMIDE AND ALBUTEROL SULFATE 3 ML: 2.5; .5 SOLUTION RESPIRATORY (INHALATION) at 12:40

## 2020-01-01 RX ADMIN — METRONIDAZOLE 500 MG: 500 INJECTION, SOLUTION INTRAVENOUS at 09:00

## 2020-01-01 RX ADMIN — AMIODARONE HYDROCHLORIDE 200 MG: 200 TABLET ORAL at 09:30

## 2020-01-01 RX ADMIN — AMIODARONE HYDROCHLORIDE 200 MG: 200 TABLET ORAL at 08:51

## 2020-01-01 RX ADMIN — DICLOFENAC 2 G: 10 GEL TOPICAL at 21:06

## 2020-01-01 RX ADMIN — LEVOTHYROXINE SODIUM 75 MCG: 75 TABLET ORAL at 06:09

## 2020-01-01 RX ADMIN — HEPARIN SODIUM 5000 UNITS: 5000 INJECTION, SOLUTION INTRAVENOUS; SUBCUTANEOUS at 05:41

## 2020-01-01 RX ADMIN — LEVOTHYROXINE SODIUM 75 MCG: 0.07 TABLET ORAL at 12:42

## 2020-01-01 RX ADMIN — MEMANTINE 10 MG: 10 TABLET ORAL at 22:30

## 2020-01-01 RX ADMIN — CASTOR OIL AND BALSAM, PERU: 788; 87 OINTMENT TOPICAL at 09:45

## 2020-01-01 RX ADMIN — DICLOFENAC 2 G: 10 GEL TOPICAL at 20:52

## 2020-01-01 RX ADMIN — NITROGLYCERIN 5 MCG/MIN: 20 INJECTION INTRAVENOUS at 21:53

## 2020-01-01 RX ADMIN — CASTOR OIL AND BALSAM, PERU: 788; 87 OINTMENT TOPICAL at 09:33

## 2020-01-01 RX ADMIN — PRAVASTATIN SODIUM 20 MG: 20 TABLET ORAL at 20:51

## 2020-01-01 RX ADMIN — MULTIPLE VITAMINS W/ MINERALS TAB 1 TABLET: TAB ORAL at 10:33

## 2020-01-01 RX ADMIN — CARBIDOPA AND LEVODOPA 1 TABLET: 25; 100 TABLET ORAL at 21:06

## 2020-01-01 RX ADMIN — ASPIRIN 81 MG CHEWABLE TABLET 81 MG: 81 TABLET CHEWABLE at 09:43

## 2020-01-01 RX ADMIN — POLYVINYL ALCOHOL, POVIDONE 2 DROP: 14; 6 SOLUTION/ DROPS OPHTHALMIC at 18:22

## 2020-01-01 RX ADMIN — PANTOPRAZOLE SODIUM 40 MG: 40 TABLET, DELAYED RELEASE ORAL at 06:38

## 2020-01-01 RX ADMIN — QUETIAPINE FUMARATE 12.5 MG: 25 TABLET ORAL at 01:21

## 2020-01-01 RX ADMIN — PANTOPRAZOLE SODIUM 40 MG: 40 TABLET, DELAYED RELEASE ORAL at 10:22

## 2020-01-01 RX ADMIN — MELATONIN TAB 5 MG 5 MG: 5 TAB at 00:01

## 2020-01-01 RX ADMIN — FUROSEMIDE 20 MG: 20 TABLET ORAL at 09:00

## 2020-01-01 RX ADMIN — MEMANTINE 10 MG: 10 TABLET ORAL at 09:35

## 2020-01-01 RX ADMIN — POTASSIUM CHLORIDE 40 MEQ: 750 CAPSULE, EXTENDED RELEASE ORAL at 17:53

## 2020-01-01 RX ADMIN — PANTOPRAZOLE SODIUM 40 MG: 40 TABLET, DELAYED RELEASE ORAL at 13:10

## 2020-01-01 RX ADMIN — ASPIRIN 81 MG CHEWABLE TABLET 81 MG: 81 TABLET CHEWABLE at 09:28

## 2020-01-01 RX ADMIN — FUROSEMIDE 20 MG: 10 INJECTION, SOLUTION INTRAMUSCULAR; INTRAVENOUS at 17:52

## 2020-01-01 RX ADMIN — MEMANTINE 10 MG: 10 TABLET ORAL at 20:13

## 2020-01-01 RX ADMIN — CARBIDOPA AND LEVODOPA 1 TABLET: 25; 100 TABLET ORAL at 17:23

## 2020-01-01 RX ADMIN — FINASTERIDE 5 MG: 5 TABLET, FILM COATED ORAL at 07:58

## 2020-01-01 RX ADMIN — CARBIDOPA AND LEVODOPA 1 TABLET: 25; 100 TABLET ORAL at 16:25

## 2020-01-01 RX ADMIN — MEMANTINE 10 MG: 10 TABLET ORAL at 21:40

## 2020-01-01 RX ADMIN — SODIUM CHLORIDE, PRESERVATIVE FREE 10 ML: 5 INJECTION INTRAVENOUS at 10:37

## 2020-01-01 RX ADMIN — CARBIDOPA AND LEVODOPA 1 TABLET: 25; 100 TABLET ORAL at 21:15

## 2020-01-01 RX ADMIN — NYSTATIN 5 ML: 100000 SUSPENSION ORAL at 17:23

## 2020-01-01 RX ADMIN — POLYVINYL ALCOHOL, POVIDONE 2 DROP: 14; 6 SOLUTION/ DROPS OPHTHALMIC at 18:06

## 2020-01-01 RX ADMIN — IPRATROPIUM BROMIDE AND ALBUTEROL SULFATE 3 ML: 2.5; .5 SOLUTION RESPIRATORY (INHALATION) at 07:24

## 2020-01-01 RX ADMIN — CARBIDOPA AND LEVODOPA 1 TABLET: 25; 100 TABLET ORAL at 16:34

## 2020-01-01 RX ADMIN — IPRATROPIUM BROMIDE AND ALBUTEROL SULFATE 3 ML: 2.5; .5 SOLUTION RESPIRATORY (INHALATION) at 19:40

## 2020-01-01 RX ADMIN — FINASTERIDE 5 MG: 5 TABLET, FILM COATED ORAL at 10:23

## 2020-01-01 RX ADMIN — SENNOSIDES AND DOCUSATE SODIUM 2 TABLET: 8.6; 5 TABLET ORAL at 21:57

## 2020-01-01 RX ADMIN — ASPIRIN 81 MG 81 MG: 81 TABLET ORAL at 09:14

## 2020-01-01 RX ADMIN — CARBIDOPA AND LEVODOPA 1 TABLET: 25; 100 TABLET ORAL at 10:24

## 2020-01-01 RX ADMIN — PANTOPRAZOLE SODIUM 40 MG: 40 TABLET, DELAYED RELEASE ORAL at 06:32

## 2020-01-01 RX ADMIN — SENNOSIDES AND DOCUSATE SODIUM 2 TABLET: 8.6; 5 TABLET ORAL at 21:40

## 2020-01-01 RX ADMIN — ASPIRIN 81 MG CHEWABLE TABLET 81 MG: 81 TABLET CHEWABLE at 09:30

## 2020-01-01 RX ADMIN — PANTOPRAZOLE SODIUM 40 MG: 40 TABLET, DELAYED RELEASE ORAL at 05:41

## 2020-01-01 RX ADMIN — SACUBITRIL AND VALSARTAN 0.5 TABLET: 24; 26 TABLET, FILM COATED ORAL at 21:06

## 2020-01-01 RX ADMIN — ASPIRIN 81 MG 81 MG: 81 TABLET ORAL at 09:02

## 2020-01-01 RX ADMIN — SENNOSIDES AND DOCUSATE SODIUM 2 TABLET: 8.6; 5 TABLET ORAL at 21:15

## 2020-01-01 RX ADMIN — HEPARIN SODIUM 5000 UNITS: 5000 INJECTION, SOLUTION INTRAVENOUS; SUBCUTANEOUS at 20:52

## 2020-01-01 RX ADMIN — FUROSEMIDE 20 MG: 20 TABLET ORAL at 13:11

## 2020-01-01 RX ADMIN — POLYVINYL ALCOHOL, POVIDONE 2 DROP: 14; 6 SOLUTION/ DROPS OPHTHALMIC at 10:38

## 2020-01-01 RX ADMIN — CARBIDOPA AND LEVODOPA 1 TABLET: 25; 100 TABLET ORAL at 08:23

## 2020-01-01 RX ADMIN — FINASTERIDE 5 MG: 5 TABLET, FILM COATED ORAL at 09:59

## 2020-01-01 RX ADMIN — FINASTERIDE 5 MG: 5 TABLET, FILM COATED ORAL at 09:44

## 2020-01-01 RX ADMIN — ASPIRIN 81 MG 81 MG: 81 TABLET ORAL at 09:59

## 2020-01-01 RX ADMIN — METRONIDAZOLE 500 MG: 500 INJECTION, SOLUTION INTRAVENOUS at 17:41

## 2020-01-01 RX ADMIN — LEVOTHYROXINE SODIUM 75 MCG: 0.07 TABLET ORAL at 08:51

## 2020-01-01 RX ADMIN — DICLOFENAC 2 G: 10 GEL TOPICAL at 09:45

## 2020-01-01 RX ADMIN — CASTOR OIL AND BALSAM, PERU: 788; 87 OINTMENT TOPICAL at 09:40

## 2020-01-01 RX ADMIN — PANTOPRAZOLE SODIUM 40 MG: 40 TABLET, DELAYED RELEASE ORAL at 05:32

## 2020-01-01 RX ADMIN — METRONIDAZOLE 500 MG: 500 TABLET ORAL at 09:56

## 2020-01-01 RX ADMIN — MEMANTINE 10 MG: 10 TABLET ORAL at 09:39

## 2020-01-01 RX ADMIN — ASPIRIN 81 MG 81 MG: 81 TABLET ORAL at 09:35

## 2020-01-01 RX ADMIN — SODIUM CHLORIDE, PRESERVATIVE FREE 10 ML: 5 INJECTION INTRAVENOUS at 00:39

## 2020-01-01 RX ADMIN — MULTIPLE VITAMINS W/ MINERALS TAB 1 TABLET: TAB ORAL at 07:57

## 2020-01-01 RX ADMIN — METRONIDAZOLE 500 MG: 500 INJECTION, SOLUTION INTRAVENOUS at 03:36

## 2020-01-01 RX ADMIN — ASPIRIN 81 MG 81 MG: 81 TABLET ORAL at 07:56

## 2020-01-01 RX ADMIN — CARBIDOPA AND LEVODOPA 1 TABLET: 25; 100 TABLET ORAL at 20:50

## 2020-01-01 RX ADMIN — MEMANTINE 10 MG: 10 TABLET ORAL at 10:20

## 2020-01-01 RX ADMIN — CLOPIDOGREL BISULFATE 75 MG: 75 TABLET ORAL at 09:29

## 2020-01-01 RX ADMIN — SODIUM CHLORIDE, PRESERVATIVE FREE 10 ML: 5 INJECTION INTRAVENOUS at 09:23

## 2020-01-01 RX ADMIN — POTASSIUM CHLORIDE 40 MEQ: 750 CAPSULE, EXTENDED RELEASE ORAL at 09:43

## 2020-01-01 RX ADMIN — CARBIDOPA AND LEVODOPA 1 TABLET: 25; 100 TABLET ORAL at 07:56

## 2020-01-01 RX ADMIN — MEMANTINE 10 MG: 10 TABLET ORAL at 13:14

## 2020-01-01 RX ADMIN — SACUBITRIL AND VALSARTAN 0.5 TABLET: 24; 26 TABLET, FILM COATED ORAL at 09:39

## 2020-01-01 RX ADMIN — FINASTERIDE 5 MG: 5 TABLET, FILM COATED ORAL at 10:34

## 2020-01-01 RX ADMIN — AMIODARONE HYDROCHLORIDE 200 MG: 200 TABLET ORAL at 09:23

## 2020-01-01 RX ADMIN — ASPIRIN 81 MG CHEWABLE TABLET 81 MG: 81 TABLET CHEWABLE at 08:50

## 2020-01-01 RX ADMIN — AMIODARONE HYDROCHLORIDE 200 MG: 200 TABLET ORAL at 09:28

## 2020-01-01 RX ADMIN — FINASTERIDE 5 MG: 5 TABLET, FILM COATED ORAL at 10:25

## 2020-01-01 RX ADMIN — OXYCODONE HYDROCHLORIDE AND ACETAMINOPHEN 1 TABLET: 7.5; 325 TABLET ORAL at 07:57

## 2020-01-01 RX ADMIN — MAGNESIUM SULFATE HEPTAHYDRATE 4 G: 40 INJECTION, SOLUTION INTRAVENOUS at 18:02

## 2020-01-01 RX ADMIN — SIMETHICONE CHEW TAB 80 MG 80 MG: 80 TABLET ORAL at 02:33

## 2020-01-01 RX ADMIN — MEMANTINE 10 MG: 10 TABLET ORAL at 08:59

## 2020-01-01 RX ADMIN — SACUBITRIL AND VALSARTAN 0.5 TABLET: 24; 26 TABLET, FILM COATED ORAL at 21:36

## 2020-01-01 RX ADMIN — CARBIDOPA AND LEVODOPA 1 TABLET: 25; 100 TABLET ORAL at 15:10

## 2020-01-01 RX ADMIN — PANTOPRAZOLE SODIUM 40 MG: 40 TABLET, DELAYED RELEASE ORAL at 05:40

## 2020-01-01 RX ADMIN — SODIUM CHLORIDE, PRESERVATIVE FREE 10 ML: 5 INJECTION INTRAVENOUS at 10:08

## 2020-01-01 RX ADMIN — SACUBITRIL AND VALSARTAN 0.5 TABLET: 24; 26 TABLET, FILM COATED ORAL at 20:07

## 2020-01-01 RX ADMIN — MULTIPLE VITAMINS W/ MINERALS TAB 1 TABLET: TAB ORAL at 09:28

## 2020-01-01 RX ADMIN — CARBIDOPA AND LEVODOPA 1 TABLET: 25; 100 TABLET ORAL at 21:44

## 2020-01-01 RX ADMIN — SACUBITRIL AND VALSARTAN 0.5 TABLET: 24; 26 TABLET, FILM COATED ORAL at 20:51

## 2020-01-01 RX ADMIN — AMIODARONE HYDROCHLORIDE 200 MG: 200 TABLET ORAL at 09:14

## 2020-01-01 RX ADMIN — MELATONIN TAB 5 MG 5 MG: 5 TAB at 20:20

## 2020-01-01 RX ADMIN — LEVOTHYROXINE SODIUM 75 MCG: 75 TABLET ORAL at 06:33

## 2020-01-01 RX ADMIN — SODIUM CHLORIDE, PRESERVATIVE FREE 10 ML: 5 INJECTION INTRAVENOUS at 08:51

## 2020-01-01 RX ADMIN — SIMETHICONE CHEW TAB 80 MG 80 MG: 80 TABLET ORAL at 21:19

## 2020-01-01 RX ADMIN — NYSTATIN 5 ML: 100000 SUSPENSION ORAL at 00:53

## 2020-01-01 RX ADMIN — CARBIDOPA AND LEVODOPA 1 TABLET: 25; 100 TABLET ORAL at 09:35

## 2020-01-01 RX ADMIN — CARBIDOPA AND LEVODOPA 1 TABLET: 25; 100 TABLET ORAL at 09:23

## 2020-01-01 RX ADMIN — FINASTERIDE 5 MG: 5 TABLET, FILM COATED ORAL at 09:34

## 2020-01-01 RX ADMIN — LEVOTHYROXINE SODIUM 75 MCG: 75 TABLET ORAL at 05:25

## 2020-01-01 RX ADMIN — IPRATROPIUM BROMIDE AND ALBUTEROL SULFATE 3 ML: 2.5; .5 SOLUTION RESPIRATORY (INHALATION) at 16:59

## 2020-01-01 RX ADMIN — CLOPIDOGREL BISULFATE 75 MG: 75 TABLET ORAL at 08:51

## 2020-01-01 RX ADMIN — CASTOR OIL AND BALSAM, PERU 5 G: 788; 87 OINTMENT TOPICAL at 20:19

## 2020-01-01 RX ADMIN — SODIUM CHLORIDE, PRESERVATIVE FREE 10 ML: 5 INJECTION INTRAVENOUS at 21:58

## 2020-01-01 RX ADMIN — SODIUM CHLORIDE, PRESERVATIVE FREE 10 ML: 5 INJECTION INTRAVENOUS at 20:53

## 2020-01-01 RX ADMIN — LIDOCAINE HYDROCHLORIDE 2 G: 10 INJECTION, SOLUTION EPIDURAL; INFILTRATION; INTRACAUDAL; PERINEURAL at 17:34

## 2020-01-01 RX ADMIN — LEVOTHYROXINE SODIUM 75 MCG: 0.07 TABLET ORAL at 09:30

## 2020-01-01 RX ADMIN — OXYCODONE HYDROCHLORIDE AND ACETAMINOPHEN 1 TABLET: 7.5; 325 TABLET ORAL at 04:22

## 2020-01-01 RX ADMIN — PANTOPRAZOLE SODIUM 40 MG: 40 TABLET, DELAYED RELEASE ORAL at 06:40

## 2020-01-01 RX ADMIN — ASPIRIN 81 MG 81 MG: 81 TABLET ORAL at 09:55

## 2020-01-01 RX ADMIN — MIDODRINE HYDROCHLORIDE 10 MG: 5 TABLET ORAL at 06:09

## 2020-01-01 RX ADMIN — SACUBITRIL AND VALSARTAN 0.5 TABLET: 24; 26 TABLET, FILM COATED ORAL at 09:28

## 2020-01-01 RX ADMIN — AMIODARONE HYDROCHLORIDE 200 MG: 200 TABLET ORAL at 10:24

## 2020-01-01 RX ADMIN — SODIUM CHLORIDE, PRESERVATIVE FREE 10 ML: 5 INJECTION INTRAVENOUS at 21:36

## 2020-01-01 RX ADMIN — IPRATROPIUM BROMIDE AND ALBUTEROL SULFATE 3 ML: 2.5; .5 SOLUTION RESPIRATORY (INHALATION) at 08:40

## 2020-01-01 RX ADMIN — CARBIDOPA AND LEVODOPA 1 TABLET: 25; 100 TABLET ORAL at 17:25

## 2020-01-01 RX ADMIN — FUROSEMIDE 40 MG: 10 INJECTION, SOLUTION INTRAMUSCULAR; INTRAVENOUS at 09:28

## 2020-01-01 RX ADMIN — SODIUM CHLORIDE, PRESERVATIVE FREE 10 ML: 5 INJECTION INTRAVENOUS at 00:58

## 2020-01-01 RX ADMIN — DICLOFENAC 2 G: 10 GEL TOPICAL at 09:01

## 2020-01-01 RX ADMIN — IPRATROPIUM BROMIDE AND ALBUTEROL SULFATE 3 ML: 2.5; .5 SOLUTION RESPIRATORY (INHALATION) at 20:43

## 2020-01-01 RX ADMIN — INSULIN LISPRO 3 UNITS: 100 INJECTION, SOLUTION INTRAVENOUS; SUBCUTANEOUS at 13:10

## 2020-01-01 RX ADMIN — CARBIDOPA AND LEVODOPA 1 TABLET: 25; 100 TABLET ORAL at 15:05

## 2020-01-01 RX ADMIN — IPRATROPIUM BROMIDE AND ALBUTEROL SULFATE 3 ML: 2.5; .5 SOLUTION RESPIRATORY (INHALATION) at 16:18

## 2020-01-01 RX ADMIN — ASPIRIN 81 MG 81 MG: 81 TABLET ORAL at 10:34

## 2020-01-01 RX ADMIN — MULTIPLE VITAMINS W/ MINERALS TAB 1 TABLET: TAB ORAL at 10:25

## 2020-01-01 RX ADMIN — CLOPIDOGREL BISULFATE 75 MG: 75 TABLET ORAL at 09:59

## 2020-01-01 RX ADMIN — NYSTATIN 5 ML: 100000 SUSPENSION ORAL at 02:33

## 2020-01-01 RX ADMIN — SODIUM CHLORIDE, PRESERVATIVE FREE 10 ML: 5 INJECTION INTRAVENOUS at 09:39

## 2020-01-01 RX ADMIN — MULTIPLE VITAMINS W/ MINERALS TAB 1 TABLET: TAB ORAL at 09:39

## 2020-01-01 RX ADMIN — AMIODARONE HYDROCHLORIDE 200 MG: 200 TABLET ORAL at 10:21

## 2020-01-01 RX ADMIN — HEPARIN SODIUM 4000 UNITS: 1000 INJECTION INTRAVENOUS; SUBCUTANEOUS at 04:07

## 2020-01-01 RX ADMIN — FINASTERIDE 5 MG: 5 TABLET, FILM COATED ORAL at 09:39

## 2020-01-01 RX ADMIN — FINASTERIDE 5 MG: 5 TABLET, FILM COATED ORAL at 15:13

## 2020-01-01 RX ADMIN — CLOPIDOGREL BISULFATE 75 MG: 75 TABLET ORAL at 10:07

## 2020-01-01 RX ADMIN — HEPARIN SODIUM 5000 UNITS: 5000 INJECTION, SOLUTION INTRAVENOUS; SUBCUTANEOUS at 13:04

## 2020-01-01 RX ADMIN — CASTOR OIL AND BALSAM, PERU: 788; 87 OINTMENT TOPICAL at 08:51

## 2020-01-01 RX ADMIN — FUROSEMIDE 40 MG: 10 INJECTION, SOLUTION INTRAMUSCULAR; INTRAVENOUS at 08:51

## 2020-01-01 RX ADMIN — LEVOTHYROXINE SODIUM 75 MCG: 75 TABLET ORAL at 06:19

## 2020-01-01 RX ADMIN — ACETAMINOPHEN 650 MG: 325 TABLET, FILM COATED ORAL at 12:34

## 2020-01-01 RX ADMIN — MULTIVITAMIN TABLET 1 TABLET: TABLET at 12:34

## 2020-01-01 RX ADMIN — CARBIDOPA AND LEVODOPA 1 TABLET: 25; 100 TABLET ORAL at 09:39

## 2020-01-01 RX ADMIN — SENNOSIDES AND DOCUSATE SODIUM 2 TABLET: 8.6; 5 TABLET ORAL at 21:42

## 2020-01-01 RX ADMIN — ACETAMINOPHEN 650 MG: 325 TABLET, FILM COATED ORAL at 00:45

## 2020-01-01 RX ADMIN — SACUBITRIL AND VALSARTAN 0.5 TABLET: 24; 26 TABLET, FILM COATED ORAL at 10:08

## 2020-01-01 RX ADMIN — CARBIDOPA AND LEVODOPA 1 TABLET: 25; 100 TABLET ORAL at 15:45

## 2020-01-01 RX ADMIN — CARBIDOPA AND LEVODOPA 1 TABLET: 25; 100 TABLET ORAL at 21:57

## 2020-01-01 RX ADMIN — DICLOFENAC 2 G: 10 GEL TOPICAL at 20:50

## 2020-01-01 RX ADMIN — SENNOSIDES AND DOCUSATE SODIUM 2 TABLET: 8.6; 5 TABLET ORAL at 20:50

## 2020-01-01 RX ADMIN — FINASTERIDE 5 MG: 5 TABLET, FILM COATED ORAL at 10:07

## 2020-01-01 RX ADMIN — LEVOTHYROXINE SODIUM 75 MCG: 75 TABLET ORAL at 06:36

## 2020-01-01 RX ADMIN — FINASTERIDE 5 MG: 5 TABLET, FILM COATED ORAL at 08:22

## 2020-01-01 RX ADMIN — ACETAMINOPHEN 650 MG: 325 TABLET ORAL at 05:40

## 2020-01-01 RX ADMIN — PANTOPRAZOLE SODIUM 40 MG: 40 TABLET, DELAYED RELEASE ORAL at 12:42

## 2020-01-01 RX ADMIN — DICLOFENAC 2 G: 10 GEL TOPICAL at 10:25

## 2020-01-01 RX ADMIN — FUROSEMIDE 20 MG: 20 TABLET ORAL at 10:23

## 2020-01-01 RX ADMIN — IPRATROPIUM BROMIDE AND ALBUTEROL SULFATE 3 ML: 2.5; .5 SOLUTION RESPIRATORY (INHALATION) at 12:56

## 2020-01-01 RX ADMIN — METRONIDAZOLE 500 MG: 500 INJECTION, SOLUTION INTRAVENOUS at 09:15

## 2020-01-01 RX ADMIN — SODIUM CHLORIDE 125 ML/HR: 9 INJECTION, SOLUTION INTRAVENOUS at 17:41

## 2020-01-01 RX ADMIN — POLYVINYL ALCOHOL, POVIDONE 2 DROP: 14; 6 SOLUTION/ DROPS OPHTHALMIC at 12:34

## 2020-01-01 RX ADMIN — MEMANTINE 10 MG: 10 TABLET ORAL at 10:08

## 2020-01-01 RX ADMIN — NYSTATIN 5 ML: 100000 SUSPENSION ORAL at 08:12

## 2020-01-01 RX ADMIN — CARBIDOPA AND LEVODOPA 1 TABLET: 25; 100 TABLET ORAL at 09:28

## 2020-01-01 RX ADMIN — CARBIDOPA AND LEVODOPA 1 TABLET: 25; 100 TABLET ORAL at 09:34

## 2020-01-01 RX ADMIN — FINASTERIDE 5 MG: 5 TABLET, FILM COATED ORAL at 09:56

## 2020-01-01 RX ADMIN — CEFTRIAXONE 1 G: 1 INJECTION, POWDER, FOR SOLUTION INTRAMUSCULAR; INTRAVENOUS at 17:34

## 2020-01-01 RX ADMIN — LEVOTHYROXINE SODIUM 75 MCG: 75 TABLET ORAL at 06:38

## 2020-01-01 RX ADMIN — SACUBITRIL AND VALSARTAN 0.5 TABLET: 24; 26 TABLET, FILM COATED ORAL at 20:12

## 2020-01-01 RX ADMIN — HEPARIN SODIUM 5000 UNITS: 5000 INJECTION, SOLUTION INTRAVENOUS; SUBCUTANEOUS at 15:05

## 2020-01-01 RX ADMIN — POLYVINYL ALCOHOL, POVIDONE 2 DROP: 14; 6 SOLUTION/ DROPS OPHTHALMIC at 20:11

## 2020-01-01 RX ADMIN — CEFTRIAXONE 1 G: 1 INJECTION, POWDER, FOR SOLUTION INTRAMUSCULAR; INTRAVENOUS at 17:22

## 2020-01-01 RX ADMIN — AMIODARONE HYDROCHLORIDE 200 MG: 200 TABLET ORAL at 09:56

## 2020-01-01 RX ADMIN — CASTOR OIL AND BALSAM, PERU 5 G: 788; 87 OINTMENT TOPICAL at 00:57

## 2020-01-01 RX ADMIN — HEPARIN SODIUM 12 UNITS/KG/HR: 10000 INJECTION, SOLUTION INTRAVENOUS at 04:07

## 2020-01-01 RX ADMIN — HEPARIN SODIUM 5000 UNITS: 5000 INJECTION, SOLUTION INTRAVENOUS; SUBCUTANEOUS at 09:38

## 2020-01-01 RX ADMIN — IPRATROPIUM BROMIDE AND ALBUTEROL SULFATE 3 ML: 2.5; .5 SOLUTION RESPIRATORY (INHALATION) at 12:59

## 2020-01-01 RX ADMIN — SODIUM CHLORIDE, PRESERVATIVE FREE 10 ML: 5 INJECTION INTRAVENOUS at 14:34

## 2020-01-01 RX ADMIN — CASTOR OIL AND BALSAM, PERU: 788; 87 OINTMENT TOPICAL at 10:24

## 2020-01-01 RX ADMIN — METRONIDAZOLE 500 MG: 500 INJECTION, SOLUTION INTRAVENOUS at 01:10

## 2020-01-01 RX ADMIN — CARBIDOPA AND LEVODOPA 1 TABLET: 25; 100 TABLET ORAL at 20:52

## 2020-01-01 RX ADMIN — SODIUM CHLORIDE, PRESERVATIVE FREE 10 ML: 5 INJECTION INTRAVENOUS at 09:28

## 2020-01-01 RX ADMIN — CARBIDOPA AND LEVODOPA 1 TABLET: 25; 100 TABLET ORAL at 13:10

## 2020-01-01 RX ADMIN — LEVOTHYROXINE SODIUM 75 MCG: 75 TABLET ORAL at 06:47

## 2020-01-01 RX ADMIN — MIDODRINE HYDROCHLORIDE 10 MG: 5 TABLET ORAL at 10:00

## 2020-01-01 RX ADMIN — PANTOPRAZOLE SODIUM 40 MG: 40 TABLET, DELAYED RELEASE ORAL at 09:39

## 2020-01-01 RX ADMIN — IPRATROPIUM BROMIDE AND ALBUTEROL SULFATE 3 ML: 2.5; .5 SOLUTION RESPIRATORY (INHALATION) at 08:31

## 2020-01-01 RX ADMIN — DICLOFENAC 2 G: 10 GEL TOPICAL at 20:07

## 2020-01-01 RX ADMIN — MEMANTINE 10 MG: 10 TABLET ORAL at 20:12

## 2020-01-01 RX ADMIN — AMIODARONE HYDROCHLORIDE 200 MG: 200 TABLET ORAL at 09:43

## 2020-01-01 RX ADMIN — MULTIPLE VITAMINS W/ MINERALS TAB 1 TABLET: TAB ORAL at 09:29

## 2020-01-01 RX ADMIN — QUETIAPINE FUMARATE 12.5 MG: 25 TABLET ORAL at 20:51

## 2020-01-01 RX ADMIN — SACUBITRIL AND VALSARTAN 0.5 TABLET: 24; 26 TABLET, FILM COATED ORAL at 08:59

## 2020-01-01 RX ADMIN — MULTIPLE VITAMINS W/ MINERALS TAB 1 TABLET: TAB ORAL at 09:35

## 2020-01-01 RX ADMIN — SIMETHICONE CHEW TAB 80 MG 80 MG: 80 TABLET ORAL at 06:23

## 2020-01-01 RX ADMIN — CLOPIDOGREL BISULFATE 75 MG: 75 TABLET ORAL at 09:14

## 2020-01-01 RX ADMIN — CLOPIDOGREL BISULFATE 75 MG: 75 TABLET ORAL at 09:39

## 2020-01-01 RX ADMIN — FINASTERIDE 5 MG: 5 TABLET, FILM COATED ORAL at 12:38

## 2020-01-01 RX ADMIN — LEVOTHYROXINE SODIUM 75 MCG: 75 TABLET ORAL at 06:14

## 2020-01-01 RX ADMIN — CARBIDOPA AND LEVODOPA 1 TABLET: 25; 100 TABLET ORAL at 08:50

## 2020-01-01 RX ADMIN — PRAVASTATIN SODIUM 20 MG: 20 TABLET ORAL at 20:52

## 2020-01-01 RX ADMIN — CASTOR OIL AND BALSAM, PERU: 788; 87 OINTMENT TOPICAL at 20:52

## 2020-01-01 RX ADMIN — PRAVASTATIN SODIUM 20 MG: 20 TABLET ORAL at 22:31

## 2020-01-01 RX ADMIN — FINASTERIDE 5 MG: 5 TABLET, FILM COATED ORAL at 09:15

## 2020-01-01 RX ADMIN — IPRATROPIUM BROMIDE AND ALBUTEROL SULFATE 3 ML: 2.5; .5 SOLUTION RESPIRATORY (INHALATION) at 16:35

## 2020-01-01 RX ADMIN — SODIUM CHLORIDE, PRESERVATIVE FREE 10 ML: 5 INJECTION INTRAVENOUS at 09:30

## 2020-01-01 RX ADMIN — ACETAMINOPHEN 650 MG: 325 TABLET, FILM COATED ORAL at 05:32

## 2020-01-01 RX ADMIN — METRONIDAZOLE 500 MG: 500 INJECTION, SOLUTION INTRAVENOUS at 06:33

## 2020-01-01 RX ADMIN — LEVOTHYROXINE SODIUM 75 MCG: 0.07 TABLET ORAL at 10:21

## 2020-01-01 RX ADMIN — PRAVASTATIN SODIUM 20 MG: 20 TABLET ORAL at 20:12

## 2020-01-01 RX ADMIN — ACETAMINOPHEN 650 MG: 325 TABLET, FILM COATED ORAL at 19:06

## 2020-01-01 RX ADMIN — MELATONIN TAB 5 MG 2.5 MG: 5 TAB at 19:06

## 2020-01-01 RX ADMIN — FINASTERIDE 5 MG: 5 TABLET, FILM COATED ORAL at 08:51

## 2020-01-01 RX ADMIN — FINASTERIDE 5 MG: 5 TABLET, FILM COATED ORAL at 09:00

## 2020-01-01 RX ADMIN — OXYCODONE HYDROCHLORIDE AND ACETAMINOPHEN 1 TABLET: 7.5; 325 TABLET ORAL at 00:39

## 2020-01-01 RX ADMIN — SACUBITRIL AND VALSARTAN 0.5 TABLET: 24; 26 TABLET, FILM COATED ORAL at 08:50

## 2020-01-01 RX ADMIN — CETIRIZINE HYDROCHLORIDE 10 MG: 10 TABLET, FILM COATED ORAL at 09:59

## 2020-01-01 RX ADMIN — HEPARIN SODIUM 5000 UNITS: 5000 INJECTION, SOLUTION INTRAVENOUS; SUBCUTANEOUS at 22:31

## 2020-01-01 RX ADMIN — MEMANTINE 10 MG: 10 TABLET ORAL at 20:52

## 2020-01-01 RX ADMIN — CLOPIDOGREL BISULFATE 75 MG: 75 TABLET ORAL at 08:22

## 2020-01-01 RX ADMIN — PRAVASTATIN SODIUM 20 MG: 20 TABLET ORAL at 21:37

## 2020-01-01 RX ADMIN — FUROSEMIDE 20 MG: 20 TABLET ORAL at 09:44

## 2020-01-01 RX ADMIN — MEMANTINE 10 MG: 10 TABLET ORAL at 21:37

## 2020-01-01 RX ADMIN — FUROSEMIDE 40 MG: 10 INJECTION, SOLUTION INTRAMUSCULAR; INTRAVENOUS at 20:52

## 2020-01-01 RX ADMIN — CETIRIZINE HYDROCHLORIDE 10 MG: 10 TABLET, FILM COATED ORAL at 08:59

## 2020-01-01 RX ADMIN — SIMETHICONE CHEW TAB 80 MG 80 MG: 80 TABLET ORAL at 12:03

## 2020-01-01 RX ADMIN — CLOPIDOGREL BISULFATE 75 MG: 75 TABLET ORAL at 09:28

## 2020-01-01 RX ADMIN — PANTOPRAZOLE SODIUM 40 MG: 40 TABLET, DELAYED RELEASE ORAL at 04:41

## 2020-01-01 RX ADMIN — CARBIDOPA AND LEVODOPA 1 TABLET: 25; 100 TABLET ORAL at 15:44

## 2020-01-01 RX ADMIN — PRAVASTATIN SODIUM 20 MG: 20 TABLET ORAL at 21:06

## 2020-01-01 RX ADMIN — MEMANTINE 10 MG: 10 TABLET ORAL at 20:07

## 2020-01-01 RX ADMIN — HEPARIN SODIUM 5000 UNITS: 5000 INJECTION, SOLUTION INTRAVENOUS; SUBCUTANEOUS at 15:13

## 2020-01-01 RX ADMIN — SACUBITRIL AND VALSARTAN 0.5 TABLET: 24; 26 TABLET, FILM COATED ORAL at 09:38

## 2020-01-01 RX ADMIN — ASPIRIN 81 MG 81 MG: 81 TABLET ORAL at 08:22

## 2020-01-01 RX ADMIN — SODIUM CHLORIDE 125 ML/HR: 9 INJECTION, SOLUTION INTRAVENOUS at 00:23

## 2020-01-01 RX ADMIN — METRONIDAZOLE 500 MG: 500 INJECTION, SOLUTION INTRAVENOUS at 10:35

## 2020-01-01 RX ADMIN — MULTIPLE VITAMINS W/ MINERALS TAB 1 TABLET: TAB ORAL at 09:02

## 2020-01-01 RX ADMIN — HYDROCODONE BITARTRATE AND ACETAMINOPHEN 1 TABLET: 5; 325 TABLET ORAL at 18:02

## 2020-01-01 RX ADMIN — SODIUM CHLORIDE 125 ML/HR: 9 INJECTION, SOLUTION INTRAVENOUS at 01:34

## 2020-01-01 RX ADMIN — CETIRIZINE HYDROCHLORIDE 10 MG: 10 TABLET, FILM COATED ORAL at 09:35

## 2020-01-01 RX ADMIN — HEPARIN SODIUM 5000 UNITS: 5000 INJECTION, SOLUTION INTRAVENOUS; SUBCUTANEOUS at 06:48

## 2020-01-01 RX ADMIN — POTASSIUM CHLORIDE 10 MEQ: 7.46 INJECTION, SOLUTION INTRAVENOUS at 02:26

## 2020-01-01 RX ADMIN — PRAVASTATIN SODIUM 20 MG: 20 TABLET ORAL at 22:44

## 2020-01-01 RX ADMIN — IPRATROPIUM BROMIDE AND ALBUTEROL SULFATE 3 ML: 2.5; .5 SOLUTION RESPIRATORY (INHALATION) at 07:38

## 2020-01-01 RX ADMIN — CARBIDOPA AND LEVODOPA 1 TABLET: 25; 100 TABLET ORAL at 16:45

## 2020-01-01 RX ADMIN — FUROSEMIDE 20 MG: 20 TABLET ORAL at 09:48

## 2020-01-01 RX ADMIN — CARBIDOPA AND LEVODOPA 1 TABLET: 25; 100 TABLET ORAL at 21:40

## 2020-01-01 RX ADMIN — METRONIDAZOLE 500 MG: 500 INJECTION, SOLUTION INTRAVENOUS at 17:25

## 2020-01-01 RX ADMIN — FINASTERIDE 5 MG: 5 TABLET, FILM COATED ORAL at 09:23

## 2020-01-01 RX ADMIN — CLOPIDOGREL BISULFATE 75 MG: 75 TABLET ORAL at 10:34

## 2020-01-01 RX ADMIN — IPRATROPIUM BROMIDE AND ALBUTEROL SULFATE 3 ML: 2.5; .5 SOLUTION RESPIRATORY (INHALATION) at 09:20

## 2020-01-01 RX ADMIN — ASPIRIN 81 MG CHEWABLE TABLET 81 MG: 81 TABLET CHEWABLE at 10:21

## 2020-01-01 RX ADMIN — CARBIDOPA AND LEVODOPA 1 TABLET: 25; 100 TABLET ORAL at 09:02

## 2020-01-01 RX ADMIN — POLYVINYL ALCOHOL, POVIDONE 2 DROP: 14; 6 SOLUTION/ DROPS OPHTHALMIC at 21:06

## 2020-01-01 RX ADMIN — AMIODARONE HYDROCHLORIDE 200 MG: 200 TABLET ORAL at 09:02

## 2020-01-01 RX ADMIN — SODIUM CHLORIDE, PRESERVATIVE FREE 10 ML: 5 INJECTION INTRAVENOUS at 21:35

## 2020-01-01 NOTE — PROGRESS NOTES
INFECTIOUS DISEASE Progress Note     Murali Dennis  4/20/1929  [id number]      Admission Date: 12/27/2019      Requesting Provider: No ref. provider found  Evaluating Physician: Ish Tijerina MD    Reason for Consultation: sepsis     History of present illness:  12/30/19: Patient is a 89 y.o. male, who presented to the ED with complaints of abdominal tenderness which began on Friday evening.   He has been having decreased appetite over the past several weeks and has lost 30 pounds unintentionally over the past year.  His family denies any fever, cough, sputum production, nausea, vomiting, dysuria prior to admission.    An abdominal CT on admission with distended gallbladder, with mild to moderate diffuse acute pancreatitis, mild intrahepatic bile duct dilatation. He underwent an MRCP with gallbladder distention, and possible mass in neck of gallbladder, with changes of pancreatitis.  Admitting labs with lipase of >3000, Scr 1.19, WBC 13, 000., lactate of 2.9,  Blood cultures now positive for Strep species.  He is currently on Vancomycin and Cefepime and we were consulted for evaluation and treatment.     He has a history of penicillin allergy which his wife indicates consisted of arm swelling after injection.  12/31/19:  He is resting quietly.  Daughter says he was confused and restless/aggitated several times during the evening.    1/1/19: Family is very concerned that PT hasn't been working with him, and he is so weak.  Still having difficulty with swallowing.  He had a bowel movement earlier this am. Was confused earlier this am per son. He remains afebrile.     Past Medical History:   Diagnosis Date   • Alzheimer disease (CMS/[facility])    • Warren esophagus     followed  with GI in [place]   • Benign prostatic hyperplasia without lower urinary tract symptoms 10/17/2018   • Chronic congestive heart failure (CMS/HCC) 12/8/2017     echo report from 8/2/17 EF 60-65%, mild TR, mild AR ([facility],  [place]) Echocardiogram 17: EF 28%, mild to moderate MR    • COPD (chronic obstructive pulmonary disease) (CMS/[facility])    • Coronary artery disease    • Dementia (CMS/[facility])    • Diarrhea 2019   • Environmental allergies 10/17/2018   • Essential hypertension 10/17/2018   • Gastroesophageal reflux disease 2018   • GERD (gastroesophageal reflux disease)    • Hypothyroid     started after amiodarone use in    • Sleep apnea     wears cpap   • Stroke (CMS/[facility])    • TIA (transient ischemic attack) 2017   • Unintended weight loss 10/17/2018       Past Surgical History:   Procedure Laterality Date   • CARDIAC CATHETERIZATION     • CARDIAC CATHETERIZATION N/A 2018    Procedure: Left Heart Cath;  Surgeon: Hollis Ugarte MD;  Location:  [facility] CATH INVASIVE [facility];  Service:    • CARPAL TUNNEL RELEASE Right    • CATARACT EXTRACTION Bilateral    • CORONARY ARTERY BYPASS GRAFT      Triple Bypass, @ [facility] @ [place], [place]   • ERCP N/A 2019    Procedure: ENDOSCOPIC RETROGRADE CHOLANGIOPANCREATOGRAPHY;  Surgeon: Arsh White MD;  Location:  [facility] ENDOSCOPY;  Service: Gastroenterology   • HEMORRHOIDECTOMY     • HERNIA REPAIR         Family History   Problem Relation Age of Onset   • Cancer Mother    • Heart disease Sister    • Diabetes Sister    • Heart disease Brother    • Cancer Brother    • Stroke Sister        Social History     Socioeconomic History   • Marital status:      Spouse name: Not on file   • Number of children: Not on file   • Years of education: Not on file   • Highest education level: Not on file   Occupational History   • Occupation: Retired   Tobacco Use   • Smoking status: Former Smoker     Last attempt to quit: 1967     Years since quittin.0   • Smokeless tobacco: Never Used   Substance and Sexual Activity   • Alcohol use: No   • Drug use: No   • Sexual activity: Defer   Social History Narrative     Caffeine use: 1 serving daily.    Patient lives at home with family.     Lives with wife, daughter close by for any needs       Allergies   Allergen Reactions   • Benzodiazepines Other (See Comments)     Paradoxical response   • Codeine Other (See Comments)     unknown   • Fluoxetine Other (See Comments)     Paradoxical response   • Lipitor [Atorvastatin] Other (See Comments)     Myalgias     • Metoclopramide Other (See Comments)     Unknown   • Nabumetone    • Penicillins Swelling and Other (See Comments)     Has tolerated cefepime 12/2019   • Prozac [Fluoxetine Hcl]    • Tramadol Other (See Comments)     unknown   • Valium [Diazepam] Other (See Comments)     Paradoxical response     • Sulfa Antibiotics Rash         Medication:    Current Facility-Administered Medications:   •  acetaminophen (TYLENOL) tablet 650 mg, 650 mg, Oral, Q6H PRN, Arsh White MD, 650 mg at 12/29/19 0544  •  amiodarone (PACERONE) tablet 200 mg, 200 mg, Oral, Daily, Arsh White MD, 200 mg at 12/31/19 0943  •  aspirin chewable tablet 81 mg, 81 mg, Oral, Daily, Arsh White MD, 81 mg at 12/31/19 0943  •  carbidopa-levodopa (SINEMET)  MG per tablet 1 tablet, 1 tablet, Oral, TID, Arsh White MD, 1 tablet at 12/31/19 2121  •  cefTRIAXone (ROCEPHIN) 2 g/100 mL 0.9% NS VTB (ANIYA), 2 g, Intravenous, Q24H, Ish Tijerina MD, 2 g at 12/31/19 1639  •  fentaNYL citrate (PF) (SUBLIMAZE) injection 50 mcg, 50 mcg, Intravenous, Q5 Min PRN, Isak Dudley CRNA  •  finasteride (PROSCAR) tablet 5 mg, 5 mg, Oral, Daily, Arsh White MD, 5 mg at 12/31/19 1304  •  ipratropium-albuterol (DUO-NEB) nebulizer solution 3 mL, 3 mL, Nebulization, Q4H PRN, Arsh White MD, 3 mL at 12/30/19 0345  •  lactated ringers bolus 500 mL, 500 mL, Intravenous, Once PRN, Isak Dudley CRNA  •  levothyroxine (SYNTHROID, LEVOTHROID) tablet 75 mcg, 75 mcg, Oral, Q AM,  Nesha Rey MD, 75 mcg at 01/01/20 0633  •  metroNIDAZOLE (FLAGYL) 500 mg/100mL IVPB, 500 mg, Intravenous, Q8H, Arsh White MD, 500 mg at 01/01/20 0633  •  midodrine (PROAMATINE) tablet 10 mg, 10 mg, Oral, TID AC, Arsh White MD, 10 mg at 01/01/20 0633  •  Morphine sulfate (PF) injection 2 mg, 2 mg, Intravenous, Q2H PRN, Arsh White MD, 2 mg at 12/31/19 1304  •  multivitamin with minerals 1 tablet, 1 tablet, Oral, Daily, Arsh White MD, 1 tablet at 12/31/19 1029  •  ondansetron (ZOFRAN) tablet 4 mg, 4 mg, Oral, Q6H PRN **OR** ondansetron (ZOFRAN) injection 4 mg, 4 mg, Intravenous, Q6H PRN, Arsh White MD  •  oxyCODONE-acetaminophen (PERCOCET) 7.5-325 MG per tablet 1 tablet, 1 tablet, Oral, Q4H PRN, Arsh White MD, 1 tablet at 12/31/19 2121  •  sodium chloride 0.9 % flush 10 mL, 10 mL, Intravenous, PRN, Arsh White MD, 10 mL at 12/31/19 2122  •  [COMPLETED] Insert peripheral IV, , , Once **AND** sodium chloride 0.9 % flush 10 mL, 10 mL, Intravenous, PRN, Arsh White MD    Antibiotics:  Anti-Infectives (From admission, onward)    Ordered     Dose/Rate Route Frequency Start Stop    12/31/19 1504  cefTRIAXone (ROCEPHIN) 2 g/100 mL 0.9% NS VTB (ANIYA)  Review   Ordering Provider:  Ish Tijerina MD    2 g  over 30 Minutes Intravenous Every 24 Hours 12/31/19 1600 01/07/20 1559    12/29/19 0632  vancomycin 1500 mg/500 mL 0.9% NS IVPB ([facility])     Ordering Provider:  Colin Pyle, RPH    20 mg/kg × 78.4 kg  over 90 Minutes Intravenous Once 12/29/19 0730 12/29/19 0948    12/28/19 1226  metroNIDAZOLE (FLAGYL) 500 mg/100mL IVPB  Review   Ordering Provider:  Arsh White MD    500 mg  over 60 Minutes Intravenous Every 8 Hours 12/28/19 1400 01/04/20 1359    12/28/19 1226  cefepime (MAXIPIME) 2 g/100 mL 0.9% NS ([facility])     Ordering Provider:  Perla Melgar DO     2 g  200 mL/hr over 30 Minutes Intravenous Once 19 1315 19 1358            Review of Systems:  No reliable ROS from patient       Physical Exam:   Vital Signs  Temp (24hrs), Av °F (36.1 °C), Min:96.5 °F (35.8 °C), Max:97.8 °F (36.6 °C)    Temp  Min: 96.5 °F (35.8 °C)  Max: 97.8 °F (36.6 °C)  BP  Min: 108/62  Max: 135/65  Pulse  Min: 54  Max: 61  Resp  Min: 14  Max: 18  SpO2  Min: 94 %  Max: 96 %    GENERAL: Drowsy, in no acute distress.   HEENT: Normocephalic, atraumatic.  PERRL. EOMI. No conjunctival injection. No icterus. Oropharynx clear without evidence of thrush or exudate. No evidence of peridontal disease.    HEART: RRR; + 2/6murmur, rubs, gallops.   LUNGS: Clear to auscultation bilaterally without wheezing, rales, rhonchi. Normal respiratory effort. Nonlabored. No dullness.  ABDOMEN: Soft,  Decreased tenderness  nondistended. Positive bowel sounds. No rebound or guarding.   EXT:  No cyanosis, clubbing or edema. No cord.  :  Najera catheter.  MSK:  No joint effusions   SKIN: Warm and dry without cutaneous eruptions on Inspection/palpation.    NEURO: drowsy       Laboratory Data    Results from last 7 days   Lab Units 19  0616 19  0849 19  0657   WBC 10*3/mm3 8.70 13.29* 15.49*   HEMOGLOBIN g/dL 11.2* 11.0* 11.1*   HEMATOCRIT % 34.7* 34.5* 35.0*   PLATELETS 10*3/mm3 106* 99* 113*     Results from last 7 days   Lab Units 19  0616   SODIUM mmol/L 139   POTASSIUM mmol/L 3.8   CHLORIDE mmol/L 107   CO2 mmol/L 19.0*   BUN mg/dL 60*   CREATININE mg/dL 2.17*   GLUCOSE mg/dL 176*   CALCIUM mg/dL 8.6     Results from last 7 days   Lab Units 19  0616   ALK PHOS U/L 238*   BILIRUBIN mg/dL 1.3*   ALT (SGPT) U/L 33   AST (SGOT) U/L 36             Results from last 7 days   Lab Units 19  0657   LACTATE mmol/L 1.3             Estimated Creatinine Clearance: 24.7 mL/min (A) (by C-G formula based on SCr of 2.17 mg/dL (H)).      Microbiology:  Blood Culture   Date Value Ref  Range Status   12/28/2019 Gram Positive Cocci (A)  Preliminary   12/28/2019 Gram Positive Cocci (A)  Preliminary     BCID, PCR   Date Value Ref Range Status   12/28/2019 (C) No organism detected by BCID PCR. Final    Streptococcus spp, not A, B, or pneumoniae. Identification by BCID PCR.     BC Strep gallolyticus, ssp pasterurianus         Radiology:  Imaging Results (Last 72 Hours)     Procedure Component Value Units Date/Time    FL ERCP pancreatic and biliary ducts [[id number]] Collected:  12/30/19 1313     Updated:  12/30/19 1741    Narrative:       EXAMINATION: RETROGRADE CHOLANGIOGRAM     HISTORY: Right upper quadrant pain.     FINDINGS: Two minutes 10 seconds fluoroscopic time was utilized to  assist in this procedure. Three images are displayed. There is no  evidence of choledocholithiasis.       Impression:       There is no choledocholithiasis.     D:  12/30/2019  E:  12/30/2019     This report was finalized on 12/30/2019 5:38 PM by Dr. Bismark Goff MD.       XR Chest 1 View [[id number]] Collected:  12/29/19 0812     Updated:  12/29/19 1906    Narrative:          EXAMINATION: XR CHEST 1 VW - 12/29/2019     INDICATION: K85.90-Acute pancreatitis without necrosis or infection,  unspecified; N17.9-Acute kidney failure, unspecified      COMPARISON: 12/28/2019     FINDINGS: Cardiomegaly status post medial sternotomy and CABG with mild  central vascular congestion. Bibasilar opacifications of  likely  atelectasis increased from prior comparison along with trace bilateral  pleural effusions.           Impression:       Increasing bibasilar opacifications along with persistent  trace bilateral pleural effusions.     DICTATED:   12/29/2019  EDITED/ls :   12/29/2019      This report was finalized on 12/29/2019 7:03 PM by Dr. Lauri Montelongo.       XR Chest 1 View [[id number]] Collected:  12/28/19 1743     Updated:  12/29/19 1416    Narrative:          EXAMINATION: XR CHEST 1 VW - 12/28/2019     INDICATION: K85.90-Acute  pancreatitis without necrosis or infection,  unspecified; N17.9-Acute kidney failure, unspecified      COMPARISON: Chest x-ray 02/24/2019     FINDINGS: Cardiac size enlarged with increased bony vascularity however  no pleural effusion. Asymmetric elevation the right diaphragm again  noted. No pneumothorax.           Impression:       Cardiomegaly with mild increase in pulmonary vascularity  however no pleural effusions.     DICTATED:   12/28/2019  EDITED/ls :   12/28/2019      This report was finalized on 12/29/2019 2:13 PM by Dr. Lauri Montelongo.               Impression:   1.  Severe sepsis- with leukocytosis, lactic acidosis, elevated pro calcitonin  acute kidney injury, known focus of infection   2.  Cholangitis- with associated streptococcal bacteremia and secondary to choledocholithiasis, s/p sphincterotomy  3.  Streptococcal bacteremia-  This is secondary to cholangitis.    4.  Acute pancreatitis  5.  Dementia   6.  Acute kidney injury/ATN-superimposed on chronic kidney disease, stage 3,   7.  Cardiomyopathy, EF 30%   8.  Leukocytosis/neutrophilia-improving          PLAN/RECOMMENDATIONS:  1.  Ceftriaxone 2 g IV daily  2.  Continue intravenous metronidazole  3.  Remove manriquez     Dr. Tijerina has obtained the history, performed the physical exam and formulated the above treatment plan.      We will tentatively plan to give him intravenous antibiotic therapy until Friday.    Ish Tijerina MD  1/1/2020  7:38 AM

## 2020-01-01 NOTE — PLAN OF CARE
"  Problem: Patient Care Overview  Goal: Plan of Care Review  Outcome: Ongoing (interventions implemented as appropriate)  Flowsheets  Taken 1/1/2020 1115  Plan of Care Reviewed With: patient  Taken 1/1/2020 1053  Outcome Summary: PT PRESENTS WITH EVOLVING SYMPTOMS TO INCLUDE INCREASED CONFUSION, GENERALIZED WEAKNESS AND DECLINE IN FUNCTIONAL MOBILITY. PT DOES NOT APPEAR TO FEEL WELL AND WAS BELCHING THROUGHOUT TREATMENT. PT MILDLY ANXIOUS AND PERSEVERATING ON \"WHAT IS GOING ON\". PT FOLLOWED COMMANDS 25%. ANTICIPATE ABILITY TO STAND WITH ASSIST ONCE MENTAL STATUS IMPROVES. RECOMMEND SNF AT D/C.      "

## 2020-01-01 NOTE — PROGRESS NOTES
"Patient Name:  Murali Dennis  YOB: 1929  [id number]    Surgery Progress Note    Date of visit: 1/1/2020    Subjective   Subjective: Reports that he is having trouble swallowing. Overall feels OK, but weak.         Objective     Objective:     /72 (BP Location: Right arm, Patient Position: Sitting)   Pulse 70   Temp 97.8 °F (36.6 °C) (Axillary)   Resp 24   Ht 177.8 cm (70\")   Wt 77.1 kg (170 lb)   SpO2 95%   BMI 24.39 kg/m²     Intake/Output Summary (Last 24 hours) at 1/1/2020 0914  Last data filed at 1/1/2020 0633  Gross per 24 hour   Intake 300 ml   Output 925 ml   Net -625 ml       CV:  Rhythm  regular and rate regular   L:  Clear  to auscultation bilaterally   Abd:  Bowel sounds positive , soft, nontender.  Ext:  No cyanosis, clubbing, edema    Recent labs that are back at this time have been reviewed.        Assessment/Plan     Assessment/ Plan:    Hospital Problem List     * (Principal) Pancreatitis - Discussed case with Family and Dr. Tijerina. Agree with continuing antibiotics for the time being given severity of sepsis.  His family seems agreeable to continuing with current medical therapy, with outpatient follow-up to determine if cholecystectomy is reasonable in the future.  No need for urgent cholecystectomy, and both the patient and the family are wary of proceeding at this time anyway.  Continue with dysphagia eval.      Dementia (CMS/HCC)        Hypothyroid    Coronary artery disease involving native coronary artery of native heart without angina pectoris        A-fib (CMS/HCC)    Overview Signed 12/29/2017 11:55 AM by Emely Box APRN     · Postoperative 2003         Benign prostatic hyperplasia without lower urinary tract symptoms    Type 2 diabetes mellitus without complication, without long-term current use of insulin (CMS/HCC)        Gastroesophageal reflux disease    Parkinson's disease (CMS/HCC)    ANGELA on CPAP    Cardiomyopathy (EF 30%)        Mixed " hyperlipidemia        Sepsis (CMS/HCC)    Bacteremia due to Streptococcus    Acute renal failure superimposed on stage 3 chronic kidney disease (CMS/HCC)        Acute cholecystitis    Acute pancreatitis    Overview Signed 12/29/2019  7:13 PM by Ish Mercado MD     Added automatically from request for surgery [id number]                   Agsuto Jack MD  1/1/2020  9:14 AM

## 2020-01-01 NOTE — PLAN OF CARE
Problem: Patient Care Overview  Goal: Plan of Care Review  Outcome: Ongoing (interventions implemented as appropriate)  Flowsheets (Taken 1/1/2020 5808)  Plan of Care Reviewed With: spouse  Note:   SLP caregiver instruction completed with pt's wife. SLP will check back tomorrow for ? Any further needs/diet adjustments to assist towards comfort. Please see note for further details and recommendations.

## 2020-01-01 NOTE — THERAPY EVALUATION
Patient Name: Murali Dennis  : 1929    MRN: [id number]                              Today's Date: 2020       Admit Date: 2019    Visit Dx:     ICD-10-CM ICD-9-CM   1. Acute pancreatitis, unspecified complication status, unspecified pancreatitis type K85.90 577.0   2. HELIO (acute kidney injury) (CMS/[facility], located [facility]) N17.9 584.9   3. Dysphagia, unspecified type R13.10 787.20     Patient Active Problem List   Diagnosis   • Dementia (CMS/[facility])   • Hypothyroid   • Stage 3 chronic kidney disease (CMS/[facility])   • Coronary artery disease involving native coronary artery of native heart without angina pectoris   • A-fib (CMS/[facility])   • Benign prostatic hyperplasia without lower urinary tract symptoms   • Type 2 diabetes mellitus without complication, without long-term current use of insulin (CMS/[facility])   • Gastroesophageal reflux disease   • Ventricular tachycardia (CMS/[facility])   • Parkinson's disease (CMS/[facility])   • ANGELA on CPAP   • Cardiomyopathy (EF 30%)   • Mixed hyperlipidemia   • Pancreatitis   • Sepsis (CMS/[facility])   • Bacteremia due to Streptococcus   • Acute renal failure superimposed on stage 3 chronic kidney disease (CMS/[facility])   • Acute cholecystitis   • Acute pancreatitis     Past Medical History:   Diagnosis Date   • Alzheimer disease (CMS/[facility])    • Warren esophagus     followed  with GI in [place]   • Benign prostatic hyperplasia without lower urinary tract symptoms 10/17/2018   • Chronic congestive heart failure (CMS/[facility]) 2017     echo report from 17 EF 60-65%, mild TR, mild AR ([place], [place]) Echocardiogram 17: EF 28%, mild to moderate MR    • COPD (chronic obstructive pulmonary disease) (CMS/[facility])    • Coronary artery disease    • Dementia (CMS/[facility])    • Diarrhea 2019   • Environmental allergies 10/17/2018   • Essential hypertension 10/17/2018   • Gastroesophageal reflux disease 2018   • GERD (gastroesophageal reflux disease)    • Hypothyroid     started after  amiodarone use in 2003   • Sleep apnea     wears cpap   • Stroke (CMS/HCC) 2003   • TIA (transient ischemic attack) 12/29/2017   • Unintended weight loss 10/17/2018     Past Surgical History:   Procedure Laterality Date   • CARDIAC CATHETERIZATION  2003   • CARDIAC CATHETERIZATION N/A 1/19/2018    Procedure: Left Heart Cath;  Surgeon: Hollis Ugarte MD;  Location:  [facility];  Service:    • CARPAL TUNNEL RELEASE Right    • CATARACT EXTRACTION Bilateral    • CORONARY ARTERY BYPASS GRAFT  2003    Triple Bypass, @ [facility] @ [place], [place]   • ERCP N/A 12/30/2019    Procedure: ENDOSCOPIC RETROGRADE CHOLANGIOPANCREATOGRAPHY;  Surgeon: Arsh White MD;  Location:  [facility];  Service: Gastroenterology   • HEMORRHOIDECTOMY     • HERNIA REPAIR       General Information     Row Name 01/01/20 1046          PT Evaluation Time/Intention    Document Type  evaluation  -CD     Mode of Treatment  physical therapy  -CD     Row Name 01/01/20 1046          General Information    Patient Profile Reviewed?  yes  -CD     Prior Level of Function  independent:;gait;transfer;bed mobility;dressing;min assist:;bathing;dependent:;driving  -CD     Existing Precautions/Restrictions  fall [employer] CATHETER, [employer], PD, CONFUSION.   -CD     Barriers to Rehab  medically complex;cognitive status  -CD     Row Name 01/01/20 1046          Relationship/Environment    Lives With  spouse  -CD     Row Name 01/01/20 1046          Resource/Environmental Concerns    Current Living Arrangements  home/apartment/condo  -CD     Row Name 01/01/20 1046          Home Main Entrance    Number of Stairs, Main Entrance  two  -CD     Stair Railings, Main Entrance  railings safe and in good condition  -CD     Row Name 01/01/20 1046          Stairs Within Home, Primary    Number of Stairs, Within Home, Primary  none  -CD     Row Name 01/01/20 1046          Cognitive Assessment/Intervention- PT/OT     "Orientation Status (Cognition)  oriented to;person;disoriented to;place;situation;time  -CD     Cognitive Assessment/Intervention Comment  PT CONFUSED AND MILDLY ANXIOUS.  DOES NOT INITIATE CONVERSATION  EXCEPT TO ASK \"WHAT IS GOING ON.\"   -CD     Row Name 01/01/20 1046          Safety Issues, Functional Mobility    Safety Issues Affecting Function (Mobility)  ability to follow commands;awareness of need for assistance;insight into deficits/self awareness;problem solving;safety precaution awareness;safety precautions follow-through/compliance  -CD     Impairments Affecting Function (Mobility)  endurance/activity tolerance;strength;cognition;balance  -CD       User Key  (r) = Recorded By, (t) = Taken By, (c) = Cosigned By    Initials Name Provider Type    CD Esthela Moore PT Physical Therapist        Mobility     Row Name 01/01/20 1051          Bed Mobility Assessment/Treatment    Bed Mobility Assessment/Treatment  rolling left;rolling right  -CD     Rolling Left [place] (Bed Mobility)  dependent (less than 25% patient effort);2 person assist  -CD     Rolling Right [place] (Bed Mobility)  dependent (less than 25% patient effort);2 person assist  -CD     Assistive Device (Bed Mobility)  bed rails;draw sheet  -CD     Comment (Bed Mobility)  ROLLED L/R FOR PLACEMENT OF MECHANICAL LIFT SLING.   -CD     Row Name 01/01/20 1051          Transfer Assessment/Treatment    Comment (Transfers)  DEFERRED DUE TO CONFUSION/WEAKNESS.   -CD     Row Name 01/01/20 1051          Bed-Chair Transfer    Bed-Chair [place] (Transfers)  dependent (less than 25% patient effort);2 person assist  -CD     Assistive Device (Bed-Chair Transfers)  mechanical lift/aid  -CD       User Key  (r) = Recorded By, (t) = Taken By, (c) = Cosigned By    Initials Name Provider Type    CD Esthela Moore PT Physical Therapist        Obj/Interventions     Row Name 01/01/20 1052          General ROM    GENERAL ROM COMMENTS  B OUMAR DUNN'S   -CD  "    Row Name 01/01/20 1052          MMT (Manual Muscle Testing)    General MMT Comments  B LE GROSSLY 3-4/5. PT ABLE TO PERFORM B LAQ BUT DID NOT FOLLOW COMMANDS FOR MMT.   -CD     Row Name 01/01/20 1052          Therapeutic Exercise    Lower Extremity (Therapeutic Exercise)  LAQ (long arc quad), bilateral;marching while seated;heel slides, bilateral  -CD     Exercise Type (Therapeutic Exercise)  AAROM (active assistive range of motion)  -CD     Sets/Reps (Therapeutic Exercise)  1 SET OF 5-10 REPS EACH.   -CD       User Key  (r) = Recorded By, (t) = Taken By, (c) = Cosigned By    Initials Name Provider Type    CD Esthela Moore, PT Physical Therapist        Goals/Plan     Row Name 01/01/20 1102          Bed Mobility Goal 1 (PT)    Activity/Assistive Device (Bed Mobility Goal 1, PT)  sit to supine/supine to sit  -CD     [place] Level/Cues Needed (Bed Mobility Goal 1, PT)  minimum assist (75% or more patient effort)  -CD     Time Frame (Bed Mobility Goal 1, PT)  long term goal (LTG);2 weeks  -CD     Row Name 01/01/20 1102          Transfer Goal 1 (PT)    Activity/Assistive Device (Transfer Goal 1, PT)  bed-to-chair/chair-to-bed;sit-to-stand/stand-to-sit;walker, rolling  -CD     [place] Level/Cues Needed (Transfer Goal 1, PT)  minimum assist (75% or more patient effort);2 person assist  -CD     Time Frame (Transfer Goal 1, PT)  long term goal (LTG);2 weeks  -CD     Row Name 01/01/20 1102          Gait Training Goal 1 (PT)    Activity/Assistive Device (Gait Training Goal 1, PT)  gait (walking locomotion);walker, rolling  -CD     [place] Level (Gait Training Goal 1, PT)  2 person assist;minimum assist (75% or more patient effort)  -CD     Time Frame (Gait Training Goal 1, PT)  long term goal (LTG);2 weeks  -CD       User Key  (r) = Recorded By, (t) = Taken By, (c) = Cosigned By    Initials Name Provider Type    CD Esthela Moore, PT Physical Therapist        Clinical Impression     Row Name 01/01/20 1054     "      Pain Assessment    Additional Documentation  Pain Scale: FACES Pre/Post-Treatment (Group)  -CD     Row Name 01/01/20 1053          Pain Scale: Numbers Pre/Post-Treatment    Pain Location - Orientation  generalized DOES NOT APPEAR TO FEEL WELL, BELCHING THROUGHOUT TREATMENT.   -CD     Pain Intervention(s)  Ambulation/increased activity;Repositioned  -CD     Row Name 01/01/20 1053          Pain Scale: FACES Pre/Post-Treatment    Pain: FACES Scale, Pretreatment  2-->hurts little bit  -CD     Pain: FACES Scale, Post-Treatment  2-->hurts little bit  -CD     Pre/Post Treatment Pain Comment  TOLERATED UP TO CHAIR WITH MECHANICAL LIFT.   -CD     Row Name 01/01/20 1053          Plan of Care Review    Plan of Care Reviewed With  patient;family  -CD     Outcome Summary  PT PRESENTS WITH EVOLVING SYMPTOMS TO INCLUDE INCREASED CONFUSION, GENERALIZED WEAKNESS AND DECLINE IN FUNCTIONAL MOBILITY. PT DOES NOT APPEAR TO FEEL WELL AND WAS BELCHING THROUGHOUT TREATMENT. PT MILDLY ANXIOUS AND PERSEVERATING ON \"WHAT IS GOING ON\". PT FOLLOWED COMMANDS 25%. ANTICIPATE ABILITY TO STAND WITH ASSIST ONCE MENTAL STATUS IMPROVES. RECOMMEND SNF AT D/C.   -CD     Row Name 01/01/20 1053          Physical Therapy Clinical Impression    Patient/Family Goals Statement (PT Clinical Impression)  FAMILY HOPING PT CAN GO TO [facility] AT D/C AS HE HAS BEEN THERE BEFORE.   -CD     Criteria for Skilled Interventions Met (PT Clinical Impression)  yes  -CD     Rehab Potential (PT Clinical Summary)  good, to achieve stated therapy goals  -CD     Predicted Duration of Therapy (PT)  2 WEEKS.   -CD     Row Name 01/01/20 1053          Vital Signs    Pre Systolic BP Rehab  136  -CD     Pre Treatment Diastolic BP  72  -CD     Post Systolic BP Rehab  152  -CD     Post Treatment Diastolic BP  99  -CD     Pretreatment Heart Rate (beats/min)  61  -CD     Posttreatment Heart Rate (beats/min)  62  -CD     Pre SpO2 (%)  95  -CD     O2 Delivery Pre Treatment  room air  -CD  "    O2 Delivery Intra Treatment  room air  -CD     Post SpO2 (%)  94  -CD     O2 Delivery Post Treatment  room air  -CD     Pre Patient Position  Supine  -CD     Intra Patient Position  Sitting  -CD     Post Patient Position  Sitting  -CD     Row Name 01/01/20 1053          Positioning and Restraints    Pre-Treatment Position  in bed  -CD     Post Treatment Position  chair  -CD     In Chair  reclined;call light within reach;encouraged to call for assist;exit alarm on;notified nsg;with family/caregiver;legs elevated;RUE elevated;LUE elevated;waffle cushion NSG TO SWITCH OUT BED WHILE PT IS UIC. RECOMMEND CONTINUED USE OF MECHANICAL LIFT BED<>CHAIR UNTIL MENTAL STATUS IMPROVES AND PT ABLE TO ACTIVELY PARTICIPATE MORE.   -CD       User Key  (r) = Recorded By, (t) = Taken By, (c) = Cosigned By    Initials Name Provider Type    Esthela Benito, PT Physical Therapist        Outcome Measures     Row Name 01/01/20 1103          How much help from another person do you currently need...    Turning from your back to your side while in flat bed without using bedrails?  1  -CD     Moving from lying on back to sitting on the side of a flat bed without bedrails?  1  -CD     Moving to and from a bed to a chair (including a wheelchair)?  1  -CD     Standing up from a chair using your arms (e.g., wheelchair, bedside chair)?  2  -CD     Climbing 3-5 steps with a railing?  1  -CD     To walk in hospital room?  1  -CD     AM-PAC 6 Clicks Score (PT)  7  -CD     Row Name 01/01/20 1103          Functional Assessment    Outcome Measure Options  AM-PAC 6 Clicks Basic Mobility (PT)  -CD       User Key  (r) = Recorded By, (t) = Taken By, (c) = Cosigned By    Initials Name Provider Type    Esthela Benito, PT Physical Therapist          PT Recommendation and Plan  Planned Therapy Interventions (PT Eval): balance training, bed mobility training, strengthening, transfer training, patient/family education, home exercise program, gait  "training  Outcome Summary/Treatment Plan (PT)  Anticipated Discharge Disposition (PT): skilled nursing facility  Plan of Care Reviewed With: patient  Outcome Summary: PT PRESENTS WITH EVOLVING SYMPTOMS TO INCLUDE INCREASED CONFUSION, GENERALIZED WEAKNESS AND DECLINE IN FUNCTIONAL MOBILITY. PT DOES NOT APPEAR TO FEEL WELL AND WAS BELCHING THROUGHOUT TREATMENT. PT MILDLY ANXIOUS AND PERSEVERATING ON \"WHAT IS GOING ON\". PT FOLLOWED COMMANDS 25%. ANTICIPATE ABILITY TO STAND WITH ASSIST ONCE MENTAL STATUS IMPROVES. RECOMMEND SNF AT D/C.      Time Calculation:   PT Charges     Row Name 01/01/20 1119             Time Calculation    Start Time  0907  -CD      PT Received On  01/01/20  -CD      PT Goal Re-Cert Due Date  01/11/20  -CD         Time Calculation- PT    Total Timed Code Minutes- PT  15 minute(s)  -CD         Timed Charges    21295 - PT Therapeutic Exercise Minutes  7  -CD      52681 - PT Therapeutic Activity Minutes  8  -CD        User Key  (r) = Recorded By, (t) = Taken By, (c) = Cosigned By    Initials Name Provider Type    CD Esthela Moore, PT Physical Therapist        Therapy Charges for Today     Code Description Service Date Service Provider Modifiers Qty    66558132847 HC PT THERAPEUTIC ACT EA 15 MIN 1/1/2020 sEthela Moore, PT GP 1    77811791723 HC PT THER SUPP EA 15 MIN 1/1/2020 Esthela Moore, PT GP 2          PT G-Codes  Outcome Measure Options: AM-PAC 6 Clicks Basic Mobility (PT)  AM-PAC 6 Clicks Score (PT): 7    Esthela Moore PT  1/1/2020       "

## 2020-01-01 NOTE — PROGRESS NOTES
Clinical Nutrition   Nutrition Assessment  Reason for Visit:   Follow-up protocol    Patient Name: Murali Dennis  YOB: 1929  MRN: [id number]  Date of Encounter: 01/01/20 12:15 PM  Admission date: 12/27/2019    Nutrition Assessment   Assessment     Hospital Problem List    Pancreatitis    Dementia (CMS/[facility])    Hypothyroid    Coronary artery disease involving native coronary artery of native heart without angina pectoris    A-fib (CMS/[facility])    Benign prostatic hyperplasia without lower urinary tract symptoms    Type 2 diabetes mellitus without complication, without long-term current use of insulin (CMS/[facility])    Gastroesophageal reflux disease    Parkinson's disease (CMS/[facility])    ANGELA on CPAP    Cardiomyopathy (EF 30%)    Mixed hyperlipidemia    Sepsis (CMS/[facility])    Bacteremia due to Streptococcus    Acute renal failure superimposed on stage 3 chronic kidney disease (CMS/[facility])    Acute cholecystitis    Acute pancreatitis    Additional applicable diagnosis/conditions/procedures this adm:  (12/30) s/p ERCP- unremarkable UGI tract     Applicable PMH/PSxH:   PMH: He  has a past medical history of Alzheimer disease (CMS/[facility]), Warren esophagus, Benign prostatic hyperplasia without lower urinary tract symptoms (10/17/2018), Chronic congestive heart failure (CMS/[facility]) (12/8/2017), COPD (chronic obstructive pulmonary disease) (CMS/[facility]), Coronary artery disease (2003), Dementia (CMS/[facility]) (2003), Diarrhea (2/24/2019), Environmental allergies (10/17/2018), Essential hypertension (10/17/2018), Gastroesophageal reflux disease (11/19/2018), GERD (gastroesophageal reflux disease), Hypothyroid, Sleep apnea, Stroke (CMS/[facility]) (2003), TIA (transient ischemic attack) (12/29/2017), and Unintended weight loss (10/17/2018).   PSxH: He  has a past surgical history that includes Hernia repair; Carpal tunnel release (Right); Hemorrhoid surgery; Coronary artery bypass graft (2003); Cardiac catheterization (2003);  "Cataract extraction (Bilateral); Cardiac catheterization (N/A, 1/19/2018); and ERCP (N/A, 12/30/2019).     Reported/Observed/Food/Nutrition Related History:     Pt upgraded to regular diet today (comfort diet), was previously been receiving clears and tolerating appropriately. Pt is agitated with increased confusion today.     Anthropometrics     Height: 177.8 cm (70\")  Last filed wt: Weight: 77.1 kg (170 lb) (12/30/19 0439)  Weight Method: Bed scale    BMI: BMI (Calculated): 23  Normal: 18.5-24.9kg/m2    Ideal Body Weight (IBW) (kg): 76.48    Labs reviewed   Yes   Results from last 7 days   Lab Units 12/31/19  0616 12/30/19  0849 12/29/19  0657 12/28/19  1804   GLUCOSE mg/dL 176* 167* 148* 162*   BUN mg/dL 60* 57* 43* 34*   CREATININE mg/dL 2.17* 2.47* 2.48* 2.14*   SODIUM mmol/L 139 140 140 142   CHLORIDE mmol/L 107 109* 107 106   POTASSIUM mmol/L 3.8 4.4 4.5 4.2   MAGNESIUM mg/dL  --   --  1.9 1.8   ALT (SGPT) U/L 33 76* 66*  --      Results from last 7 days   Lab Units 12/31/19  0616 12/30/19  0849 12/29/19  0657   ALBUMIN g/dL 2.50* 2.80* 3.10*   IONIZED CALCIUM mmol/L 1.31  --   --      Results from last 7 days   Lab Units 01/01/20  0125 12/30/19  2238 12/30/19  0641 12/28/19  1650   GLUCOSE mg/dL 130 144* 145* 150*     Lab Results   Lab Value Date/Time    HGBA1C 6.40 (H) 12/29/2019 0657    HGBA1C 5.9 05/08/2019 1027    HGBA1C 6.0 02/08/2019 1018    HGBA1C 6.90 (H) 10/25/2018 0934     Medications reviewed   Pertinent: flagyl, MVI, rocephin  PRN: zofran, percocet    Current Nutrition Prescription     PO: Diet Regular    Oral Nutrition Supplement: Boost Breeze TID     Intake: Insufficient data     Nutrition Diagnosis     1/1  Problem Inadequate oral intake   Etiology Pancreatitis    Signs/Symptoms Pt has been NPO/CLD x 4 days; diet adv 1/1 to regular diet      Nutrition Intervention   1.  Follow treatment progress, Care plan reviewed  2. Adjusted supplement: Ensure HP TID     Goal:   General: Nutrition support " treatment  PO: Tolerate PO, Increase intake    Monitoring/Evaluation:   Per protocol, PO intake, Supplement intake, GI status, Symptoms, POC/GOC, Swallow function    Will Continue to follow per protocol    Daisy Walters RD  Time Spent: 20 minutes

## 2020-01-01 NOTE — THERAPY EVALUATION
Acute Care - Speech Language Pathology   Swallow Re-Evaluation [facility]     Patient Name: Murali Dennis  : 1929  MRN: [id number]  Today's Date: 2020               Admit Date: 2019    Visit Dx:     ICD-10-CM ICD-9-CM   1. Acute pancreatitis, unspecified complication status, unspecified pancreatitis type K85.90 577.0   2. HELIO (acute kidney injury) (CMS/[facility]) N17.9 584.9   3. Dysphagia, unspecified type R13.10 787.20     Patient Active Problem List   Diagnosis   • Dementia (CMS/[facility])   • Hypothyroid   • Stage 3 chronic kidney disease (CMS/[facility])   • Coronary artery disease involving native coronary artery of native heart without angina pectoris   • A-fib (CMS/[facility])   • Benign prostatic hyperplasia without lower urinary tract symptoms   • Type 2 diabetes mellitus without complication, without long-term current use of insulin (CMS/[facility])   • Gastroesophageal reflux disease   • Ventricular tachycardia (CMS/[facility])   • Parkinson's disease (CMS/[facility])   • ANGELA on CPAP   • Cardiomyopathy (EF 30%)   • Mixed hyperlipidemia   • Pancreatitis   • Sepsis (CMS/[facility])   • Bacteremia due to Streptococcus   • Acute renal failure superimposed on stage 3 chronic kidney disease (CMS/[facility])   • Acute cholecystitis   • Acute pancreatitis     Past Medical History:   Diagnosis Date   • Alzheimer disease (CMS/[facility])    • Warren esophagus     followed  with GI in [place]   • Benign prostatic hyperplasia without lower urinary tract symptoms 10/17/2018   • Chronic congestive heart failure (CMS/[facility]) 2017     echo report from 17 EF 60-65%, mild TR, mild AR ([place], [place]) Echocardiogram 17: EF 28%, mild to moderate MR    • COPD (chronic obstructive pulmonary disease) (CMS/[facility])    • Coronary artery disease    • Dementia (CMS/[facility])    • Diarrhea 2019   • Environmental allergies 10/17/2018   • Essential hypertension 10/17/2018   • Gastroesophageal reflux disease 2018   • GERD  (gastroesophageal reflux disease)    • Hypothyroid     started after amiodarone use in 2003   • Sleep apnea     wears cpap   • Stroke (CMS/HCC) 2003   • TIA (transient ischemic attack) 12/29/2017   • Unintended weight loss 10/17/2018     Past Surgical History:   Procedure Laterality Date   • CARDIAC CATHETERIZATION  2003   • CARDIAC CATHETERIZATION N/A 1/19/2018    Procedure: Left Heart Cath;  Surgeon: Hollis Ugarte MD;  Location:  [facility];  Service:    • CARPAL TUNNEL RELEASE Right    • CATARACT EXTRACTION Bilateral    • CORONARY ARTERY BYPASS GRAFT  2003    Triple Bypass, @ [facility] @ [place], [place]   • ERCP N/A 12/30/2019    Procedure: ENDOSCOPIC RETROGRADE CHOLANGIOPANCREATOGRAPHY;  Surgeon: Arsh White MD;  Location:  [facility];  Service: Gastroenterology   • HEMORRHOIDECTOMY     • HERNIA REPAIR          SWALLOW EVALUATION (last 72 hours)      SLP Adult Swallow Evaluation     Row Name 01/01/20 1430 12/31/19 1045 12/30/19 1415             Rehab Evaluation    Document Type  re-evaluation  -SM  re-evaluation  -MP  evaluation  -RD      Subjective Information  --  no complaints  -MP  complains of;weakness  -RD      Patient Observations  -- currently agitated  -SM  alert;cooperative  -MP  lethargic  -RD      Patient/Family Observations  POC discussion and education with wife  -SM  Spouse & family present  -MP  Spouse and family present  -RD      Patient Effort  --  good  -MP  adequate  -RD         General Information    Patient Profile Reviewed  --  yes  -MP  yes  -RD      Pertinent History Of Current Problem  RN reconsulted as family wishes for comfort PO diet, wishes for SLP to assist.   -SM  Adm w/ pancreatitis s/p ERCP 12/30. Sepsis. CXR: increasing bibasilar opacifications. Large sliding hiatal hernia per GI note. hx of dementia, Parkinson's dz, GERD, ARF/CKD, DM 2. Currently on a clear liquid diet restriction after ERCP. Clinical swallow  evaluation yesterday demonstrated s/sxs aspiration w/ all consistencies, but family unsure of POC & wishing for pt to continue to eat/drink.  -MP  Adm w/ pancreatitis s/p ERCP 12/30. Sepsis. CXR: increasing bibasilar opacifications. Large sliding hiatal hernia per GI note. hx of dementia, Parkinson's dz, GERD, ARF/CKD, DM 2. Currently on a clear liquid diet restriction after ERCP  -RD      Current Method of Nutrition  regular textures;thin liquids  -SM  clear liquids  -MP  clear liquids  -RD      Precautions/Limitations, Vision  --  WFL;for purposes of eval  -MP  --      Precautions/Limitations, Hearing  --  hearing aid, bilaterally  -MP  --      Prior Level of Function-Communication  --  cognitive-linguistic impairment;other (see comments) dementia, PD  -MP  cognitive-linguistic impairment;other (see comments) dementia, PD  -RD      Prior Level of Function-Swallowing  --  no diet consistency restrictions  -MP  no diet consistency restrictions;other (see comments) RN reports coughing w/ PO during this admission  -RD      Plans/Goals Discussed with  spouse/S.O.;agreed upon  -SM  patient;spouse/S.O.;family;agreed upon  -MP  patient;spouse/S.O.;family;agreed upon  -RD      Barriers to Rehab  --  medically complex;cognitive status  -MP  medically complex  -RD      Patient's Goals for Discharge  --  patient did not state  -MP  patient did not state  -RD      Family Goals for Discharge  --  family did not state  -MP  patient able to eat/drink without coughing/choking;patient able to return to regular diet  -RD         Pain Assessment    Additional Documentation  --  Pain Scale: FACES Pre/Post-Treatment (Group)  -MP  Pain Scale: FACES Pre/Post-Treatment (Group)  -RD         Pain Scale: FACES Pre/Post-Treatment    Pain: FACES Scale, Pretreatment  --  0-->no hurt  -MP  0-->no hurt  -RD      Pain: FACES Scale, Post-Treatment  --  0-->no hurt  -MP  0-->no hurt  -RD         Oral Motor and Function    Dentition Assessment  --   natural, present and adequate  -MP  --      Secretion Management  --  WNL/WFL  -MP  wet vocal quality  -RD      Mucosal Quality  --  dry  -MP  dry  -RD      Volitional Swallow  --  --  delayed;weak  -RD      Volitional Cough  --  --  weak  -RD         Oral Musculature and Cranial Nerve Assessment    Oral Motor General Assessment  --  generalized oral motor weakness  -MP  generalized oral motor weakness  -RD      Oral Motor, Comment  --  --  Weak, wet voice  -RD         General Eating/Swallowing Observations    Respiratory Support Currently in Use  --  --  nasal cannula  -RD      Eating/Swallowing Skills  --  fed by SLP  -MP  fed by SLP  -RD      Positioning During Eating  --  upright in bed  -MP  upright 90 degree;upright in bed  -RD      Utensils Used  --  spoon;cup  -MP  spoon;cup;straw  -RD      Consistencies Trialed  --  thin liquids;pureed  -MP  thin liquids;nectar/syrup-thick liquids;pureed clears only  -RD         Respiratory    Respiratory Status  --  --  reduction in SpO2;during swallowing/eating  -RD         Clinical Swallow Eval    Oral Prep Phase  --  --  impaired  -RD      Oral Transit  --  --  impaired  -RD      Pharyngeal Phase  --  suspected pharyngeal impairment  -MP  suspected pharyngeal impairment  -RD      Clinical Swallow Evaluation Summary  Pt not appropriate to attempt assessment. Spoke with spousem, who confirmed wishes to appraoch via comfort/QOL. Reportsmin intake and no signs discomfort despite occassional coughing. Spouse understands risks r/t aspiration. Recommendations to reflect POC discussion.   -  Clinical swallow re-evaluation completed. Pt given trials of ice chipx1, thin via tsp/cup, & puree. ? WVQ x1 w/ sip of thin. Multiple swallows w/ puree. Pt w/ multiple general risk factors for aspiration. Discussed w/ family. Pt/family not wishing to pursue FEES @ this time (2' invasive & do not suspect pt would tolerate) & unable to complete MBS until CL restriction lifted. Family  wishing for pt to continue to eat/drink until able to complete MBS. Notified RN. Discussed general aspiration precautions. Will place on hold until CL diet restriction lifted & then complete MBS.  -MP  --         Oral Prep Concerns    Oral Prep Concerns  --  --  increased prep time  -RD      Increased Prep Time  --  --  pudding  -RD         Oral Transit Concerns    Oral Transit Concerns  --  --  increased oral transit time  -RD      Increased Oral Transit Time  --  --  pudding  -RD         Pharyngeal Phase Concerns    Pharyngeal Phase Concerns  --  multiple swallows;wet vocal quality  -MP  wet vocal quality;multiple swallows  -RD      Wet Vocal Quality  --  thin  -MP  thin;other (see comments) inconsistently w/ all PO  -RD      Multiple Swallows  --  pudding  -MP  thin  -RD      Pharyngeal Phase Concerns, Comment  --  --  Saw pt for clinical dysphagia evaluation after ERCP this PM. Pt lethargic after procedure, but alerted to voice. Family wanting to feed pt. Pt currently on clear liquid diet restriction. Weak/wet vocal quality prior to PO trials. Overt s/s of aspiration w/ thins c/b multiple swallows and inconsistent wet vocal quality. Inconsistent wet vocal quality t/o all PO trials. Need to r/o pharyngeal dysphagia given s/s of aspiration, however pt too lethargic this PM to participate in study and pt/family/spouse are unsure of GOC decisions. Discussed s/s and risks of aspiration as well as general aspiration risk given Parkinson's disease and incr'd weakness this admit. Discussed FEES given clear liquid restriction vs. MBS when able per GI vs. comfort diet. Pt/family/spouse wish to discuss further prior to making further decisions. Spouse would like pt to continue current clear liquid diet and monitor despite current aspiration risk. RN notified. SLP will f/u tomorrow to determine GOC for comfort diet vs. FEES/MBS as appropriate.   -RD         Clinical Impression    SLP Swallowing Diagnosis  suspected  pharyngeal dysfunction  -SM  suspected pharyngeal dysfunction  -MP  suspected pharyngeal dysfunction  -RD      Functional Impact  risk of aspiration/pneumonia  -SM  risk of aspiration/pneumonia  -MP  risk of aspiration/pneumonia;risk of malnutrition;risk of dehydration  -RD      Rehab Potential/Prognosis, Swallowing  --  adequate, monitor progress closely  -MP  adequate, monitor progress closely  -RD      Swallow Criteria for Skilled Therapeutic Interventions Met  --  demonstrates skilled criteria  -MP  demonstrates skilled criteria  -RD         Recommendations    Therapy Frequency (Swallow)  --  --  PRN  -RD      SLP Diet Recommendation  regular textures;thin liquids;other (see comments) as comfort diet, per family's wishes  -  other (see comments) see summary above - family wishing to continue CL  -MP  clear liquid diet;other (see comments) comfort diet; per MD discretion  -RD      Recommended Diagnostics  reassess via clinical swallow evaluation will recheck tomorrow for ? any further needs/adjustments  -  VFSS (MBS);other (see comments) when CL diet restriction lifted  -MP  reassess via clinical swallow evaluation;other (see comments) vs. FEES/MBS as appropriate/agreeable pending GO  -RD      Recommended Precautions and Strategies  upright posture during/after eating  -  other (see comments);upright posture during/after eating;small bites of food and sips of liquid oral care BID/PRN  -MP  upright posture during/after eating;small bites of food and sips of liquid;check mouth frequently for oral residue/pocketing;other (see comments) aspiration/reflux precautions; Oral care BID/PRN  -RD      SLP Rec. for Method of Medication Administration  as tolerated  -SM  meds crushed;with pudding or applesauce;meds via alternate route  -MP  meds crushed;with pudding or applesauce;as tolerated;meds via alternate route  -RD      Monitor for Signs of Aspiration  --  yes;notify SLP if any concerns  -MP  yes;notify SLP if  any concerns  -RD      Anticipated Dischage Disposition  --  unknown  -MP  unknown  -RD        User Key  (r) = Recorded By, (t) = Taken By, (c) = Cosigned By    Initials Name Effective Dates    Gayla Ramirez MS CCC-SLP 06/22/15 -     RD July Eid, MS CCC-SLP 04/03/18 -     MP Mik Nieto, MS CCC-SLP 06/19/19 -           EDUCATION  The patient's wife has been educated in the following areas:   Dysphagia (Swallowing Impairment) Modified Diet Instruction.    SLP Recommendation and Plan  SLP Swallowing Diagnosis: suspected pharyngeal dysfunction  SLP Diet Recommendation: regular textures, thin liquids, other (see comments)(as comfort diet, per family's wishes)  Recommended Precautions and Strategies: upright posture during/after eating  SLP Rec. for Method of Medication Administration: as tolerated        Recommended Diagnostics: reassess via clinical swallow evaluation(will recheck tomorrow for ? any further needs/adjustments)                      Plan of Care Reviewed With: spouse           Time Calculation:   Time Calculation- SLP     Row Name 01/01/20 1454             Time Calculation- SLP    SLP Start Time  1430  -      SLP Received On  01/01/20  -        User Key  (r) = Recorded By, (t) = Taken By, (c) = Cosigned By    Initials Name Provider Type    Gayla Ramirez MS CCC-SLP Speech and Language Pathologist          Therapy Charges for Today     Code Description Service Date Service Provider Modifiers Qty    [id number]  ST SELF CARE/MGMT/TRAIN EA 15 MIN 1/1/2020 Gayla Zhao MS CCC-SLP GN 1               Gayla Zhao MS CCC-SLP  1/1/2020

## 2020-01-01 NOTE — PROGRESS NOTES
[facility] Medicine Services  PROGRESS NOTE    Patient Name: Murali Dennis  : 1929  MRN: [id number]    Date of Admission: 2019  Primary Care Physician: Diana Null APRN    Subjective   Subjective     CC:  Follow up for sepsis 2/2 cholangitis s/p ERCP    HPI:  More confused this morning per family, has not been eating very much. He denies any significant abdominal pain, nausea, or vomiting.    Review of Systems  Gen- No fevers, chills  CV- No chest pain, palpitations  Resp- No cough, dyspnea        Objective   Objective     Vital Signs:   Temp:  [96.9 °F (36.1 °C)-97.8 °F (36.6 °C)] 97.5 °F (36.4 °C)  Heart Rate:  [54-70] 57  Resp:  [14-24] 18  BP: (116-136)/(62-76) 116/76        Physical Exam:  Constitutional: No acute distress, awake, alert  HENT: NCAT, mucous membranes moist  Respiratory: Clear to auscultation bilaterally, respiratory effort normal on room air  Cardiovascular: RRR, no murmurs, no lower extremity edema  Gastrointestinal: Positive bowel sounds, soft, nontender, nondistended  Psychiatric: Appropriate affect, cooperative  Neurologic: Oriented x to person, moving all extremities equally, Cranial Nerves grossly intact to confrontation, speech clear    Results Reviewed:    Results from last 7 days   Lab Units 19  0616 19  0849 19  0657 19  1804   WBC 10*3/mm3 8.70 13.29* 15.49*  --    HEMOGLOBIN g/dL 11.2* 11.0* 11.1*  --    HEMATOCRIT % 34.7* 34.5* 35.0*  --    PLATELETS 10*3/mm3 106* 99* 113*  --    PROCALCITONIN ng/mL  --   --  21.53* 17.44*     Results from last 7 days   Lab Units 19  0616 19  0849 19  0657  19  1938   SODIUM mmol/L 139 140 140   < > 142   POTASSIUM mmol/L 3.8 4.4 4.5   < > 3.8   CHLORIDE mmol/L 107 109* 107   < > 103   CO2 mmol/L 19.0* 20.0* 20.0*   < > 23.0   BUN mg/dL 60* 57* 43*   < > 26*   CREATININE mg/dL 2.17* 2.47* 2.48*   < > 1.90*   GLUCOSE mg/dL 176* 167* 148*   < > 129*    CALCIUM mg/dL 8.6 8.8 8.7   < > 9.3   ALT (SGPT) U/L 33 76* 66*   < > 24   AST (SGOT) U/L 36 65* 181*   < > 189*   TROPONIN T ng/mL  --   --   --   --  0.011    < > = values in this interval not displayed.     Estimated Creatinine Clearance: 24.7 mL/min (A) (by C-G formula based on SCr of 2.17 mg/dL (H)).    Microbiology Results Abnormal     Procedure Component Value - Date/Time    Blood Culture - Blood, Wrist, Right [[id number]]  (Abnormal) Collected:  12/28/19 1244    Lab Status:  Final result Specimen:  Blood from Wrist, Right Updated:  12/31/19 0618     Blood Culture Streptococcus gallolyticus ssp pasteurianus     Comment: Refer to previous blood culture collected on 12/28 at 1244 for AMY's            Gram Stain Aerobic Bottle Gram positive cocci in chains    Narrative:       Aerobic bottle only      Blood Culture - Blood, Hand, Right [[id number]]  (Abnormal)  (Susceptibility) Collected:  12/28/19 1244    Lab Status:  Final result Specimen:  Blood from Hand, Right Updated:  12/31/19 0615     Blood Culture Streptococcus gallolyticus ssp pasteurianus     Gram Stain Aerobic Bottle Gram positive cocci in chains    Narrative:       Aerobic bottle only      Susceptibility      Streptococcus gallolyticus ssp pasteurianus     AMY     Ceftriaxone Susceptible     Penicillin G Susceptible     Vancomycin Susceptible                    Blood Culture ID, PCR - Blood, Hand, Right [[id number]]  (Abnormal) Collected:  12/28/19 1244    Lab Status:  Final result Specimen:  Blood from Hand, Right Updated:  12/29/19 0700     BCID, PCR Streptococcus spp, not A, B, or pneumoniae. Identification by BCID PCR.          Imaging Results (Last 24 Hours)     ** No results found for the last 24 hours. **          Results for orders placed during the hospital encounter of 12/27/19   Adult Transthoracic Echo Complete W/ Cont if Necessary Per Protocol    Narrative · The left ventricular cavity is moderately dilated.  · Right ventricular cavity is  mild-to-moderately dilated.  · Left atrial cavity size is moderate-to-severely dilated.  · Moderate aortic valve regurgitation is present.  · Moderate mitral valve regurgitation is present.  · Mild to moderate tricuspid valve regurgitation is present.  · Calculated right ventricular systolic pressure from tricuspid   regurgitation is 35 mmHg.  · Estimated EF = 30%.  · Left ventricular systolic function is severely decreased.  · Left ventricular diastolic dysfunction (grade II) consistent with   pseudonormalization.  · The findings are consistent with dilated cardiomyopathy.  · Mild pulmonary hypertension is present.  · There is no evidence of pericardial effusion.  · The aortic valve exhibits moderate sclerosis without stenosis.          I have reviewed the medications:  Scheduled Meds:  amiodarone 200 mg Oral Daily   aspirin 81 mg Oral Daily   carbidopa-levodopa 1 tablet Oral TID   cefTRIAXone 2 g Intramuscular Q24H   finasteride 5 mg Oral Daily   levothyroxine 75 mcg Oral Q AM   metroNIDAZOLE 500 mg Intravenous Q8H   midodrine 10 mg Oral TID AC   multivitamin with minerals 1 tablet Oral Daily   QUEtiapine 50 mg Oral Nightly     Continuous Infusions:   PRN Meds:.•  acetaminophen  •  fentanyl  •  ipratropium-albuterol  •  lactated ringers  •  Morphine  •  ondansetron **OR** ondansetron  •  oxyCODONE-acetaminophen  •  sodium chloride  •  [COMPLETED] Insert peripheral IV **AND** sodium chloride      Assessment/Plan   Assessment & Plan     Active Hospital Problems    Diagnosis  POA   • **Pancreatitis [K85.90]  Yes   • Bacteremia due to Streptococcus [R78.81, B95.5]  Yes   • Acute renal failure superimposed on stage 3 chronic kidney disease (CMS/HCC) [N17.9, N18.3]  No   • Acute cholecystitis [K81.0]  Yes   • Sepsis (CMS/HCC) [A41.9]  Yes   • Acute pancreatitis [K85.90]  Yes   • Mixed hyperlipidemia [E78.2]  Yes   • Cardiomyopathy (EF 30%) [I42.9]  Yes   • ANGELA on CPAP [G47.33, Z99.89]  Not Applicable   • Parkinson's  disease (CMS/[facility]) [G20]  Yes   • Gastroesophageal reflux disease [K21.9]  Yes   • Type 2 diabetes mellitus without complication, without long-term current use of insulin (CMS/[facility]) [E11.9]  Yes   • Benign prostatic hyperplasia without lower urinary tract symptoms [N40.0]  Yes   • A-fib (CMS/[facility]) [I48.91]  Yes   • Coronary artery disease involving native coronary artery of native heart without angina pectoris [I25.10]  Yes   • Hypothyroid [E03.9]  Yes   • Dementia (CMS/[facility]) [F03.90]  Yes      Resolved Hospital Problems    Diagnosis Date Resolved POA   • Sinus bradycardia [R00.1] 12/29/2019 Yes        Brief Hospital Course to date:  Murali Dennis is a 89 y.o. male new to me today with PMH significant for CKDIII, parkinson's disease with dementia, CAD, atrial fibrillation, T2DM, and hypothyroidism who was admitted on 12/27/2019 for sepsis 2/2 acute pancreatitis due to choledocholithiasis and cholangitis and strep bacteremia.    Acute pancreatitis  Choledocholithiasis and cholangitis  Sepsis due to Strep pacteremia  -S/p ERCP on 12/30 with balloonsweep and sphincterotomy  -holding plavix  -continue ceftriaxone IM - has no IV access and was too agitated to get PICC placed safely   -try flagyl po if he will take po medication- has been refusing today    HELIO on CKDIII- Cr improving, continue to monitor CMP qAM and avoid nephrotoxins    Alzheimers dementia - was agitated today, given haldol 0.5 mg IM, will try seroquel 25 mg qnightly if he will take it by mouth       DVT Prophylaxis:  mechanical    Disposition: I expect the patient to be discharged pending placement and improvement in infection.    CODE STATUS:   Code Status and Medical Interventions:   Ordered at: 12/27/19 3922     Limited Support to NOT Include:    Intubation     Level Of Support Discussed With:    Health Care Surrogate     Code Status:    No CPR     Medical Interventions (Level of Support Prior to Arrest):    Limited         Electronically signed by  Kayla Lozoya MD, 01/01/20, 3:20 PM.

## 2020-01-01 NOTE — NURSING NOTE
Here to place PICC.  Consent given by spouse.  Patient is very agitated and not cooperating at all.  Called family back in to speak with him in attempts to calm him.  This was not successful.  After speaking with Dr Lozoya and Dr Tijerina, will give Rocephin IM this evening and assess the situation tomorrow in hopes the patient is more calm for PICC attempt.

## 2020-01-02 NOTE — THERAPY TREATMENT NOTE
Patient Name: Murali Dennis  : 1929    MRN: [id number]                              Today's Date: 2020       Admit Date: 2019    Visit Dx:     ICD-10-CM ICD-9-CM   1. Acute pancreatitis, unspecified complication status, unspecified pancreatitis type K85.90 577.0   2. HELIO (acute kidney injury) (CMS/[facility]) N17.9 584.9   3. Dysphagia, unspecified type R13.10 787.20   4. Impaired mobility and ADLs Z74.09 799.89     Patient Active Problem List   Diagnosis   • Dementia (CMS/[facility])   • Hypothyroid   • Stage 3 chronic kidney disease (CMS/[facility])   • Coronary artery disease involving native coronary artery of native heart without angina pectoris   • A-fib (CMS/[facility])   • Benign prostatic hyperplasia without lower urinary tract symptoms   • Type 2 diabetes mellitus without complication, without long-term current use of insulin (CMS/[facility])   • Gastroesophageal reflux disease   • Ventricular tachycardia (CMS/[facility])   • Parkinson's disease (CMS/[facility])   • ANGELA on CPAP   • Cardiomyopathy (EF 30%)   • Mixed hyperlipidemia   • Pancreatitis   • Sepsis (CMS/[facility])   • Bacteremia due to Streptococcus   • Acute renal failure superimposed on stage 3 chronic kidney disease (CMS/[facility])   • Acute cholecystitis   • Acute pancreatitis     Past Medical History:   Diagnosis Date   • Alzheimer disease (CMS/[facility])    • Warren esophagus     followed  with GI in [place]   • Benign prostatic hyperplasia without lower urinary tract symptoms 10/17/2018   • Chronic congestive heart failure (CMS/[facility]) 2017     echo report from 17 EF 60-65%, mild TR, mild AR ([place], [place]) Echocardiogram 17: EF 28%, mild to moderate MR    • COPD (chronic obstructive pulmonary disease) (CMS/[facility])    • Coronary artery disease    • Dementia (CMS/[facility])    • Diarrhea 2019   • Environmental allergies 10/17/2018   • Essential hypertension 10/17/2018   • Gastroesophageal reflux disease 2018   • GERD (gastroesophageal reflux  disease)    • Hypothyroid     started after amiodarone use in 2003   • Sleep apnea     wears cpap   • Stroke (CMS/HCC) 2003   • TIA (transient ischemic attack) 12/29/2017   • Unintended weight loss 10/17/2018     Past Surgical History:   Procedure Laterality Date   • CARDIAC CATHETERIZATION  2003   • CARDIAC CATHETERIZATION N/A 1/19/2018    Procedure: Left Heart Cath;  Surgeon: Hollis Ugarte MD;  Location:  [facility] CATH INVASIVE LOCATION;  Service:    • CARPAL TUNNEL RELEASE Right    • CATARACT EXTRACTION Bilateral    • CORONARY ARTERY BYPASS GRAFT  2003    Triple Bypass, @ [facility] @ [place], [place]   • ERCP N/A 12/30/2019    Procedure: ENDOSCOPIC RETROGRADE CHOLANGIOPANCREATOGRAPHY;  Surgeon: Arsh White MD;  Location:  [facility] ENDOSCOPY;  Service: Gastroenterology   • HEMORRHOIDECTOMY     • HERNIA REPAIR       General Information     Row Name 01/02/20 1543          PT Evaluation Time/Intention    Document Type  therapy note (daily note)  -AA     Mode of Treatment  physical therapy  -AA     Row Name 01/02/20 1543          General Information    Patient Profile Reviewed?  yes  -AA     Existing Precautions/Restrictions  fall  -AA     Row Name 01/02/20 1543          Cognitive Assessment/Intervention- PT/OT    Orientation Status (Cognition)  oriented to;person  -AA     Row Name 01/02/20 1543          Safety Issues, Functional Mobility    Impairments Affecting Function (Mobility)  endurance/activity tolerance;strength;cognition;balance  -AA       User Key  (r) = Recorded By, (t) = Taken By, (c) = Cosigned By    Initials Name Provider Type    AA Frances Hebert, PT Physical Therapist        Mobility     Row Name 01/02/20 1543          Bed Mobility Assessment/Treatment    Bed Mobility Assessment/Treatment  rolling left;rolling right;scooting/bridging  -AA     Rolling Left [place] (Bed Mobility)  dependent (less than 25% patient effort);2 person assist;verbal cues;nonverbal cues  (demo/gesture)  -AA     Rolling Right [place] (Bed Mobility)  dependent (less than 25% patient effort);2 person assist;verbal cues;nonverbal cues (demo/gesture)  -AA     Scooting/Bridging [place] (Bed Mobility)  dependent (less than 25% patient effort);2 person assist;verbal cues;nonverbal cues (demo/gesture)  -AA     Comment (Bed Mobility)  roll B multiple times for mobility training and sling placement.  Pt unable to reach for bed rail but is able to grasp once on side to A   -AA     Row Name 01/02/20 1543          Transfer Assessment/Treatment    Comment (Transfers)  STS held due to fatigue/lethargy  -AA     Row Name 01/02/20 1543          Bed-Chair Transfer    Bed-Chair [place] (Transfers)  dependent (less than 25% patient effort);2 person assist  -AA     Assistive Device (Bed-Chair Transfers)  mechanical lift/aid  -AA       User Key  (r) = Recorded By, (t) = Taken By, (c) = Cosigned By    Initials Name Provider Type    Frances Cordova PT Physical Therapist        Obj/Interventions     Row Name 01/02/20 1543          Therapeutic Exercise    Lower Extremity (Therapeutic Exercise)  heel slides, bilateral;LAQ (long arc quad), bilateral;SLR (straight leg raise), bilateral  -AA     Lower Extremity Range of Motion (Therapeutic Exercise)  hip abduction/adduction, bilateral;ankle dorsiflexion/plantar flexion, bilateral  -AA     Exercise Type (Therapeutic Exercise)  AAROM (active assistive range of motion);PROM (passive range of motion)  -AA     Position (Therapeutic Exercise)  seated  -AA     Sets/Reps (Therapeutic Exercise)  5-10  -AA       User Key  (r) = Recorded By, (t) = Taken By, (c) = Cosigned By    Initials Name Provider Type    Frances Cordova, PT Physical Therapist        Goals/Plan    No documentation.       Clinical Impression     Row Name 01/02/20 154          Pain Assessment    Additional Documentation  Pain Scale: FACES Pre/Post-Treatment (Group)  -     Row Name 01/02/20 1545           Pain Scale: Numbers Pre/Post-Treatment    Pain Scale: Numbers, Pretreatment  0/10 - no pain  -AA     Pain Scale: Numbers, Post-Treatment  0/10 - no pain  -AA     Row Name 01/02/20 1543          Pain Scale: FACES Pre/Post-Treatment    Pain: FACES Scale, Pretreatment  0-->no hurt  -AA     Pain: FACES Scale, Post-Treatment  0-->no hurt  -AA     Row Name 01/02/20 1543          Plan of Care Review    Plan of Care Reviewed With  patient;family  -AA     Progress  no change  -AA     Row Name 01/02/20 1543          Positioning and Restraints    Pre-Treatment Position  in bed  -AA     Post Treatment Position  chair  -AA     In Chair  notified nsg;exit alarm on;waffle cushion;reclined;with family/caregiver;on mechanical lift sling;call light within reach;encouraged to call for assist  -AA       User Key  (r) = Recorded By, (t) = Taken By, (c) = Cosigned By    Initials Name Provider Type    Frances Cordova PT Physical Therapist        Outcome Measures     Row Name 01/02/20 1543          How much help from another person do you currently need...    Turning from your back to your side while in flat bed without using bedrails?  1  -AA     Moving from lying on back to sitting on the side of a flat bed without bedrails?  1  -AA     Moving to and from a bed to a chair (including a wheelchair)?  1  -AA     Standing up from a chair using your arms (e.g., wheelchair, bedside chair)?  2  -AA     Climbing 3-5 steps with a railing?  1  -AA     To walk in hospital room?  1  -AA     AM-PAC 6 Clicks Score (PT)  7  -AA     Row Name 01/02/20 1543          Functional Assessment    Outcome Measure Options  AM-PAC 6 Clicks Basic Mobility (PT)  -AA       User Key  (r) = Recorded By, (t) = Taken By, (c) = Cosigned By    Initials Name Provider Type    Frances Cordova PT Physical Therapist          PT Recommendation and Plan     Outcome Summary/Treatment Plan (PT)  Anticipated Discharge Disposition (PT): skilled nursing facility  Plan of Care  Reviewed With: patient, family  Progress: no change  Outcome Summary: Pt presents with fatigue and lethargy this afternoon.  Pt max A of 2 to dependent of 2 for bed mobility.  Mechanical lift to chair.  Unable to assess STS due to lethargy.  Family educated on BLE ther-ex.  SNF at [place]     Time Calculation:   PT Charges     Row Name 01/02/20 1543             Time Calculation    Start Time  1543  -AA      PT Received On  01/02/20  -AA      PT Goal Re-Cert Due Date  01/11/20  -AA         Time Calculation- PT    Total Timed Code Minutes- PT  23 minute(s)  -AA         Timed Charges    31604 - PT Therapeutic Exercise Minutes  8  -AA      37051 - PT Therapeutic Activity Minutes  15  -AA        User Key  (r) = Recorded By, (t) = Taken By, (c) = Cosigned By    Initials Name Provider Type    AA Frances Hebert, DARREL Physical Therapist        Therapy Charges for Today     Code Description Service Date Service Provider Modifiers Qty    61037777003 HC PT THER PROC EA 15 MIN 1/2/2020 Frances Hebert, PT GP 1    75377633540 HC PT THERAPEUTIC ACT EA 15 MIN 1/2/2020 Frances Hebert, PT GP 1          PT G-Codes  Outcome Measure Options: AM-PAC 6 Clicks Basic Mobility (PT)  AM-PAC 6 Clicks Score (PT): 7  AM-PAC 6 Clicks Score (OT): 6    April CAROLYN Hebert PT  1/2/2020

## 2020-01-02 NOTE — PROGRESS NOTES
Continued Stay Note  [facility]     Patient Name: Murali Dennis  MRN: [id number]  Today's Date: 1/2/2020    Admit Date: 12/27/2019    Discharge Plan     Row Name 01/02/20 0914       Plan    Plan  Home with family    Patient/Family in Agreement with Plan  yes    Plan Comments  Spoke with son at bedside. Family is considering home with [facility]. CM will contine to follow.    Final Discharge Disposition Code  06 - home with home health care        Discharge Codes    No documentation.             Vernon Hurd RN

## 2020-01-02 NOTE — CONSULTS
POST OPERATIVE NOTE-IMMEDIATE :  Preoperative Diagnosis:  left ovarian mucinous borderline tumor    Postoperative Diagnosis:  Same       NATIONAL GUIDELINE REFERENCED FOR TREATMENT PLANNING:NCCN    Procedures:  Robotic assisted laparoscopic total hysterectomy  Right oophorectomy  Omentectomy  Appendectomy     Prosthetic Devices:  None    Surgeon(s) and Assistants (if any):  Surgeon(s):  Fiorella Rose MD  Circulator: Gita Lutz RN  Relief Circulator: Mami De La Torre RN  Relief Scrub: Nehal Walker  Scrub Person: Chance Doherty  First Assistant: Nataliia Moyer APRN CNP    Anesthesia:  General    Drains:  none    Specimens:  Uterus, cervix, right ovary, omentum, appendix.     Complications: none    Findings/Conclusions:  Grossly normal appearing uterus, ovary, omentum, and appendix.     Estimated Blood Loss:  5cc    Condition on discharge from OR:  Satisfactory      Nataliia Moyer   On Behalf of  Surgeon(s):  Fiorella Rose MD           Urology Consult Note    Murali Dennis  LOS: 6      ASSESSMENT:    89 y.o. male with a tight phimosis with inablity of staff to place manriquez catheter.  He is voiding some but not completely.  FC was removed yesterday after recent admission for pancreatitis.     DISCUSSION/RECOMMENDATIONS/PLAN:  Manriquez catheter placed and I would encourage considerable improvement in his condition (ambulatory, no constipation, etc) before considering voiding trial    HPI:  Murali Dennis is a 89 y.o. male who was admitted 12/27/2019  for Pancreatitis [K85.90]. Patient was referred by Dr. Lozoya for evaluation of urinary retention.. Patient has had no symptoms. Other than not emptying completely since FC removal yesterday and finding of phimosis.   Patient denies history of previous infection, urolithiasis,  trauma and  cancer. Patient has not seen a urologist in the past.  .    Past Medical History:   Diagnosis Date   • Alzheimer disease (CMS/[facility])    • Warren esophagus     followed  with GI in [place]   • Benign prostatic hyperplasia without lower urinary tract symptoms 10/17/2018   • Chronic congestive heart failure (CMS/[facility]) 12/8/2017     echo report from 8/2/17 EF 60-65%, mild TR, mild AR ([place], [place]) Echocardiogram 12/9/17: EF 28%, mild to moderate MR    • COPD (chronic obstructive pulmonary disease) (CMS/[facility])    • Coronary artery disease 2003   • Dementia (CMS/HCC) 2003   • Diarrhea 2/24/2019   • Environmental allergies 10/17/2018   • Essential hypertension 10/17/2018   • Gastroesophageal reflux disease 11/19/2018   • GERD (gastroesophageal reflux disease)    • Hypothyroid     started after amiodarone use in 2003   • Sleep apnea     wears cpap   • Stroke (CMS/HCC) 2003   • TIA (transient ischemic attack) 12/29/2017   • Unintended weight loss 10/17/2018     Past Surgical History:   Procedure Laterality Date   • CARDIAC CATHETERIZATION  2003   • CARDIAC CATHETERIZATION N/A 1/19/2018    Procedure:  Left Heart Cath;  Surgeon: Hollis Ugarte MD;  Location:  [facility] CATH INVASIVE LOCATION;  Service:    • CARPAL TUNNEL RELEASE Right    • CATARACT EXTRACTION Bilateral    • CORONARY ARTERY BYPASS GRAFT  2003    Triple Bypass, @ [facility] @ [place], [place]   • ERCP N/A 12/30/2019    Procedure: ENDOSCOPIC RETROGRADE CHOLANGIOPANCREATOGRAPHY;  Surgeon: Arsh White MD;  Location:  [facility] ENDOSCOPY;  Service: Gastroenterology   • HEMORRHOIDECTOMY     • HERNIA REPAIR       Medications Prior to Admission   Medication Sig Dispense Refill Last Dose   • amiodarone (PACERONE) 200 MG tablet Take 1 tablet by mouth Daily. 30 tablet 5 Past Week at Unknown time   • aspirin 81 MG chewable tablet Chew 81 mg Daily.   Past Week at Unknown time   • carbidopa-levodopa (SINEMET)  MG per tablet Take 1 tablet by mouth 3 (Three) Times a Day. 90 tablet 11 Past Week at Unknown time   • clopidogrel (PLAVIX) 75 MG tablet Take 75 mg by mouth Daily.   Past Week at Unknown time   • dutasteride (AVODART) 0.5 MG capsule TAKE 1 CAPSULE BY MOUTH EVERY DAY 30 capsule 0 Past Week at Unknown time   • levothyroxine (SYNTHROID, LEVOTHROID) 75 MCG tablet TAKE 1 TABLET BY MOUTH EVERY DAY 90 tablet 0 Past Week at Unknown time   • Loratadine 10 MG capsule Take 1 capsule by mouth Daily As Needed.   Past Week at Unknown time   • memantine (NAMENDA) 10 MG tablet Take 1 tablet by mouth 2 (Two) Times a Day. 60 tablet 11 Past Week at Unknown time   • midodrine (PROAMATINE) 10 MG tablet Take 1 tablet by mouth Every 8 (Eight) Hours. 90 tablet 5 Past Week at Unknown time   • mirtazapine (REMERON) 30 MG tablet TAKE 1 TABLET BY MOUTH EVERYDAY AT BEDTIME 90 tablet 0 Past Week at Unknown time   • Multiple Vitamins-Minerals (MULTIVITAL PO) Take 1 tablet by mouth Daily.   Past Week at Unknown time   • omeprazole (priLOSEC) 20 MG capsule TAKE 1 CAPSULE BY MOUTH EVERY DAY (Patient taking differently: 40 mg.) 30 capsule 5 Past Week at  Unknown time   • pravastatin (PRAVACHOL) 20 MG tablet TAKE 1 TABLET BY MOUTH EVERY DAY (Patient taking differently: Every Night.) 90 tablet 0 Past Week at Unknown time   • sacubitril-valsartan (ENTRESTO) 24-26 MG tablet Take 0.5 tablets by mouth 2 (Two) Times a Day. 60 tablet  Past Week at Unknown time   • acetaminophen (TYLENOL) 325 MG tablet Take 650 mg by mouth At Night As Needed for Mild Pain .   Taking   • diclofenac (VOLTAREN) 1 % gel gel Apply 4 g topically to the appropriate area as directed 4 (Four) Times a Day. Small amount to affected area 300 g 3 Taking   • glucose blood test strip 1 each by Other route Daily. Use as instructed 100 each 12 Taking   • glucose monitor monitoring kit 1 each Daily. 1 each 0 Taking   • ipratropium-albuterol (DUO-NEB) 0.5-2.5 mg/mL nebulizer Take 3 mL by nebulization Every 4 (Four) Hours As Needed for Shortness of Air (CBS PROTOCOL). 360 mL  Taking   • Lancets (FREESTYLE) lancets 1 each by Other route Daily. May dispense any brand needed E11.9 100 each 12 Taking   • nitroglycerin (NITROSTAT) 0.4 MG SL tablet Place 0.4 mg under the tongue Every 5 (Five) Minutes As Needed for Chest Pain. Take no more than 3 doses in 15 minutes.   Taking     Allergies   Allergen Reactions   • Benzodiazepines Other (See Comments)     Paradoxical response   • Codeine Other (See Comments)     unknown   • Fluoxetine Other (See Comments)     Paradoxical response   • Lipitor [Atorvastatin] Other (See Comments)     Myalgias     • Metoclopramide Other (See Comments)     Unknown   • Nabumetone    • Penicillins Swelling and Other (See Comments)     Has tolerated cefepime 12/2019   • Prozac [Fluoxetine Hcl]    • Tramadol Other (See Comments)     unknown   • Valium [Diazepam] Other (See Comments)     Paradoxical response     • Sulfa Antibiotics Rash     Family History   Problem Relation Age of Onset   • Cancer Mother    • Heart disease Sister    • Diabetes Sister    • Heart disease Brother    • Cancer  "Brother    • Stroke Sister      Social History     Socioeconomic History   • Marital status:      Spouse name: Not on file   • Number of children: Not on file   • Years of education: Not on file   • Highest education level: Not on file   Occupational History   • Occupation: Retired   Tobacco Use   • Smoking status: Former Smoker     Last attempt to quit: 1967     Years since quittin.0   • Smokeless tobacco: Never Used   Substance and Sexual Activity   • Alcohol use: No   • Drug use: No   • Sexual activity: Defer   Social History Narrative    Caffeine use: 1 serving daily.    Patient lives at home with family.     Lives with wife, daughter close by for any needs         Review of Systems      No fever chills   No dysuria  Objective     Blood pressure 138/67, pulse 56, temperature 97.7 °F (36.5 °C), temperature source Axillary, resp. rate 16, height 177.8 cm (70\"), weight 85.1 kg (187 lb 11.2 oz), SpO2 97 %.  /67 (BP Location: Left arm, Patient Position: Sitting)   Pulse 56   Temp 97.7 °F (36.5 °C) (Axillary)   Resp 16   Ht 177.8 cm (70\")   Wt 85.1 kg (187 lb 11.2 oz)   SpO2 97%   BMI 26.93 kg/m²     Physicial Exam    General: elderly pleasant WDWN male in NAD  Back: No CVAT, spine linear and non-tender   Abdomen: Soft and non-tender with no masses, organomegaly or guarding.  : Normal male    Imaging  none    Labs  Urine Microscopy:   Results from last 7 days   Lab Units 19  1044   RBC UA /HPF 3-6*   WBC UA /HPF 13-20*   , Urinalysis:   Results from last 7 days   Lab Units 19  1044   PH, URINE  5.5   PROTEIN UA  Trace*   GLUCOSE UA  Negative   KETONES UA  Trace*   , BMP:   Results from last 7 days   Lab Units 20  0547 19  0616   SODIUM mmol/L 141 139   POTASSIUM mmol/L 3.6 3.8   CALCIUM mg/dL 8.9 8.6   CHLORIDE mmol/L 110* 107   CO2 mmol/L 18.0* 19.0*   BUN mg/dL 51* 60*   CREATININE mg/dL 1.53* 2.17*   ALBUMIN g/dL  --  2.50*   BILIRUBIN mg/dL  --  1.3*   ALK " PHOS U/L  --  238*    and CBC:   Results from last 7 days   Lab Units 01/02/20  0547   WBC 10*3/mm3 5.44   RBC 10*6/mm3 3.22*   HEMOGLOBIN g/dL 10.8*   HEMATOCRIT % 32.6*   PLATELETS 10*3/mm3 114*       Apolinar Carpenter MD - 1/2/2020, 5:46 PM

## 2020-01-02 NOTE — PROGRESS NOTES
INFECTIOUS DISEASE Progress Note     Murali Dennis  4/20/1929  [id number]      Admission Date: 12/27/2019      Requesting Provider: No ref. provider found  Evaluating Physician: Ish Tijerina MD    Reason for Consultation: sepsis     History of present illness:  12/30/19: Patient is a 89 y.o. male, who presented to the ED with complaints of abdominal tenderness which began on Friday evening.   He has been having decreased appetite over the past several weeks and has lost 30 pounds unintentionally over the past year.  His family denies any fever, cough, sputum production, nausea, vomiting, dysuria prior to admission.    An abdominal CT on admission with distended gallbladder, with mild to moderate diffuse acute pancreatitis, mild intrahepatic bile duct dilatation. He underwent an MRCP with gallbladder distention, and possible mass in neck of gallbladder, with changes of pancreatitis.  Admitting labs with lipase of >3000, Scr 1.19, WBC 13, 000., lactate of 2.9,  Blood cultures now positive for Strep species.  He is currently on Vancomycin and Cefepime and we were consulted for evaluation and treatment.     He has a history of penicillin allergy which his wife indicates consisted of arm swelling after injection.  12/31/19:  He is resting quietly.  Daughter says he was confused and restless/aggitated several times during the evening.    1/1/19: Family is very concerned that PT hasn't been working with him, and he is so weak.  Still having difficulty with swallowing.  He had a bowel movement earlier this am. Was confused earlier this am per son. He remains afebrile.   1/2/20:  His manriquez was removed and he is unable to urinate.  He was very aggitated and confused yesterday and last pm was given haldol and he is quiet now. Son at bedside.  He complains of abdominal pain.  His PICC line was ordered yesterday but he was not cooperative with placement of the PICC line.    Past Medical History:   Diagnosis Date   •  Alzheimer disease (CMS/[facility])    • Warren esophagus     followed  with GI in [place]   • Benign prostatic hyperplasia without lower urinary tract symptoms 10/17/2018   • Chronic congestive heart failure (CMS/[facility]) 12/8/2017     echo report from 8/2/17 EF 60-65%, mild TR, mild AR ([place], [place]) Echocardiogram 12/9/17: EF 28%, mild to moderate MR    • COPD (chronic obstructive pulmonary disease) (CMS/[facility])    • Coronary artery disease 2003   • Dementia (CMS/[facility]) 2003   • Diarrhea 2/24/2019   • Environmental allergies 10/17/2018   • Essential hypertension 10/17/2018   • Gastroesophageal reflux disease 11/19/2018   • GERD (gastroesophageal reflux disease)    • Hypothyroid     started after amiodarone use in 2003   • Sleep apnea     wears cpap   • Stroke (CMS/[facility]) 2003   • TIA (transient ischemic attack) 12/29/2017   • Unintended weight loss 10/17/2018       Past Surgical History:   Procedure Laterality Date   • CARDIAC CATHETERIZATION  2003   • CARDIAC CATHETERIZATION N/A 1/19/2018    Procedure: Left Heart Cath;  Surgeon: Hollis Ugarte MD;  Location:  [facility];  Service:    • CARPAL TUNNEL RELEASE Right    • CATARACT EXTRACTION Bilateral    • CORONARY ARTERY BYPASS GRAFT  2003    Triple Bypass, @ [facility] @ [place], [place]   • ERCP N/A 12/30/2019    Procedure: ENDOSCOPIC RETROGRADE CHOLANGIOPANCREATOGRAPHY;  Surgeon: Arsh White MD;  Location:  [facility] ENDOSCOPY;  Service: Gastroenterology   • HEMORRHOIDECTOMY     • HERNIA REPAIR         Family History   Problem Relation Age of Onset   • Cancer Mother    • Heart disease Sister    • Diabetes Sister    • Heart disease Brother    • Cancer Brother    • Stroke Sister        Social History     Socioeconomic History   • Marital status:      Spouse name: Not on file   • Number of children: Not on file   • Years of education: Not on file   • Highest education level: Not on file   Occupational  History   • Occupation: Retired   Tobacco Use   • Smoking status: Former Smoker     Last attempt to quit: 1967     Years since quittin.0   • Smokeless tobacco: Never Used   Substance and Sexual Activity   • Alcohol use: No   • Drug use: No   • Sexual activity: Defer   Social History Narrative    Caffeine use: 1 serving daily.    Patient lives at home with family.     Lives with wife, daughter close by for any needs       Allergies   Allergen Reactions   • Benzodiazepines Other (See Comments)     Paradoxical response   • Codeine Other (See Comments)     unknown   • Fluoxetine Other (See Comments)     Paradoxical response   • Lipitor [Atorvastatin] Other (See Comments)     Myalgias     • Metoclopramide Other (See Comments)     Unknown   • Nabumetone    • Penicillins Swelling and Other (See Comments)     Has tolerated cefepime 2019   • Prozac [Fluoxetine Hcl]    • Tramadol Other (See Comments)     unknown   • Valium [Diazepam] Other (See Comments)     Paradoxical response     • Sulfa Antibiotics Rash         Medication:    Current Facility-Administered Medications:   •  acetaminophen (TYLENOL) tablet 650 mg, 650 mg, Oral, Q6H PRN, Arsh White MD, 650 mg at 19 0544  •  amiodarone (PACERONE) tablet 200 mg, 200 mg, Oral, Daily, Arsh White MD, 200 mg at 20 09  •  aspirin chewable tablet 81 mg, 81 mg, Oral, Daily, Arsh White MD, 81 mg at 20 0902  •  carbidopa-levodopa (SINEMET)  MG per tablet 1 tablet, 1 tablet, Oral, TID, Arsh White MD, 1 tablet at 20 2144  •  cefTRIAXone (ROCEPHIN) 2 g in lidocaine (XYLOCAINE) 1 % IM only syringe, 2 g, Intramuscular, Q24H, Kayla Lozoya MD, 2 g at 20 1734  •  fentaNYL citrate (PF) (SUBLIMAZE) injection 50 mcg, 50 mcg, Intravenous, Q5 Min PRN, Isak Dudley CRNA  •  finasteride (PROSCAR) tablet 5 mg, 5 mg, Oral, Daily, Arsh White MD, 5 mg at  01/01/20 1238  •  haloperidol lactate (HALDOL) injection 1 mg, 1 mg, Intramuscular, Q6H PRN, Kayla Lozoya MD  •  ipratropium-albuterol (DUO-NEB) nebulizer solution 3 mL, 3 mL, Nebulization, Q4H PRN, Arsh White MD, 3 mL at 12/30/19 0345  •  lactated ringers bolus 500 mL, 500 mL, Intravenous, Once PRN, Isak Dudley CRNA  •  levothyroxine (SYNTHROID, LEVOTHROID) tablet 75 mcg, 75 mcg, Oral, Q AM, Nesha Rey MD, 75 mcg at 01/02/20 0609  •  magic mouthwash oral supsension 5 mL, 5 mL, Swish & Spit, Q4H PRN, Kayla Lozoya MD, 5 mL at 01/02/20 0233  •  metroNIDAZOLE (FLAGYL) tablet 500 mg, 500 mg, Oral, Q8H, Kayla Lozoya MD  •  midodrine (PROAMATINE) tablet 10 mg, 10 mg, Oral, TID AC, Arsh White MD, 10 mg at 01/02/20 0609  •  Morphine sulfate (PF) injection 2 mg, 2 mg, Intravenous, Q2H PRN, Arsh White MD, 2 mg at 12/31/19 1304  •  multivitamin with minerals 1 tablet, 1 tablet, Oral, Daily, Arsh White MD, 1 tablet at 01/01/20 0902  •  ondansetron (ZOFRAN) tablet 4 mg, 4 mg, Oral, Q6H PRN **OR** ondansetron (ZOFRAN) injection 4 mg, 4 mg, Intravenous, Q6H PRN, Arsh White MD  •  oxyCODONE-acetaminophen (PERCOCET) 7.5-325 MG per tablet 1 tablet, 1 tablet, Oral, Q4H PRN, Arsh White MD, 1 tablet at 12/31/19 2121  •  QUEtiapine (SEROquel) tablet 50 mg, 50 mg, Oral, Nightly, Kayla Lozoya MD, 50 mg at 01/01/20 2143  •  sennosides-docusate (PERICOLACE) 8.6-50 MG per tablet 2 tablet, 2 tablet, Oral, Nightly, Kayla Lozoya MD, 2 tablet at 01/01/20 2142  •  simethicone (MYLICON) chewable tablet 80 mg, 80 mg, Oral, 4x Daily PRN, Kayla Lozoya MD, 80 mg at 01/02/20 0233  •  sodium chloride 0.9 % flush 10 mL, 10 mL, Intravenous, PRN, Arsh White MD, 10 mL at 12/31/19 2122  •  [COMPLETED] Insert peripheral IV, , , Once **AND** sodium chloride 0.9 % flush 10 mL, 10 mL, Intravenous, PRN,  Arsh White MD    Antibiotics:  Anti-Infectives (From admission, onward)    Ordered     Dose/Rate Route Frequency Start Stop    20 0811  metroNIDAZOLE (FLAGYL) tablet 500 mg     Ordering Provider:  Kayla Lozoya MD    500 mg Oral Every 8 Hours Scheduled 20 0900 20 1359    20 1357  cefTRIAXone (ROCEPHIN) 2 g in lidocaine (XYLOCAINE) 1 % IM only syringe  Review   Ordering Provider:  Kayla Lozoya MD    2 g Intramuscular Every 24 Hours 20 1445 20 1444    19 0632  vancomycin 1500 mg/500 mL 0.9% NS IVPB ([facility])     Ordering Provider:  Colin Pyle, RPH    20 mg/kg × 78.4 kg  over 90 Minutes Intravenous Once 19 0730 19 0948    19 1226  cefepime (MAXIPIME) 2 g/100 mL 0.9% NS (mbp)     Ordering Provider:  Perla Melgar DO    2 g  200 mL/hr over 30 Minutes Intravenous Once 19 1315 19 1358            Review of Systems:  No reliable ROS from patient       Physical Exam:   Vital Signs  Temp (24hrs), Av.7 °F (36.5 °C), Min:97.4 °F (36.3 °C), Max:98.2 °F (36.8 °C)    Temp  Min: 97.4 °F (36.3 °C)  Max: 98.2 °F (36.8 °C)  BP  Min: 116/76  Max: 150/77  Pulse  Min: 57  Max: 65  Resp  Min: 16  Max: 24  SpO2  Min: 96 %  Max: 99 %    GENERAL: Drowsy, in no acute distress.   HEENT: Normocephalic, atraumatic.  PERRL No conjunctival injection. No icterus. Oropharynx clear without evidence of thrush or exudate. No evidence of peridontal disease.    HEART: RRR; + 2/6murmur, rubs, gallops.   LUNGS: Clear to auscultation bilaterally without wheezing, rales, rhonchi. Normal respiratory effort. Nonlabored. No dullness.  ABDOMEN: Soft,  Decreased tenderness  nondistended. Positive bowel sounds. No rebound or guarding.   EXT:  No cyanosis, clubbing or edema. No cord.  :  Najera catheter removed   MSK:  No joint effusions   SKIN: Warm and dry without cutaneous eruptions on Inspection/palpation.    NEURO: Somnolent and poorly  sponsored      Laboratory Data    Results from last 7 days   Lab Units 01/02/20  0547 12/31/19  0616 12/30/19  0849   WBC 10*3/mm3 5.44 8.70 13.29*   HEMOGLOBIN g/dL 10.8* 11.2* 11.0*   HEMATOCRIT % 32.6* 34.7* 34.5*   PLATELETS 10*3/mm3 114* 106* 99*     Results from last 7 days   Lab Units 01/02/20  0547   SODIUM mmol/L 141   POTASSIUM mmol/L 3.6   CHLORIDE mmol/L 110*   CO2 mmol/L 18.0*   BUN mg/dL 51*   CREATININE mg/dL 1.53*   GLUCOSE mg/dL 137*   CALCIUM mg/dL 8.9     Results from last 7 days   Lab Units 12/31/19  0616   ALK PHOS U/L 238*   BILIRUBIN mg/dL 1.3*   ALT (SGPT) U/L 33   AST (SGOT) U/L 36             Results from last 7 days   Lab Units 12/29/19  0657   LACTATE mmol/L 1.3             Estimated Creatinine Clearance: 38.6 mL/min (A) (by C-G formula based on SCr of 1.53 mg/dL (H)).      Microbiology:  Blood Culture   Date Value Ref Range Status   12/28/2019 Gram Positive Cocci (A)  Preliminary   12/28/2019 Gram Positive Cocci (A)  Preliminary     BCID, PCR   Date Value Ref Range Status   12/28/2019 (C) No organism detected by BCID PCR. Final    Streptococcus spp, not A, B, or pneumoniae. Identification by BCID PCR.     BC Strep gallolyticus, ssp pasterurianus         Radiology:  Imaging Results (Last 72 Hours)     Procedure Component Value Units Date/Time    FL ERCP pancreatic and biliary ducts [[id number]] Collected:  12/30/19 1313     Updated:  12/30/19 1741    Narrative:       EXAMINATION: RETROGRADE CHOLANGIOGRAM     HISTORY: Right upper quadrant pain.     FINDINGS: Two minutes 10 seconds fluoroscopic time was utilized to  assist in this procedure. Three images are displayed. There is no  evidence of choledocholithiasis.       Impression:       There is no choledocholithiasis.     D:  12/30/2019  E:  12/30/2019     This report was finalized on 12/30/2019 5:38 PM by Dr. Bismark Goff MD.               Impression:   1.  Severe sepsis- with leukocytosis, lactic acidosis, elevated pro calcitonin  acute  kidney injury, known focus of infection   2.  Cholangitis- with associated streptococcal bacteremia and secondary to choledocholithiasis, s/p sphincterotomy  3.  Streptococcal bacteremia-  This is secondary to cholangitis.    4.  Acute pancreatitis  5.  Dementia   6.  Acute kidney injury/ATN-superimposed on chronic kidney disease, stage 3,   7.  Cardiomyopathy, EF 30%   8.  Leukocytosis/neutrophilia-improving          PLAN/RECOMMENDATIONS:  1.  Continue ceftriaxone-give IM if he has no IV access  2.  Continue metronidazole  3.  PICC line placement-hopefully he will cooperate today      Dr. Tijerina has obtained the history, performed the physical exam and formulated the above treatment plan.         Ish Tijerina MD  1/2/2020  8:43 AM

## 2020-01-02 NOTE — THERAPY DISCHARGE NOTE
Acute Care - Speech Language Pathology   Swallow Eval/Discharge  [facility]       Patient Name: Murali Dennis  : 1929  MRN: [id number]  Today's Date: 2020               Admit Date: 2019    Visit Dx:    ICD-10-CM ICD-9-CM   1. Acute pancreatitis, unspecified complication status, unspecified pancreatitis type K85.90 577.0   2. HELIO (acute kidney injury) (CMS/Formerly [facility]) N17.9 584.9   3. Dysphagia, unspecified type R13.10 787.20   4. Impaired mobility and ADLs Z74.09 799.89     Patient Active Problem List   Diagnosis   • Dementia (CMS/Formerly [facility])   • Hypothyroid   • Stage 3 chronic kidney disease (CMS/Formerly [facility])   • Coronary artery disease involving native coronary artery of native heart without angina pectoris   • A-fib (CMS/[facility]ly [facility])   • Benign prostatic hyperplasia without lower urinary tract symptoms   • Type 2 diabetes mellitus without complication, without long-term current use of insulin (CMS/[facility]ly [facility])   • Gastroesophageal reflux disease   • Ventricular tachycardia (CMS/HCC)   • Parkinson's disease (CMS/[facility]ly [facility])   • ANGELA on CPAP   • Cardiomyopathy (EF 30%)   • Mixed hyperlipidemia   • Pancreatitis   • Sepsis (CMS/[facility])   • Bacteremia due to Streptococcus   • Acute renal failure superimposed on stage 3 chronic kidney disease (CMS/[facility]ly [facility])   • Acute cholecystitis   • Acute pancreatitis     Past Medical History:   Diagnosis Date   • Alzheimer disease (CMS/[facility]ly [facility])    • Warren esophagus     followed  with GI in [place]   • Benign prostatic hyperplasia without lower urinary tract symptoms 10/17/2018   • Chronic congestive heart failure (CMS/[facility]ly [facility]) 2017     echo report from 17 EF 60-65%, mild TR, mild AR ([place], [place]) Echocardiogram 17: EF 28%, mild to moderate MR    • COPD (chronic obstructive pulmonary disease) (CMS/[facility]ly [facility])    • Coronary artery disease    • Dementia (CMS/[facility]ly [facility])    • Diarrhea 2019   • Environmental allergies 10/17/2018   • Essential hypertension  10/17/2018   • Gastroesophageal reflux disease 11/19/2018   • GERD (gastroesophageal reflux disease)    • Hypothyroid     started after amiodarone use in 2003   • Sleep apnea     wears cpap   • Stroke (CMS/HCC) 2003   • TIA (transient ischemic attack) 12/29/2017   • Unintended weight loss 10/17/2018     Past Surgical History:   Procedure Laterality Date   • CARDIAC CATHETERIZATION  2003   • CARDIAC CATHETERIZATION N/A 1/19/2018    Procedure: Left Heart Cath;  Surgeon: Hollis Ugarte MD;  Location:  [facility] CATH [facility]VASIVE [facility]ATION;  Service:    • CARPAL TUNNEL RELEASE Right    • CATARACT EXTRACTION Bilateral    • CORONARY ARTERY BYPASS GRAFT  2003    Triple Bypass, @ [facility] @ [place], [place]   • ERCP N/A 12/30/2019    Procedure: ENDOSCOPIC RETROGRADE CHOLANGIOPANCREATOGRAPHY;  Surgeon: Arsh White MD;  Location:  [facility] ENDOSCOPY;  Service: Gastroenterology   • HEMORRHOIDECTOMY     • HERNIA REPAIR            SWALLOW EVALUATION (last 72 hours)      [facility] Adult Swallow Evaluation     Row Name 01/02/20 1305 01/02/20 1300 01/01/20 1430 12/31/19 1045          Rehab Evaluation    Document Type  re-evaluation  -CH  --  -CH  re-evaluation  -  re-evaluation  -     Subjective Information  no complaints  -CH  --  -CH  --  no complaints  -MP     Patient Observations  --  --  -- currently agitated  -SM  alert;cooperative  -     Patient/Family Observations  --  --  -CH  POC discussion and education with wife  -SM  Spouse & family present  -MP     Evaluation Not Performed  other (see comments) patient sleeping  -CH  --  --  --     Patient Effort  --  --  --  good  -MP     Comment  Patient sleeping. Family reporting very little intake. They have been providing soft/pureed foods and thin liquids and report patient having no difficulty when alert  -CH  --  -CH  --  --        General Information    Patient Profile Reviewed  yes  -CH  --  -CH  --  yes  -MP     Pertinent History Of Current Problem   89 y.o. male Adm w/ pancreatitis s/p ERCP 12/30. Sepsis. CXR: increasing bibasilar opacifications. Large sliding hiatal hernia per GI note. hx of dementia, Parkinson's dz, GERD, ARF/CKD, DM 2. Fmily wishing for comfort diet at this time. Family has reported good tolerance of soft foods and thin liquids, although patient consuming very little  -CH  --  -CH  RN reconsulted as family wishes for comfort PO diet, wishes for SLP to assist.   -SM  Adm w/ pancreatitis s/p ERCP 12/30. Sepsis. CXR: increasing bibasilar opacifications. Large sliding hiatal hernia per GI note. hx of dementia, Parkinson's dz, GERD, ARF/CKD, DM 2. Currently on a clear liquid diet restriction after ERCP. Clinical swallow evaluation yesterday demonstrated s/sxs aspiration w/ all consistencies, but family unsure of POC & wishing for pt to continue to eat/drink.  -MP     Current Method of Nutrition  --  --  regular textures;thin liquids  -SM  clear liquids  -MP     Precautions/Limitations, Vision  --  --  --  WFL;for purposes of eval  -MP     Precautions/Limitations, Hearing  --  --  --  hearing aid, bilaterally  -MP     Prior Level of Function-Communication  --  --  --  cognitive-linguistic impairment;other (see comments) dementia, PD  -MP     Prior Level of Function-Swallowing  --  --  --  no diet consistency restrictions  -MP     Plans/Goals Discussed with  spouse/S.O.;family;agreed upon  -  --  -  spouse/S.O.;agreed upon  -  patient;spouse/S.O.;family;agreed upon  -     Barriers to Rehab  --  --  --  medically complex;cognitive status  -MP     Patient's Goals for Discharge  --  --  --  patient did not state  -MP     Family Goals for Discharge  --  --  --  family did not state  -MP        Pain Assessment    Additional Documentation  Pain Scale: FACES Pre/Post-Treatment (Group)  -CH  --  -CH  --  Pain Scale: FACES Pre/Post-Treatment (Group)  -MP        Pain Scale: Numbers Pre/Post-Treatment    Pain Scale: Numbers, Pretreatment  0/10 - no  pain  -CH  --  --  --     Pain Scale: Numbers, Post-Treatment  0/10 - no pain  -CH  --  --  --        Pain Scale: FACES Pre/Post-Treatment    Pain: FACES Scale, Pretreatment  0-->no hurt  -CH  --  -CH  --  0-->no hurt  -MP     Pain: FACES Scale, Post-Treatment  0-->no hurt  -CH  --  -CH  --  0-->no hurt  -MP        Oral Motor and Function    Dentition Assessment  --  --  --  natural, present and adequate  -     Secretion Management  --  --  --  WNL/WFL  -MP     Mucosal Quality  --  --  --  dry  -MP        Oral Musculature and Cranial Nerve Assessment    Oral Motor General Assessment  --  --  --  generalized oral motor weakness  -        General Eating/Swallowing Observations    Eating/Swallowing Skills  --  --  --  fed by SLP  -MP     Positioning During Eating  --  --  --  upright in bed  -     Utensils Used  --  --  --  spoon;cup  -     Consistencies Trialed  --  --  --  thin liquids;pureed  -        Clinical Swallow Eval    Pharyngeal Phase  --  --  --  suspected pharyngeal impairment  -     Clinical Swallow Evaluation Summary  Patient not appropriate to attempt re-evaluation of swallow. Spouse and family present and reporting good tolerance of pureed consistency and thin liquids without s/s of discomfort. They stated that patient intake is poor and they have attempted numerous trials with soft foods. Family would like to continue with current diet for comfort/QOL and will focus on softer foods as patient able.  Spouse educated re safe feeding of patient and use of general swallow precautions as well as s/s of aspiration to be aware of.   -CH  --  Pt not appropriate to attempt assessment. Spoke with spousem, who confirmed wishes to appraoch via comfort/QOL. Reportsmin intake and no signs discomfort despite occassional coughing. Spouse understands risks r/t aspiration. Recommendations to reflect POC discussion.   -  Clinical swallow re-evaluation completed. Pt given trials of ice chipx1, thin via  tsp/cup, & puree. ? WVQ x1 w/ sip of thin. Multiple swallows w/ puree. Pt w/ multiple general risk factors for aspiration. Discussed w/ family. Pt/family not wishing to pursue FEES @ this time (2' invasive & do not suspect pt would tolerate) & unable to complete MBS until CL restriction lifted. Family wishing for pt to continue to eat/drink until able to complete MBS. Notified RN. Discussed general aspiration precautions. Will place on hold until CL diet restriction lifted & then complete MBS.  -        Pharyngeal Phase Concerns    Pharyngeal Phase Concerns  --  --  --  multiple swallows;wet vocal quality  -     Wet Vocal Quality  --  --  --  thin  -MP     Multiple Swallows  --  --  --  pudding  -        Clinical Impression    SLP Swallowing Diagnosis  suspected pharyngeal dysfunction  -  --  suspected pharyngeal dysfunction  -  suspected pharyngeal dysfunction  -     Functional Impact  risk of aspiration/pneumonia  -  --  risk of aspiration/pneumonia  -  risk of aspiration/pneumonia  -     Rehab Potential/Prognosis, Swallowing  --  --  --  adequate, monitor progress closely  -     Swallow Criteria for Skilled Therapeutic Interventions Met  demonstrates skilled criteria  -  --  --  demonstrates skilled criteria  -        Recommendations    Therapy Frequency (Swallow)  evaluation only  -  --  --  --     SLP Diet Recommendation  regular textures;thin liquids;other (see comments)  -  --  regular textures;thin liquids;other (see comments) as comfort diet, per family's wishes  -  other (see comments) see summary above - family wishing to continue CL  -MP     Recommended Diagnostics  --  --  reassess via clinical swallow evaluation will recheck tomorrow for ? any further needs/adjustments  -  VFSS (MBS);other (see comments) when CL diet restriction lifted  -     Recommended Precautions and Strategies  upright posture during/after eating  -  --  upright posture during/after eating  -   other (see comments);upright posture during/after eating;small bites of food and sips of liquid oral care BID/PRN  -MP     SLP Rec. for Method of Medication Administration  as tolerated  -  --  as tolerated  -  meds crushed;with pudding or applesauce;meds via alternate route  -     Monitor for Signs of Aspiration  notify SLP if any concerns  -  --  --  yes;notify SLP if any concerns  -MP     Anticipated Dischage Disposition  home;home with home health;other (see comments) or possibly home with Hospice  -  --  --  unknown  -       User Key  (r) = Recorded By, (t) = Taken By, (c) = Cosigned By    Initials Name Effective Dates     Gayla Zhao, MS CCC-SLP 06/22/15 -     Mik Haley MS CCC-SLP 06/19/19 -     Vero Del Rosario MS CCC-SLP 02/14/19 -           EDUCATION  The patient has been educated in the following areas:   Dysphagia (Swallowing Impairment) Oral Care/Hydration Modified Diet Instruction.    SLP Recommendation and Plan  SLP Swallowing Diagnosis: suspected pharyngeal dysfunction  SLP Diet Recommendation: regular textures, thin liquids, other (see comments)     Monitor for Signs of Aspiration: notify SLP if any concerns     Swallow Criteria for Skilled Therapeutic Interventions Met: demonstrates skilled criteria  Anticipated Dischage Disposition: home, home with home health, other (see comments)(or possibly home with Hospice)     Therapy Frequency (Swallow): evaluation only                        Time Calculation:   Time Calculation- SLP     Row Name 01/02/20 1449 01/02/20 1418          Time Calculation- SLP    SLP Start Time  1305  -  1305  -     SLP Received On  01/02/20  -  01/02/20  -       User Key  (r) = Recorded By, (t) = Taken By, (c) = Cosigned By    Initials Name Provider Type    Vero Del Rosario, MS CCC-SLP Speech and Language Pathologist          Therapy Charges for Today     Code Description Service Date Service Provider Modifiers Qty    43052714653  HC ST SELF CARE/MGMT/TRAIN EA 15 MIN 1/2/2020 Vero Fontanez, MS CCC-SLP GN 2    [id number] HC ST SELF CARE/MGMT/TRAIN EA 15 MIN 1/2/2020 Vero Fontanez, MS CCC-SLP GN 1               SLP Discharge Summary  Anticipated Dischage Disposition: home, home with home health, other (see comments)(or possibly home with Hospice)    Vero Fontanez MS CCC-SLP  1/2/2020

## 2020-01-02 NOTE — PLAN OF CARE
Problem: Patient Care Overview  Goal: Plan of Care Review  Outcome: Ongoing (interventions implemented as appropriate)  Flowsheets (Taken 1/2/2020 1020 by Mary Martínez OT)  Plan of Care Reviewed With: patient;family  Note:   SLP re-evaluation and caregiver instruction completed. Will sign-off  as patient is reportedly tolerating comfort diet without s/s of discomfort. Please see note for further details and recommendations.

## 2020-01-02 NOTE — PLAN OF CARE
Problem: Patient Care Overview  Goal: Plan of Care Review  Outcome: Ongoing (interventions implemented as appropriate)  Flowsheets  Taken 1/2/2020 0402  Progress: improving  Taken 1/2/2020 0200  Plan of Care Reviewed With: patient;family  Note:   Pt has been oriented to self only this shift. Has rested well with no signs of discomfort/agitation. Took medication PO with water per pt and family request. No IV access; PICC placement planned for today; unsuccessful yesterday due to increased agitation. No urine output; bladder scan shows 300ml. Will continue to monitor.

## 2020-01-02 NOTE — PROGRESS NOTES
[facility] Medicine Services  PROGRESS NOTE    Patient Name: Murali Dennis  : 1929  MRN: [id number]    Date of Admission: 2019  Primary Care Physician: Diana Null APRN    Subjective   Subjective     CC:  Follow up for sepsis 2/2 cholangitis s/p ERCP    HPI:  Still confused, very agitated last night per family and was restless, did not get good sleep.    Review of Systems  Unable to obtain due to patients mental status      Objective   Objective     Vital Signs:   Temp:  [97.4 °F (36.3 °C)-98.2 °F (36.8 °C)] 97.6 °F (36.4 °C)  Heart Rate:  [64-65] 65  Resp:  [16-24] 24  BP: (136-150)/(65-77) 139/66        Physical Exam:  Constitutional: No acute distress, awake, alert  HENT: NCAT, mucous membranes moist  Respiratory: Clear to auscultation bilaterally, respiratory effort normal on room air  Cardiovascular: RRR, no murmurs, no lower extremity edema   Gastrointestinal: Positive bowel sounds, soft, nontender, nondistended  Psychiatric: Appropriate affect, cooperative  Neurologic: Oriented to person only, Cranial Nerves grossly intact to confrontation, speech clear    Results Reviewed:    Results from last 7 days   Lab Units 20  0547 19  0616 19  0849 19  0657 19  1804   WBC 10*3/mm3 5.44 8.70 13.29* 15.49*  --    HEMOGLOBIN g/dL 10.8* 11.2* 11.0* 11.1*  --    HEMATOCRIT % 32.6* 34.7* 34.5* 35.0*  --    PLATELETS 10*3/mm3 114* 106* 99* 113*  --    PROCALCITONIN ng/mL  --   --   --  21.53* 17.44*     Results from last 7 days   Lab Units 20  0547 19  0616 19  0849 19  0657  19  1938   SODIUM mmol/L 141 139 140 140   < > 142   POTASSIUM mmol/L 3.6 3.8 4.4 4.5   < > 3.8   CHLORIDE mmol/L 110* 107 109* 107   < > 103   CO2 mmol/L 18.0* 19.0* 20.0* 20.0*   < > 23.0   BUN mg/dL 51* 60* 57* 43*   < > 26*   CREATININE mg/dL 1.53* 2.17* 2.47* 2.48*   < > 1.90*   GLUCOSE mg/dL 137* 176* 167* 148*   < > 129*   CALCIUM mg/dL  8.9 8.6 8.8 8.7   < > 9.3   ALT (SGPT) U/L  --  33 76* 66*   < > 24   AST (SGOT) U/L  --  36 65* 181*   < > 189*   TROPONIN T ng/mL  --   --   --   --   --  0.011    < > = values in this interval not displayed.     Estimated Creatinine Clearance: 38.6 mL/min (A) (by C-G formula based on SCr of 1.53 mg/dL (H)).    Microbiology Results Abnormal     Procedure Component Value - Date/Time    Blood Culture - Blood, Wrist, Right [[id number]]  (Abnormal) Collected:  12/28/19 1244    Lab Status:  Final result Specimen:  Blood from Wrist, Right Updated:  12/31/19 0618     Blood Culture Streptococcus gallolyticus ssp pasteurianus     Comment: Refer to previous blood culture collected on 12/28 at 1244 for AMY's            Gram Stain Aerobic Bottle Gram positive cocci in chains    Narrative:       Aerobic bottle only      Blood Culture - Blood, Hand, Right [[id number]]  (Abnormal)  (Susceptibility) Collected:  12/28/19 1244    Lab Status:  Final result Specimen:  Blood from Hand, Right Updated:  12/31/19 0615     Blood Culture Streptococcus gallolyticus ssp pasteurianus     Gram Stain Aerobic Bottle Gram positive cocci in chains    Narrative:       Aerobic bottle only      Susceptibility      Streptococcus gallolyticus ssp pasteurianus     AMY     Ceftriaxone Susceptible     Penicillin G Susceptible     Vancomycin Susceptible                    Blood Culture ID, PCR - Blood, Hand, Right [[id number]]  (Abnormal) Collected:  12/28/19 1244    Lab Status:  Final result Specimen:  Blood from Hand, Right Updated:  12/29/19 0700     BCID, PCR Streptococcus spp, not A, B, or pneumoniae. Identification by BCID PCR.          Imaging Results (Last 24 Hours)     Procedure Component Value Units Date/Time    XR Chest 1 View [[id number]] Collected:  01/02/20 1235     Updated:  01/02/20 1254    Narrative:       EXAMINATION: XR CHEST 1 VW-      INDICATION: PICC tip placement; K85.90-Acute pancreatitis without  necrosis or infection, unspecified;  N17.9-Acute kidney failure,  unspecified; R13.10-Dysphagia, unspecified; Z74.09-Other reduced  mobility.      COMPARISON: Chest x-ray 12/29/2019.     FINDINGS: Cardiac size enlarged status post median sternotomy and CABG  with right arm PICC now in place terminating within the distal SVC. No  pneumothorax. Bibasilar opacifications right greater than left with  probable trace bilateral pleural effusions similar to prior.           Impression:       Right arm PICC placement terminating within the distal SVC.  No postprocedural pneumothorax. Pulmonary findings otherwise are grossly  unchanged from prior.     D:  01/02/2020  E:  01/02/2020             Results for orders placed during the hospital encounter of 12/27/19   Adult Transthoracic Echo Complete W/ Cont if Necessary Per Protocol    Narrative · The left ventricular cavity is moderately dilated.  · Right ventricular cavity is mild-to-moderately dilated.  · Left atrial cavity size is moderate-to-severely dilated.  · Moderate aortic valve regurgitation is present.  · Moderate mitral valve regurgitation is present.  · Mild to moderate tricuspid valve regurgitation is present.  · Calculated right ventricular systolic pressure from tricuspid   regurgitation is 35 mmHg.  · Estimated EF = 30%.  · Left ventricular systolic function is severely decreased.  · Left ventricular diastolic dysfunction (grade II) consistent with   pseudonormalization.  · The findings are consistent with dilated cardiomyopathy.  · Mild pulmonary hypertension is present.  · There is no evidence of pericardial effusion.  · The aortic valve exhibits moderate sclerosis without stenosis.          I have reviewed the medications:  Scheduled Meds:  amiodarone 200 mg Oral Daily   aspirin 81 mg Oral Daily   carbidopa-levodopa 1 tablet Oral TID   cefTRIAXone 2 g Intramuscular Q24H   finasteride 5 mg Oral Daily   levothyroxine 75 mcg Oral Q AM   metroNIDAZOLE 500 mg Oral Q8H   midodrine 10 mg Oral TID AC    multivitamin with minerals 1 tablet Oral Daily   QUEtiapine 50 mg Oral Nightly   sennosides-docusate 2 tablet Oral Nightly   sodium chloride 10 mL Intravenous Q12H     Continuous Infusions:   PRN Meds:.•  acetaminophen  •  fentanyl  •  haloperidol lactate  •  ipratropium-albuterol  •  lactated ringers  •  magic mouthwash  •  Morphine  •  ondansetron **OR** ondansetron  •  oxyCODONE-acetaminophen  •  simethicone  •  sodium chloride  •  [COMPLETED] Insert peripheral IV **AND** sodium chloride  •  sodium chloride  •  sodium chloride      Assessment/Plan   Assessment & Plan     Active Hospital Problems    Diagnosis  POA   • **Pancreatitis [K85.90]  Yes   • Bacteremia due to Streptococcus [R78.81, B95.5]  Yes   • Acute renal failure superimposed on stage 3 chronic kidney disease (CMS/HCC) [N17.9, N18.3]  No   • Acute cholecystitis [K81.0]  Yes   • Sepsis (CMS/[facility]) [A41.9]  Yes   • Acute pancreatitis [K85.90]  Yes   • Mixed hyperlipidemia [E78.2]  Yes   • Cardiomyopathy (EF 30%) [I42.9]  Yes   • ANGELA on CPAP [G47.33, Z99.89]  Not Applicable   • Parkinson's disease (CMS/[facility]) [G20]  Yes   • Gastroesophageal reflux disease [K21.9]  Yes   • Type 2 diabetes mellitus without complication, without long-term current use of insulin (CMS/[facility]) [E11.9]  Yes   • Benign prostatic hyperplasia without lower urinary tract symptoms [N40.0]  Yes   • A-fib (CMS/[facility]) [I48.91]  Yes   • Coronary artery disease involving native coronary artery of native heart without angina pectoris [I25.10]  Yes   • Hypothyroid [E03.9]  Yes   • Dementia (CMS/[facility]) [F03.90]  Yes      Resolved Hospital Problems    Diagnosis Date Resolved POA   • Sinus bradycardia [R00.1] 12/29/2019 Yes        Brief Hospital Course to date:  Murali Dennis is a 89 y.o. male with PMH significant for CKDIII, parkinson's disease with dementia, CAD, atrial fibrillation, T2DM, and hypothyroidism who was admitted on 12/27/2019 for sepsis 2/2 acute pancreatitis due to  choledocholithiasis and cholangitis and strep bacteremia.     Acute pancreatitis  Choledocholithiasis and cholangitis  Sepsis due to Strep bacteremia  -S/p ERCP on 12/30 with balloonsweep and sphincterotomy  -holding plavix  -s/p PICC placed today - restart ceftriaxone and metronidazole IV  -restart IVF     HELIO on CKDIII- Cr improving, continue to monitor CMP qAM and avoid nephrotoxins, restart IVF today      Urinary retention and penile edema   -s/p manriquez catheter today, continue while patient is not ambulating on his own     Alzheimers dementia - still restless overnight, will increase seroquel to 100 mg qnightly - check EKG tmr AM for QT interval check   -PT/OT following     Hx of atrial fibrillation   -continue amiodarone 200 mg po qd, not on anticoagulation    CAD   -PCI ~ 1 yr ago ; had cath here on 1/18/2018 with critical stenosis in SVG to circumflex obtuse marginal treated with alpine ANITA  -continue asa and statin - plavix held due to possible surgery - no surgery  Planned  -restart plavix tomorrow        DVT Prophylaxis:  mechanical     Disposition: I expect the patient to be discharged pending placement and improvement in infection.     CODE STATUS:   Code Status and Medical Interventions:   Ordered at: 12/27/19 5671     Limited Support to NOT Include:    Intubation     Level Of Support Discussed With:    Health Care Surrogate     Code Status:    No CPR     Medical Interventions (Level of Support Prior to Arrest):    Limited         Electronically signed by Kayla Lozoya MD, 01/02/20, 1:29 PM.

## 2020-01-02 NOTE — PROGRESS NOTES
"Patient Name:  Murali Dennis  YOB: 1929  [id number]    Surgery Progress Note    Date of visit: 1/2/2020    Subjective   Subjective: Continues to have episodes of confusion. Some voiding difficulty.         Objective     Objective:     /65   Pulse 64   Temp 98.2 °F (36.8 °C) (Oral)   Resp 16   Ht 177.8 cm (70\")   Wt 85.1 kg (187 lb 11.2 oz)   SpO2 97%   BMI 26.93 kg/m²     Intake/Output Summary (Last 24 hours) at 1/2/2020 0732  Last data filed at 1/1/2020 1158  Gross per 24 hour   Intake 80 ml   Output 150 ml   Net -70 ml       CV:  Rhythm  regular and rate regular   L:  Mild rhonchi to auscultation bilaterally   Abd:  Bowel sounds positive , soft, nontender  Ext:  No cyanosis, clubbing, edema    Recent labs that are back at this time have been reviewed. WBC normal. Creatinine improved.       Assessment/Plan     Assessment/ Plan:    Hospital Problem List     * (Principal) Pancreatitis - Resolving. OK to go to rehab from my standpoint, with outpatient follow up to discuss when or if we perform CCY. Overall, while he seems to be doing better from his acute issue, he appears to be deteriorating in overall condition and strength. Perhaps it may be reasonable to discuss overall goals of care again with the family, as they have been reluctant to pursue aggressive interventions in the past. It may be a more humane care plan to allow the patient to go home with Hospice rather than pursue an unlikely recovery with aggressive placement and interventions.      Dementia (CMS/[facility])        Hypothyroid    Coronary artery disease involving native coronary artery of native heart without angina pectoris        A-fib (CMS/[facility])    Overview Signed 12/29/2017 11:55 AM by Emely Box APRN     · Postoperative 2003         Benign prostatic hyperplasia without lower urinary tract symptoms    Type 2 diabetes mellitus without complication, without long-term current use of insulin (CMS/[facility])        " Gastroesophageal reflux disease    Parkinson's disease (CMS/HCC)    ANGELA on CPAP    Cardiomyopathy (EF 30%)        Mixed hyperlipidemia        Sepsis (CMS/HCC)    Bacteremia due to Streptococcus    Acute renal failure superimposed on stage 3 chronic kidney disease (CMS/HCC)        Acute cholecystitis    Acute pancreatitis    Overview Signed 12/29/2019  7:13 PM by Ish Mercado MD     Added automatically from request for surgery [id number]                   Agusto Jack MD  1/2/2020  7:32 AM

## 2020-01-02 NOTE — CONSULTS
4FR PICC placed by ASTER YE and CRISTELA YE in the right arm, X-Ray ordered to confirm PICC tip.

## 2020-01-02 NOTE — PLAN OF CARE
Problem: Patient Care Overview  Goal: Plan of Care Review  Outcome: Ongoing (interventions implemented as appropriate)  Flowsheets (Taken 1/2/2020 1020)  Outcome Summary: Pt currently max x 2 for bed mobility and toilet txfr. Pt oriented to self and has difficulty followiing directiions. Pt dependent for toilet hygeine and UB dressiing. Will need SNF at dischare.

## 2020-01-02 NOTE — OP NOTE
Date of service 1/2/20    Preop Dx - phimosis and inability to place FC  Postop Dx - same    Procedure - difficult urethral catheter placement  Anesthesia - local  Complication - none    Indications - Male admitted for pancreatitis, now unable to void and found to have nonpassable phimosis by RN.    Findings - tight phimosis, glans not visible,FC inserted with clear drainage and moderate resistance    Procedure - placed supine, genitalia prepped and draped sterilly.Viscous xylocaine used.  Catheter guide placed in 16 Fr manriquez catheter which was then manually guided into preputial space and urethra.  Advanced to bladder with moderate resistance at level of prostate.

## 2020-01-02 NOTE — PLAN OF CARE
Problem: Patient Care Overview  Goal: Plan of Care Review  Outcome: Ongoing (interventions implemented as appropriate)  Flowsheets (Taken 1/2/2020 4433)  Outcome Summary: Pt presents with fatigue and lethargy this afternoon.  Pt max A of 2 to dependent of 2 for bed mobility.  Mechanical lift to chair.  Unable to assess STS due to lethargy.  Family educated on BLE ther-ex.  SNF at [facility]

## 2020-01-03 NOTE — PLAN OF CARE
Lethargic.  Arouses only briefly with interventions.  Rambling speech intermittent but able to converse with family some.  Able to follow commands intermittent.  Respirations unlabored.  Monitor SB w/BBB.  No signs of distress.  Meds administered whole.  Appetite poor with minimal bites only.  Najera in place with jl urine.  PICC infusing.  Palliative and Hospice consulted.  Family bedside entire shift and will make decision tomorrow regarding plan of care.      Problem: Skin Injury Risk (Adult)  Goal: Identify Related Risk Factors and Signs and Symptoms  Outcome: Ongoing (interventions implemented as appropriate)  Flowsheets (Taken 1/3/2020 1815)  Related Risk Factors (Skin Injury Risk): advanced age; cognitive impairment; hospitalization prolonged; mechanical forces; medical devices; mobility impaired; nutritional deficiencies  Goal: Skin Health and Integrity  Outcome: Ongoing (interventions implemented as appropriate)  Flowsheets (Taken 1/3/2020 1815)  Skin Health and Integrity: making progress toward outcome     Problem: Sepsis/Septic Shock (Adult)  Goal: Signs and Symptoms of Listed Potential Problems Will be Absent, Minimized or Managed (Sepsis/Septic Shock)  Outcome: Ongoing (interventions implemented as appropriate)  Flowsheets (Taken 1/3/2020 1815)  Problems Assessed (Sepsis): all  Problems Present (Sepsis): undernutrition; situational response     Problem: Pancreatitis, Acute/Chronic (Adult)  Goal: Signs and Symptoms of Listed Potential Problems Will be Absent, Minimized or Managed (Pancreatitis, Acute/Chronic)  Outcome: Ongoing (interventions implemented as appropriate)  Flowsheets  Taken 12/29/2019 1905  Problems Assessed (Pancreatitis): all  Taken 1/3/2020 1815  Problems Present (Pancreatitis): situational response

## 2020-01-03 NOTE — PLAN OF CARE
Problem: Patient Care Overview  Goal: Interprofessional Rounds/Family Conf  Outcome: Ongoing (interventions implemented as appropriate)  Flowsheets (Taken 1/3/2020 1300)  Participants: advanced practice nurse; nursing; physician; social work/services  Note:   New Palliative consutl from Dr. Zackary Jack for GOC and Hospice consult on 1/3/2020 at 0756 and seen by Pina Schwarz, RN, CHPN and Dr. Dorcas Montoya Palliative/Hospice on 1/3/2020 at 1220. Patient sleeping but does wake, he gets restless, agitated, eating small sips and bites. Family does not want aggressive care and want to focus on his comfort. He has been declining at home. Dr. Montoya did meet with patient, his wife, son and daughter. They were offered in-patient Hospice and she left them to discuss what they want. Daughter was given our brochure and business card. Palliative will continue to follow for symptoms, support and goals.

## 2020-01-03 NOTE — PLAN OF CARE
New palliative care consult for GOC/ACP. Family meeting with Dr. Montoya. Family deciding on in-patient Hospice. I gave them Palliative brochure, handout, business and meal discount card.

## 2020-01-03 NOTE — PROGRESS NOTES
Continued Stay Note  [facility]     Patient Name: Murali Dennis  MRN: [id number]  Today's Date: 1/3/2020    Admit Date: 12/27/2019    Discharge Plan     Row Name 01/03/20 0853       Plan    Plan  Home     Patient/Family in Agreement with Plan  yes    Plan Comments  Spoke with wife and son at bedside. They are consulting with Palliative/Hospice today. CM will continue to follow.    Final Discharge Disposition Code  01 - home or self-care        Discharge Codes    No documentation.             Vernon Hurd RN

## 2020-01-03 NOTE — PROGRESS NOTES
[facility] Medicine Services  PROGRESS NOTE    Patient Name: Murali Dennis  : 1929  MRN: [id number]    Date of Admission: 2019  Primary Care Physician: Diana Null APRN    Subjective   Subjective     CC:  Follow up for pancreatitis, bacteremia    HPI:  No acute events overnight. He was still restless and has not been eating very much. Denies fever/chills. Has not had a bowel movement, denies abdominal pain.    Review of Systems  CV- No chest pain, palpitations  Resp- No cough, dyspnea      Objective   Objective     Vital Signs:   Temp:  [97.7 °F (36.5 °C)-98.1 °F (36.7 °C)] 97.8 °F (36.6 °C)  Heart Rate:  [56-63] 59  Resp:  [16-18] 18  BP: (124-138)/(67-70) 124/70        Physical Exam:  Constitutional: No acute distress, awake, alert  HENT: NCAT, mucous membranes moist  Respiratory: Clear to auscultation bilaterally, respiratory effort normal on room air   Cardiovascular: RRR, no murmurs,  Gastrointestinal: Positive bowel sounds, soft, nontender, mild distension   Psychiatric: Appropriate affect, cooperative  Neurologic: Oriented to self, speech clear  Skin: No rashes on exposed skin    Results Reviewed:    Results from last 7 days   Lab Units 20  0728 20  0547 19  0616  19  0657 19  1804   WBC 10*3/mm3 5.91 5.44 8.70   < > 15.49*  --    HEMOGLOBIN g/dL 9.8* 10.8* 11.2*   < > 11.1*  --    HEMATOCRIT % 29.0* 32.6* 34.7*   < > 35.0*  --    PLATELETS 10*3/mm3 107* 114* 106*   < > 113*  --    PROCALCITONIN ng/mL  --   --   --   --  21.53* 17.44*    < > = values in this interval not displayed.     Results from last 7 days   Lab Units 20  0728 20  0547 19  0616 19  0849  19  1938   SODIUM mmol/L 140 141 139 140   < > 142   POTASSIUM mmol/L 3.3* 3.6 3.8 4.4   < > 3.8   CHLORIDE mmol/L 109* 110* 107 109*   < > 103   CO2 mmol/L 19.0* 18.0* 19.0* 20.0*   < > 23.0   BUN mg/dL 42* 51* 60* 57*   < > 26*   CREATININE  mg/dL 1.32* 1.53* 2.17* 2.47*   < > 1.90*   GLUCOSE mg/dL 134* 137* 176* 167*   < > 129*   CALCIUM mg/dL 8.4 8.9 8.6 8.8   < > 9.3   ALT (SGPT) U/L 12  --  33 76*   < > 24   AST (SGOT) U/L 40  --  36 65*   < > 189*   TROPONIN T ng/mL  --   --   --   --   --  0.011    < > = values in this interval not displayed.     Estimated Creatinine Clearance: 45.8 mL/min (A) (by C-G formula based on SCr of 1.32 mg/dL (H)).    Microbiology Results Abnormal     Procedure Component Value - Date/Time    Blood Culture - Blood, Wrist, Right [[id number]]  (Abnormal) Collected:  12/28/19 1244    Lab Status:  Final result Specimen:  Blood from Wrist, Right Updated:  12/31/19 0618     Blood Culture Streptococcus gallolyticus ssp pasteurianus     Comment: Refer to previous blood culture collected on 12/28 at 1244 for AMY's            Gram Stain Aerobic Bottle Gram positive cocci in chains    Narrative:       Aerobic bottle only      Blood Culture - Blood, Hand, Right [[id number]]  (Abnormal)  (Susceptibility) Collected:  12/28/19 1244    Lab Status:  Final result Specimen:  Blood from Hand, Right Updated:  12/31/19 0615     Blood Culture Streptococcus gallolyticus ssp pasteurianus     Gram Stain Aerobic Bottle Gram positive cocci in chains    Narrative:       Aerobic bottle only      Susceptibility      Streptococcus gallolyticus ssp pasteurianus     AMY     Ceftriaxone Susceptible     Penicillin G Susceptible     Vancomycin Susceptible                    Blood Culture ID, PCR - Blood, Hand, Right [[id number]]  (Abnormal) Collected:  12/28/19 1244    Lab Status:  Final result Specimen:  Blood from Hand, Right Updated:  12/29/19 0700     BCID, PCR Streptococcus spp, not A, B, or pneumoniae. Identification by BCID PCR.          Imaging Results (Last 24 Hours)     Procedure Component Value Units Date/Time    XR Chest 1 View [[id number]] Collected:  01/02/20 1235     Updated:  01/02/20 1525    Narrative:       EXAMINATION: XR CHEST 1 VW-       INDICATION: PICC tip placement; K85.90-Acute pancreatitis without  necrosis or infection, unspecified; N17.9-Acute kidney failure,  unspecified; R13.10-Dysphagia, unspecified; Z74.09-Other reduced  mobility.      COMPARISON: Chest x-ray 12/29/2019.     FINDINGS: Cardiac size enlarged status post median sternotomy and CABG  with right arm PICC now in place terminating within the distal SVC. No  pneumothorax. Bibasilar opacifications right greater than left with  probable trace bilateral pleural effusions similar to prior.           Impression:       Right arm PICC placement terminating within the distal SVC.  No postprocedural pneumothorax. Pulmonary findings otherwise are grossly  unchanged from prior.     D:  01/02/2020  E:  01/02/2020     This report was finalized on 1/2/2020 3:22 PM by Dr. Lauri Montelongo.             Results for orders placed during the hospital encounter of 12/27/19   Adult Transthoracic Echo Complete W/ Cont if Necessary Per Protocol    Narrative · The left ventricular cavity is moderately dilated.  · Right ventricular cavity is mild-to-moderately dilated.  · Left atrial cavity size is moderate-to-severely dilated.  · Moderate aortic valve regurgitation is present.  · Moderate mitral valve regurgitation is present.  · Mild to moderate tricuspid valve regurgitation is present.  · Calculated right ventricular systolic pressure from tricuspid   regurgitation is 35 mmHg.  · Estimated EF = 30%.  · Left ventricular systolic function is severely decreased.  · Left ventricular diastolic dysfunction (grade II) consistent with   pseudonormalization.  · The findings are consistent with dilated cardiomyopathy.  · Mild pulmonary hypertension is present.  · There is no evidence of pericardial effusion.  · The aortic valve exhibits moderate sclerosis without stenosis.          I have reviewed the medications:  Scheduled Meds:  amiodarone 200 mg Oral Daily   aspirin 81 mg Oral Daily   carbidopa-levodopa 1  tablet Oral TID   cefTRIAXone 2 g Intravenous Q24H   clopidogrel 75 mg Oral Daily   finasteride 5 mg Oral Daily   levothyroxine 75 mcg Oral Q AM   metroNIDAZOLE 500 mg Intravenous Q8H   multivitamin with minerals 1 tablet Oral Daily   QUEtiapine 100 mg Oral Nightly   sennosides-docusate 2 tablet Oral Nightly   sodium chloride 10 mL Intravenous Q12H     Continuous Infusions:  sodium chloride 125 mL/hr Last Rate: 125 mL/hr (01/03/20 0915)     PRN Meds:.•  acetaminophen  •  fentanyl  •  ipratropium-albuterol  •  lactated ringers  •  magic mouthwash  •  Morphine  •  ondansetron **OR** ondansetron  •  oxyCODONE-acetaminophen  •  simethicone  •  sodium chloride  •  [COMPLETED] Insert peripheral IV **AND** sodium chloride  •  sodium chloride  •  sodium chloride      Assessment/Plan   Assessment & Plan     Active Hospital Problems    Diagnosis  POA   • **Pancreatitis [K85.90]  Yes   • Bacteremia due to Streptococcus [R78.81, B95.5]  Yes   • Acute renal failure superimposed on stage 3 chronic kidney disease (CMS/[facility]) [N17.9, N18.3]  No   • Acute cholecystitis [K81.0]  Yes   • Sepsis (CMS/[facility]) [A41.9]  Yes   • Acute pancreatitis [K85.90]  Yes   • Mixed hyperlipidemia [E78.2]  Yes   • Cardiomyopathy (EF 30%) [I42.9]  Yes   • ANGELA on CPAP [G47.33, Z99.89]  Not Applicable   • Parkinson's disease (CMS/[facility]) [G20]  Yes   • Gastroesophageal reflux disease [K21.9]  Yes   • Type 2 diabetes mellitus without complication, without long-term current use of insulin (CMS/[facility]) [E11.9]  Yes   • Benign prostatic hyperplasia without lower urinary tract symptoms [N40.0]  Yes   • A-fib (CMS/[facility]) [I48.91]  Yes   • Coronary artery disease involving native coronary artery of native heart without angina pectoris [I25.10]  Yes   • Hypothyroid [E03.9]  Yes   • Dementia (CMS/[facility]) [F03.90]  Yes      Resolved Hospital Problems    Diagnosis Date Resolved POA   • Sinus bradycardia [R00.1] 12/29/2019 Yes        Brief Hospital Course to date:  Murali GUZMÁN  Sonny is a 89 y.o. male with PMH significant for CKDIII, parkinson's disease with dementia, CAD, atrial fibrillation, T2DM, and hypothyroidism who was admitted on 12/27/2019 for sepsis 2/2 acute pancreatitis due to choledocholithiasis and cholangitis and strep bacteremia.     Acute pancreatitis  Choledocholithiasis and cholangitis  Sepsis due to Strep bacteremia  -S/p ERCP on 12/30 with balloonsweep and sphincterotomy  -s/p PICC placed yesterday - continue ceftriaxone and metronidazole  -continue IVF- encouraged po intake as needed      HELIO on CKDIII- Cr improving, continue to monitor CMP qAM and avoid nephrotoxins, restart IVF today      Urinary retention and penile edema   -s/p manriquez catheter today, continue while patient is not ambulating on his own      Alzheimers dementia - still restless overnight, seroquel did not seem to help, EKG with prolonged QT- stop seroquel and recheck EKG tomorrow   -PT/OT following - will need SNF but now pending palliative care/hospice     Hx of atrial fibrillation   -continue amiodarone 200 mg po qd, not on anticoagulation     CAD   -PCI ~ 1 yr ago ; had cath here on 1/18/2018 with critical stenosis in SVG to circumflex obtuse marginal treated with alpine ANITA  -continue asa and statin - plavix was held due to possible surgery - no surgery  Planned  -restart plavix today         DVT Prophylaxis:  mechanical     Disposition: I expect the patient to be discharged pending placement and improvement in infection. Brought up possibility of palliative care yesterday due to his recent decline in mental and clinical status, it seems like they are more on board with it today per Dr. Jack's note. Palliative care consulted for goals of care discussion.    CODE STATUS:   Code Status and Medical Interventions:   Ordered at: 12/27/19 4604     Limited Support to NOT Include:    Intubation     Level Of Support Discussed With:    Health Care Surrogate     Code Status:    No CPR     Medical  Interventions (Level of Support Prior to Arrest):    Limited         Electronically signed by Kayla Lozoya MD, 01/03/20, 10:36 AM.

## 2020-01-03 NOTE — PROGRESS NOTES
INFECTIOUS DISEASE Progress Note     Murali Dennis  4/20/1929  [id number]      Admission Date: 12/27/2019      Requesting Provider: No ref. provider found  Evaluating Physician: Ish Tijerina MD    Reason for Consultation: sepsis     History of present illness:  12/30/19: Patient is a 89 y.o. male, who presented to the ED with complaints of abdominal tenderness which began on Friday evening.   He has been having decreased appetite over the past several weeks and has lost 30 pounds unintentionally over the past year.  His family denies any fever, cough, sputum production, nausea, vomiting, dysuria prior to admission.    An abdominal CT on admission with distended gallbladder, with mild to moderate diffuse acute pancreatitis, mild intrahepatic bile duct dilatation. He underwent an MRCP with gallbladder distention, and possible mass in neck of gallbladder, with changes of pancreatitis.  Admitting labs with lipase of >3000, Scr 1.19, WBC 13, 000., lactate of 2.9,  Blood cultures now positive for Strep species.  He is currently on Vancomycin and Cefepime and we were consulted for evaluation and treatment.     He has a history of penicillin allergy which his wife indicates consisted of arm swelling after injection.  12/31/19:  He is resting quietly.  Daughter says he was confused and restless/aggitated several times during the evening.    1/1/19: Family is very concerned that PT hasn't been working with him, and he is so weak.  Still having difficulty with swallowing.  He had a bowel movement earlier this am. Was confused earlier this am per son. He remains afebrile.   1/2/20:  His manriquez was removed and he is unable to urinate.  He was very aggitated and confused yesterday and last pm was given haldol and he is quiet now. Son at bedside.  He complains of abdominal pain.  His PICC line was ordered yesterday but he was not cooperative with placement of the PICC line.  1/3/20:  Manriquez was replaced by urology  yesterday.  Family is considering hospice/palliative.     Past Medical History:   Diagnosis Date   • Alzheimer disease (CMS/[facility])    • Warren esophagus     followed  with GI in [place]   • Benign prostatic hyperplasia without lower urinary tract symptoms 10/17/2018   • Chronic congestive heart failure (CMS/HCC) 12/8/2017     echo report from 8/2/17 EF 60-65%, mild TR, mild AR ([place], [place]) Echocardiogram 12/9/17: EF 28%, mild to moderate MR    • COPD (chronic obstructive pulmonary disease) (CMS/[facility])    • Coronary artery disease 2003   • Dementia (CMS/[facility]) 2003   • Diarrhea 2/24/2019   • Environmental allergies 10/17/2018   • Essential hypertension 10/17/2018   • Gastroesophageal reflux disease 11/19/2018   • GERD (gastroesophageal reflux disease)    • Hypothyroid     started after amiodarone use in 2003   • Sleep apnea     wears cpap   • Stroke (CMS/[facility]) 2003   • TIA (transient ischemic attack) 12/29/2017   • Unintended weight loss 10/17/2018       Past Surgical History:   Procedure Laterality Date   • CARDIAC CATHETERIZATION  2003   • CARDIAC CATHETERIZATION N/A 1/19/2018    Procedure: Left Heart Cath;  Surgeon: Hollis Ugarte MD;  Location:  [facility];  Service:    • CARPAL TUNNEL RELEASE Right    • CATARACT EXTRACTION Bilateral    • CORONARY ARTERY BYPASS GRAFT  2003    Triple Bypass, @ [facility] @ [place], [place]   • ERCP N/A 12/30/2019    Procedure: ENDOSCOPIC RETROGRADE CHOLANGIOPANCREATOGRAPHY;  Surgeon: Arsh White MD;  Location:  [facility] ENDOSCOPY;  Service: Gastroenterology   • HEMORRHOIDECTOMY     • HERNIA REPAIR         Family History   Problem Relation Age of Onset   • Cancer Mother    • Heart disease Sister    • Diabetes Sister    • Heart disease Brother    • Cancer Brother    • Stroke Sister        Social History     Socioeconomic History   • Marital status:      Spouse name: Not on file   • Number of children: Not on  file   • Years of education: Not on file   • Highest education level: Not on file   Occupational History   • Occupation: Retired   Tobacco Use   • Smoking status: Former Smoker     Last attempt to quit: 1967     Years since quittin.0   • Smokeless tobacco: Never Used   Substance and Sexual Activity   • Alcohol use: No   • Drug use: No   • Sexual activity: Defer   Social History Narrative    Caffeine use: 1 serving daily.    Patient lives at home with family.     Lives with wife, daughter close by for any needs       Allergies   Allergen Reactions   • Benzodiazepines Other (See Comments)     Paradoxical response   • Codeine Other (See Comments)     unknown   • Fluoxetine Other (See Comments)     Paradoxical response   • Lipitor [Atorvastatin] Other (See Comments)     Myalgias     • Metoclopramide Other (See Comments)     Unknown   • Nabumetone    • Penicillins Swelling and Other (See Comments)     Has tolerated cefepime 2019   • Prozac [Fluoxetine Hcl]    • Tramadol Other (See Comments)     unknown   • Valium [Diazepam] Other (See Comments)     Paradoxical response     • Sulfa Antibiotics Rash         Medication:    Current Facility-Administered Medications:   •  acetaminophen (TYLENOL) tablet 650 mg, 650 mg, Oral, Q6H PRN, Arsh White MD, 650 mg at 19 0544  •  amiodarone (PACERONE) tablet 200 mg, 200 mg, Oral, Daily, Arsh White MD, 200 mg at 20 0914  •  aspirin chewable tablet 81 mg, 81 mg, Oral, Daily, Arsh White MD, 81 mg at 20 0914  •  bisacodyl (DULCOLAX) suppository 10 mg, 10 mg, Rectal, Daily PRN, Kayla Lozoya MD  •  carbidopa-levodopa (SINEMET)  MG per tablet 1 tablet, 1 tablet, Oral, TID, Arsh White MD, 1 tablet at 20 1645  •  clopidogrel (PLAVIX) tablet 75 mg, 75 mg, Oral, Daily, Kayla Lozoya MD, 75 mg at 20 0914  •  fentaNYL citrate (PF) (SUBLIMAZE) injection 50 mcg, 50 mcg,  Intravenous, Q5 Min PRN, Isak Dudley CRNA  •  finasteride (PROSCAR) tablet 5 mg, 5 mg, Oral, Daily, Arsh White MD, 5 mg at 01/03/20 0915  •  ipratropium-albuterol (DUO-NEB) nebulizer solution 3 mL, 3 mL, Nebulization, Q4H PRN, Arsh White MD, 3 mL at 12/30/19 0345  •  lactated ringers bolus 500 mL, 500 mL, Intravenous, Once PRN, Isak Dudley CRNA  •  levothyroxine (SYNTHROID, LEVOTHROID) tablet 75 mcg, 75 mcg, Oral, Q AM, Nesha Rey MD, 75 mcg at 01/03/20 0619  •  magic mouthwash oral supsension 5 mL, 5 mL, Swish & Spit, Q4H PRN, Kayla Lozoya MD, 5 mL at 01/02/20 0233  •  Morphine sulfate (PF) injection 2 mg, 2 mg, Intravenous, Q2H PRN, Arsh White MD, 2 mg at 12/31/19 1304  •  multivitamin with minerals 1 tablet, 1 tablet, Oral, Daily, Arsh White MD, 1 tablet at 01/03/20 0914  •  ondansetron (ZOFRAN) tablet 4 mg, 4 mg, Oral, Q6H PRN **OR** ondansetron (ZOFRAN) injection 4 mg, 4 mg, Intravenous, Q6H PRN, Arsh White MD  •  oxyCODONE-acetaminophen (PERCOCET) 7.5-325 MG per tablet 1 tablet, 1 tablet, Oral, Q4H PRN, Arsh White MD, 1 tablet at 12/31/19 2121  •  sennosides-docusate (PERICOLACE) 8.6-50 MG per tablet 2 tablet, 2 tablet, Oral, Nightly, Kayla Lozoya MD, 2 tablet at 01/02/20 2050  •  simethicone (MYLICON) chewable tablet 80 mg, 80 mg, Oral, 4x Daily PRN, Kayla Lozoya MD, 80 mg at 01/02/20 0233  •  sodium chloride 0.9 % flush 10 mL, 10 mL, Intravenous, PRN, Arsh White MD, 10 mL at 12/31/19 2122  •  [COMPLETED] Insert peripheral IV, , , Once **AND** sodium chloride 0.9 % flush 10 mL, 10 mL, Intravenous, PRN, Arsh White MD  •  sodium chloride 0.9 % flush 10 mL, 10 mL, Intravenous, Q12H, Ish Tijerina MD, 10 mL at 01/02/20 2051  •  sodium chloride 0.9 % flush 10 mL, 10 mL, Intravenous, PRN, Ish Tijerina MD  •  sodium chloride 0.9 %  flush 20 mL, 20 mL, Intravenous, PRN, Ish Tijerina MD  •  sodium chloride 0.9 % infusion, 125 mL/hr, Intravenous, Continuous, Kayla Lozoya MD, Last Rate: 125 mL/hr at 20 174, 125 mL/hr at 20 174    Antibiotics:  Anti-Infectives (From admission, onward)    Ordered     Dose/Rate Route Frequency Start Stop    19 0632  vancomycin 1500 mg/500 mL 0.9% NS IVPB ([facility])     Ordering Provider:  Colin Pyle, RPH    20 mg/kg × 78.4 kg  over 90 Minutes Intravenous Once 19 0730 19 0948    19 1226  cefepime (MAXIPIME) 2 g/100 mL 0.9% NS (mbp)     Ordering Provider:  Perla Melgar DO    2 g  200 mL/hr over 30 Minutes Intravenous Once 19 1315 19 1358            Review of Systems:  No reliable ROS from patient       Physical Exam:   Vital Signs  Temp (24hrs), Av.9 °F (36.6 °C), Min:97.7 °F (36.5 °C), Max:98.1 °F (36.7 °C)    Temp  Min: 97.7 °F (36.5 °C)  Max: 98.1 °F (36.7 °C)  BP  Min: 109/55  Max: 132/70  Pulse  Min: 55  Max: 64  Resp  Min: 16  Max: 18  SpO2  Min: 93 %  Max: 97 %    GENERAL: Somnolent and pale appearing  HEENT: Normocephalic, atraumatic.  PERRL No conjunctival injection. No icterus. Oropharynx clear without evidence of thrush or exudate. No evidence of peridontal disease.    HEART: RRR; + 2/6murmur, rubs, gallops.   LUNGS: Clear to auscultation bilaterally without wheezing, rales, rhonchi. Normal respiratory effort. Nonlabored. No dullness.  ABDOMEN: Soft,  Decreased tenderness  nondistended. Positive bowel sounds. No rebound or guarding.   EXT:  No cyanosis, clubbing or edema. No cord.  :  Najera catheter removed   MSK:  No joint effusions   SKIN: Warm and dry without cutaneous eruptions on Inspection/palpation.    NEURO: Somnolent and poorly responsive      Laboratory Data    Results from last 7 days   Lab Units 20  0728 20  0547 19  0616   WBC 10*3/mm3 5.91 5.44 8.70   HEMOGLOBIN g/dL 9.8* 10.8* 11.2*   HEMATOCRIT % 29.0*  32.6* 34.7*   PLATELETS 10*3/mm3 107* 114* 106*     Results from last 7 days   Lab Units 01/03/20  0728   SODIUM mmol/L 140   POTASSIUM mmol/L 3.3*   CHLORIDE mmol/L 109*   CO2 mmol/L 19.0*   BUN mg/dL 42*   CREATININE mg/dL 1.32*   GLUCOSE mg/dL 134*   CALCIUM mg/dL 8.4     Results from last 7 days   Lab Units 01/03/20  0728   ALK PHOS U/L 205*   BILIRUBIN mg/dL 0.8   ALT (SGPT) U/L 12   AST (SGOT) U/L 40             Results from last 7 days   Lab Units 12/29/19  0657   LACTATE mmol/L 1.3             Estimated Creatinine Clearance: 45.8 mL/min (A) (by C-G formula based on SCr of 1.32 mg/dL (H)).      Microbiology:  Blood Culture   Date Value Ref Range Status   12/28/2019 Gram Positive Cocci (A)  Preliminary   12/28/2019 Gram Positive Cocci (A)  Preliminary     BCID, PCR   Date Value Ref Range Status   12/28/2019 (C) No organism detected by BCID PCR. Final    Streptococcus spp, not A, B, or pneumoniae. Identification by BCID PCR.     BC Strep gallolyticus, ssp pasterurianus         Radiology:  Imaging Results (Last 72 Hours)     Procedure Component Value Units Date/Time    XR Chest 1 View [[id number]] Collected:  01/02/20 1235     Updated:  01/02/20 1525    Narrative:       EXAMINATION: XR CHEST 1 VW-      INDICATION: PICC tip placement; K85.90-Acute pancreatitis without  necrosis or infection, unspecified; N17.9-Acute kidney failure,  unspecified; R13.10-Dysphagia, unspecified; Z74.09-Other reduced  mobility.      COMPARISON: Chest x-ray 12/29/2019.     FINDINGS: Cardiac size enlarged status post median sternotomy and CABG  with right arm PICC now in place terminating within the distal SVC. No  pneumothorax. Bibasilar opacifications right greater than left with  probable trace bilateral pleural effusions similar to prior.           Impression:       Right arm PICC placement terminating within the distal SVC.  No postprocedural pneumothorax. Pulmonary findings otherwise are grossly  unchanged from prior.     D:   01/02/2020  E:  01/02/2020     This report was finalized on 1/2/2020 3:22 PM by Dr. Lauri Montelongo.               Impression:   1.  Severe sepsis- with leukocytosis, lactic acidosis, elevated pro calcitonin  acute kidney injury, known focus of infection   2.  Cholangitis- with associated streptococcal bacteremia and secondary to choledocholithiasis, s/p sphincterotomy  3.  Streptococcal bacteremia-  This is secondary to cholangitis.    4.  Acute pancreatitis  5.  Dementia   6.  Acute kidney injury/ATN-superimposed on chronic kidney disease, stage 3,   7.  Cardiomyopathy, EF 30%   8.  Leukocytosis/neutrophilia-improving  9. Urinary retention, manriquez placed by urology     His prognosis is extremely poor and hospice/comfort measures only would be appropriate.    PLAN/RECOMMENDATIONS:  1.  Discontinue ceftriaxone  2.  Discontinue Flagyl  3.  Hospice/comfort measures only    Dr. Tijerina has obtained the history, performed the physical exam and formulated the above treatment plan.         Ish Tijerina MD  1/3/2020  6:08 PM

## 2020-01-03 NOTE — CONSULTS
Diana Null, ARCADIO - PCP  Consulting physician: Dr. Jack    Chief Complaint   Patient presents with   • Abdominal Pain       HPI:   89yo male with dementia and Parkinson disease, as well as medical hx significant for CAD, AF, CKD, and DM.  Still walking with cane at home prior to admission but had been declining for months with decreased appetite and 30# weight loss.  Patient and wife moved to [place] from [place] about a year and a half ago to live near daughter Santa so she could assist with patient's care.    Patient admitted this hospitalization with abd pain.  Found to have cholangitis with strep bacteremia, choledocholithiasis s/p sphincterotomy, and pancreatitis.  Surgery and ID following.  Remains on abx but cholecystectomy deferred due to patient's fragile state.    Very weak.  Mainly sleeping at this point.  Not eating much.  Had a Magic Cup yesterday, a few sips of milkshake today.  Gets restless, agitated.  Has received seroquel.  No PRN pain medication administered over the last 2-3 days but did have significant doses in prior days of the hospitalization.  Najera catheter removed on 1/2 but was replaced by  on 1/3.     Patient seen with wife, daughter Santa, son Dominick and his wife present.  Dominick familiar with Hospice organization in [place] where he lives.    Meds reviewed.  Code status NO CPR, no intubation.  Comfort diet ordered.     Past Medical History:   Diagnosis Date   • Alzheimer disease (CMS/[facility])    • Warren esophagus     followed  with GI in [place]   • Benign prostatic hyperplasia without lower urinary tract symptoms 10/17/2018   • Chronic congestive heart failure (CMS/[facility]) 12/8/2017     echo report from 8/2/17 EF 60-65%, mild TR, mild AR ([place], [place]) Echocardiogram 12/9/17: EF 28%, mild to moderate MR    • COPD (chronic obstructive pulmonary disease) (CMS/[facility])    • Coronary artery disease 2003   • Dementia (CMS/[facility]) 2003   • Diarrhea 2/24/2019   •  Environmental allergies 10/17/2018   • Essential hypertension 10/17/2018   • Gastroesophageal reflux disease 11/19/2018   • GERD (gastroesophageal reflux disease)    • Hypothyroid     started after amiodarone use in 2003   • Sleep apnea     wears cpap   • Stroke (CMS/HCC) 2003   • TIA (transient ischemic attack) 12/29/2017   • Unintended weight loss 10/17/2018     Past Surgical History:   Procedure Laterality Date   • CARDIAC CATHETERIZATION  2003   • CARDIAC CATHETERIZATION N/A 1/19/2018    Procedure: Left Heart Cath;  Surgeon: Hollis Ugarte MD;  Location:  [facility];  Service:    • CARPAL TUNNEL RELEASE Right    • CATARACT EXTRACTION Bilateral    • CORONARY ARTERY BYPASS GRAFT  2003    Triple Bypass, @ [facility] [place], [place]   • ERCP N/A 12/30/2019    Procedure: ENDOSCOPIC RETROGRADE CHOLANGIOPANCREATOGRAPHY;  Surgeon: Arsh Whiet MD;  Location:  [facility] ENDOSCOPY;  Service: Gastroenterology   • HEMORRHOIDECTOMY     • HERNIA REPAIR       Current Code Status     Date Active Code Status Order ID Comments User Context       12/27/2019 2313 No CPR [id number]  Amy Morse APRN ED       Questions for Current Code Status     Question Answer Comment    Code Status No CPR     Medical Interventions (Level of Support Prior to Arrest) Limited     Limited Support to NOT Include Intubation     Level Of Support Discussed With Health Care Surrogate         Current Facility-Administered Medications   Medication Dose Route Frequency Provider Last Rate Last Dose   • acetaminophen (TYLENOL) tablet 650 mg  650 mg Oral Q6H PRN Arsh White MD   650 mg at 12/29/19 0544   • amiodarone (PACERONE) tablet 200 mg  200 mg Oral Daily Arsh White MD   200 mg at 01/03/20 0914   • aspirin chewable tablet 81 mg  81 mg Oral Daily Arsh White MD   81 mg at 01/03/20 0914   • bisacodyl (DULCOLAX) suppository 10 mg  10 mg Rectal Daily  PRN Kayla Lozoya MD       • carbidopa-levodopa (SINEMET)  MG per tablet 1 tablet  1 tablet Oral TID Arsh White MD   1 tablet at 01/03/20 1645   • cefTRIAXone (ROCEPHIN) 2 g/100 mL 0.9% NS VTB (ANIYA)  2 g Intravenous Q24H Kayla Lozoya MD   2 g at 01/03/20 1645   • clopidogrel (PLAVIX) tablet 75 mg  75 mg Oral Daily Kayla Lozoya MD   75 mg at 01/03/20 0914   • fentaNYL citrate (PF) (SUBLIMAZE) injection 50 mcg  50 mcg Intravenous Q5 Min PRN Isak Dudley CRNA       • finasteride (PROSCAR) tablet 5 mg  5 mg Oral Daily Arsh White MD   5 mg at 01/03/20 0915   • ipratropium-albuterol (DUO-NEB) nebulizer solution 3 mL  3 mL Nebulization Q4H PRN Arsh White MD   3 mL at 12/30/19 0345   • lactated ringers bolus 500 mL  500 mL Intravenous Once PRN Isak Dudley CRNA       • levothyroxine (SYNTHROID, LEVOTHROID) tablet 75 mcg  75 mcg Oral Q AM Nesha Rey MD   75 mcg at 01/03/20 0619   • magic mouthwash oral supsension 5 mL  5 mL Swish & Spit Q4H PRN Kayla Lozoya MD   5 mL at 01/02/20 0233   • metroNIDAZOLE (FLAGYL) 500 mg/100mL IVPB  500 mg Intravenous Q8H Kayla Lozoya MD   500 mg at 01/03/20 0915   • Morphine sulfate (PF) injection 2 mg  2 mg Intravenous Q2H PRN Arsh White MD   2 mg at 12/31/19 1304   • multivitamin with minerals 1 tablet  1 tablet Oral Daily Arsh White MD   1 tablet at 01/03/20 0914   • ondansetron (ZOFRAN) tablet 4 mg  4 mg Oral Q6H PRN Arsh White MD        Or   • ondansetron (ZOFRAN) injection 4 mg  4 mg Intravenous Q6H PRN Arsh White MD       • oxyCODONE-acetaminophen (PERCOCET) 7.5-325 MG per tablet 1 tablet  1 tablet Oral Q4H PRN Arsh White MD   1 tablet at 12/31/19 2121   • sennosides-docusate (PERICOLACE) 8.6-50 MG per tablet 2 tablet  2 tablet Oral Nightly Kayla Lozoya MD   2 tablet at 01/02/20 2050   • simethicone  (MYLICON) chewable tablet 80 mg  80 mg Oral 4x Daily PRN Kayla Lozoya MD   80 mg at 01/02/20 0233   • sodium chloride 0.9 % flush 10 mL  10 mL Intravenous PRN Arsh White MD   10 mL at 12/31/19 2122   • sodium chloride 0.9 % flush 10 mL  10 mL Intravenous PRN Arsh White MD       • sodium chloride 0.9 % flush 10 mL  10 mL Intravenous Q12H Ish Tijerina MD   10 mL at 01/02/20 2051   • sodium chloride 0.9 % flush 10 mL  10 mL Intravenous PRN Ish Tijerina MD       • sodium chloride 0.9 % flush 20 mL  20 mL Intravenous PRN Ish Tijerina MD       • sodium chloride 0.9 % infusion  125 mL/hr Intravenous Continuous Kayla Lozoya  mL/hr at 01/03/20 0915 125 mL/hr at 01/03/20 0915       sodium chloride 125 mL/hr Last Rate: 125 mL/hr (01/03/20 0915)     •  acetaminophen  •  bisacodyl  •  fentanyl  •  ipratropium-albuterol  •  lactated ringers  •  magic mouthwash  •  Morphine  •  ondansetron **OR** ondansetron  •  oxyCODONE-acetaminophen  •  simethicone  •  sodium chloride  •  [COMPLETED] Insert peripheral IV **AND** sodium chloride  •  sodium chloride  •  sodium chloride     Allergies   Allergen Reactions   • Benzodiazepines Other (See Comments)     Paradoxical response   • Codeine Other (See Comments)     unknown   • Fluoxetine Other (See Comments)     Paradoxical response   • Lipitor [Atorvastatin] Other (See Comments)     Myalgias     • Metoclopramide Other (See Comments)     Unknown   • Nabumetone    • Penicillins Swelling and Other (See Comments)     Has tolerated cefepime 12/2019   • Prozac [Fluoxetine Hcl]    • Tramadol Other (See Comments)     unknown   • Valium [Diazepam] Other (See Comments)     Paradoxical response     • Sulfa Antibiotics Rash     Family History   Problem Relation Age of Onset   • Cancer Mother    • Heart disease Sister    • Diabetes Sister    • Heart disease Brother    • Cancer Brother    • Stroke Sister      Social History  "    Socioeconomic History   • Marital status:      Spouse name: Not on file   • Number of children: Not on file   • Years of education: Not on file   • Highest education level: Not on file   Occupational History   • Occupation: Retired   Tobacco Use   • Smoking status: Former Smoker     Last attempt to quit: 1967     Years since quittin.0   • Smokeless tobacco: Never Used   Substance and Sexual Activity   • Alcohol use: No   • Drug use: No   • Sexual activity: Defer   Social History Narrative    Caffeine use: 1 serving daily.    Patient lives at home with family.     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  .  Daughter Santa here in [place] and son Dominick in [place].  Has 2 other adults children - 1 in [place], 1 in [place].     Review of Systems - pt unable to provide due to AMS.    /63 (BP Location: Left arm, Patient Position: Lying)   Pulse 64   Temp 97.8 °F (36.6 °C) (Oral)   Resp 18   Ht 177.8 cm (70\")   Wt 87.1 kg (192 lb)   SpO2 93%   BMI 27.55 kg/m²     Intake/Output Summary (Last 24 hours) at 1/3/2020 1719  Last data filed at 1/3/2020 1645  Gross per 24 hour   Intake 1200 ml   Output 825 ml   Net 375 ml     Physical Exam:  Gen: elderly male, ill appearing, in bed, NAD, sleeping unless stimulated  Head/Neck: NC/AT, trachea midline  Eyes: closed, normal lids and lashes  ENT: mucous membranes moist  Respiratory: CTAB, diminished bases, respirations not labored  Cardiovascular: RRR, no murmur  Gastrointestinal: soft, nontender, hypoactive  Psychiatric: calm, face relaxed  Neurologic: sleepy but arousable, face symmetric, answers some but speech often not related to questioning, no myoclonus  Skin: warm, dry       Results from last 7 days   Lab Units 20  0728   WBC 10*3/mm3 5.91   HEMOGLOBIN g/dL 9.8*   HEMATOCRIT % 29.0*   PLATELETS 10*3/mm3 107*     Results from last 7 days   Lab Units 20  0728 "   SODIUM mmol/L 140   POTASSIUM mmol/L 3.3*   CHLORIDE mmol/L 109*   CO2 mmol/L 19.0*   BUN mg/dL 42*   CREATININE mg/dL 1.32*   CALCIUM mg/dL 8.4   BILIRUBIN mg/dL 0.8   ALK PHOS U/L 205*   ALT (SGPT) U/L 12   AST (SGOT) U/L 40   GLUCOSE mg/dL 134*     Results from last 7 days   Lab Units 01/03/20  0728   SODIUM mmol/L 140   POTASSIUM mmol/L 3.3*   CHLORIDE mmol/L 109*   CO2 mmol/L 19.0*   BUN mg/dL 42*   CREATININE mg/dL 1.32*   GLUCOSE mg/dL 134*   CALCIUM mg/dL 8.4     Imaging Results (Last 72 Hours)     Procedure Component Value Units Date/Time    XR Chest 1 View [[id number]] Collected:  01/02/20 1235     Updated:  01/02/20 1525    Narrative:       EXAMINATION: XR CHEST 1 VW-      INDICATION: PICC tip placement; K85.90-Acute pancreatitis without  necrosis or infection, unspecified; N17.9-Acute kidney failure,  unspecified; R13.10-Dysphagia, unspecified; Z74.09-Other reduced  mobility.      COMPARISON: Chest x-ray 12/29/2019.     FINDINGS: Cardiac size enlarged status post median sternotomy and CABG  with right arm PICC now in place terminating within the distal SVC. No  pneumothorax. Bibasilar opacifications right greater than left with  probable trace bilateral pleural effusions similar to prior.           Impression:       Right arm PICC placement terminating within the distal SVC.  No postprocedural pneumothorax. Pulmonary findings otherwise are grossly  unchanged from prior.     D:  01/02/2020  E:  01/02/2020     This report was finalized on 1/2/2020 3:22 PM by Dr. Lauri Montelongo.           Impression:   89yo male with dementia and Parkinson disease, declining over recent months.  Markedly weak, debilitated this hospitalization with sepsis from cholangitis, bacteremia, choledocholithiasis and pancreatitis.    Symptoms:  Restlessness, Agitation  Acute on chronic encephalopathy  Debility  Anorexia    Plan:   -Long discussion w/ family at bedside.  Discussed Palliative Care vs Hospice Care.  -Reviewed patient's  clinical status and prognosis.  Did state that patient is eligible for Hospice at this time, including inpatient care at [facility].  Explained that would entail comfort focused care only provided by the Hospice care team, in contrast to current care w/ Hospitalist and consultants.  Also explained that inpatient Hospice is meant for short length of stay.  They understand comfort focused care would mean no IV abx, IVF or artificial nutrition.  -Family also had questions about what home w/ hospice would entail and I explained outpatient Hospice care and services as a Medicare benefit.  -Family very much leaning toward comfort focused care but would like to take tonight to think about options and discuss further.    -Will have Hospice f/u tomorrow.    Dorcas Montoya MD  01/03/20  5:19 PM

## 2020-01-03 NOTE — PROGRESS NOTES
"Patient Name:  Murali Dennis  YOB: 1929  [id number]    Surgery Progress Note    Date of visit: 1/3/2020    Subjective   Subjective: Patient had PICC placed yesterday. He also required Urology consult for Najera placement.         Objective     Objective:     /70 (BP Location: Left arm, Patient Position: Lying)   Pulse 59   Temp 97.8 °F (36.6 °C) (Axillary)   Resp 18   Ht 177.8 cm (70\")   Wt 87.1 kg (192 lb)   SpO2 96%   BMI 27.55 kg/m²     Intake/Output Summary (Last 24 hours) at 1/3/2020 0754  Last data filed at 1/3/2020 0600  Gross per 24 hour   Intake 1080 ml   Output 2080 ml   Net -1000 ml       CV:  Rhythm  regular and rate regular   L:  Rhonchi to auscultation bilaterally   Abd:  Bowel sounds positive , soft, nontender  Ext:  No cyanosis, clubbing, edema    Recent labs that are back at this time have been reviewed.        Assessment/Plan     Assessment/ Plan:    Hospital Problem List     * (Principal) Pancreatitis - Stable. I had an extensive discussion with the patient's son at the bedside.  He indicates that the family does not want any surgical intervention for his gallbladder, as they do not think he would tolerate it.  I also discussed a change to a more palliative care plan for the patient, including the possibility of hospice care, and he seems very agreeable to this.  As such I will consult palliative care for goals of care discussions with the family.      Dementia (CMS/[facility])        Hypothyroid    Coronary artery disease involving native coronary artery of native heart without angina pectoris        A-fib (CMS/[facility])    Overview Signed 12/29/2017 11:55 AM by Emely Box APRN     · Postoperative 2003         Benign prostatic hyperplasia without lower urinary tract symptoms    Type 2 diabetes mellitus without complication, without long-term current use of insulin (CMS/[facility])        Gastroesophageal reflux disease    Parkinson's disease (CMS/[facility])    ANGELA on CPAP    " Cardiomyopathy (EF 30%)        Mixed hyperlipidemia        Sepsis (CMS/HCC)    Bacteremia due to Streptococcus    Acute renal failure superimposed on stage 3 chronic kidney disease (CMS/HCC)        Acute cholecystitis    Acute pancreatitis    Overview Signed 12/29/2019  7:13 PM by Ish Mercado MD     Added automatically from request for surgery [id number]                   Agusto Jack MD  1/3/2020  7:54 AM

## 2020-01-03 NOTE — PROGRESS NOTES
Adult Nutrition  Assessment/PES    Patient Name:  Murali Dennis  YOB: 1929  MRN: [id number]  Admit Date:  12/27/2019    Assessment Date:  1/3/2020      Reason for Assessment     Row Name 01/03/20 1538          Reason for Assessment    Reason For Assessment  follow-up protocol   25 mins     Diagnosis  -- per notes this adm             Labs/Tests/Procedures/Meds     Row Name 01/03/20 1542          Labs/Procedures/Meds    Lab Results Reviewed  reviewed   noted low K today; rec replace per protocol.             Nutrition Prescription Ordered     Row Name 01/03/20 1539          Nutrition Prescription PO    Current PO Diet  Regular     Supplement  Magic Cup nsg reports pt is consuming magic cup supplement. Pt also has milkshake at bedside (brought by [facility]) that nsg states pt has been consuming.   No family/friends in room with pt at time of visit.     Supplement Frequency  3 times a day         Evaluation of Received Nutrient/Fluid Intake     Row Name 01/03/20 1541          PO Evaluation    Number of Days PO Intake Evaluated  Insufficient Data               Problem/Interventions:  Problem 1     Row Name 01/03/20 1543          Nutrition Diagnoses Problem 1    Problem 1  -- unable to determine at this time; insufficient data avail r/t po intake               Intervention Goal     Row Name 01/03/20 1544          Intervention Goal    PO  Establish PO         Nutrition Intervention     Row Name 01/03/20 1544          Nutrition Intervention    RD/Tech Action  Supplement provided;Follow Tx progress;Encourage intake           Education/Evaluation     Row Name 01/03/20 1544          Monitor/Evaluation    Monitor  Per protocol           Electronically signed by:  Chelita Gibbons MS,RD,LD  01/03/20 3:44 PM

## 2020-01-04 NOTE — DISCHARGE PLACEMENT REQUEST
"Murali Dennis (89 y.o. Male)     Date of Birth Social Security Number Address Home Phone MRN    04/20/1929  [address] [place] [id number] [phone] [id number]    Mu-ism Marital Status          None        Admission Date Admission Type Admitting Provider Attending Provider Department, Room/Bed    12/27/19 Emergency Kayla Lozoya MD Anciro, Audree, MD [facility] 3F, S312/1    Discharge Date Discharge Disposition Discharge Destination                       Attending Provider:  Kayla Lozoya MD    Allergies:  Benzodiazepines, Codeine, Fluoxetine, Lipitor [Atorvastatin], Metoclopramide, Nabumetone, Penicillins, Prozac [Fluoxetine Hcl], Tramadol, Valium [Diazepam], Sulfa Antibiotics    Isolation:  None   Infection:  None   Code Status:  No CPR    Ht:  177.8 cm (70\")   Wt:  87.1 kg (192 lb)    Admission Cmt:  None   Principal Problem:  Pancreatitis [K85.90]                 Active Insurance as of 12/27/2019     Primary Coverage     Payor Plan Insurance Group Employer/Plan Group    MEDICARE MEDICARE A & B      Payor Plan Address Payor Plan Phone Number Payor Plan Fax Number Effective Dates    [address] [phone]  4/1/1994 - None Entered    Formerly [facility] [id number]       Subscriber Name Subscriber Birth Date Member ID       MURALI DENNIS 4/20/1929 [id number]           Secondary Coverage     Payor Plan Insurance Group Employer/Plan Group    ANTHEM BLUE CROSS ANTHEM BLUE CROSS BLUE SHIELD PPO [id number]     Payor Plan Address Payor Plan Phone Number Payor Plan Fax Number Effective Dates    [address] [phone]  7/1/2019 - None Entered    [facility] [id number]       Subscriber Name Subscriber Birth Date Member ID       MURALI DENNIS 4/20/1929 [id number]                 Emergency Contacts      (Rel.) Home Phone Work Phone Mobile Phone    Ron Dennisth (Power of ) [phone] -- [phone]    TalaveraSanta rockwell (Daughter) " "[phone] -- --            Emergency Contact Information     Name Relation Home Work Mobile    Nelly Dennis Power of  [phone]    Santa Talavera Daughter [phone]            Insurance Information                MEDICARE/MEDICARE A & B Phone: [phone]    Subscriber: Murali Dennis Subscriber#: [id number]    Group#:  Precert#:         ANTHEM BLUE CROSS/ANTHEM BLUE CROSS BLUE SHIELD PPO Phone: [phone]    Subscriber: Murali Dennis Subscriber#: [id number]    Group#: [id number] Precert#:              History & Physical      Trina Verdin DO at [phone]          [facility] Medicine Services  HISTORY AND PHYSICAL    Patient Name: Murali Dennis  : 1929  MRN: [id number]  Primary Care Physician: Diana Null APRN    Subjective   Subjective     Chief Complaint:  Abdominal pain     HPI:  Murali Dennis is a 89 y.o. male with a past medical history significant for alzheimer disease, matthews esophagus, BPH, COPD, CAD, Parkinson disease, essential hypertension, ANGELA, CVA, GERD and hypothyroidism presents to the ED with complaints of abdominal pain that started around 1700 today.  Family reports noticing the patient had a \"yellow color to him.\"  They report patient had been feeling fine with no complaints up until today.  He complained of right periumbilical \"deep pain\" with radiation to his back.  They deny any nausea, vomiting, dysuria, hematuria, chest pain, shortness of breath, or palpitations however family does report chills along with poor appetite with a 30 lb unintentional weight loss over the past year.  Wife also reports intermittent diarrhea over the past month. Workup in the ED reveals an ALT of 24, AST of 189, Tbili of 1.4 and lipase of >3000.  Patient will be admitted to [facility] under the care of the Hospitalist for further evaluation and treatment.     Review of Systems   Constitutional: Positive for chills " and unexpected weight change. Negative for activity change, appetite change, diaphoresis, fatigue and fever.   HENT: Negative.    Eyes: Negative for photophobia and visual disturbance.   Respiratory: Positive for cough. Negative for chest tightness and shortness of breath.    Cardiovascular: Negative for chest pain, palpitations and leg swelling.   Gastrointestinal: Positive for abdominal pain and diarrhea. Negative for abdominal distention, blood in stool, constipation, nausea and vomiting.   Genitourinary: Negative.    Musculoskeletal: Positive for back pain. Negative for neck pain and neck stiffness.   Skin: Negative.    Neurological: Negative.    Psychiatric/Behavioral: Negative.         Otherwise 10-system ROS reviewed and is negative except as mentioned in the HPI.    Personal History     Past Medical History:   Diagnosis Date   • Alzheimer disease (CMS/Formerly [facility])    • Warren esophagus     followed  with GI in [place]   • Benign prostatic hyperplasia without lower urinary tract symptoms 10/17/2018   • COPD (chronic obstructive pulmonary disease) (CMS/Formerly [facility])    • Coronary artery disease 2003   • Dementia (CMS/Formerly [facility]) 2003   • Diarrhea 2/24/2019   • Environmental allergies 10/17/2018   • Essential hypertension 10/17/2018   • Gastroesophageal reflux disease 11/19/2018   • GERD (gastroesophageal reflux disease)    • Hypothyroid     started after amiodarone use in 2003   • Sleep apnea     wears cpap   • Stroke (CMS/Formerly [facility]) 2003   • TIA (transient ischemic attack) 12/29/2017   • Unintended weight loss 10/17/2018       Past Surgical History:   Procedure Laterality Date   • CARDIAC CATHETERIZATION  2003   • CARDIAC CATHETERIZATION N/A 1/19/2018    Procedure: Left Heart Cath;  Surgeon: Hollis Ugarte MD;  Location: [facility] CATH INVASIVE LOCATION;  Service:    • CARPAL TUNNEL RELEASE Right    • CATARACT EXTRACTION Bilateral    • CORONARY ARTERY BYPASS GRAFT  2003    Triple Bypass, @ [facility] @ [place], [place]   •  HEMORRHOIDECTOMY     • HERNIA REPAIR         Family History: family history includes Cancer in his brother and mother; Diabetes in his sister; Heart disease in his brother and sister; Stroke in his sister.     Social History:  reports that he quit smoking about 52 years ago. He has never used smokeless tobacco. He reports that he does not drink alcohol or use drugs.    Medications:  Medications Prior to Admission   Medication Sig Dispense Refill Last Dose   • acetaminophen (TYLENOL) 325 MG tablet Take 650 mg by mouth At Night As Needed for Mild Pain .   Taking   • amiodarone (PACERONE) 200 MG tablet Take 1 tablet by mouth Daily. 30 tablet 5 Taking   • aspirin 81 MG chewable tablet Chew 81 mg Daily.   Taking   • carbidopa-levodopa (SINEMET)  MG per tablet Take 1 tablet by mouth 3 (Three) Times a Day. 90 tablet 11 Taking   • cholecalciferol (VITAMIN D3) 1000 units tablet Take 1,000 Units by mouth Daily.   Taking   • clopidogrel (PLAVIX) 75 MG tablet Take 75 mg by mouth Daily.   Taking   • diclofenac (VOLTAREN) 1 % gel gel Apply 4 g topically to the appropriate area as directed 4 (Four) Times a Day. Small amount to affected area 300 g 3 Taking   • dutasteride (AVODART) 0.5 MG capsule TAKE 1 CAPSULE BY MOUTH EVERY DAY 30 capsule 0 Taking   • dutasteride (AVODART) 0.5 MG capsule TAKE 1 CAPSULE BY MOUTH EVERY DAY 30 capsule 2 Taking   • glucose blood test strip 1 each by Other route Daily. Use as instructed 100 each 12 Taking   • glucose monitor monitoring kit 1 each Daily. 1 each 0 Taking   • ipratropium-albuterol (DUO-NEB) 0.5-2.5 mg/mL nebulizer Take 3 mL by nebulization Every 4 (Four) Hours As Needed for Shortness of Air (CBS PROTOCOL). 360 mL  Taking   • Lancets (FREESTYLE) lancets 1 each by Other route Daily. May dispense any brand needed E11.9 100 each 12 Taking   • levothyroxine (SYNTHROID, LEVOTHROID) 75 MCG tablet TAKE 1 TABLET BY MOUTH EVERY DAY 90 tablet 0    • Loratadine 10 MG capsule Take 1 capsule by  mouth Daily As Needed.   Taking   • memantine (NAMENDA) 10 MG tablet Take 1 tablet by mouth 2 (Two) Times a Day. 60 tablet 11 Taking   • midodrine (PROAMATINE) 10 MG tablet Take 1 tablet by mouth Every 8 (Eight) Hours. 90 tablet 5 Taking   • mirtazapine (REMERON) 30 MG tablet TAKE 1 TABLET BY MOUTH EVERYDAY AT BEDTIME 90 tablet 0    • Multiple Vitamins-Minerals (MULTIVITAL PO) Take 1 tablet by mouth Daily.   Taking   • nitroglycerin (NITROSTAT) 0.4 MG SL tablet Place 0.4 mg under the tongue Every 5 (Five) Minutes As Needed for Chest Pain. Take no more than 3 doses in 15 minutes.   Taking   • omeprazole (priLOSEC) 20 MG capsule TAKE 1 CAPSULE BY MOUTH EVERY DAY 30 capsule 5 Taking   • pravastatin (PRAVACHOL) 20 MG tablet TAKE 1 TABLET BY MOUTH EVERY DAY 90 tablet 0    • sacubitril-valsartan (ENTRESTO) 24-26 MG tablet Take 0.5 tablets by mouth 2 (Two) Times a Day. 60 tablet  Taking   • vitamin E 400 UNIT capsule Take 400 Units by mouth Daily.   Taking       Allergies   Allergen Reactions   • Benzodiazepines Other (See Comments)     Paradoxical response   • Codeine Other (See Comments)     unknown   • Fluoxetine Other (See Comments)     Paradoxical response   • Lipitor [Atorvastatin] Other (See Comments)     Myalgias     • Metoclopramide Other (See Comments)     Unknown   • Nabumetone    • Penicillins Swelling and Other (See Comments)   • Prozac [Fluoxetine Hcl]    • Tramadol Other (See Comments)     unknown   • Valium [Diazepam] Other (See Comments)     Paradoxical response     • Sulfa Antibiotics Rash       Objective   Objective     Vital Signs:   Temp:  [97.8 °F (36.6 °C)] 97.8 °F (36.6 °C)  Heart Rate:  [55-74] 74  Resp:  [16] 16  BP: (128-182)/(60-90) 182/81        Physical Exam   Constitutional: He appears well-developed and well-nourished. No distress.   Alert, answers questions appropriately.  [place]    HENT:   Head: Normocephalic and atraumatic.   Eyes: Pupils are equal, round, and reactive to light. EOM are  normal. Right eye exhibits no discharge. Left eye exhibits no discharge. Scleral icterus is present.   Neck: Normal range of motion. Neck supple. JVD present. No tracheal deviation present. No thyromegaly present.   Cardiovascular: Normal rate, regular rhythm, normal heart sounds and intact distal pulses. Exam reveals no gallop and no friction rub.   No murmur heard.  Pulmonary/Chest: Effort normal. No stridor. No respiratory distress. He has no wheezes. He has rales. He exhibits no tenderness.   Fine crackles to RLL    Abdominal: Soft. Bowel sounds are normal. He exhibits no distension and no mass. There is tenderness. There is no rebound and no guarding. No hernia.   Right upper quadrant and epigastric pain with palpation    Musculoskeletal: Normal range of motion. He exhibits no edema, tenderness or deformity.   Lymphadenopathy:     He has no cervical adenopathy.   Neurological:   Sleeping, awakes to verbal stimuli.  Oriented x3, family at bedside.    Skin: Skin is warm and dry. No rash noted. He is not diaphoretic. No erythema. No pallor.   Psychiatric: His behavior is normal.   Nursing note and vitals reviewed.       Results Reviewed:  I have personally reviewed current lab, radiology, and data and agree.    Results from last 7 days   Lab Units 12/27/19 1938   WBC 10*3/mm3 8.31   HEMOGLOBIN g/dL 13.0   HEMATOCRIT % 40.2   PLATELETS 10*3/mm3 175     Results from last 7 days   Lab Units 12/27/19 1938   SODIUM mmol/L 142   POTASSIUM mmol/L 3.8   CHLORIDE mmol/L 103   CO2 mmol/L 23.0   BUN mg/dL 26*   CREATININE mg/dL 1.90*   GLUCOSE mg/dL 129*   CALCIUM mg/dL 9.3   ALT (SGPT) U/L 24   AST (SGOT) U/L 189*     No results found for: BNP  No results found for: PHART, PCO2  Imaging Results (Last 24 Hours)     Procedure Component Value Units Date/Time    CT Abdomen Pelvis Without Contrast [[id number]] Collected:  12/28/19 0015     Updated:  12/28/19 0017    Narrative:       CT ABDOMEN AND PELVIS, NONCONTRAST,  12/27/2019    HISTORY:  89-year-old male in the ED after sudden onset periumbilical abdomen pain at 1700 hours today radiating into the right side of the abdomen. Markedly elevated serum lipase indicating acute pancreatitis.    TECHNIQUE:  CT imaging of the abdomen and pelvis without oral or IV contrast. Radiation dose reduction techniques included automated exposure control. Radiation audit for CT and nuclear cardiology exams in the last 12 months: 0.    ABDOMEN FINDINGS:  The gallbladder is markedly distended and shows diffuse wall thickening and a tiny amount of pericholecystic fluid. Sludge and/or dense stone material is present within the dependent portion of the gallbladder. There is mild intrahepatic bile duct  dilatation, but there is no visible dilatation of the distal extrahepatic bile duct.    Mild to moderate diffuse acute pancreatitis superimposed on a background of chronic diffuse fatty atrophy of the pancreas. Pancreatic and peripancreatic inflammatory soft tissue stranding, but no focal fluid collection. No visible pancreatic duct  dilatation. Hepatic and peripancreatic vasculature cannot be assessed without contrast.    Large hiatal hernia with fluid distended intrathoracic upper stomach. Consider NG tube decompression.    Liver and spleen are unremarkable. The kidneys are unobstructed. Normal caliber abdominal aorta. Small bowel and colon are normal in caliber and appearance, as imaged, with the exception of moderately severe sigmoid colonic diverticulosis. The appendix  is not directly visualized, there is no indirect CT evidence of acute appendicitis.    PELVIS FINDINGS:  Urinary bladder, prostate and rectum are unremarkable. No inguinal hernia.    Lung base images show scarring or atelectasis in the dependent posterior lung bases. No pleural effusion.      Impression:       1.  Mild to moderate diffuse acute pancreatitis. Background chronic fatty atrophy of the pancreas. No pancreatic ductal  dilatation.  2.  Abnormal gallbladder. The gallbladder is distended and shows diffuse wall thickening. Small quantities of dense material within the dependent portion of the gallbladder may represent gallstones and/or dense biliary sludge.  3.  Mild intrahepatic bile duct dilatation, but no visible extrahepatic bile duct dilatation. Consider MRCP if the patient has laboratory evidence of biliary obstruction.  4.  Large hiatal hernia with fluid distended intrathoracic stomach. Consider NG tube decompression.  5.  Lower colonic diverticulosis.    Signer Name: Vernon Urbano MD   Signed: 12/28/2019 12:15 AM   Workstation Name: [email or web address]    [facility] Gallbladder [[id number]] Collected:  12/27/19 2230     Updated:  12/27/19 2232    Narrative:        Abdomen Ltd 12/27/2019    INDICATION:   Abnormally elevated liver enzymes today. Elevated lipase. Generalized abdominal pain today.    COMPARISON:   None available.    FINDINGS:  PANCREAS: The pancreas is obscured due to overlying bowel gas.    LIVER: The echogenicity and echotexture of the hepatic parenchyma is within normal limits.. No hepatic mass. The intrahepatic bile ducts are normal in caliber. The common duct is dilated to 9 mm. No obstructing calculus or mass is seen, but the distal  aspect of the common duct and the pancreas are obscured by bowel gas. Clinical correlation is recommended. Consider correlation with CT scan.    GALLBLADDER: The gallbladder is distended, measuring 11.3 cm in greatest diameter. There is minimal sludge along the dependent portion of the gallbladder. No evidence of cholelithiasis or gallbladder wall thickening.    RIGHT KIDNEY: The right kidney measures 8.1 cm. There is no evidence of hydronephrosis. There is increased right renal cortical echogenicity characteristic of medical renal disease. 1.8 cm cyst upper pole right kidney. OTHER: No ascites.      Impression:         1. Distended gallbladder,  measuring 11.3 cm in greatest diameter. Minimal gallbladder sludge. No evidence of cholelithiasis or gallbladder wall thickening.  2. Dilatation of the common bile duct to 9 mm. No obstructing mass or calculus is seen, but the distal aspect of the common bile duct and the pancreas are obscured by bowel gas. If there is clinical concern for choledocholithiasis, consider correlation  with MRCP.  3. Increased right renal cortical echogenicity characteristic of medical renal disease. Small right renal cyst. No evidence of hydronephrosis.    Signer Name: Isak Sofia MD   Signed: 12/27/2019 10:30 PM   Workstation Name: [id number]    [facility]        Results for orders placed during the hospital encounter of 02/24/19   Adult Transthoracic Echo Complete W/ Cont if Necessary Per Protocol    Narrative · There is biatrial enlargement. LV size is at the upper limits of normal.  · Global and segmental LV wall motion abnormalities c/w CAD are noted.  · The estimated LV ejection fraction is 26%-30%.  · There is aortic valve sclerosis with trace aortic insufficiency.  · Moderate mitral regurgitation is present.  · Mild PA hypertension is suggested.          Assessment/Plan   Assessment / Plan     Active Hospital Problems    Diagnosis   • **Pancreatitis   • Elevated transaminase level   • Sinus bradycardia   • Mixed hyperlipidemia   • Cardiomyopathy (EF 30%)   • ANGELA on CPAP   • Gastroesophageal reflux disease   • Type 2 diabetes mellitus without complication, without long-term current use of insulin (CMS/[facility])   • Benign prostatic hyperplasia without lower urinary tract symptoms   • A-fib (CMS/HCC)     · Postoperative 2003     • Stage 3 chronic kidney disease (CMS/[facility])   • Chronic congestive heart failure (CMS/HCC)     ·  echo report from 8/2/17 EF 60-65%, mild TR, mild AR ([place], [place])  · Echocardiogram 12/9/17: EF 28%, mild to moderate MR  ·      • Coronary artery disease involving  native coronary artery of native heart without angina pectoris   • Hypothyroid   • Dementia (CMS/HCC)         Assessment & Plan:  89  Year old male presents to the ED with family due to acute onset of abdominal pain that began around 1700 today.      1) Acute pancreatitis  -? Gallstone pancreatitis  -labs as mentioned above  -CT of abdomen and pelvis pending  -US of gallbladder noted above  -will obtain MRCP tonight, if MRCP shows noted gallstone, and patient is candidate for ERCP: will need to hold plavix.  Obtain records from Dr. Ugarte: wife reports recent outpatient heart cath with stent placement.   -consult general surgery and GI in am  -NPO after midnight.   -gentle hydration due to underlying CHF with EF of 30%  -strict I &O  -pain control  -prn anti-emetics    2) CKD III  -baseline creatinine 1.7-1.9  -hold nephrotoxic medications  -monitor, repeat labs in am    3) Systolic congestive heart failure  -echo 2/24/2019 showed EF of 26-30% with global and segmental LV wall motion abnormalities, moderate MR and mild PA hypertension is noted   -on entresto  -strict I &O   -daily weight    4) diabetes mellitus type 2  -check hgb A1c  -start ldssi   -fingersticks achs    5) History of atrial fibrillation  -on amiodarone  -Currently in NSR    6) BPH  -on avodart    7) Hypothyroidism  -on synthroid    8) parkinson disease  -on sinemet  -follows with neurology    9) Alzheimer disease  -on namenda     10) CAD  -wife reports cath with stent about a year ago  -per our records: last cath was 1/18/2018 with critical stenosis in SVG to circumflex obtuse marginal treated with alpine ANITA  -on plavix, ASA, statin   -follows with Dr. Ugarte      DVT prophylaxis:scds    CODE STATUS:  DNR/DNI  Code Status and Medical Interventions:   Ordered at: 12/27/19 2284     Limited Support to NOT Include:    Intubation     Level Of Support Discussed With:    Health Care Surrogate     Code Status:    No CPR     Medical Interventions (Level  "of Support Prior to Arrest):    Limited       Admission Status:  I believe this patient meets INPATIENT status due to acute pancreatitis.  I feel patient’s risk for adverse outcomes and need for care warrant INPATIENT evaluation and I predict the patient’s care encounter to likely last beyond 2 midnights.    Trina Verdin DO   12/28/19   1:32 AM        Attending   Admission Attestation       I have seen and examined the patient, performing an independent face-to-face diagnostic evaluation with plan of care reviewed and developed with the advanced practice clinician (APC).      Brief Summary Statement:   Murali Dennis is a 89 y.o. male with a PMH significant for dementia due to Alzheimer's disease/Parkinson's disease, BPH, COPD, CAD, HTN, ANGELA on CPAP, history of stroke, hypothyroidism who presents to the ED due to acute onset abdominal pain.  Patient is noncontributory to HPI, family at bedside provides history of present illness.  Around 5 PM this afternoon family noted the patient had an abnormal \"yellow color \".  On questioning, the patient had complaints of deep abdominal pain.  There were no reports of nausea, vomiting.  He has  intermittent diarrhea which has been stable recently.  Due to severe pain, he was brought to the ED for further evaluation.  Remainder of detailed HPI is as noted by APC and has been reviewed and/or edited by me for completeness.    Attending Physical Exam:  Constitutional: Somnolent but arousable  Eyes: PERRLA, sclerae anicteric, no conjunctival injection  HENT: NCAT, mucous membranes moist  Neck: Supple, no thyromegaly, no lymphadenopathy, trachea midline  Respiratory: Inspiratory crackles at right lung base  Cardiovascular: RRR, no murmurs, rubs, or gallops, palpable pedal pulses bilaterally  Gastrointestinal: Positive bowel sounds, soft, tenderness in the right upper quadrant with guarding, nondistended  Musculoskeletal: No bilateral ankle edema, no clubbing or cyanosis to " extremities  Psychiatric: Appropriate affect, cooperative  Neurologic: Oriented x person, able to move all 4 limbs spontaneously, Cranial Nerves grossly intact to confrontation, speech slow  Skin: No rashes      Brief Assessment/Plan :  See detailed assessment and plan developed with APC which I have reviewed and/or edited for completeness.    Electronically signed by Trina Verdin DO, 12/28/19, 1:32 AM.                Electronically signed by Trina Verdin DO at 12/28/19 0342

## 2020-01-04 NOTE — NURSING NOTE
Patient family came to nurses station stating patient is having a hard time breathing.  Assessed patient, patient states he has pressure at his heart.  Lungs are clear, no arrhythmia noted.  Patient states pain is really bad in chest.  Morphine given prn, patient belched relieving some of pain.  simethacone given for relief as well.  Charge nurse notified and house supervisor notified, no rapid called related to patient being palliative patient with hospice consult.

## 2020-01-04 NOTE — PROGRESS NOTES
Hospice nurse met with family to discuss GOC and possible hospice admission. Patient currently appears to be on an uptic. Eating a small amount and having increased interactions with family. Wife grand daughter  Son and Dr. DESTIN Lozoya  present during meeting. After reviewing records, assessment  and discussing Patients condition He is Hospice appropriate but does not appear to be in-patient appropriate at this time. Son very agitated and defensive over what he perceives as a contradiction in opinions given from multiple practitioners.  Meeting was concluded with resuming antibiotics and encouraging activity and intake to test patients progress. Hospice will continue to check in on patient and family to assess for further need.

## 2020-01-04 NOTE — PLAN OF CARE
VSS. Pt. Lethargic and frail. Pt. Alert x1 to self. Pt. Continues to tolerate PO meds well. NP notified of K+ trending down. K+ replacement protocol started. Family to consult with Hospice tomorrow. Will continue to monitor closely.

## 2020-01-04 NOTE — PROGRESS NOTES
[facility] Medicine Services  PROGRESS NOTE    Patient Name: Murali Dennis  : 1929  MRN: [id number]    Date of Admission: 2019  Primary Care Physician: Diana Null APRN    Subjective   Subjective     CC:  Follow-up for pancreatitis, bacteremia    HPI:  Family at bedside providing most of the history.  Patient was able to sleep at night.  And was in good spirits.  Denied any significant pain.  Currently is resting comfortably.  He did have some episodes where he was having trouble breathing and was put on oxygen.    Review of Systems  Unable to obtain due to patient's mental status  Objective   Objective     Vital Signs:   Temp:  [97.1 °F (36.2 °C)-98.3 °F (36.8 °C)] 98.1 °F (36.7 °C)  Heart Rate:  [54-64] 61  Resp:  [16-20] 18  BP: (112-122)/(61-78) 122/61        Physical Exam:  Constitutional: No acute distress, asleep, and resting comfortably  HENT: NCAT, mucous membranes moist  Respiratory: Clear to auscultation bilaterally, respiratory effort normal on nasal cannula  Cardiovascular: RRR, no murmurs  Gastrointestinal: Positive bowel sounds, soft, nontender, nondistended  Musculoskeletal: No bilateral ankle edema  Neurological: He will periodically open his eyes and answer questions appropriately    Results Reviewed:    Results from last 7 days   Lab Units 20  0728 20  0547 19  0616  19  0657 19  1804   WBC 10*3/mm3 5.91 5.44 8.70   < > 15.49*  --    HEMOGLOBIN g/dL 9.8* 10.8* 11.2*   < > 11.1*  --    HEMATOCRIT % 29.0* 32.6* 34.7*   < > 35.0*  --    PLATELETS 10*3/mm3 107* 114* 106*   < > 113*  --    PROCALCITONIN ng/mL  --   --   --   --  21.53* 17.44*    < > = values in this interval not displayed.     Results from last 7 days   Lab Units 20  1045 20  0728 20  0547 19  0616 19  0849   SODIUM mmol/L  --  140 141 139 140   POTASSIUM mmol/L 4.3 3.3* 3.6 3.8 4.4   CHLORIDE mmol/L  --  109* 110* 107 109*    CO2 mmol/L  --  19.0* 18.0* 19.0* 20.0*   BUN mg/dL  --  42* 51* 60* 57*   CREATININE mg/dL  --  1.32* 1.53* 2.17* 2.47*   GLUCOSE mg/dL  --  134* 137* 176* 167*   CALCIUM mg/dL  --  8.4 8.9 8.6 8.8   ALT (SGPT) U/L  --  12  --  33 76*   AST (SGOT) U/L  --  40  --  36 65*     Estimated Creatinine Clearance: 45.8 mL/min (A) (by C-G formula based on SCr of 1.32 mg/dL (H)).    Microbiology Results Abnormal     Procedure Component Value - Date/Time    Blood Culture - Blood, Wrist, Right [[id number]]  (Abnormal) Collected:  12/28/19 1244    Lab Status:  Final result Specimen:  Blood from Wrist, Right Updated:  12/31/19 0618     Blood Culture Streptococcus gallolyticus ssp pasteurianus     Comment: Refer to previous blood culture collected on 12/28 at [facility] for AMY's            Gram Stain Aerobic Bottle Gram positive cocci in chains    Narrative:       Aerobic bottle only      Blood Culture - Blood, Hand, Right [[id number]]  (Abnormal)  (Susceptibility) Collected:  12/28/19 1244    Lab Status:  Final result Specimen:  Blood from Hand, Right Updated:  12/31/19 0615     Blood Culture Streptococcus gallolyticus ssp pasteurianus     Gram Stain Aerobic Bottle Gram positive cocci in chains    Narrative:       Aerobic bottle only      Susceptibility      Streptococcus gallolyticus ssp pasteurianus     AMY     Ceftriaxone Susceptible     Penicillin G Susceptible     Vancomycin Susceptible                    Blood Culture ID, PCR - Blood, Hand, Right [[id number]]  (Abnormal) Collected:  12/28/19 1244    Lab Status:  Final result Specimen:  Blood from Hand, Right Updated:  12/29/19 0700     BCID, PCR Streptococcus spp, not A, B, or pneumoniae. Identification by BCID PCR.          Imaging Results (Last 24 Hours)     ** No results found for the last 24 hours. **          Results for orders placed during the hospital encounter of 12/27/19   Adult Transthoracic Echo Complete W/ Cont if Necessary Per Protocol    Narrative · The left  ventricular cavity is moderately dilated.  · Right ventricular cavity is mild-to-moderately dilated.  · Left atrial cavity size is moderate-to-severely dilated.  · Moderate aortic valve regurgitation is present.  · Moderate mitral valve regurgitation is present.  · Mild to moderate tricuspid valve regurgitation is present.  · Calculated right ventricular systolic pressure from tricuspid   regurgitation is 35 mmHg.  · Estimated EF = 30%.  · Left ventricular systolic function is severely decreased.  · Left ventricular diastolic dysfunction (grade II) consistent with   pseudonormalization.  · The findings are consistent with dilated cardiomyopathy.  · Mild pulmonary hypertension is present.  · There is no evidence of pericardial effusion.  · The aortic valve exhibits moderate sclerosis without stenosis.          I have reviewed the medications:  Scheduled Meds:  amiodarone 200 mg Oral Daily   aspirin 81 mg Oral Daily   carbidopa-levodopa 1 tablet Oral TID   clopidogrel 75 mg Oral Daily   finasteride 5 mg Oral Daily   levothyroxine 75 mcg Oral Q AM   multivitamin with minerals 1 tablet Oral Daily   sennosides-docusate 2 tablet Oral Nightly   sodium chloride 10 mL Intravenous Q12H     Continuous Infusions:  sodium chloride 125 mL/hr Last Rate: Stopped (01/04/20 0630)     PRN Meds:.•  acetaminophen  •  bisacodyl  •  fentanyl  •  ipratropium-albuterol  •  lactated ringers  •  magic mouthwash  •  magnesium sulfate **OR** magnesium sulfate **OR** magnesium sulfate  •  Morphine  •  ondansetron **OR** ondansetron  •  oxyCODONE-acetaminophen  •  potassium chloride **OR** potassium chloride **OR** potassium chloride  •  simethicone  •  sodium chloride  •  [COMPLETED] Insert peripheral IV **AND** sodium chloride  •  sodium chloride  •  sodium chloride      Assessment/Plan   Assessment & Plan     Active Hospital Problems    Diagnosis  POA   • **Pancreatitis [K85.90]  Yes   • Bacteremia due to Streptococcus [R78.81, B95.5]  Yes   •  Acute renal failure superimposed on stage 3 chronic kidney disease (CMS/[facility]) [N17.9, N18.3]  No   • Acute cholecystitis [K81.0]  Yes   • Sepsis (CMS/[facility]) [A41.9]  Yes   • Acute pancreatitis [K85.90]  Yes   • Mixed hyperlipidemia [E78.2]  Yes   • Cardiomyopathy (EF 30%) [I42.9]  Yes   • ANGELA on CPAP [G47.33, Z99.89]  Not Applicable   • Parkinson's disease (CMS/[facility]) [G20]  Yes   • Gastroesophageal reflux disease [K21.9]  Yes   • Type 2 diabetes mellitus without complication, without long-term current use of insulin (CMS/[facility]) [E11.9]  Yes   • Benign prostatic hyperplasia without lower urinary tract symptoms [N40.0]  Yes   • A-fib (CMS/[facility]) [I48.91]  Yes   • Coronary artery disease involving native coronary artery of native heart without angina pectoris [I25.10]  Yes   • Hypothyroid [E03.9]  Yes   • Dementia (CMS/[facility]) [F03.90]  Yes      Resolved Hospital Problems    Diagnosis Date Resolved POA   • Sinus bradycardia [R00.1] 12/29/2019 Yes        Brief Hospital Course to date:  Murali Dennis is a 89 y.o. male PMH significant for CKDIII, parkinson's disease with dementia, CAD, atrial fibrillation, T2DM, and hypothyroidism who was admitted on 12/27/2019 for sepsis 2/2 acute pancreatitis due to choledocholithiasis and cholangitis and strep bacteremia.    He has Alzheimer's dementia and his delirium continued to worsen and he has stopped eating enough to maintain nutrition, palliative care was consulted for goals of care discussion.  Family had opted for hospice this morning. This afternoon hospice nurse followed up with them today regarding further plans and did not think he was appropriate for hospice anymore. Family now wants to continue management so we will restart antibiotics and IVF.    Palliative care will continue to follow.      DVT Prophylaxis:  mechanical     Disposition: I expect the patient to be discharged pending clinical improvement.     CODE STATUS:   Code Status and Medical Interventions:   Ordered at:  12/27/19 2166     Limited Support to NOT Include:    Intubation     Level Of Support Discussed With:    Health Care Surrogate     Code Status:    No CPR     Medical Interventions (Level of Support Prior to Arrest):    Limited         Electronically signed by Kayla Lozoya MD, 01/04/20, 11:56 AM.

## 2020-01-04 NOTE — PLAN OF CARE
Lethargic majority shift.  Alert late afternoon and conversing with family.  C/o SOA beginning shift but no acute distress.  Cannula applied for comfort, prn neb given. Percocet, Zofran, and Lasix each administered. Urine output increased and SOA resolved.  Monitor SB w/BBB.  Appetite remained poor.  Family conference with hospice and medical.  Family bedside entire shift and still reluctant to finalize hospice as direction of care.       Problem: Skin Injury Risk (Adult)  Goal: Identify Related Risk Factors and Signs and Symptoms  Outcome: Ongoing (interventions implemented as appropriate)  Flowsheets (Taken 1/3/2020 1815)  Related Risk Factors (Skin Injury Risk): advanced age;cognitive impairment;hospitalization prolonged;mechanical forces;medical devices;mobility impaired;nutritional deficiencies  Goal: Skin Health and Integrity  Outcome: Ongoing (interventions implemented as appropriate)  Flowsheets (Taken 1/3/2020 1815)  Skin Health and Integrity: making progress toward outcome

## 2020-01-04 NOTE — PROGRESS NOTES
"Patient Name:  Murali Dennis  YOB: 1929  [id number]    Surgery Progress Note    Date of visit: 1/4/2020    Subjective   Subjective: Family has decided on a Palliative course. Antibiotics stopped and pt seen by Dr. Montoya.         Objective     Objective:     /65 (BP Location: Left arm, Patient Position: Lying)   Pulse 62   Temp 97.1 °F (36.2 °C) (Axillary)   Resp 20   Ht 177.8 cm (70\")   Wt 87.1 kg (192 lb)   SpO2 96%   BMI 27.55 kg/m²     Intake/Output Summary (Last 24 hours) at 1/4/2020 0748  Last data filed at 1/4/2020 0500  Gross per 24 hour   Intake 2935 ml   Output 775 ml   Net 2160 ml       CV:  Rhythm regular and rate regular   L:  Rhonchi to auscultation bilaterally   Abd:  Bowel sounds positive , soft, mild distention  Ext:  No cyanosis, clubbing, edema    Recent labs that are back at this time have been reviewed.        Assessment/Plan     Assessment/ Plan:    Hospital Problem List     * (Principal) Pancreatitis - Will sign off given decision to switch to Palliative care. Please call with questions.      Dementia (CMS/[facility])        Hypothyroid    Coronary artery disease involving native coronary artery of native heart without angina pectoris        A-fib (CMS/[facility])    Overview Signed 12/29/2017 11:55 AM by Emely Box APRN     · Postoperative 2003         Benign prostatic hyperplasia without lower urinary tract symptoms    Type 2 diabetes mellitus without complication, without long-term current use of insulin (CMS/[facility])            Parkinson's disease (CMS/HCC)    ANGELA on CPAP    Cardiomyopathy (EF 30%)        Mixed hyperlipidemia        Sepsis (CMS/H[facility])    Bacteremia due to Streptococcus    Acute renal failure superimposed on stage 3 chronic kidney disease (CMS/HCC)        Acute cholecystitis    Acute pancreatitis    Overview Signed 12/29/2019  7:13 PM by Ish Mercado MD     Added automatically from request for surgery [id number]                   Agusto Jack, " MD  1/4/2020  7:48 AM

## 2020-01-04 NOTE — PLAN OF CARE
Problem: Patient Care Overview  Goal: Interprofessional Rounds/Family Conf  1/4/2020 1608 by Nicky Cardoso MSW  Flowsheets (Taken 1/4/2020 1608)  Summary: Patient awake and engaging at time of palliative SW visit 1130 - family not present at time of visit, patient with no symptom complaints, pleasant and appeared comfortable.  Hospice nurse HANK Verde RN later met with patient, family and hospital medicine - given patient improved status today, plan is to continue current plan of care, Hospice will follow for appropriate level of hospice care; Palliative Care continues to be involved for assist with symptom management and plan of care.  Participants: advanced practice nurse; nursing; social work/services  1/4/2020 1608 by Nicky Cardoso MSW  Reactivated

## 2020-01-04 NOTE — PROGRESS NOTES
INFECTIOUS DISEASE Progress Note     Murali Dennis  4/20/1929  [id number]      Admission Date: 12/27/2019      Requesting Provider: No ref. provider found  Evaluating Physician: Ish Tijerina MD    Reason for Consultation: sepsis     History of present illness:  12/30/19: Patient is a 89 y.o. male, who presented to the ED with complaints of abdominal tenderness which began on Friday evening.   He has been having decreased appetite over the past several weeks and has lost 30 pounds unintentionally over the past year.  His family denies any fever, cough, sputum production, nausea, vomiting, dysuria prior to admission.    An abdominal CT on admission with distended gallbladder, with mild to moderate diffuse acute pancreatitis, mild intrahepatic bile duct dilatation. He underwent an MRCP with gallbladder distention, and possible mass in neck of gallbladder, with changes of pancreatitis.  Admitting labs with lipase of >3000, Scr 1.19, WBC 13, 000., lactate of 2.9,  Blood cultures now positive for Strep species.  He is currently on Vancomycin and Cefepime and we were consulted for evaluation and treatment.     He has a history of penicillin allergy which his wife indicates consisted of arm swelling after injection.  12/31/19:  He is resting quietly.  Daughter says he was confused and restless/aggitated several times during the evening.    1/1/19: Family is very concerned that PT hasn't been working with him, and he is so weak.  Still having difficulty with swallowing.  He had a bowel movement earlier this am. Was confused earlier this am per son. He remains afebrile.   1/2/20:  His manriquez was removed and he is unable to urinate.  He was very aggitated and confused yesterday and last pm was given haldol and he is quiet now. Son at bedside.  He complains of abdominal pain.  His PICC line was ordered yesterday but he was not cooperative with placement of the PICC line.  1/3/20:  Manriquez was replaced by urology  yesterday.  Family is considering hospice/palliative.   1/4/2020: His family has decided to proceed with hospice/comfort measures only.  They indicate that this was his choice.  He has remained afebrile overnight.  He has been somewhat dyspneic.    Past Medical History:   Diagnosis Date   • Alzheimer disease (CMS/[facility])    • Warren esophagus     followed  with GI in [place]   • Benign prostatic hyperplasia without lower urinary tract symptoms 10/17/2018   • Chronic congestive heart failure (CMS/[facility]) 12/8/2017     echo report from 8/2/17 EF 60-65%, mild TR, mild AR ([place], [place]) Echocardiogram 12/9/17: EF 28%, mild to moderate MR    • COPD (chronic obstructive pulmonary disease) (CMS/[facility])    • Coronary artery disease 2003   • Dementia (CMS/[facility]) 2003   • Diarrhea 2/24/2019   • Environmental allergies 10/17/2018   • Essential hypertension 10/17/2018   • Gastroesophageal reflux disease 11/19/2018   • GERD (gastroesophageal reflux disease)    • Hypothyroid     started after amiodarone use in 2003   • Sleep apnea     wears cpap   • Stroke (CMS/[facility]) 2003   • TIA (transient ischemic attack) 12/29/2017   • Unintended weight loss 10/17/2018       Past Surgical History:   Procedure Laterality Date   • CARDIAC CATHETERIZATION  2003   • CARDIAC CATHETERIZATION N/A 1/19/2018    Procedure: Left Heart Cath;  Surgeon: Hollis Ugarte MD;  Location:  [facility] CATH INVASIVE LOCATION;  Service:    • CARPAL TUNNEL RELEASE Right    • CATARACT EXTRACTION Bilateral    • CORONARY ARTERY BYPASS GRAFT  2003    Triple Bypass, @ [facility] @ [place], [place]   • ERCP N/A 12/30/2019    Procedure: ENDOSCOPIC RETROGRADE CHOLANGIOPANCREATOGRAPHY;  Surgeon: Arsh White MD;  Location:  [facility] ENDOSCOPY;  Service: Gastroenterology   • HEMORRHOIDECTOMY     • HERNIA REPAIR         Family History   Problem Relation Age of Onset   • Cancer Mother    • Heart disease Sister    • Diabetes Sister    • Heart  disease Brother    • Cancer Brother    • Stroke Sister        Social History     Socioeconomic History   • Marital status:      Spouse name: Not on file   • Number of children: Not on file   • Years of education: Not on file   • Highest education level: Not on file   Occupational History   • Occupation: Retired   Tobacco Use   • Smoking status: Former Smoker     Last attempt to quit: 1967     Years since quittin.0   • Smokeless tobacco: Never Used   Substance and Sexual Activity   • Alcohol use: No   • Drug use: No   • Sexual activity: Defer   Social History Narrative    Caffeine use: 1 serving daily.    Patient lives at home with family.     Lives with wife, daughter close by for any needs       Allergies   Allergen Reactions   • Benzodiazepines Other (See Comments)     Paradoxical response   • Codeine Other (See Comments)     unknown   • Fluoxetine Other (See Comments)     Paradoxical response   • Lipitor [Atorvastatin] Other (See Comments)     Myalgias     • Metoclopramide Other (See Comments)     Unknown   • Nabumetone    • Penicillins Swelling and Other (See Comments)     Has tolerated cefepime 2019   • Prozac [Fluoxetine Hcl]    • Tramadol Other (See Comments)     unknown   • Valium [Diazepam] Other (See Comments)     Paradoxical response     • Sulfa Antibiotics Rash         Medication:    Current Facility-Administered Medications:   •  acetaminophen (TYLENOL) tablet 650 mg, 650 mg, Oral, Q6H PRN, Arsh White MD, 650 mg at 19 0544  •  amiodarone (PACERONE) tablet 200 mg, 200 mg, Oral, Daily, Arsh White MD, 200 mg at 20 0914  •  aspirin chewable tablet 81 mg, 81 mg, Oral, Daily, Arsh White MD, 81 mg at 20 0914  •  bisacodyl (DULCOLAX) suppository 10 mg, 10 mg, Rectal, Daily PRN, Kayla Lozoya MD  •  carbidopa-levodopa (SINEMET)  MG per tablet 1 tablet, 1 tablet, Oral, TID, Arsh White MD, 1  tablet at 01/03/20 2145  •  clopidogrel (PLAVIX) tablet 75 mg, 75 mg, Oral, Daily, Kayla Lozoya MD, 75 mg at 01/03/20 0914  •  fentaNYL citrate (PF) (SUBLIMAZE) injection 50 mcg, 50 mcg, Intravenous, Q5 Min PRN, Isak Dudley CRNA  •  finasteride (PROSCAR) tablet 5 mg, 5 mg, Oral, Daily, Arsh White MD, 5 mg at 01/03/20 0915  •  ipratropium-albuterol (DUO-NEB) nebulizer solution 3 mL, 3 mL, Nebulization, Q4H PRN, Arsh White MD, 3 mL at 12/30/19 0345  •  lactated ringers bolus 500 mL, 500 mL, Intravenous, Once PRN, Isak Dudley CRNA  •  levothyroxine (SYNTHROID, LEVOTHROID) tablet 75 mcg, 75 mcg, Oral, Q AM, Nesha Rey MD, 75 mcg at 01/04/20 0556  •  magic mouthwash oral supsension 5 mL, 5 mL, Swish & Spit, Q4H PRN, Kayla Lozoya MD, 5 mL at 01/04/20 0053  •  Magnesium Sulfate 2 gram Bolus, followed by 8 gram infusion (total Mg dose 10 grams)- Mg less than or equal to 1mg/dL, 2 g, Intravenous, PRN **OR** Magnesium Sulfate 2 gram / 50mL Infusion (GIVE X 3 BAGS TO EQUAL 6GM TOTAL DOSE) - Mg 1.1 - 1.5 mg/dl, 2 g, Intravenous, PRN **OR** Magnesium Sulfate 4 gram infusion- Mg 1.6-1.9 mg/dL, 4 g, Intravenous, PRN, Severo Campbell, DO  •  Morphine sulfate (PF) injection 2 mg, 2 mg, Intravenous, Q2H PRN, Arsh White MD, 2 mg at 01/04/20 0621  •  multivitamin with minerals 1 tablet, 1 tablet, Oral, Daily, Arsh White MD, 1 tablet at 01/03/20 0914  •  ondansetron (ZOFRAN) tablet 4 mg, 4 mg, Oral, Q6H PRN **OR** ondansetron (ZOFRAN) injection 4 mg, 4 mg, Intravenous, Q6H PRN, Arsh White MD  •  oxyCODONE-acetaminophen (PERCOCET) 7.5-325 MG per tablet 1 tablet, 1 tablet, Oral, Q4H PRN, Arsh White MD, 1 tablet at 12/31/19 2121  •  potassium chloride (MICRO-K) CR capsule 40 mEq, 40 mEq, Oral, PRN, 40 mEq at 01/03/20 1409 **OR** potassium chloride (KLOR-CON) packet 40 mEq, 40 mEq, Oral, PRN **OR**  potassium chloride 10 mEq in 100 mL IVPB, 10 mEq, Intravenous, Q1H PRN, Severo Campbell, DO, Last Rate: 100 mL/hr at 20 0336, 10 mEq at 20 0336  •  sennosides-docusate (PERICOLACE) 8.6-50 MG per tablet 2 tablet, 2 tablet, Oral, Nightly, Kayla Lozoya MD, 2 tablet at 20 2146  •  simethicone (MYLICON) chewable tablet 80 mg, 80 mg, Oral, 4x Daily PRN, Kayla Lozoya MD, 80 mg at 20 0623  •  sodium chloride 0.9 % flush 10 mL, 10 mL, Intravenous, PRN, Arsh White MD, 10 mL at 19  •  [COMPLETED] Insert peripheral IV, , , Once **AND** sodium chloride 0.9 % flush 10 mL, 10 mL, Intravenous, PRN, Arsh White MD  •  sodium chloride 0.9 % flush 10 mL, 10 mL, Intravenous, Q12H, Ish Tijerina MD, 10 mL at 20  •  sodium chloride 0.9 % flush 10 mL, 10 mL, Intravenous, PRN, Ish Tijerina MD  •  sodium chloride 0.9 % flush 20 mL, 20 mL, Intravenous, PRN, Ish Tijerina MD  •  sodium chloride 0.9 % infusion, 125 mL/hr, Intravenous, Continuous, Kayla Lozoya MD, Stopped at 20 0630    Antibiotics:  Anti-Infectives (From admission, onward)    Ordered     Dose/Rate Route Frequency Start Stop    19 0632  vancomycin 1500 mg/500 mL 0.9% NS IVPB ([facility])     Ordering Provider:  Colin Pyle, RPH    20 mg/kg × 78.4 kg  over 90 Minutes Intravenous Once 19 0730 19 0948    19 1226  cefepime (MAXIPIME) 2 g/100 mL 0.9% NS (mbp)     Ordering Provider:  Perla Melgar DO    2 g  200 mL/hr over 30 Minutes Intravenous Once 19 1315 19 1358            Review of Systems:  No reliable ROS from patient       Physical Exam:   Vital Signs  Temp (24hrs), Av.8 °F (36.6 °C), Min:97.1 °F (36.2 °C), Max:98.3 °F (36.8 °C)    Temp  Min: 97.1 °F (36.2 °C)  Max: 98.3 °F (36.8 °C)  BP  Min: 109/55  Max: 122/65  Pulse  Min: 54  Max: 64  Resp  Min: 16  Max: 20  SpO2  Min: 93 %  Max: 99 %    GENERAL: Cachectic, ill, and  preterminal appearing  HEENT: No conjunctival injection. No icterus. Oropharynx clear without evidence of thrush or exudate.  HEART: RRR; + 2/6murmur, rubs, gallops.   LUNGS: Mild bilateral rhonchi  ABDOMEN: Soft and nontender  EXT:  No cyanosis, clubbing or edema. No cord.  :  Najera catheter removed   MSK:  No joint effusions   SKIN: Warm and dry without cutaneous eruptions on Inspection/palpation.    NEURO: Somnolent and poorly responsive      Laboratory Data    Results from last 7 days   Lab Units 01/03/20  0728 01/02/20  0547 12/31/19  0616   WBC 10*3/mm3 5.91 5.44 8.70   HEMOGLOBIN g/dL 9.8* 10.8* 11.2*   HEMATOCRIT % 29.0* 32.6* 34.7*   PLATELETS 10*3/mm3 107* 114* 106*     Results from last 7 days   Lab Units 01/03/20  0728   SODIUM mmol/L 140   POTASSIUM mmol/L 3.3*   CHLORIDE mmol/L 109*   CO2 mmol/L 19.0*   BUN mg/dL 42*   CREATININE mg/dL 1.32*   GLUCOSE mg/dL 134*   CALCIUM mg/dL 8.4     Results from last 7 days   Lab Units 01/03/20  0728   ALK PHOS U/L 205*   BILIRUBIN mg/dL 0.8   ALT (SGPT) U/L 12   AST (SGOT) U/L 40             Results from last 7 days   Lab Units 12/29/19  0657   LACTATE mmol/L 1.3             Estimated Creatinine Clearance: 45.8 mL/min (A) (by C-G formula based on SCr of 1.32 mg/dL (H)).      Microbiology:  Blood Culture   Date Value Ref Range Status   12/28/2019 Gram Positive Cocci (A)  Preliminary   12/28/2019 Gram Positive Cocci (A)  Preliminary     BCID, PCR   Date Value Ref Range Status   12/28/2019 (C) No organism detected by BCID PCR. Final    Streptococcus spp, not A, B, or pneumoniae. Identification by BCID PCR.     BC Strep gallolyticus, ssp pasterurianus         Radiology:  Imaging Results (Last 72 Hours)     Procedure Component Value Units Date/Time    XR Chest 1 View [[id number]] Collected:  01/02/20 1235     Updated:  01/02/20 1525    Narrative:       EXAMINATION: XR CHEST 1 VW-      INDICATION: PICC tip placement; K85.90-Acute pancreatitis without  necrosis or  infection, unspecified; N17.9-Acute kidney failure,  unspecified; R13.10-Dysphagia, unspecified; Z74.09-Other reduced  mobility.      COMPARISON: Chest x-ray 12/29/2019.     FINDINGS: Cardiac size enlarged status post median sternotomy and CABG  with right arm PICC now in place terminating within the distal SVC. No  pneumothorax. Bibasilar opacifications right greater than left with  probable trace bilateral pleural effusions similar to prior.           Impression:       Right arm PICC placement terminating within the distal SVC.  No postprocedural pneumothorax. Pulmonary findings otherwise are grossly  unchanged from prior.     D:  01/02/2020  E:  01/02/2020     This report was finalized on 1/2/2020 3:22 PM by Dr. Lauri Montelongo.               Impression:   1.  Severe sepsis-improved  2.  Cholangitis- with associated streptococcal bacteremia and secondary to choledocholithiasis, s/p sphincterotomy  3.  Streptococcal bacteremia-  this is secondary to cholangitis.  He is now off of antibiotic therapy  4.  Acute pancreatitis  5.  Dementia   6.  Acute kidney injury/ATN-superimposed on chronic kidney disease, stage 3,   7.  Cardiomyopathy, EF 30%   8.  Leukocytosis/neutrophilia-improving  9. Urinary retention, manriquez placed by urology     PLAN/RECOMMENDATIONS:  1.  Continue off of antibiotic therapy  2.  Hospice/comfort measures only    I discussed his situation in detail with his family today.  I will sign off    Ish Tijerina MD  1/4/2020  7:42 AM

## 2020-01-05 NOTE — PLAN OF CARE
Problem: Patient Care Overview  Goal: Plan of Care Review  Outcome: Ongoing (interventions implemented as appropriate)  Flowsheets (Taken 1/5/2020 1704)  Progress: improving  Plan of Care Reviewed With: patient; spouse; daughter; son  Outcome Summary: pt remains alert, disoriented to time, NSR-[place] w/BBB, room air, VSS. family remains at bedside. pt awake for majority of shift. turned q 2 hr, bottom is red but blanchable. scant BM today, watery/liquid. manriquez cath in place, cath care performed. bed bath and linen changed.

## 2020-01-05 NOTE — PROGRESS NOTES
"Palliative Care Progress Note    Date of Admission: 12/27/2019    Code Status:   Current Code Status     Date Active Code Status Order ID Comments User Context       12/27/2019 2313 No CPR [id number]  Amy Morse APRN ED       Questions for Current Code Status     Question Answer Comment    Code Status No CPR     Medical Interventions (Level of Support Prior to Arrest) Limited     Limited Support to NOT Include Intubation     Level Of Support Discussed With Health Care Surrogate         Subjective:  Patient is more awake today, eating some and visiting with family.  Looking forward to having his therapy dog, Lubna, to visit this afternoon.   + burning sensation below sternal notch, usual heartburn per son and patient report.  Denies any dyspnea, nausea, anxiety.  Prns: x2 oxy/APAP 7.5mg, x1 morphine IV  Reviewed with nursing and updated.  Reviewed current scheduled and prn medications for route, type, dose, and frequency.  Reviewed med list.    sodium chloride 125 mL/hr Last Rate: Stopped (01/04/20 0630)     •  acetaminophen  •  bisacodyl  •  calcium carbonate EX  •  ipratropium-albuterol  •  lactated ringers  •  magic mouthwash  •  magnesium sulfate **OR** magnesium sulfate **OR** magnesium sulfate  •  Morphine  •  ondansetron **OR** ondansetron  •  oxyCODONE-acetaminophen  •  potassium chloride **OR** potassium chloride **OR** potassium chloride  •  simethicone  •  sodium chloride  •  [COMPLETED] Insert peripheral IV **AND** sodium chloride  •  sodium chloride  •  sodium chloride    Objective:  PPS 30% /66 (BP Location: Right arm, Patient Position: Lying)   Pulse 60   Temp 97.7 °F (36.5 °C) (Oral)   Resp 16   Ht 177.8 cm (70\")   Wt 87.1 kg (192 lb)   SpO2 95%   BMI 27.55 kg/m²    Intake & Output (last day)       01/04 0701 - 01/05 0700 01/05 0701 - 01/06 0700    P.O. 60 120    I.V. (mL/kg)      IV Piggyback 200     Total Intake(mL/kg) 260 (3) 120 (1.4)    Urine (mL/kg/hr) 1750 (0.8) 500 (0.8)    " Stool 0     Total Output 1750 500    Net -1490 -380          Stool Unmeasured Occurrence 2 x         Lab Results (last 24 hours)     Procedure Component Value Units Date/Time    Basic Metabolic Panel [[id number]]  (Abnormal) Collected:  01/05/20 0635    Specimen:  Blood Updated:  01/05/20 0742     Glucose 112 mg/dL      BUN 33 mg/dL      Creatinine 1.44 mg/dL      Sodium 138 mmol/L      Potassium 4.3 mmol/L      Chloride 108 mmol/L      CO2 20.0 mmol/L      Calcium 8.2 mg/dL      eGFR Non African Amer 46 mL/min/1.73      BUN/Creatinine Ratio 22.9     Anion Gap 10.0 mmol/L     Narrative:       GFR Normal >60  Chronic Kidney Disease <60  Kidney Failure <15      POC Glucose Once [[id number]]  (Normal) Collected:  01/05/20 0716    Specimen:  Blood Updated:  01/05/20 0719     Glucose 96 mg/dL         Imaging Results (Last 24 Hours)     ** No results found for the last 24 hours. **          Physical Exam:  Gen: NAD, resting in bed, sitting upright  Skin: warm, dry   Eyes: SANDEEP, conjunctiva clear and moist   HEENT: oropharynx clear, moist  Resp/thorax: even effort, non labored, CTA   CV: RRR   ABD: soft, bowel sounds +, nontender  Ext: no edema, no redness  Neuro: alert, interactive, no myoclonus, equal  strong strength, raises lower extremities off bed.        Reviewed labs and diagnostic results.   Lab Results   Component Value Date    HGBA1C 6.40 (H) 12/29/2019     Results from last 7 days   Lab Units 01/03/20  0728   WBC 10*3/mm3 5.91   HEMOGLOBIN g/dL 9.8*   HEMATOCRIT % 29.0*   PLATELETS 10*3/mm3 107*     Results from last 7 days   Lab Units 01/05/20  0635  01/03/20  0728   SODIUM mmol/L 138  --  140   POTASSIUM mmol/L 4.3   < > 3.3*   CHLORIDE mmol/L 108*  --  109*   CO2 mmol/L 20.0*  --  19.0*   BUN mg/dL 33*  --  42*   CREATININE mg/dL 1.44*  --  1.32*   CALCIUM mg/dL 8.2  --  8.4   BILIRUBIN mg/dL  --   --  0.8   ALK PHOS U/L  --   --  205*   ALT (SGPT) U/L  --   --  12   AST (SGOT) U/L  --   --  40   GLUCOSE  mg/dL 112*  --  134*    < > = values in this interval not displayed.       Impression: 89 y.o. male with sepsis from cholangitis, pancreatitis, streptococcus bacteremia, Parkinson's with dementia, HELIO on CKD stage3    Plan:   Dyspepsia, indigestion - prn GI cocktail, TUMS  PPI started per primary.   Abd pain - diminished currently, last BM 1/5 small    Anorexia - eating some bites    Goals of care - wife is not at bedside, son only.   Overall family became more hopeful of some recovery with patient becoming more alert, joking, interacting yesterday and interested in po intake.   Patient seems to be continuing the same status today.  Will follow up with wife tomorrow and assist with disposition planning, but need to sort out particulars of plan of care and disposition.   Patient would be hospice eligible, noted infectious disease signed off on 1/4/19.   Time: 40 minutes   > 50% time spent in counseling and education concerning current orders for symptom management with patient and son    Joie Muhammad, APRN  [phone]  01/05/20  2:36 PM

## 2020-01-06 NOTE — PLAN OF CARE
Problem: Patient Care Overview  Goal: Interprofessional Rounds/Family Conf  Flowsheets (Taken 1/6/2020 6404)  Summary: 1300 Palliative Team Conference: JOSEF Houser RN, PN; RAJWINDER Ly, RN, PN; JENNIFER Schwarz, RN, PN; THANG Campbell, ; KIM Cardoso, [facility], [facility]  Note:   Referral for Palliative follow up at facility following discharge faxed to [facility] Navigators. Palliative follow up not available if pt goes to [facility]. Please notify CN at time of discharge and location, at [phone], or fax [phone].

## 2020-01-06 NOTE — PROGRESS NOTES
INFECTIOUS DISEASE Progress Note     Murali Dennis  4/20/1929  [id number]      Admission Date: 12/27/2019      Requesting Provider: No ref. provider found  Evaluating Physician: ARCADIO Junior    Reason for Consultation: sepsis     History of present illness:  12/30/19: Patient is a 89 y.o. male, who presented to the ED with complaints of abdominal tenderness which began on Friday evening.   He has been having decreased appetite over the past several weeks and has lost 30 pounds unintentionally over the past year.  His family denies any fever, cough, sputum production, nausea, vomiting, dysuria prior to admission.    An abdominal CT on admission with distended gallbladder, with mild to moderate diffuse acute pancreatitis, mild intrahepatic bile duct dilatation. He underwent an MRCP with gallbladder distention, and possible mass in neck of gallbladder, with changes of pancreatitis.  Admitting labs with lipase of >3000, Scr 1.19, WBC 13, 000., lactate of 2.9,  Blood cultures now positive for Strep species.  He is currently on Vancomycin and Cefepime and we were consulted for evaluation and treatment.     He has a history of penicillin allergy which his wife indicates consisted of arm swelling after injection.  12/31/19:  He is resting quietly.  Daughter says he was confused and restless/aggitated several times during the evening.    1/1/19: Family is very concerned that PT hasn't been working with him, and he is so weak.  Still having difficulty with swallowing.  He had a bowel movement earlier this am. Was confused earlier this am per son. He remains afebrile.   1/2/20:  His manriquez was removed and he is unable to urinate.  He was very aggitated and confused yesterday and last pm was given haldol and he is quiet now. Son at bedside.  He complains of abdominal pain.  His PICC line was ordered yesterday but he was not cooperative with placement of the PICC line.  1/3/20:  Manriquez was replaced by urology  "yesterday.  Family is considering hospice/palliative.   1/6/20: When I last saw him on Saturday, he remains afebrile. Worked with PT, eating more every day. He is alert and oriented.  \"ready to get out of here\"        Past Medical History:   Diagnosis Date   • Alzheimer disease (CMS/Formerly [facility])    • Warren esophagus     followed  with GI in [place]   • Benign prostatic hyperplasia without lower urinary tract symptoms 10/17/2018   • Chronic congestive heart failure (CMS/Formerly [facility]) 12/8/2017     echo report from 8/2/17 EF 60-65%, mild TR, mild AR ([place], [place]) Echocardiogram 12/9/17: EF 28%, mild to moderate MR    • COPD (chronic obstructive pulmonary disease) (CMS/Formerly [facility])    • Coronary artery disease 2003   • Dementia (CMS/Formerly [facility]) 2003   • Diarrhea 2/24/2019   • Environmental allergies 10/17/2018   • Essential hypertension 10/17/2018   • Gastroesophageal reflux disease 11/19/2018   • GERD (gastroesophageal reflux disease)    • Hypothyroid     started after amiodarone use in 2003   • Sleep apnea     wears cpap   • Stroke (CMS/Formerly [facility]) 2003   • TIA (transient ischemic attack) 12/29/2017   • Unintended weight loss 10/17/2018       Past Surgical History:   Procedure Laterality Date   • CARDIAC CATHETERIZATION  2003   • CARDIAC CATHETERIZATION N/A 1/19/2018    Procedure: Left Heart Cath;  Surgeon: Hollis Ugarte MD;  Location:  [facility]ASIVE [facility]ATION;  Service:    • CARPAL TUNNEL RELEASE Right    • CATARACT EXTRACTION Bilateral    • CORONARY ARTERY BYPASS GRAFT  2003    Triple Bypass, @ [facility] @ [place], [place]   • ERCP N/A 12/30/2019    Procedure: ENDOSCOPIC RETROGRADE CHOLANGIOPANCREATOGRAPHY;  Surgeon: Arsh White MD;  Location: [facility] ENDOSCOPY;  Service: Gastroenterology   • HEMORRHOIDECTOMY     • HERNIA REPAIR         Family History   Problem Relation Age of Onset   • Cancer Mother    • Heart disease Sister    • Diabetes Sister    • Heart disease Brother    • Cancer " Brother    • Stroke Sister        Social History     Socioeconomic History   • Marital status:      Spouse name: Not on file   • Number of children: Not on file   • Years of education: Not on file   • Highest education level: Not on file   Occupational History   • Occupation: Retired   Tobacco Use   • Smoking status: Former Smoker     Last attempt to quit: 1967     Years since quittin.0   • Smokeless tobacco: Never Used   Substance and Sexual Activity   • Alcohol use: No   • Drug use: No   • Sexual activity: Defer   Social History Narrative    Caffeine use: 1 serving daily.    Patient lives at home with family.     Lives with wife, daughter close by for any needs       Allergies   Allergen Reactions   • Benzodiazepines Other (See Comments)     Paradoxical response   • Codeine Other (See Comments)     unknown   • Fluoxetine Other (See Comments)     Paradoxical response   • Lipitor [Atorvastatin] Other (See Comments)     Myalgias     • Metoclopramide Other (See Comments)     Unknown   • Nabumetone    • Penicillins Swelling and Other (See Comments)     Has tolerated cefepime 2019   • Prozac [Fluoxetine Hcl]    • Tramadol Other (See Comments)     unknown   • Valium [Diazepam] Other (See Comments)     Paradoxical response     • Sulfa Antibiotics Rash         Medication:    Current Facility-Administered Medications:   •  acetaminophen (TYLENOL) tablet 650 mg, 650 mg, Oral, Q6H PRN, Arsh White MD, 650 mg at 19 0544  •  aluminum-magnesium hydroxide-simethicone (MAALOX MAX) 400-400-40 MG/5ML 15 mL, Lidocaine Viscous HCl (XYLOCAINE) 2 % 15 mL suspension, , Oral, Once, Roberto Alarcon MD  •  amiodarone (PACERONE) tablet 200 mg, 200 mg, Oral, Daily, Arsh White MD, 200 mg at 20 1035  •  aspirin chewable tablet 81 mg, 81 mg, Oral, Daily, Arsh White MD, 81 mg at 20 1034  •  bisacodyl (DULCOLAX) suppository 10 mg, 10 mg, Rectal, Daily  PRN, Kayla Lozoya MD  •  calcium carbonate EX (TUMS EX) chewable tablet 750 mg, 750 mg, Oral, TID PRN, Dorcas Montoya MD  •  carbidopa-levodopa (SINEMET)  MG per tablet 1 tablet, 1 tablet, Oral, TID, Arsh White MD, 1 tablet at 01/06/20 1034  •  cefTRIAXone (ROCEPHIN) 2 g/100 mL 0.9% NS VTB (ANIYA), 2 g, Intravenous, Daily, Ish Tijerina MD  •  clopidogrel (PLAVIX) tablet 75 mg, 75 mg, Oral, Daily, Kayla Lozoya MD, 75 mg at 01/06/20 1034  •  finasteride (PROSCAR) tablet 5 mg, 5 mg, Oral, Daily, Arsh White MD, 5 mg at 01/06/20 1034  •  ipratropium-albuterol (DUO-NEB) nebulizer solution 3 mL, 3 mL, Nebulization, Q4H PRN, Arsh White MD, 3 mL at 01/05/20 1217  •  lactated ringers bolus 500 mL, 500 mL, Intravenous, Once PRN, Isak Dudley CRNA  •  levothyroxine (SYNTHROID, LEVOTHROID) tablet 75 mcg, 75 mcg, Oral, Q AM, Nesha Rey MD, 75 mcg at 01/06/20 0641  •  magic mouthwash oral supsension 5 mL, 5 mL, Swish & Spit, Q4H PRN, Kayla Lozoya MD, 5 mL at 01/04/20 0812  •  Magnesium Sulfate 2 gram Bolus, followed by 8 gram infusion (total Mg dose 10 grams)- Mg less than or equal to 1mg/dL, 2 g, Intravenous, PRN **OR** Magnesium Sulfate 2 gram / 50mL Infusion (GIVE X 3 BAGS TO EQUAL 6GM TOTAL DOSE) - Mg 1.1 - 1.5 mg/dl, 2 g, Intravenous, PRN **OR** Magnesium Sulfate 4 gram infusion- Mg 1.6-1.9 mg/dL, 4 g, Intravenous, PRN, Campbell, Severo D, DO  •  Morphine sulfate (PF) injection 2 mg, 2 mg, Intravenous, Q2H PRN, Joie Muhammad APRN, 2 mg at 01/05/20 0422  •  multivitamin with minerals 1 tablet, 1 tablet, Oral, Daily, Arsh White MD, 1 tablet at 01/06/20 1033  •  ondansetron (ZOFRAN) tablet 4 mg, 4 mg, Oral, Q6H PRN **OR** ondansetron (ZOFRAN) injection 4 mg, 4 mg, Intravenous, Q6H PRN, Arsh White MD, 4 mg at 01/04/20 0812  •  oxyCODONE-acetaminophen (PERCOCET) 7.5-325 MG per tablet 1 tablet, 1 tablet,  Oral, Q4H PRN, Arsh White MD, 1 tablet at 01/05/20 0422  •  pantoprazole (PROTONIX) EC tablet 40 mg, 40 mg, Oral, Q AM, Roberto Alarcon MD, 40 mg at 01/06/20 0640  •  potassium chloride (MICRO-K) CR capsule 40 mEq, 40 mEq, Oral, PRN, 40 mEq at 01/03/20 2329 **OR** potassium chloride (KLOR-CON) packet 40 mEq, 40 mEq, Oral, PRN **OR** potassium chloride 10 mEq in 100 mL IVPB, 10 mEq, Intravenous, Q1H PRN, Severo Campbell, DO, Last Rate: 100 mL/hr at 01/04/20 0336, 10 mEq at 01/04/20 0336  •  sennosides-docusate (PERICOLACE) 8.6-50 MG per tablet 2 tablet, 2 tablet, Oral, Nightly, Kayla Lozoya MD, 2 tablet at 01/05/20 2115  •  simethicone (MYLICON) chewable tablet 80 mg, 80 mg, Oral, 4x Daily PRN, Kayla Lozoya MD, 80 mg at 01/05/20 1203  •  sodium chloride 0.9 % flush 10 mL, 10 mL, Intravenous, PRN, Arsh White MD, 10 mL at 12/31/19 2122  •  [COMPLETED] Insert peripheral IV, , , Once **AND** sodium chloride 0.9 % flush 10 mL, 10 mL, Intravenous, PRN, Arsh White MD  •  sodium chloride 0.9 % flush 10 mL, 10 mL, Intravenous, Q12H, Ish Tijerina MD, 10 mL at 01/06/20 1037  •  sodium chloride 0.9 % flush 10 mL, 10 mL, Intravenous, PRN, Ish Tijerina MD  •  sodium chloride 0.9 % flush 20 mL, 20 mL, Intravenous, PRN, Ish Tijerina MD    Antibiotics:  Anti-Infectives (From admission, onward)    Ordered     Dose/Rate Route Frequency Start Stop    01/06/20 1112  cefTRIAXone (ROCEPHIN) 2 g/100 mL 0.9% NS VTB (ANIYA)     Ordering Provider:  Ish Tijerina MD    2 g  over 30 Minutes Intravenous Daily 01/06/20 1200 01/10/20 0859    01/04/20 1549  cefTRIAXone (ROCEPHIN) 1 g/100 mL 0.9% NS (MBP)     Ordering Provider:  Kayla Lozoya MD    1 g  over 30 Minutes Intravenous Every 24 Hours 01/04/20 1700 01/05/20 1752    01/04/20 1553  metroNIDAZOLE (FLAGYL) 500 mg/100mL IVPB     Ordering Provider:  Kayla Lozoya MD    500 mg  over 60 Minutes Intravenous  Every 8 Hours 20 1700 20 1135    19 0632  vancomycin 1500 mg/500 mL 0.9% NS IVPB ([facility])     Ordering Provider:  Colin Pyle, RPH    20 mg/kg × 78.4 kg  over 90 Minutes Intravenous Once 19 0730 19 0948    19 1226  cefepime (MAXIPIME) 2 g/100 mL 0.9% NS (mbp)     Ordering Provider:  Perla Melgar DO    2 g  200 mL/hr over 30 Minutes Intravenous Once 19 1315 19 1358          Review of Systems:  No reliable ROS from patient       Physical Exam:   Vital Signs  Temp (24hrs), Av.9 °F (36.6 °C), Min:97.5 °F (36.4 °C), Max:98.1 °F (36.7 °C)    Temp  Min: 97.5 °F (36.4 °C)  Max: 98.1 °F (36.7 °C)  BP  Min: 126/79  Max: 132/67  Pulse  Min: 58  Max: 68  Resp  Min: 16  Max: 18  SpO2  Min: 90 %  Max: 98 %    GENERAL: alert sitting in chair, NAD   HEENT: Normocephalic, atraumatic.  PERRL No conjunctival injection. No icterus. Oropharynx clear without evidence of thrush or exudate. No evidence of peridontal disease.    HEART: RRR; + 2/6murmur, rubs, gallops.   LUNGS: Clear to auscultation bilaterally without wheezing, rales, rhonchi. Normal respiratory effort. Nonlabored. No dullness.  ABDOMEN: Soft,  No  tenderness  nondistended. Positive bowel sounds. No rebound or guarding.   EXT:  No cyanosis, clubbing or edema. No cord.  :  +Najera catheter r  MSK:  No joint effusions   SKIN: Warm and dry without cutaneous eruptions on Inspection/palpation.    NEURO: A+o x 3      Laboratory Data    Results from last 7 days   Lab Units 20  0728 20  0547 19  0616   WBC 10*3/mm3 5.91 5.44 8.70   HEMOGLOBIN g/dL 9.8* 10.8* 11.2*   HEMATOCRIT % 29.0* 32.6* 34.7*   PLATELETS 10*3/mm3 107* 114* 106*     Results from last 7 days   Lab Units 20  0635   SODIUM mmol/L 138   POTASSIUM mmol/L 4.3   CHLORIDE mmol/L 108*   CO2 mmol/L 20.0*   BUN mg/dL 33*   CREATININE mg/dL 1.44*   GLUCOSE mg/dL 112*   CALCIUM mg/dL 8.2     Results from last 7 days   Lab Units 20  0728    ALK PHOS U/L 205*   BILIRUBIN mg/dL 0.8   ALT (SGPT) U/L 12   AST (SGOT) U/L 40                         Estimated Creatinine Clearance: 42 mL/min (A) (by C-G formula based on SCr of 1.44 mg/dL (H)).      Microbiology:  Blood Culture   Date Value Ref Range Status   12/28/2019 Gram Positive Cocci (A)  Preliminary   12/28/2019 Gram Positive Cocci (A)  Preliminary     BCID, PCR   Date Value Ref Range Status   12/28/2019 (C) No organism detected by BCID PCR. Final    Streptococcus spp, not A, B, or pneumoniae. Identification by BCID PCR.     BC Strep gallolyticus, ssp pasterurianus         Radiology:  Imaging Results (Last 72 Hours)     ** No results found for the last 72 hours. **            Impression:   1.  Severe sepsis- with leukocytosis, lactic acidosis, elevated pro calcitonin  acute kidney injury, known focus of infection   2.  Cholangitis- with associated streptococcal bacteremia and secondary to choledocholithiasis, s/p sphincterotomy  3.  Streptococcal bacteremia-  This is secondary to cholangitis.    4.  Acute pancreatitis  5.  Dementia   6.  Acute kidney injury/ATN-superimposed on chronic kidney disease, stage 3,   7.  Cardiomyopathy, EF 30%   8.  Leukocytosis/neutrophilia-improving  9. Urinary retention, manriquez placed by urology         PLAN/RECOMMENDATIONS:  1.  ceftriaxone  2.   Flagyl  3.  Possible rehab    Dr. Tijerina has obtained the history, performed the physical exam and formulated the above treatment plan.         Carito Hernandez, APRN  1/6/2020  12:08 PM

## 2020-01-06 NOTE — PROGRESS NOTES
Palliative Care Progress Note    Date of Admission: 12/27/2019    Subjective: Family reports that over the weekend the patient has had a significant improvement stating that he has improved almost his baseline mentation.  Reportedly is sitting up and eating food.  Current Code Status     Date Active Code Status Order ID Comments User Context       12/27/2019 2313 No CPR [id number]  Amy Morse APRN ED       Questions for Current Code Status     Question Answer Comment    Code Status No CPR     Medical Interventions (Level of Support Prior to Arrest) Limited     Limited Support to NOT Include Intubation     Level Of Support Discussed With Health Care Surrogate         No current facility-administered medications on file prior to encounter.      Current Outpatient Medications on File Prior to Encounter   Medication Sig Dispense Refill   • amiodarone (PACERONE) 200 MG tablet Take 1 tablet by mouth Daily. 30 tablet 5   • aspirin 81 MG chewable tablet Chew 81 mg Daily.     • carbidopa-levodopa (SINEMET)  MG per tablet Take 1 tablet by mouth 3 (Three) Times a Day. 90 tablet 11   • clopidogrel (PLAVIX) 75 MG tablet Take 75 mg by mouth Daily.     • dutasteride (AVODART) 0.5 MG capsule TAKE 1 CAPSULE BY MOUTH EVERY DAY 30 capsule 0   • levothyroxine (SYNTHROID, LEVOTHROID) 75 MCG tablet TAKE 1 TABLET BY MOUTH EVERY DAY 90 tablet 0   • Loratadine 10 MG capsule Take 1 capsule by mouth Daily As Needed.     • memantine (NAMENDA) 10 MG tablet Take 1 tablet by mouth 2 (Two) Times a Day. 60 tablet 11   • midodrine (PROAMATINE) 10 MG tablet Take 1 tablet by mouth Every 8 (Eight) Hours. 90 tablet 5   • mirtazapine (REMERON) 30 MG tablet TAKE 1 TABLET BY MOUTH EVERYDAY AT BEDTIME 90 tablet 0   • Multiple Vitamins-Minerals (MULTIVITAL PO) Take 1 tablet by mouth Daily.     • omeprazole (priLOSEC) 20 MG capsule TAKE 1 CAPSULE BY MOUTH EVERY DAY (Patient taking differently: 40 mg.) 30 capsule 5   • pravastatin (PRAVACHOL) 20 MG  "tablet TAKE 1 TABLET BY MOUTH EVERY DAY (Patient taking differently: Every Night.) 90 tablet 0   • sacubitril-valsartan (ENTRESTO) 24-26 MG tablet Take 0.5 tablets by mouth 2 (Two) Times a Day. 60 tablet    • acetaminophen (TYLENOL) 325 MG tablet Take 650 mg by mouth At Night As Needed for Mild Pain .     • diclofenac (VOLTAREN) 1 % gel gel Apply 4 g topically to the appropriate area as directed 4 (Four) Times a Day. Small amount to affected area 300 g 3   • glucose blood test strip 1 each by Other route Daily. Use as instructed 100 each 12   • glucose monitor monitoring kit 1 each Daily. 1 each 0   • ipratropium-albuterol (DUO-NEB) 0.5-2.5 mg/mL nebulizer Take 3 mL by nebulization Every 4 (Four) Hours As Needed for Shortness of Air (CBS PROTOCOL). 360 mL    • Lancets (FREESTYLE) lancets 1 each by Other route Daily. May dispense any brand needed E11.9 100 each 12   • nitroglycerin (NITROSTAT) 0.4 MG SL tablet Place 0.4 mg under the tongue Every 5 (Five) Minutes As Needed for Chest Pain. Take no more than 3 doses in 15 minutes.          •  acetaminophen  •  bisacodyl  •  calcium carbonate EX  •  ipratropium-albuterol  •  lactated ringers  •  magic mouthwash  •  magnesium sulfate **OR** magnesium sulfate **OR** magnesium sulfate  •  Morphine  •  ondansetron **OR** ondansetron  •  oxyCODONE-acetaminophen  •  potassium chloride **OR** potassium chloride **OR** potassium chloride  •  simethicone  •  sodium chloride  •  [COMPLETED] Insert peripheral IV **AND** sodium chloride  •  sodium chloride  •  sodium chloride    Objective: /67 (BP Location: Left arm, Patient Position: Sitting)   Pulse 60   Temp 98.1 °F (36.7 °C) (Axillary)   Resp 18   Ht 177.8 cm (70\")   Wt 87.1 kg (192 lb)   SpO2 98%   BMI 27.55 kg/m²      Intake/Output Summary (Last 24 hours) at 1/6/2020 1401  Last data filed at 1/6/2020 1144  Gross per 24 hour   Intake 592 ml   Output 1000 ml   Net -408 ml     Physical Exam:      General Appearance:  "   Awake, sitting in chair   Head:    Normocephalic, without obvious abnormality, atraumatic   Eyes:            Lids and lashes normal, conjunctivae and sclerae normal, no   icterus, no pallor, corneas clear, PERRLA   Ears:    Ears appear intact with no abnormalities noted   Throat:   No oral lesions, no thrush, oral mucosa moist   Neck:   No adenopathy, supple, trachea midline, no thyromegaly, no   carotid bruit, no JVD   Back:     No kyphosis present, no scoliosis present, no skin lesions,      erythema or scars, no tenderness to percussion or                   palpation,   range of motion normal   Lungs:     Clear to auscultation,respirations regular, even and                  unlabored    Heart:    Regular rhythm and normal rate, normal S1 and S2, no            murmur, no gallop, no rub, no click   Chest Wall:    No abnormalities observed   Abdomen:     Normal bowel sounds, no masses, no organomegaly, soft        non-tender, non-distended, no guarding, no rebound                tenderness   Rectal:     Deferred   Extremities:   Moves all extremities well, no edema, no cyanosis, no             redness   Pulses:   Pulses palpable and equal bilaterally   Skin:   No bleeding, bruising or rash   Lymph nodes:   No palpable adenopathy   Neurologic:   Cranial nerves 2 - 12 grossly intact, sensation intact, DTR       present and equal bilaterally     Results from last 7 days   Lab Units 01/03/20  0728   WBC 10*3/mm3 5.91   HEMOGLOBIN g/dL 9.8*   HEMATOCRIT % 29.0*   PLATELETS 10*3/mm3 107*     Results from last 7 days   Lab Units 01/05/20  0635  01/03/20  0728   SODIUM mmol/L 138  --  140   POTASSIUM mmol/L 4.3   < > 3.3*   CHLORIDE mmol/L 108*  --  109*   CO2 mmol/L 20.0*  --  19.0*   BUN mg/dL 33*  --  42*   CREATININE mg/dL 1.44*  --  1.32*   CALCIUM mg/dL 8.2  --  8.4   BILIRUBIN mg/dL  --   --  0.8   ALK PHOS U/L  --   --  205*   ALT (SGPT) U/L  --   --  12   AST (SGOT) U/L  --   --  40   GLUCOSE mg/dL 112*  --  134*     < > = values in this interval not displayed.       Impression: Parkinsons  Sepsis  Change in MS  Pancreatitis  GOC      Plan: Talk to the patient's family.  They are looking to patient going to rehab in some capacity at time of discharge.  They are comfortable with the current conservative medical management plan of care.  Goals are set and symptoms are managed.  Palliative medicine will sign off.        Severo Campbell,   01/06/20  2:01 PM

## 2020-01-06 NOTE — PROGRESS NOTES
INFECTIOUS DISEASE Progress Note     Murali Dennis  4/20/1929  [id number]      Admission Date: 12/27/2019      Requesting Provider: No ref. provider found  Evaluating Physician: Ish Tijerina MD    Reason for Consultation: sepsis     History of present illness:  12/30/19: Patient is a 89 y.o. male, who presented to the ED with complaints of abdominal tenderness which began on Friday evening.   He has been having decreased appetite over the past several weeks and has lost 30 pounds unintentionally over the past year.  His family denies any fever, cough, sputum production, nausea, vomiting, dysuria prior to admission.    An abdominal CT on admission with distended gallbladder, with mild to moderate diffuse acute pancreatitis, mild intrahepatic bile duct dilatation. He underwent an MRCP with gallbladder distention, and possible mass in neck of gallbladder, with changes of pancreatitis.  Admitting labs with lipase of >3000, Scr 1.19, WBC 13, 000., lactate of 2.9,  Blood cultures now positive for Strep species.  He is currently on Vancomycin and Cefepime and we were consulted for evaluation and treatment.     He has a history of penicillin allergy which his wife indicates consisted of arm swelling after injection.  12/31/19:  He is resting quietly.  Daughter says he was confused and restless/aggitated several times during the evening.    1/1/19: Family is very concerned that PT hasn't been working with him, and he is so weak.  Still having difficulty with swallowing.  He had a bowel movement earlier this am. Was confused earlier this am per son. He remains afebrile.   1/2/20:  His manriquez was removed and he is unable to urinate.  He was very aggitated and confused yesterday and last pm was given haldol and he is quiet now. Son at bedside.  He complains of abdominal pain.  His PICC line was ordered yesterday but he was not cooperative with placement of the PICC line.  1/3/20:  Manriquez was replaced by urology  yesterday.  Family is considering hospice/palliative.   1/4/2020: His family has decided to proceed with hospice/comfort measures only.  They indicate that this was his choice.  He has remained afebrile overnight.  He has been somewhat dyspneic.  1/6/2020: When I last saw him on Saturday, the family had indicated that they were going to proceed with hospice/comfort measures only.  He apparently became more alert and responsive later in the day and they decided to not proceed with hospice.  They met with the hospitalist and his antibiotic therapy was restarted.  I had already discontinued his antibiotic therapy after 1/3-I discussed duration of antibiotic therapy with Dr. Jack and we have decided to leave him on antibiotic therapy through that time regardless of his hospice status.  I was not informed about the change in decisions until I was called by Dr. Alarcon today.  He is now back on Rocephin/Flagyl.  He has remained afebrile.  He has no new complaints today.  I have been informed that his family was upset about a perceived lack of communication or disagreement among the physicians.  I have discussed his clinical situation and therapeutic plan repeatedly and on numerous different days with Dr. Jack and the hospitalist service.  I informed his family today that we have been communicating extensively about his care although I was not notified by the hospitalist service this weekend that there was a change in his status and change in decisions regarding hospice.    Past Medical History:   Diagnosis Date   • Alzheimer disease (CMS/[facility])    • Warren esophagus     followed  with GI in [place]   • Benign prostatic hyperplasia without lower urinary tract symptoms 10/17/2018   • Chronic congestive heart failure (CMS/[facility]) 12/8/2017     echo report from 8/2/17 EF 60-65%, mild TR, mild AR ([place], [place]) Echocardiogram 12/9/17: EF 28%, mild to moderate MR    • COPD (chronic obstructive pulmonary  disease) (CMS/[facility])    • Coronary artery disease    • Dementia (CMS/[facility])    • Diarrhea 2019   • Environmental allergies 10/17/2018   • Essential hypertension 10/17/2018   • Gastroesophageal reflux disease 2018   • GERD (gastroesophageal reflux disease)    • Hypothyroid     started after amiodarone use in    • Sleep apnea     wears cpap   • Stroke (CMS/[facility])    • TIA (transient ischemic attack) 2017   • Unintended weight loss 10/17/2018       Past Surgical History:   Procedure Laterality Date   • CARDIAC CATHETERIZATION     • CARDIAC CATHETERIZATION N/A 2018    Procedure: Left Heart Cath;  Surgeon: Hollis Ugarte MD;  Location:  [facility] CATH INVASIVE LOCATION;  Service:    • CARPAL TUNNEL RELEASE Right    • CATARACT EXTRACTION Bilateral    • CORONARY ARTERY BYPASS GRAFT      Triple Bypass, @ [facility] @ [place], [place]   • ERCP N/A 2019    Procedure: ENDOSCOPIC RETROGRADE CHOLANGIOPANCREATOGRAPHY;  Surgeon: Arsh White MD;  Location:  [facility] ENDOSCOPY;  Service: Gastroenterology   • HEMORRHOIDECTOMY     • HERNIA REPAIR         Family History   Problem Relation Age of Onset   • Cancer Mother    • Heart disease Sister    • Diabetes Sister    • Heart disease Brother    • Cancer Brother    • Stroke Sister        Social History     Socioeconomic History   • Marital status:      Spouse name: Not on file   • Number of children: Not on file   • Years of education: Not on file   • Highest education level: Not on file   Occupational History   • Occupation: Retired   Tobacco Use   • Smoking status: Former Smoker     Last attempt to quit: 1967     Years since quittin.0   • Smokeless tobacco: Never Used   Substance and Sexual Activity   • Alcohol use: No   • Drug use: No   • Sexual activity: Defer   Social History Narrative    Caffeine use: 1 serving daily.    Patient lives at home with family.     Lives with wife, daughter close  by for any needs       Allergies   Allergen Reactions   • Benzodiazepines Other (See Comments)     Paradoxical response   • Codeine Other (See Comments)     unknown   • Fluoxetine Other (See Comments)     Paradoxical response   • Lipitor [Atorvastatin] Other (See Comments)     Myalgias     • Metoclopramide Other (See Comments)     Unknown   • Nabumetone    • Penicillins Swelling and Other (See Comments)     Has tolerated cefepime 12/2019   • Prozac [Fluoxetine Hcl]    • Tramadol Other (See Comments)     unknown   • Valium [Diazepam] Other (See Comments)     Paradoxical response     • Sulfa Antibiotics Rash         Medication:    Current Facility-Administered Medications:   •  acetaminophen (TYLENOL) tablet 650 mg, 650 mg, Oral, Q6H PRN, Arsh White MD, 650 mg at 12/29/19 0544  •  aluminum-magnesium hydroxide-simethicone (MAALOX MAX) 400-400-40 MG/5ML 15 mL, Lidocaine Viscous HCl (XYLOCAINE) 2 % 15 mL suspension, , Oral, Once, Roberto Alarcon MD  •  amiodarone (PACERONE) tablet 200 mg, 200 mg, Oral, Daily, Arsh White MD, 200 mg at 01/06/20 1035  •  aspirin chewable tablet 81 mg, 81 mg, Oral, Daily, Arsh White MD, 81 mg at 01/06/20 1034  •  bisacodyl (DULCOLAX) suppository 10 mg, 10 mg, Rectal, Daily PRN, Kayla Lozoya MD  •  calcium carbonate EX (TUMS EX) chewable tablet 750 mg, 750 mg, Oral, TID PRN, Dorcas Montoya MD  •  carbidopa-levodopa (SINEMET)  MG per tablet 1 tablet, 1 tablet, Oral, TID, Arsh White MD, 1 tablet at 01/06/20 1634  •  clopidogrel (PLAVIX) tablet 75 mg, 75 mg, Oral, Daily, Kayla Lozoya MD, 75 mg at 01/06/20 1034  •  finasteride (PROSCAR) tablet 5 mg, 5 mg, Oral, Daily, Arsh White MD, 5 mg at 01/06/20 1034  •  ipratropium-albuterol (DUO-NEB) nebulizer solution 3 mL, 3 mL, Nebulization, Q4H PRN, Arsh White MD, 3 mL at 01/05/20 1217  •  lactated ringers bolus 500 mL, 500  mL, Intravenous, Once PRN, Isak Dudley, CRNA  •  levothyroxine (SYNTHROID, LEVOTHROID) tablet 75 mcg, 75 mcg, Oral, Q AM, Nesha Rey MD, 75 mcg at 01/06/20 0641  •  magic mouthwash oral supsension 5 mL, 5 mL, Swish & Spit, Q4H PRN, Kayla Lozoya MD, 5 mL at 01/06/20 1314  •  Magnesium Sulfate 2 gram Bolus, followed by 8 gram infusion (total Mg dose 10 grams)- Mg less than or equal to 1mg/dL, 2 g, Intravenous, PRN **OR** Magnesium Sulfate 2 gram / 50mL Infusion (GIVE X 3 BAGS TO EQUAL 6GM TOTAL DOSE) - Mg 1.1 - 1.5 mg/dl, 2 g, Intravenous, PRN **OR** Magnesium Sulfate 4 gram infusion- Mg 1.6-1.9 mg/dL, 4 g, Intravenous, PRN, Severo Campbell, DO  •  Morphine sulfate (PF) injection 2 mg, 2 mg, Intravenous, Q2H PRN, Joie Muhammad, APRN, 2 mg at 01/05/20 0422  •  multivitamin with minerals 1 tablet, 1 tablet, Oral, Daily, Arsh White MD, 1 tablet at 01/06/20 1033  •  ondansetron (ZOFRAN) tablet 4 mg, 4 mg, Oral, Q6H PRN **OR** ondansetron (ZOFRAN) injection 4 mg, 4 mg, Intravenous, Q6H PRN, Arsh White MD, 4 mg at 01/04/20 0812  •  oxyCODONE-acetaminophen (PERCOCET) 7.5-325 MG per tablet 1 tablet, 1 tablet, Oral, Q4H PRN, Arsh White MD, 1 tablet at 01/05/20 0422  •  pantoprazole (PROTONIX) EC tablet 40 mg, 40 mg, Oral, Q AM, Roberto Alarcon MD, 40 mg at 01/06/20 0640  •  potassium chloride (MICRO-K) CR capsule 40 mEq, 40 mEq, Oral, PRN, 40 mEq at 01/03/20 2329 **OR** potassium chloride (KLOR-CON) packet 40 mEq, 40 mEq, Oral, PRN **OR** potassium chloride 10 mEq in 100 mL IVPB, 10 mEq, Intravenous, Q1H PRN, Severo Campbell, DO, Last Rate: 100 mL/hr at 01/04/20 0336, 10 mEq at 01/04/20 0336  •  sennosides-docusate (PERICOLACE) 8.6-50 MG per tablet 2 tablet, 2 tablet, Oral, Nightly, Kayla Lozoya MD, 2 tablet at 01/05/20 2115  •  simethicone (MYLICON) chewable tablet 80 mg, 80 mg, Oral, 4x Daily PRN, Kayla Lozoya MD, 80 mg at 01/05/20 1203  •   sodium chloride 0.9 % flush 10 mL, 10 mL, Intravenous, PRN, Arsh White MD, 10 mL at 19 2122  •  [COMPLETED] Insert peripheral IV, , , Once **AND** sodium chloride 0.9 % flush 10 mL, 10 mL, Intravenous, PRN, Arsh White MD  •  sodium chloride 0.9 % flush 10 mL, 10 mL, Intravenous, Q12H, Ish Tijerina MD, 10 mL at 20 1037  •  sodium chloride 0.9 % flush 10 mL, 10 mL, Intravenous, PRN, Ish Tijerina MD  •  sodium chloride 0.9 % flush 20 mL, 20 mL, Intravenous, PRNLilliana Mark J, MD    Antibiotics:  Anti-Infectives (From admission, onward)    Ordered     Dose/Rate Route Frequency Start Stop    20 1549  cefTRIAXone (ROCEPHIN) 1 g/100 mL 0.9% NS (MBP)     Ordering Provider:  Kayla Lozoya MD    1 g  over 30 Minutes Intravenous Every 24 Hours 20 1700 20 1752    20 1553  metroNIDAZOLE (FLAGYL) 500 mg/100mL IVPB     Ordering Provider:  Kayla Lozoya MD    500 mg  over 60 Minutes Intravenous Every 8 Hours 20 1700 20 1135    19 0632  vancomycin 1500 mg/500 mL 0.9% NS IVPB ([facility])     Ordering Provider:  Colin Pyle, RPH    20 mg/kg × 78.4 kg  over 90 Minutes Intravenous Once 19 0730 19 0948    19 1226  cefepime (MAXIPIME) 2 g/100 mL 0.9% NS (mbp)     Ordering Provider:  Perla Melgar DO    2 g  200 mL/hr over 30 Minutes Intravenous Once 19 1315 19 1358            Review of Systems:  No reliable ROS from patient       Physical Exam:   Vital Signs  Temp (24hrs), Av.8 °F (36.6 °C), Min:97.3 °F (36.3 °C), Max:98.1 °F (36.7 °C)    Temp  Min: 97.3 °F (36.3 °C)  Max: 98.1 °F (36.7 °C)  BP  Min: 126/79  Max: 130/67  Pulse  Min: 57  Max: 68  Resp  Min: 18  Max: 18  SpO2  Min: 90 %  Max: 99 %    GENERAL: He is alert and responsive but confused.  He is in no acute distress  HEENT: No conjunctival injection. No icterus. Oropharynx clear without evidence of thrush or exudate.  HEART: RRR; +  2/6murmur, rubs, gallops.   LUNGS: Mild bilateral rhonchi  ABDOMEN: Soft and nontender  EXT:  No cyanosis, clubbing or edema. No cord.  :  Najera catheter removed   MSK:  No joint effusions   SKIN: Warm and dry without cutaneous eruptions on Inspection/palpation.    NEURO: He is alert and responsive.  He moves all of his extremities.      Laboratory Data    Results from last 7 days   Lab Units 01/03/20  0728 01/02/20  0547 12/31/19  0616   WBC 10*3/mm3 5.91 5.44 8.70   HEMOGLOBIN g/dL 9.8* 10.8* 11.2*   HEMATOCRIT % 29.0* 32.6* 34.7*   PLATELETS 10*3/mm3 107* 114* 106*     Results from last 7 days   Lab Units 01/05/20  0635   SODIUM mmol/L 138   POTASSIUM mmol/L 4.3   CHLORIDE mmol/L 108*   CO2 mmol/L 20.0*   BUN mg/dL 33*   CREATININE mg/dL 1.44*   GLUCOSE mg/dL 112*   CALCIUM mg/dL 8.2     Results from last 7 days   Lab Units 01/03/20  0728   ALK PHOS U/L 205*   BILIRUBIN mg/dL 0.8   ALT (SGPT) U/L 12   AST (SGOT) U/L 40                         Estimated Creatinine Clearance: 42 mL/min (A) (by C-G formula based on SCr of 1.44 mg/dL (H)).      Microbiology:  Blood Culture   Date Value Ref Range Status   12/28/2019 Gram Positive Cocci (A)  Preliminary   12/28/2019 Gram Positive Cocci (A)  Preliminary     BCID, PCR   Date Value Ref Range Status   12/28/2019 (C) No organism detected by BCID PCR. Final    Streptococcus spp, not A, B, or pneumoniae. Identification by BCID PCR.     BC Strep gallolyticus, ssp pasterurianus         Radiology:  Imaging Results (Last 72 Hours)     ** No results found for the last 72 hours. **            Impression:   1.  Severe sepsis-improved  2.  Cholangitis- with associated streptococcal bacteremia and secondary to choledocholithiasis, s/p sphincterotomy  3.  Streptococcal bacteremia-  this is secondary to cholangitis.  He had already received a week of intravenous antibiotic therapy when I discontinued his therapy on 1/3 but he has now received 3 additional days of therapy.  His TTE was  negative for valvular vegetations and I do not think it would be prudent to put him through a GERMAINE with the need for sedation.  I will plan to discontinue his antibiotic therapy after today.  He will be at potential risk for relapse since he does have gallbladder disease.  Dr. Jack has offered to perform an elective cholecystectomy if he and the family desire after he has clinically improved.  4.  Acute pancreatitis  5.  Dementia   6.  Acute kidney injury/ATN-superimposed on chronic kidney disease, stage 3,   7.  Cardiomyopathy, EF 30%   8.  Leukocytosis/neutrophilia-improving  9. Urinary retention, manriquez placed by urology     PLAN/RECOMMENDATIONS:  1.  Discontinue antibiotic therapy  2.  Elective cholecystectomy if he continues to improve-this will need to be done after he is discharged from the hospital    I discussed his situation in detail with his family again today.  I discussed his complicated situation repeatedly with Dr. Alarcon today.  I discussed his situation again with Dr. Zackary Jack today.  I spent over 40 minutes on his care today.      Anderson Tijerina MD  1/6/2020  4:48 PM

## 2020-01-06 NOTE — PROGRESS NOTES
Clinical Nutrition   Nutrition Assessment  Reason for Visit:   Follow-up protocol    Patient Name: Murali Dennis  YOB: 1929  MRN: [id number]  Date of Encounter: 01/06/20 2:08 PM  Admission date: 12/27/2019    Nutrition Assessment   Assessment     Hospital Problem List    Pancreatitis    Dementia (CMS/[facility])    Hypothyroid    Coronary artery disease involving native coronary artery of native heart without angina pectoris    A-fib (CMS/[facility])    Benign prostatic hyperplasia without lower urinary tract symptoms    Type 2 diabetes mellitus without complication, without long-term current use of insulin (CMS/[facility])    Gastroesophageal reflux disease    Parkinson's disease (CMS/[facility])    ANGELA on CPAP    Cardiomyopathy (EF 30%)    Mixed hyperlipidemia    Sepsis (CMS/[facility])    Bacteremia due to Streptococcus    Acute renal failure superimposed on stage 3 chronic kidney disease (CMS/[facility])    Acute cholecystitis    Acute pancreatitis    Confusion    Acid reflux    Additional applicable diagnosis/conditions/procedures this adm:  (12/30) s/p ERCP- unremarkable UGI tract  (1/2) SLP eval - family would like comfort diet of regular textures / thin liquids    Applicable PMH/PSxH:   PMH: He  has a past medical history of Alzheimer disease (CMS/[facility]), Warren esophagus, Benign prostatic hyperplasia without lower urinary tract symptoms (10/17/2018), Chronic congestive heart failure (CMS/[facility]) (12/8/2017), COPD (chronic obstructive pulmonary disease) (CMS/[facility]), Coronary artery disease (2003), Dementia (CMS/[facility]) (2003), Diarrhea (2/24/2019), Environmental allergies (10/17/2018), Essential hypertension (10/17/2018), Gastroesophageal reflux disease (11/19/2018), GERD (gastroesophageal reflux disease), Hypothyroid, Sleep apnea, Stroke (CMS/[facility]) (2003), TIA (transient ischemic attack) (12/29/2017), and Unintended weight loss (10/17/2018).   PSxH: He  has a past surgical history that includes Hernia repair; Carpal  "tunnel release (Right); Hemorrhoid surgery; Coronary artery bypass graft (2003); Cardiac catheterization (2003); Cataract extraction (Bilateral); Cardiac catheterization (N/A, 1/19/2018); and ERCP (N/A, 12/30/2019).     Reported/Observed/Food/Nutrition Related History:   Patient and and family present during visit. Family reports that patient has not been eating very well lately and is taking only bites of the Magic Cup supplements. Patient loves ice cream, but family encourages Magic Cup consumption since it has better nutrition. Family requests that patient try chocolate Boost pudding in addition to his Magic Cup. Family also provided RD with lunch meal preferences to communicate with kitchen.    RD notes family has chosen palliative care and declined hospice. Will not have CCY this admission.    Anthropometrics     Height: 177.8 cm (70\")  Last filed wt: Weight: 87.1 kg (192 lb) (01/03/20 0500)  Weight Method: Bed scale    BMI: BMI (Calculated): 23  Normal: 18.5-24.9kg/m2    Ideal Body Weight (IBW) (kg): 76.48    Labs reviewed   Yes     Medications reviewed   Yes  Pertinent: MVI, rocephin, sinemet, protonix, pericolace  PRN: magic mouthwash    Current Nutrition Prescription   PO: Diet Regular    Oral Nutrition Supplement: Magic Cup 3x daily    Intake: 7% / 10 meals    Nutrition Diagnosis     1/1 (updated 1/6)  Problem Inadequate oral intake   Etiology Clinical condition, decreased appetite   Signs/Symptoms PO intake: 7% / 10 meals, bites of magic cup supplement per family   Status: ongoing    Nutrition Intervention   1.  Follow treatment progress, Care plan reviewed  2.  Interview for preferences, Encourage intake, Supplement provided - ordered Boost pudding 1x daily in addition to magic cup    Goal:   General: Nutrition support treatment  PO: Increase intake    Monitoring/Evaluation:   Per protocol, PO intake, Supplement intake, Symptoms     Will Continue to follow per protocol  Lyssa Stevenson RD  Time Spent: " 25 minutes

## 2020-01-06 NOTE — PLAN OF CARE
Problem: Patient Care Overview  Goal: Plan of Care Review  Outcome: Ongoing (interventions implemented as appropriate)  Flowsheets (Taken 1/6/2020 1126)  Plan of Care Reviewed With: spouse;daughter  Outcome Summary: PT ALERT, FOLLOWING COMMANDS AND MOTIVATED TO WORK WITH THERAPY. ABLE TO INITIATE PIVOT TRANSFER BED TO CHAIR WITH MAX ASSIST OF 2, STS WITH MOD ASSIST OF 2 AND COMPLETED B LE THER EX WITH CUES. RECOMMEND SNF VS IRF AT D/C IT PT CONTINUES WITH ABILITY /DESIRE TO WORK WITH THERAPY. GOOD FAMILY SUPPORT.

## 2020-01-06 NOTE — PROGRESS NOTES
Continued Stay Note  [facility]     Patient Name: Murali Dennis  MRN: [id number]  Today's Date: 1/6/2020    Admit Date: 12/27/2019    Discharge Plan     Row Name 01/06/20 1733       Plan    Plan  Rehab facility     Plan Comments  Chart reviewed. Visit made to pt. Pt's wife present. Pt appears comfortable, no c/o pain. Wife stated the plan is for pt to discharge to [facility] for rehab, discharge date unknown at this time. Will continue to follow peripherally. Please call [phone] if can be of further assistance.        Discharge Codes    No documentation.       Expected Discharge Date and Time     Expected Discharge Date Expected Discharge Time    Jan 7, 2020             Vero David RN

## 2020-01-06 NOTE — PROGRESS NOTES
[facility] Medicine Services  PROGRESS NOTE    Patient Name: Murali Dennis  : 1929  MRN: [id number]    Date of Admission: 2019  Primary Care Physician: Diana Null APRN    Subjective   Subjective     CC: Abdominal pain    HPI: Patient is on medicine for acid at home.  They are reporting he is having some symptoms of acid reflux.  Patient denies significant abdominal pain at this time.  He appears comfortable.  Wife and son in room today.  Son said he was going to step outside with me and talk to me outside the room.    Review of Systems    Unable to assess    Objective   Objective     Vital Signs:   Temp:  [97.4 °F (36.3 °C)-98.8 °F (37.1 °C)] 97.9 °F (36.6 °C)  Heart Rate:  [56-68] 68  Resp:  [14-18] 18  BP: (126-138)/(66-74) 126/73        Physical Exam:    Constitutional: No acute distress, awake, alert  HENT: NCAT, mucous membranes moist  Respiratory: Clear to auscultation bilaterally, respiratory effort normal   Cardiovascular: RRR, s1 and s2  Gastrointestinal: Positive bowel sounds, soft, nontender, nondistended  Musculoskeletal: No bilateral ankle edema  Psychiatric: Appropriate affect, cooperative  Neurologic: Oriented x 3, strength symmetric in all extremities, Cranial Nerves grossly intact to confrontation, speech clear  Skin: No rashes      Results Reviewed:    Results from last 7 days   Lab Units 20  0728 20  0547 19  0616   WBC 10*3/mm3 5.91 5.44 8.70   HEMOGLOBIN g/dL 9.8* 10.8* 11.2*   HEMATOCRIT % 29.0* 32.6* 34.7*   PLATELETS 10*3/mm3 107* 114* 106*     Results from last 7 days   Lab Units 20  0635 20  1045 20  0728 20  0547 19  0616 19  0849   SODIUM mmol/L 138  --  140 141 139 140   POTASSIUM mmol/L 4.3 4.3 3.3* 3.6 3.8 4.4   CHLORIDE mmol/L 108*  --  109* 110* 107 109*   CO2 mmol/L 20.0*  --  19.0* 18.0* 19.0* 20.0*   BUN mg/dL 33*  --  42* 51* 60* 57*   CREATININE mg/dL 1.44*  --  1.32* 1.53*  2.17* 2.47*   GLUCOSE mg/dL 112*  --  134* 137* 176* 167*   CALCIUM mg/dL 8.2  --  8.4 8.9 8.6 8.8   ALT (SGPT) U/L  --   --  12  --  33 76*   AST (SGOT) U/L  --   --  40  --  36 65*     Estimated Creatinine Clearance: 42 mL/min (A) (by C-G formula based on SCr of 1.44 mg/dL (H)).    Microbiology Results Abnormal     Procedure Component Value - Date/Time    Blood Culture - Blood, Wrist, Right [[id number]]  (Abnormal) Collected:  12/28/19 1244    Lab Status:  Final result Specimen:  Blood from Wrist, Right Updated:  12/31/19 0618     Blood Culture Streptococcus gallolyticus ssp pasteurianus     Comment: Refer to previous blood culture collected on 12/28 at 1244 for AMY's            Gram Stain Aerobic Bottle Gram positive cocci in chains    Narrative:       Aerobic bottle only      Blood Culture - Blood, Hand, Right [[id number]]  (Abnormal)  (Susceptibility) Collected:  12/28/19 1244    Lab Status:  Final result Specimen:  Blood from Hand, Right Updated:  12/31/19 0615     Blood Culture Streptococcus gallolyticus ssp pasteurianus     Gram Stain Aerobic Bottle Gram positive cocci in chains    Narrative:       Aerobic bottle only      Susceptibility      Streptococcus gallolyticus ssp pasteurianus     AMY     Ceftriaxone Susceptible     Penicillin G Susceptible     Vancomycin Susceptible                    Blood Culture ID, PCR - Blood, Hand, Right [[id number]]  (Abnormal) Collected:  12/28/19 1244    Lab Status:  Final result Specimen:  Blood from Hand, Right Updated:  12/29/19 0700     BCID, PCR Streptococcus spp, not A, B, or pneumoniae. Identification by BCID PCR.        Imaging Results (Last 24 Hours)     ** No results found for the last 24 hours. **        Results for orders placed during the hospital encounter of 12/27/19   Adult Transthoracic Echo Complete W/ Cont if Necessary Per Protocol    Narrative · The left ventricular cavity is moderately dilated.  · Right ventricular cavity is mild-to-moderately dilated.  ·  Left atrial cavity size is moderate-to-severely dilated.  · Moderate aortic valve regurgitation is present.  · Moderate mitral valve regurgitation is present.  · Mild to moderate tricuspid valve regurgitation is present.  · Calculated right ventricular systolic pressure from tricuspid   regurgitation is 35 mmHg.  · Estimated EF = 30%.  · Left ventricular systolic function is severely decreased.  · Left ventricular diastolic dysfunction (grade II) consistent with   pseudonormalization.  · The findings are consistent with dilated cardiomyopathy.  · Mild pulmonary hypertension is present.  · There is no evidence of pericardial effusion.  · The aortic valve exhibits moderate sclerosis without stenosis.          I have reviewed the medications:  Scheduled Meds:  GI cocktail  Oral Once   amiodarone 200 mg Oral Daily   aspirin 81 mg Oral Daily   carbidopa-levodopa 1 tablet Oral TID   clopidogrel 75 mg Oral Daily   finasteride 5 mg Oral Daily   levothyroxine 75 mcg Oral Q AM   metroNIDAZOLE 500 mg Intravenous Q8H   multivitamin with minerals 1 tablet Oral Daily   pantoprazole 40 mg Oral Q AM   sennosides-docusate 2 tablet Oral Nightly   sodium chloride 10 mL Intravenous Q12H     Continuous Infusions:   PRN Meds:.•  acetaminophen  •  bisacodyl  •  calcium carbonate EX  •  ipratropium-albuterol  •  lactated ringers  •  magic mouthwash  •  magnesium sulfate **OR** magnesium sulfate **OR** magnesium sulfate  •  Morphine  •  ondansetron **OR** ondansetron  •  oxyCODONE-acetaminophen  •  potassium chloride **OR** potassium chloride **OR** potassium chloride  •  simethicone  •  sodium chloride  •  [COMPLETED] Insert peripheral IV **AND** sodium chloride  •  sodium chloride  •  sodium chloride      Assessment/Plan   Assessment & Plan     Active Hospital Problems    Diagnosis  POA   • **Pancreatitis [K85.90]  Yes   • Bacteremia due to Streptococcus [R78.81, B95.5]  Yes   • Acute renal failure superimposed on stage 3 chronic kidney  disease (CMS/[facility]) [N17.9, N18.3]  No   • Acute cholecystitis [K81.0]  Yes   • Sepsis (CMS/[facility]) [A41.9]  Yes   • Acute pancreatitis [K85.90]  Yes   • Mixed hyperlipidemia [E78.2]  Yes   • Cardiomyopathy (EF 30%) [I42.9]  Yes   • ANGELA on CPAP [G47.33, Z99.89]  Not Applicable   • Parkinson's disease (CMS/[facility]) [G20]  Yes   • Gastroesophageal reflux disease [K21.9]  Yes   • Type 2 diabetes mellitus without complication, without long-term current use of insulin (CMS/[facility]) [E11.9]  Yes   • Benign prostatic hyperplasia without lower urinary tract symptoms [N40.0]  Yes   • A-fib (CMS/[facility]) [I48.91]  Yes   • Coronary artery disease involving native coronary artery of native heart without angina pectoris [I25.10]  Yes   • Hypothyroid [E03.9]  Yes   • Dementia (CMS/[facility]) [F03.90]  Yes      Resolved Hospital Problems    Diagnosis Date Resolved POA   • Sinus bradycardia [R00.1] 12/29/2019 Yes        Brief Hospital Course to date:  Murali Dennis is a 89 y.o. male admitted with abdominal pain    Acute pancreatitis  -Seen by GI and surgery  Sepsis  -IV ceftriaxone  Gastric reflux  -Trial GI cocktail  Complicated social situation  -Refer to hospice case manager note from January 4  Phimosis  -Seen by urology  Chronic kidney disease  -Unknown baseline    Yesterday's notes would suggest that family opted for hospice.    Surgery has signed off due to the decision to switch to palliative care    Unclear goals of care it appears.  Son stepped out of the room to talk to me even though this is the first time I am seeing the patient.  Evidently hospice was consulted but there was some confusion based upon the hospice notes.    Seen by palliative today    DVT Prophylaxis: mechanical    Disposition: I expect the patient to be discharged     CODE STATUS:   Code Status and Medical Interventions:   Ordered at: 12/27/19 1397     Limited Support to NOT Include:    Intubation     Level Of Support Discussed With:    Health Care Surrogate     Code  Status:    No CPR     Medical Interventions (Level of Support Prior to Arrest):    Limited         Electronically signed by Roberto Alarcon MD, 01/05/20, 9:03 PM.

## 2020-01-06 NOTE — PROGRESS NOTES
[facility] Medicine Services  PROGRESS NOTE    Patient Name: Murali Dennis  : 1929  MRN: [id number]    Date of Admission: 2019  Primary Care Physician: Diana Null APRN    Subjective   Subjective     CC: Bacteremia    HPI: Wife and daughter in room today.  Discussed bacteria in bloodstream.  I called Dr. Tijerina today.  Per report the patient is remarkably improved from previous ones during this hospital stay.  Today is the second time I have seen him.  He denies chills.  He denies nausea and vomiting    Review of Systems    Gen- No fevers, chills  CV- No chest pain, palpitations  Resp- No cough, dyspnea  GI- No N/V/D, abd pain    Objective   Objective     Vital Signs:   Temp:  [97.3 °F (36.3 °C)-98.1 °F (36.7 °C)] 97.3 °F (36.3 °C)  Heart Rate:  [57-68] 57  Resp:  [18] 18  BP: (126-130)/(64-79) 129/64        Physical Exam:    Constitutional: No acute distress, awake, alert  HENT: NCAT, dry tongue  Respiratory: Clear to auscultation bilaterally, respiratory effort normal   Cardiovascular: RRR, s1 and s2  Gastrointestinal: Positive bowel sounds, soft, nontender, nondistended  Musculoskeletal: No bilateral ankle edema  Psychiatric: Appropriate affect, cooperative  Neurologic: Oriented x 3, strength symmetric in all extremities, Cranial Nerves grossly intact to confrontation, speech clear  Skin: dry, + skin tenting      Results Reviewed:    Results from last 7 days   Lab Units 20  0728 20  0547 19  0616   WBC 10*3/mm3 5.91 5.44 8.70   HEMOGLOBIN g/dL 9.8* 10.8* 11.2*   HEMATOCRIT % 29.0* 32.6* 34.7*   PLATELETS 10*3/mm3 107* 114* 106*     Results from last 7 days   Lab Units 20  0635 20  1045 20  0728 20  0547 19  0616   SODIUM mmol/L 138  --  140 141 139   POTASSIUM mmol/L 4.3 4.3 3.3* 3.6 3.8   CHLORIDE mmol/L 108*  --  109* 110* 107   CO2 mmol/L 20.0*  --  19.0* 18.0* 19.0*   BUN mg/dL 33*  --  42* 51* 60*    CREATININE mg/dL 1.44*  --  1.32* 1.53* 2.17*   GLUCOSE mg/dL 112*  --  134* 137* 176*   CALCIUM mg/dL 8.2  --  8.4 8.9 8.6   ALT (SGPT) U/L  --   --  12  --  33   AST (SGOT) U/L  --   --  40  --  36     Estimated Creatinine Clearance: 42 mL/min (A) (by C-G formula based on SCr of 1.44 mg/dL (H)).    Microbiology Results Abnormal     Procedure Component Value - Date/Time    Blood Culture - Blood, Wrist, Right [[id number]]  (Abnormal) Collected:  12/28/19 1244    Lab Status:  Final result Specimen:  Blood from Wrist, Right Updated:  12/31/19 0618     Blood Culture Streptococcus gallolyticus ssp pasteurianus     Comment: Refer to previous blood culture collected on 12/28 at 1244 for AMY's            Gram Stain Aerobic Bottle Gram positive cocci in chains    Narrative:       Aerobic bottle only      Blood Culture - Blood, Hand, Right [[id number]]  (Abnormal)  (Susceptibility) Collected:  12/28/19 1244    Lab Status:  Final result Specimen:  Blood from Hand, Right Updated:  12/31/19 0615     Blood Culture Streptococcus gallolyticus ssp pasteurianus     Gram Stain Aerobic Bottle Gram positive cocci in chains    Narrative:       Aerobic bottle only      Susceptibility      Streptococcus gallolyticus ssp pasteurianus     AMY     Ceftriaxone Susceptible     Penicillin G Susceptible     Vancomycin Susceptible                    Blood Culture ID, PCR - Blood, Hand, Right [[id number]]  (Abnormal) Collected:  12/28/19 1244    Lab Status:  Final result Specimen:  Blood from Hand, Right Updated:  12/29/19 0700     BCID, PCR Streptococcus spp, not A, B, or pneumoniae. Identification by BCID PCR.          Imaging Results (Last 24 Hours)     ** No results found for the last 24 hours. **          Results for orders placed during the hospital encounter of 12/27/19   Adult Transthoracic Echo Complete W/ Cont if Necessary Per Protocol    Narrative · The left ventricular cavity is moderately dilated.  · Right ventricular cavity is  mild-to-moderately dilated.  · Left atrial cavity size is moderate-to-severely dilated.  · Moderate aortic valve regurgitation is present.  · Moderate mitral valve regurgitation is present.  · Mild to moderate tricuspid valve regurgitation is present.  · Calculated right ventricular systolic pressure from tricuspid   regurgitation is 35 mmHg.  · Estimated EF = 30%.  · Left ventricular systolic function is severely decreased.  · Left ventricular diastolic dysfunction (grade II) consistent with   pseudonormalization.  · The findings are consistent with dilated cardiomyopathy.  · Mild pulmonary hypertension is present.  · There is no evidence of pericardial effusion.  · The aortic valve exhibits moderate sclerosis without stenosis.          I have reviewed the medications:  Scheduled Meds:  GI cocktail  Oral Once   amiodarone 200 mg Oral Daily   aspirin 81 mg Oral Daily   carbidopa-levodopa 1 tablet Oral TID   clopidogrel 75 mg Oral Daily   finasteride 5 mg Oral Daily   levothyroxine 75 mcg Oral Q AM   multivitamin with minerals 1 tablet Oral Daily   pantoprazole 40 mg Oral Q AM   sennosides-docusate 2 tablet Oral Nightly   sodium chloride 10 mL Intravenous Q12H     Continuous Infusions:   PRN Meds:.•  acetaminophen  •  bisacodyl  •  calcium carbonate EX  •  ipratropium-albuterol  •  lactated ringers  •  magic mouthwash  •  magnesium sulfate **OR** magnesium sulfate **OR** magnesium sulfate  •  Morphine  •  ondansetron **OR** ondansetron  •  oxyCODONE-acetaminophen  •  potassium chloride **OR** potassium chloride **OR** potassium chloride  •  simethicone  •  sodium chloride  •  [COMPLETED] Insert peripheral IV **AND** sodium chloride  •  sodium chloride  •  sodium chloride      Assessment/Plan   Assessment & Plan     Active Hospital Problems    Diagnosis  POA   • **Pancreatitis [K85.90]  Yes   • Bacteremia due to Streptococcus [R78.81, B95.5]  Yes   • Acute renal failure superimposed on stage 3 chronic kidney disease  (CMS/[facility]) [N17.9, N18.3]  No   • Acute cholecystitis [K81.0]  Yes   • Sepsis (CMS/[facility]) [A41.9]  Yes   • Acute pancreatitis [K85.90]  Yes   • Mixed hyperlipidemia [E78.2]  Yes   • Cardiomyopathy (EF 30%) [I42.9]  Yes   • ANGELA on CPAP [G47.33, Z99.89]  Not Applicable   • Parkinson's disease (CMS/[facility]) [G20]  Yes   • Gastroesophageal reflux disease [K21.9]  Yes   • Type 2 diabetes mellitus without complication, without long-term current use of insulin (CMS/[facility]) [E11.9]  Yes   • Benign prostatic hyperplasia without lower urinary tract symptoms [N40.0]  Yes   • A-fib (CMS/[facility]) [I48.91]  Yes   • Coronary artery disease involving native coronary artery of native heart without angina pectoris [I25.10]  Yes   • Hypothyroid [E03.9]  Yes   • Dementia (CMS/[facility]) [F03.90]  Yes      Resolved Hospital Problems    Diagnosis Date Resolved POA   • Sinus bradycardia [R00.1] 12/29/2019 Yes        Brief Hospital Course to date:  Murali Dennis is a 89 y.o. male admitted with abdominal pain pancreatitis cholangitis and bacteremia.    Acute pancreatitis  -Is to be due to gallbladder sludge  -Plan is for cholecystectomy at a later time if he gets worse  -He likely has cholangitis but antibiotics have been discontinued today  Sepsis  -Continue antibiotics and observe  Complicated Najera catheter  -Had to be be placed by urology  Chronic kidney disease  -Stable    Discussed case in multidisciplinary rounds  Discussed case with infectious diseases    Long dialogue with patient wife and daughter in room today    We will stop antibiotics and observe  Specifically talked to Dr. Tijerina today twice    DVT Prophylaxis: Mechanical    Disposition: I expect the patient to be discharged to Rehab    CODE STATUS:   Code Status and Medical Interventions:   Ordered at: 12/27/19 9418     Limited Support to NOT Include:    Intubation     Level Of Support Discussed With:    Health Care Surrogate     Code Status:    No CPR     Medical Interventions (Level  of Support Prior to Arrest):    Limited         Electronically signed by Roberto Alarcon MD, 01/06/20, 6:50 PM.

## 2020-01-06 NOTE — PROGRESS NOTES
INFECTIOUS DISEASE Progress Note     Murali Dennis  4/20/1929  [id number]      Admission Date: 12/27/2019      Requesting Provider: No ref. provider found  Evaluating Physician: Ish Tijerina MD    Reason for Consultation: sepsis     History of present illness:  12/30/19: Patient is a 89 y.o. male, who presented to the ED with complaints of abdominal tenderness which began on Friday evening.   He has been having decreased appetite over the past several weeks and has lost 30 pounds unintentionally over the past year.  His family denies any fever, cough, sputum production, nausea, vomiting, dysuria prior to admission.    An abdominal CT on admission with distended gallbladder, with mild to moderate diffuse acute pancreatitis, mild intrahepatic bile duct dilatation. He underwent an MRCP with gallbladder distention, and possible mass in neck of gallbladder, with changes of pancreatitis.  Admitting labs with lipase of >3000, Scr 1.19, WBC 13, 000., lactate of 2.9,  Blood cultures now positive for Strep species.  He is currently on Vancomycin and Cefepime and we were consulted for evaluation and treatment.     He has a history of penicillin allergy which his wife indicates consisted of arm swelling after injection.  12/31/19:  He is resting quietly.  Daughter says he was confused and restless/aggitated several times during the evening.    1/1/19: Family is very concerned that PT hasn't been working with him, and he is so weak.  Still having difficulty with swallowing.  He had a bowel movement earlier this am. Was confused earlier this am per son. He remains afebrile.   1/2/20:  His manriquez was removed and he is unable to urinate.  He was very aggitated and confused yesterday and last pm was given haldol and he is quiet now. Son at bedside.  He complains of abdominal pain.  His PICC line was ordered yesterday but he was not cooperative with placement of the PICC line.  1/3/20:  Manriquez was replaced by urology  yesterday.  Family is considering hospice/palliative.   1/4/2020: His family has decided to proceed with hospice/comfort measures only.  They indicate that this was his choice.  He has remained afebrile overnight.  He has been somewhat dyspneic.  1/6/2020: When I last saw him on Saturday, the family had indicated that they were going to proceed with hospice/comfort measures only.  He apparently became more alert and responsive later in the day and they decided to not proceed with hospice.  They met with the hospitalist and his antibiotic therapy was restarted.  I had already discontinued his antibiotic therapy after 1/3-I discussed duration of antibiotic therapy with Dr. Jack and we have decided to leave him on antibiotic therapy through that time regardless of his hospice status.  I was not informed about the change in decisions until I was called by Dr. Alarcon today.  He is now back on Rocephin/Flagyl.  He has remained afebrile.  He has no new complaints today.  I have been informed that his family was upset about a perceived lack of communication or disagreement among the physicians.  I have discussed his clinical situation and therapeutic plan repeatedly and on numerous different days with Dr. Jack and the hospitalist service.  I informed his family today that we have been communicating extensively about his care although I was not notified by the hospitalist service this weekend that there was a change in his status and change in decisions regarding hospice.    Past Medical History:   Diagnosis Date   • Alzheimer disease (CMS/[facility])    • Warren esophagus     followed  with GI in [place]   • Benign prostatic hyperplasia without lower urinary tract symptoms 10/17/2018   • Chronic congestive heart failure (CMS/[facility]) 12/8/2017     echo report from 8/2/17 EF 60-65%, mild TR, mild AR ([place], [place]) Echocardiogram 12/9/17: EF 28%, mild to moderate MR    • COPD (chronic obstructive pulmonary  disease) (CMS/[facility])    • Coronary artery disease    • Dementia (CMS/[facility])    • Diarrhea 2019   • Environmental allergies 10/17/2018   • Essential hypertension 10/17/2018   • Gastroesophageal reflux disease 2018   • GERD (gastroesophageal reflux disease)    • Hypothyroid     started after amiodarone use in    • Sleep apnea     wears cpap   • Stroke (CMS/[facility])    • TIA (transient ischemic attack) 2017   • Unintended weight loss 10/17/2018       Past Surgical History:   Procedure Laterality Date   • CARDIAC CATHETERIZATION     • CARDIAC CATHETERIZATION N/A 2018    Procedure: Left Heart Cath;  Surgeon: Hollis Ugarte MD;  Location:  [facility] CATH INVASIVE LOCATION;  Service:    • CARPAL TUNNEL RELEASE Right    • CATARACT EXTRACTION Bilateral    • CORONARY ARTERY BYPASS GRAFT      Triple Bypass, @ [facility] [place], [place]   • ERCP N/A 2019    Procedure: ENDOSCOPIC RETROGRADE CHOLANGIOPANCREATOGRAPHY;  Surgeon: Arsh White MD;  Location:  [facility] ENDOSCOPY;  Service: Gastroenterology   • HEMORRHOIDECTOMY     • HERNIA REPAIR         Family History   Problem Relation Age of Onset   • Cancer Mother    • Heart disease Sister    • Diabetes Sister    • Heart disease Brother    • Cancer Brother    • Stroke Sister        Social History     Socioeconomic History   • Marital status:      Spouse name: Not on file   • Number of children: Not on file   • Years of education: Not on file   • Highest education level: Not on file   Occupational History   • Occupation: Retired   Tobacco Use   • Smoking status: Former Smoker     Last attempt to quit: 1967     Years since quittin.0   • Smokeless tobacco: Never Used   Substance and Sexual Activity   • Alcohol use: No   • Drug use: No   • Sexual activity: Defer   Social History Narrative    Caffeine use: 1 serving daily.    Patient lives at home with family.     Lives with wife, daughter close  by for any needs       Allergies   Allergen Reactions   • Benzodiazepines Other (See Comments)     Paradoxical response   • Codeine Other (See Comments)     unknown   • Fluoxetine Other (See Comments)     Paradoxical response   • Lipitor [Atorvastatin] Other (See Comments)     Myalgias     • Metoclopramide Other (See Comments)     Unknown   • Nabumetone    • Penicillins Swelling and Other (See Comments)     Has tolerated cefepime 12/2019   • Prozac [Fluoxetine Hcl]    • Tramadol Other (See Comments)     unknown   • Valium [Diazepam] Other (See Comments)     Paradoxical response     • Sulfa Antibiotics Rash         Medication:    Current Facility-Administered Medications:   •  acetaminophen (TYLENOL) tablet 650 mg, 650 mg, Oral, Q6H PRN, Arsh White MD, 650 mg at 12/29/19 0544  •  aluminum-magnesium hydroxide-simethicone (MAALOX MAX) 400-400-40 MG/5ML 15 mL, Lidocaine Viscous HCl (XYLOCAINE) 2 % 15 mL suspension, , Oral, Once, Roberto Alarcon MD  •  amiodarone (PACERONE) tablet 200 mg, 200 mg, Oral, Daily, Arsh White MD, 200 mg at 01/06/20 1035  •  aspirin chewable tablet 81 mg, 81 mg, Oral, Daily, Arsh White MD, 81 mg at 01/06/20 1034  •  bisacodyl (DULCOLAX) suppository 10 mg, 10 mg, Rectal, Daily PRN, Kayla Lozoya MD  •  calcium carbonate EX (TUMS EX) chewable tablet 750 mg, 750 mg, Oral, TID PRN, Dorcas Montoya MD  •  carbidopa-levodopa (SINEMET)  MG per tablet 1 tablet, 1 tablet, Oral, TID, Arsh White MD, 1 tablet at 01/06/20 1634  •  cefTRIAXone (ROCEPHIN) 2 g/100 mL 0.9% NS VTB (ANIYA), 2 g, Intravenous, Daily, Ish Tijerina MD, 2 g at 01/06/20 1314  •  clopidogrel (PLAVIX) tablet 75 mg, 75 mg, Oral, Daily, Kayla Lozoya MD, 75 mg at 01/06/20 1034  •  finasteride (PROSCAR) tablet 5 mg, 5 mg, Oral, Daily, Arsh White MD, 5 mg at 01/06/20 1034  •  ipratropium-albuterol (DUO-NEB) nebulizer solution 3 mL,  3 mL, Nebulization, Q4H PRN, Arsh White MD, 3 mL at 01/05/20 1217  •  lactated ringers bolus 500 mL, 500 mL, Intravenous, Once PRN, Isak Dudley CRNA  •  levothyroxine (SYNTHROID, LEVOTHROID) tablet 75 mcg, 75 mcg, Oral, Q AM, Nesha Rey MD, 75 mcg at 01/06/20 0641  •  magic mouthwash oral supsension 5 mL, 5 mL, Swish & Spit, Q4H PRN, Kayla Lozoya MD, 5 mL at 01/06/20 1314  •  Magnesium Sulfate 2 gram Bolus, followed by 8 gram infusion (total Mg dose 10 grams)- Mg less than or equal to 1mg/dL, 2 g, Intravenous, PRN **OR** Magnesium Sulfate 2 gram / 50mL Infusion (GIVE X 3 BAGS TO EQUAL 6GM TOTAL DOSE) - Mg 1.1 - 1.5 mg/dl, 2 g, Intravenous, PRN **OR** Magnesium Sulfate 4 gram infusion- Mg 1.6-1.9 mg/dL, 4 g, Intravenous, PRN, Severo Campbell, DO  •  Morphine sulfate (PF) injection 2 mg, 2 mg, Intravenous, Q2H PRN, Joie Muhammad APRN, 2 mg at 01/05/20 0422  •  multivitamin with minerals 1 tablet, 1 tablet, Oral, Daily, Arsh White MD, 1 tablet at 01/06/20 1033  •  ondansetron (ZOFRAN) tablet 4 mg, 4 mg, Oral, Q6H PRN **OR** ondansetron (ZOFRAN) injection 4 mg, 4 mg, Intravenous, Q6H PRN, Arsh White MD, 4 mg at 01/04/20 0812  •  oxyCODONE-acetaminophen (PERCOCET) 7.5-325 MG per tablet 1 tablet, 1 tablet, Oral, Q4H PRN, Arsh White MD, 1 tablet at 01/05/20 0422  •  pantoprazole (PROTONIX) EC tablet 40 mg, 40 mg, Oral, Q AM, Roberto Alarcon MD, 40 mg at 01/06/20 0640  •  potassium chloride (MICRO-K) CR capsule 40 mEq, 40 mEq, Oral, PRN, 40 mEq at 01/03/20 2329 **OR** potassium chloride (KLOR-CON) packet 40 mEq, 40 mEq, Oral, PRN **OR** potassium chloride 10 mEq in 100 mL IVPB, 10 mEq, Intravenous, Q1H PRN, Severo Campbell, DO, Last Rate: 100 mL/hr at 01/04/20 0336, 10 mEq at 01/04/20 0336  •  sennosides-docusate (PERICOLACE) 8.6-50 MG per tablet 2 tablet, 2 tablet, Oral, Nightly, Kayla Lozoya MD, 2 tablet at 01/05/20 2115  •   simethicone (MYLICON) chewable tablet 80 mg, 80 mg, Oral, 4x Daily PRN, Kayla Lozoya MD, 80 mg at 20 1203  •  sodium chloride 0.9 % flush 10 mL, 10 mL, Intravenous, PRN, Arsh White MD, 10 mL at 19 2122  •  [COMPLETED] Insert peripheral IV, , , Once **AND** sodium chloride 0.9 % flush 10 mL, 10 mL, Intravenous, PRN, Arsh White MD  •  sodium chloride 0.9 % flush 10 mL, 10 mL, Intravenous, Q12H, Ish Tijerina MD, 10 mL at 20 1037  •  sodium chloride 0.9 % flush 10 mL, 10 mL, Intravenous, PRN, Ish Tijerina MD  •  sodium chloride 0.9 % flush 20 mL, 20 mL, Intravenous, PRN, Ish Tijerina MD    Antibiotics:  Anti-Infectives (From admission, onward)    Ordered     Dose/Rate Route Frequency Start Stop    20 1112  cefTRIAXone (ROCEPHIN) 2 g/100 mL 0.9% NS VTB (ANIYA)  Review   Ordering Provider:  Ish Tijerina MD    2 g  over 30 Minutes Intravenous Daily 20 1200 01/10/20 0859    20 1549  cefTRIAXone (ROCEPHIN) 1 g/100 mL 0.9% NS (MBP)     Ordering Provider:  Kalya Lozoya MD    1 g  over 30 Minutes Intravenous Every 24 Hours 20 1700 20 1752    20 1553  metroNIDAZOLE (FLAGYL) 500 mg/100mL IVPB     Ordering Provider:  Kayla Lozoya MD    500 mg  over 60 Minutes Intravenous Every 8 Hours 20 1700 20 1135    19 0632  vancomycin 1500 mg/500 mL 0.9% NS IVPB ([facility])     Ordering Provider:  Colin Pyle [facility]    20 mg/kg × 78.4 kg  over 90 Minutes Intravenous Once 19 0730 19 0948    19 1226  cefepime (MAXIPIME) 2 g/100 mL 0.9% NS (mbp)     Ordering Provider:  Perla Melgar DO    2 g  200 mL/hr over 30 Minutes Intravenous Once 19 1315 19 1358            Review of Systems:  No reliable ROS from patient       Physical Exam:   Vital Signs  Temp (24hrs), Av.8 °F (36.6 °C), Min:97.3 °F (36.3 °C), Max:98.1 °F (36.7 °C)    Temp  Min: 97.3 °F (36.3 °C)  Max: 98.1 °F (36.7  °C)  BP  Min: 126/79  Max: 130/67  Pulse  Min: 57  Max: 68  Resp  Min: 18  Max: 18  SpO2  Min: 90 %  Max: 99 %    GENERAL: He is alert and responsive but confused.  He is in no acute distress  HEENT: No conjunctival injection. No icterus. Oropharynx clear without evidence of thrush or exudate.  HEART: RRR; + 2/6murmur, rubs, gallops.   LUNGS: Mild bilateral rhonchi  ABDOMEN: Soft and nontender  EXT:  No cyanosis, clubbing or edema. No cord.  :  Najera catheter removed   MSK:  No joint effusions   SKIN: Warm and dry without cutaneous eruptions on Inspection/palpation.    NEURO: He is alert and responsive.  He moves all of his extremities.      Laboratory Data    Results from last 7 days   Lab Units 01/03/20  0728 01/02/20  0547 12/31/19  0616   WBC 10*3/mm3 5.91 5.44 8.70   HEMOGLOBIN g/dL 9.8* 10.8* 11.2*   HEMATOCRIT % 29.0* 32.6* 34.7*   PLATELETS 10*3/mm3 107* 114* 106*     Results from last 7 days   Lab Units 01/05/20  0635   SODIUM mmol/L 138   POTASSIUM mmol/L 4.3   CHLORIDE mmol/L 108*   CO2 mmol/L 20.0*   BUN mg/dL 33*   CREATININE mg/dL 1.44*   GLUCOSE mg/dL 112*   CALCIUM mg/dL 8.2     Results from last 7 days   Lab Units 01/03/20  0728   ALK PHOS U/L 205*   BILIRUBIN mg/dL 0.8   ALT (SGPT) U/L 12   AST (SGOT) U/L 40                         Estimated Creatinine Clearance: 42 mL/min (A) (by C-G formula based on SCr of 1.44 mg/dL (H)).      Microbiology:  Blood Culture   Date Value Ref Range Status   12/28/2019 Gram Positive Cocci (A)  Preliminary   12/28/2019 Gram Positive Cocci (A)  Preliminary     BCID, PCR   Date Value Ref Range Status   12/28/2019 (C) No organism detected by BCID PCR. Final    Streptococcus spp, not A, B, or pneumoniae. Identification by BCID PCR.     BC Strep gallolyticus, ssp pasterurianus         Radiology:  Imaging Results (Last 72 Hours)     ** No results found for the last 72 hours. **            Impression:   1.  Severe sepsis-improved  2.  Cholangitis- with associated  streptococcal bacteremia and secondary to choledocholithiasis, s/p sphincterotomy  3.  Streptococcal bacteremia-  this is secondary to cholangitis.  He had already received a week of intravenous antibiotic therapy when I discontinued his therapy on 1/3 but he has now received 3 additional days of therapy.  His TTE was negative for valvular vegetations and I do not think it would be prudent to put him through a GERMAINE with the need for sedation.  I will plan to discontinue his antibiotic therapy after today.  He will be at potential risk for relapse since he does have gallbladder disease.  Dr. Jack has offered to perform an elective cholecystectomy if he and the family desire after he has clinically improved.  4.  Acute pancreatitis  5.  Dementia   6.  Acute kidney injury/ATN-superimposed on chronic kidney disease, stage 3,   7.  Cardiomyopathy, EF 30%   8.  Leukocytosis/neutrophilia-improving  9. Urinary retention, manriquez placed by urology     PLAN/RECOMMENDATIONS:  1.  Discontinue antibiotic therapy  2.  Elective cholecystectomy if he continues to improve-this will need to be done after he is discharged from the hospital    I discussed his situation in detail with his family again today.  I discussed his complicated situation repeatedly with Dr. Alarcon today.  I discussed his situation again with Dr. Zackary Jack today.      Anderson Tijerina MD  1/6/2020  4:41 PM

## 2020-01-06 NOTE — PROGRESS NOTES
Continued Stay Note  [facility]     Patient Name: Murali Dennis  MRN: [id number]  Today's Date: 1/6/2020    Admit Date: 12/27/2019    Discharge Plan     Row Name 01/06/20 1118       Plan    Plan  [facility]    Provided post acute provider list?  Yes    Post Acute Provider Lists  Inpatient Rehab    Post Acuite Provider List  Delivered    Delivered To  Patient;Support Person    Method of Delivery  In person    Patient/Family in Agreement with Plan  yes    Plan Comments  Spoke with patient, wife and daughter at bedside. Plan is [facility] at discharge. Called [facility] and left a voicemail for a referral. CM will continue to follow.    Final Discharge Disposition Code  62 - inpatient rehab facility        Discharge Codes    No documentation.             Vernon Hurd RN

## 2020-01-06 NOTE — THERAPY TREATMENT NOTE
Patient Name: Murali Dennis  : 1929    MRN: [id number]                              Today's Date: 2020       Admit Date: 2019    Visit Dx:     ICD-10-CM ICD-9-CM   1. Acute pancreatitis, unspecified complication status, unspecified pancreatitis type K85.90 577.0   2. HELIO (acute kidney injury) (CMS/[facility]) N17.9 584.9   3. Dysphagia, unspecified type R13.10 787.20   4. Impaired mobility and ADLs Z74.09 799.89     Patient Active Problem List   Diagnosis   • Dementia (CMS/[facility])   • Hypothyroid   • Stage 3 chronic kidney disease (CMS/[facility])   • Coronary artery disease involving native coronary artery of native heart without angina pectoris   • A-fib (CMS/[facility])   • Benign prostatic hyperplasia without lower urinary tract symptoms   • Type 2 diabetes mellitus without complication, without long-term current use of insulin (CMS/[facility])   • Gastroesophageal reflux disease   • Ventricular tachycardia (CMS/[facility])   • Parkinson's disease (CMS/[facility]CC)   • ANGELA on CPAP   • Cardiomyopathy (EF 30%)   • Mixed hyperlipidemia   • Pancreatitis   • Sepsis (CMS/[facility])   • Bacteremia due to Streptococcus   • Acute renal failure superimposed on stage 3 chronic kidney disease (CMS/[facility])   • Acute cholecystitis   • Acute pancreatitis     Past Medical History:   Diagnosis Date   • Alzheimer disease (CMS/[facility])    • Warren esophagus     followed  with GI in [place]   • Benign prostatic hyperplasia without lower urinary tract symptoms 10/17/2018   • Chronic congestive heart failure (CMS/[facility]) 2017     echo report from 17 EF 60-65%, mild TR, mild AR ([place], [place]) Echocardiogram 17: EF 28%, mild to moderate MR    • COPD (chronic obstructive pulmonary disease) (CMS/[facility])    • Coronary artery disease    • Dementia (CMS/[facility])    • Diarrhea 2019   • Environmental allergies 10/17/2018   • Essential hypertension 10/17/2018   • Gastroesophageal reflux disease 2018   • GERD (gastroesophageal reflux  disease)    • Hypothyroid     started after amiodarone use in 2003   • Sleep apnea     wears cpap   • Stroke (CMS/HCC) 2003   • TIA (transient ischemic attack) 12/29/2017   • Unintended weight loss 10/17/2018     Past Surgical History:   Procedure Laterality Date   • CARDIAC CATHETERIZATION  2003   • CARDIAC CATHETERIZATION N/A 1/19/2018    Procedure: Left Heart Cath;  Surgeon: Hollis Ugarte MD;  Location:  [facility] CATH INVASIVE LOCATION;  Service:    • CARPAL TUNNEL RELEASE Right    • CATARACT EXTRACTION Bilateral    • CORONARY ARTERY BYPASS GRAFT  2003    Triple Bypass, @ [facility] @ [place], [place]   • ERCP N/A 12/30/2019    Procedure: ENDOSCOPIC RETROGRADE CHOLANGIOPANCREATOGRAPHY;  Surgeon: Arsh White MD;  Location:  [facility];  Service: Gastroenterology   • HEMORRHOIDECTOMY     • HERNIA REPAIR       General Information     Row Name 01/06/20 1118          PT Evaluation Time/Intention    Document Type  therapy note (daily note)  -CD     Mode of Treatment  physical therapy  -CD     Row Name 01/06/20 1118          General Information    Patient Profile Reviewed?  -- PT ALERT NOW, EATING WELL AND FAMILY REQUESTING P.T. PT MOTIVATED TO WORK WITH THERAPY.   -CD     Existing Precautions/Restrictions  fall  -CD     Barriers to Rehab  previous functional deficit;cognitive status;medically complex  -CD     Row Name 01/06/20 1118          Cognitive Assessment/Intervention- PT/OT    Orientation Status (Cognition)  oriented to;person WANTS TO GO HOME.   -CD     Cognitive Assessment/Intervention Comment  PT FOLLOWING COMMANDS, ALERT AND CONVERSING. WANTS TO GET OOB. WANTS TO GO HOME.   -CD     Row Name 01/06/20 1118          Safety Issues, Functional Mobility    Impairments Affecting Function (Mobility)  endurance/activity tolerance;strength;postural/trunk control  -CD       User Key  (r) = Recorded By, (t) = Taken By, (c) = Cosigned By    Initials Name Provider Type    CD Oscar  Esthela ARIAS, PT Physical Therapist        Mobility     Row Name 01/06/20 1120          Bed Mobility Assessment/Treatment    Bed Mobility Assessment/Treatment  supine-sit;sidelying-sit  -CD     Rolling Left Patrick (Bed Mobility)  minimum assist (75% patient effort)  -CD     Sidelying-Sit Patrick (Bed Mobility)  moderate assist (50% patient effort);verbal cues  -CD     Assistive Device (Bed Mobility)  head of bed elevated;bed rails;draw sheet  -CD     Row Name 01/06/20 1120          Transfer Assessment/Treatment    Comment (Transfers)  CUES FOR HAND PLACEMENT. CUES FOR UPRIGHT POSTURE AND MANUAL ASSIST FOR WT SHIFT TO STEP TO RECLINER.   -CD     Row Name 01/06/20 1120          Bed-Chair Transfer    Bed-Chair Patrick (Transfers)  maximum assist (25% patient effort);2 person assist;verbal cues  -CD     Row Name 01/06/20 1120          Sit-Stand Transfer    Sit-Stand Patrick (Transfers)  moderate assist (50% patient effort);2 person assist  -CD     Row Name 01/06/20 1120          Gait/Stairs Assessment/Training    Comment (Gait/Stairs)  DEFERRED DUE TO WEAKNESS.   -CD       User Key  (r) = Recorded By, (t) = Taken By, (c) = Cosigned By    Initials Name Provider Type    CD Esthela Moore, PT Physical Therapist        Obj/Interventions     Row Name 01/06/20 1123          Therapeutic Exercise    Lower Extremity (Therapeutic Exercise)  LAQ (long arc quad), bilateral;marching while seated;SLR (straight leg raise), bilateral;heel slides, bilateral  -     Exercise Type (Therapeutic Exercise)  AROM (active range of motion)  -CD     Position (Therapeutic Exercise)  seated  -CD     Sets/Reps (Therapeutic Exercise)  1 SET OF 10 REPS. CUES FOR FULL AVAILABLE RANGE. REST BREAKS.   -CD     Row Name 01/06/20 1123          Static Sitting Balance    Level of Patrick (Unsupported Sitting, Static Balance)  contact guard assist;minimal assist, 75% patient effort INITIALLY CGA BUT THEN MIN ASSIST WITH FATIGUE DUE TO  FORWARED LEAN.   -CD     Sitting Position (Unsupported Sitting, Static Balance)  sitting on edge of bed  -CD     Time Able to Maintain Position (Unsupported Sitting, Static Balance)  more than 5 minutes  -CD     Row Name 01/06/20 1123          Dynamic Sitting Balance    Level of [place], Reaches Outside Midline (Sitting, Dynamic Balance)  minimal assist, 75% patient effort  -CD     Sitting Position, Reaches Outside Midline (Sitting, Dynamic Balance)  sitting on edge of bed  -CD     Row Name 01/06/20 1123          Static Standing Balance    Level of [place] (Supported Standing, Static Balance)  moderate assist, 50 to 74% patient effort;2 person assist  -CD     Assistive Device Utilized (Supported Standing, Static Balance)  -- VIA B UE SUPPORT AND GAIT BELT.   -CD     Row Name 01/06/20 1123          Dynamic Standing Balance    Level of [place], Reaches Outside Midline (Standing, Dynamic Balance)  maximal assist, 25 to 49% patient effort;2 person assist  -CD     Assistive Device Utilized (Supported Standing, Dynamic Balance)  -- VIA B UE SUPPORT AND GAIT BELT.   -CD     Comment, Reaches Outside Midline (Standing, Dynamic Balance)  MAX ASSIST FOR WT SHIFT AND CONSTANT CUES FOR UPRIGHT POSTURE. PT ABLE TO TAKE SHUFFLING STEPS BED TO CHAIR WITH MAX ASSIST OF 2. RECOMMEND LIFT BACK TO SPECIALTY BED FOR SAFETY.   -CD       User Key  (r) = Recorded By, (t) = Taken By, (c) = Cosigned By    Initials Name Provider Type    CD Esthela Moore, PT Physical Therapist        Goals/Plan    No documentation.       Clinical Impression     Row Name 01/06/20 1126          Pain Scale: Numbers Pre/Post-Treatment    Pain Scale: Numbers, Pretreatment  0/10 - no pain  -CD     Pain Scale: Numbers, Post-Treatment  0/10 - no pain  -CD     Row Name 01/06/20 1126          Plan of Care Review    Plan of Care Reviewed With  spouse;daughter  -CD     Progress  improving  -CD     Outcome Summary  PT ALERT, FOLLOWING COMMANDS AND MOTIVATED  TO WORK WITH THERAPY. ABLE TO INITIATE PIVOT TRANSFER BED TO CHAIR WITH MAX ASSIST OF 2, STS WITH MOD ASSIST OF 2 AND COMPLETED B LE THER EX WITH CUES. RECOMMEND SNF VS IRF AT D/C IT PT CONTINUES WITH ABILITY /DESIRE TO WORK WITH THERAPY. GOOD FAMILY SUPPORT.   -CD     Row Name 01/06/20 1126          Physical Therapy Clinical Impression    Patient/Family Goals Statement (PT Clinical Impression)  FAMILY DESIRES CONTINUED REHAB PRIOR TO HOME WITH FAMILY.   -CD     Criteria for Skilled Interventions Met (PT Clinical Impression)  yes  -CD     Rehab Potential (PT Clinical Summary)  good, to achieve stated therapy goals  -CD     Row Name 01/06/20 1126          Vital Signs    Pre Systolic BP Rehab  124  -CD     Pre Treatment Diastolic BP  69  -CD     Post Systolic BP Rehab  157  -CD     Post Treatment Diastolic BP  60  -CD     Pretreatment Heart Rate (beats/min)  61  -CD     Posttreatment Heart Rate (beats/min)  58  -CD     Pre SpO2 (%)  95  -CD     O2 Delivery Pre Treatment  room air  -CD     O2 Delivery Intra Treatment  room air  -CD     Post SpO2 (%)  97  -CD     O2 Delivery Post Treatment  room air  -CD     Pre Patient Position  Supine  -CD     Intra Patient Position  Standing  -CD     Post Patient Position  Sitting  -CD     Row Name 01/06/20 1126          Positioning and Restraints    Pre-Treatment Position  in bed  -CD     Post Treatment Position  chair  -CD     In Chair  reclined;call light within reach;encouraged to call for assist;exit alarm on;with family/caregiver;legs elevated;RUE elevated;LUE elevated REVIEWED LE THER EX WITH DTR/WIFE. NSG NOTIFIED P.T. RECOMMEND LIFT SYSTEM FOR CHAIR TO SPECIALTY BED FOR SAFETY.   -CD       User Key  (r) = Recorded By, (t) = Taken By, (c) = Cosigned By    Initials Name Provider Type    CD Esthela Moore, PT Physical Therapist        Outcome Measures     Row Name 01/06/20 1137          How much help from another person do you currently need...    Turning from your back to your  side while in flat bed without using bedrails?  3  -CD     Moving from lying on back to sitting on the side of a flat bed without bedrails?  2  -CD     Moving to and from a bed to a chair (including a wheelchair)?  2  -CD     Standing up from a chair using your arms (e.g., wheelchair, bedside chair)?  2  -CD     Climbing 3-5 steps with a railing?  1  -CD     To walk in hospital room?  1  -CD     AM-PAC 6 Clicks Score (PT)  11  -CD     Row Name 01/06/20 1137          Functional Assessment    Outcome Measure Options  AM-PAC 6 Clicks Basic Mobility (PT)  -CD       User Key  (r) = Recorded By, (t) = Taken By, (c) = Cosigned By    Initials Name Provider Type    Esthela Benito, PT Physical Therapist          PT Recommendation and Plan  Planned Therapy Interventions (PT Eval): balance training, bed mobility training, strengthening, transfer training, patient/family education, home exercise program, gait training  Outcome Summary/Treatment Plan (PT)  Anticipated Discharge Disposition (PT): skilled nursing facility  Plan of Care Reviewed With: spouse, daughter  Progress: improving  Outcome Summary: PT ALERT, FOLLOWING COMMANDS AND MOTIVATED TO WORK WITH THERAPY. ABLE TO INITIATE PIVOT TRANSFER BED TO CHAIR WITH MAX ASSIST OF 2, STS WITH MOD ASSIST OF 2 AND COMPLETED B LE THER EX WITH CUES. RECOMMEND SNF VS IRF AT D/C IT PT CONTINUES WITH ABILITY /DESIRE TO WORK WITH THERAPY. GOOD FAMILY SUPPORT.      Time Calculation:   PT Charges     Row Name 01/06/20 1140             Time Calculation    Start Time  0946  -CD      PT Received On  01/06/20  -CD      PT Goal Re-Cert Due Date  01/11/20  -CD         Time Calculation- PT    Total Timed Code Minutes- PT  33 minute(s)  -CD         Timed Charges    54281 - PT Therapeutic Exercise Minutes  18  -CD      74192 - PT Therapeutic Activity Minutes  15  -CD        User Key  (r) = Recorded By, (t) = Taken By, (c) = Cosigned By    Initials Name Provider Type    Esthela Benito PT  Physical Therapist        Therapy Charges for Today     Code Description Service Date Service Provider Modifiers Qty    58423952429  PT THER PROC EA 15 MIN 1/6/2020 Esthela Moore, PT GP 1    49861714244 HC PT THERAPEUTIC ACT EA 15 MIN 1/6/2020 Esthela Moore, PT GP 1    [id number]  PT THER SUPP EA 15 MIN 1/6/2020 Esthela Moore, PT GP 2          PT G-Codes  Outcome Measure Options: AM-PAC 6 Clicks Basic Mobility (PT)  AM-PAC 6 Clicks Score (PT): 11  AM-PAC 6 Clicks Score (OT): 6    Esthela Moore, PT  1/6/2020

## 2020-01-07 NOTE — PROGRESS NOTES
INFECTIOUS DISEASE Progress Note     Murali Dennis  4/20/1929  [id number]      Admission Date: 12/27/2019      Requesting Provider: No ref. provider found  Evaluating Physician: Ish Tijerina MD    Reason for Consultation: sepsis     History of present illness:  12/30/19: Patient is a 89 y.o. male, who presented to the ED with complaints of abdominal tenderness which began on Friday evening.   He has been having decreased appetite over the past several weeks and has lost 30 pounds unintentionally over the past year.  His family denies any fever, cough, sputum production, nausea, vomiting, dysuria prior to admission.    An abdominal CT on admission with distended gallbladder, with mild to moderate diffuse acute pancreatitis, mild intrahepatic bile duct dilatation. He underwent an MRCP with gallbladder distention, and possible mass in neck of gallbladder, with changes of pancreatitis.  Admitting labs with lipase of >3000, Scr 1.19, WBC 13, 000., lactate of 2.9,  Blood cultures now positive for Strep species.  He is currently on Vancomycin and Cefepime and we were consulted for evaluation and treatment.     He has a history of penicillin allergy which his wife indicates consisted of arm swelling after injection.  12/31/19:  He is resting quietly.  Daughter says he was confused and restless/aggitated several times during the evening.    1/1/19: Family is very concerned that PT hasn't been working with him, and he is so weak.  Still having difficulty with swallowing.  He had a bowel movement earlier this am. Was confused earlier this am per son. He remains afebrile.   1/2/20:  His manriquez was removed and he is unable to urinate.  He was very aggitated and confused yesterday and last pm was given haldol and he is quiet now. Son at bedside.  He complains of abdominal pain.  His PICC line was ordered yesterday but he was not cooperative with placement of the PICC line.  1/3/20:  Manriquez was replaced by urology  yesterday.  Family is considering hospice/palliative.   1/4/2020: His family has decided to proceed with hospice/comfort measures only.  They indicate that this was his choice.  He has remained afebrile overnight.  He has been somewhat dyspneic.  1/6/2020: When I last saw him on Saturday, the family had indicated that they were going to proceed with hospice/comfort measures only.  He apparently became more alert and responsive later in the day and they decided to not proceed with hospice.  They met with the hospitalist and his antibiotic therapy was restarted.  I had already discontinued his antibiotic therapy after 1/3-I discussed duration of antibiotic therapy with Dr. Jack and we have decided to leave him on antibiotic therapy through that time regardless of his hospice status.  I was not informed about the change in decisions until I was called by Dr. Alarcon today.  He is now back on Rocephin/Flagyl.  He has remained afebrile.  He has no new complaints today.  I have been informed that his family was upset about a perceived lack of communication or disagreement among the physicians.  I have discussed his clinical situation and therapeutic plan repeatedly and on numerous different days with Dr. Jack and the hospitalist service.  I informed his family today that we have been communicating extensively about his care although I was not notified by the hospitalist service this weekend that there was a change in his status and change in decisions regarding hospice.  1/7/20:  His granddaughter says he was restless and confused last pm.  He is more alert this am.  Knows he's in the hospital.  Denies any abdominal pain.        Past Medical History:   Diagnosis Date   • Alzheimer disease (CMS/[facility])    • Warren esophagus     followed  with GI in [place]   • Benign prostatic hyperplasia without lower urinary tract symptoms 10/17/2018   • Chronic congestive heart failure (CMS/HCC) 12/8/2017     echo report from 8/2/17 EF  60-65%, mild TR, mild AR ([place], [place]) Echocardiogram 17: EF 28%, mild to moderate MR    • COPD (chronic obstructive pulmonary disease) (CMS/[facility])    • Coronary artery disease    • Dementia (CMS/[facility])    • Diarrhea 2019   • Environmental allergies 10/17/2018   • Essential hypertension 10/17/2018   • Gastroesophageal reflux disease 2018   • GERD (gastroesophageal reflux disease)    • Hypothyroid     started after amiodarone use in    • Sleep apnea     wears cpap   • Stroke (CMS/[facility])    • TIA (transient ischemic attack) 2017   • Unintended weight loss 10/17/2018       Past Surgical History:   Procedure Laterality Date   • CARDIAC CATHETERIZATION     • CARDIAC CATHETERIZATION N/A 2018    Procedure: Left Heart Cath;  Surgeon: Hollis Ugarte MD;  Location:  [facility] CATH INVASIVE LOCATION;  Service:    • CARPAL TUNNEL RELEASE Right    • CATARACT EXTRACTION Bilateral    • CORONARY ARTERY BYPASS GRAFT      Triple Bypass, @ [facility] @ [place], [place]   • ERCP N/A 2019    Procedure: ENDOSCOPIC RETROGRADE CHOLANGIOPANCREATOGRAPHY;  Surgeon: Arsh White MD;  Location:  [facility] ENDOSCOPY;  Service: Gastroenterology   • HEMORRHOIDECTOMY     • HERNIA REPAIR         Family History   Problem Relation Age of Onset   • Cancer Mother    • Heart disease Sister    • Diabetes Sister    • Heart disease Brother    • Cancer Brother    • Stroke Sister        Social History     Socioeconomic History   • Marital status:      Spouse name: Not on file   • Number of children: Not on file   • Years of education: Not on file   • Highest education level: Not on file   Occupational History   • Occupation: Retired   Tobacco Use   • Smoking status: Former Smoker     Last attempt to quit: 1967     Years since quittin.0   • Smokeless tobacco: Never Used   Substance and Sexual Activity   • Alcohol use: No   • Drug use: No   •  Sexual activity: Defer   Social History Narrative    Caffeine use: 1 serving daily.    Patient lives at home with family.     Lives with wife, daughter close by for any needs       Allergies   Allergen Reactions   • Benzodiazepines Other (See Comments)     Paradoxical response   • Codeine Other (See Comments)     unknown   • Fluoxetine Other (See Comments)     Paradoxical response   • Lipitor [Atorvastatin] Other (See Comments)     Myalgias     • Metoclopramide Other (See Comments)     Unknown   • Nabumetone    • Penicillins Swelling and Other (See Comments)     Has tolerated cefepime 12/2019   • Prozac [Fluoxetine Hcl]    • Tramadol Other (See Comments)     unknown   • Valium [Diazepam] Other (See Comments)     Paradoxical response     • Sulfa Antibiotics Rash         Medication:    Current Facility-Administered Medications:   •  acetaminophen (TYLENOL) tablet 650 mg, 650 mg, Oral, Q6H PRN, Arsh White MD, 650 mg at 12/29/19 0544  •  aluminum-magnesium hydroxide-simethicone (MAALOX MAX) 400-400-40 MG/5ML 15 mL, Lidocaine Viscous HCl (XYLOCAINE) 2 % 15 mL suspension, , Oral, Once, Roberto Alarcon MD  •  amiodarone (PACERONE) tablet 200 mg, 200 mg, Oral, Daily, Arsh White MD, 200 mg at 01/07/20 0923  •  aspirin chewable tablet 81 mg, 81 mg, Oral, Daily, Arsh White MD, 81 mg at 01/07/20 0923  •  bisacodyl (DULCOLAX) suppository 10 mg, 10 mg, Rectal, Daily PRN, Kayla Lozoya MD  •  calcium carbonate EX (TUMS EX) chewable tablet 750 mg, 750 mg, Oral, TID PRN, Dorcas Montoya MD  •  carbidopa-levodopa (SINEMET)  MG per tablet 1 tablet, 1 tablet, Oral, TID, Arsh White MD, 1 tablet at 01/07/20 0923  •  clopidogrel (PLAVIX) tablet 75 mg, 75 mg, Oral, Daily, Kayla Lozoya MD, 75 mg at 01/07/20 0923  •  finasteride (PROSCAR) tablet 5 mg, 5 mg, Oral, Daily, Arsh White MD, 5 mg at 01/07/20 0923  •  ipratropium-albuterol  (DUO-NEB) nebulizer solution 3 mL, 3 mL, Nebulization, Q4H PRN, Arsh White MD, 3 mL at 01/05/20 1217  •  lactated ringers bolus 500 mL, 500 mL, Intravenous, Once PRN, Isak Dudley CRNA  •  levothyroxine (SYNTHROID, LEVOTHROID) tablet 75 mcg, 75 mcg, Oral, Q AM, Nesha Rey MD, 75 mcg at 01/07/20 0633  •  magic mouthwash oral supsension 5 mL, 5 mL, Swish & Spit, Q4H PRN, Kayla Lozoya MD, 5 mL at 01/06/20 1314  •  Magnesium Sulfate 2 gram Bolus, followed by 8 gram infusion (total Mg dose 10 grams)- Mg less than or equal to 1mg/dL, 2 g, Intravenous, PRN **OR** Magnesium Sulfate 2 gram / 50mL Infusion (GIVE X 3 BAGS TO EQUAL 6GM TOTAL DOSE) - Mg 1.1 - 1.5 mg/dl, 2 g, Intravenous, PRN **OR** Magnesium Sulfate 4 gram infusion- Mg 1.6-1.9 mg/dL, 4 g, Intravenous, PRN, Severo Campbell, DO  •  memantine (NAMENDA) tablet 10 mg, 10 mg, Oral, Q12H, Roberto Alarcon MD, 10 mg at 01/07/20 1314  •  Morphine sulfate (PF) injection 2 mg, 2 mg, Intravenous, Q2H PRN, Joie Muhammad APRN, 2 mg at 01/07/20 0453  •  multivitamin with minerals 1 tablet, 1 tablet, Oral, Daily, Arsh White MD, 1 tablet at 01/07/20 0923  •  ondansetron (ZOFRAN) tablet 4 mg, 4 mg, Oral, Q6H PRN **OR** ondansetron (ZOFRAN) injection 4 mg, 4 mg, Intravenous, Q6H PRN, Arsh White MD, 4 mg at 01/04/20 0812  •  oxyCODONE-acetaminophen (PERCOCET) 7.5-325 MG per tablet 1 tablet, 1 tablet, Oral, Q4H PRN, Arsh White MD, 1 tablet at 01/05/20 0422  •  pantoprazole (PROTONIX) EC tablet 40 mg, 40 mg, Oral, Q AM, Roberto Alarcon MD, 40 mg at 01/07/20 0632  •  potassium chloride (MICRO-K) CR capsule 40 mEq, 40 mEq, Oral, PRN, 40 mEq at 01/03/20 8206 **OR** potassium chloride (KLOR-CON) packet 40 mEq, 40 mEq, Oral, PRN **OR** potassium chloride 10 mEq in 100 mL IVPB, 10 mEq, Intravenous, Q1H PRN, Severo Campbell, DO, Last Rate: 100 mL/hr at 01/04/20 0336, 10 mEq at 01/04/20 0336  •   sennosides-docusate (PERICOLACE) 8.6-50 MG per tablet 2 tablet, 2 tablet, Oral, Nightly, Kayla Lozoya MD, 2 tablet at 20 2157  •  simethicone (MYLICON) chewable tablet 80 mg, 80 mg, Oral, 4x Daily PRN, Kayla Lozoya MD, 80 mg at 20 1203  •  sodium chloride 0.9 % flush 10 mL, 10 mL, Intravenous, PRN, Arsh White MD, 10 mL at 19 2122  •  [COMPLETED] Insert peripheral IV, , , Once **AND** sodium chloride 0.9 % flush 10 mL, 10 mL, Intravenous, PRN, Arsh White MD  •  sodium chloride 0.9 % flush 10 mL, 10 mL, Intravenous, Q12H, Ish Tijerina MD, 10 mL at 20 0923  •  sodium chloride 0.9 % flush 10 mL, 10 mL, Intravenous, PRN, Ish Tijerina MD  •  sodium chloride 0.9 % flush 20 mL, 20 mL, Intravenous, PRN, Ish Tijerina MD    Antibiotics:  Anti-Infectives (From admission, onward)    Ordered     Dose/Rate Route Frequency Start Stop    20 1549  cefTRIAXone (ROCEPHIN) 1 g/100 mL 0.9% NS (MBP)     Ordering Provider:  Kayla Lozoya MD    1 g  over 30 Minutes Intravenous Every 24 Hours 20 1700 20 1752    20 1553  metroNIDAZOLE (FLAGYL) 500 mg/100mL IVPB     Ordering Provider:  Kayla Lozoya MD    500 mg  over 60 Minutes Intravenous Every 8 Hours 20 1700 20 1135    19 0632  vancomycin 1500 mg/500 mL 0.9% NS IVPB ([facility])     Ordering Provider:  Colin Pyle [facility]    20 mg/kg × 78.4 kg  over 90 Minutes Intravenous Once 19 0730 19 0948    19 1226  cefepime (MAXIPIME) 2 g/100 mL 0.9% NS (mbp)     Ordering Provider:  Perla Melgar DO    2 g  200 mL/hr over 30 Minutes Intravenous Once 19 1315 19 1358            Review of Systems:  No reliable ROS from patient       Physical Exam:   Vital Signs  Temp (24hrs), Av.6 °F (36.4 °C), Min:97.3 °F (36.3 °C), Max:98 °F (36.7 °C)    Temp  Min: 97.3 °F (36.3 °C)  Max: 98 °F (36.7 °C)  BP  Min: 121/82  Max: 147/72  Pulse  Min: 54  Max:  62  Resp  Min: 16  Max: 18  SpO2  Min: 95 %  Max: 97 %    GENERAL: He is alert and responsive but confused.  He is in no acute distress  HEENT: No conjunctival injection. No icterus. Oropharynx clear without evidence of thrush or exudate.  HEART: RRR; + 2/6murmur, rubs, gallops.   LUNGS: diminished anteriorly   ABDOMEN: Soft and nontender  EXT:  No cyanosis, clubbing or edema. No cord.  :  Najera catheter   MSK:  No joint effusions   SKIN: Warm and dry without cutaneous eruptions on Inspection/palpation.    NEURO: He is alert but confused.  He moves all of his extremities.      Laboratory Data    Results from last 7 days   Lab Units 01/03/20  0728 01/02/20  0547   WBC 10*3/mm3 5.91 5.44   HEMOGLOBIN g/dL 9.8* 10.8*   HEMATOCRIT % 29.0* 32.6*   PLATELETS 10*3/mm3 107* 114*     Results from last 7 days   Lab Units 01/05/20  0635   SODIUM mmol/L 138   POTASSIUM mmol/L 4.3   CHLORIDE mmol/L 108*   CO2 mmol/L 20.0*   BUN mg/dL 33*   CREATININE mg/dL 1.44*   GLUCOSE mg/dL 112*   CALCIUM mg/dL 8.2     Results from last 7 days   Lab Units 01/03/20  0728   ALK PHOS U/L 205*   BILIRUBIN mg/dL 0.8   ALT (SGPT) U/L 12   AST (SGOT) U/L 40                         Estimated Creatinine Clearance: 42 mL/min (A) (by C-G formula based on SCr of 1.44 mg/dL (H)).      Microbiology:  Blood Culture   Date Value Ref Range Status   12/28/2019 Gram Positive Cocci (A)  Preliminary   12/28/2019 Gram Positive Cocci (A)  Preliminary     BCID, PCR   Date Value Ref Range Status   12/28/2019 (C) No organism detected by BCID PCR. Final    Streptococcus spp, not A, B, or pneumoniae. Identification by BCID PCR.     BC Strep gallolyticus, ssp pasterurianus         Radiology:  Imaging Results (Last 72 Hours)     ** No results found for the last 72 hours. **            Impression:   1.  Severe sepsis-improved  2.  Cholangitis- with associated streptococcal bacteremia and secondary to choledocholithiasis, s/p sphincterotomy  3.  Streptococcal bacteremia-   this is secondary to cholangitis.  He had already received a week of intravenous antibiotic therapy when I discontinued his therapy on 1/3 but he has now received 3 additional days of therapy.  His TTE was negative for valvular vegetations and I do not think it would be prudent to put him through a GERMAINE with the need for sedation.  He is now off of antibiotic therapy since 1/6. He will be at potential risk for relapse since he does have gallbladder disease.  Dr. Jack has offered to perform an elective cholecystectomy if he and the family desire after he has clinically improved.  4.  Acute pancreatitis  5.  Dementia   6.  Acute kidney injury/ATN-superimposed on chronic kidney disease, stage 3,   7. Cardiomyopathy, EF 30%   8. Leukocytosis/neutrophilia-improved  9. Urinary retention- manriquez placed by urology     PLAN/RECOMMENDATIONS:  1.  Monitor off antibiotics   2.  Elective cholecystectomy if he continues to improve-this will need to be done after he is discharged from the hospital  3.  Awaiting rehab     Dr. Tijerina has obtained the history, performed the physical exam and formulated the above treatment plan.     Anderson Tijerina MD  1/7/2020  4:36 PM

## 2020-01-07 NOTE — PROGRESS NOTES
[facility] Medicine Services  PROGRESS NOTE    Patient Name: Murali Dennis  : 1929  MRN: [id number]    Date of Admission: 2019  Primary Care Physician: Diana Null APRN    Subjective   Subjective     CC:  pancreatitis    HPI:    Wife in room today.  No chest pain.  No shortness of breath.  Denies fevers or chills.  Reports of eating without nausea or abdominal pain.      Review of Systems    Gen- No fevers, chills  CV- No chest pain, palpitations  Resp- No cough, dyspnea  GI- No N/V/D, abd pain    Objective   Objective     Vital Signs:   Temp:  [97.3 °F (36.3 °C)-98 °F (36.7 °C)] 97.5 °F (36.4 °C)  Heart Rate:  [54-62] 54  Resp:  [16-18] 18  BP: (121-147)/(63-82) 127/63     Physical Exam:    Constitutional: Awake, alert  Eyes: PERRLA, sclerae anicteric, no conjunctival injection  HENT: NCAT, dry tongue  Neck: Supple, no JVD, trachea midline  Respiratory: poor inspiratory effort, clear  Cardiovascular: RRR, s1 and s2  Gastrointestinal: Positive bowel sounds, soft, nontender, nondistended  Musculoskeletal: No bilateral ankle edema, no clubbing or cyanosis to extremities  Psychiatric: Appropriate affect, cooperative  Neurologic: Oriented x 3, generalized weakness  Skin: dry, + skin tenting    Results Reviewed:    Results from last 7 days   Lab Units 20  0728 20  0547   WBC 10*3/mm3 5.91 5.44   HEMOGLOBIN g/dL 9.8* 10.8*   HEMATOCRIT % 29.0* 32.6*   PLATELETS 10*3/mm3 107* 114*     Results from last 7 days   Lab Units 20  0635 20  1045 20  0728 20  0547   SODIUM mmol/L 138  --  140 141   POTASSIUM mmol/L 4.3 4.3 3.3* 3.6   CHLORIDE mmol/L 108*  --  109* 110*   CO2 mmol/L 20.0*  --  19.0* 18.0*   BUN mg/dL 33*  --  42* 51*   CREATININE mg/dL 1.44*  --  1.32* 1.53*   GLUCOSE mg/dL 112*  --  134* 137*   CALCIUM mg/dL 8.2  --  8.4 8.9   ALT (SGPT) U/L  --   --  12  --    AST (SGOT) U/L  --   --  40  --      Estimated Creatinine  Clearance: 42 mL/min (A) (by C-G formula based on SCr of 1.44 mg/dL (H)).    Microbiology Results Abnormal     Procedure Component Value - Date/Time    Blood Culture - Blood, Wrist, Right [[id number]]  (Abnormal) Collected:  12/28/19 1244    Lab Status:  Final result Specimen:  Blood from Wrist, Right Updated:  12/31/19 0618     Blood Culture Streptococcus gallolyticus ssp pasteurianus     Comment: Refer to previous blood culture collected on 12/28 at 1244 for AMY's            Gram Stain Aerobic Bottle Gram positive cocci in chains    Narrative:       Aerobic bottle only      Blood Culture - Blood, Hand, Right [[id number]]  (Abnormal)  (Susceptibility) Collected:  12/28/19 1244    Lab Status:  Final result Specimen:  Blood from Hand, Right Updated:  12/31/19 0615     Blood Culture Streptococcus gallolyticus ssp pasteurianus     Gram Stain Aerobic Bottle Gram positive cocci in chains    Narrative:       Aerobic bottle only      Susceptibility      Streptococcus gallolyticus ssp pasteurianus     AMY     Ceftriaxone Susceptible     Penicillin G Susceptible     Vancomycin Susceptible                    Blood Culture ID, PCR - Blood, Hand, Right [[id number]]  (Abnormal) Collected:  12/28/19 1244    Lab Status:  Final result Specimen:  Blood from Hand, Right Updated:  12/29/19 0700     BCID, PCR Streptococcus spp, not A, B, or pneumoniae. Identification by BCID PCR.          Imaging Results (Last 24 Hours)     ** No results found for the last 24 hours. **          Results for orders placed during the hospital encounter of 12/27/19   Adult Transthoracic Echo Complete W/ Cont if Necessary Per Protocol    Narrative · The left ventricular cavity is moderately dilated.  · Right ventricular cavity is mild-to-moderately dilated.  · Left atrial cavity size is moderate-to-severely dilated.  · Moderate aortic valve regurgitation is present.  · Moderate mitral valve regurgitation is present.  · Mild to moderate tricuspid valve  regurgitation is present.  · Calculated right ventricular systolic pressure from tricuspid   regurgitation is 35 mmHg.  · Estimated EF = 30%.  · Left ventricular systolic function is severely decreased.  · Left ventricular diastolic dysfunction (grade II) consistent with   pseudonormalization.  · The findings are consistent with dilated cardiomyopathy.  · Mild pulmonary hypertension is present.  · There is no evidence of pericardial effusion.  · The aortic valve exhibits moderate sclerosis without stenosis.          I have reviewed the medications:  Scheduled Meds:  GI cocktail  Oral Once   amiodarone 200 mg Oral Daily   aspirin 81 mg Oral Daily   carbidopa-levodopa 1 tablet Oral TID   clopidogrel 75 mg Oral Daily   finasteride 5 mg Oral Daily   levothyroxine 75 mcg Oral Q AM   memantine 10 mg Oral Q12H   multivitamin with minerals 1 tablet Oral Daily   pantoprazole 40 mg Oral Q AM   sennosides-docusate 2 tablet Oral Nightly   sodium chloride 10 mL Intravenous Q12H     Continuous Infusions:   PRN Meds:.•  acetaminophen  •  bisacodyl  •  calcium carbonate EX  •  ipratropium-albuterol  •  lactated ringers  •  magic mouthwash  •  magnesium sulfate **OR** magnesium sulfate **OR** magnesium sulfate  •  Morphine  •  ondansetron **OR** ondansetron  •  potassium chloride **OR** potassium chloride **OR** potassium chloride  •  simethicone  •  sodium chloride  •  [COMPLETED] Insert peripheral IV **AND** sodium chloride  •  sodium chloride  •  sodium chloride      Assessment/Plan   Assessment & Plan     Active Hospital Problems    Diagnosis  POA   • **Pancreatitis [K85.90]  Yes   • Bacteremia due to Streptococcus [R78.81, B95.5]  Yes   • Acute renal failure superimposed on stage 3 chronic kidney disease (CMS/HCC) [N17.9, N18.3]  No   • Acute cholecystitis [K81.0]  Yes   • Sepsis (CMS/HCC) [A41.9]  Yes   • Acute pancreatitis [K85.90]  Yes   • Mixed hyperlipidemia [E78.2]  Yes   • Cardiomyopathy (EF 30%) [I42.9]  Yes   • ANGELA on  CPAP [G47.33, Z99.89]  Not Applicable   • Parkinson's disease (CMS/[facility]) [G20]  Yes   • Gastroesophageal reflux disease [K21.9]  Yes   • Type 2 diabetes mellitus without complication, without long-term current use of insulin (CMS/[facility]) [E11.9]  Yes   • Benign prostatic hyperplasia without lower urinary tract symptoms [N40.0]  Yes   • A-fib (CMS/[facility]) [I48.91]  Yes   • Coronary artery disease involving native coronary artery of native heart without angina pectoris [I25.10]  Yes   • Hypothyroid [E03.9]  Yes   • Dementia (CMS/[facility]) [F03.90]  Yes      Resolved Hospital Problems    Diagnosis Date Resolved POA   • Sinus bradycardia [R00.1] 12/29/2019 Yes        Brief Hospital Course to date:  Murali Dennis is a 89 y.o. male who presented to the ER with abdominal pain.  He had periumbilical and right sided abdominal pain.  He was admitted with acute pancreatitis.      Acute Pancreatitis  - seen by GI and Surgery on admission  - had ERCP with Dr. White  - large amount of biliary sludge which was swept and had sphincterotomy  Sepsis with Strep Bacteremia  - likely due to cholangitis  Complicated Najera Catheter  - placed by Urology  Acute Respiratory Failure  History of Atrial Fibrillation  Dementia/Parkinson  CKD    Called by Dr. Rojas Arthur today.  He reports visiting room.  I called son, Dominick Dennis at [phone] today    DVT Prophylaxis:  mechanical    Disposition: I expect the patient to be discharged to [facility] for rehab    CODE STATUS:   Code Status and Medical Interventions:   Ordered at: 12/27/19 5332     Limited Support to NOT Include:    Intubation     Level Of Support Discussed With:    Health Care Surrogate     Code Status:    No CPR     Medical Interventions (Level of Support Prior to Arrest):    Limited         Electronically signed by Roberto Alarcon MD, 01/07/20, 5:48 PM.

## 2020-01-07 NOTE — PROGRESS NOTES
Continued Stay Note  [facility]     Patient Name: Murali Dennis  MRN: [id number]  Today's Date: 1/7/2020    Admit Date: 12/27/2019    Discharge Plan     Row Name 01/07/20 0842       Plan    Plan  [facility]    Patient/Family in Agreement with Plan  yes    Plan Comments  Spoke to patient and family at bedside. Plan is [facility]. Patient is nauseated this morning. CM will continue to follow.    Final Discharge Disposition Code  62 - inpatient rehab facility        Discharge Codes    No documentation.       Expected Discharge Date and Time     Expected Discharge Date Expected Discharge Time    Jan 7, 2020             Vernon Hurd RN

## 2020-01-08 NOTE — PROGRESS NOTES
Continued Stay Note  [facility]     Patient Name: Murali Dennis  MRN: [id number]  Today's Date: 1/8/2020    Admit Date: 12/27/2019    Discharge Plan     Row Name 01/08/20 0821       Plan    Plan  [facility]    Patient/Family in Agreement with Plan  yes    Plan Comments  Spoke with patient and daughter at bedside. Plan is [facility]. Second choice of facility would be [facility]. Spoke with Mina at [facility] and she is trying get the patient a bed today. CM will continue to follow.    Final Discharge Disposition Code  62 - inpatient rehab facility        Discharge Codes    No documentation.       Expected Discharge Date and Time     Expected Discharge Date Expected Discharge Time    Jan 7, 2020             Vernon Hurd RN

## 2020-01-08 NOTE — PROGRESS NOTES
[facility] Medicine Services  PROGRESS NOTE    Patient Name: Murali Dennis  : 1929  MRN: [id number]    Date of Admission: 2019  Primary Care Physician: Diana Null APRN    Subjective   Subjective     CC:  pancreatitis    HPI:    Pt is seen resting back in bed dozing.  NAD. Daughter at bs. Pt easily awakens.  Alert and oriented to person, place, family and general events.  Denies pain, n/v.  Weak but ready to get to rehab.  NO new issues.     Review of Systems    Gen- No fevers, chills  CV- No chest pain, palpitations  Resp- No cough, dyspnea  GI- No N/V/D, abd pain    Objective   Objective     Vital Signs:   Temp:  [97.5 °F (36.4 °C)-97.8 °F (36.6 °C)] 97.8 °F (36.6 °C)  Heart Rate:  [54-64] 63  Resp:  [16-20] 16  BP: (127-144)/(63-73) 144/71     Physical Exam:    Constitutional: resting back in bed, awakens easily.  NAD. Daughter at bs.   Eyes: PERRLA, sclerae anicteric, no conjunctival injection  HENT: NCAT, MMM  Neck: Supple, trachea midline  Respiratory: poor inspiratory effort and lungs CTA bilaterally A/P just slightly decreased at bases. No increased work of breathing noted. On RA w sats 97%  Cardiovascular: RRR, s1 and s2  Gastrointestinal: Positive bowel sounds, soft, nontender, nondistended  Musculoskeletal: No bilateral ankle edema, no clubbing or cyanosis to extremities. SHINE Spont   Psychiatric: Appropriate affect, cooperative and calm   Neurologic: Oriented x 3 with STM deficit, generalized weakness. Speech clear and appropriate once awake.  Follows commands   Skin: c/d/i.      Results Reviewed:    Results from last 7 days   Lab Units 20  0728 20  0547   WBC 10*3/mm3 5.91 5.44   HEMOGLOBIN g/dL 9.8* 10.8*   HEMATOCRIT % 29.0* 32.6*   PLATELETS 10*3/mm3 107* 114*     Results from last 7 days   Lab Units 20  0635 20  1045 20  0728 20  0547   SODIUM mmol/L 138  --  140 141   POTASSIUM mmol/L 4.3 4.3 3.3* 3.6   CHLORIDE  mmol/L 108*  --  109* 110*   CO2 mmol/L 20.0*  --  19.0* 18.0*   BUN mg/dL 33*  --  42* 51*   CREATININE mg/dL 1.44*  --  1.32* 1.53*   GLUCOSE mg/dL 112*  --  134* 137*   CALCIUM mg/dL 8.2  --  8.4 8.9   ALT (SGPT) U/L  --   --  12  --    AST (SGOT) U/L  --   --  40  --      Estimated Creatinine Clearance: 42 mL/min (A) (by C-G formula based on SCr of 1.44 mg/dL (H)).    Microbiology Results Abnormal     Procedure Component Value - Date/Time    Blood Culture - Blood, Wrist, Right [[id number]]  (Abnormal) Collected:  12/28/19 1244    Lab Status:  Final result Specimen:  Blood from Wrist, Right Updated:  12/31/19 0618     Blood Culture Streptococcus gallolyticus ssp pasteurianus     Comment: Refer to previous blood culture collected on 12/28 at 1244 for AMY's            Gram Stain Aerobic Bottle Gram positive cocci in chains    Narrative:       Aerobic bottle only      Blood Culture - Blood, Hand, Right [[id number]]  (Abnormal)  (Susceptibility) Collected:  12/28/19 1244    Lab Status:  Final result Specimen:  Blood from Hand, Right Updated:  12/31/19 0615     Blood Culture Streptococcus gallolyticus ssp pasteurianus     Gram Stain Aerobic Bottle Gram positive cocci in chains    Narrative:       Aerobic bottle only      Susceptibility      Streptococcus gallolyticus ssp pasteurianus     AMY     Ceftriaxone Susceptible     Penicillin G Susceptible     Vancomycin Susceptible                    Blood Culture ID, PCR - Blood, Hand, Right [[id number]]  (Abnormal) Collected:  12/28/19 1244    Lab Status:  Final result Specimen:  Blood from Hand, Right Updated:  12/29/19 0700     BCID, PCR Streptococcus spp, not A, B, or pneumoniae. Identification by BCID PCR.          Imaging Results (Last 24 Hours)     ** No results found for the last 24 hours. **          Results for orders placed during the hospital encounter of 12/27/19   Adult Transthoracic Echo Complete W/ Cont if Necessary Per Protocol    Narrative · The left  ventricular cavity is moderately dilated.  · Right ventricular cavity is mild-to-moderately dilated.  · Left atrial cavity size is moderate-to-severely dilated.  · Moderate aortic valve regurgitation is present.  · Moderate mitral valve regurgitation is present.  · Mild to moderate tricuspid valve regurgitation is present.  · Calculated right ventricular systolic pressure from tricuspid   regurgitation is 35 mmHg.  · Estimated EF = 30%.  · Left ventricular systolic function is severely decreased.  · Left ventricular diastolic dysfunction (grade II) consistent with   pseudonormalization.  · The findings are consistent with dilated cardiomyopathy.  · Mild pulmonary hypertension is present.  · There is no evidence of pericardial effusion.  · The aortic valve exhibits moderate sclerosis without stenosis.          I have reviewed the medications:  Scheduled Meds:    GI cocktail  Oral Once   amiodarone 200 mg Oral Daily   aspirin 81 mg Oral Daily   carbidopa-levodopa 1 tablet Oral TID   clopidogrel 75 mg Oral Daily   finasteride 5 mg Oral Daily   levothyroxine 75 mcg Oral Q AM   memantine 10 mg Oral Q12H   multivitamin with minerals 1 tablet Oral Daily   pantoprazole 40 mg Oral Q AM   sennosides-docusate 2 tablet Oral Nightly   sodium chloride 10 mL Intravenous Q12H     Continuous Infusions:   PRN Meds:.•  acetaminophen  •  bisacodyl  •  calcium carbonate EX  •  ipratropium-albuterol  •  lactated ringers  •  magic mouthwash  •  magnesium sulfate **OR** magnesium sulfate **OR** magnesium sulfate  •  Morphine  •  ondansetron **OR** ondansetron  •  potassium chloride **OR** potassium chloride **OR** potassium chloride  •  simethicone  •  sodium chloride  •  [COMPLETED] Insert peripheral IV **AND** sodium chloride  •  sodium chloride  •  sodium chloride      Assessment/Plan   Assessment & Plan     Active Hospital Problems    Diagnosis  POA   • **Pancreatitis [K85.90]  Yes   • Bacteremia due to Streptococcus [R78.81, B95.5]   Yes   • Acute renal failure superimposed on stage 3 chronic kidney disease (CMS/[facility]) [N17.9, N18.3]  No   • Acute cholecystitis [K81.0]  Yes   • Sepsis (CMS/[facility]) [A41.9]  Yes   • Acute pancreatitis [K85.90]  Yes   • Mixed hyperlipidemia [E78.2]  Yes   • Cardiomyopathy (EF 30%) [I42.9]  Yes   • ANGELA on CPAP [G47.33, Z99.89]  Not Applicable   • Parkinson's disease (CMS/[facility]) [G20]  Yes   • Gastroesophageal reflux disease [K21.9]  Yes   • Type 2 diabetes mellitus without complication, without long-term current use of insulin (CMS/[facility]) [E11.9]  Yes   • Benign prostatic hyperplasia without lower urinary tract symptoms [N40.0]  Yes   • A-fib (CMS/[facility]) [I48.91]  Yes   • Coronary artery disease involving native coronary artery of native heart without angina pectoris [I25.10]  Yes   • Hypothyroid [E03.9]  Yes   • Dementia (CMS/[facility]) [F03.90]  Yes      Resolved Hospital Problems    Diagnosis Date Resolved POA   • Sinus bradycardia [R00.1] 12/29/2019 Yes        Brief Hospital Course to date:  Murali Dennis is a 89 y.o. male who presented to the ER with abdominal pain.  He had periumbilical and right sided abdominal pain.  He was admitted with acute pancreatitis.      Assessment/Plan:    Acute Pancreatitis  Cholangitis- with associated streptococcal bacteremia and secondary to choledocholithiasis, s/p sphincterotomy  - seen by GI and Surgery on admission  - had ERCP with Dr. White  - large amount of biliary sludge which was swept and had sphincterotomy    Sepsis with Strep Bacteremia  - likely due to cholangitis  -TTE was negative for valvular vegetations and I do not think it would be prudent to put him through a GERMAINE with the need for sedation.  He is now off of antibiotic therapy since 1/6. He will be at potential risk for relapse since he does have gallbladder disease.  Dr. Jack has offered to perform an elective cholecystectomy if he and the family desire after he has clinically improved.  Per ID  today:  PLAN/RECOMMENDATIONS:  1.  Monitor off antibiotics   2.  Elective cholecystectomy if he continues to improve-this will need to be done after he is discharged from the hospital  3.  Awaiting rehab       Complicated Manriquez Catheter  --tight phimosis with inablity of staff to place manriquez catheter.   - f/c placed by Urology  --manriquez remains in place.  F/u outpt urology   --encourage considerable improvement in his condition (ambulatory, no constipation, etc) before considering voiding trial    Acute Respiratory Failure  --improved     History of Atrial Fibrillation  --on amiodarone, asa 81, plavix  --tolerating    Dementia/Parkinson  --on sinemet and namenda  --continue current meds     CKD  --stable     DVT Prophylaxis:  mechanical    Disposition: I expect the patient to be discharged to [facility] for rehab pending bed available.      CODE STATUS:   Code Status and Medical Interventions:   Ordered at: 12/27/19 2313     Limited Support to NOT Include:    Intubation     Level Of Support Discussed With:    Health Care Surrogate     Code Status:    No CPR     Medical Interventions (Level of Support Prior to Arrest):    Limited         Electronically signed by ARCADIO Leal, 01/08/20, 11:27 AM.

## 2020-01-08 NOTE — PROGRESS NOTES
INFECTIOUS DISEASE Progress Note     Murali Dennis  4/20/1929  [id number]      Admission Date: 12/27/2019      Requesting Provider: No ref. provider found  Evaluating Physician: Ish Tijerina MD    Reason for Consultation: sepsis     History of present illness:  12/30/19: Patient is a 89 y.o. male, who presented to the ED with complaints of abdominal tenderness which began on Friday evening.   He has been having decreased appetite over the past several weeks and has lost 30 pounds unintentionally over the past year.  His family denies any fever, cough, sputum production, nausea, vomiting, dysuria prior to admission.    An abdominal CT on admission with distended gallbladder, with mild to moderate diffuse acute pancreatitis, mild intrahepatic bile duct dilatation. He underwent an MRCP with gallbladder distention, and possible mass in neck of gallbladder, with changes of pancreatitis.  Admitting labs with lipase of >3000, Scr 1.19, WBC 13, 000., lactate of 2.9,  Blood cultures now positive for Strep species.  He is currently on Vancomycin and Cefepime and we were consulted for evaluation and treatment.     He has a history of penicillin allergy which his wife indicates consisted of arm swelling after injection.  12/31/19:  He is resting quietly.  Daughter says he was confused and restless/aggitated several times during the evening.    1/1/19: Family is very concerned that PT hasn't been working with him, and he is so weak.  Still having difficulty with swallowing.  He had a bowel movement earlier this am. Was confused earlier this am per son. He remains afebrile.   1/2/20:  His manriquez was removed and he is unable to urinate.  He was very aggitated and confused yesterday and last pm was given haldol and he is quiet now. Son at bedside.  He complains of abdominal pain.  His PICC line was ordered yesterday but he was not cooperative with placement of the PICC line.  1/3/20:  Manriquez was replaced by urology  yesterday.  Family is considering hospice/palliative.   1/4/2020: His family has decided to proceed with hospice/comfort measures only.  They indicate that this was his choice.  He has remained afebrile overnight.  He has been somewhat dyspneic.  1/6/2020: When I last saw him on Saturday, the family had indicated that they were going to proceed with hospice/comfort measures only.  He apparently became more alert and responsive later in the day and they decided to not proceed with hospice.  They met with the hospitalist and his antibiotic therapy was restarted.  I had already discontinued his antibiotic therapy after 1/3-I discussed duration of antibiotic therapy with Dr. Jack and we have decided to leave him on antibiotic therapy through that time regardless of his hospice status.  I was not informed about the change in decisions until I was called by Dr. Alarcon today.  He is now back on Rocephin/Flagyl.  He has remained afebrile.  He has no new complaints today.  I have been informed that his family was upset about a perceived lack of communication or disagreement among the physicians.  I have discussed his clinical situation and therapeutic plan repeatedly and on numerous different days with Dr. Jack and the hospitalist service.  I informed his family today that we have been communicating extensively about his care although I was not notified by the hospitalist service this weekend that there was a change in his status and change in decisions regarding hospice.  1/7/20:  His granddaughter says he was restless and confused last pm.  He is more alert this am.  Knows he's in the hospital.  Denies any abdominal pain.    1/8/20:  Awaiting rehab placement possibly today.  He was less confused last pm per family.  He remains afebrile.       Past Medical History:   Diagnosis Date   • Alzheimer disease (CMS/HCC)    • Warren esophagus     followed  with GI in [place]   • Benign prostatic hyperplasia without lower  urinary tract symptoms 10/17/2018   • Chronic congestive heart failure (CMS/[facility]) 12/8/2017     echo report from 8/2/17 EF 60-65%, mild TR, mild AR ([place], [place]) Echocardiogram 12/9/17: EF 28%, mild to moderate MR    • COPD (chronic obstructive pulmonary disease) (CMS/[facility])    • Coronary artery disease 2003   • Dementia (CMS/[facility]) 2003   • Diarrhea 2/24/2019   • Environmental allergies 10/17/2018   • Essential hypertension 10/17/2018   • Gastroesophageal reflux disease 11/19/2018   • GERD (gastroesophageal reflux disease)    • Hypothyroid     started after amiodarone use in 2003   • Sleep apnea     wears cpap   • Stroke (CMS/[facility]) 2003   • TIA (transient ischemic attack) 12/29/2017   • Unintended weight loss 10/17/2018       Past Surgical History:   Procedure Laterality Date   • CARDIAC CATHETERIZATION  2003   • CARDIAC CATHETERIZATION N/A 1/19/2018    Procedure: Left Heart Cath;  Surgeon: Hollis Ugarte MD;  Location:  [facility] CATH INVASIVE LOCATION;  Service:    • CARPAL TUNNEL RELEASE Right    • CATARACT EXTRACTION Bilateral    • CORONARY ARTERY BYPASS GRAFT  2003    Triple Bypass, @ [facility] @ [place], [place]   • ERCP N/A 12/30/2019    Procedure: ENDOSCOPIC RETROGRADE CHOLANGIOPANCREATOGRAPHY;  Surgeon: Arsh White MD;  Location: [facility]OSCOPY;  Service: Gastroenterology   • HEMORRHOIDECTOMY     • HERNIA REPAIR         Family History   Problem Relation Age of Onset   • Cancer Mother    • Heart disease Sister    • Diabetes Sister    • Heart disease Brother    • Cancer Brother    • Stroke Sister        Social History     Socioeconomic History   • Marital status:      Spouse name: Not on file   • Number of children: Not on file   • Years of education: Not on file   • Highest education level: Not on file   Occupational History   • Occupation: Retired   Tobacco Use   • Smoking status: Former Smoker     Last attempt to quit: 12/29/1967     Years since  quittin.0   • Smokeless tobacco: Never Used   Substance and Sexual Activity   • Alcohol use: No   • Drug use: No   • Sexual activity: Defer   Social History Narrative    Caffeine use: 1 serving daily.    Patient lives at home with family.     Lives with wife, daughter close by for any needs       Allergies   Allergen Reactions   • Benzodiazepines Other (See Comments)     Paradoxical response   • Codeine Other (See Comments)     unknown   • Fluoxetine Other (See Comments)     Paradoxical response   • Lipitor [Atorvastatin] Other (See Comments)     Myalgias     • Metoclopramide Other (See Comments)     Unknown   • Nabumetone    • Penicillins Swelling and Other (See Comments)     Has tolerated cefepime 2019   • Prozac [Fluoxetine Hcl]    • Tramadol Other (See Comments)     unknown   • Valium [Diazepam] Other (See Comments)     Paradoxical response     • Sulfa Antibiotics Rash         Medication:    Current Facility-Administered Medications:   •  acetaminophen (TYLENOL) tablet 650 mg, 650 mg, Oral, Q6H PRN, Arsh White MD, 650 mg at 19 0544  •  aluminum-magnesium hydroxide-simethicone (MAALOX MAX) 400-400-40 MG/5ML 15 mL, Lidocaine Viscous HCl (XYLOCAINE) 2 % 15 mL suspension, , Oral, Once, Roberto Alarcon MD  •  amiodarone (PACERONE) tablet 200 mg, 200 mg, Oral, Daily, Arsh White MD, 200 mg at 20 1024  •  aspirin chewable tablet 81 mg, 81 mg, Oral, Daily, Arsh White MD, 81 mg at 20 1024  •  bisacodyl (DULCOLAX) suppository 10 mg, 10 mg, Rectal, Daily PRN, Kayla Lozoya MD  •  calcium carbonate EX (TUMS EX) chewable tablet 750 mg, 750 mg, Oral, TID PRN, Dorcas Montoya MD  •  carbidopa-levodopa (SINEMET)  MG per tablet 1 tablet, 1 tablet, Oral, TID, Arsh White MD, 1 tablet at 20 1625  •  clopidogrel (PLAVIX) tablet 75 mg, 75 mg, Oral, Daily, Kayla Lozoya MD, 75 mg at 20 1024  •  finasteride  (PROSCAR) tablet 5 mg, 5 mg, Oral, Daily, Arsh White MD, 5 mg at 01/08/20 1025  •  ipratropium-albuterol (DUO-NEB) nebulizer solution 3 mL, 3 mL, Nebulization, Q4H PRN, Arsh White MD, 3 mL at 01/08/20 1207  •  lactated ringers bolus 500 mL, 500 mL, Intravenous, Once PRN, Isak Dudley CRNA  •  levothyroxine (SYNTHROID, LEVOTHROID) tablet 75 mcg, 75 mcg, Oral, Q AM, Nesha Rey MD, 75 mcg at 01/08/20 0638  •  magic mouthwash oral supsension 5 mL, 5 mL, Swish & Spit, Q4H PRN, Kayla Lozoya MD, 5 mL at 01/07/20 1723  •  Magnesium Sulfate 2 gram Bolus, followed by 8 gram infusion (total Mg dose 10 grams)- Mg less than or equal to 1mg/dL, 2 g, Intravenous, PRN **OR** Magnesium Sulfate 2 gram / 50mL Infusion (GIVE X 3 BAGS TO EQUAL 6GM TOTAL DOSE) - Mg 1.1 - 1.5 mg/dl, 2 g, Intravenous, PRN **OR** Magnesium Sulfate 4 gram infusion- Mg 1.6-1.9 mg/dL, 4 g, Intravenous, PRN, Severo Campbell, DO  •  memantine (NAMENDA) tablet 10 mg, 10 mg, Oral, Q12H, Roberto Alarcon MD, 10 mg at 01/08/20 1025  •  Morphine sulfate (PF) injection 2 mg, 2 mg, Intravenous, Q2H PRN, Joie Muhammad APRN, 2 mg at 01/07/20 0453  •  multivitamin with minerals 1 tablet, 1 tablet, Oral, Daily, Arsh White MD, 1 tablet at 01/08/20 1025  •  ondansetron (ZOFRAN) tablet 4 mg, 4 mg, Oral, Q6H PRN **OR** ondansetron (ZOFRAN) injection 4 mg, 4 mg, Intravenous, Q6H PRN, Arsh White Rob, MD, 4 mg at 01/04/20 0812  •  pantoprazole (PROTONIX) EC tablet 40 mg, 40 mg, Oral, Q AM, Roberto Alarcon MD, 40 mg at 01/08/20 0638  •  potassium chloride (MICRO-K) CR capsule 40 mEq, 40 mEq, Oral, PRN, 40 mEq at 01/03/20 2329 **OR** potassium chloride (KLOR-CON) packet 40 mEq, 40 mEq, Oral, PRN **OR** potassium chloride 10 mEq in 100 mL IVPB, 10 mEq, Intravenous, Q1H PRN, Severo Campbell, DO, Last Rate: 100 mL/hr at 01/04/20 0336, 10 mEq at 01/04/20 0336  •  sennosides-docusate (PERICOLACE)  8.6-50 MG per tablet 2 tablet, 2 tablet, Oral, Nightly, Kayla Lozoya MD, 2 tablet at 20 2106  •  simethicone (MYLICON) chewable tablet 80 mg, 80 mg, Oral, 4x Daily PRN, Kayla Lozoya MD, 80 mg at 20 1203  •  sodium chloride 0.9 % flush 10 mL, 10 mL, Intravenous, PRN, Arsh White MD, 10 mL at 19 2122  •  [COMPLETED] Insert peripheral IV, , , Once **AND** sodium chloride 0.9 % flush 10 mL, 10 mL, Intravenous, PRN, Arsh White MD  •  sodium chloride 0.9 % flush 10 mL, 10 mL, Intravenous, Q12H, Ish Tijerina MD, 10 mL at 20 1025  •  sodium chloride 0.9 % flush 10 mL, 10 mL, Intravenous, PRN, Ish Tijerina MD  •  sodium chloride 0.9 % flush 20 mL, 20 mL, Intravenous, PRN, Ish Tijerina MD    Antibiotics:  Anti-Infectives (From admission, onward)    Ordered     Dose/Rate Route Frequency Start Stop    20 1549  cefTRIAXone (ROCEPHIN) 1 g/100 mL 0.9% NS (MBP)     Ordering Provider:  Kayla Lozoya MD    1 g  over 30 Minutes Intravenous Every 24 Hours 20 1700 20 1752    20 1553  metroNIDAZOLE (FLAGYL) 500 mg/100mL IVPB     Ordering Provider:  Kayla Lozoya MD    500 mg  over 60 Minutes Intravenous Every 8 Hours 20 1700 20 1135    19 0632  vancomycin 1500 mg/500 mL 0.9% NS IVPB ([facility])     Ordering Provider:  Colin Pyle RPH    20 mg/kg × 78.4 kg  over 90 Minutes Intravenous Once 19 0730 19 0948    19 1226  cefepime (MAXIPIME) 2 g/100 mL 0.9% NS (mbp)     Ordering Provider:  Talia, Perla, DO    2 g  200 mL/hr over 30 Minutes Intravenous Once 19 1315 19 1358            Review of Systems:  No reliable ROS from patient       Physical Exam:   Vital Signs  Temp (24hrs), Av.7 °F (36.5 °C), Min:97.5 °F (36.4 °C), Max:97.8 °F (36.6 °C)    Temp  Min: 97.5 °F (36.4 °C)  Max: 97.8 °F (36.6 °C)  BP  Min: 133/68  Max: 144/71  Pulse  Min: 58  Max: 64  Resp  Min: 16  Max:  20  SpO2  Min: 94 %  Max: 99 %    GENERAL: He is alert and responsive   He is in no acute distress  HEENT: No conjunctival injection. No icterus.  No labial ulcers  HEART: RRR; + 2/6murmur  LUNGS: diminished anteriorly   ABDOMEN: Soft and nontender  EXT:  No cyanosis, clubbing or edema. No cord.  :  Najera catheter   MSK:  No joint effusions   SKIN: Warm and dry without cutaneous eruptions on Inspection/palpation.    NEURO: He is alert  Oriented x 2  He moves all of his extremities.      Laboratory Data    Results from last 7 days   Lab Units 01/03/20  0728 01/02/20  0547   WBC 10*3/mm3 5.91 5.44   HEMOGLOBIN g/dL 9.8* 10.8*   HEMATOCRIT % 29.0* 32.6*   PLATELETS 10*3/mm3 107* 114*     Results from last 7 days   Lab Units 01/05/20  0635   SODIUM mmol/L 138   POTASSIUM mmol/L 4.3   CHLORIDE mmol/L 108*   CO2 mmol/L 20.0*   BUN mg/dL 33*   CREATININE mg/dL 1.44*   GLUCOSE mg/dL 112*   CALCIUM mg/dL 8.2     Results from last 7 days   Lab Units 01/03/20  0728   ALK PHOS U/L 205*   BILIRUBIN mg/dL 0.8   ALT (SGPT) U/L 12   AST (SGOT) U/L 40                         Estimated Creatinine Clearance: 42 mL/min (A) (by C-G formula based on SCr of 1.44 mg/dL (H)).      Microbiology:  Blood Culture   Date Value Ref Range Status   12/28/2019 Gram Positive Cocci (A)  Preliminary   12/28/2019 Gram Positive Cocci (A)  Preliminary     BCID, PCR   Date Value Ref Range Status   12/28/2019 (C) No organism detected by BCID PCR. Final    Streptococcus spp, not A, B, or pneumoniae. Identification by BCID PCR.     BC Strep gallolyticus, ssp pasterurianus         Radiology:  Imaging Results (Last 72 Hours)     ** No results found for the last 72 hours. **            Impression:   1.  Severe sepsis-improved  2.  Cholangitis- with associated streptococcal bacteremia and secondary to choledocholithiasis, s/p sphincterotomy  3.  Streptococcal bacteremia-  this is secondary to cholangitis.  He had already received a week of intravenous  antibiotic therapy when I discontinued his therapy on 1/3 but he has now received 3 additional days of therapy.  His TTE was negative for valvular vegetations and I do not think it would be prudent to put him through a GERMAINE with the need for sedation.  He is now off of antibiotic therapy since 1/6. He will be at potential risk for relapse since he does have gallbladder disease.  Dr. Jack has offered to perform an elective cholecystectomy if he and the family desire after he has clinically improved.  I discussed the possibility of elective cholecystectomy with him and his family again today.  4.  Acute pancreatitis  5.  Dementia   6.  Acute kidney injury/ATN-superimposed on chronic kidney disease, stage 3,   7. Cardiomyopathy, EF 30%   8. Leukocytosis/neutrophilia-improved  9. Urinary retention- manriquez placed by urology     PLAN/RECOMMENDATIONS:  1.  Monitor off antibiotics   2.  Elective cholecystectomy if he continues to improve-this will need to be done after he is discharged from the hospital  3.  Awaiting rehab     Dr. Tijerina has obtained the history, performed the physical exam and formulated the above treatment plan.     Anderson Tijerina MD  1/8/2020  4:36 PM

## 2020-01-08 NOTE — PLAN OF CARE
Problem: Patient Care Overview  Goal: Plan of Care Review  Outcome: Ongoing (interventions implemented as appropriate)  Flowsheets (Taken 1/8/2020 1157)  Plan of Care Reviewed With: patient;daughter  Outcome Summary: CONTINUES TO SHOW PROGRESS WITH FUNCTIONAL MOBILITY. ABLE TO INITIATE GAIT IN ROOM X 8 FEET WITH R WALKER AND MOD ASSIST OF 2. DEMONSTRATED IMPROVED BED MOBILITY, TRANSFERS AND STATIC SITTING BALANCE. RECOMMEND IRF AT D/C.

## 2020-01-08 NOTE — PLAN OF CARE
Problem: Patient Care Overview  Goal: Plan of Care Review  Outcome: Ongoing (interventions implemented as appropriate)  Note:   WOC nurse for skin assessment to coccyx and buttocks.  Patient has dark staining of the coccyx/buttocks.  This is blanchable.  He also has an area of thickened tissue that may have built up from chronic moisture and friction.  There are several open areas that all lindsey.  Use z guard bid and prn.  DO  NOT APPLY DRY DRESSING this holds in moisture.  Patient is on a DOLPHIN Mattress.  Use z guard bid and prn. Heels CDI and blanchable.  No further WOC nurse need.  Please keep all low Cezar skin prevention interventions in place while patient is a Cezar of 18 or less including heel boots, repositioning wedge and white incontinence pads.  Please reconsult if needed.  Thank you

## 2020-01-08 NOTE — PROGRESS NOTES
Clinical Nutrition     Nutrition Assessment  Reason for Visit:   Follow-up protocol    Patient Name: Murali Dennis  YOB: 1929  MRN: [id number]  Date of Encounter: 01/08/20 5:47 PM  Admission date: 12/27/2019    Nutrition Assessment   Admission Diagnosis         Pancreatitis    Dementia (CMS/[facility])    Hypothyroid    Coronary artery disease involving native coronary artery of native heart without angina pectoris    A-fib (CMS/[facility])    Benign prostatic hyperplasia without lower urinary tract symptoms    Type 2 diabetes mellitus without complication, without long-term current use of insulin (CMS/[facility])    Gastroesophageal reflux disease    Parkinson's disease (CMS/[facility])    ANGELA on CPAP    Cardiomyopathy (EF 30%)    Mixed hyperlipidemia    Sepsis (CMS/[facility])    Bacteremia due to Streptococcus    Acute renal failure superimposed on stage 3 chronic kidney disease (CMS/[facility])    Acute cholecystitis    Acute pancreatitis        PMH: He  has a past medical history of Alzheimer disease (CMS/[facility]), Warren esophagus, Benign prostatic hyperplasia without lower urinary tract symptoms (10/17/2018), Chronic congestive heart failure (CMS/[facility]) (12/8/2017), COPD (chronic obstructive pulmonary disease) (CMS/[facility]), Coronary artery disease (2003), Dementia (CMS/[facility]) (2003), Diarrhea (2/24/2019), Environmental allergies (10/17/2018), Essential hypertension (10/17/2018), Gastroesophageal reflux disease (11/19/2018), GERD (gastroesophageal reflux disease), Hypothyroid, Sleep apnea, Stroke (CMS/[facility]) (2003), TIA (transient ischemic attack) (12/29/2017), and Unintended weight loss (10/17/2018).   PSxH: He  has a past surgical history that includes Hernia repair; Carpal tunnel release (Right); Hemorrhoid surgery; Coronary artery bypass graft (2003); Cardiac catheterization (2003); Cataract extraction (Bilateral); Cardiac catheterization (N/A, 1/19/2018); and ERCP (N/A, 12/30/2019).       Reported/Observed/Food/Nutrition  Related History:     Pt sitting up in bed, is most delightful, he reports he is not having any N/V, no abdominal pain    Per wife: pt is starting to eat more, likes his supplements: magic cups + boost pudding, he also drinks boost at home,  no issues swallowing    Per RN: pt has made a dramatic improvement,  is swallowing pills ok, plan to transfer to rehab    Labs reviewed     Results from last 7 days   Lab Units 01/05/20  0635  01/03/20  0728   SODIUM mmol/L 138  --  140   POTASSIUM mmol/L 4.3   < > 3.3*   CHLORIDE mmol/L 108*  --  109*   CO2 mmol/L 20.0*  --  19.0*   BUN mg/dL 33*  --  42*   CREATININE mg/dL 1.44*  --  1.32*   CALCIUM mg/dL 8.2  --  8.4   BILIRUBIN mg/dL  --   --  0.8   ALK PHOS U/L  --   --  205*   ALT (SGPT) U/L  --   --  12   AST (SGOT) U/L  --   --  40   GLUCOSE mg/dL 112*  --  134*    < > = values in this interval not displayed.       Results from last 7 days   Lab Units 01/05/20  0716   GLUCOSE mg/dL 96     Lab Results   Lab Value Date/Time    HGBA1C 6.40 (H) 12/29/2019 0657    HGBA1C 5.9 05/08/2019 1027    HGBA1C 6.0 02/08/2019 1018    HGBA1C 6.90 (H) 10/25/2018 0934         Medications reviewed   Pertinent: MVI, protonix,maalox max with visocous lidocaine, pericolace, abx dc'd         GI: no  N/V, no abdominal pain    SKIN: WOC nurse for skin assessment to coccyx and buttocks.  Patient has dark staining of the coccyx/buttocks.  This is blanchable.  He also has an area of thickened tissue that may have built up from chronic moisture and friction.  There are several open areas that all lindsey.  Use z guard bid and prn.  DO  NOT APPLY DRY DRESSING this holds in moisture.  Patient is on a DOLPHIN Mattress.  Use z guard bid and prn. Heels CDI and blanchable.  No further WOC nurse need.  Please keep all low Cezar skin prevention interventions in place while patient is a Cezar of 18 or less including heel boots, repositioning wedge and white incontinence pads.  Please reconsult if needed.   Thank you    Current Nutrition Prescription     PO: Diet Regular  Boost Pudding daily, Magic Cups 2x/day    Intake:25% of 6 meals         Nutrition Diagnosis   1/1. 1-8  Problem Inadequate oral intake   Etiology Acute Pancreatitis/ Cholangitis   Signs/Symptoms Po intake 25%   Status: improving      Nutrition Intervention     Interventions Goal    General: Nutrition support treatment  Increase Intake    Nutrition Interventions   1.  Follow treatment progress, Advise alternate selection, Advised available snacks, Interview for preferences, Menu provided, Supplement provided    Boost+ 3x/day    Monitor/ Evaluation    Per protocol, PO intake, Supplement intake, Pertinent labs, Skin status, GI status, Symptoms, Swallow function    Nicky Sy RD  Time Spent: 30min

## 2020-01-08 NOTE — THERAPY TREATMENT NOTE
Patient Name: Murali Dennis  : 1929    MRN: [id number]                              Today's Date: 2020       Admit Date: 2019    Visit Dx:     ICD-10-CM ICD-9-CM   1. Acute pancreatitis, unspecified complication status, unspecified pancreatitis type K85.90 577.0   2. HELIO (acute kidney injury) (CMS/[facility]) N17.9 584.9   3. Dysphagia, unspecified type R13.10 787.20   4. Impaired mobility and ADLs Z74.09 799.89     Patient Active Problem List   Diagnosis   • Dementia (CMS/[facility])   • Hypothyroid   • Stage 3 chronic kidney disease (CMS/[facility])   • Coronary artery disease involving native coronary artery of native heart without angina pectoris   • A-fib (CMS/[facility])   • Benign prostatic hyperplasia without lower urinary tract symptoms   • Type 2 diabetes mellitus without complication, without long-term current use of insulin (CMS/[facility])   • Gastroesophageal reflux disease   • Ventricular tachycardia (CMS/[facility])   • Parkinson's disease (CMS/[facility])   • ANGELA on CPAP   • Cardiomyopathy (EF 30%)   • Mixed hyperlipidemia   • Pancreatitis   • Sepsis (CMS/[facility])   • Bacteremia due to Streptococcus   • Acute renal failure superimposed on stage 3 chronic kidney disease (CMS/[facility])   • Acute cholecystitis   • Acute pancreatitis     Past Medical History:   Diagnosis Date   • Alzheimer disease (CMS/[facility])    • Warren esophagus     followed  with GI in [place]   • Benign prostatic hyperplasia without lower urinary tract symptoms 10/17/2018   • Chronic congestive heart failure (CMS/[facility]) 2017     echo report from 17 EF 60-65%, mild TR, mild AR ([place], [place]) Echocardiogram 17: EF 28%, mild to moderate MR    • COPD (chronic obstructive pulmonary disease) (CMS/[facility])    • Coronary artery disease    • Dementia (CMS/[facility])    • Diarrhea 2019   • Environmental allergies 10/17/2018   • Essential hypertension 10/17/2018   • Gastroesophageal reflux disease 2018   • GERD (gastroesophageal reflux  disease)    • Hypothyroid     started after amiodarone use in 2003   • Sleep apnea     wears cpap   • Stroke (CMS/[facility]) 2003   • TIA (transient ischemic attack) 12/29/2017   • Unintended weight loss 10/17/2018     Past Surgical History:   Procedure Laterality Date   • CARDIAC CATHETERIZATION  2003   • CARDIAC CATHETERIZATION N/A 1/19/2018    Procedure: Left Heart Cath;  Surgeon: Hollis Ugarte MD;  Location:  [facility] CATH INVASIVE LOCATION;  Service:    • CARPAL TUNNEL RELEASE Right    • CATARACT EXTRACTION Bilateral    • CORONARY ARTERY BYPASS GRAFT  2003    Triple Bypass, @ [facility] [place], [place]   • ERCP N/A 12/30/2019    Procedure: ENDOSCOPIC RETROGRADE CHOLANGIOPANCREATOGRAPHY;  Surgeon: Arsh White MD;  Location:  [facility] ENDOSCOPY;  Service: Gastroenterology   • HEMORRHOIDECTOMY     • HERNIA REPAIR       General Information     Row Name 01/08/20 1148          PT Evaluation Time/Intention    Document Type  therapy note (daily note)  -CD     Mode of Treatment  physical therapy  -CD     Row Name 01/08/20 1148          General Information    Patient Profile Reviewed?  yes  -CD     Existing Precautions/Restrictions  fall  -CD     Barriers to Rehab  hearing deficit;medically complex;previous functional deficit;cognitive status  -CD     Row Name 01/08/20 1148          Cognitive Assessment/Intervention- PT/OT    Orientation Status (Cognition)  oriented to;person  -CD     Cognitive Assessment/Intervention Comment  PT [place] AND REQUIRES MULTIPLE CUES BUT IS FOLLOWING ALL COMMANDS.   -CD     Row Name 01/08/20 1148          Safety Issues, Functional Mobility    Safety Issues Affecting Function (Mobility)  safety precaution awareness;safety precautions follow-through/compliance  -CD     Impairments Affecting Function (Mobility)  endurance/activity tolerance;strength;postural/trunk control  -CD       User Key  (r) = Recorded By, (t) = Taken By, (c) = Cosigned By    Initials Name Provider  Type    CD Esthela Moore, PT Physical Therapist        Mobility     Row Name 01/08/20 1150          Bed Mobility Assessment/Treatment    Supine-Sit [place] (Bed Mobility)  moderate assist (50% patient effort);verbal cues  -CD     Comment (Bed Mobility)  CUES FOR SEQUENCING AND TO USE BEDRAIL TO ASSIST.   -CD     Row Name 01/08/20 1150          Transfer Assessment/Treatment    Comment (Transfers)  CUES FOR HAND PLACEMENT WITH R WALKER. PERFORMED STS FROM EOB.   -CD     Row Name 01/08/20 1150          Sit-Stand Transfer    Sit-Stand [place] (Transfers)  moderate assist (50% patient effort);2 person assist;verbal cues  -CD     Assistive Device (Sit-Stand Transfers)  walker, front-wheeled  -CD     Row Name 01/08/20 1150          Gait/Stairs Assessment/Training    Gait/Stairs Assessment/Training  gait/ambulation independence  -CD     [place] Level (Gait)  moderate assist (50% patient effort);2 person assist;verbal cues  -CD     Assistive Device (Gait)  walker, front-wheeled  -CD     Distance in Feet (Gait)  8 FEET. FOLLOWED CLOSELY WITH RECLINER.   -CD     Deviations/Abnormal Patterns (Gait)  base of support, wide;stride length decreased;gait speed decreased  -CD     Bilateral Gait Deviations  forward flexed posture  -CD     Left Sided Gait Deviations  heel strike decreased  -CD     Comment (Gait/Stairs)  MANUAL ASSIST FOR PROPER WALKER PLACEMENT AND TO GUIDE WX.  CUES FOR UPRIGHT POSTURE. RECLINER BROUGHT UP TO PT TO SIT DUE TO C/O DIZZINESS. BP STABLE.   -CD       User Key  (r) = Recorded By, (t) = Taken By, (c) = Cosigned By    Initials Name Provider Type    CD Esthela Moore PT Physical Therapist        Obj/Interventions     Row Name 01/08/20 1153          Therapeutic Exercise    Lower Extremity (Therapeutic Exercise)  heel slides, bilateral  -CD     Exercise Type (Therapeutic Exercise)  AROM (active range of motion)  -CD     Position (Therapeutic Exercise)  supine  -CD     Sets/Reps (Therapeutic  Exercise)  2 SETS OF 10 REPS. GENTLE RESISTANCE WITH HIP/KNEE EXTENSION ON 2ND SET.   -CD     Row Name 01/08/20 1153          Static Sitting Balance    Level of [place] (Unsupported Sitting, Static Balance)  contact guard assist  -CD     Sitting Position (Unsupported Sitting, Static Balance)  sitting on edge of bed  -CD     Time Able to Maintain Position (Unsupported Sitting, Static Balance)  more than 5 minutes  -CD     Row Name 01/08/20 1153          Dynamic Sitting Balance    Level of [place], Reaches Outside Midline (Sitting, Dynamic Balance)  minimal assist, 75% patient effort  -CD     Sitting Position, Reaches Outside Midline (Sitting, Dynamic Balance)  sitting on edge of bed  -CD     Row Name 01/08/20 1153          Static Standing Balance    Level of [place] (Supported Standing, Static Balance)  minimal assist, 75% patient effort;2 person assist  -CD     Assistive Device Utilized (Supported Standing, Static Balance)  walker, rolling  -CD     Comment (Supported Standing, Static Balance)  INITIAL POSTERIOR LEAN WITH FORWARD FLEXED AT HIPS UPON STANDING. PT ABLE TO CORRECT WITH MAX CUES.   -CD     Row Name 01/08/20 1153          Dynamic Standing Balance    Level of [place], Reaches Outside Midline (Standing, Dynamic Balance)  moderate assist, 50 to 74% patient effort;2 person assist  -CD     Assistive Device Utilized (Supported Standing, Dynamic Balance)  walker, rolling  -CD     Comment, Reaches Outside Midline (Standing, Dynamic Balance)  GAIT IN ROOM. CONSTANT CUES FOR UPRIGHT POSTURE AND MANUAL ASSIST TO CONTROL SPEED OF WALKER.   -CD       User Key  (r) = Recorded By, (t) = Taken By, (c) = Cosigned By    Initials Name Provider Type    CD Esthela Moore PT Physical Therapist        Goals/Plan    No documentation.       Clinical Impression     Row Name 01/08/20 1156          Pain Scale: Numbers Pre/Post-Treatment    Pain Scale: Numbers, Pretreatment  0/10 - no pain  -CD     Pain Scale:  Numbers, Post-Treatment  0/10 - no pain  -CD     Row Name 01/08/20 1156          Plan of Care Review    Plan of Care Reviewed With  patient;daughter  -CD     Progress  improving  -CD     Outcome Summary  CONTINUES TO SHOW PROGRESS WITH FUNCTIONAL MOBILITY. ABLE TO INITIATE GAIT IN ROOM X 8 FEET WITH R WALKER AND MOD ASSIST OF 2. DEMONSTRATED IMPROVED BED MOBILITY, TRANSFERS AND STATIC SITTING BALANCE TODAY. RECOMMEND IRF AT D/C.   -CD     Row Name 01/08/20 1156          Physical Therapy Clinical Impression    Patient/Family Goals Statement (PT Clinical Impression)  FAMILY WANTS IRF AT D/C PRIOR TO HOME WITH FAMILY.   -CD     Criteria for Skilled Interventions Met (PT Clinical Impression)  yes  -CD     Rehab Potential (PT Clinical Summary)  good, to achieve stated therapy goals  -CD     Row Name 01/08/20 1156          Vital Signs    Pre Systolic BP Rehab  144  -CD     Pre Treatment Diastolic BP  71  -CD     Intra Systolic BP Rehab  144  -CD     Intra Treatment Diastolic BP  101  -CD     Post Systolic BP Rehab  151  -CD     Post Treatment Diastolic BP  78  -CD     Pretreatment Heart Rate (beats/min)  62  -CD     Intratreatment Heart Rate (beats/min)  71  -CD     Posttreatment Heart Rate (beats/min)  67  -CD     Pre SpO2 (%)  97  -CD     O2 Delivery Pre Treatment  room air  -CD     O2 Delivery Intra Treatment  room air  -CD     Post SpO2 (%)  97  -CD     O2 Delivery Post Treatment  room air  -CD     Pre Patient Position  Supine  -CD     Intra Patient Position  Standing  -CD     Post Patient Position  Sitting  -CD     Row Name 01/08/20 1156          Positioning and Restraints    Pre-Treatment Position  in bed  -CD     Post Treatment Position  chair  -CD     In Chair  reclined;call light within reach;encouraged to call for assist;legs elevated;RUE elevated;LUE elevated;exit alarm on;on mechanical lift sling;notified nsg;with family/caregiver;waffle cushion RECOMMENDED NSG ASSIST BACK TO BED VIA B UE SUPPORT AND GAIT BELT  WITH BED DEFLATED.   -CD       User Key  (r) = Recorded By, (t) = Taken By, (c) = Cosigned By    Initials Name Provider Type    Esthela Benito, PT Physical Therapist        Outcome Measures     Row Name 01/08/20 1159          How much help from another person do you currently need...    Turning from your back to your side while in flat bed without using bedrails?  3  -CD     Moving from lying on back to sitting on the side of a flat bed without bedrails?  2  -CD     Moving to and from a bed to a chair (including a wheelchair)?  2  -CD     Standing up from a chair using your arms (e.g., wheelchair, bedside chair)?  2  -CD     Climbing 3-5 steps with a railing?  1  -CD     To walk in hospital room?  2  -CD     AM-PAC 6 Clicks Score (PT)  12  -CD     Row Name 01/08/20 1159          Functional Assessment    Outcome Measure Options  AM-PAC 6 Clicks Basic Mobility (PT)  -CD       User Key  (r) = Recorded By, (t) = Taken By, (c) = Cosigned By    Initials Name Provider Type    Esthela Benito, PT Physical Therapist          PT Recommendation and Plan  Planned Therapy Interventions (PT Eval): balance training, bed mobility training, strengthening, transfer training, patient/family education, home exercise program, gait training  Outcome Summary/Treatment Plan (PT)  Anticipated Discharge Disposition (PT): inpatient rehabilitation facility  Plan of Care Reviewed With: patient, daughter  Progress: improving  Outcome Summary: CONTINUES TO SHOW PROGRESS WITH FUNCTIONAL MOBILITY. ABLE TO INITIATE GAIT IN ROOM X 8 FEET WITH R WALKER AND MOD ASSIST OF 2. DEMONSTRATED IMPROVED BED MOBILITY, TRANSFERS AND STATIC SITTING BALANCE TODAY. RECOMMEND IRF AT D/C.      Time Calculation:   PT Charges     Row Name 01/08/20 1201             Time Calculation    Start Time  1117  -CD      PT Received On  01/08/20  -CD      PT Goal Re-Cert Due Date  01/11/20  -CD         Time Calculation- PT    Total Timed Code Minutes- PT  30 minute(s)  -CD          Timed Charges    60510 - PT Therapeutic Exercise Minutes  5  -CD      83108 - Gait Training Minutes   10  -CD      63702 - PT Therapeutic Activity Minutes  15  -CD        User Key  (r) = Recorded By, (t) = Taken By, (c) = Cosigned By    Initials Name Provider Type    Esthela Benito, PT Physical Therapist        Therapy Charges for Today     Code Description Service Date Service Provider Modifiers Qty    21306896808 HC GAIT TRAINING EA 15 MIN 1/8/2020 Esthela Moore, PT GP 1    14743958182 HC PT THERAPEUTIC ACT EA 15 MIN 1/8/2020 Esthela Moore, PT GP 1    [id number]  PT THER SUPP EA 15 MIN 1/8/2020 Esthela Moore, PT GP 2          PT G-Codes  Outcome Measure Options: AM-PAC 6 Clicks Basic Mobility (PT)  AM-PAC 6 Clicks Score (PT): 12  AM-PAC 6 Clicks Score (OT): 6    Esthela Moore, PT  1/8/2020

## 2020-01-08 NOTE — PLAN OF CARE
Pt asleep intermittently most of shift, arouses by voice; disoriented to time and situation.  VSS.  Increased appetite with at least 25% of lunch and dinner eaten; requests water frequently.  Wife remains at bedside, participating in care.  Will continue to monitor.        Problem: Patient Care Overview  Goal: Plan of Care Review  Outcome: Ongoing (interventions implemented as appropriate)  Goal: Interprofessional Rounds/Family Conf  Outcome: Ongoing (interventions implemented as appropriate)     Problem: Pain, Chronic (Adult)  Goal: Identify Related Risk Factors and Signs and Symptoms  Outcome: Ongoing (interventions implemented as appropriate)  Goal: Acceptable Pain/Comfort Level and Functional Ability  Outcome: Ongoing (interventions implemented as appropriate)     Problem: Skin Injury Risk (Adult)  Goal: Identify Related Risk Factors and Signs and Symptoms  Outcome: Ongoing (interventions implemented as appropriate)  Goal: Skin Health and Integrity  Outcome: Ongoing (interventions implemented as appropriate)     Problem: Fall Risk (Adult)  Goal: Identify Related Risk Factors and Signs and Symptoms  Outcome: Ongoing (interventions implemented as appropriate)  Goal: Absence of Fall  Outcome: Ongoing (interventions implemented as appropriate)

## 2020-01-09 NOTE — DISCHARGE PLACEMENT REQUEST
"Murali Barnes (89 y.o. Male)     Date of Birth Social Security Number Address Home Phone MRN    04/20/1929  [address] [facility] [address] [phone] [id number]    Anabaptist Marital Status          None        Admission Date Admission Type Admitting Provider Attending Provider Department, Room/Bed    12/27/19 Emergency Roberto Alarcon MD Schnell, Aaron, MD [facility] 3F, S312/1    Discharge Date Discharge Disposition Discharge Destination         Rehab Facility or Unit (DC - External)              Attending Provider:  Roberto Alarcon MD    Allergies:  Benzodiazepines, Codeine, Fluoxetine, Lipitor [Atorvastatin], Metoclopramide, Nabumetone, Penicillins, Prozac [Fluoxetine Hcl], Tramadol, Valium [Diazepam], Sulfa Antibiotics    Isolation:  None   Infection:  None   Code Status:  No CPR    Ht:  177.8 cm (70\")   Wt:  87.1 kg (192 lb)    Admission Cmt:  None   Principal Problem:  Pancreatitis [K85.90]                 Active Insurance as of 12/27/2019     Primary Coverage     Payor Plan Insurance Group Employer/Plan Group    MEDICARE MEDICARE A & B      Payor Plan Address Payor Plan Phone Number Payor Plan Fax Number Effective Dates    [address] [phone]  4/1/1994 - None Entered    [facility] [address]       Subscriber Name Subscriber Birth Date Member ID       MURALI BARNES 4/20/1929 [id number]           Secondary Coverage     Payor Plan Insurance Group Employer/Plan Group    [employer] BLUE CROSS [employer] BLUE SHIELD PPO [id number]     Payor Plan Address Payor Plan Phone Number Payor Plan Fax Number Effective Dates    [address] [phone]  7/1/2019 - None Entered    [facility] [address]       Subscriber Name Subscriber Birth Date Member ID       MURALI BARNES 4/20/1929 [id number]                 Emergency Contacts      (Rel.) Home Phone Work Phone Mobile Phone    Nelly Barnes (Power of ) [phone] -- [phone]    " Christine Talaveraula (Daughter) [phone] -- --               Discharge Summary      Sherri SilvaARCADIO at 20 1340              [facility] Medicine Services  DISCHARGE SUMMARY    Patient Name: Murali Dennis  : 1929  MRN: [id number]    Date of Admission: 2019  7:23 PM  Date of Discharge:  2020  Primary Care Physician: Diana Null APRN    Consults     Date and Time Order Name Status Description    1/3/2020 0756 Inpatient Palliative Care MD Consult Completed     2020 1135 Inpatient Urology Consult Completed     2019 0030 Inpatient Infectious Diseases Consult Completed     2019 005 Inpatient Gastroenterology Consult Completed     2019 005 Inpatient General Surgery Consult Completed           Hospital Course     Presenting Problem:   Pancreatitis [K85.90]    Active Hospital Problems    Diagnosis  POA   • **Pancreatitis [K85.90]  Yes   • Bacteremia due to Streptococcus [R78.81, B95.5]  Yes   • Acute renal failure superimposed on stage 3 chronic kidney disease (CMS/[facility]) [N17.9, N18.3]  No   • Acute cholecystitis [K81.0]  Yes   • Sepsis (CMS/[facility]) [A41.9]  Yes   • Acute pancreatitis [K85.90]  Yes   • Mixed hyperlipidemia [E78.2]  Yes   • Cardiomyopathy (EF 30%) [I42.9]  Yes   • ANGELA on CPAP [G47.33, Z99.89]  Not Applicable   • Parkinson's disease (CMS/[facility]) [G20]  Yes   • Gastroesophageal reflux disease [K21.9]  Yes   • Type 2 diabetes mellitus without complication, without long-term current use of insulin (CMS/[facility]) [E11.9]  Yes   • Benign prostatic hyperplasia without lower urinary tract symptoms [N40.0]  Yes   • A-fib (CMS/[facility]) [I48.91]  Yes   • Coronary artery disease involving native coronary artery of native heart without angina pectoris [I25.10]  Yes   • Hypothyroid [E03.9]  Yes   • Dementia (CMS/[facility]) [F03.90]  Yes      Resolved Hospital Problems    Diagnosis Date Resolved POA   • Sinus bradycardia [R00.1] 2019 Yes          Hospital  Course:  Murali Dennis is a 89 y.o. male who presented to the ER with abdominal pain.  He had periumbilical and right sided abdominal pain.  He was admitted with acute pancreatitis.       Assessment/Plan:     Acute Pancreatitis  Cholangitis- with associated streptococcal bacteremia and secondary to choledocholithiasis, s/p sphincterotomy  - seen by GI and Surgery on admission  - had ERCP with Dr. White  - large amount of biliary sludge which was swept and had sphincterotomy  --can follow up with Dr Jack of general surgery in future if decides to have cholecystectomy      Sepsis with Strep Bacteremia  - likely due to cholangitis  -TTE was negative for valvular vegetations and I do not think it would be prudent to put him through a GERMAINE with the need for sedation.  He is now off of antibiotic therapy since 1/6. He will be at potential risk for relapse since he does have gallbladder disease.  Dr. Jack has offered to perform an elective cholecystectomy if he and the family desire after he has clinically improved.  Per ID today:  PLAN/RECOMMENDATIONS:  1.  Monitor off antibiotics   2.  Elective cholecystectomy if he continues to improve-this will need to be done after he is discharged from the hospital        Complicated Manriquez Catheter  --tight phimosis with inablity of staff to place manriquez catheter.   - f/c placed by Urology  --manriquez remains in place.  F/u outpt urology   --encourage considerable improvement in his condition (ambulatory, no constipation, etc) before considering voiding trial     Acute Respiratory Failure  --improved      History of Atrial Fibrillation  --on amiodarone, asa 81, plavix  --tolerating     Dementia/Parkinson  --on sinemet and namenda  --continue current meds     Medically stable and ready for discharge. Will be transferred to [facility] today by family.        Discharge Follow Up Recommendations for labs/diagnostics:   PCP 1 week after DC from rehab   Dr Jack in future to [facility] for  cholecystectomy   Urology 1-2 weeks     Day of Discharge     HPI:   Up in chair, NAD. Wife in room. Pt feels well, no complaints. Eager for dc to rehab. Eating well, no abd pain or n/v. Najera remains with clear yellow urine. Denies f/c,n/v/d, soa, or cp     Review of Systems  Otherwise ROS is negative except as mentioned in the HPI.    Vital Signs:   Temp:  [97.6 °F (36.4 °C)-98.2 °F (36.8 °C)] 98.2 °F (36.8 °C)  Heart Rate:  [55-70] 55  Resp:  [16-18] 18  BP: (114-141)/(60-92) 114/60     Physical Exam:  Constitutional: Awake, alert  Eyes: PERRLA, sclerae anicteric, no conjunctival injection  HENT: NCAT, mucous membranes moist  Neck: Supple, trachea midline  Respiratory: Clear to auscultation bilaterally, nonlabored respirations on RA  Cardiovascular: RRR, no murmurs, rubs, or gallops, palpable pedal pulses bilaterally  Gastrointestinal: Positive bowel sounds, soft, nontender, nondistended  Musculoskeletal: No bilateral ankle edema, no clubbing or cyanosis to extremities  Psychiatric: Appropriate affect, cooperative  Neurologic: Oriented x 3, strength symmetric in all extremities, Cranial Nerves grossly intact to confrontation, speech clear  Skin: No rashes     Pertinent  and/or Most Recent Results     Results from last 7 days   Lab Units 01/09/20  0534 01/05/20  0635 01/04/20  1045 01/03/20  0728   WBC 10*3/mm3 8.21  --   --  5.91   HEMOGLOBIN g/dL 10.1*  --   --  9.8*   HEMATOCRIT % 30.6*  --   --  29.0*   PLATELETS 10*3/mm3 217  --   --  107*   SODIUM mmol/L 137 138  --  140   POTASSIUM mmol/L 3.7 4.3 4.3 3.3*   CHLORIDE mmol/L 102 108*  --  109*   CO2 mmol/L 21.0* 20.0*  --  19.0*   BUN mg/dL 18 33*  --  42*   CREATININE mg/dL 1.26 1.44*  --  1.32*   GLUCOSE mg/dL 136* 112*  --  134*   CALCIUM mg/dL 8.5 8.2  --  8.4     Results from last 7 days   Lab Units 01/09/20  0534 01/03/20  0728   BILIRUBIN mg/dL 0.9 0.8   ALK PHOS U/L 175* 205*   ALT (SGPT) U/L 9 12   AST (SGOT) U/L 60* 40           Invalid input(s): TG,  LDLCALC, LDLREALC        Brief Urine Lab Results  (Last result in the past 365 days)      Color   Clarity   Blood   Leuk Est   Nitrite   Protein   CREAT   Urine HCG        12/28/19 1044 Dark Yellow Cloudy Negative Trace Negative Trace               Microbiology Results Abnormal     Procedure Component Value - Date/Time    Blood Culture - Blood, Wrist, Right [[id number]]  (Abnormal) Collected:  12/28/19 1244    Lab Status:  Final result Specimen:  Blood from Wrist, Right Updated:  12/31/19 0618     Blood Culture Streptococcus gallolyticus ssp pasteurianus     Comment: Refer to previous blood culture collected on 12/28 at 1244 for AMY's            Gram Stain Aerobic Bottle Gram positive cocci in chains    Narrative:       Aerobic bottle only      Blood Culture - Blood, Hand, Right [[id number]]  (Abnormal)  (Susceptibility) Collected:  12/28/19 1244    Lab Status:  Final result Specimen:  Blood from Hand, Right Updated:  12/31/19 0615     Blood Culture Streptococcus gallolyticus ssp pasteurianus     Gram Stain Aerobic Bottle Gram positive cocci in chains    Narrative:       Aerobic bottle only      Susceptibility      Streptococcus gallolyticus ssp pasteurianus     AMY     Ceftriaxone Susceptible     Penicillin G Susceptible     Vancomycin Susceptible                    Blood Culture ID, PCR - Blood, Hand, Right [[id number]]  (Abnormal) Collected:  12/28/19 1244    Lab Status:  Final result Specimen:  Blood from Hand, Right Updated:  12/29/19 0700     BCID, PCR Streptococcus spp, not A, B, or pneumoniae. Identification by BCID PCR.          Imaging Results (All)     Procedure Component Value Units Date/Time    XR Chest 1 View [[id number]] Collected:  01/02/20 1235     Updated:  01/02/20 1525    Narrative:       EXAMINATION: XR CHEST 1 VW-      INDICATION: PICC tip placement; K85.90-Acute pancreatitis without  necrosis or infection, unspecified; N17.9-Acute kidney failure,  unspecified; R13.10-Dysphagia, unspecified;  Z74.09-Other reduced  mobility.      COMPARISON: Chest x-ray 12/29/2019.     FINDINGS: Cardiac size enlarged status post median sternotomy and CABG  with right arm PICC now in place terminating within the distal SVC. No  pneumothorax. Bibasilar opacifications right greater than left with  probable trace bilateral pleural effusions similar to prior.           Impression:       Right arm PICC placement terminating within the distal SVC.  No postprocedural pneumothorax. Pulmonary findings otherwise are grossly  unchanged from prior.     D:  01/02/2020  E:  01/02/2020     This report was finalized on 1/2/2020 3:22 PM by Dr. Lauri Montelongo.       FL ERCP pancreatic and biliary ducts [[id number]] Collected:  12/30/19 1313     Updated:  12/30/19 1741    Narrative:       EXAMINATION: RETROGRADE CHOLANGIOGRAM     HISTORY: Right upper quadrant pain.     FINDINGS: Two minutes 10 seconds fluoroscopic time was utilized to  assist in this procedure. Three images are displayed. There is no  evidence of choledocholithiasis.       Impression:       There is no choledocholithiasis.     D:  12/30/2019  E:  12/30/2019     This report was finalized on 12/30/2019 5:38 PM by Dr. Bismark Goff MD.       XR Chest 1 View [[id number]] Collected:  12/29/19 0812     Updated:  12/29/19 1906    Narrative:          EXAMINATION: XR CHEST 1 VW - 12/29/2019     INDICATION: K85.90-Acute pancreatitis without necrosis or infection,  unspecified; N17.9-Acute kidney failure, unspecified      COMPARISON: 12/28/2019     FINDINGS: Cardiomegaly status post medial sternotomy and CABG with mild  central vascular congestion. Bibasilar opacifications of  likely  atelectasis increased from prior comparison along with trace bilateral  pleural effusions.           Impression:       Increasing bibasilar opacifications along with persistent  trace bilateral pleural effusions.     DICTATED:   12/29/2019  EDITED/ls :   12/29/2019      This report was finalized on 12/29/2019  7:03 PM by Dr. Lauri Montelongo.       XR Chest 1 View [[id number]] Collected:  12/28/19 1743     Updated:  12/29/19 1416    Narrative:          EXAMINATION: XR CHEST 1 VW - 12/28/2019     INDICATION: K85.90-Acute pancreatitis without necrosis or infection,  unspecified; N17.9-Acute kidney failure, unspecified      COMPARISON: Chest x-ray 02/24/2019     FINDINGS: Cardiac size enlarged with increased bony vascularity however  no pleural effusion. Asymmetric elevation the right diaphragm again  noted. No pneumothorax.           Impression:       Cardiomegaly with mild increase in pulmonary vascularity  however no pleural effusions.     DICTATED:   12/28/2019  EDITED/ls :   12/28/2019      This report was finalized on 12/29/2019 2:13 PM by Dr. Lauri Montelongo.       MRI abdomen wo contrast mrcp [[id number]] Collected:  12/28/19 0733     Updated:  12/28/19 0735    Narrative:       MRI MRCP WO    INDICATION:    Lower abdominal pain for the past several days with elevated liver enzymes. Acute pancreatitis. Elevated white count and fever.     TECHNIQUE:   MRI of the abdomen with MRCP without  IV contrast.    COMPARISON:    CT scan dated 12/27/2019    FINDINGS:  Atelectasis is seen at both lung bases posteriorly. There is a small volume of ascites around the liver and spleen. Edematous and inflammatory changes are seen in the pancreatic bed and along the posterior retroperitoneal reflection consistent with the  patient's known pancreatitis. Gallbladder remains markedly distended. The gallbladder wall is not thickened. There is abnormal soft tissue in the gallbladder neck that does not layer though a sludge typically does. Findings are worrisome for gallbladder  carcinoma obstructing the neck of the gallbladder. The intrahepatic biliary tree is dilated, particularly left lobe branches. There appears to be narrowing of the left proper hepatic duct at the sadiq hepatis. The common bile duct itself is nondilated  measuring 3 to 5 mm in  diameter. No definite common duct stones are identified. The pancreatic duct is nondilated. No suspicious liver masses are seen. The portal vein is patent. Spleen is normal in size.      Impression:       Gallbladder distention is again seen and there is a suggestion of a mass in the neck of the gallbladder. 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 Also noted is narrowing of the left proper hepatic duct at the sadiq hepatis and there is  asymmetric relative dilatation of left-sided intrahepatic bile ducts. The common duct is nondilated with no stones identified. Changes of pancreatitis are seen in the pancreatic bed and retroperitoneum and is a small volume of ascites.    Signer Name: Chapo Manzanares MD   Signed: 12/28/2019 7:33 AM   Workstation Name: [id number]    [facility]    CT Abdomen Pelvis Without Contrast [[id number]] Collected:  12/28/19 0015     Updated:  12/28/19 0017    Narrative:       CT ABDOMEN AND PELVIS, NONCONTRAST, 12/27/2019    HISTORY:  89-year-old male in the ED after sudden onset periumbilical abdomen pain at 1700 hours today radiating into the right side of the abdomen. Markedly elevated serum lipase indicating acute pancreatitis.    TECHNIQUE:  CT imaging of the abdomen and pelvis without oral or IV contrast. Radiation dose reduction techniques included automated exposure control. Radiation audit for CT and nuclear cardiology exams in the last 12 months: 0.    ABDOMEN FINDINGS:  The gallbladder is markedly distended and shows diffuse wall thickening and a tiny amount of pericholecystic fluid. Sludge and/or dense stone material is present within the dependent portion of the gallbladder. There is mild intrahepatic bile duct  dilatation, but there is no visible dilatation of the distal extrahepatic bile duct.    Mild to moderate diffuse acute pancreatitis superimposed on a background of chronic diffuse fatty atrophy of the pancreas. Pancreatic and peripancreatic  inflammatory soft tissue stranding, but no focal fluid collection. No visible pancreatic duct  dilatation. Hepatic and peripancreatic vasculature cannot be assessed without contrast.    Large hiatal hernia with fluid distended intrathoracic upper stomach. Consider NG tube decompression.    Liver and spleen are unremarkable. The kidneys are unobstructed. Normal caliber abdominal aorta. Small bowel and colon are normal in caliber and appearance, as imaged, with the exception of moderately severe sigmoid colonic diverticulosis. The appendix  is not directly visualized, there is no indirect CT evidence of acute appendicitis.    PELVIS FINDINGS:  Urinary bladder, prostate and rectum are unremarkable. No inguinal hernia.    Lung base images show scarring or atelectasis in the dependent posterior lung bases. No pleural effusion.      Impression:       1.  Mild to moderate diffuse acute pancreatitis. Background chronic fatty atrophy of the pancreas. No pancreatic ductal dilatation.  2.  Abnormal gallbladder. The gallbladder is distended and shows diffuse wall thickening. Small quantities of dense material within the dependent portion of the gallbladder may represent gallstones and/or dense biliary sludge.  3.  Mild intrahepatic bile duct dilatation, but no visible extrahepatic bile duct dilatation. Consider MRCP if the patient has laboratory evidence of biliary obstruction.  4.  Large hiatal hernia with fluid distended intrathoracic stomach. Consider NG tube decompression.  5.  Lower colonic diverticulosis.    Signer Name: Vernon Urbano MD   Signed: 12/28/2019 12:15 AM   Workstation Name: [id number]    [facility] Gallbladder [[id number]] Collected:  12/27/19 2230     Updated:  12/27/19 2232    Narrative:        Abdomen Ltd 12/27/2019    INDICATION:   Abnormally elevated liver enzymes today. Elevated lipase. Generalized abdominal pain today.    COMPARISON:   None  available.    FINDINGS:  PANCREAS: The pancreas is obscured due to overlying bowel gas.    LIVER: The echogenicity and echotexture of the hepatic parenchyma is within normal limits.. No hepatic mass. The intrahepatic bile ducts are normal in caliber. The common duct is dilated to 9 mm. No obstructing calculus or mass is seen, but the distal  aspect of the common duct and the pancreas are obscured by bowel gas. Clinical correlation is recommended. Consider correlation with CT scan.    GALLBLADDER: The gallbladder is distended, measuring 11.3 cm in greatest diameter. There is minimal sludge along the dependent portion of the gallbladder. No evidence of cholelithiasis or gallbladder wall thickening.    RIGHT KIDNEY: The right kidney measures 8.1 cm. There is no evidence of hydronephrosis. There is increased right renal cortical echogenicity characteristic of medical renal disease. 1.8 cm cyst upper pole right kidney. OTHER: No ascites.      Impression:         1. Distended gallbladder, measuring 11.3 cm in greatest diameter. Minimal gallbladder sludge. No evidence of cholelithiasis or gallbladder wall thickening.  2. Dilatation of the common bile duct to 9 mm. No obstructing mass or calculus is seen, but the distal aspect of the common bile duct and the pancreas are obscured by bowel gas. If there is clinical concern for choledocholithiasis, consider correlation  with MRCP.  3. Increased right renal cortical echogenicity characteristic of medical renal disease. Small right renal cyst. No evidence of hydronephrosis.    Signer Name: Isak Sofia MD   Signed: 12/27/2019 10:30 PM   Workstation Name: [id number]    [facility]          Results for orders placed during the hospital encounter of 12/07/17   Duplex Carotid Ultrasound CAR    Narrative · No obstructive carotid stenosis bilaterally  · Antegrade bilateral vertebral flow.          Results for orders placed during the hospital encounter of  12/07/17   Duplex Carotid Ultrasound CAR    Narrative · No obstructive carotid stenosis bilaterally  · Antegrade bilateral vertebral flow.          Results for orders placed during the hospital encounter of 12/27/19   Adult Transthoracic Echo Complete W/ Cont if Necessary Per Protocol    Narrative · The left ventricular cavity is moderately dilated.  · Right ventricular cavity is mild-to-moderately dilated.  · Left atrial cavity size is moderate-to-severely dilated.  · Moderate aortic valve regurgitation is present.  · Moderate mitral valve regurgitation is present.  · Mild to moderate tricuspid valve regurgitation is present.  · Calculated right ventricular systolic pressure from tricuspid   regurgitation is 35 mmHg.  · Estimated EF = 30%.  · Left ventricular systolic function is severely decreased.  · Left ventricular diastolic dysfunction (grade II) consistent with   pseudonormalization.  · The findings are consistent with dilated cardiomyopathy.  · Mild pulmonary hypertension is present.  · There is no evidence of pericardial effusion.  · The aortic valve exhibits moderate sclerosis without stenosis.            Discharge Details        Discharge Medications      New Medications      Instructions Start Date   simethicone 80 MG chewable tablet  Commonly known as:  MYLICON   80 mg, Oral, 4 Times Daily PRN         Changes to Medications      Instructions Start Date   omeprazole 20 MG capsule  Commonly known as:  priLOSEC  What changed:  how much to take   40 mg, Oral, Daily      pravastatin 20 MG tablet  Commonly known as:  PRAVACHOL  What changed:    · how much to take  · how to take this  · when to take this   TAKE 1 TABLET BY MOUTH EVERY DAY         Continue These Medications      Instructions Start Date   acetaminophen 325 MG tablet  Commonly known as:  TYLENOL   650 mg, Oral, Nightly PRN      amiodarone 200 MG tablet  Commonly known as:  PACERONE   200 mg, Oral, Daily      aspirin 81 MG chewable tablet   81 mg,  Oral, Daily      carbidopa-levodopa  MG per tablet  Commonly known as:  SINEMET   1 tablet, Oral, 3 Times Daily      clopidogrel 75 MG tablet  Commonly known as:  PLAVIX   75 mg, Oral, Daily      dutasteride 0.5 MG capsule  Commonly known as:  AVODART   TAKE 1 CAPSULE BY MOUTH EVERY DAY      freestyle lancets   1 each, Other, Daily, May dispense any brand needed E11.9      glucose monitor monitoring kit   1 each, Does not apply, Daily      ipratropium-albuterol 0.5-2.5 mg/3 ml nebulizer  Commonly known as:  DUO-NEB   3 mL, Nebulization, Every 4 Hours PRN      levothyroxine 75 MCG tablet  Commonly known as:  SYNTHROID, LEVOTHROID   TAKE 1 TABLET BY MOUTH EVERY DAY      Loratadine 10 MG capsule   1 capsule, Oral, Daily PRN      memantine 10 MG tablet  Commonly known as:  NAMENDA   10 mg, Oral, 2 Times Daily      MULTIVITAL PO   1 tablet, Oral, Daily      nitroglycerin 0.4 MG SL tablet  Commonly known as:  NITROSTAT   0.4 mg, Sublingual, Every 5 Minutes PRN, Take no more than 3 doses in 15 minutes.          Stop These Medications    diclofenac 1 % gel gel  Commonly known as:  VOLTAREN     glucose blood test strip     midodrine 10 MG tablet  Commonly known as:  PROAMATINE     mirtazapine 30 MG tablet  Commonly known as:  REMERON     sacubitril-valsartan 24-26 MG tablet  Commonly known as:  ENTRESTO            Allergies   Allergen Reactions   • Benzodiazepines Other (See Comments)     Paradoxical response   • Codeine Other (See Comments)     unknown   • Fluoxetine Other (See Comments)     Paradoxical response   • Lipitor [Atorvastatin] Other (See Comments)     Myalgias     • Metoclopramide Other (See Comments)     Unknown   • Nabumetone    • Penicillins Swelling and Other (See Comments)     Has tolerated cefepime 12/2019   • Prozac [Fluoxetine Hcl]    • Tramadol Other (See Comments)     unknown   • Valium [Diazepam] Other (See Comments)     Paradoxical response     • Sulfa Antibiotics Rash         Discharge  Disposition:  Rehab Facility or Unit (DC - External)    Diet:  Hospital:  Diet Order   Procedures   • Diet Regular       Activity:  Activity Instructions     Activity as Tolerated               CODE STATUS:    Code Status and Medical Interventions:   Ordered at: 12/27/19 6447     Limited Support to NOT Include:    Intubation     Level Of Support Discussed With:    Health Care Surrogate     Code Status:    No CPR     Medical Interventions (Level of Support Prior to Arrest):    Limited       Future Appointments   Date Time Provider Department Center   1/24/2020 10:00 AM Diana Null APRN [facility] None   1/30/2020 11:00 AM Agusto Reddy MD [facility] None   5/1/2020 10:30 AM Francesca Hardy PA-C [facility] None       Additional Instructions for the Follow-ups that You Need to Schedule     Discharge Follow-up with PCP   As directed       Currently Documented PCP:    Diana Null APRN    PCP Phone Number:    [phone]     Follow Up Details:  after DC from rehab         Discharge Follow-up with Specified Provider: Dr Carpenter; 2 Weeks   As directed      To:  Dr Carpenter    Follow Up:  2 Weeks               Time Spent on Discharge:  45 minutes    Electronically signed by ARCADIO Ramires, 01/09/20, 1:41 PM.        Electronically signed by Sherri Silva APRN at 01/09/20 6243

## 2020-01-09 NOTE — PROGRESS NOTES
INFECTIOUS DISEASE Progress Note     Murali Dennis  4/20/1929  [id number]      Admission Date: 12/27/2019      Requesting Provider: No ref. provider found  Evaluating Physician: Ish Tijerina MD    Reason for Consultation: sepsis     History of present illness:  12/30/19: Patient is a 89 y.o. male, who presented to the ED with complaints of abdominal tenderness which began on Friday evening.   He has been having decreased appetite over the past several weeks and has lost 30 pounds unintentionally over the past year.  His family denies any fever, cough, sputum production, nausea, vomiting, dysuria prior to admission.    An abdominal CT on admission with distended gallbladder, with mild to moderate diffuse acute pancreatitis, mild intrahepatic bile duct dilatation. He underwent an MRCP with gallbladder distention, and possible mass in neck of gallbladder, with changes of pancreatitis.  Admitting labs with lipase of >3000, Scr 1.19, WBC 13, 000., lactate of 2.9,  Blood cultures now positive for Strep species.  He is currently on Vancomycin and Cefepime and we were consulted for evaluation and treatment.     He has a history of penicillin allergy which his wife indicates consisted of arm swelling after injection.  12/31/19:  He is resting quietly.  Daughter says he was confused and restless/aggitated several times during the evening.    1/1/19: Family is very concerned that PT hasn't been working with him, and he is so weak.  Still having difficulty with swallowing.  He had a bowel movement earlier this am. Was confused earlier this am per son. He remains afebrile.   1/2/20:  His manriquez was removed and he is unable to urinate.  He was very aggitated and confused yesterday and last pm was given haldol and he is quiet now. Son at bedside.  He complains of abdominal pain.  His PICC line was ordered yesterday but he was not cooperative with placement of the PICC line.  1/3/20:  Manriquez was replaced by urology  yesterday.  Family is considering hospice/palliative.   1/4/2020: His family has decided to proceed with hospice/comfort measures only.  They indicate that this was his choice.  He has remained afebrile overnight.  He has been somewhat dyspneic.  1/6/2020: When I last saw him on Saturday, the family had indicated that they were going to proceed with hospice/comfort measures only.  He apparently became more alert and responsive later in the day and they decided to not proceed with hospice.  They met with the hospitalist and his antibiotic therapy was restarted.  I had already discontinued his antibiotic therapy after 1/3-I discussed duration of antibiotic therapy with Dr. Jack and we have decided to leave him on antibiotic therapy through that time regardless of his hospice status.  I was not informed about the change in decisions until I was called by Dr. Alarcon today.  He is now back on Rocephin/Flagyl.  He has remained afebrile.  He has no new complaints today.  I have been informed that his family was upset about a perceived lack of communication or disagreement among the physicians.  I have discussed his clinical situation and therapeutic plan repeatedly and on numerous different days with Dr. Jack and the hospitalist service.  I informed his family today that we have been communicating extensively about his care although I was not notified by the hospitalist service this weekend that there was a change in his status and change in decisions regarding hospice.  1/7/20:  His granddaughter says he was restless and confused last pm.  He is more alert this am.  Knows he's in the hospital.  Denies any abdominal pain.    1/8/20:  Awaiting rehab placement possibly today.  He was less confused last pm per family.  He remains afebrile.   1/9/20:  He was restless last pm. He had a bowel movement. Being transferred to [facility] today. No abdominal pain].      Past Medical History:   Diagnosis Date   • Alzheimer  disease (CMS/[facility])    • Warren esophagus     followed  with GI in [place]   • Benign prostatic hyperplasia without lower urinary tract symptoms 10/17/2018   • Chronic congestive heart failure (CMS/[facility]) 12/8/2017     echo report from 8/2/17 EF 60-65%, mild TR, mild AR ([place], [place]) Echocardiogram 12/9/17: EF 28%, mild to moderate MR    • COPD (chronic obstructive pulmonary disease) (CMS/[facility])    • Coronary artery disease 2003   • Dementia (CMS/[facility]) 2003   • Diarrhea 2/24/2019   • Environmental allergies 10/17/2018   • Essential hypertension 10/17/2018   • Gastroesophageal reflux disease 11/19/2018   • GERD (gastroesophageal reflux disease)    • Hypothyroid     started after amiodarone use in 2003   • Sleep apnea     wears cpap   • Stroke (CMS/[facility]) 2003   • TIA (transient ischemic attack) 12/29/2017   • Unintended weight loss 10/17/2018       Past Surgical History:   Procedure Laterality Date   • CARDIAC CATHETERIZATION  2003   • CARDIAC CATHETERIZATION N/A 1/19/2018    Procedure: Left Heart Cath;  Surgeon: Hollis Ugarte MD;  Location:  [facility] CATH INVASIVE LOCATION;  Service:    • CARPAL TUNNEL RELEASE Right    • CATARACT EXTRACTION Bilateral    • CORONARY ARTERY BYPASS GRAFT  2003    Triple Bypass, @ [facility] [place], [place]   • ERCP N/A 12/30/2019    Procedure: ENDOSCOPIC RETROGRADE CHOLANGIOPANCREATOGRAPHY;  Surgeon: Arsh White MD;  Location: [facility]OSCOPY;  Service: Gastroenterology   • HEMORRHOIDECTOMY     • HERNIA REPAIR         Family History   Problem Relation Age of Onset   • Cancer Mother    • Heart disease Sister    • Diabetes Sister    • Heart disease Brother    • Cancer Brother    • Stroke Sister        Social History     Socioeconomic History   • Marital status:      Spouse name: Not on file   • Number of children: Not on file   • Years of education: Not on file   • Highest education level: Not on file   Occupational History   •  Occupation: Retired   Tobacco Use   • Smoking status: Former Smoker     Last attempt to quit: 1967     Years since quittin.0   • Smokeless tobacco: Never Used   Substance and Sexual Activity   • Alcohol use: No   • Drug use: No   • Sexual activity: Defer   Social History Narrative    Caffeine use: 1 serving daily.    Patient lives at home with family.     Lives with wife, daughter close by for any needs       Allergies   Allergen Reactions   • Benzodiazepines Other (See Comments)     Paradoxical response   • Codeine Other (See Comments)     unknown   • Fluoxetine Other (See Comments)     Paradoxical response   • Lipitor [Atorvastatin] Other (See Comments)     Myalgias     • Metoclopramide Other (See Comments)     Unknown   • Nabumetone    • Penicillins Swelling and Other (See Comments)     Has tolerated cefepime 2019   • Prozac [Fluoxetine Hcl]    • Tramadol Other (See Comments)     unknown   • Valium [Diazepam] Other (See Comments)     Paradoxical response     • Sulfa Antibiotics Rash         Medication:    Current Facility-Administered Medications:   •  acetaminophen (TYLENOL) tablet 650 mg, 650 mg, Oral, Q6H PRN, Arsh White MD, 650 mg at 20 0540  •  aluminum-magnesium hydroxide-simethicone (MAALOX MAX) 400-400-40 MG/5ML 15 mL, Lidocaine Viscous HCl (XYLOCAINE) 2 % 15 mL suspension, , Oral, Once, Roberto Alarcon MD  •  amiodarone (PACERONE) tablet 200 mg, 200 mg, Oral, Daily, Arsh White MD, 200 mg at 20 1007  •  aspirin chewable tablet 81 mg, 81 mg, Oral, Daily, Arsh White MD, 81 mg at 20 1008  •  bisacodyl (DULCOLAX) suppository 10 mg, 10 mg, Rectal, Daily PRN, Kayla Lozoya MD  •  calcium carbonate EX (TUMS EX) chewable tablet 750 mg, 750 mg, Oral, TID PRN, Dorcas Montoya MD  •  carbidopa-levodopa (SINEMET)  MG per tablet 1 tablet, 1 tablet, Oral, TID, Arsh White MD, 1 tablet at 20 1529  •   clopidogrel (PLAVIX) tablet 75 mg, 75 mg, Oral, Daily, Kayla Lozoya MD, 75 mg at 01/09/20 1007  •  finasteride (PROSCAR) tablet 5 mg, 5 mg, Oral, Daily, Arsh White MD, 5 mg at 01/09/20 1007  •  ipratropium-albuterol (DUO-NEB) nebulizer solution 3 mL, 3 mL, Nebulization, Q4H PRN, Arsh White MD, 3 mL at 01/09/20 0417  •  lactated ringers bolus 500 mL, 500 mL, Intravenous, Once PRN, Isak Dudley CRNA  •  levothyroxine (SYNTHROID, LEVOTHROID) tablet 75 mcg, 75 mcg, Oral, Q AM, Nesha Rey MD, 75 mcg at 01/09/20 0540  •  magic mouthwash oral supsension 5 mL, 5 mL, Swish & Spit, Q4H PRN, Kayla Lozoya MD, 5 mL at 01/07/20 1723  •  Magnesium Sulfate 2 gram Bolus, followed by 8 gram infusion (total Mg dose 10 grams)- Mg less than or equal to 1mg/dL, 2 g, Intravenous, PRN **OR** Magnesium Sulfate 2 gram / 50mL Infusion (GIVE X 3 BAGS TO EQUAL 6GM TOTAL DOSE) - Mg 1.1 - 1.5 mg/dl, 2 g, Intravenous, PRN **OR** Magnesium Sulfate 4 gram infusion- Mg 1.6-1.9 mg/dL, 4 g, Intravenous, PRN, Severo Campbell, DO  •  memantine (NAMENDA) tablet 10 mg, 10 mg, Oral, Q12H, Roberto Alarcon MD, 10 mg at 01/09/20 1008  •  Morphine sulfate (PF) injection 2 mg, 2 mg, Intravenous, Q2H PRN, Joie Muhammad APRN, 2 mg at 01/07/20 0453  •  multivitamin with minerals 1 tablet, 1 tablet, Oral, Daily, Arsh White MD, 1 tablet at 01/08/20 1025  •  ondansetron (ZOFRAN) tablet 4 mg, 4 mg, Oral, Q6H PRN **OR** ondansetron (ZOFRAN) injection 4 mg, 4 mg, Intravenous, Q6H PRN, Arsh White MD, 4 mg at 01/04/20 0812  •  pantoprazole (PROTONIX) EC tablet 40 mg, 40 mg, Oral, Q AM, Roberto Alarcon MD, 40 mg at 01/09/20 0540  •  potassium chloride (MICRO-K) CR capsule 40 mEq, 40 mEq, Oral, PRN, 40 mEq at 01/03/20 4989 **OR** potassium chloride (KLOR-CON) packet 40 mEq, 40 mEq, Oral, PRN **OR** potassium chloride 10 mEq in 100 mL IVPB, 10 mEq, Intravenous, Q1H PRN, Shannan,  Severo ONEAL DO, Last Rate: 100 mL/hr at 01/04/20 0336, 10 mEq at 01/04/20 0336  •  sennosides-docusate (PERICOLACE) 8.6-50 MG per tablet 2 tablet, 2 tablet, Oral, Nightly, Kayla Lozoya MD, 2 tablet at 01/08/20 2140  •  simethicone (MYLICON) chewable tablet 80 mg, 80 mg, Oral, 4x Daily PRN, Kayla Lozoya MD, 80 mg at 01/05/20 1203  •  sodium chloride 0.9 % flush 10 mL, 10 mL, Intravenous, PRN, Arsh White MD, 10 mL at 12/31/19 2122  •  [COMPLETED] Insert peripheral IV, , , Once **AND** sodium chloride 0.9 % flush 10 mL, 10 mL, Intravenous, PRN, Arsh White MD  •  sodium chloride 0.9 % flush 10 mL, 10 mL, Intravenous, Q12H, Ish Tijerina MD, 10 mL at 01/09/20 1008  •  sodium chloride 0.9 % flush 10 mL, 10 mL, Intravenous, PRN, Ish Tijerina MD  •  sodium chloride 0.9 % flush 20 mL, 20 mL, Intravenous, PRN, Ish Tijerina MD    Current Outpatient Medications:   •  amiodarone (PACERONE) 200 MG tablet, Take 1 tablet by mouth Daily., Disp: 30 tablet, Rfl: 5  •  aspirin 81 MG chewable tablet, Chew 81 mg Daily., Disp: , Rfl:   •  carbidopa-levodopa (SINEMET)  MG per tablet, Take 1 tablet by mouth 3 (Three) Times a Day., Disp: 90 tablet, Rfl: 11  •  clopidogrel (PLAVIX) 75 MG tablet, Take 75 mg by mouth Daily., Disp: , Rfl:   •  dutasteride (AVODART) 0.5 MG capsule, TAKE 1 CAPSULE BY MOUTH EVERY DAY, Disp: 30 capsule, Rfl: 0  •  levothyroxine (SYNTHROID, LEVOTHROID) 75 MCG tablet, TAKE 1 TABLET BY MOUTH EVERY DAY, Disp: 90 tablet, Rfl: 0  •  Loratadine 10 MG capsule, Take 1 capsule by mouth Daily As Needed., Disp: , Rfl:   •  memantine (NAMENDA) 10 MG tablet, Take 1 tablet by mouth 2 (Two) Times a Day., Disp: 60 tablet, Rfl: 11  •  Multiple Vitamins-Minerals (MULTIVITAL PO), Take 1 tablet by mouth Daily., Disp: , Rfl:   •  pravastatin (PRAVACHOL) 20 MG tablet, TAKE 1 TABLET BY MOUTH EVERY DAY (Patient taking differently: Every Night.), Disp: 90 tablet, Rfl: 0  •   acetaminophen (TYLENOL) 325 MG tablet, Take 650 mg by mouth At Night As Needed for Mild Pain ., Disp: , Rfl:   •  glucose monitor monitoring kit, 1 each Daily., Disp: 1 each, Rfl: 0  •  ipratropium-albuterol (DUO-NEB) 0.5-2.5 mg/mL nebulizer, Take 3 mL by nebulization Every 4 (Four) Hours As Needed for Shortness of Air (CBS PROTOCOL)., Disp: 360 mL, Rfl:   •  Lancets (FREESTYLE) lancets, 1 each by Other route Daily. May dispense any brand needed E11.9, Disp: 100 each, Rfl: 12  •  nitroglycerin (NITROSTAT) 0.4 MG SL tablet, Place 0.4 mg under the tongue Every 5 (Five) Minutes As Needed for Chest Pain. Take no more than 3 doses in 15 minutes., Disp: , Rfl:   •  omeprazole (priLOSEC) 20 MG capsule, Take 2 capsules by mouth Daily., Disp: , Rfl:   •  simethicone (MYLICON) 80 MG chewable tablet, Chew 1 tablet 4 (Four) Times a Day As Needed for Flatulence., Disp: , Rfl:     Antibiotics:  Anti-Infectives (From admission, onward)    Ordered     Dose/Rate Route Frequency Start Stop    20 1549  cefTRIAXone (ROCEPHIN) 1 g/100 mL 0.9% NS (MBP)     Ordering Provider:  Kayla Lozoya MD    1 g  over 30 Minutes Intravenous Every 24 Hours 20 1700 20 1752    20 1553  metroNIDAZOLE (FLAGYL) 500 mg/100mL IVPB     Ordering Provider:  Kayla Lozoya MD    500 mg  over 60 Minutes Intravenous Every 8 Hours 20 1700 20 1135    19 0632  vancomycin 1500 mg/500 mL 0.9% NS IVPB ([facility])     Ordering Provider:  Colin Pyle [facility]    20 mg/kg × 78.4 kg  over 90 Minutes Intravenous Once 19 0730 19 0948    19 1226  cefepime (MAXIPIME) 2 g/100 mL 0.9% NS (mbp)     Ordering Provider:  Perla Melgar DO    2 g  200 mL/hr over 30 Minutes Intravenous Once 19 1315 19 1358            Review of Systems:  No reliable ROS from patient       Physical Exam:   Vital Signs  Temp (24hrs), Av.9 °F (36.6 °C), Min:97.6 °F (36.4 °C), Max:98.2 °F (36.8 °C)    Temp  Min: 97.6 °F (36.4 °C)   Max: 98.2 °F (36.8 °C)  BP  Min: 114/60  Max: 141/71  Pulse  Min: 55  Max: 70  Resp  Min: 16  Max: 18  SpO2  Min: 91 %  Max: 98 %    GENERAL: He is alert and responsive   He is in no acute distress  HEENT: No conjunctival injection. No icterus.  No labial ulcers  HEART: RRR; + 2/6murmur  LUNGS: diminished anteriorly   ABDOMEN: Soft and nontender  EXT:  No cyanosis, clubbing or edema. No cord.  :  Najera catheter   MSK:  No joint effusions   SKIN: Warm and dry without cutaneous eruptions on Inspection/palpation.    NEURO: He is alert  Oriented x 2  He moves all of his extremities.      Laboratory Data    Results from last 7 days   Lab Units 01/09/20  0534 01/03/20  0728   WBC 10*3/mm3 8.21 5.91   HEMOGLOBIN g/dL 10.1* 9.8*   HEMATOCRIT % 30.6* 29.0*   PLATELETS 10*3/mm3 217 107*     Results from last 7 days   Lab Units 01/09/20  0534   SODIUM mmol/L 137   POTASSIUM mmol/L 3.7   CHLORIDE mmol/L 102   CO2 mmol/L 21.0*   BUN mg/dL 18   CREATININE mg/dL 1.26   GLUCOSE mg/dL 136*   CALCIUM mg/dL 8.5     Results from last 7 days   Lab Units 01/09/20  0534   ALK PHOS U/L 175*   BILIRUBIN mg/dL 0.9   ALT (SGPT) U/L 9   AST (SGOT) U/L 60*                         Estimated Creatinine Clearance: 48 mL/min (by C-G formula based on SCr of 1.26 mg/dL).      Microbiology:  Blood Culture   Date Value Ref Range Status   12/28/2019 Gram Positive Cocci (A)  Preliminary   12/28/2019 Gram Positive Cocci (A)  Preliminary     BCID, PCR   Date Value Ref Range Status   12/28/2019 (C) No organism detected by BCID PCR. Final    Streptococcus spp, not A, B, or pneumoniae. Identification by BCID PCR.     BC Strep gallolyticus, ssp pasterurianus         Radiology:  Imaging Results (Last 72 Hours)     ** No results found for the last 72 hours. **            Impression:   1.  Severe sepsis-improved  2.  Cholangitis- with associated streptococcal bacteremia and secondary to choledocholithiasis, s/p sphincterotomy  3.  Streptococcal bacteremia-   this is secondary to cholangitis.  He had already received a week of intravenous antibiotic therapy when I discontinued his therapy on 1/3 but he has now received 3 additional days of therapy.  His TTE was negative for valvular vegetations and I do not think it would be prudent to put him through a GERMAINE with the need for sedation.  He is now off of antibiotic therapy since 1/6. He will be at potential risk for relapse since he does have gallbladder disease.  Dr. Jack has offered to perform an elective cholecystectomy if he and the family desire after he has clinically improved.  I discussed the possibility of elective cholecystectomy with him and his family again today.  4.  Acute pancreatitis  5.  Dementia   6.  Acute kidney injury/ATN-superimposed on chronic kidney disease, stage 3,   7. Cardiomyopathy, EF 30%   8. Leukocytosis/neutrophilia-improved  9. Urinary retention- manriquez placed by urology     PLAN/RECOMMENDATIONS:  1.  Monitor off antibiotics   2.  Elective cholecystectomy if he continues to improve-this will need to be done after he is discharged from the hospital  3.  [facility] today    Dr. Tijerina has obtained the history, performed the physical exam and formulated the above treatment plan.     Anderson Tijerina MD  1/9/2020  3:49 PM

## 2020-01-09 NOTE — PROGRESS NOTES
Case Management Discharge Note      Final Note: Plan is [facility] unit. Family will transport. Nurse to call report to [phone]. CM will fax discharge summary to [phone]. Please send any hard scripts for narcotics with the patient.     Provided post acute provider list?: Yes  Post Acute Provider Lists: Inpatient Rehab  Post Acuite Provider List: Delivered  Delivered To: Patient, Support Person  Method of Delivery: In person    Destination - Selection Complete      Service Provider Request Status Selected Services Address Phone Number Fax Number    [facility] Selected Inpatient Rehabilitation [address] [place] [address] [phone]      Durable Medical Equipment      No service has been selected for the patient.      Dialysis/Infusion      No service has been selected for the patient.      Home Medical Care      No service has been selected for the patient.      Therapy      No service has been selected for the patient.      Community Resources      No service has been selected for the patient.             Final Discharge Disposition Code: 62 - inpatient rehab facility

## 2020-01-09 NOTE — PROGRESS NOTES
Continued Stay Note  [facility]     Patient Name: Murali Dennis  MRN: [id number]  Today's Date: 1/9/2020    Admit Date: 12/27/2019    Discharge Plan     Row Name 01/09/20 1136       Plan    Plan  [facility]    Patient/Family in Agreement with Plan  yes    Plan Comments  Spoke with Homa with [facility]. She is still waiting on medical approval for their doctor. CM will continue to follow.    Final Discharge Disposition Code  62 - inpatient rehab facility        Discharge Codes    No documentation.       Expected Discharge Date and Time     Expected Discharge Date Expected Discharge Time    Jan 9, 2020             Vernon Hurd RN

## 2020-01-09 NOTE — PLAN OF CARE
Problem: Patient Care Overview  Goal: Plan of Care Review  Outcome: Ongoing (interventions implemented as appropriate)  Flowsheets  Taken 1/8/2020 1610  Plan of Care Reviewed With: patient;spouse;daughter  Taken 1/8/2020 6907  Outcome Summary: pt remains a/o (disoriented to time), room air, NSR w/BBB,VSS. BM x 4, all small and formed. manriquez cath still in place. MASD on coccyx, red but blanchable, family remains at bedside

## 2020-01-09 NOTE — NURSING NOTE
Prior to central line removal, order for the removal of catheter was verified, patient was assessed, necessary materials were gathered and patient was educated regarding procedure .    Patient was positioned supine and flat to ensure that the insertion site was at or below the level of the heart.    Hands were washed, clean gloves were applied and central line dressing was gently removed. Catheter exit site was not cultured.     A new pair of clean gloves were then applied. Insertion site was cleansed with 2% Chlorhexidine swab using a circular motion beginning at the insertion site and moving outward for 30 seconds and allowed to dry.     Clamp on line was not present.     Patient was instructed to hold breath as catheter was withdrawn.     The central line was grasped at the insertion site and slowly pulled outward parallel to the skin. Resistance was not met.    After central line was completely removed, a sterile 4x4 gauze pad was used to apply light pressure until bleeding stopped. At that time, petroleum-based gauze and a sterile occlusive dressing was applied to exit site.     Patient was instructed to keep dressing in place for at least 24 hours and to remain in a flat or reclining position for 30 minutes post-removal.     Catheter was inspected after removal and was intact. Tip of central line was not sent for culture. Patient tolerated procedure.

## 2020-01-09 NOTE — DISCHARGE SUMMARY
[facility] Medicine Services  DISCHARGE SUMMARY    Patient Name: Murali Dennis  : 1929  MRN: [id number]    Date of Admission: 2019  7:23 PM  Date of Discharge:  2020  Primary Care Physician: Diana Null APRN    Consults     Date and Time Order Name Status Description    1/3/2020 0756 Inpatient Palliative Care MD Consult Completed     2020 1135 Inpatient Urology Consult Completed     2019 0030 Inpatient Infectious Diseases Consult Completed     2019 005 Inpatient Gastroenterology Consult Completed     2019 005 Inpatient General Surgery Consult Completed           Hospital Course     Presenting Problem:   Pancreatitis [K85.90]    Active Hospital Problems    Diagnosis  POA   • **Pancreatitis [K85.90]  Yes   • Bacteremia due to Streptococcus [R78.81, B95.5]  Yes   • Acute renal failure superimposed on stage 3 chronic kidney disease (CMS/[facility]) [N17.9, N18.3]  No   • Acute cholecystitis [K81.0]  Yes   • Sepsis (CMS/[facility]ly [facility]) [A41.9]  Yes   • Acute pancreatitis [K85.90]  Yes   • Mixed hyperlipidemia [E78.2]  Yes   • Cardiomyopathy (EF 30%) [I42.9]  Yes   • ANGELA on CPAP [G47.33, Z99.89]  Not Applicable   • Parkinson's disease (CMS/[facility]) [G20]  Yes   • Gastroesophageal reflux disease [K21.9]  Yes   • Type 2 diabetes mellitus without complication, without long-term current use of insulin (CMS/[facility]) [E11.9]  Yes   • Benign prostatic hyperplasia without lower urinary tract symptoms [N40.0]  Yes   • A-fib (CMS/[facility]) [I48.91]  Yes   • Coronary artery disease involving native coronary artery of native heart without angina pectoris [I25.10]  Yes   • Hypothyroid [E03.9]  Yes   • Dementia (CMS/[facility]) [F03.90]  Yes      Resolved Hospital Problems    Diagnosis Date Resolved POA   • Sinus bradycardia [R00.1] 2019 Yes          Hospital Course:  Murali Dennis is a 89 y.o. male who presented to the ER with abdominal pain.  He had periumbilical and right sided  abdominal pain.  He was admitted with acute pancreatitis.       Assessment/Plan:     Acute Pancreatitis  Cholangitis- with associated streptococcal bacteremia and secondary to choledocholithiasis, s/p sphincterotomy  - seen by GI and Surgery on admission  - had ERCP with Dr. White  - large amount of biliary sludge which was swept and had sphincterotomy  --can follow up with Dr Jack of general surgery in future if decides to have cholecystectomy      Sepsis with Strep Bacteremia  - likely due to cholangitis  -TTE was negative for valvular vegetations and I do not think it would be prudent to put him through a GERMAINE with the need for sedation.  He is now off of antibiotic therapy since 1/6. He will be at potential risk for relapse since he does have gallbladder disease.  Dr. Jack has offered to perform an elective cholecystectomy if he and the family desire after he has clinically improved.  Per ID today:  PLAN/RECOMMENDATIONS:  1.  Monitor off antibiotics   2.  Elective cholecystectomy if he continues to improve-this will need to be done after he is discharged from the hospital        Complicated Manriquez Catheter  --tight phimosis with inablity of staff to place manriquez catheter.   - f/c placed by Urology  --manriquez remains in place.  F/u outpt urology   --encourage considerable improvement in his condition (ambulatory, no constipation, etc) before considering voiding trial     Acute Respiratory Failure  --improved      History of Atrial Fibrillation  --on amiodarone, asa 81, plavix  --tolerating     Dementia/Parkinson  --on sinemet and namenda  --continue current meds     Medically stable and ready for discharge. Will be transferred to [facility] today by family.        Discharge Follow Up Recommendations for labs/diagnostics:   PCP 1 week after DC from rehab   Dr Jack in future to [facility] for cholecystectomy   Urology 1-2 weeks     Day of Discharge     HPI:   Up in chair, NAD. Wife in room. Pt feels well, no complaints.  Eager for dc to rehab. Eating well, no abd pain or n/v. Najera remains with clear yellow urine. Denies f/c,n/v/d, soa, or cp     Review of Systems  Otherwise ROS is negative except as mentioned in the HPI.    Vital Signs:   Temp:  [97.6 °F (36.4 °C)-98.2 °F (36.8 °C)] 98.2 °F (36.8 °C)  Heart Rate:  [55-70] 55  Resp:  [16-18] 18  BP: (114-141)/(60-92) 114/60     Physical Exam:  Constitutional: Awake, alert  Eyes: PERRLA, sclerae anicteric, no conjunctival injection  HENT: NCAT, mucous membranes moist  Neck: Supple, trachea midline  Respiratory: Clear to auscultation bilaterally, nonlabored respirations on RA  Cardiovascular: RRR, no murmurs, rubs, or gallops, palpable pedal pulses bilaterally  Gastrointestinal: Positive bowel sounds, soft, nontender, nondistended  Musculoskeletal: No bilateral ankle edema, no clubbing or cyanosis to extremities  Psychiatric: Appropriate affect, cooperative  Neurologic: Oriented x 3, strength symmetric in all extremities, Cranial Nerves grossly intact to confrontation, speech clear  Skin: No rashes     Pertinent  and/or Most Recent Results     Results from last 7 days   Lab Units 01/09/20  0534 01/05/20  0635 01/04/20  1045 01/03/20  0728   WBC 10*3/mm3 8.21  --   --  5.91   HEMOGLOBIN g/dL 10.1*  --   --  9.8*   HEMATOCRIT % 30.6*  --   --  29.0*   PLATELETS 10*3/mm3 217  --   --  107*   SODIUM mmol/L 137 138  --  140   POTASSIUM mmol/L 3.7 4.3 4.3 3.3*   CHLORIDE mmol/L 102 108*  --  109*   CO2 mmol/L 21.0* 20.0*  --  19.0*   BUN mg/dL 18 33*  --  42*   CREATININE mg/dL 1.26 1.44*  --  1.32*   GLUCOSE mg/dL 136* 112*  --  134*   CALCIUM mg/dL 8.5 8.2  --  8.4     Results from last 7 days   Lab Units 01/09/20  0534 01/03/20  0728   BILIRUBIN mg/dL 0.9 0.8   ALK PHOS U/L 175* 205*   ALT (SGPT) U/L 9 12   AST (SGOT) U/L 60* 40           Invalid input(s): TG, LDLCALC, LDLREALC        Brief Urine Lab Results  (Last result in the past 365 days)      Color   Clarity   Blood   Leuk Est    Nitrite   Protein   CREAT   Urine HCG        12/28/19 1044 Dark Yellow Cloudy Negative Trace Negative Trace               Microbiology Results Abnormal     Procedure Component Value - Date/Time    Blood Culture - Blood, Wrist, Right [[id number]]  (Abnormal) Collected:  12/28/19 1244    Lab Status:  Final result Specimen:  Blood from Wrist, Right Updated:  12/31/19 0618     Blood Culture Streptococcus gallolyticus ssp pasteurianus     Comment: Refer to previous blood culture collected on 12/28 at 1244 for AMY's            Gram Stain Aerobic Bottle Gram positive cocci in chains    Narrative:       Aerobic bottle only      Blood Culture - Blood, Hand, Right [[id number]]  (Abnormal)  (Susceptibility) Collected:  12/28/19 1244    Lab Status:  Final result Specimen:  Blood from Hand, Right Updated:  12/31/19 0615     Blood Culture Streptococcus gallolyticus ssp pasteurianus     Gram Stain Aerobic Bottle Gram positive cocci in chains    Narrative:       Aerobic bottle only      Susceptibility      Streptococcus gallolyticus ssp pasteurianus     AMY     Ceftriaxone Susceptible     Penicillin G Susceptible     Vancomycin Susceptible                    Blood Culture ID, PCR - Blood, Hand, Right [[id number]]  (Abnormal) Collected:  12/28/19 1244    Lab Status:  Final result Specimen:  Blood from Hand, Right Updated:  12/29/19 0700     BCID, PCR Streptococcus spp, not A, B, or pneumoniae. Identification by BCID PCR.          Imaging Results (All)     Procedure Component Value Units Date/Time    XR Chest 1 View [[id number]] Collected:  01/02/20 1235     Updated:  01/02/20 1525    Narrative:       EXAMINATION: XR CHEST 1 VW-      INDICATION: PICC tip placement; K85.90-Acute pancreatitis without  necrosis or infection, unspecified; N17.9-Acute kidney failure,  unspecified; R13.10-Dysphagia, unspecified; Z74.09-Other reduced  mobility.      COMPARISON: Chest x-ray 12/29/2019.     FINDINGS: Cardiac size enlarged status post median  sternotomy and CABG  with right arm PICC now in place terminating within the distal SVC. No  pneumothorax. Bibasilar opacifications right greater than left with  probable trace bilateral pleural effusions similar to prior.           Impression:       Right arm PICC placement terminating within the distal SVC.  No postprocedural pneumothorax. Pulmonary findings otherwise are grossly  unchanged from prior.     D:  01/02/2020  E:  01/02/2020     This report was finalized on 1/2/2020 3:22 PM by Dr. Lauri Montelongo.       FL ERCP pancreatic and biliary ducts [[id number]] Collected:  12/30/19 1313     Updated:  12/30/19 1741    Narrative:       EXAMINATION: RETROGRADE CHOLANGIOGRAM     HISTORY: Right upper quadrant pain.     FINDINGS: Two minutes 10 seconds fluoroscopic time was utilized to  assist in this procedure. Three images are displayed. There is no  evidence of choledocholithiasis.       Impression:       There is no choledocholithiasis.     D:  12/30/2019  E:  12/30/2019     This report was finalized on 12/30/2019 5:38 PM by Dr. Bismark Goff MD.       XR Chest 1 View [[id number]] Collected:  12/29/19 0812     Updated:  12/29/19 1906    Narrative:          EXAMINATION: XR CHEST 1 VW - 12/29/2019     INDICATION: K85.90-Acute pancreatitis without necrosis or infection,  unspecified; N17.9-Acute kidney failure, unspecified      COMPARISON: 12/28/2019     FINDINGS: Cardiomegaly status post medial sternotomy and CABG with mild  central vascular congestion. Bibasilar opacifications of  likely  atelectasis increased from prior comparison along with trace bilateral  pleural effusions.           Impression:       Increasing bibasilar opacifications along with persistent  trace bilateral pleural effusions.     DICTATED:   12/29/2019  EDITED/ls :   12/29/2019      This report was finalized on 12/29/2019 7:03 PM by Dr. Lauri Montelongo.       XR Chest 1 View [[id number]] Collected:  12/28/19 1743     Updated:  12/29/19 1416     Narrative:          EXAMINATION: XR CHEST 1 VW - 12/28/2019     INDICATION: K85.90-Acute pancreatitis without necrosis or infection,  unspecified; N17.9-Acute kidney failure, unspecified      COMPARISON: Chest x-ray 02/24/2019     FINDINGS: Cardiac size enlarged with increased bony vascularity however  no pleural effusion. Asymmetric elevation the right diaphragm again  noted. No pneumothorax.           Impression:       Cardiomegaly with mild increase in pulmonary vascularity  however no pleural effusions.     DICTATED:   12/28/2019  EDITED/ls :   12/28/2019      This report was finalized on 12/29/2019 2:13 PM by Dr. Lauri Montelongo.       MRI abdomen wo contrast mrcp [[id number]] Collected:  12/28/19 0733     Updated:  12/28/19 0735    Narrative:       MRI MRCP WO    INDICATION:    Lower abdominal pain for the past several days with elevated liver enzymes. Acute pancreatitis. Elevated white count and fever.     TECHNIQUE:   MRI of the abdomen with MRCP without  IV contrast.    COMPARISON:    CT scan dated 12/27/2019    FINDINGS:  Atelectasis is seen at both lung bases posteriorly. There is a small volume of ascites around the liver and spleen. Edematous and inflammatory changes are seen in the pancreatic bed and along the posterior retroperitoneal reflection consistent with the  patient's known pancreatitis. Gallbladder remains markedly distended. The gallbladder wall is not thickened. There is abnormal soft tissue in the gallbladder neck that does not layer though a sludge typically does. Findings are worrisome for gallbladder  carcinoma obstructing the neck of the gallbladder. The intrahepatic biliary tree is dilated, particularly left lobe branches. There appears to be narrowing of the left proper hepatic duct at the sadiq hepatis. The common bile duct itself is nondilated  measuring 3 to 5 mm in diameter. No definite common duct stones are identified. The pancreatic duct is nondilated. No suspicious liver masses  are seen. The portal vein is patent. Spleen is normal in size.      Impression:       Gallbladder distention is again seen and there is a suggestion of a mass in the neck of the gallbladder. 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 Also noted is narrowing of the left proper hepatic duct at the sadiq hepatis and there is  asymmetric relative dilatation of left-sided intrahepatic bile ducts. The common duct is nondilated with no stones identified. Changes of pancreatitis are seen in the pancreatic bed and retroperitoneum and is a small volume of ascites.    Signer Name: Chapo Manzanares MD   Signed: 12/28/2019 7:33 AM   Workstation Name: [email or web address]    [facility]    CT Abdomen Pelvis Without Contrast [[id number]] Collected:  12/28/19 0015     Updated:  12/28/19 0017    Narrative:       CT ABDOMEN AND PELVIS, NONCONTRAST, 12/27/2019    HISTORY:  89-year-old male in the ED after sudden onset periumbilical abdomen pain at 1700 hours today radiating into the right side of the abdomen. Markedly elevated serum lipase indicating acute pancreatitis.    TECHNIQUE:  CT imaging of the abdomen and pelvis without oral or IV contrast. Radiation dose reduction techniques included automated exposure control. Radiation audit for CT and nuclear cardiology exams in the last 12 months: 0.    ABDOMEN FINDINGS:  The gallbladder is markedly distended and shows diffuse wall thickening and a tiny amount of pericholecystic fluid. Sludge and/or dense stone material is present within the dependent portion of the gallbladder. There is mild intrahepatic bile duct  dilatation, but there is no visible dilatation of the distal extrahepatic bile duct.    Mild to moderate diffuse acute pancreatitis superimposed on a background of chronic diffuse fatty atrophy of the pancreas. Pancreatic and peripancreatic inflammatory soft tissue stranding, but no focal fluid collection. No visible pancreatic duct  dilatation. Hepatic and  peripancreatic vasculature cannot be assessed without contrast.    Large hiatal hernia with fluid distended intrathoracic upper stomach. Consider NG tube decompression.    Liver and spleen are unremarkable. The kidneys are unobstructed. Normal caliber abdominal aorta. Small bowel and colon are normal in caliber and appearance, as imaged, with the exception of moderately severe sigmoid colonic diverticulosis. The appendix  is not directly visualized, there is no indirect CT evidence of acute appendicitis.    PELVIS FINDINGS:  Urinary bladder, prostate and rectum are unremarkable. No inguinal hernia.    Lung base images show scarring or atelectasis in the dependent posterior lung bases. No pleural effusion.      Impression:       1.  Mild to moderate diffuse acute pancreatitis. Background chronic fatty atrophy of the pancreas. No pancreatic ductal dilatation.  2.  Abnormal gallbladder. The gallbladder is distended and shows diffuse wall thickening. Small quantities of dense material within the dependent portion of the gallbladder may represent gallstones and/or dense biliary sludge.  3.  Mild intrahepatic bile duct dilatation, but no visible extrahepatic bile duct dilatation. Consider MRCP if the patient has laboratory evidence of biliary obstruction.  4.  Large hiatal hernia with fluid distended intrathoracic stomach. Consider NG tube decompression.  5.  Lower colonic diverticulosis.    Signer Name: Vernon Urbano MD   Signed: 12/28/2019 12:15 AM   Workstation Name: [email or web address]    [facility] Gallbladder [[id number]] Collected:  12/27/19 2230     Updated:  12/27/19 2232    Narrative:        Abdomen Ltd 12/27/2019    INDICATION:   Abnormally elevated liver enzymes today. Elevated lipase. Generalized abdominal pain today.    COMPARISON:   None available.    FINDINGS:  PANCREAS: The pancreas is obscured due to overlying bowel gas.    LIVER: The echogenicity and echotexture of the  hepatic parenchyma is within normal limits.. No hepatic mass. The intrahepatic bile ducts are normal in caliber. The common duct is dilated to 9 mm. No obstructing calculus or mass is seen, but the distal  aspect of the common duct and the pancreas are obscured by bowel gas. Clinical correlation is recommended. Consider correlation with CT scan.    GALLBLADDER: The gallbladder is distended, measuring 11.3 cm in greatest diameter. There is minimal sludge along the dependent portion of the gallbladder. No evidence of cholelithiasis or gallbladder wall thickening.    RIGHT KIDNEY: The right kidney measures 8.1 cm. There is no evidence of hydronephrosis. There is increased right renal cortical echogenicity characteristic of medical renal disease. 1.8 cm cyst upper pole right kidney. OTHER: No ascites.      Impression:         1. Distended gallbladder, measuring 11.3 cm in greatest diameter. Minimal gallbladder sludge. No evidence of cholelithiasis or gallbladder wall thickening.  2. Dilatation of the common bile duct to 9 mm. No obstructing mass or calculus is seen, but the distal aspect of the common bile duct and the pancreas are obscured by bowel gas. If there is clinical concern for choledocholithiasis, consider correlation  with MRCP.  3. Increased right renal cortical echogenicity characteristic of medical renal disease. Small right renal cyst. No evidence of hydronephrosis.    Signer Name: Isak Sofia MD   Signed: 12/27/2019 10:30 PM   Workstation Name: [email or web address]    [facility]          Results for orders placed during the hospital encounter of 12/07/17   Duplex Carotid Ultrasound CAR    Narrative · No obstructive carotid stenosis bilaterally  · Antegrade bilateral vertebral flow.          Results for orders placed during the hospital encounter of 12/07/17   Duplex Carotid Ultrasound CAR    Narrative · No obstructive carotid stenosis bilaterally  · Antegrade bilateral vertebral flow.           Results for orders placed during the hospital encounter of 12/27/19   Adult Transthoracic Echo Complete W/ Cont if Necessary Per Protocol    Narrative · The left ventricular cavity is moderately dilated.  · Right ventricular cavity is mild-to-moderately dilated.  · Left atrial cavity size is moderate-to-severely dilated.  · Moderate aortic valve regurgitation is present.  · Moderate mitral valve regurgitation is present.  · Mild to moderate tricuspid valve regurgitation is present.  · Calculated right ventricular systolic pressure from tricuspid   regurgitation is 35 mmHg.  · Estimated EF = 30%.  · Left ventricular systolic function is severely decreased.  · Left ventricular diastolic dysfunction (grade II) consistent with   pseudonormalization.  · The findings are consistent with dilated cardiomyopathy.  · Mild pulmonary hypertension is present.  · There is no evidence of pericardial effusion.  · The aortic valve exhibits moderate sclerosis without stenosis.            Discharge Details        Discharge Medications      New Medications      Instructions Start Date   simethicone 80 MG chewable tablet  Commonly known as:  MYLICON   80 mg, Oral, 4 Times Daily PRN         Changes to Medications      Instructions Start Date   omeprazole 20 MG capsule  Commonly known as:  priLOSEC  What changed:  how much to take   40 mg, Oral, Daily      pravastatin 20 MG tablet  Commonly known as:  PRAVACHOL  What changed:    · how much to take  · how to take this  · when to take this   TAKE 1 TABLET BY MOUTH EVERY DAY         Continue These Medications      Instructions Start Date   acetaminophen 325 MG tablet  Commonly known as:  TYLENOL   650 mg, Oral, Nightly PRN      amiodarone 200 MG tablet  Commonly known as:  PACERONE   200 mg, Oral, Daily      aspirin 81 MG chewable tablet   81 mg, Oral, Daily      carbidopa-levodopa  MG per tablet  Commonly known as:  SINEMET   1 tablet, Oral, 3 Times Daily      clopidogrel 75 MG  tablet  Commonly known as:  PLAVIX   75 mg, Oral, Daily      dutasteride 0.5 MG capsule  Commonly known as:  AVODART   TAKE 1 CAPSULE BY MOUTH EVERY DAY      freestyle lancets   1 each, Other, Daily, May dispense any brand needed E11.9      glucose monitor monitoring kit   1 each, Does not apply, Daily      ipratropium-albuterol 0.5-2.5 mg/3 ml nebulizer  Commonly known as:  DUO-NEB   3 mL, Nebulization, Every 4 Hours PRN      levothyroxine 75 MCG tablet  Commonly known as:  SYNTHROID, LEVOTHROID   TAKE 1 TABLET BY MOUTH EVERY DAY      Loratadine 10 MG capsule   1 capsule, Oral, Daily PRN      memantine 10 MG tablet  Commonly known as:  NAMENDA   10 mg, Oral, 2 Times Daily      MULTIVITAL PO   1 tablet, Oral, Daily      nitroglycerin 0.4 MG SL tablet  Commonly known as:  NITROSTAT   0.4 mg, Sublingual, Every 5 Minutes PRN, Take no more than 3 doses in 15 minutes.          Stop These Medications    diclofenac 1 % gel gel  Commonly known as:  VOLTAREN     glucose blood test strip     midodrine 10 MG tablet  Commonly known as:  PROAMATINE     mirtazapine 30 MG tablet  Commonly known as:  REMERON     sacubitril-valsartan 24-26 MG tablet  Commonly known as:  ENTRESTO            Allergies   Allergen Reactions   • Benzodiazepines Other (See Comments)     Paradoxical response   • Codeine Other (See Comments)     unknown   • Fluoxetine Other (See Comments)     Paradoxical response   • Lipitor [Atorvastatin] Other (See Comments)     Myalgias     • Metoclopramide Other (See Comments)     Unknown   • Nabumetone    • Penicillins Swelling and Other (See Comments)     Has tolerated cefepime 12/2019   • Prozac [Fluoxetine Hcl]    • Tramadol Other (See Comments)     unknown   • Valium [Diazepam] Other (See Comments)     Paradoxical response     • Sulfa Antibiotics Rash         Discharge Disposition:  Rehab Facility or Unit (DC - External)    Diet:  Hospital:  Diet Order   Procedures   • Diet Regular       Activity:  Activity  Instructions     Activity as Tolerated               CODE STATUS:    Code Status and Medical Interventions:   Ordered at: 12/27/19 2313     Limited Support to NOT Include:    Intubation     Level Of Support Discussed With:    Health Care Surrogate     Code Status:    No CPR     Medical Interventions (Level of Support Prior to Arrest):    Limited       Future Appointments   Date Time Provider Department Center   1/24/2020 10:00 AM Diana Null APRN [facility] None   1/30/2020 11:00 AM Agusto Reddy MD [facility] None   5/1/2020 10:30 AM Francesca Hardy PA-C [facility] None       Additional Instructions for the Follow-ups that You Need to Schedule     Discharge Follow-up with PCP   As directed       Currently Documented PCP:    Diana Null APRN    PCP Phone Number:    [phone]     Follow Up Details:  after DC from rehab         Discharge Follow-up with Specified Provider: Dr Carpenter; 2 Weeks   As directed      To:  Dr Carpenter    Follow Up:  2 Weeks               Time Spent on Discharge:  45 minutes    Electronically signed by ARCADIO Ramires, 01/09/20, 1:41 PM.

## 2020-01-22 NOTE — OUTREACH NOTE
Pt was at [facility] - after transfer from [facility]. Pt's wife Sylvia states pt was sob but improved with neb. Also feet swollen but better today. She said Dr Owens at [facility] told her at discharge to give him a lasix when he got home. She thought he had a refill at the pharmacy but turned out the prescription had . She wld like to know if Diana Null could send one in to [facility] [address] She states it was last filled by his doc in [place], unsure of dose. She did mention that his entresto and midodrine were stopped at  and she was unsure why or what to do about that. She has not heard from  yet. He will see urologist Dr Carpenter tomorrow  (had difficulty voiding post catheter removal). He has voided today. Rescheduled his pcp appt sooner for  - earliest I can get him in - due to sxs and for medication clarification. Advised her to continue meds as rx'd at [facility] and to call office or take pt back to ER with new or worsening sxs in the meantime and she verb understanding. She denies any other questions or needs and appreciated the call.

## 2020-01-22 NOTE — OUTREACH NOTE
Prep Survey      Responses   Facility patient discharged from?  [place]   Is patient eligible?  Yes   Discharge diagnosis  Sepsis, Bacteremia due to Streptococcus, Pancreatitis    Does the patient have one of the following disease processes/diagnoses(primary or secondary)?  Sepsis   Does the patient have Home health ordered?  Yes   What is the Home health agency?   [facility]    Is there a DME ordered?  No   Prep survey completed?  Yes          Erica Mtz RN

## 2020-01-22 NOTE — PROGRESS NOTES
I called and discussed patient with wife. Pt was SOA but improved with a neb. She has him doing deep breathing exercises as well.  She already got lasix 20 mg for him and gave him a dose and hour ago.   He will see urology tomorrow (had difficulty voiding post catheter removal) has voided today.   Wife spoke with Dr. Ugarte office who advised he stay off entresto and midodrine as well  (had low HR on them )  If SOA gets worse and does not improve with neb and lasix, she should go to ER with him. She verbalized understanding. Pt will see me in 2 days.

## 2020-01-24 NOTE — OUTREACH NOTE
Sepsis Week 3 Survey      Responses   Facility patient discharged from?  [place]   Does the patient have one of the following disease processes/diagnoses(primary or secondary)?  Sepsis   Week 3 attempt successful?  Yes   Call start time  1149   Call end time  1151   Discharge diagnosis  Sepsis, Bacteremia due to Streptococcus, Pancreatitis    Meds reviewed with patient/caregiver?  Yes   Is the patient having any side effects they believe may be caused by any medication additions or changes?  No   Does the patient have all medications related to this admission filled (includes all antibiotics, inhalers, nebulizers,steroids,etc.)  Yes   Is the patient taking all medications as directed (includes completed medication regime)?  Yes   Does the patient have a primary care provider?   Yes   Does the patient have an appointment with their PCP within 7 days of discharge?  Yes   Has the patient kept scheduled appointments due by today?  Yes   What is the Home health agency?   [facility]    Has home health visited the patient within 72 hours of discharge?  Yes   Psychosocial issues?  No   Did the patient receive a copy of their discharge instructions?  Yes   Nursing interventions  Reviewed instructions with patient   What is the patient's perception of their health status since discharge?  Improving   Nursing interventions  Nurse provided patient education   Is the patient/caregiver able to teach back Sepsis?  S - Shivering,fever or very cold, E - Extreme pain or generalized discomfort (worst ever,especially abdomen), S - Short of breath, P - Pale or discolored skin   Nursing interventions  Nurse provided reassurance to patient   Is patient/caregiver able to teach back steps to recovery at home?  Set small, achievable goals for return to baseline health, Rest and regain strength, Eat a balanced diet, Make a list of questions for PCP appoinment   Is the patient/caregiver able to teach back signs and symptoms of worsening  condition:  Fever, Hyperthermia, High blood glucose without diabetes, Shortness of breath/rapid respiratory rate   Is the patient/caregiver able to teach back the hierarchy of who to call/visit for symptoms/problems? PCP, Specialist, Home health nurse, Urgent Care, ED, 911  Yes   Additional teach back comments  Some swelling in legs, color is good, PT in today, Nurse in yesterday, out to [employer], doing well.   Week 3 call completed?  Yes          April Franks RN

## 2020-01-24 NOTE — PROGRESS NOTES
Transitional Care Follow Up Visit  Subjective     Murali Dennis is a 89 y.o. male who presents for a transitional care management visit.    Within 48 business hours after discharge our office contacted him via telephone to coordinate his care and needs.      I reviewed and discussed the details of that call along with the discharge summary, hospital problems, inpatient lab results, inpatient diagnostic studies, and consultation reports with Murali.     Current outpatient and discharge medications have been reconciled for the patient.  Reviewed by: ARCADIO Mccrary      Date of TCM Phone Call 1/22/2020   [facility]   Date of Admission 1/9/2020   Date of Discharge 1/21/2020   [place] [place] for Readmission (LACE) Score: 14 (1/9/2020  6:00 AM)      History of Present Illness   Course During Hospital Stay:      80 is a 89-year-old gentleman with a past medical history of coronary artery disease, controlled type 2 diabetes , atrial fibrillation, cardiomyopathy with an LV EF of 30%, dementia, Parkinson's, sleep apnea on CPAP therapy, BPH, and hypothyroidism.  He was admitted to [facility] on 12/27/2019 with an acute complaint of abdominal pain.  He was noted to have acute pancreatitis.  During his hospitalization he was noted to have acute pancreatitis cholangitis with associated streptococcal bacteremia .  He underwent ERCP with Dr. Sawyer and a large amount of biliary sludge was swept and he had a sphincterotomy.  He will follow-up with Dr. Guero DURHAM if he decides to have elective cholecystectomy.  He was also noted to have sepsis due to cholangitis.  He had a transthoracic echocardiogram that was negative for valvular vegetation.  He had complications with Najera catheter insertion during the hospitalization as well.  Urology was consulted due to tight phimosis with inability of staff to place a Najera catheter and he has followed up with  outpatient urology.  He saw them earlier this week.  He was offered the option of a possible circumcision which he has declined at this time.    He also was noted to have acute respiratory failure and atrial fibrillation.  He was medically stable at time of discharge and transferred to [facility] 1/9/2020.    During his stay at [facility] he was noted to have pneumonia.  He was treated with antibiotics throughout his stay at [facility] and these were discontinued several days prior to discharge.  He participated in physical therapy and Occupational Therapy during hospitalization and is continuing PT, OT, speech, and skilled nursing through [facility].  He is normally ambulatory with a walker and is now requiring a wheelchair due to deconditioning.  He was also noted to have a low heart rate during hospitalizations and his Entresto and midodrine were stopped.    Over the last couple of days he has noted some shortness of breath that responded well to nebulizer.  He also noted some swelling in bilateral lower extremities.  Patient's wife notified Dr. Ugarte of this and he sent in Lasix for the patient.  Patient took the Lasix and has endorsed an improvement in the swelling.  Patient and wife deny any difficulty with voiding.  He is tolerating p.o. Intake.  Wife reports he is doing fairly well at home.  He will follow-up with Dr. Ugarte on the first week of February 2020.     The following portions of the patient's history were reviewed and updated as appropriate: allergies, current medications, past family history, past medical history, past social history, past surgical history and problem list.    Review of Systems   Constitutional: Positive for activity change, appetite change (slightly decreased) and fatigue. Negative for chills, diaphoresis and fever.   HENT: Negative.    Eyes: Negative.    Respiratory: Positive for shortness of breath (mild with exertion ). Negative for chest  tightness and wheezing.    Cardiovascular: Positive for leg swelling. Negative for chest pain and palpitations.   Gastrointestinal: Negative for constipation, diarrhea, nausea and rectal pain.   Genitourinary: Negative for difficulty urinating, dysuria, flank pain, frequency and urgency.   Musculoskeletal: Positive for arthralgias and gait problem. Negative for joint swelling and myalgias.   Neurological: Positive for weakness (Generalized). Negative for dizziness, seizures, facial asymmetry, light-headedness and numbness.   Psychiatric/Behavioral: Negative for decreased concentration, dysphoric mood and sleep disturbance. The patient is not nervous/anxious.        Objective   Physical Exam   Constitutional: He appears well-developed and well-nourished. No distress.   HENT:   Head: Normocephalic and atraumatic.   Nose: Nose normal.   Mouth/Throat: Oropharynx is clear and moist.   Neck: Neck supple. No JVD present.   Cardiovascular: Normal rate, regular rhythm, normal heart sounds and intact distal pulses.   No murmur heard.  Pulmonary/Chest: Effort normal and breath sounds normal. No stridor. No respiratory distress. He has no wheezes. He has no rales. He exhibits no tenderness.   Abdominal: Soft. He exhibits no distension. There is no tenderness.   Musculoskeletal: He exhibits no edema.   Lymphadenopathy:     He has no cervical adenopathy.   Neurological: He is alert.   Skin: Skin is warm and dry. Capillary refill takes less than 2 seconds. He is not diaphoretic. No erythema. There is pallor.   Psychiatric: He has a normal mood and affect.   Nursing note and vitals reviewed.      Assessment/Plan   Murali was seen today for transitional care management.    Diagnoses and all orders for this visit:    Acute biliary pancreatitis without infection or necrosis  -     CBC & Differential  -     Comprehensive Metabolic Panel  -     TSH  -     BNP  -     CBC Auto Differential    Acute cholecystitis  -     CBC &  Differential  -     Comprehensive Metabolic Panel  -     TSH  -     BNP  -     CBC Auto Differential  Patient underwent sphincterotomy during hospitalization and has done well since.  He will follow-up with Dr. Jack outpatient for possible elective cholecystectomy.  He is symptom-free currently.  Parkinson's disease (CMS/HCC)  -     CBC & Differential  -     Comprehensive Metabolic Panel  -     TSH  -     BNP  -     CBC Auto Differential    Late onset Alzheimer's disease without behavioral disturbance (CMS/HCC)  -     CBC & Differential  -     Comprehensive Metabolic Panel  -     TSH  -     BNP  -     CBC Auto Differential  Patient doing well in regards to Parkinson's and Alzheimer's.  He will continue to follow with neurology.    Type 2 diabetes mellitus without complication, without long-term current use of insulin (CMS/HCC)  -     CBC & Differential  -     Comprehensive Metabolic Panel  -     TSH  -     BNP  -     CBC Auto Differential  Well-controlled with diet.    Acquired hypothyroidism  -     CBC & Differential  -     Comprehensive Metabolic Panel  -     TSH  -     BNP  -     CBC Auto Differential  Previously stable.  Will will continue to follow and check TSH.    Atrial fibrillation, unspecified type (CMS/HCC)  -     CBC & Differential  -     Comprehensive Metabolic Panel  -     TSH  -     BNP  -     CBC Auto Differential  Patient with regular rate and rhythm in office today.    Cardiomyopathy, unspecified type (CMS/HCC)  -     CBC & Differential  -     Comprehensive Metabolic Panel  -     TSH  -     BNP  -     CBC Auto Differential  Patient does have some mild shortness of breath with exertion and some lower extremity edema..  We will check labs and gently diurese.    ANGELA on CPAP  -     Generic CPAP Order  -     CBC & Differential  -     Comprehensive Metabolic Panel  -     TSH  -     BNP  -     CBC Auto Differential  Patient needs order for new tubing for CPAP machine.    Acute renal failure superimposed  on stage 3 chronic kidney disease, unspecified acute renal failure type (CMS/HCC)  -     CBC & Differential  -     Comprehensive Metabolic Panel  -     TSH  -     BNP  -     CBC Auto Differential  Stable on last labs [facility].  We will recheck    Anemia, unspecified type  -     CBC & Differential  -     Comprehensive Metabolic Panel  -     TSH  -     BNP  -     CBC Auto Differential    Lower extremity edema  -     BNP    Short of breath on exertion  -     BNP      Pt appears well, in no distress, and non toxic in office. Some 1+ pitting edema to BLE. Lungs CTA. Instructed to use lasix and potassium once a day over the weekend. Will check labs. Need to be  careful with diuresis due to renal function.     Return in about 3 weeks (around 2/14/2020) for Recheck.

## 2020-01-28 NOTE — TELEPHONE ENCOUNTER
Thao PT with [facility] called stating that she is in the home with the patient and his wife now. She states that his starting (resting) O2 sat was 91%. She stated that with minimal exercise his O2 sat dropped down to 71%. She states that after a period of resting and deep breathing his O2 came back up to 91%. She states that during this whole time his heart rate has been jumping around between . She states that he is resting comfortably now and not in any acute distress at this time. She states that she wants to make sure we knew about it and if there are any recommendations for the patient or the wife at this time to call. Patient's wife call back # is [phone].

## 2020-01-28 NOTE — TELEPHONE ENCOUNTER
home health Nurse Chad called because pt needs a new diagnosis that will keep his home health active the previous diagnosis of Pancreatis is no longer accepted because they are stating that it is resolved.       Please call and adv at [phone]    Rep Chad, and if call isn't returned today Chad will be out and you can Contact Vickie at the same number

## 2020-01-28 NOTE — TELEPHONE ENCOUNTER
I called and discussed the patient with dr leach. He advises to restart the entresto at 24-26.   Called pt wife-Nelly and discussed pt. She reports that OT has been to see pt as well today and he did better with them. BP was 132/77 and sats 96%    Pt in no distress currently.   Wife understands to restart the entresto and she has scheduled his appt with Dr. Leach for tomorrow.

## 2020-01-31 NOTE — OUTREACH NOTE
Sepsis Week 4 Survey      Responses   Facility patient discharged from?  [place]   Does the patient have one of the following disease processes/diagnoses(primary or secondary)?  Sepsis   Week 4 attempt successful?  Yes   Call start time  1229   Call end time  1232   Discharge diagnosis  Sepsis, Bacteremia due to Streptococcus, Pancreatitis    Meds reviewed with patient/caregiver?  Yes   Is the patient having any side effects they believe may be caused by any medication additions or changes?  No   Is the patient taking all medications as directed (includes completed medication regime)?  Yes   Has the patient kept scheduled appointments due by today?  Yes   Is the patient still receiving Home Health Services?  N/A   Psychosocial issues?  No   What is the patient's perception of their health status since discharge?  Same   Nursing interventions  Nurse provided patient education   Is the patient/caregiver able to teach back Sepsis?  S - Shivering,fever or very cold, E - Extreme pain or generalized discomfort (worst ever,especially abdomen), S - Short of breath, P - Pale or discolored skin   Nursing interventions  Nurse provided reassurance to patient   Is patient/caregiver able to teach back steps to recovery at home?  Set small, achievable goals for return to baseline health, Rest and regain strength, Eat a balanced diet, Make a list of questions for PCP appoinment   Is the patient/caregiver able to teach back signs and symptoms of worsening condition:  Fever, Hyperthermia, High blood glucose without diabetes, Shortness of breath/rapid respiratory rate   Is the patient/caregiver able to teach back the hierarchy of who to call/visit for symptoms/problems? PCP, Specialist, Home health nurse, Urgent Care, ED, 911  Yes   Week 4 call completed?  Yes   Would the patient like one additional call?  No   Graduated  Yes   Did the patient feel the follow up calls were helpful during their recovery period?  Yes   Was the number  of calls appropriate?  Yes   Wrap up additional comments  daughter sated he is doing ok, has CHF also and is restarting entresto per cardio.          Windy May RN

## 2020-02-03 NOTE — TELEPHONE ENCOUNTER
Pt wife notified she stated understanding.  She says his BP is good now.  Still has the cough.  Will finish cough med that was prescribed by [facility].  Will call if does not get better.  Will go to ER if O2 stays in the 80's

## 2020-02-03 NOTE — TELEPHONE ENCOUNTER
Thao with PT with [facility] said his BP is high, a lot of coughing, O2 is in the 80s and she was wondering what we wanted to do.  She said the wifes number is [phone]

## 2020-02-05 NOTE — TELEPHONE ENCOUNTER
Patients wife is calling in regards to , very sick and body aches she gave breathing treatment , vitals look good but  feels miserable. Patients wife stated she would like a call to discuss further to see what steps and precautions she needs to take. Please advise [phone]

## 2020-02-07 PROBLEM — J44.9 COPD (CHRONIC OBSTRUCTIVE PULMONARY DISEASE) (HCC): Status: ACTIVE | Noted: 2020-01-01

## 2020-02-07 PROBLEM — I50.812 CHRONIC SYSTOLIC RIGHT HEART FAILURE (HCC): Status: ACTIVE | Noted: 2020-01-01

## 2020-02-07 PROBLEM — I50.21 ACUTE SYSTOLIC HEART FAILURE (HCC): Status: ACTIVE | Noted: 2020-01-01

## 2020-02-07 PROBLEM — T14.8XXA DEEP TISSUE INJURY: Status: ACTIVE | Noted: 2020-01-01

## 2020-02-07 PROBLEM — R53.81 DEBILITY: Status: ACTIVE | Noted: 2017-12-08

## 2020-02-07 PROBLEM — R09.02 HYPOXIA: Status: ACTIVE | Noted: 2020-01-01

## 2020-02-07 PROBLEM — I50.23 ACUTE ON CHRONIC SYSTOLIC CHF (CONGESTIVE HEART FAILURE) (HCC): Status: ACTIVE | Noted: 2020-01-01

## 2020-02-07 PROBLEM — K85.90 ACUTE PANCREATITIS: Status: RESOLVED | Noted: 2019-01-01 | Resolved: 2020-01-01

## 2020-02-07 NOTE — PLAN OF CARE
Problem: Patient Care Overview  Goal: Plan of Care Review  Outcome: Ongoing (interventions implemented as appropriate)  Flowsheets (Taken 2/7/2020 8856)  Plan of Care Reviewed With: patient; spouse; daughter  Note:   Patient consult for wounds to right foot (POA). Right heel presents as 1.0 cm x 1.0 cm x 0 cm DTPI. Right lateral malleolus presents as 0.5 cm x 0.5 cm x 0 cm unstageable pressure injury. Will order Venelex to be applied to both areas-cover with foam dressing and off load with heel boots. Left heel pink/dry/intact/boggy but blanching.  Coccyx purple/ecchymotic possible venous congestion-all areas dry/blanching-use Z guard for moisture barrier. Will order Dolphin bed with low air loss topper due to patient immobility and moisture management. See skin care orders. WOC will continue to follow.

## 2020-02-07 NOTE — H&P
[facility] Medicine Services  HISTORY AND PHYSICAL    Patient Name: Murali Dennis  : 1929  MRN: [id number]  Primary Care Physician: Diana Null APRN  Date of admission: 2020      Subjective   Subjective     Chief Complaint:  Low oxygen    HPI:  Murali Dennis is a 89 y.o. male who is recently admitted in January for ERCP for pancreatitis and cholangitis and received treatment and subsequently was treated at [facility] where he reportedly developed pneumonia and C. difficile colitis with past medical history as per below including systolic congestive heart failure who presents from his primary care provider office with approximately 4 to 5-day history of progressive moderate and gradually worsening dyspnea exacerbated with exertion and improved at rest.  His granddaughter is a nursing student and has been checking his pulse oximetry and reports his home pulse oximetry has ranged from 79 to 86%.  They note increase in his lower extremity edema and increase in his respirations.  He has had a productive cough with yellowish-green sputum but no fevers.  He has not had any further diarrhea since he completed treatment of his C. difficile colitis.  His daughter is a schoolteacher and does not feel sick but notes that she has multiple sick contacts teaching elementary school.  At his primary care provider office he was found to be hypoxic and was referred for direct admission to the hospital for probable acute heart failure exacerbation.    Review of Systems   Constitutional: Positive for fatigue. Negative for chills and fever.   HENT: Negative for sinus pressure and sinus pain.    Eyes: Negative.    Respiratory: Positive for cough and shortness of breath.    Cardiovascular: Negative for chest pain and palpitations.   Gastrointestinal: Negative for abdominal pain, diarrhea, nausea and vomiting.   Endocrine: Negative.    Genitourinary: Negative for dysuria and  hematuria.   Musculoskeletal: Negative for neck pain and neck stiffness.   Skin: Positive for wound. Negative for rash.        Right lateral ankle and right heel pressure wounds   Allergic/Immunologic: Negative for environmental allergies and immunocompromised state.   Neurological: Negative for seizures and headaches.   Hematological: Negative for adenopathy. Does not bruise/bleed easily.   Psychiatric/Behavioral: Positive for confusion. The patient is not nervous/anxious.           Personal History     Past Medical History:   Diagnosis Date   • Alzheimer disease (CMS/[facility])    • Arthritis    • Warren esophagus     followed  with GI in [place]   • Benign prostatic hyperplasia without lower urinary tract symptoms 10/17/2018   • Chronic congestive heart failure (CMS/[facility]) 12/8/2017     echo report from 8/2/17 EF 60-65%, mild TR, mild AR ([place], [place]) Echocardiogram 12/9/17: EF 28%, mild to moderate MR    • COPD (chronic obstructive pulmonary disease) (CMS/[facility])    • Coronary artery disease 2003   • Dementia (CMS/[facility]) 2003   • Diarrhea 2/24/2019   • Environmental allergies 10/17/2018   • Essential hypertension 10/17/2018   • Gastroesophageal reflux disease 11/19/2018   • GERD (gastroesophageal reflux disease)    • Hypothyroid     started after amiodarone use in 2003   • Sleep apnea     wears cpap   • Stroke (CMS/[facility]) 2003   • TIA (transient ischemic attack) 12/29/2017   • Unintended weight loss 10/17/2018       Past Surgical History:   Procedure Laterality Date   • CARDIAC CATHETERIZATION  2003   • CARDIAC CATHETERIZATION N/A 1/19/2018    Procedure: Left Heart Cath;  Surgeon: Hollis Ugarte MD;  Location: [facility] INVASIVE LOCATION;  Service:    • CARPAL TUNNEL RELEASE Right    • CATARACT EXTRACTION Bilateral    • CORONARY ARTERY BYPASS GRAFT  2003    Triple Bypass, @ [facility] @ [place], [place]   • ERCP N/A 12/30/2019    Procedure: ENDOSCOPIC RETROGRADE  CHOLANGIOPANCREATOGRAPHY;  Surgeon: Arsh White MD;  Location: [facility] ENDOSCOPY;  Service: Gastroenterology   • HEMORRHOIDECTOMY     • HERNIA REPAIR         Family History: family history includes Cancer in his brother and mother; Diabetes in his sister; Heart disease in his brother and sister; Stroke in his sister.      Social History:  reports that he quit smoking about 52 years ago. He has never used smokeless tobacco. He reports that he does not drink alcohol or use drugs.  Social History     Social History Narrative    Caffeine use: 1 serving daily.    Patient lives at home with family.     Lives with wife, daughter close by for any needs       Medications:  Available home medication information reviewed.  Medications Prior to Admission   Medication Sig Dispense Refill Last Dose   • acetaminophen (TYLENOL) 325 MG tablet Take 650 mg by mouth At Night As Needed for Mild Pain .   Past Week at Unknown time   • amiodarone (PACERONE) 200 MG tablet Take 1 tablet by mouth Daily. 30 tablet 5 2/7/2020 at Unknown time   • aspirin 81 MG chewable tablet Chew 81 mg Daily.   2/7/2020 at Unknown time   • carbidopa-levodopa (SINEMET)  MG per tablet Take 1 tablet by mouth 3 (Three) Times a Day. 90 tablet 11 2/7/2020 at Unknown time   • clopidogrel (PLAVIX) 75 MG tablet Take 75 mg by mouth Daily.   2/7/2020 at Unknown time   • diclofenac (VOLTAREN) 1 % gel gel    Past Month at Unknown time   • dutasteride (AVODART) 0.5 MG capsule TAKE 1 CAPSULE BY MOUTH EVERY DAY 30 capsule 0 2/6/2020 at Unknown time   • ENTRESTO 24-26 MG tablet Take .5 bid   2/7/2020 at Unknown time   • furosemide (LASIX) 20 MG tablet Take 1 tablet by mouth Daily As Needed.   2/7/2020 at Unknown time   • glucose monitor monitoring kit 1 each Daily. 1 each 0 2/7/2020 at Unknown time   • ipratropium-albuterol (DUO-NEB) 0.5-2.5 mg/mL nebulizer Take 3 mL by nebulization Every 4 (Four) Hours As Needed for Shortness of Air (CBS PROTOCOL). 360  mL  Past Month at Unknown time   • KLOR-CON 20 MEQ CR tablet    2/6/2020 at Unknown time   • Lancets (FREESTYLE) lancets 1 each by Other route Daily. May dispense any brand needed E11.9 100 each 12 2/6/2020 at Unknown time   • levothyroxine (SYNTHROID, LEVOTHROID) 75 MCG tablet TAKE 1 TABLET BY MOUTH EVERY DAY 90 tablet 0 2/7/2020 at Unknown time   • Loratadine 10 MG capsule Take 1 capsule by mouth Daily As Needed.   Past Month at Unknown time   • memantine (NAMENDA) 10 MG tablet Take 1 tablet by mouth 2 (Two) Times a Day. 60 tablet 11 2/7/2020 at Unknown time   • Multiple Vitamins-Minerals (MULTIVITAL PO) Take 1 tablet by mouth Daily.   2/6/2020 at Unknown time   • omeprazole (priLOSEC) 20 MG capsule TAKE 1 CAPSULE BY MOUTH EVERY DAY 30 capsule 0 2/7/2020 at Unknown time   • pravastatin (PRAVACHOL) 20 MG tablet TAKE 1 TABLET BY MOUTH EVERY DAY (Patient taking differently: Every Night.) 90 tablet 0 2/6/2020 at Unknown time   • simethicone (MYLICON) 80 MG chewable tablet Chew 1 tablet 4 (Four) Times a Day As Needed for Flatulence.   2/6/2020 at Unknown time   • nitroglycerin (NITROSTAT) 0.4 MG SL tablet Place 0.4 mg under the tongue Every 5 (Five) Minutes As Needed for Chest Pain. Take no more than 3 doses in 15 minutes.   Unknown at Unknown time       Allergies   Allergen Reactions   • Benzodiazepines Other (See Comments)     Paradoxical response   • Valium [Diazepam] Other (See Comments)     Paradoxical response     • Codeine Other (See Comments)     unknown   • Fluoxetine Other (See Comments)     Paradoxical response   • Lipitor [Atorvastatin] Other (See Comments)     Myalgias     • Metoclopramide Other (See Comments)     Unknown   • Nabumetone Other (See Comments)     Unknown     • Penicillins Swelling and Other (See Comments)     Has tolerated cefepime 12/2019   • Tramadol Other (See Comments)     unknown   • Prozac [Fluoxetine Hcl] Unknown - Low Severity   • Sulfa Antibiotics Rash       Objective   Objective         Vital Signs:   Temp:  [97.9 °F (36.6 °C)-98 °F (36.7 °C)] 98 °F (36.7 °C)  Heart Rate:  [] 61  Resp:  [18-22] 18  BP: (132-138)/(64-83) 138/83        Physical Exam   Constitutional: Awake, alert  Eyes: Some mild bilateral ocular drainage and erythema,  pupils equal, sclerae anicteric, no conjunctival injection  HENT: NCAT, mucous membranes moist  Neck: Supple, no thyromegaly, no lymphadenopathy, trachea midline  Respiratory: Rales at the bases bilaterally, more clear at the apex bilaterally, borderline tachypnea, initially 87% on room air oxygen, now on nasal cannula  Cardiovascular: Regular, telemetry appears sinus rhythm, palpable radial pulses bilaterally  Gastrointestinal: Positive bowel sounds, soft, nontender, nondistended  Musculoskeletal: Elderly and frail, debilitated in appearance, BMI is 25, pitting bilateral lower extremity edema  Psychiatric: Demented affect, cooperative  Neurologic: Not currently oriented, follows simple commands and answer simple questions however not always appropriately, no slurred speech or facial droop    Skin: No rashes or jaundice      Results Reviewed:  I have personally reviewed current lab and radiology data.              Invalid input(s):  ALKPHOS  Estimated Creatinine Clearance: 35 mL/min (A) (by C-G formula based on SCr of 1.6 mg/dL (H)).  Brief Urine Lab Results  (Last result in the past 365 days)      Color   Clarity   Blood   Leuk Est   Nitrite   Protein   CREAT   Urine HCG        12/28/19 1044 Dark Yellow Cloudy Negative Trace Negative Trace             Imaging Results (Last 24 Hours)     Procedure Component Value Units Date/Time    XR Chest 1 View [[id number]] Resulted:  02/07/20 1346     Updated:  02/07/20 1346        Results for orders placed during the hospital encounter of 12/27/19   Adult Transthoracic Echo Complete W/ Cont if Necessary Per Protocol    Narrative · The left ventricular cavity is moderately dilated.  · Right ventricular cavity is  mild-to-moderately dilated.  · Left atrial cavity size is moderate-to-severely dilated.  · Moderate aortic valve regurgitation is present.  · Moderate mitral valve regurgitation is present.  · Mild to moderate tricuspid valve regurgitation is present.  · Calculated right ventricular systolic pressure from tricuspid   regurgitation is 35 mmHg.  · Estimated EF = 30%.  · Left ventricular systolic function is severely decreased.  · Left ventricular diastolic dysfunction (grade II) consistent with   pseudonormalization.  · The findings are consistent with dilated cardiomyopathy.  · Mild pulmonary hypertension is present.  · There is no evidence of pericardial effusion.  · The aortic valve exhibits moderate sclerosis without stenosis.          Assessment/Plan   Assessment & Plan     Active Hospital Problems    Diagnosis POA   • **Acute on chronic systolic CHF (congestive heart failure) (CMS/[facility]) [I50.23] Yes   • Hypoxia [R09.02] Yes   • Acute systolic heart failure (CMS/[facility]) [I50.21] Yes   • Deep tissue injury, right heel and right lateral ankle pressure ulcers [T14.8XXA] Yes   • COPD (chronic obstructive pulmonary disease) (CMS/[facility]) [J44.9] Yes   • Cardiomyopathy (EF 30%) [I42.9] Yes   • ANGELA on CPAP [G47.33, Z99.89] Not Applicable   • Parkinson's disease (CMS/[facility]) [G20] Yes   • Gastroesophageal reflux disease [K21.9] Yes   • A-fib (CMS/[facility]) [I48.91] Yes     · Postoperative 2003     • CKD (chronic kidney disease) stage 3, GFR 30-59 ml/min (CMS/[facility]) [N18.3] Yes   • Coronary artery disease involving native coronary artery of native heart without angina pectoris [I25.10] Yes   • Debility [R53.81] Yes   • Dementia (CMS/[facility]) [F03.90] Yes     89-year-old male presents the hospital with worsening dyspnea with acute on chronic systolic congestive heart failure exacerbation.    Acute on chronic systolic congestive heart failure exacerbation, EF 30%:  Check laboratory studies, likely will start careful IV diuresis once labs  return  Consult cardiology team per patient request, see [facility]  Continue Entresto if renal function at baseline.  Monitor electrolytes and replace as needed.  Chest x-ray ordered    Acute hypoxia:  Due to CHF.  Oxygen as needed, wean as tolerated.  Initially 87% on room air.  Possible viral illness.  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    COPD: Scheduled nebulizers.  Incentive spirometer.    Paroxysmal atrial fibrillation: Currently appears sinus rhythm.  Family state he is not on anticoagulation but takes aspirin and Plavix for coronary artery disease.  They have discussed this with cardiologist in the past.  Plan to continue home regimen for now.  Telemetry monitoring.  Amiodarone rhythm control.    Parkinson's disease: Stable.  Continue home medication.    Gastroesophageal reflux disease: Stable.  PPI.    History of impaired glucose tolerance: Family deny any recent hypoglycemia.  No clear indication for correction insulin currently.  Previous A1c's have been borderline.  Diet controlled.    Debility: PT OT.  Up with support only.    Stage III chronic kidney disease: Baseline creatinine appears to range from 1.2-1.6.  Awaiting laboratory studies.  Monitor.    Dementia with sundowning: Continue home medication.  Supportive care.  PT OT.  Family requesting something if he becomes agitated at night, avoid Ativan and other benzodiazepine.  Seroquel low-dose as needed.    Deep tissue injury, right heel and right lateral ankle pressure ulcers present on admission: Wound care to evaluate and treat.    Recent C. difficile colitis: Spore precaution.  No current diarrhea.  Monitor and avoid antibiotics unless absolutely necessary.    DVT prophylaxis: Heparin    CODE STATUS:    Code Status and Medical Interventions:   Ordered at: 02/07/20 1402     Limited Support to NOT Include:    Intubation     Code Status:    No CPR     Medical Interventions (Level of Support Prior to Arrest):    Limited        Admission Status:  I believe this patient meets INPATIENT status due to hypoxia, CHF, and complex comorbid conditions and elderly 89-year-old debilitated patient.  I feel patient’s risk for adverse outcomes and need for care warrant INPATIENT evaluation and I predict the patient’s care encounter to likely last beyond 2 midnights.      Electronically signed by Jeremías Arshad MD, 02/07/20, 2:05 PM.

## 2020-02-07 NOTE — PROGRESS NOTES
Subjective   Murali Dennis is a 89 y.o. male.     Chief Complaint   Patient presents with   • Conjunctivitis     pink eye, cough, wheezing, low oxygen, twitching, lack of appetite, fatigue-refill neb machine med   • Cough   • low oxygen       History of Present Illness     Patient has a past medical history of coronary artery disease, controlled type 2 diabetes, atrial fibrillation, cardiomyopathy with a left ventricular EF of 30%, dementia, Parkinson's, sleep apnea on CPAP therapy, BPH, and hypothyroidism.  He had an admission to [facility] in December January with pancreatitis cholangitis and streptococcal bacteremia.  The right ERCP with Dr. Christine's and had a sphincterotomy.  He was recommended to have a possible elective cholecystectomy but has done well with gastrointestinal symptoms since discharge.  He was also noted to be septic and was treated with antibiotics.  On that his stay in rehab he was noted to have pneumonia which were treated with antibiotics.  He had improvement in his symptoms and has been following with PT OT and speech through [facility].  A BNP completed on 1/24/2020 was greater than 6000.  Patient was discussed with Dr. Ugarte at that time and he was restarted on his Entresto at that time which had been previously stopped due to low blood pressures and heart rate.  He has since seen Dr. Ugarte who increased his Lasix.      Patient presents to the office today accompanied by wife and daughter.  They are concerned about possible pinkeye and ongoing cough with low oxygen levels at home.  Wife reports that oxygen saturations have been 86-88% with minimal exertion such as going to the restroom.  After a nebulizer and rest sats get as high as 92 to 93%.     Patient has been having a productive cough of yellowish-green sputum.  He has been afebrile.  Denies any chills or diaphoresis but is constantly cold.  He has discharge and redness to bilateral eyes  which is been ongoing for about 1 week.    He was seen in urgent care on 2/5/2020 and they recommended to go to the emergency department for possible return of pneumonia.  The patient and family decided not to do this as patient does not want to sit in the emergency room.      The following portions of the patient's history were reviewed and updated as appropriate: allergies, current medications, past family history, past medical history, past social history, past surgical history and problem list.    Review of Systems   Constitutional: Positive for activity change, appetite change and fatigue. Negative for chills, diaphoresis, fever and unexpected weight change.   HENT: Negative.    Eyes: Positive for discharge and redness.   Respiratory: Positive for apnea, cough and shortness of breath.    Cardiovascular: Negative for chest pain, palpitations and leg swelling.   Gastrointestinal: Negative for abdominal distention, abdominal pain, diarrhea, nausea and vomiting.   Endocrine: Positive for cold intolerance.   Genitourinary: Negative for difficulty urinating ( Patient has voided twice today) and frequency.   Musculoskeletal: Positive for gait problem.   Skin: Positive for color change.   Neurological: Positive for weakness. Negative for dizziness.       Outpatient Medications Marked as Taking for the 2/7/20 encounter (Office Visit) with Diana Null APRN   Medication Sig Dispense Refill   • acetaminophen (TYLENOL) 325 MG tablet Take 650 mg by mouth At Night As Needed for Mild Pain .     • amiodarone (PACERONE) 200 MG tablet Take 1 tablet by mouth Daily. 30 tablet 5   • aspirin 81 MG chewable tablet Chew 81 mg Daily.     • carbidopa-levodopa (SINEMET)  MG per tablet Take 1 tablet by mouth 3 (Three) Times a Day. 90 tablet 11   • clopidogrel (PLAVIX) 75 MG tablet Take 75 mg by mouth Daily.     • diclofenac (VOLTAREN) 1 % gel gel      • dutasteride (AVODART) 0.5 MG capsule TAKE 1 CAPSULE BY MOUTH EVERY DAY 30  capsule 0   • ENTRESTO 24-26 MG tablet Take .5 bid     • furosemide (LASIX) 20 MG tablet Take 1 tablet by mouth Daily As Needed.     • glucose monitor monitoring kit 1 each Daily. 1 each 0   • ipratropium-albuterol (DUO-NEB) 0.5-2.5 mg/mL nebulizer Take 3 mL by nebulization Every 4 (Four) Hours As Needed for Shortness of Air (CBS PROTOCOL). 360 mL    • KLOR-CON 20 MEQ CR tablet      • Lancets (FREESTYLE) lancets 1 each by Other route Daily. May dispense any brand needed E11.9 100 each 12   • levothyroxine (SYNTHROID, LEVOTHROID) 75 MCG tablet TAKE 1 TABLET BY MOUTH EVERY DAY 90 tablet 0   • Loratadine 10 MG capsule Take 1 capsule by mouth Daily As Needed.     • memantine (NAMENDA) 10 MG tablet Take 1 tablet by mouth 2 (Two) Times a Day. 60 tablet 11   • Multiple Vitamins-Minerals (MULTIVITAL PO) Take 1 tablet by mouth Daily.     • nitroglycerin (NITROSTAT) 0.4 MG SL tablet Place 0.4 mg under the tongue Every 5 (Five) Minutes As Needed for Chest Pain. Take no more than 3 doses in 15 minutes.     • omeprazole (priLOSEC) 20 MG capsule TAKE 1 CAPSULE BY MOUTH EVERY DAY 30 capsule 0   • pravastatin (PRAVACHOL) 20 MG tablet TAKE 1 TABLET BY MOUTH EVERY DAY (Patient taking differently: Every Night.) 90 tablet 0   • simethicone (MYLICON) 80 MG chewable tablet Chew 1 tablet 4 (Four) Times a Day As Needed for Flatulence.     • [DISCONTINUED] vancomycin (VANCOCIN) 125 MG capsule              Objective   Physical Exam   Constitutional: He is oriented to person, place, and time. He is easily aroused. He has a sickly appearance.   HENT:   Head: Normocephalic and atraumatic.   Eyes: Pupils are equal, round, and reactive to light. Right conjunctiva is injected. Left conjunctiva is injected.   Neck: Trachea normal.   Cardiovascular: Regular rhythm and normal heart sounds. Tachycardia present.   Pulmonary/Chest: Accessory muscle usage present. Tachypnea noted. No bradypnea. No respiratory distress. He has decreased breath sounds. He  "has rales in the right lower field and the left lower field.   Abdominal: Soft. Normal appearance and bowel sounds are normal. He exhibits no distension. There is no tenderness.   Neurological: He is oriented to person, place, and time and easily aroused.   Skin: Skin is warm and dry. Capillary refill takes 2 to 3 seconds. There is pallor.   Slight jaundice appearance.      Psychiatric: He has a normal mood and affect. His speech is normal and behavior is normal. Judgment and thought content normal.   Nursing note and vitals reviewed.      Vitals:    02/07/20 1120   BP: 132/64   Pulse: 100   Resp: 22   Temp: 97.9 °F (36.6 °C)   SpO2: 92%   Weight: Comment: wheelchair   Height: 177.8 cm (70\")     Body mass index is 25.11 kg/m².        Assessment/Plan   Murali was seen today for conjunctivitis, cough and low oxygen.    Diagnoses and all orders for this visit:    Hypoxia    Cardiomyopathy, unspecified type (CMS/HCC)- EF 30%    Tachycardia    Acute on chronic congestive heart failure, unspecified heart failure type (CMS/HCC)    Acute bacterial conjunctivitis of both eyes    Jaundice    History of recent pneumonia         Patient appears significantly ill upon presentation in office.  He is tachypneic, tachycardic, and is having frequent episodes of hypoxia.  He had recent pancreatitis, sepsis, and pneumonia.  Recent elevation in BNP.  He has not done well since he has been home after his last hospitalization.  I believe this patient needs to be back in the hospital for additional work-up and acute management.  I have discussed with Dr. Alvarado who has agreed to direct admit patient to a telemetry bed at [facility].  I have also discussed with wife and daughter that we will likely need to have palliative medicine involved once he is discharged from the hospital.  They are in agreement with this plan and we will discuss once released from hospital if this is not already arranged.       Return for After " hospital discharge.  I discussed my findings and recommendations with patient.  The plan of care was  discussed with patient. They verbalized understanding and agreement.  Patient was encouraged to keep me informed of any acute changes, lack of improvement, or any new concerning symptoms.       * Please note that portions of this note were completed with a voice recognition program. Efforts were made to edit the dictation but occasionally words are erroneously transcribed.

## 2020-02-08 NOTE — CONSULTS
[facility] at [facility]  Cardiovascular Consultation Note    Reason for consultation: Acute on chronic systolic heart failure #2 CAD    History of Present Illness:  This 89-year-old gentleman with history of CAD and prior bypass surgery with a history of chronic systolic heart failure presented with a several day history of shortness of breath and hypoxemia.  Upon admission here his BNP was 12,000.  Chest x-ray showed congestive heart failure.  According to the family member the patient has several day history of progressive shortness of breath with lower extremity edema and increased abdominal girth.  The patient denies any tightness or heaviness in his chest.  He denies palpitations.  He does get short of breath at home with minimal activity.  He does wheeze on a regular basis.  He is not on home O2.  Patient was hospitalized recently and then went to rehab.    Cardiac risk factors: #1 hypertension #2 CAD #3 advanced age #4 male sex #5 hyperlipidemia    Past medical and surgical history, social and family history reviewed in EMR.    REVIEW OF SYSTEMS:     Past Medical History:   Diagnosis Date   • Alzheimer disease (CMS/[facility])    • Arthritis    • Warren esophagus     followed  with GI in [place]   • Benign prostatic hyperplasia without lower urinary tract symptoms 10/17/2018   • Chronic congestive heart failure (CMS/HCC) 12/8/2017     echo report from 8/2/17 EF 60-65%, mild TR, mild AR ([place], [place]) Echocardiogram 12/9/17: EF 28%, mild to moderate MR    • COPD (chronic obstructive pulmonary disease) (CMS/HCC)    • Coronary artery disease 2003   • Dementia (CMS/[facility]) 2003   • Diarrhea 2/24/2019   • Environmental allergies 10/17/2018   • Essential hypertension 10/17/2018   • Gastroesophageal reflux disease 11/19/2018   • GERD (gastroesophageal reflux disease)    • Hypothyroid     started after amiodarone use in 2003   • Sleep apnea     wears cpap   • Stroke (CMS/[facility]) 2003    • TIA (transient ischemic attack) 2017   • Unintended weight loss 10/17/2018     Past Surgical History:   Procedure Laterality Date   • CARDIAC CATHETERIZATION     • CARDIAC CATHETERIZATION N/A 2018    Procedure: Left Heart Cath;  Surgeon: Hollis Ugarte MD;  Location:  [facility] INVASIVE LOCATION;  Service:    • CARPAL TUNNEL RELEASE Right    • CATARACT EXTRACTION Bilateral    • CORONARY ARTERY BYPASS GRAFT      Triple Bypass, @ [facility] @ [place], [place]   • ERCP N/A 2019    Procedure: ENDOSCOPIC RETROGRADE CHOLANGIOPANCREATOGRAPHY;  Surgeon: Arsh White MD;  Location:  [facility] ENDOSCOPY;  Service: Gastroenterology   • HEMORRHOIDECTOMY     • HERNIA REPAIR       Social History     Socioeconomic History   • Marital status:      Spouse name: Not on file   • Number of children: Not on file   • Years of education: Not on file   • Highest education level: Not on file   Occupational History   • Occupation: Retired   Tobacco Use   • Smoking status: Former Smoker     Last attempt to quit: 1967     Years since quittin.1   • Smokeless tobacco: Never Used   Substance and Sexual Activity   • Alcohol use: No   • Drug use: No   • Sexual activity: Defer   Social History Narrative    Caffeine use: 1 serving daily.    Patient lives at home with family.     Lives with wife, daughter close by for any needs     Family History   Problem Relation Age of Onset   • Cancer Mother    • Heart disease Sister    • Diabetes Sister    • Heart disease Brother    • Cancer Brother    • Stroke Sister        H&P ROS reviewed and pertinent CV ROS as noted in HPI.    Cardiac: No palpitations no angina.  Positive dyspnea orthopnea lower extremity edema  Respiratory: Patient has dyspnea on exertion class III and he does wheeze fairly regularly  Lower Extremities: No lesions  GI: No nausea vomiting diarrhea bright blood per rectum or melena  Neuro: No history of stroke TIA or  neurologic event  Hematology: No ecchymosis petechiae or hematologic malignancy      Physical Exam: General well-developed elderly male with resting tachypnea O2 sat of only 91% on room air       HEENT: No obvious JVP, tongue is midline, no icterus       Respiratory: Equal bilateral symmetrical expansion and bilateral rails       Cardiovascular: Regular rate and rhythm with no murmur gallop or click and no edema       GI: Positive bowel sounds       Lower Extremities: No lesions       Neuro: Facial expressions are symmetrical but the patient appears weak       Skin: Warm and dry with no edema to palpation       Psych: Flat affect    Results Review: BNP is greater than 12,000.  BUN and creatinine are improved from yesterday           Vital Sign Min/Max for last 24 hours  Temp  Min: 97.6 °F (36.4 °C)  Max: 98.2 °F (36.8 °C)   BP  Min: 129/84  Max: 153/89   Pulse  Min: 61  Max: 100   Resp  Min: 16  Max: 22   SpO2  Min: 91 %  Max: 97 %   No data recorded      Intake/Output Summary (Last 24 hours) at 2/8/2020 0811  Last data filed at 2/8/2020 0400  Gross per 24 hour   Intake 400 ml   Output 1275 ml   Net -875 ml             Current Facility-Administered Medications:   •  acetaminophen (TYLENOL) tablet 650 mg, 650 mg, Oral, Q4H PRN **OR** acetaminophen (TYLENOL) 160 MG/5ML solution 650 mg, 650 mg, Oral, Q4H PRN **OR** acetaminophen (TYLENOL) suppository 650 mg, 650 mg, Rectal, Q4H PRN, Jeremías Arshad MD  •  amiodarone (PACERONE) tablet 200 mg, 200 mg, Oral, Daily, Jeremías Arshad MD  •  aspirin chewable tablet 81 mg, 81 mg, Oral, Daily, Jeremías Arshad MD  •  carbidopa-levodopa (SINEMET)  MG per tablet 1 tablet, 1 tablet, Oral, TID, Jeremías Arshad MD, 1 tablet at 02/07/20 2007  •  castor oil-balsam peru (VENELEX) ointment, , Topical, Q12H, Jeremías Arshad MD  •  cetirizine (zyrTEC) tablet 5 mg, 5 mg, Oral, Daily PRN, Jeremías Arshad, MD  •  clopidogrel (PLAVIX)  tablet 75 mg, 75 mg, Oral, Daily, Jeremías Arshad MD  •  diclofenac (VOLTAREN) 1 % gel 2 g, 2 g, Topical, BID, Jeremías Arshad MD, 2 g at 02/07/20 2007  •  docusate sodium (COLACE) capsule 100 mg, 100 mg, Oral, BID PRN, Jeremías Arshad MD  •  famotidine (PEPCID) tablet 20 mg, 20 mg, Oral, BID PRN, Jeremías Arshad MD  •  finasteride (PROSCAR) tablet 5 mg, 5 mg, Oral, Daily, Jeremías Arshad MD, 5 mg at 02/07/20 1513  •  furosemide (LASIX) injection 20 mg, 20 mg, Intravenous, BID, Jeremías rAshad MD  •  heparin (porcine) 5000 UNIT/ML injection 5,000 Units, 5,000 Units, Subcutaneous, Q8H, Jeremías Arshad MD, 5,000 Units at 02/08/20 0635  •  ipratropium-albuterol (DUO-NEB) nebulizer solution 3 mL, 3 mL, Nebulization, 4x Daily - RT, Jeremías Arshad MD, 3 mL at 02/07/20 1911  •  labetalol (NORMODYNE,TRANDATE) injection 10 mg, 10 mg, Intravenous, Q2H PRN, Jeremías Arhsad MD  •  levothyroxine (SYNTHROID, LEVOTHROID) tablet 75 mcg, 75 mcg, Oral, Daily, Jeremías Arshad MD  •  Magnesium Sulfate 2 gram Bolus, followed by 8 gram infusion (total Mg dose 10 grams)- Mg less than or equal to 1mg/dL, 2 g, Intravenous, PRN **OR** Magnesium Sulfate 2 gram / 50mL Infusion (GIVE X 3 BAGS TO EQUAL 6GM TOTAL DOSE) - Mg 1.1 - 1.5 mg/dl, 2 g, Intravenous, PRN **OR** Magnesium Sulfate 4 gram infusion- Mg 1.6-1.9 mg/dL, 4 g, Intravenous, PRN, Jeremías Arshad MD, Last Rate: 25 mL/hr at 02/07/20 1802, 4 g at 02/07/20 1802  •  melatonin tablet 5 mg, 5 mg, Oral, Nightly PRN, Jeremías Arshad MD  •  memantine (NAMENDA) tablet 10 mg, 10 mg, Oral, BID, Jeremías Arshad MD, 10 mg at 02/07/20 2007  •  multivitamin with minerals 1 tablet, 1 tablet, Oral, Daily, Jeremías Arshad MD  •  ondansetron (ZOFRAN) tablet 4 mg, 4 mg, Oral, Q6H PRN **OR** ondansetron (ZOFRAN) injection 4 mg, 4 mg, Intravenous, Q6H PRN, Jeremías Arshad,  MD  •  pantoprazole (PROTONIX) EC tablet 40 mg, 40 mg, Oral, QAM, Jeremías Arshad MD, 40 mg at 02/08/20 0635  •  Polyvinyl Alcohol-Povidone PF (HYPOTEARS) 1.4-0.6 % ophthalmic solution 2 drop, 2 drop, Both Eyes, Q1H PRN, Yaneli Pinto, [facility]  •  pravastatin (PRAVACHOL) tablet 20 mg, 20 mg, Oral, Nightly, Jeremías Arshad MD, 20 mg at 02/07/20 2007  •  QUEtiapine (SEROquel) tablet 12.5 mg, 12.5 mg, Oral, Q6H PRN, Jeremías Arshad MD  •  sacubitril-valsartan (ENTRESTO) 24-26 MG tablet 0.5 tablet, 0.5 tablet, Oral, Q12H, Jeremías Arshad MD, 0.5 tablet at 02/07/20 2007  •  simethicone (MYLICON) chewable tablet 80 mg, 80 mg, Oral, 4x Daily PRN, Jeremías Arshad MD  •  sodium chloride 0.9 % flush 10 mL, 10 mL, Intravenous, Q12H, Jeremías Arshad MD  •  sodium chloride 0.9 % flush 10 mL, 10 mL, Intravenous, PRN, Jeremías Arshad MD    Lab Review:   Results from last 7 days   Lab Units 02/08/20  0506 02/07/20  1401   WBC 10*3/mm3 5.31 5.34   HEMOGLOBIN g/dL 10.9* 11.6*   PLATELETS 10*3/mm3 159 149     Results from last 7 days   Lab Units 02/08/20  0506 02/07/20  1401   SODIUM mmol/L 139 140   POTASSIUM mmol/L 3.9 3.5   CO2 mmol/L 23.0 23.0   BUN mg/dL 19 20   CREATININE mg/dL 1.49* 1.53*   MAGNESIUM mg/dL 2.6* 1.7   GLUCOSE mg/dL 133* 124*     Estimated Creatinine Clearance: 37.6 mL/min (A) (by C-G formula based on SCr of 1.49 mg/dL (H)).        .lipd  Lab Results   Component Value Date    CHLPL 141 04/09/2018    TRIG 140 10/12/2018    HDL 42 10/12/2018    AST 39 02/07/2020    ALT 9 02/07/2020       Radiology Reports:  Imaging Results (Last 72 Hours)     Procedure Component Value Units Date/Time    XR Chest 1 View [[id number]] Collected:  02/07/20 1436     Updated:  02/07/20 1513    Narrative:       EXAMINATION: XR CHEST 1 VW-02/07/2020:     INDICATION: Hypoxia, CHF.     COMPARISON: 01/02/2020.     FINDINGS: The heart shadow appears mildly enlarged. There is  increasing  bilateral perihilar disease, suggesting interval development of CHF.  Patchy atelectasis in the medial left lower lobe appears increased. Mild  new right basilar discoid atelectasis is also noted. No pneumothorax is  seen. Right upper extremity PICC line has apparently been removed.       Impression:       Interval development of congestive heart failure.     D:  02/07/2020  E:  02/07/2020                    Assessment/Plan: 1 acute on chronic systolic heart failure-the patient has had progressive shortness of breath for the last several days to the point of orthopnea increased abdominal girth.  States he may compliant with his medications.  At this time we will start the patient on Lasix 40 mg IV every 12 hours.  He still has significant crackles bilaterally.  Going put him on O2 at 2 L as well.  2 CAD-had a stent 2018 not have any angina      Chapo Hung MD  02/08/20  8:11 AM

## 2020-02-08 NOTE — PLAN OF CARE
Problem: Patient Care Overview  Goal: Plan of Care Review  Outcome: Ongoing (interventions implemented as appropriate)  Flowsheets (Taken 2/8/2020 0751)  Progress: no change  Plan of Care Reviewed With: patient; spouse  Outcome Summary: Pt resting in bed. VSS. RA. Spouse at bedside. Skin care provided. Bilat eyes, reddened, with frequent, yellow drainage. Specialty bed. Heels in protective boots. Plan of care discussed with pt, spouse. Verbalized understanding. Will continue to monitor.  Goal: Individualization and Mutuality  Outcome: Ongoing (interventions implemented as appropriate)  Goal: Discharge Needs Assessment  Outcome: Ongoing (interventions implemented as appropriate)  Goal: Interprofessional Rounds/Family Conf  Outcome: Ongoing (interventions implemented as appropriate)     Problem: Fall Risk (Adult)  Goal: Identify Related Risk Factors and Signs and Symptoms  Outcome: Ongoing (interventions implemented as appropriate)  Flowsheets (Taken 2/8/2020 0751)  Related Risk Factors (Fall Risk): age-related changes; confusion/agitation; gait/mobility problems; fatigue/slow reaction; environment unfamiliar  Goal: Absence of Fall  Outcome: Ongoing (interventions implemented as appropriate)  Flowsheets (Taken 2/8/2020 0751)  Absence of Fall: making progress toward outcome     Problem: Skin Injury Risk (Adult)  Goal: Identify Related Risk Factors and Signs and Symptoms  Outcome: Ongoing (interventions implemented as appropriate)  Flowsheets (Taken 2/8/2020 0751)  Related Risk Factors (Skin Injury Risk): advanced age; mechanical forces; mobility impaired; moisture  Goal: Skin Health and Integrity  Outcome: Ongoing (interventions implemented as appropriate)  Flowsheets (Taken 2/8/2020 0751)  Skin Health and Integrity: making progress toward outcome     Problem: Cardiac: Heart Failure (Adult)  Goal: Signs and Symptoms of Listed Potential Problems Will be Absent, Minimized or Managed (Cardiac: Heart Failure)  Outcome:  Ongoing (interventions implemented as appropriate)  Flowsheets (Taken 2/8/2020 9824)  Problems Assessed (Heart Failure): all  Problems Present (Heart Failure): fluid/electrolyte imbalance; situational response

## 2020-02-08 NOTE — NURSING NOTE
Pt's wife at bedside and requested all 4 side rails be placed upright for pt safety d/t confusion. Notified pt's wife that all 4 side rails up would be considered a restraint and a safety measure. They are still requesting all 4 side rails be up. Pts wife stated family will in room present at all times while bed rails are up and will notify nursing staff if they are to leave.

## 2020-02-08 NOTE — PROGRESS NOTES
[facility] Medicine Services  PROGRESS NOTE    Patient Name: Murali Dennis  : 1929  MRN: [id number]    Date of Admission: 2020  Primary Care Physician: Diana Null APRN    Subjective   Subjective     CC:  Follow-up shortness of breath    HPI:  Breathing better today.  Noted good urine output with Lasix.  Notes improved swelling.  Family at the bedside pleased with his progress.  Continues to have some eye drainage and family feels that he has pinkeye.    Review of Systems  No current fevers or chills  No current shortness of breath or cough  No current nausea, vomiting, or diarrhea  No current chest pain or palpitations    Objective   Objective     Vital Signs:   Temp:  [97.6 °F (36.4 °C)-98.2 °F (36.8 °C)] 98.2 °F (36.8 °C)  Heart Rate:  [] 89  Resp:  [16-22] 18  BP: (129-153)/(45-91) 150/85        Physical Exam:  Constitutional: Awake, alert  Eyes: Some mild bilateral ocular drainage and erythema,  pupils equal, sclerae anicteric, no conjunctival injection  HENT: NCAT, mucous membranes moist  Neck: Supple, no thyromegaly, no lymphadenopathy, trachea midline  Respiratory:  Decreasing Rales at the bases bilaterally, more clear at the apex bilaterally, borderline tachypnea  Cardiovascular: Regular, telemetry appears sinus rhythm, palpable radial pulses bilaterally  Gastrointestinal: Positive bowel sounds, soft, nontender, nondistended  Musculoskeletal: Elderly and frail, debilitated in appearance, BMI is 25, pitting bilateral lower extremity edema  Psychiatric: Demented affect, cooperative  Neurologic: Not currently oriented, follows simple commands and answer simple questions however not always appropriately, no slurred speech or facial droop    Skin: No rashes or jaundice      Results Reviewed:  Results from last 7 days   Lab Units 20  0506 20  1401   WBC 10*3/mm3 5.31 5.34   HEMOGLOBIN g/dL 10.9* 11.6*   HEMATOCRIT % 35.3* 36.9*   PLATELETS  10*3/mm3 159 149     Results from last 7 days   Lab Units 02/08/20  0506 02/07/20  1401   SODIUM mmol/L 139 140   POTASSIUM mmol/L 3.9 3.5   CHLORIDE mmol/L 103 103   CO2 mmol/L 23.0 23.0   BUN mg/dL 19 20   CREATININE mg/dL 1.49* 1.53*   GLUCOSE mg/dL 133* 124*   CALCIUM mg/dL 9.0 8.9   ALT (SGPT) U/L  --  9   AST (SGOT) U/L  --  39   PROBNP pg/mL  --  12,195.0*     Estimated Creatinine Clearance: 37.6 mL/min (A) (by C-G formula based on SCr of 1.49 mg/dL (H)).    Microbiology Results Abnormal     Procedure Component Value - Date/Time    Blood Culture - Blood, Hand, Right [[id number]] Collected:  02/07/20 1401    Lab Status:  Preliminary result Specimen:  Blood from Hand, Right Updated:  02/08/20 0231     Blood Culture No growth at less than 24 hours    Narrative:       Aerobic bottle only      Blood Culture - Blood, Arm, Left [[id number]] Collected:  02/07/20 1401    Lab Status:  Preliminary result Specimen:  Blood from Arm, Left Updated:  02/08/20 0231     Blood Culture No growth at less than 24 hours    Respiratory Panel, PCR - Swab, Nasopharynx [[id number]]  (Normal) Collected:  02/07/20 1545    Lab Status:  Final result Specimen:  Swab from Nasopharynx Updated:  02/07/20 1731     ADENOVIRUS, PCR Not Detected     Coronavirus 229E Not Detected     Coronavirus HKU1 Not Detected     Coronavirus NL63 Not Detected     Coronavirus OC43 Not Detected     Human Metapneumovirus Not Detected     Human Rhinovirus/Enterovirus Not Detected     Influenza B PCR Not Detected     Parainfluenza Virus 1 Not Detected     Parainfluenza Virus 2 Not Detected     Parainfluenza Virus 3 Not Detected     Parainfluenza Virus 4 Not Detected     Bordetella pertussis pcr Not Detected     Influenza A H1 2009 PCR Not Detected     Chlamydophila pneumoniae PCR Not Detected     Mycoplasma pneumo by PCR Not Detected     Influenza A PCR Not Detected     Influenza A H3 Not Detected     Influenza A H1 Not Detected     RSV, PCR Not Detected     Bordetella  parapertussis PCR Not Detected          Imaging Results (Last 24 Hours)     Procedure Component Value Units Date/Time    XR Chest 1 View [[id number]] Collected:  02/07/20 1436     Updated:  02/07/20 1513    Narrative:       EXAMINATION: XR CHEST 1 VW-02/07/2020:     INDICATION: Hypoxia, CHF.     COMPARISON: 01/02/2020.     FINDINGS: The heart shadow appears mildly enlarged. There is increasing  bilateral perihilar disease, suggesting interval development of CHF.  Patchy atelectasis in the medial left lower lobe appears increased. Mild  new right basilar discoid atelectasis is also noted. No pneumothorax is  seen. Right upper extremity PICC line has apparently been removed.       Impression:       Interval development of congestive heart failure.     D:  02/07/2020  E:  02/07/2020                    Results for orders placed during the hospital encounter of 12/27/19   Adult Transthoracic Echo Complete W/ Cont if Necessary Per Protocol    Narrative · The left ventricular cavity is moderately dilated.  · Right ventricular cavity is mild-to-moderately dilated.  · Left atrial cavity size is moderate-to-severely dilated.  · Moderate aortic valve regurgitation is present.  · Moderate mitral valve regurgitation is present.  · Mild to moderate tricuspid valve regurgitation is present.  · Calculated right ventricular systolic pressure from tricuspid   regurgitation is 35 mmHg.  · Estimated EF = 30%.  · Left ventricular systolic function is severely decreased.  · Left ventricular diastolic dysfunction (grade II) consistent with   pseudonormalization.  · The findings are consistent with dilated cardiomyopathy.  · Mild pulmonary hypertension is present.  · There is no evidence of pericardial effusion.  · The aortic valve exhibits moderate sclerosis without stenosis.          I have reviewed the medications:  Scheduled Meds:  amiodarone 200 mg Oral Daily   aspirin 81 mg Oral Daily   carbidopa-levodopa 1 tablet Oral TID   castor  oil-balsam peru  Topical Q12H   clopidogrel 75 mg Oral Daily   diclofenac 2 g Topical BID   finasteride 5 mg Oral Daily   heparin (porcine) 5,000 Units Subcutaneous Q8H   ipratropium-albuterol 3 mL Nebulization 4x Daily - RT   levothyroxine 75 mcg Oral Daily   memantine 10 mg Oral BID   multivitamin with minerals 1 tablet Oral Daily   pantoprazole 40 mg Oral QAM   pravastatin 20 mg Oral Nightly   sacubitril-valsartan 0.5 tablet Oral Q12H   sodium chloride 10 mL Intravenous Q12H     Continuous Infusions:   PRN Meds:.•  acetaminophen **OR** acetaminophen **OR** acetaminophen  •  cetirizine  •  docusate sodium  •  famotidine  •  labetalol  •  magnesium sulfate **OR** magnesium sulfate **OR** magnesium sulfate  •  melatonin  •  ondansetron **OR** ondansetron  •  Polyvinyl Alcohol-Povidone PF  •  QUEtiapine  •  simethicone  •  sodium chloride    Assessment/Plan   Assessment & Plan     Active Hospital Problems    Diagnosis  POA   • **Acute on chronic systolic CHF (congestive heart failure) (CMS/[facility]) [I50.23]  Yes   • Hypoxia [R09.02]  Yes   • Acute systolic heart failure (CMS/[facility]) [I50.21]  Yes   • Deep tissue injury, right heel and right lateral ankle pressure ulcers [T14.8XXA]  Yes   • COPD (chronic obstructive pulmonary disease) (CMS/[facility]) [J44.9]  Yes   • Cardiomyopathy (EF 30%) [I42.9]  Yes   • ANGELA on CPAP [G47.33, Z99.89]  Not Applicable   • Parkinson's disease (CMS/[facility]) [G20]  Yes   • Gastroesophageal reflux disease [K21.9]  Yes   • A-fib (CMS/[facility]) [I48.91]  Yes   • CKD (chronic kidney disease) stage 3, GFR 30-59 ml/min (CMS/[facility]) [N18.3]  Yes   • Coronary artery disease involving native coronary artery of native heart without angina pectoris [I25.10]  Yes   • Debility [R53.81]  Yes   • Dementia (CMS/[facility]) [F03.90]  Yes      Resolved Hospital Problems   No resolved problems to display.        Brief Hospital Course to date:  Murali Dennis is a 89 y.o. male presents the hospital with worsening dyspnea with acute on  chronic systolic congestive heart failure exacerbation.    Plan: Continue IV diuresis.  Plan for Lasix 20 mg IV twice today and hold further diuresis until lab studies return tomorrow.  Entresto restarted now that renal function was confirmed at baseline.  Chest x-ray images reviewed and shows acute pulmonary edema.  Hypoxia improved with diuresis.  Continue atrial fibrillation medications.  [place] currently appears viral continue eyedrops.     Acute on chronic systolic congestive heart failure exacerbation, EF 30%:  Entresto  IV diuresis  Cardiology consult team as per family request     Acute hypoxia:  Due to CHF.  Oxygen as needed, wean as tolerated.  Initially 87% on room air.      COPD: Scheduled nebulizers.  Incentive spirometer.     Paroxysmal atrial fibrillation: Currently appears sinus rhythm.  Family state he is not on anticoagulation but takes aspirin and Plavix for coronary artery disease.  They have discussed this with cardiologist in the past.  Plan to continue home regimen for now.  Telemetry monitoring.  Amiodarone rhythm control.     Parkinson's disease: Stable.  Continue home medication.     Gastroesophageal reflux disease: Stable.  PPI.     History of impaired glucose tolerance: Family deny any recent hypoglycemia.  No clear indication for correction insulin currently.  Previous A1c's have been borderline.  Diet controlled.     Debility: PT OT.  Up with support only.     Stage III chronic kidney disease: Baseline creatinine appears to range from 1.2-1.6.  Initially 1.5 this day at baseline.  Monitor.     Dementia with sundowning: Continue home medication.  Supportive care.  PT OT.  Family requesting something if he becomes agitated at night, avoid Ativan and other benzodiazepine.  Seroquel low-dose as needed.     Deep tissue injury, right heel and right lateral ankle pressure ulcers present on admission: Wound care to evaluate and treat.     Recent C. difficile colitis: Spore precaution.  No  current diarrhea.  Monitor and avoid antibiotics unless absolutely necessary.     DVT prophylaxis: Heparin     Disposition: I expect the patient to be discharged location to be determined when improved    CODE STATUS:   Code Status and Medical Interventions:   Ordered at: 02/07/20 1402     Limited Support to NOT Include:    Intubation     Code Status:    No CPR     Medical Interventions (Level of Support Prior to Arrest):    Limited         Electronically signed by Jeremías Arshad MD, 02/08/20, 8:04 AM.

## 2020-02-08 NOTE — PLAN OF CARE
Pt receiving IV Lasix BID. Pt has had good UOP. Cardiology following. Pt placed on 2LNC. Pts wife at bedside has concerns regarding pt tremors and twitching. Pt has been resting in between care and denies any complaints. Pt has had poor PO intake. VSS. Will cont to monitor,.

## 2020-02-09 NOTE — PROGRESS NOTES
[facility] Medicine Services  PROGRESS NOTE    Patient Name: Murali Dennis  : 1929  MRN: [id number]    Date of Admission: 2020  Primary Care Physician: Diana Null APRN    Subjective   Subjective     CC:  Follow-up shortness of breath    HPI:  Developed confusion last night.  Did not sleep well.  Daughter notes he has been achy.  Eye drainage has decreased.  Patient only ate a few bites of breakfast.  Patient has not is conversational today.       Review of Systems  Not currently answering review of systems questioning    Objective   Objective     Vital Signs:   Temp:  [97.6 °F (36.4 °C)-98.6 °F (37 °C)] 98.5 °F (36.9 °C)  Heart Rate:  [63-85] 63  Resp:  [16-20] 18  BP: (129-151)/(75-86) 129/81        Physical Exam:  Constitutional: Awake, alert  Eyes: Some mild bilateral ocular drainage and erythema,  pupils equal, sclerae anicteric, no conjunctival injection  HENT: NCAT, mucous membranes moist  Neck: Supple, no thyromegaly, no lymphadenopathy, trachea midline  Respiratory:  Decreasing Rales at the bases bilaterally, more clear at the apex bilaterally, borderline tachypnea  Cardiovascular: Regular, telemetry appears sinus rhythm, palpable radial pulses bilaterally  Gastrointestinal: Positive bowel sounds, soft, nontender, nondistended  Musculoskeletal: Elderly and frail, debilitated in appearance, BMI is 25, pitting bilateral lower extremity edema  Psychiatric: Demented affect, cooperative  Neurologic: Not currently oriented and more somnolent today, follows simple commands and answer simple questions however not always appropriately, no slurred speech or facial droop    Skin: No rashes or jaundice      Results Reviewed:  Results from last 7 days   Lab Units 20  0354 20  0506 20  1401   WBC 10*3/mm3 4.80 5.31 5.34   HEMOGLOBIN g/dL 10.3* 10.9* 11.6*   HEMATOCRIT % 32.5* 35.3* 36.9*   PLATELETS 10*3/mm3 136* 159 149     Results from last 7 days      Lab Units 02/09/20  0354 02/08/20  0506 02/07/20  1401   SODIUM mmol/L 141 139 140   POTASSIUM mmol/L 3.7 3.9 3.5   CHLORIDE mmol/L 102 103 103   CO2 mmol/L 23.0 23.0 23.0   BUN mg/dL 19 19 20   CREATININE mg/dL 1.55* 1.49* 1.53*   GLUCOSE mg/dL 120* 133* 124*   CALCIUM mg/dL 8.4 9.0 8.9   ALT (SGPT) U/L  --   --  9   AST (SGOT) U/L  --   --  39   PROBNP pg/mL 13,604.0*  --  12,195.0*     Estimated Creatinine Clearance: 36.2 mL/min (A) (by C-G formula based on SCr of 1.55 mg/dL (H)).    Microbiology Results Abnormal     Procedure Component Value - Date/Time    Urine Culture - Urine, Urine, Clean Catch [[id number]]  (Abnormal)  (Susceptibility) Collected:  02/07/20 1905    Lab Status:  Final result Specimen:  Urine, Clean Catch Updated:  02/09/20 0147     Urine Culture 25,000 CFU/mL Escherichia coli    Susceptibility      Escherichia coli     AMY     Ampicillin Susceptible     Ampicillin + Sulbactam Susceptible     Cefazolin Susceptible     Cefepime Susceptible     Ceftazidime Susceptible     Ceftriaxone Susceptible     Gentamicin Susceptible     Levofloxacin Susceptible     Nitrofurantoin Susceptible     Piperacillin + Tazobactam Susceptible     Tetracycline Susceptible     Trimethoprim + Sulfamethoxazole Susceptible                    Blood Culture - Blood, Hand, Right [[id number]] Collected:  02/07/20 1401    Lab Status:  Preliminary result Specimen:  Blood from Hand, Right Updated:  02/08/20 1431     Blood Culture No growth at 24 hours    Narrative:       Aerobic bottle only      Blood Culture - Blood, Arm, Left [[id number]] Collected:  02/07/20 1401    Lab Status:  Preliminary result Specimen:  Blood from Arm, Left Updated:  02/08/20 1431     Blood Culture No growth at 24 hours    Respiratory Panel, PCR - Swab, Nasopharynx [[id number]]  (Normal) Collected:  02/07/20 1545    Lab Status:  Final result Specimen:  Swab from Nasopharynx Updated:  02/07/20 1731     ADENOVIRUS, PCR Not Detected     Coronavirus 229E Not  Detected     Coronavirus HKU1 Not Detected     Coronavirus NL63 Not Detected     Coronavirus OC43 Not Detected     Human Metapneumovirus Not Detected     Human Rhinovirus/Enterovirus Not Detected     Influenza B PCR Not Detected     Parainfluenza Virus 1 Not Detected     Parainfluenza Virus 2 Not Detected     Parainfluenza Virus 3 Not Detected     Parainfluenza Virus 4 Not Detected     Bordetella pertussis pcr Not Detected     Influenza A H1 2009 PCR Not Detected     Chlamydophila pneumoniae PCR Not Detected     Mycoplasma pneumo by PCR Not Detected     Influenza A PCR Not Detected     Influenza A H3 Not Detected     Influenza A H1 Not Detected     RSV, PCR Not Detected     Bordetella parapertussis PCR Not Detected          Imaging Results (Last 24 Hours)     Procedure Component Value Units Date/Time    XR Chest 1 View [[id number]] Collected:  02/07/20 1436     Updated:  02/08/20 2012    Narrative:       EXAMINATION: XR CHEST 1 VW-02/07/2020:     INDICATION: Hypoxia, CHF.     COMPARISON: 01/02/2020.     FINDINGS: The heart shadow appears mildly enlarged. There is increasing  bilateral perihilar disease, suggesting interval development of CHF.  Patchy atelectasis in the medial left lower lobe appears increased. Mild  new right basilar discoid atelectasis is also noted. No pneumothorax is  seen. Right upper extremity PICC line has apparently been removed.       Impression:       Interval development of congestive heart failure.     D:  02/07/2020  E:  02/07/2020            This report was finalized on 2/8/2020 8:09 PM by Dr. Stiven Benedict MD.             Results for orders placed during the hospital encounter of 12/27/19   Adult Transthoracic Echo Complete W/ Cont if Necessary Per Protocol    Narrative · The left ventricular cavity is moderately dilated.  · Right ventricular cavity is mild-to-moderately dilated.  · Left atrial cavity size is moderate-to-severely dilated.  · Moderate aortic valve regurgitation is  present.  · Moderate mitral valve regurgitation is present.  · Mild to moderate tricuspid valve regurgitation is present.  · Calculated right ventricular systolic pressure from tricuspid   regurgitation is 35 mmHg.  · Estimated EF = 30%.  · Left ventricular systolic function is severely decreased.  · Left ventricular diastolic dysfunction (grade II) consistent with   pseudonormalization.  · The findings are consistent with dilated cardiomyopathy.  · Mild pulmonary hypertension is present.  · There is no evidence of pericardial effusion.  · The aortic valve exhibits moderate sclerosis without stenosis.          I have reviewed the medications:  Scheduled Meds:    amiodarone 200 mg Oral Daily   aspirin 81 mg Oral Daily   carbidopa-levodopa 1 tablet Oral TID   castor oil-balsam peru  Topical Q12H   clopidogrel 75 mg Oral Daily   diclofenac 2 g Topical BID   finasteride 5 mg Oral Daily   furosemide 40 mg Intravenous Q12H   heparin (porcine) 5,000 Units Subcutaneous Q8H   ipratropium-albuterol 3 mL Nebulization 4x Daily - RT   levothyroxine 75 mcg Oral Daily   memantine 10 mg Oral BID   multivitamin with minerals 1 tablet Oral Daily   pantoprazole 40 mg Oral QAM   pharmacy consult - MTM  Does not apply Daily   pravastatin 20 mg Oral Nightly   sacubitril-valsartan 0.5 tablet Oral Q12H   sodium chloride 10 mL Intravenous Q12H     Continuous Infusions:   PRN Meds:.•  acetaminophen **OR** acetaminophen **OR** acetaminophen  •  cetirizine  •  docusate sodium  •  famotidine  •  labetalol  •  magnesium sulfate **OR** magnesium sulfate **OR** magnesium sulfate  •  melatonin  •  ondansetron **OR** ondansetron  •  Polyvinyl Alcohol-Povidone PF  •  QUEtiapine  •  simethicone  •  sodium chloride    Assessment/Plan   Assessment & Plan     Active Hospital Problems    Diagnosis  POA   • **Acute on chronic systolic CHF (congestive heart failure) (CMS/HCC) [I50.23]  Yes   • Hypoxia [R09.02]  Yes   • Acute systolic heart failure (CMS/HCC)  [I50.21]  Yes   • Deep tissue injury, right heel and right lateral ankle pressure ulcers [T14.8XXA]  Yes   • COPD (chronic obstructive pulmonary disease) (CMS/[facility]) [J44.9]  Yes   • Cardiomyopathy (EF 30%) [I42.9]  Yes   • ANGELA on CPAP [G47.33, Z99.89]  Not Applicable   • Parkinson's disease (CMS/[facility]) [G20]  Yes   • Gastroesophageal reflux disease [K21.9]  Yes   • A-fib (CMS/HCC) [I48.91]  Yes   • CKD (chronic kidney disease) stage 3, GFR 30-59 ml/min (CMS/HCC) [N18.3]  Yes   • Coronary artery disease involving native coronary artery of native heart without angina pectoris [I25.10]  Yes   • Debility [R53.81]  Yes   • Dementia (CMS/HCC) [F03.90]  Yes      Resolved Hospital Problems   No resolved problems to display.        Brief Hospital Course to date:  Murali Dennis is a 89 y.o. male presents the hospital with worsening dyspnea with acute on chronic systolic congestive heart failure exacerbation.    Plan: Diuresis adjusted.  Family requesting we hold afternoon dose.  He has received morning dose today.  Add melatonin scheduled in the evening.  Seroquel as needed if patient becomes agitated.  Continue to monitor renal function which is increasing.  Respiratory status is improved however patient is more lethargic.  Continue to encourage diet.  Continue eyedrops for pinkeye.  Repeat laboratory studies tomorrow.  Otherwise plan per below.  Urinalysis with very small count of E. coli.  Plan to repeat urinalysis today as high risk for recurrent C. difficile on antibiotics..     Acute on chronic systolic congestive heart failure exacerbation, EF 30%:  Entresto  IV diuresis  Cardiology consult team as per family request     Acute hypoxia:  Due to CHF.  Oxygen as needed, wean as tolerated.  Initially 87% on room air.      COPD: Scheduled nebulizers.  Incentive spirometer.     Paroxysmal atrial fibrillation: Currently appears sinus rhythm.  Family state he is not on anticoagulation but takes aspirin and Plavix for  coronary artery disease.  They have discussed this with cardiologist in the past.  Plan to continue home regimen for now.  Telemetry monitoring.  Amiodarone rhythm control.     Parkinson's disease: Stable.  Continue home medication.     Gastroesophageal reflux disease: Stable.  PPI.     History of impaired glucose tolerance: Family deny any recent hypoglycemia.  No clear indication for correction insulin currently.  Previous A1c's have been borderline.  Diet controlled.     Debility: PT OT.  Up with support only.     Stage III chronic kidney disease: Baseline creatinine appears to range from 1.2-1.6.  Initially 1.5 this day at baseline.  Monitor.     Dementia with sundowning: Continue home medication.  Supportive care.  PT OT.  Family requesting something if he becomes agitated at night, avoid Ativan and other benzodiazepine.  Seroquel low-dose as needed.     Deep tissue injury, right heel and right lateral ankle pressure ulcers present on admission: Wound care to evaluate and treat.     Recent C. difficile colitis: Spore precaution.  No current diarrhea.  Monitor and avoid antibiotics unless absolutely necessary.     DVT prophylaxis: Heparin     Disposition: I expect the patient to be discharged location to be determined when improved    CODE STATUS:   Code Status and Medical Interventions:   Ordered at: 02/07/20 1402     Limited Support to NOT Include:    Intubation     Code Status:    No CPR     Medical Interventions (Level of Support Prior to Arrest):    Limited         Electronically signed by Jeremías Arshad MD, 02/09/20, 9:06 AM.

## 2020-02-09 NOTE — CONSULTS
Clinical Nutrition   Reason For Visit: Reduced oral intake (nursing consult)    Patient Name: Murali Dennis  YOB: 1929  MRN: [id number]  Date of Encounter: 02/09/20 1:21 PM  Admission date: 2/7/2020    -RD communicated food preferences to kitchen.  -RD changed regular textures diet order to soft textures/ground meats diet order (per dtr's request).  -RD ordered chocolate Magic Cup 2x daily and chocolate Mighty Shake 1x daily.  -RD ordered daily bedtime snack (cottage cheese and peaches).  -RD ordered nursing assistance with meals when family not present.      Nutrition Assessment     Admission Problem List:  Dyspnea  Acute on chronic CHF  Hypoxia  Debility  DTPI to right heel and right lateral ankle  Recent C. Diff      Applicable PMH:  CHF  Dementia  Warren's esophagus  COPD  CVA  GERD  CM  PAF  CKD stage 3  Parkinson's disease  Pancreatitis  Cholangitis      Applicable Nutrition-Related Information:        Applicable medical tests/procedures since admission:      Reported/Observed/Food/Nutrition Related History   Patient asleep in bedside chair, dtr at bedside. RD familiar with patient from previous admission last month - patient was not eating well at the time. Dtr states that after discharging from that admission, patient went to [facility] for rehab and continued to not eat well due to an odd meal schedule (breakfast at 10am and dinner at 7:30pm; pt is always asleep by 7pm). However, patient eventually discharged to home from [facility] for 2 weeks and ate well. He was eating [employer] which helped - patient loves [employer] and eats breakfast there daily. Unfortunately patient has returned to poor appetite and PO intake during this admission. Dtr states patient dislikes Boost/Ensure supplement drinks, so requests chocolate Magic Cup and chocolate Mighty Shake. Requests that patient receive jello daily with lunch and dinner and a daily bedtime snack of cottage cheese with peaches. Requests  that nursing assist patient with meals whenever no family present. RD provided menu to keep in patient's room and obtained orders for the next 4 meals. Dtr requests soft textures and ground meats for now. Patient had difficulty chewing chicken earlier. Does well with potatoes, oatmeal, applesauce). Coffee with each meal - no tea.      Anthropometrics   Height: 70 in  Weight: 178 lbs (MDOV standing 2/7 per EMR)  BMI: 25.5  BMI classification: Overweight: 25.0-29.9kg/m2    IBW: 166 lbs    UBW: 175 lbs (MDOV standing wt 1.5 weeks ago, patient was retaining fluid at the time per dtr).  Weight change: RD unable to determine at this time 2/2 fluid retention.    Labs reviewed   Labs reviewed: Yes    Medications reviewed   Medications reviewed: Yes  Pertinent: sinemet, MVI, protonix    Current Nutrition Prescription   PO: Diet Regular; Cardiac    Evaluation of Received Nutrient/Fluid Intake: 21% / 5 meals    Nutrition Diagnosis     Problem Inadequate oral intake   Etiology Poor appetite   Signs/Symptoms PO intake: 21% / 5 meals     Intervention   Intervention: Follow treatment progress, Care plan reviewed, Advise alternate selection, Advised available snacks, Interview for preferences, Menu provided, Menu adjusted, Encourage intake, Supplement provided     -RD communicated food preferences to kitchen.  -RD changed regular textures diet order to soft textures/ground meats diet order (per dtr's request).  -RD ordered chocolate Magic Cup 2x daily and chocolate Mighty Shake 1x daily.  -RD ordered daily bedtime snack (cottage cheese and peaches).  -RD ordered nursing assistance with meals when family not present.    Goal:   General: Nutrition support treatment  PO: Increase intake    Monitoring/Evaluation:       Monitoring/Evaluation: Per protocol, PO intake, Supplement intake, Pertinent labs, Weight, Skin status  Will Continue to follow per protocol  Lyssa Stevenson RD  Time Spent: 60 min

## 2020-02-09 NOTE — THERAPY EVALUATION
Acute Care - Occupational Therapy Initial Evaluation  [facility]     Patient Name: Murali Dennis  : 1929  MRN: [id number]  Today's Date: 2020  Onset of Illness/Injury or Date of Surgery: 20  Date of Referral to OT: 20       Admit Date: 2020       ICD-10-CM ICD-9-CM   1. Impaired mobility and ADLs Z74.09 799.89     Patient Active Problem List   Diagnosis   • Dementia (CMS/[facility])   • Hypothyroid   • CKD (chronic kidney disease) stage 3, GFR 30-59 ml/min (CMS/[facility])   • Coronary artery disease involving native coronary artery of native heart without angina pectoris   • Debility   • A-fib (CMS/[facility])   • Benign prostatic hyperplasia without lower urinary tract symptoms   • Gastroesophageal reflux disease   • Ventricular tachycardia (CMS/[facility])   • Parkinson's disease (CMS/[facility])   • ANGELA on CPAP   • Cardiomyopathy (EF 30%)   • Mixed hyperlipidemia   • Pancreatitis   • Sepsis (CMS/[facility])   • Bacteremia due to Streptococcus   • Acute renal failure superimposed on stage 3 chronic kidney disease (CMS/[facility])   • Acute cholecystitis   • Acute on chronic systolic CHF (congestive heart failure) (CMS/[facility])   • Hypoxia   • Acute systolic heart failure (CMS/[facility])   • Deep tissue injury, right heel and right lateral ankle pressure ulcers   • COPD (chronic obstructive pulmonary disease) (CMS/[facility])     Past Medical History:   Diagnosis Date   • Alzheimer disease (CMS/[facility])    • Arthritis    • Warren esophagus     followed  with GI in [place]   • Benign prostatic hyperplasia without lower urinary tract symptoms 10/17/2018   • Chronic congestive heart failure (CMS/[facility]) 2017     echo report from 17 EF 60-65%, mild TR, mild AR ([place], [place]) Echocardiogram 17: EF 28%, mild to moderate MR    • COPD (chronic obstructive pulmonary disease) (CMS/[facility])    • Coronary artery disease    • Dementia (CMS/[facility])    • Diarrhea 2019   • Environmental allergies 10/17/2018   • Essential  hypertension 10/17/2018   • Gastroesophageal reflux disease 11/19/2018   • GERD (gastroesophageal reflux disease)    • Hypothyroid     started after amiodarone use in 2003   • Sleep apnea     wears cpap   • Stroke (CMS/HCC) 2003   • TIA (transient ischemic attack) 12/29/2017   • Unintended weight loss 10/17/2018     Past Surgical History:   Procedure Laterality Date   • CARDIAC CATHETERIZATION  2003   • CARDIAC CATHETERIZATION N/A 1/19/2018    Procedure: Left Heart Cath;  Surgeon: Hollis Ugarte MD;  Location:  [facility] CATH INVASIVE LOCATION;  Service:    • CARPAL TUNNEL RELEASE Right    • CATARACT EXTRACTION Bilateral    • CORONARY ARTERY BYPASS GRAFT  2003    Triple Bypass, @ [facility] @ [place], [place]   • ERCP N/A 12/30/2019    Procedure: ENDOSCOPIC RETROGRADE CHOLANGIOPANCREATOGRAPHY;  Surgeon: Arsh White MD;  Location:  [facility] ENDOSCOPY;  Service: Gastroenterology   • HEMORRHOIDECTOMY     • HERNIA REPAIR            OT ASSESSMENT FLOWSHEET (last 12 hours)      Occupational Therapy Evaluation     Row Name 02/09/20 1036                   OT Evaluation Time/Intention    Subjective Information  no complaints  -        Document Type  evaluation  -        Mode of Treatment  individual therapy;occupational therapy  -        Patient Effort  good  -           General Information    Patient Profile Reviewed?  yes  -        Onset of Illness/Injury or Date of Surgery  02/07/20  -        Patient Observations  alert;cooperative;agree to therapy  -        Patient/Family Observations  Daughter at bedside   -        General Observations of Patient  Pt. UIC on arrival   -        Prior Level of Function  transfer;min assist:;max assist:;dressing;bathing  -        Equipment Currently Used at Home  bath bench;grab bar;raised toilet;rollator;walker, rolling  -        Existing Precautions/Restrictions  fall;oxygen therapy device and L/min  -        Barriers to Rehab  previous  functional deficit;cognitive status  -           Home Main Entrance    Number of Stairs, Main Entrance  two  -        Stair Railings, Main Entrance  railings on both sides of stairs  -           Cognitive Assessment/Intervention- PT/OT    Orientation Status (Cognition)  oriented to;person;disoriented to;place;situation;time  -        Follows Commands (Cognition)  follows one step commands;75-90% accuracy;repetition of directions required;verbal cues/prompting required  -           Safety Issues, Functional Mobility    Safety Issues Affecting Function (Mobility)  awareness of need for assistance;insight into deficits/self awareness;safety precaution awareness;safety precautions follow-through/compliance;friction/shear risk;problem solving;sequencing abilities  -        Impairments Affecting Function (Mobility)  balance;cognition;endurance/activity tolerance;strength;postural/trunk control  -           Bed Mobility Assessment/Treatment    Comment (Bed Mobility)  [facility]   -           Functional Mobility    Functional Mobility- Comment  Deferred due to safety   -           Transfer Assessment/Treatment    Transfer Assessment/Treatment  sit-stand transfer;stand-sit transfer  -        Comment (Transfers)  VC's for hand placement and upright posture in standing. Pt. demonstrates posterior lean in standing.   -           Sit-Stand Transfer    Sit-Stand [place] (Transfers)  maximum assist (25% patient effort);1 person assist;verbal cues  -        Assistive Device (Sit-Stand Transfers)  other (see comments) HHA, Gait Belt   -           Stand-Sit Transfer    Stand-Sit [place] (Transfers)  maximum assist (25% patient effort);1 person assist  -        Assistive Device (Stand-Sit Transfers)  other (see comments) HHA, Gait Belt   -           ADL Assessment/Intervention    BADL Assessment/Intervention  lower body dressing;upper body dressing  -           Upper Body Dressing  Assessment/Training    Upper Body Dressing [place] Level  don;pull-over garment;moderate assist (50% patient effort)  -        Comment (Upper Body Dressing)  Per daughters report   -           Lower Body Dressing Assessment/Training    Lower Body Dressing [place] Level  don;socks;dependent (less than 25% patient effort)  -        Lower Body Dressing Position  supported sitting  -           BADL Safety/Performance    Impairments, BADL Safety/Performance  balance;cognition;endurance/activity tolerance;strength;trunk/postural control  -        Cognitive Impairments, BADL Safety/Performance  sequencing abilities;safety precaution awareness  -        Skilled BADL Treatment/Intervention  BADL process/adaptation training;cognitive/safety deficit modifications  -           General ROM    GENERAL ROM COMMENTS  BUE AROM WFL   -           MMT (Manual Muscle Testing)    General MMT Comments  BUE grossly 4/5 throughout   -           Motor Assessment/Interventions    Additional Documentation  Balance (Group)  -           Balance    Balance  static sitting balance;static standing balance  -           Static Sitting Balance    Level of [place] (Unsupported Sitting, Static Balance)  minimal assist, 75% patient effort  -        Sitting Position (Unsupported Sitting, Static Balance)  sitting in chair  -        Time Able to Maintain Position (Unsupported Sitting, Static Balance)  1 to 2 minutes  -           Static Standing Balance    Level of [place] (Supported Standing, Static Balance)  maximal assist, 25 to 49% patient effort;1 person assist  -        Time Able to Maintain Position (Supported Standing, Static Balance)  15 to 30 seconds  -        Assistive Device Utilized (Supported Standing, Static Balance)  other (see comments) HHA, Gait Belt   -        Comment (Supported Standing, Static Balance)  VC's for upright posture, blocking BLE's   -           Sensory  Assessment/Intervention    Sensory General Assessment  no sensation deficits identified  -           Positioning and Restraints    Pre-Treatment Position  sitting in chair/recliner  -        Post Treatment Position  chair  -LC        In Chair  reclined;call light within reach;encouraged to call for assist;exit alarm on;with family/caregiver;legs elevated  -           Pain Assessment    Additional Documentation  Pain Scale: Numbers Pre/Post-Treatment (Group)  -           Pain Scale: Numbers Pre/Post-Treatment    Pain Scale: Numbers, Pretreatment  0/10 - no pain  -LC        Pain Scale: Numbers, Post-Treatment  0/10 - no pain  -LC           Wound 02/07/20 1345 Right lateral malleolus Pressure Injury    Wound - Properties Group Date first assessed: 02/07/20  -MR Time first assessed: 1345  -MR Present on Hospital Admission: Y  -MR Side: Right  -MR Orientation: lateral  -MR Location: malleolus  -MR Primary Wound Type: Pressure inj  -MR Stage, Pressure Injury: unstageable  -MR       Wound 02/07/20 1345 Right heel Pressure Injury    Wound - Properties Group Date first assessed: 02/07/20  -MR Time first assessed: 1345  -MR Present on Hospital Admission: Y  -MR Side: Right  -MR Location: heel  -MR Primary Wound Type: Pressure inj  -MR Stage, Pressure Injury: deep tissue injury  -MR       Plan of Care Review    Plan of Care Reviewed With  patient;daughter  -           Clinical Impression (OT)    Date of Referral to OT  02/07/20  -        OT Diagnosis  Impaired ADLs   -        Patient/Family Goals Statement (OT Eval)  To return home   -        Therapy Frequency (OT Eval)  daily  -        Care Plan Review (OT)  evaluation/treatment results reviewed;care plan/treatment goals reviewed;risks/benefits reviewed;current/potential barriers reviewed;patient/other agree to care plan  -        Care Plan Review, Other Participant (OT Eval)  daughter  -        Anticipated Discharge Disposition (OT)  skilled nursing  facility  -           Vital Signs    Pre Systolic BP Rehab  -- VSS, Cleared with RN for tx  -LC           OT Goals    Transfer Goal Selection (OT)  transfer, OT goal 1  -LC        Dressing Goal Selection (OT)  dressing, OT goal 1;dressing, OT goal 2  -        Toileting Goal Selection (OT)  --  -LC           Transfer Goal 1 (OT)    Activity/Assistive Device (Transfer Goal 1, OT)  sit-to-stand/stand-to-sit  -LC        [place] Level/Cues Needed (Transfer Goal 1, OT)  moderate assist (50-74% patient effort)  -LC        Time Frame (Transfer Goal 1, OT)  10 days  -LC        Progress/Outcome (Transfer Goal 1, OT)  goal ongoing  -LC           Dressing Goal 1 (OT)    Activity/Assistive Device (Dressing Goal 1, OT)  upper body dressing  -LC        [place]/Cues Needed (Dressing Goal 1, OT)  minimum assist (75% or more patient effort)  -LC        Time Frame (Dressing Goal 1, OT)  10 days  -LC        Progress/Outcome (Dressing Goal 1, OT)  goal ongoing  -           Dressing Goal 2 (OT)    Activity/Assistive Device (Dressing Goal 2, OT)  lower body dressing  -LC        [place]/Cues Needed (Dressing Goal 2, OT)  maximum assist (25-49% patient effort)  -LC        Time Frame (Dressing Goal 2, OT)  10 days  -LC        Progress/Outcome (Dressing Goal 2, OT)  goal ongoing  -           Toileting Goal 1 (OT)    Activity/Device (Toileting Goal 1, OT)  perform perineal hygiene;adjust/manage clothing;commode, bedside without drop arms  -        [place] Level/Cues Needed (Toileting Goal 1, OT)  maximum assist (25-49% patient effort)  -LC        Time Frame (Toileting Goal 1, OT)  10 days  -LC        Progress/Outcome (Toileting Goal 1, OT)  goal ongoing  -           Living Environment    Home Accessibility  stairs to enter home  -          User Key  (r) = Recorded By, (t) = Taken By, (c) = Cosigned By    Initials Name Effective Dates    Stephenie López, RN 06/16/16 -     LC Jocelyn Monteiro, RAUL 07/18/19 -                 OT Recommendation and Plan  Outcome Summary/Treatment Plan (OT)  Anticipated Discharge Disposition (OT): skilled nursing facility  Therapy Frequency (OT Eval): daily  Plan of Care Review  Plan of Care Reviewed With: patient, daughter  Plan of Care Reviewed With: patient, daughter  Outcome Summary: OT eval completed. Pt. presents with general weakness, impaired activity tolerance and balance, and a decline in functional mobility. Pt. required Max A for STS t/fs. Demonstrated a posterior lean with trunk flexion when standing required LE blocking and vc's to correct upright posture. Pt. DEP for LBD and Mod A for UBD. Skilled OT services warranted to promote return to PLOF. Recommend SNF for rehab at discharge. .    Outcome Measures     Row Name 02/09/20 1036             How much help from another is currently needed...    Putting on and taking off regular lower body clothing?  1  -LC      Bathing (including washing, rinsing, and drying)  2  -LC      Toileting (which includes using toilet bed pan or urinal)  2  -LC      Putting on and taking off regular upper body clothing  2  -LC      Taking care of personal grooming (such as brushing teeth)  3  -LC      Eating meals  3  -LC      AM-PAC 6 Clicks Score (OT)  13  -         Functional Assessment    Outcome Measure Options  AM-PAC 6 Clicks Daily Activity (OT)  -        User Key  (r) = Recorded By, (t) = Taken By, (c) = Cosigned By    Initials Name Provider Type    Jocelyn Zaman OT Occupational Therapist          Time Calculation:   Time Calculation- OT     Row Name 02/09/20 1036             Time Calculation- OT    OT Start Time  1036  -      OT Received On  02/09/20  -      OT Goal Re-Cert Due Date  02/19/20  -        User Key  (r) = Recorded By, (t) = Taken By, (c) = Cosigned By    Initials Name Provider Type    Jocelyn Zaman OT Occupational Therapist        Therapy Charges for Today     Code Description Service Date Service Provider  Modifiers Qty    69122386924 HC OT EVAL MOD COMPLEXITY 4 2/9/2020 Jocelyn Monteiro, OT GO 1               Jocelyn Monteiro OT  2/9/2020

## 2020-02-09 NOTE — PLAN OF CARE
Problem: Patient Care Overview  Goal: Plan of Care Review  Flowsheets (Taken 2/9/2020 1036)  Outcome Summary: OT eval completed. Pt. presents with general weakness, impaired activity tolerance and balance, and a decline in functional mobility. Pt. required Max A for STS t/fs. Demonstrated a posterior lean with trunk flexion when standing required LE blocking and vc's to correct upright posture. Pt. DEP for LBD and Mod A for UBD. Skilled OT services warranted to promote return to PLOF. Recommend SNF for rehab at discharge.

## 2020-02-09 NOTE — PLAN OF CARE
Problem: Patient Care Overview  Goal: Plan of Care Review  Outcome: Ongoing (interventions implemented as appropriate)  Flowsheets (Taken 2/9/2020 1612)  Plan of Care Reviewed With: patient;spouse  Outcome Summary: PT eval completed.  Pt was lethargic and difficult to arouse this PM.  Pt performed sit<>Stand with RW and maxA and was able to stand with RW and Marylou for about 2 minutes.  Continue inpatient PT in order to improve mobility and decrease risk of falls.  Pt would benefit from continued therapy at SNF upon D/C in order to increase independence with transfers, ambulation, ADLs, and return home with family assist as soon as possible.

## 2020-02-09 NOTE — THERAPY EVALUATION
Patient Name: Murali Dennis  : 1929    MRN: [id number]                              Today's Date: 2020       Admit Date: 2020    Visit Dx:     ICD-10-CM ICD-9-CM   1. Impaired mobility and ADLs Z74.09 799.89     Patient Active Problem List   Diagnosis   • Dementia (CMS/[facility])   • Hypothyroid   • CKD (chronic kidney disease) stage 3, GFR 30-59 ml/min (CMS/[facility])   • Coronary artery disease involving native coronary artery of native heart without angina pectoris   • Debility   • A-fib (CMS/[facility])   • Benign prostatic hyperplasia without lower urinary tract symptoms   • Gastroesophageal reflux disease   • Ventricular tachycardia (CMS/[facility])   • Parkinson's disease (CMS/[facility])   • ANGELA on CPAP   • Cardiomyopathy (EF 30%)   • Mixed hyperlipidemia   • Pancreatitis   • Sepsis (CMS/[facility])   • Bacteremia due to Streptococcus   • Acute renal failure superimposed on stage 3 chronic kidney disease (CMS/[facility])   • Acute cholecystitis   • Acute on chronic systolic CHF (congestive heart failure) (CMS/[facility])   • Hypoxia   • Acute systolic heart failure (CMS/[facility])   • Deep tissue injury, right heel and right lateral ankle pressure ulcers   • COPD (chronic obstructive pulmonary disease) (CMS/[facility])     Past Medical History:   Diagnosis Date   • Alzheimer disease (CMS/[facility])    • Arthritis    • [place] esophagus     followed  with GI in [place]   • Benign prostatic hyperplasia without lower urinary tract symptoms 10/17/2018   • Chronic congestive heart failure (CMS/[facility]) 2017     echo report from 17 EF 60-65%, mild TR, mild AR ([place], [place]) Echocardiogram 17: EF 28%, mild to moderate MR    • COPD (chronic obstructive pulmonary disease) (CMS/[facility])    • Coronary artery disease    • Dementia (CMS/[facility])    • Diarrhea 2019   • Environmental allergies 10/17/2018   • Essential hypertension 10/17/2018   • Gastroesophageal reflux disease 2018   • GERD (gastroesophageal reflux disease)    •  Hypothyroid     started after amiodarone use in 2003   • Sleep apnea     wears cpap   • Stroke (CMS/HCC) 2003   • TIA (transient ischemic attack) 12/29/2017   • Unintended weight loss 10/17/2018     Past Surgical History:   Procedure Laterality Date   • CARDIAC CATHETERIZATION  2003   • CARDIAC CATHETERIZATION N/A 1/19/2018    Procedure: Left Heart Cath;  Surgeon: Hollis Ugarte MD;  Location:  [facility] CATH INVASIVE LOCATION;  Service:    • CARPAL TUNNEL RELEASE Right    • CATARACT EXTRACTION Bilateral    • CORONARY ARTERY BYPASS GRAFT  2003    Triple Bypass, @ [facility] @ [place], [place]   • ERCP N/A 12/30/2019    Procedure: ENDOSCOPIC RETROGRADE CHOLANGIOPANCREATOGRAPHY;  Surgeon: Arsh White MD;  Location:  [facility] ENDOSCOPY;  Service: Gastroenterology   • HEMORRHOIDECTOMY     • HERNIA REPAIR       General Information     Row Name 02/09/20 1559          PT Evaluation Time/Intention    Document Type  evaluation  -     Mode of Treatment  physical therapy  -     Row Name 02/09/20 1559          General Information    Patient Profile Reviewed?  yes  -LF     Prior Level of Function  min assist:;transfer;gait;max assist:;dressing;bathing;dependent:;home management;cooking;driving Pt's wife stated that in Dec. 2019, pt was able to perform transfers and mobility independently with a cane.  -     Existing Precautions/Restrictions  fall;oxygen therapy device and L/min;other (see comments) Dementia, Parkinson's, spore  -     Barriers to Rehab  cognitive status;medically complex;previous functional deficit  -     Row Name 02/09/20 1559          Relationship/Environment    Lives With  spouse  -     Row Name 02/09/20 1559          Resource/Environmental Concerns    Current Living Arrangements  home/apartment/condo  -     Row Name 02/09/20 1559          Home Main Entrance    Number of Stairs, Main Entrance  two  -LF     Stair Railings, Main Entrance  railings on both sides of stairs   -     Row Name 02/09/20 1555          Cognitive Assessment/Intervention- PT/OT    Orientation Status (Cognition)  oriented to;person  -     Cognitive Assessment/Intervention Comment  Pt lethargic and falling asleep throughout PT eval.  Pt's wife stated that pt he was restless last night and did not sleep well.  -     Row Name 02/09/20 8673          Safety Issues, Functional Mobility    Safety Issues Affecting Function (Mobility)  insight into deficits/self awareness;awareness of need for assistance;safety precaution awareness;safety precautions follow-through/compliance;ability to follow commands;judgment;problem solving;positioning of assistive device;sequencing abilities;friction/shear risk  -     Impairments Affecting Function (Mobility)  cognition;endurance/activity tolerance;strength;balance;coordination;postural/trunk control;motor control  -       User Key  (r) = Recorded By, (t) = Taken By, (c) = Cosigned By    Initials Name Provider Type     Delaney Verdugo, PT Physical Therapist        Mobility     Row Name 02/09/20 1603          Bed Mobility Assessment/Treatment    Comment (Bed Mobility)  Pt resting in bedside chair  -     Row Name 02/09/20 1604          Transfer Assessment/Treatment    Comment (Transfers)  Pt lethargic, required simple single step commands/cues.  [place] LE slipping with non-skid socks requiring blocking LE to stand.  -     Row Name 02/09/20 1608          Sit-Stand Transfer    Sit-Stand [place] (Transfers)  maximum assist (25% patient effort);1 person assist;verbal cues;nonverbal cues (demo/gesture)  -     Assistive Device (Sit-Stand Transfers)  walker, front-wheeled  -     Row Name 02/09/20 1603          Gait/Stairs Assessment/Training    [place] Level (Gait)  unable to assess  -     Comment (Gait/Stairs)  Pt lethargic and not appropriate for gt training at this time.  Pt able to stand with RW and minAx1 for about 2min before requesting to sit back down.   -       User Key  (r) = Recorded By, (t) = Taken By, (c) = Cosigned By    Initials Name Provider Type     Delaney Verdugo PT Physical Therapist        Obj/Interventions     Row Name 02/09/20 1609          General ROM    GENERAL ROM COMMENTS  jie LE A/PROM WFL  -     Row Name 02/09/20 1609          MMT (Manual Muscle Testing)    General MMT Comments  PT had difficulty following instructions for MMT.  LE strength grossly 4/5.  -     Row Name 02/09/20 1609          Static Sitting Balance    Level of [place] (Unsupported Sitting, Static Balance)  contact guard assist;1 person assist  -LF     Sitting Position (Unsupported Sitting, Static Balance)  sitting in chair  -     Time Able to Maintain Position (Unsupported Sitting, Static Balance)  1 to 2 minutes  -     Row Name 02/09/20 1609          Dynamic Sitting Balance    Level of [place], Reaches Outside Midline (Sitting, Dynamic Balance)  contact guard assist;1 person to manage equipment  -LF     Sitting Position, Reaches Outside Midline (Sitting, Dynamic Balance)  sitting in chair  -     Row Name 02/09/20 1609          Static Standing Balance    Level of [place] (Supported Standing, Static Balance)  minimal assist, 75% patient effort;1 person assist  -LF     Time Able to Maintain Position (Supported Standing, Static Balance)  1 to 2 minutes  -     Assistive Device Utilized (Supported Standing, Static Balance)  walker, rolling  -     Row Name 02/09/20 1609          Dynamic Standing Balance    Level of [place], Reaches Outside Midline (Standing, Dynamic Balance)  maximal assist, 25 to 49% patient effort;1 person assist  -LF     Time Able to Maintain Position, Reaches Outside Midline (Standing, Dynamic Balance)  15 to 30 seconds  -     Assistive Device Utilized (Supported Standing, Dynamic Balance)  walker, rolling  -     Row Name 02/09/20 1609          Sensory Assessment/Intervention    Sensory General Assessment  no sensation  deficits identified difficult to assess due to cognition.  -LF       User Key  (r) = Recorded By, (t) = Taken By, (c) = Cosigned By    Initials Name Provider Type    LF Dealney Verdugo, PT Physical Therapist        Goals/Plan     Row Name 02/09/20 1620          Bed Mobility Goal 1 (PT)    Activity/Assistive Device (Bed Mobility Goal 1, PT)  bed mobility activities, all  -LF     [place] Level/Cues Needed (Bed Mobility Goal 1, PT)  minimum assist (75% or more patient effort);1 person assist  -LF     Time Frame (Bed Mobility Goal 1, PT)  2 weeks  -LF     Progress/Outcomes (Bed Mobility Goal 1, PT)  goal ongoing  -     Row Name 02/09/20 1620          Transfer Goal 1 (PT)    Activity/Assistive Device (Transfer Goal 1, PT)  sit-to-stand/stand-to-sit;bed-to-chair/chair-to-bed;toilet;walker, rolling  -LF     [place] Level/Cues Needed (Transfer Goal 1, PT)  minimum assist (75% or more patient effort);1 person assist  -LF     Time Frame (Transfer Goal 1, PT)  2 weeks  -LF     Progress/Outcome (Transfer Goal 1, PT)  goal ongoing  -     Row Name 02/09/20 1620          Gait Training Goal 1 (PT)    Activity/Assistive Device (Gait Training Goal 1, PT)  gait (walking locomotion);assistive device use;walker, rolling  -LF     [place] Level (Gait Training Goal 1, PT)  minimum assist (75% or more patient effort);1 person assist  -LF     Distance (Gait Goal 1, PT)  25  -LF     Time Frame (Gait Training Goal 1, PT)  2 weeks  -LF     Progress/Outcome (Gait Training Goal 1, PT)  goal ongoing  -     Row Name 02/09/20 1620          Stairs Goal 1 (PT)    Activity/Assistive Device (Stairs Goal 1, PT)  stairs, all skills  -LF     [place] Level/Cues Needed (Stairs Goal 1, PT)  minimum assist (75% or more patient effort);1 person assist  -LF     Number of Stairs (Stairs Goal 1, PT)  2  -LF     Time Frame (Stairs Goal 1, PT)  2 weeks  -LF     Progress/Outcome (Stairs Goal 1, PT)  goal ongoing  -     Row Name 02/09/20  1620          Patient Education Goal (PT)    Activity (Patient Education Goal, PT)  PT POC/HEP  -LF     [place]/Cues/Accuracy (Memory Goal 2, PT)  demonstrates adequately  -LF     Time Frame (Patient Education Goal, PT)  2 weeks  -LF     Progress/Outcome (Patient Education Goal, PT)  goal ongoing  -LF       User Key  (r) = Recorded By, (t) = Taken By, (c) = Cosigned By    Initials Name Provider Type    LF Delaney Verdugo, PT Physical Therapist        Clinical Impression     Row Name 02/09/20 1612          Pain Assessment    Additional Documentation  Pain Scale: FACES Pre/Post-Treatment (Group)  -LF     Row Name 02/09/20 1612          Pain Scale: FACES Pre/Post-Treatment    Pain: FACES Scale, Pretreatment  0-->no hurt  -LF     Pain: FACES Scale, Post-Treatment  0-->no hurt  -LF     Row Name 02/09/20 1612          Plan of Care Review    Plan of Care Reviewed With  patient;spouse  -LF     Outcome Summary  PT eval completed.  Pt was lethargic and difficult to arouse this PM.  Pt performed sit<>Stand with RW and maxA and was able to stand with RW and Marylou for about 2 minutes.  Continue inpatient PT in order to improve mobility and decrease risk of falls.  Pt would benefit from continued therapy at SNF upon D/C in order to increase independence with transfers, ambulation, ADLs, and return home with family assist as soon as possible.  -     Row Name 02/09/20 1612          Physical Therapy Clinical Impression    Patient/Family Goals Statement (PT Clinical Impression)  Return home at [facility] as soon as possible  -LF     Criteria for Skilled Interventions Met (PT Clinical Impression)  yes;treatment indicated  -LF     Rehab Potential (PT Clinical Summary)  good, to achieve stated therapy goals  -LF     Row Name 02/09/20 1612          Vital Signs    O2 Delivery Pre Treatment  supplemental O2  -LF     O2 Delivery Intra Treatment  supplemental O2  -LF     O2 Delivery Post Treatment  supplemental O2  -LF     Pre Patient  Position  Sitting  -LF     Intra Patient Position  Standing  -LF     Post Patient Position  Sitting  -LF     Row Name 02/09/20 1612          Positioning and Restraints    Pre-Treatment Position  sitting in chair/recliner  -LF     Post Treatment Position  chair  -LF     In Chair  sitting;reclined;waffle cushion;legs elevated;heels elevated;waffle boot/both;exit alarm on;call light within reach;encouraged to call for assist;with family/caregiver;notified nsg  -LF       User Key  (r) = Recorded By, (t) = Taken By, (c) = Cosigned By    Initials Name Provider Type    Delaney Anderson, PT Physical Therapist        Outcome Measures     Row Name 02/09/20 1622          How much help from another person do you currently need...    Turning from your back to your side while in flat bed without using bedrails?  2  -LF     Moving from lying on back to sitting on the side of a flat bed without bedrails?  2  -LF     Moving to and from a bed to a chair (including a wheelchair)?  2  -LF     Standing up from a chair using your arms (e.g., wheelchair, bedside chair)?  2  -LF     Climbing 3-5 steps with a railing?  1  -LF     To walk in hospital room?  2  -LF     AM-PAC 6 Clicks Score (PT)  11  -LF     Row Name 02/09/20 1622          Functional Assessment    Outcome Measure Options  AM-PAC 6 Clicks Basic Mobility (PT)  -LF       User Key  (r) = Recorded By, (t) = Taken By, (c) = Cosigned By    Initials Name Provider Type    Delaney Anderson, PT Physical Therapist          PT Recommendation and Plan  Planned Therapy Interventions (PT Eval): balance training, bed mobility training, gait training, home exercise program, motor coordination training, neuromuscular re-education, patient/family education, postural re-education, stair training, strengthening, transfer training  Outcome Summary/Treatment Plan (PT)  Anticipated Equipment Needs at Discharge (PT): front wheeled walker, bedside commode, shower chair  Anticipated Discharge  Disposition (PT): skilled nursing facility  Plan of Care Reviewed With: patient, spouse  Outcome Summary: PT eval completed.  Pt was lethargic and difficult to arouse this PM.  Pt performed sit<>Stand with RW and maxA and was able to stand with RW and Marylou for about 2 minutes.  Continue inpatient PT in order to improve mobility and decrease risk of falls.  Pt would benefit from continued therapy at SNF upon D/C in order to increase independence with transfers, ambulation, ADLs, and return home with family assist as soon as possible.     Time Calculation:   PT Charges     Row Name 02/09/20 1511             Time Calculation    Start Time  1511  -LF      PT Received On  02/09/20  -      PT Goal Re-Cert Due Date  02/19/20  -LF         Timed Charges    69590 - PT Therapeutic Activity Minutes  10  -LF        User Key  (r) = Recorded By, (t) = Taken By, (c) = Cosigned By    Initials Name Provider Type     Delaney Vedrugo, PT Physical Therapist        Therapy Charges for Today     Code Description Service Date Service Provider Modifiers Qty    75989240041  PT EVAL MOD COMPLEXITY 4 2/9/2020 Delaney Verdugo, PT GP 1    [id number]  PT THERAPEUTIC ACT EA 15 MIN 2/9/2020 Delaney Verdugo, PT GP 1          PT G-Codes  Outcome Measure Options: AM-PAC 6 Clicks Basic Mobility (PT)  AM-PAC 6 Clicks Score (PT): 11  AM-PAC 6 Clicks Score (OT): 13    Delaney Verdugo PT  2/9/2020

## 2020-02-09 NOTE — PLAN OF CARE
Pt has OOB to chair today for several hours. Pt has been more sleepy and less conversational today. Only oriented to self. Poor PO intake. Family requested Tylenol be given PRN and at bedtime to aid with comfort and for sleep. IV Lasix given x1 this AM with good UOP. Pts daughter concerned about need for hospice in future due to pt decline the last few months. VSS. Will cont to monitor.

## 2020-02-09 NOTE — PLAN OF CARE
Problem: Patient Care Overview  Goal: Plan of Care Review  Outcome: Ongoing (interventions implemented as appropriate)  Flowsheets (Taken 2/9/2020 0616)  Progress: no change  Plan of Care Reviewed With: patient; daughter  Outcome Summary: Pt in bed, restless most of the night. Required two prn doses of 12.5 mg seroquel. 2L NC placed for comfort, but pt O2 sats maintain at 90% or above RA. Denied pain. Skin care provided. Will continue to monitor.  Goal: Individualization and Mutuality  Outcome: Ongoing (interventions implemented as appropriate)  Goal: Discharge Needs Assessment  Outcome: Ongoing (interventions implemented as appropriate)  Goal: Interprofessional Rounds/Family Conf  Outcome: Ongoing (interventions implemented as appropriate)     Problem: Fall Risk (Adult)  Goal: Identify Related Risk Factors and Signs and Symptoms  Outcome: Ongoing (interventions implemented as appropriate)  Flowsheets (Taken 2/9/2020 0616)  Related Risk Factors (Fall Risk): age-related changes; confusion/agitation; gait/mobility problems; fatigue/slow reaction; neuro disease/injury; environment unfamiliar  Goal: Absence of Fall  Outcome: Ongoing (interventions implemented as appropriate)  Flowsheets (Taken 2/9/2020 0616)  Absence of Fall: making progress toward outcome     Problem: Skin Injury Risk (Adult)  Goal: Identify Related Risk Factors and Signs and Symptoms  Outcome: Ongoing (interventions implemented as appropriate)  Flowsheets (Taken 2/9/2020 0616)  Related Risk Factors (Skin Injury Risk): advanced age; cognitive impairment; mobility impaired; moisture  Goal: Skin Health and Integrity  Outcome: Ongoing (interventions implemented as appropriate)  Flowsheets (Taken 2/9/2020 0616)  Skin Health and Integrity: making progress toward outcome     Problem: Cardiac: Heart Failure (Adult)  Goal: Signs and Symptoms of Listed Potential Problems Will be Absent, Minimized or Managed (Cardiac: Heart Failure)  Outcome: Ongoing  (interventions implemented as appropriate)  Flowsheets (Taken 2/9/2020 1321)  Problems Assessed (Heart Failure): all  Problems Present (Heart Failure): cardiac pump dysfunction; respiratory compromise; situational response

## 2020-02-09 NOTE — PROGRESS NOTES
[facility]  IP Progress Note      Chief Complaint/Reason for service: Acute on chronic systolic heart failure #2 CAD    Subjective   Subjective: The patient's daughter is in the room and she states that he did not sleep at all last night.  He received him to help him calm down.  She states he had hallucinations.  She states every time he comes to the hospital he does get confused but is worse this time.  She also states that just gradually over the last several months his appetite is gone down his intake is also gone down.    Past medical, surgical, social and family history reviewed in the patient's electronic medical record.    Objective     Vital Sign Min/Max for last 24 hours  Temp  Min: 97.6 °F (36.4 °C)  Max: 98.6 °F (37 °C)   BP  Min: 129/81  Max: 151/86   Pulse  Min: 63  Max: 85   Resp  Min: 16  Max: 20   SpO2  Min: 89 %  Max: 100 %   Flow (L/min)  Min: 2  Max: 2      Intake/Output Summary (Last 24 hours) at 2/9/2020 0836  Last data filed at 2/9/2020 0500  Gross per 24 hour   Intake --   Output 1825 ml   Net -1825 ml             Current Facility-Administered Medications:   •  acetaminophen (TYLENOL) tablet 650 mg, 650 mg, Oral, Q4H PRN **OR** acetaminophen (TYLENOL) 160 MG/5ML solution 650 mg, 650 mg, Oral, Q4H PRN **OR** acetaminophen (TYLENOL) suppository 650 mg, 650 mg, Rectal, Q4H PRN, Jeremías Arshad MD  •  amiodarone (PACERONE) tablet 200 mg, 200 mg, Oral, Daily, Jeremías Arshad MD, 200 mg at 02/08/20 0928  •  aspirin chewable tablet 81 mg, 81 mg, Oral, Daily, Jeremías Arshad MD, 81 mg at 02/08/20 0928  •  carbidopa-levodopa (SINEMET)  MG per tablet 1 tablet, 1 tablet, Oral, TID, Jeremías Arshad MD, 1 tablet at 02/08/20 2051  •  castor oil-balsam peru (VENELEX) ointment, , Topical, Q12H, Jeremías Arshad MD  •  cetirizine (zyrTEC) tablet 5 mg, 5 mg, Oral, Daily PRN, Jeremías Arshad MD  •  clopidogrel  (PLAVIX) tablet 75 mg, 75 mg, Oral, Daily, Jeremías Arshad MD, 75 mg at 02/08/20 0928  •  diclofenac (VOLTAREN) 1 % gel 2 g, 2 g, Topical, BID, Jeremías Arshad MD, 2 g at 02/08/20 2052  •  docusate sodium (COLACE) capsule 100 mg, 100 mg, Oral, BID PRN, Jeremías Arshad MD  •  famotidine (PEPCID) tablet 20 mg, 20 mg, Oral, BID PRN, Jeremías Arshad MD  •  finasteride (PROSCAR) tablet 5 mg, 5 mg, Oral, Daily, Jeremías Arshad MD, 5 mg at 02/08/20 0928  •  furosemide (LASIX) injection 40 mg, 40 mg, Intravenous, Q12H, Chapo Hung MD, 40 mg at 02/08/20 2052  •  heparin (porcine) 5000 UNIT/ML injection 5,000 Units, 5,000 Units, Subcutaneous, Q8H, Jeremías Arshad MD, 5,000 Units at 02/09/20 0541  •  ipratropium-albuterol (DUO-NEB) nebulizer solution 3 mL, 3 mL, Nebulization, 4x Daily - RT, Jeremías Arshad MD, 3 mL at 02/09/20 0724  •  labetalol (NORMODYNE,TRANDATE) injection 10 mg, 10 mg, Intravenous, Q2H PRN, Jeremías Arshad MD  •  levothyroxine (SYNTHROID, LEVOTHROID) tablet 75 mcg, 75 mcg, Oral, Daily, Jeremías Arshad MD, 75 mcg at 02/08/20 0928  •  Magnesium Sulfate 2 gram Bolus, followed by 8 gram infusion (total Mg dose 10 grams)- Mg less than or equal to 1mg/dL, 2 g, Intravenous, PRN **OR** Magnesium Sulfate 2 gram / 50mL Infusion (GIVE X 3 BAGS TO EQUAL 6GM TOTAL DOSE) - Mg 1.1 - 1.5 mg/dl, 2 g, Intravenous, PRN **OR** Magnesium Sulfate 4 gram infusion- Mg 1.6-1.9 mg/dL, 4 g, Intravenous, PRN, Jeremías Arshad MD, Last Rate: 25 mL/hr at 02/07/20 1802, 4 g at 02/07/20 1802  •  melatonin tablet 5 mg, 5 mg, Oral, Nightly PRN, Jeremías Arshad MD  •  memantine (NAMENDA) tablet 10 mg, 10 mg, Oral, BID, Jeremías Arshad MD, 10 mg at 02/08/20 2051  •  multivitamin with minerals 1 tablet, 1 tablet, Oral, Daily, Jeremías Arshad MD, 1 tablet at 02/08/20 0928  •  ondansetron (ZOFRAN) tablet 4 mg, 4 mg,  Oral, Q6H PRN **OR** ondansetron (ZOFRAN) injection 4 mg, 4 mg, Intravenous, Q6H PRN, Jeremías Arshad MD  •  pantoprazole (PROTONIX) EC tablet 40 mg, 40 mg, Oral, QAM, Jeremías Arshad MD, 40 mg at 02/09/20 0541  •  Pharmacy Consult - [facility], , Does not apply, Daily, Chely Quintanilla, [facility]  •  Polyvinyl Alcohol-Povidone PF (HYPOTEARS) 1.4-0.6 % ophthalmic solution 2 drop, 2 drop, Both Eyes, Q1H PRN, Yaneli Pinto, [facility], 2 drop at 02/09/20 0544  •  pravastatin (PRAVACHOL) tablet 20 mg, 20 mg, Oral, Nightly, Jeremías Arshad MD, 20 mg at 02/08/20 2051  •  QUEtiapine (SEROquel) tablet 12.5 mg, 12.5 mg, Oral, Q6H PRN, Jeremías Arshad MD, 12.5 mg at 02/09/20 0121  •  sacubitril-valsartan (ENTRESTO) 24-26 MG tablet 0.5 tablet, 0.5 tablet, Oral, Q12H, Jeremías Arshad MD, 0.5 tablet at 02/08/20 2051  •  simethicone (MYLICON) chewable tablet 80 mg, 80 mg, Oral, 4x Daily PRN, Jeremías Arshad MD  •  sodium chloride 0.9 % flush 10 mL, 10 mL, Intravenous, Q12H, Jeremías Arshad MD, 10 mL at 02/08/20 2053  •  sodium chloride 0.9 % flush 10 mL, 10 mL, Intravenous, PRN, Jeremías Arshad MD    Physical Exam: General well-developed elderly white male lethargic with nasal O2 and a sat of 100% not dyspneic not tachypneic        HEENT: No obvious JVP is present, nasal O2 is in place.  Without oxygen the sat was still 96%       Respiratory: Equal bilateral symmetrical expansion bilateral crackles       Cardiovascular: Regular rate and rhythm without murmur gallop or click and trace edema       GI: Positive bowel sounds       Lower Extremities: No lesions       Neuro: Cannot assess due to lethargy       Skin: Warm and dry with trace edema to palpation       Psych: Patient is lethargic and does not awaken when examined    Results Review: Sinus rhythm no A. fib no tachyarrhythmias    Radiology Results:  Imaging Results (Last 72 Hours)     Procedure Component Value Units Date/Time     XR Chest 1 View [[id number]] Collected:  02/07/20 1436     Updated:  02/08/20 2012    Narrative:       EXAMINATION: XR CHEST 1 VW-02/07/2020:     INDICATION: Hypoxia, CHF.     COMPARISON: 01/02/2020.     FINDINGS: The heart shadow appears mildly enlarged. There is increasing  bilateral perihilar disease, suggesting interval development of CHF.  Patchy atelectasis in the medial left lower lobe appears increased. Mild  new right basilar discoid atelectasis is also noted. No pneumothorax is  seen. Right upper extremity PICC line has apparently been removed.       Impression:       Interval development of congestive heart failure.     D:  02/07/2020  E:  02/07/2020            This report was finalized on 2/8/2020 8:09 PM by Dr. Stiven Benedict MD.             EKG: Sinus rhythm    ECHO: EF 30%    Tele: Sinus rhythm no A. fib no tachycardia or bradycardia arrhythmias    Assessment   Assessment/Plan: 1 acute on chronic systolic heart failure-the patient is  receiving Lasix 40 mg IV every 12 hours with excellent diuretic response.  However creatinine is gone up slightly to 1.55.  We will continue for now.  If BUN and creatinine continue to rise or heart failure persist consider low-dose dobutamine or Primacor for couple days.  Is been a creatinine go up again on May have to hold Entresto therapy  2 CAD status post catheter-based intervention in 2018  3 the patient had overnight confusion hallucinations.  It sounds like even as an outpatient.he is deteriorating without eating much.  Dr. Ugarte will resume care tomorrow    Chapo Hung MD  02/09/20  8:36 AM

## 2020-02-10 NOTE — PLAN OF CARE
Problem: Patient Care Overview  Goal: Plan of Care Review  Outcome: Ongoing (interventions implemented as appropriate)  Flowsheets (Taken 2/10/2020 4400)  Outcome Summary: Pt lethargic, wife reports pt has been sleeping all day.  Pt does easily arrouse, but requires constant cues for keeping eyes open.  Bed mobility mod A with max visual/verbal cues.  STS max A with BLE block due to pt posture and foot placement.  SPT max A with BUE support and gait belt.  Recommend DC to SNF when appropriate

## 2020-02-10 NOTE — THERAPY TREATMENT NOTE
Patient Name: Murali Dennis  : 1929    MRN: [id number]                              Today's Date: 2/10/2020       Admit Date: 2020    Visit Dx:     ICD-10-CM ICD-9-CM   1. Impaired mobility and ADLs Z74.09 799.89     Patient Active Problem List   Diagnosis   • Dementia (CMS/[facility])   • Hypothyroid   • CKD (chronic kidney disease) stage 3, GFR 30-59 ml/min (CMS/[facility])   • Coronary artery disease involving native coronary artery of native heart without angina pectoris   • Debility   • A-fib (CMS/[facility])   • Benign prostatic hyperplasia without lower urinary tract symptoms   • Gastroesophageal reflux disease   • Ventricular tachycardia (CMS/[facility])   • Parkinson's disease (CMS/[facility])   • ANGELA on CPAP   • Cardiomyopathy (EF 30%)   • Mixed hyperlipidemia   • Pancreatitis   • Sepsis (CMS/[facility])   • Bacteremia due to Streptococcus   • Acute renal failure superimposed on stage 3 chronic kidney disease (CMS/[facility])   • Acute cholecystitis   • Acute on chronic systolic CHF (congestive heart failure) (CMS/[facility])   • Hypoxia   • Acute systolic heart failure (CMS/[facility])   • Deep tissue injury, right heel and right lateral ankle pressure ulcers   • COPD (chronic obstructive pulmonary disease) (CMS/[facility])     Past Medical History:   Diagnosis Date   • Alzheimer disease (CMS/[facility])    • Arthritis    • Warren esophagus     followed  with GI in [place]   • Benign prostatic hyperplasia without lower urinary tract symptoms 10/17/2018   • Chronic congestive heart failure (CMS/[facility]) 2017     echo report from 17 EF 60-65%, mild TR, mild AR ([place], [place]) Echocardiogram 17: EF 28%, mild to moderate MR    • COPD (chronic obstructive pulmonary disease) (CMS/[facility])    • Coronary artery disease    • Dementia (CMS/[facility])    • Diarrhea 2019   • Environmental allergies 10/17/2018   • Essential hypertension 10/17/2018   • Gastroesophageal reflux disease 2018   • GERD (gastroesophageal reflux disease)    •  Hypothyroid     started after amiodarone use in 2003   • Sleep apnea     wears cpap   • Stroke (CMS/HCC) 2003   • TIA (transient ischemic attack) 12/29/2017   • Unintended weight loss 10/17/2018     Past Surgical History:   Procedure Laterality Date   • CARDIAC CATHETERIZATION  2003   • CARDIAC CATHETERIZATION N/A 1/19/2018    Procedure: Left Heart Cath;  Surgeon: Hollis Ugarte MD;  Location:  [facility] CATH INVASIVE LOCATION;  Service:    • CARPAL TUNNEL RELEASE Right    • CATARACT EXTRACTION Bilateral    • CORONARY ARTERY BYPASS GRAFT  2003    Triple Bypass, @ [facility] [place], [place]   • ERCP N/A 12/30/2019    Procedure: ENDOSCOPIC RETROGRADE CHOLANGIOPANCREATOGRAPHY;  Surgeon: Arsh White MD;  Location:  [facility];  Service: Gastroenterology   • HEMORRHOIDECTOMY     • HERNIA REPAIR       General Information     Row Name 02/10/20 1533          PT Evaluation Time/Intention    Document Type  therapy note (daily note)  -AA     Mode of Treatment  physical therapy  -AA     Row Name 02/10/20 1533          General Information    Patient Profile Reviewed?  yes  -AA     Existing Precautions/Restrictions  fall;oxygen therapy device and L/min;other (see comments) dementia, parkinsons, spore  -AA     Row Name 02/10/20 1533          Cognitive Assessment/Intervention- PT/OT    Orientation Status (Cognition)  oriented to;person  -AA     Row Name 02/10/20 1533          Safety Issues, Functional Mobility    Impairments Affecting Function (Mobility)  cognition;endurance/activity tolerance;strength;balance;coordination;postural/trunk control;motor control  -AA       User Key  (r) = Recorded By, (t) = Taken By, (c) = Cosigned By    Initials Name Provider Type    AA Frances Hebert, PT Physical Therapist        Mobility     Row Name 02/10/20 1533          Bed Mobility Assessment/Treatment    Bed Mobility Assessment/Treatment  rolling right;scooting/bridging;supine-sit  -AA     Rolling Right  [place] (Bed Mobility)  moderate assist (50% patient effort);verbal cues;nonverbal cues (demo/gesture)  -AA     Scooting/Bridging [place] (Bed Mobility)  maximum assist (25% patient effort);verbal cues;nonverbal cues (demo/gesture)  -AA     Supine-Sit [place] (Bed Mobility)  moderate assist (50% patient effort);verbal cues;nonverbal cues (demo/gesture)  -     Assistive Device (Bed Mobility)  bed rails;draw sheet;head of bed elevated  -     Comment (Bed Mobility)  visual and verbal cues for sequencing with pt able to follow >75%  -     Row Name 02/10/20 1533          Transfer Assessment/Treatment    Comment (Transfers)  VC for posture, hand placement, proper stepping for SPT, and foot placement.  Pt unable to come to full stand.  Required visual and verbal cues for sequencing.    -     Row Name 02/10/20 1533          Bed-Chair Transfer    Bed-Chair [place] (Transfers)  maximum assist (25% patient effort);verbal cues;nonverbal cues (demo/gesture)  -     Assistive Device (Bed-Chair Transfers)  other (see comments) gait belt and BUE support  -     Row Name 02/10/20 1533          Sit-Stand Transfer    Sit-Stand [place] (Transfers)  maximum assist (25% patient effort);verbal cues;nonverbal cues (demo/gesture)  -     Assistive Device (Sit-Stand Transfers)  other (see comments) gait belt and BUE support  -     Row Name 02/10/20 1533          Gait/Stairs Assessment/Training    Comment (Gait/Stairs)  pt lethargic and unable to ambulate at this time  -       User Key  (r) = Recorded By, (t) = Taken By, (c) = Cosigned By    Initials Name Provider Type    Frances Cordova PT Physical Therapist        Obj/Interventions     Row Name 02/10/20 1533          Therapeutic Exercise    Comment (Therapeutic Exercise)  pt lethargic, unable to keep awake to perform  -     Row Name 02/10/20 1533          Static Sitting Balance    Level of [place] (Unsupported Sitting, Static Balance)   minimal assist, 75% patient effort  -AA     Sitting Position (Unsupported Sitting, Static Balance)  sitting on edge of bed  -AA     Time Able to Maintain Position (Unsupported Sitting, Static Balance)  2 to 3 minutes  -AA     Row Name 02/10/20 1533          Static Standing Balance    Level of [place] (Supported Standing, Static Balance)  moderate assist, 50 to 74% patient effort  -AA     Time Able to Maintain Position (Supported Standing, Static Balance)  30 to 45 seconds  -AA     Comment (Supported Standing, Static Balance)  gait belt and BUE support  -AA     Row Name 02/10/20 1533          Dynamic Standing Balance    Level of [place], Reaches Outside Midline (Standing, Dynamic Balance)  maximal assist, 25 to 49% patient effort  -AA     Time Able to Maintain Position, Reaches Outside Midline (Standing, Dynamic Balance)  15 to 30 seconds  -AA     Assistive Device Utilized (Supported Standing, Dynamic Balance)  other (see comments) gait belt and BUE support  -AA       User Key  (r) = Recorded By, (t) = Taken By, (c) = Cosigned By    Initials Name Provider Type    Frances Cordova, PT Physical Therapist        Goals/Plan     Row Name 02/10/20 1533          Bed Mobility Goal 1 (PT)    Progress/Outcomes (Bed Mobility Goal 1, PT)  goal ongoing  -[facility] Name 02/10/20 1533          Transfer Goal 1 (PT)    Progress/Outcome (Transfer Goal 1, PT)  goal ongoing  -[facility] Name 02/10/20 1533          Gait Training Goal 1 (PT)    Progress/Outcome (Gait Training Goal 1, PT)  goal ongoing  -[facility] Name 02/10/20 1533          Stairs Goal 1 (PT)    Progress/Outcome (Stairs Goal 1, PT)  goal ongoing  -     Row Name 02/10/20 1533          Patient Education Goal (PT)    Progress/Outcome (Patient Education Goal, PT)  goal ongoing  -       User Key  (r) = Recorded By, (t) = Taken By, (c) = Cosigned By    Initials Name Provider Type    Frances Cordova PT Physical Therapist        Clinical Impression     Row  Name 02/10/20 1533          Pain Assessment    Additional Documentation  Pain Scale: FACES Pre/Post-Treatment (Group)  -AA     Row Name 02/10/20 1533          Pain Scale: Numbers Pre/Post-Treatment    Pain Scale: Numbers, Pretreatment  0/10 - no pain  -AA     Pain Scale: Numbers, Post-Treatment  0/10 - no pain  -AA     Row Name 02/10/20 1533          Plan of Care Review    Plan of Care Reviewed With  patient;spouse;daughter  -AA     Progress  improving  -AA     Row Name 02/10/20 1533          Positioning and Restraints    Pre-Treatment Position  in bed  -AA     Post Treatment Position  chair  -AA     In Chair  notified nsg;exit alarm on;waffle cushion;legs elevated;reclined;heels elevated;with family/caregiver;call light within reach;encouraged to call for assist  -AA       User Key  (r) = Recorded By, (t) = Taken By, (c) = Cosigned By    Initials Name Provider Type    Frances Cordova, PT Physical Therapist        Outcome Measures     Row Name 02/10/20 1533          How much help from another person do you currently need...    Turning from your back to your side while in flat bed without using bedrails?  2  -AA     Moving from lying on back to sitting on the side of a flat bed without bedrails?  2  -AA     Moving to and from a bed to a chair (including a wheelchair)?  2  -AA     Standing up from a chair using your arms (e.g., wheelchair, bedside chair)?  2  -AA     Climbing 3-5 steps with a railing?  1  -AA     To walk in hospital room?  2  -AA     AM-PAC 6 Clicks Score (PT)  11  -AA     Row Name 02/10/20 1533          Functional Assessment    Outcome Measure Options  AM-PAC 6 Clicks Basic Mobility (PT)  -AA       User Key  (r) = Recorded By, (t) = Taken By, (c) = Cosigned By    Initials Name Provider Type    Frances Cordova, PT Physical Therapist          PT Recommendation and Plan     Outcome Summary/Treatment Plan (PT)  Anticipated Equipment Needs at Discharge (PT): front wheeled walker, bedside commode,  shower chair  Anticipated Discharge Disposition (PT): skilled nursing facility  Plan of Care Reviewed With: patient, spouse, daughter  Progress: improving  Outcome Summary: Pt lethargic, wife reports pt has been sleeping all day.  Pt does easily arrouse, but requires constant cues for keeping eyes open.  Bed mobility mod A with max visual/verbal cues.  STS max A with BLE block due to pt posture and foot placement.  SPT max A with BUE support and gait belt.  Recommend DC to SNF when appropriate     Time Calculation:   PT Charges     Row Name 02/10/20 1533             Time Calculation    Start Time  1533  -AA      PT Received On  02/10/20  -AA      PT Goal Re-Cert Due Date  02/19/20  -AA         Time Calculation- PT    Total Timed Code Minutes- PT  24 minute(s)  -AA         Timed Charges    68653 - PT Therapeutic Activity Minutes  24  -AA        User Key  (r) = Recorded By, (t) = Taken By, (c) = Cosigned By    Initials Name Provider Type    AA Frances Hebert, PT Physical Therapist        Therapy Charges for Today     Code Description Service Date Service Provider Modifiers Qty    [id number]  PT THERAPEUTIC ACT EA 15 MIN 2/10/2020 Frances Hebert, PT GP 2          PT G-Codes  Outcome Measure Options: AM-PAC 6 Clicks Basic Mobility (PT)  AM-PAC 6 Clicks Score (PT): 11  AM-PAC 6 Clicks Score (OT): 13 April CAROLYN Hebert PT  2/10/2020

## 2020-02-10 NOTE — PROGRESS NOTES
Discharge Planning Assessment  [facility]     Patient Name: Murali Dennis  MRN: [id number]  Today's Date: 2/10/2020    Admit Date: 2/7/2020    Discharge Needs Assessment     Row Name 02/10/20 1110       Living Environment    Lives With  spouse    Current Living Arrangements  home/apartment/condo    Primary Care Provided by  self    Provides Primary Care For  spouse    Family Caregiver if Needed  spouse    Quality of Family Relationships  supportive;involved;helpful    Able to Return to Prior Arrangements  yes       Resource/Environmental Concerns    Transportation Concerns  car, none       Transition Planning    Patient/Family Anticipates Transition to  inpatient rehabilitation facility    Patient/Family Anticipated Services at Transition      Transportation Anticipated  family or friend will provide       Discharge Needs Assessment    Readmission Within the Last 30 Days  current reason for admission unrelated to previous admission    Concerns to be Addressed  discharge planning    Equipment Currently Used at Home  wheelchair;walker, rolling;bipap/cpap;grab bar    Anticipated Changes Related to Illness  inability to care for self    Equipment Needed After Discharge  none    Outpatient/Agency/Support Group Needs  skilled nursing facility    Discharge Facility/Level of Care Needs  nursing facility, skilled    Current Discharge Risk  chronically ill;cognitively impaired        Discharge Plan     Row Name 02/10/20 1111       Plan    Plan  ONGOING    Provided post acute provider list?  Yes    Post Acute Provider List  Nursing Home;Inpatient Rehab    Post Acute Provider Quality & Resource List  Yes    Delivered To  Support Person    Support Person  wife    Method of Delivery  In person    Patient/Family in Agreement with Plan  yes    Plan Comments  Met with pt and wife at bedside.  They resides in [facility].  Pt requires assistance with most ADLs.  He is ambulatory with a RW.  He is current with [facility]  (SN, PT/OT/SLP.  Dx:  pancreatitis, CM, CHF, DM).  He recently complete acute rehab at [facility] (1/9-1/21).  Therapy recs STR again.  They are agreeable.  Per request CM called referrals to [facility], [facility] [place], and [facility].  Confirmed pt has Medicare and [place] insurance with Rx coverage.  CM will cont to follow.     Final Discharge Disposition Code  03 - skilled nursing facility (SNF)        Destination      Service Provider Request Status Selected Services Address Phone Number Fax Number    [facility] Pending - No Request Sent N/A [address] [facility] [id number] [phone]    [facility] Pending - No Request Sent N/A [address] [facility] [id number] [phone]    [facility] Pending - No [facility] Sent N/A [address] [facility] [id number] [phone]      Durable Medical Equipment      Coordination has not been started for this encounter.      Dialysis/Infusion      Coordination has not been started for this encounter.      Home Medical Care      Coordination has not been started for this encounter.      Therapy      Coordination has not been started for this encounter.      Community Resources      Coordination has not been started for this encounter.          Demographic Summary     Row Name 02/10/20 1109       General Information    Admission Type  inpatient    Referral Source  admission list    Reason for Consult  discharge planning    Preferred Language  English       Contact Information    Permission Granted to Share Info With      Contact Information Obtained for          Functional Status     Row Name 02/10/20 110       Functional Status    Usual Activity Tolerance  fair       Functional Status, IADL    Medications  assistive person    Meal Preparation  completely dependent    Housekeeping  completely dependent    Laundry   completely dependent    Shopping  completely dependent        Psychosocial    No documentation.       Abuse/Neglect    No documentation.       Legal    No documentation.       Substance Abuse    No documentation.       Patient Forms    No documentation.           Mouna Garcia RN

## 2020-02-10 NOTE — PROGRESS NOTES
Case Management Readmission Assessment Note       Case Management Readmission Assessment (all recorded)      Readmission Interview     Row Name 02/10/20 1108             Readmission Indications    Is this hospitalization related to the prior hospital diagnosis?  No      What was the reason you were admitted?  A/C CHF      Row Name 02/10/20 1108             Medications    Did you have newly prescribed medications at discharge?  Yes      Did you understand the reasons for your medications at discharge and how to take them?  Yes      Did you understand the side effects of your medications?  Yes      Are you taking all of you prescribed medications?  Yes      Row Name 02/10/20 1108             Discharge Instructions    Did you understand your discharge instructions?  Yes      Did your family/caregiver hear your instructions?  Yes      Were you told to eat a special diet?  Yes      Did you adhere to the diet?  Yes      Were you given a number of someone to call if you had questions or concerns?  Yes      Row Name 02/10/20 1108             Index discharge location/services    Where did you go upon discharge?  Acute Rehabilitation

## 2020-02-10 NOTE — PROGRESS NOTES
[facility] Medicine Services  PROGRESS NOTE    Patient Name: Murali Dennis  : 1929  MRN: [id number]    Date of Admission: 2020  Primary Care Physician: iDana Null APRN    Subjective   Subjective     CC:  Follow-up shortness of breath    HPI:  Patient resting calmly this morning.  He has reportedly been pretty sleepy likely because he was up the night before.  Eye drainage improving.  Reportedly eating small amounts.  No diarrhea.  Minimally conversational today.        Review of Systems  Not currently answering review of systems questioning    Objective   Objective     Vital Signs:   Temp:  [97.4 °F (36.3 °C)-97.8 °F (36.6 °C)] 97.4 °F (36.3 °C)  Heart Rate:  [50-67] 53  Resp:  [16-18] 16  BP: (105-120)/(52-62) 120/62        Physical Exam:  Constitutional: Awake, alert  Eyes: Some mild bilateral ocular drainage and erythema,  pupils equal, sclerae anicteric, no conjunctival injection  HENT: NCAT, mucous membranes moist  Neck: Supple, no thyromegaly, no lymphadenopathy, trachea midline  Respiratory:  Only mild Rales at the bases bilaterally, more clear at the apex bilaterally, borderline tachypnea  Cardiovascular: Regular, telemetry appears sinus rhythm, palpable radial pulses bilaterally  Gastrointestinal: Positive bowel sounds, soft, nontender, nondistended  Musculoskeletal: Elderly and frail, debilitated in appearance, BMI is 25, trace bilateral lower extremity edema  Psychiatric: Demented affect, cooperative  Neurologic: Not currently oriented and more somnolent today, follows simple commands and answer simple questions however not always appropriately, no slurred speech or facial droop    Skin: No rashes or jaundice      Results Reviewed:  Results from last 7 days   Lab Units 02/10/20  0402 20  0354 20  0506   WBC 10*3/mm3 4.81 4.80 5.31   HEMOGLOBIN g/dL 9.9* 10.3* 10.9*   HEMATOCRIT % 31.0* 32.5* 35.3*   PLATELETS 10*3/mm3 143 136* 159     Results  from last 7 days   Lab Units 02/10/20  0402 02/09/20  0354 02/08/20  0506 02/07/20  1401   SODIUM mmol/L 138 141 139 140   POTASSIUM mmol/L 3.4* 3.7 3.9 3.5   CHLORIDE mmol/L 101 102 103 103   CO2 mmol/L 25.0 23.0 23.0 23.0   BUN mg/dL 18 19 19 20   CREATININE mg/dL 1.58* 1.55* 1.49* 1.53*   GLUCOSE mg/dL 122* 120* 133* 124*   CALCIUM mg/dL 8.2 8.4 9.0 8.9   ALT (SGPT) U/L  --   --   --  9   AST (SGOT) U/L  --   --   --  39   PROBNP pg/mL  --  13,604.0*  --  12,195.0*     Estimated Creatinine Clearance: 35.5 mL/min (A) (by C-G formula based on SCr of 1.58 mg/dL (H)).    Microbiology Results Abnormal     Procedure Component Value - Date/Time    Blood Culture - Blood, Hand, Right [[id number]] Collected:  02/07/20 1401    Lab Status:  Preliminary result Specimen:  Blood from Hand, Right Updated:  02/09/20 1431     Blood Culture No growth at 2 days    Narrative:       Aerobic bottle only      Blood Culture - Blood, Arm, Left [[id number]] Collected:  02/07/20 1401    Lab Status:  Preliminary result Specimen:  Blood from Arm, Left Updated:  02/09/20 1431     Blood Culture No growth at 2 days    Urine Culture - Urine, Urine, Clean Catch [[id number]]  (Abnormal)  (Susceptibility) Collected:  02/07/20 1905    Lab Status:  Final result Specimen:  Urine, Clean Catch Updated:  02/09/20 0147     Urine Culture 25,000 CFU/mL Escherichia coli    Susceptibility      Escherichia coli     AMY     Ampicillin Susceptible     Ampicillin + Sulbactam Susceptible     Cefazolin Susceptible     Cefepime Susceptible     Ceftazidime Susceptible     Ceftriaxone Susceptible     Gentamicin Susceptible     Levofloxacin Susceptible     Nitrofurantoin Susceptible     Piperacillin + Tazobactam Susceptible     Tetracycline Susceptible     Trimethoprim + Sulfamethoxazole Susceptible                    Respiratory Panel, PCR - Swab, Nasopharynx [[id number]]  (Normal) Collected:  02/07/20 1545    Lab Status:  Final result Specimen:  Swab from Nasopharynx  Updated:  02/07/20 1731     ADENOVIRUS, PCR Not Detected     Coronavirus 229E Not Detected     Coronavirus HKU1 Not Detected     Coronavirus NL63 Not Detected     Coronavirus OC43 Not Detected     Human Metapneumovirus Not Detected     Human Rhinovirus/Enterovirus Not Detected     Influenza B PCR Not Detected     Parainfluenza Virus 1 Not Detected     Parainfluenza Virus 2 Not Detected     Parainfluenza Virus 3 Not Detected     Parainfluenza Virus 4 Not Detected     Bordetella pertussis pcr Not Detected     Influenza A H1 2009 PCR Not Detected     Chlamydophila pneumoniae PCR Not Detected     Mycoplasma pneumo by PCR Not Detected     Influenza A PCR Not Detected     Influenza A H3 Not Detected     Influenza A H1 Not Detected     RSV, PCR Not Detected     Bordetella parapertussis PCR Not Detected          Imaging Results (Last 24 Hours)     ** No results found for the last 24 hours. **          Results for orders placed during the hospital encounter of 12/27/19   Adult Transthoracic Echo Complete W/ Cont if Necessary Per Protocol    Narrative · The left ventricular cavity is moderately dilated.  · Right ventricular cavity is mild-to-moderately dilated.  · Left atrial cavity size is moderate-to-severely dilated.  · Moderate aortic valve regurgitation is present.  · Moderate mitral valve regurgitation is present.  · Mild to moderate tricuspid valve regurgitation is present.  · Calculated right ventricular systolic pressure from tricuspid   regurgitation is 35 mmHg.  · Estimated EF = 30%.  · Left ventricular systolic function is severely decreased.  · Left ventricular diastolic dysfunction (grade II) consistent with   pseudonormalization.  · The findings are consistent with dilated cardiomyopathy.  · Mild pulmonary hypertension is present.  · There is no evidence of pericardial effusion.  · The aortic valve exhibits moderate sclerosis without stenosis.          I have reviewed the medications:  Scheduled  Meds:    amiodarone 200 mg Oral Daily   aspirin 81 mg Oral Daily   carbidopa-levodopa 1 tablet Oral TID   castor oil-balsam peru  Topical Q12H   clopidogrel 75 mg Oral Daily   diclofenac 2 g Topical BID   finasteride 5 mg Oral Daily   heparin (porcine) 5,000 Units Subcutaneous Q8H   ipratropium-albuterol 3 mL Nebulization 4x Daily - RT   levothyroxine 75 mcg Oral Daily   melatonin 2.5 mg Oral Q24H   memantine 10 mg Oral BID   multivitamin with minerals 1 tablet Oral Daily   pantoprazole 40 mg Oral QAM   pharmacy consult - MTM  Does not apply Daily   potassium chloride 40 mEq Oral Once   pravastatin 20 mg Oral Nightly   sacubitril-valsartan 0.5 tablet Oral Q12H   sodium chloride 10 mL Intravenous Q12H     Continuous Infusions:   PRN Meds:.•  acetaminophen **OR** acetaminophen **OR** acetaminophen  •  cetirizine  •  docusate sodium  •  famotidine  •  labetalol  •  magnesium sulfate **OR** magnesium sulfate **OR** magnesium sulfate  •  melatonin  •  ondansetron **OR** ondansetron  •  Polyvinyl Alcohol-Povidone PF  •  QUEtiapine  •  simethicone  •  sodium chloride    Assessment/Plan   Assessment & Plan     Active Hospital Problems    Diagnosis  POA   • **Acute on chronic systolic CHF (congestive heart failure) (CMS/[facility]) [I50.23]  Yes   • Hypoxia [R09.02]  Yes   • Acute systolic heart failure (CMS/[facility]) [I50.21]  Yes   • Deep tissue injury, right heel and right lateral ankle pressure ulcers [T14.8XXA]  Yes   • COPD (chronic obstructive pulmonary disease) (CMS/[facility]) [J44.9]  Yes   • Cardiomyopathy (EF 30%) [I42.9]  Yes   • ANGELA on CPAP [G47.33, Z99.89]  Not Applicable   • Parkinson's disease (CMS/[facility]) [G20]  Yes   • Gastroesophageal reflux disease [K21.9]  Yes   • A-fib (CMS/[facility]) [I48.91]  Yes   • CKD (chronic kidney disease) stage 3, GFR 30-59 ml/min (CMS/[facility]) [N18.3]  Yes   • Coronary artery disease involving native coronary artery of native heart without angina pectoris [I25.10]  Yes   • Debility [R53.81]  Yes   •  Dementia (CMS/[facility]) [F03.90]  Yes      Resolved Hospital Problems   No resolved problems to display.        Brief Hospital Course to date:  Murali Dennis is a 89 y.o. male presents the hospital with worsening dyspnea with acute on chronic systolic congestive heart failure exacerbation.    Plan: Transition Lasix to oral today as patient appears euvolemic.  Plan to follow-up further on cardiology recommendations.  Renal function reasonably stable.  Replace potassium.  Seroquel given last night to assist with sleep.  Continue as needed.  Continue very low-dose.  Encourage diet.  Continue eyedrops.  Repeat urinalysis negative for UTI.  Initial urinalysis with very small count of E. coli likely contaminant.  Patient is not felt to have urinary tract infection.  No diarrhea.  Repeat laboratory studies tomorrow.  Plan discussed with cardiology today.  Plan to discuss a multidisciplinary rounds with case management, nursing, and pharmacist today as well.  Oxygen weaned down to 1 L may be able to go on room air today.  Otherwise as per below     Acute on chronic systolic congestive heart failure exacerbation, EF 30%:  Entresto  IV diuresis completed  Cardiology consult team following as per family request     Acute hypoxia:  Due to CHF.  Oxygen as needed, wean as tolerated.  Initially 87% on room air and now much improved     COPD: Scheduled nebulizers.  Incentive spirometer.     Paroxysmal atrial fibrillation: Currently appears sinus rhythm.  Family state he is not on anticoagulation but takes aspirin and Plavix for coronary artery disease.  They have discussed this with cardiologist in the past.  Plan to continue home regimen for now.  Telemetry monitoring.  Amiodarone rhythm control.     Parkinson's disease: Stable.  Continue home medication.     Gastroesophageal reflux disease: Stable.  PPI.     History of impaired glucose tolerance: Family deny any recent hypoglycemia.  No clear indication for correction insulin  currently.  Previous A1c's have been borderline.  Diet controlled.     Debility: PT OT.  Up with support only.     Stage III chronic kidney disease: Baseline creatinine appears to range from 1.2-1.6.  Initially 1.5 this day at baseline.  Monitor.     Dementia with sundowning: Continue home medication.  Supportive care.  PT OT.  Family requesting something if he becomes agitated at night, avoid Ativan and other benzodiazepine.  Seroquel low-dose as needed.     Deep tissue injury, right heel and right lateral ankle pressure ulcers present on admission: Wound care to evaluate and treat.     Recent C. difficile colitis: Spore precaution.  No current diarrhea.  Monitor and avoid antibiotics unless absolutely necessary.     DVT prophylaxis: Heparin     Disposition: I expect the patient to be discharged location to be determined when improved    CODE STATUS:   Code Status and Medical Interventions:   Ordered at: 02/07/20 1402     Limited Support to NOT Include:    Intubation     Code Status:    No CPR     Medical Interventions (Level of Support Prior to Arrest):    Limited         Electronically signed by Jeremías Arshad MD, 02/10/20, 9:38 AM.

## 2020-02-10 NOTE — PLAN OF CARE
VSS on 2L NC overnight.  Pt rested well this shift with wife at bedside.  Pt and family declined bath and minimal movement of pt so that pt could get some much needed rest this shift.  Pt without complaints, given PRN Tylenol for pain.  Pt's eyes remain reddened with yellow discharge in both.  Pt's eyes periodically wiped with warm damp cloth and eye drops instilled in both.  No acute distress or complaints to note at this time.

## 2020-02-11 NOTE — PLAN OF CARE
Problem: Patient Care Overview  Goal: Plan of Care Review  Outcome: Ongoing (interventions implemented as appropriate)  Flowsheets (Taken 2/11/2020 0048)  Plan of Care Reviewed With: patient; spouse  Outcome Summary: Pt worked with PT today. Pt put on CPAP for most of day to try to improve lehargy. Pt slightly more awake this evening. Pt drank an entire milkshake after hardly any intake all day. Rehab placement pending. VSS on 1L currently, will continue to monitor.

## 2020-02-11 NOTE — PROGRESS NOTES
[facility] Medicine Services  PROGRESS NOTE    Patient Name: Murali Dennis  : 1929  MRN: [id number]    Date of Admission: 2020  Primary Care Physician: Diana Null APRN    Subjective   Subjective     CC:  Follow-up shortness of breath    HPI:  Patient very drowsy, wife in room. Answers some questions, but is disoriented to place/ time. Has not been using CPAP at night since admission. Wife says has had CPAP at home for over 10 years, but recently has been taking off saying it cristian him. Not eating or drinking well. Wife doesn't think he has had a BM in over 2 days. No other concerns. Patient denies pain.     Review of Systems  Unable to assess due to mentation     Objective   Objective     Vital Signs:   Temp:  [97.7 °F (36.5 °C)-98.8 °F (37.1 °C)] 98 °F (36.7 °C)  Heart Rate:  [54-64] 55  Resp:  [16-18] 18  BP: (111-136)/(52-73) 120/69        Physical Exam:  Constitutional: Awake, alert  Eyes: Some mild bilateral conjunctival erythema/ no drainage,  pupils equal, sclerae anicteric  HENT: NCAT, mucous membranes moist  Neck: Supple, trachea midline  Respiratory:  Bibasilar fine rales, more clear at the apex bilaterally, remains on 1LNC at 93%, desats to 88% on RA  Cardiovascular: Bradycardia, palpable radial pulses bilaterally  Gastrointestinal: Positive bowel sounds, soft, nontender, nondistended  Musculoskeletal: Elderly and frail, debilitated in appearance  Psychiatric: flat affect, cooperative  Neurologic: Oriented to person only, follows simple commands, no facial asymmetry with clear speech   Skin: No rashes or jaundice      Results Reviewed:  Results from last 7 days   Lab Units 02/10/20  0402 20  0354 20  0506   WBC 10*3/mm3 4.81 4.80 5.31   HEMOGLOBIN g/dL 9.9* 10.3* 10.9*   HEMATOCRIT % 31.0* 32.5* 35.3*   PLATELETS 10*3/mm3 143 136* 159     Results from last 7 days   Lab Units 20  0936 02/10/20  0402 20  0354  20  1401      SODIUM mmol/L 137 138 141   < > 140   POTASSIUM mmol/L 3.8 3.4* 3.7   < > 3.5   CHLORIDE mmol/L 102 101 102   < > 103   CO2 mmol/L 22.0 25.0 23.0   < > 23.0   BUN mg/dL 21 18 19   < > 20   CREATININE mg/dL 1.65* 1.58* 1.55*   < > 1.53*   GLUCOSE mg/dL 146* 122* 120*   < > 124*   CALCIUM mg/dL 8.5 8.2 8.4   < > 8.9   ALT (SGPT) U/L  --   --   --   --  9   AST (SGOT) U/L  --   --   --   --  39   PROBNP pg/mL  --   --  13,604.0*  --  12,195.0*    < > = values in this interval not displayed.     Estimated Creatinine Clearance: 34 mL/min (A) (by C-G formula based on SCr of 1.65 mg/dL (H)).    Microbiology Results Abnormal     Procedure Component Value - Date/Time    Blood Culture - Blood, Hand, Right [[id number]] Collected:  02/07/20 1401    Lab Status:  Preliminary result Specimen:  Blood from Hand, Right Updated:  02/10/20 1431     Blood Culture No growth at 3 days    Narrative:       Aerobic bottle only      Blood Culture - Blood, Arm, Left [[id number]] Collected:  02/07/20 1401    Lab Status:  Preliminary result Specimen:  Blood from Arm, Left Updated:  02/10/20 1431     Blood Culture No growth at 3 days    Urine Culture - Urine, Urine, Clean Catch [[id number]]  (Abnormal)  (Susceptibility) Collected:  02/07/20 1905    Lab Status:  Final result Specimen:  Urine, Clean Catch Updated:  02/09/20 0147     Urine Culture 25,000 CFU/mL Escherichia coli    Susceptibility      Escherichia coli     AMY     Ampicillin Susceptible     Ampicillin + Sulbactam Susceptible     Cefazolin Susceptible     Cefepime Susceptible     Ceftazidime Susceptible     Ceftriaxone Susceptible     Gentamicin Susceptible     Levofloxacin Susceptible     Nitrofurantoin Susceptible     Piperacillin + Tazobactam Susceptible     Tetracycline Susceptible     Trimethoprim + Sulfamethoxazole Susceptible                    Respiratory Panel, PCR - Swab, Nasopharynx [[id number]]  (Normal) Collected:  02/07/20 1545    Lab Status:  Final result Specimen:  Swab  from Nasopharynx Updated:  02/07/20 1731     ADENOVIRUS, PCR Not Detected     Coronavirus 229E Not Detected     Coronavirus HKU1 Not Detected     Coronavirus NL63 Not Detected     Coronavirus OC43 Not Detected     Human Metapneumovirus Not Detected     Human Rhinovirus/Enterovirus Not Detected     Influenza B PCR Not Detected     Parainfluenza Virus 1 Not Detected     Parainfluenza Virus 2 Not Detected     Parainfluenza Virus 3 Not Detected     Parainfluenza Virus 4 Not Detected     Bordetella pertussis pcr Not Detected     Influenza A H1 2009 PCR Not Detected     Chlamydophila pneumoniae PCR Not Detected     Mycoplasma pneumo by PCR Not Detected     Influenza A PCR Not Detected     Influenza A H3 Not Detected     Influenza A H1 Not Detected     RSV, PCR Not Detected     Bordetella parapertussis PCR Not Detected          Imaging Results (Last 24 Hours)     ** No results found for the last 24 hours. **          Results for orders placed during the hospital encounter of 12/27/19   Adult Transthoracic Echo Complete W/ Cont if Necessary Per Protocol    Narrative · The left ventricular cavity is moderately dilated.  · Right ventricular cavity is mild-to-moderately dilated.  · Left atrial cavity size is moderate-to-severely dilated.  · Moderate aortic valve regurgitation is present.  · Moderate mitral valve regurgitation is present.  · Mild to moderate tricuspid valve regurgitation is present.  · Calculated right ventricular systolic pressure from tricuspid   regurgitation is 35 mmHg.  · Estimated EF = 30%.  · Left ventricular systolic function is severely decreased.  · Left ventricular diastolic dysfunction (grade II) consistent with   pseudonormalization.  · The findings are consistent with dilated cardiomyopathy.  · Mild pulmonary hypertension is present.  · There is no evidence of pericardial effusion.  · The aortic valve exhibits moderate sclerosis without stenosis.          I have reviewed the  medications:  Scheduled Meds:    amiodarone 200 mg Oral Daily   aspirin 81 mg Oral Daily   carbidopa-levodopa 1 tablet Oral TID   castor oil-balsam peru  Topical Q12H   clopidogrel 75 mg Oral Daily   diclofenac 2 g Topical BID   finasteride 5 mg Oral Daily   furosemide 20 mg Oral Daily   heparin (porcine) 5,000 Units Subcutaneous Q8H   ipratropium-albuterol 3 mL Nebulization 4x Daily - RT   levothyroxine 75 mcg Oral Daily   melatonin 2.5 mg Oral Q24H   memantine 10 mg Oral BID   multivitamin with minerals 1 tablet Oral Daily   pantoprazole 40 mg Oral QAM   pharmacy consult - MTM  Does not apply Daily   pravastatin 20 mg Oral Nightly   sacubitril-valsartan 0.5 tablet Oral Q12H   sodium chloride 10 mL Intravenous Q12H     Continuous Infusions:   PRN Meds:.•  acetaminophen **OR** acetaminophen **OR** acetaminophen  •  cetirizine  •  docusate sodium  •  famotidine  •  labetalol  •  magnesium sulfate **OR** magnesium sulfate **OR** magnesium sulfate  •  melatonin  •  ondansetron **OR** ondansetron  •  Polyvinyl Alcohol-Povidone PF  •  QUEtiapine  •  simethicone  •  sodium chloride    Assessment/Plan   Assessment & Plan     Active Hospital Problems    Diagnosis  POA   • **Acute on chronic systolic CHF (congestive heart failure) (CMS/[facility]) [I50.23]  Yes   • Hypoxia [R09.02]  Yes   • Acute systolic heart failure (CMS/[facility]) [I50.21]  Yes   • Deep tissue injury, right heel and right lateral ankle pressure ulcers [T14.8XXA]  Yes   • COPD (chronic obstructive pulmonary disease) (CMS/[facility]) [J44.9]  Yes   • Cardiomyopathy (EF 30%) [I42.9]  Yes   • ANGELA on CPAP [G47.33, Z99.89]  Not Applicable   • Parkinson's disease (CMS/[facility]) [G20]  Yes   • Gastroesophageal reflux disease [K21.9]  Yes   • A-fib (CMS/[facility]) [I48.91]  Yes   • CKD (chronic kidney disease) stage 3, GFR 30-59 ml/min (CMS/[facility]) [N18.3]  Yes   • Coronary artery disease involving native coronary artery of native heart without angina pectoris [I25.10]  Yes   • Debility [R53.81]   Yes   • Dementia (CMS/[facility]) [F03.90]  Yes      Resolved Hospital Problems   No resolved problems to display.        Brief Hospital Course to date:  Murali Dennis is a 89 y.o. male presents the hospital with worsening dyspnea with acute on chronic systolic congestive heart failure exacerbation.     Acute on chronic systolic congestive heart failure exacerbation, EF 30%:  Entresto  IV diuresis completed  Cardiology consult team following as per family request  --Cont current meds, and diuresis, will cont to watch labs closely     Acute hypoxia:  Due to CHF.  Oxygen as needed, wean as tolerated.  Initially 87% on room air and now much improved   --currently on 1LNC     ANGELA  CPAP at home nightly for over a decade  Has not used CPAP this admission per RT patient has been refusing, called again today to have CPAP brought to room and discussed with patient and wife he needs to use today to his drowsiness. Possibly could have CO2 retention, but will wait for ABG at this time.   --consider ABG if becomes more lethargic      COPD: Scheduled nebulizers.  Incentive spirometer.     Paroxysmal atrial fibrillation:   --plavix and ASA      Parkinson's disease: Stable.  Continue home medication.     Gastroesophageal reflux disease: Stable.  PPI.     History of impaired glucose tolerance: Family deny any recent hypoglycemia.  No clear indication for correction insulin currently.  Previous A1c's have been borderline.  Diet controlled.     Debility: PT OT.  Up with support only.     Stage III chronic kidney disease: Baseline creatinine appears to range from 1.2-1.6.  Initially 1.5 this stay, stable, but will continue to monitor closely while on diuretic therapy and minimal oral intake.      Dementia with sundowning: Continue home medication.  Supportive care.  PT OT.  Family requesting something if he becomes agitated at night, avoid Ativan and other benzodiazepine.  Seroquel low-dose as needed.     Deep tissue injury, right heel  and right lateral ankle pressure ulcers present on admission: Wound care to evaluate and treat.     Recent C. difficile colitis: Spore precaution.  No current diarrhea.  Monitor and avoid antibiotics unless absolutely necessary.     DVT prophylaxis: Heparin     Disposition: I expect the patient to be discharged location to be determined when improved. CM looking at inpatient rehab     CODE STATUS:   Code Status and Medical Interventions:   Ordered at: 02/07/20 1402     Limited Support to NOT Include:    Intubation     Code Status:    No CPR     Medical Interventions (Level of Support Prior to Arrest):    Limited         Electronically signed by ARCADIO Ramires, 02/11/20, 10:56 AM.

## 2020-02-11 NOTE — PLAN OF CARE
Problem: Patient Care Overview  Goal: Plan of Care Review  Flowsheets (Taken 2/11/2020 4642)  Progress: improving  Plan of Care Reviewed With: patient; spouse  Outcome Summary: Able to roll L/R w/ max A using bedrail & draw sheet, scoot to HOB x 2 w/ max 2A using draw sheet (pt attempting bridging to assist), perform ther exer all extrem in sup. w/ mult rests d/t diffic staying focused on task, & eyes drifting closed d/t lethargy & fatigue, & performed sit bal activ in norton's posn. (holding trunk in midline while bringing milkshake to mouth/taking pill; holding head erect; focusing at object held in front of face); def. transf.to chair per nsg req.;limited by impaired cognit(baseline Dem.), mich (HR in low 50's), elev , low HGB (9.9), weakness from not eating & poor sleep (will start CPAP)

## 2020-02-11 NOTE — THERAPY TREATMENT NOTE
Patient Name: Murali Dennis  : 1929    MRN: [id number]                              Today's Date: 2020       Admit Date: 2020    Visit Dx:     ICD-10-CM ICD-9-CM   1. Impaired mobility and ADLs Z74.09 799.89     Patient Active Problem List   Diagnosis   • Dementia (CMS/[facility])   • Hypothyroid   • CKD (chronic kidney disease) stage 3, GFR 30-59 ml/min (CMS/[facility])   • Coronary artery disease involving native coronary artery of native heart without angina pectoris   • Debility   • A-fib (CMS/[facility])   • Benign prostatic hyperplasia without lower urinary tract symptoms   • Gastroesophageal reflux disease   • Ventricular tachycardia (CMS/[facility])   • Parkinson's disease (CMS/[facility])   • ANGELA on CPAP   • Cardiomyopathy (EF 30%)   • Mixed hyperlipidemia   • Pancreatitis   • Sepsis (CMS/[facility])   • Bacteremia due to Streptococcus   • Acute renal failure superimposed on stage 3 chronic kidney disease (CMS/[facility])   • Acute cholecystitis   • Acute on chronic systolic CHF (congestive heart failure) (CMS/[facility])   • Hypoxia   • Acute systolic heart failure (CMS/[facility])   • Deep tissue injury, right heel and right lateral ankle pressure ulcers   • COPD (chronic obstructive pulmonary disease) (CMS/[facility])     Past Medical History:   Diagnosis Date   • Alzheimer disease (CMS/[facility])    • Arthritis    • Warren esophagus     followed  with GI in [place]   • Benign prostatic hyperplasia without lower urinary tract symptoms 10/17/2018   • Chronic congestive heart failure (CMS/[facility]) 2017     echo report from 17 EF 60-65%, mild TR, mild AR ([place], [place]) Echocardiogram 17: EF 28%, mild to moderate MR    • COPD (chronic obstructive pulmonary disease) (CMS/[facility])    • Coronary artery disease    • Dementia (CMS/[facility])    • Diarrhea 2019   • Environmental allergies 10/17/2018   • Essential hypertension 10/17/2018   • Gastroesophageal reflux disease 2018   • GERD (gastroesophageal reflux disease)    •  Hypothyroid     started after amiodarone use in 2003   • Sleep apnea     wears cpap   • Stroke (CMS/HCC) 2003   • TIA (transient ischemic attack) 12/29/2017   • Unintended weight loss 10/17/2018     Past Surgical History:   Procedure Laterality Date   • CARDIAC CATHETERIZATION  2003   • CARDIAC CATHETERIZATION N/A 1/19/2018    Procedure: Left Heart Cath;  Surgeon: Hollis Ugarte MD;  Location:  [facility];  Service:    • CARPAL TUNNEL RELEASE Right    • CATARACT EXTRACTION Bilateral    • CORONARY ARTERY BYPASS GRAFT  2003    Triple Bypass, @ [facility] @ [place], [place]   • ERCP N/A 12/30/2019    Procedure: ENDOSCOPIC RETROGRADE CHOLANGIOPANCREATOGRAPHY;  Surgeon: Arsh White MD;  Location:  [facility];  Service: Gastroenterology   • HEMORRHOIDECTOMY     • HERNIA REPAIR       General Information     Row Name 02/11/20 1637          PT Evaluation Time/Intention    Document Type  therapy note (daily note)  -DM     Mode of Treatment  physical therapy  -DM     Row Name 02/11/20 163          General Information    Existing Precautions/Restrictions  fall;oxygen therapy device and L/min;other (see comments) Dem.; Park.Dz; Cdiff (spore precaut);pressure ulcers R heel & R lat ankle  -DM     Barriers to Rehab  cognitive status;hearing deficit;medically complex;previous functional deficit  -DM       User Key  (r) = Recorded By, (t) = Taken By, (c) = Cosigned By    Initials Name Provider Type    DM Vero Bernabe, PT Physical Therapist        Mobility     Row Name 02/11/20 3575          Bed Mobility Assessment/Treatment    Bed Mobility Assessment/Treatment  rolling right;rolling left;scooting/bridging;supine-sit R.T. brought CPAP, & did neb rx while PT talkinig w/wife;pt has pulled Gown off x 3, & hasn't been resting well d/t respir status,plus hasn't been eating, so extremely fatigued;Nsg in w/milkshake to give pill;diffic swallowing  -DM     Rolling Left [place]  (Bed Mobility)  verbal cues;moderate assist (50% patient effort) rolled x 2: to place dry flow pad (pt.leaking BM d/t Cdiff), then to reposn pad; PT guided R hand to L rail & cued to grasp  -DM     Rolling Right [place] (Bed Mobility)  verbal cues;moderate assist (50% patient effort) Pad plcmt,then reposn.; PT cueing to grasp L rail  -DM     Scooting/Bridging [place] (Bed Mobility)  verbal cues;maximum assist (25% patient effort);2 person assist scooted to HOB x 2 w/max A PT & nsg(slid down btwn boosts;pt attempting to bridge, to assist)  -DM     Supine-Sit [place] (Bed Mobility)  dependent (less than 25% patient effort);verbal cues bed placed in norton's,to determine if pt brunilda. upright (HR 54)  -DM     Assistive Device (Bed Mobility)  bed rails;draw sheet  -DM     Row Name 02/11/20 1635          Transfer Assessment/Treatment    Comment (Transfers)  def. per nsg req., d/t lethargy today  -DM     Row Name 02/11/20 1635          Gait/Stairs Assessment/Training    [place] Level (Gait)  unable to assess  -DM       User Key  (r) = Recorded By, (t) = Taken By, (c) = Cosigned By    Initials Name Provider Type    DM Vero Bernabe, PT Physical Therapist        Obj/Interventions     Row Name 02/11/20 1635          Therapeutic Exercise    Upper Extremity Range of Motion (Therapeutic Exercise)  shoulder flexion/extension, bilateral;shoulder abduction/adduction, bilateral;shoulder horizontal abduction/adduction, bilateral;shoulder internal/external rotation, bilateral;elbow flexion/extension, bilateral  -DM     Lower Extremity (Therapeutic Exercise)  gluteal sets;hamstring sets, bilateral;heel slides, bilateral;quad sets, bilateral;SAQ (short arc quad), bilateral car hip add.squeezes; TC's  -DM     Lower Extremity Range of Motion (Therapeutic Exercise)  hip abduction/adduction, bilateral;hip internal/external rotation, bilateral;ankle dorsiflexion/plantar flexion, bilateral abdom sets; bridging x 2  -DM      Exercise Type (Therapeutic Exercise)  AAROM (active assistive range of motion);AROM (active range of motion);isometric contraction, static  -DM     Position (Therapeutic Exercise)  supine  -DM     Sets/Reps (Therapeutic Exercise)  1/10  -DM     Expected Outcome (Therapeutic Exercise)  improve functional tolerance, single extremity activity;improve functional stability  -DM     Comment (Therapeutic Exercise)  diffic attending to task; eyes drift shut freq;cues to focus (PT counting reps aloud to engage pt)  -DM     Row Name 02/11/20 1635          Static Sitting Balance    Level of [place] (Unsupported Sitting, Static Balance)  dependent, less than 25% patient effort  -DM     Sitting Position (Unsupported Sitting, Static Balance)  long sitting on bed  -DM     Time Able to Maintain Position (Unsupported Sitting, Static Balance)  4 to 5 minutes  -DM     Comment (Unsupported Sitting, Static Balance)  Holding trunk in midline; grasping milkshake & bringing straw to mouth; PLB  -DM       User Key  (r) = Recorded By, (t) = Taken By, (c) = Cosigned By    Initials Name Provider Type    DM Vero Bernabe, PT Physical Therapist        Goals/Plan    No documentation.       Clinical Impression     Row Name 02/11/20 1635          Pain Assessment    Additional Documentation  Pain Scale: FACES Pre/Post-Treatment (Group)  -DM     Row Name 02/11/20 1635          Pain Scale: Numbers Pre/Post-Treatment    Pain Location - Side  Left  -DM     Pain Location - Orientation  upper  -DM     Pain Location  extremity  -DM     Pain Intervention(s)  Repositioned;Elevated;Rest  -DM     Row Name 02/11/20 1635          Pain Scale: FACES Pre/Post-Treatment    Pain: FACES Scale, Pretreatment  0-->no hurt  -DM     Pain: FACES Scale, Post-Treatment  2-->hurts little bit  -DM     Pre/Post Treatment Pain Comment  PREV. LUE injury 5 yrs ago (humberto pierced)  -DM     Row Name 02/11/20 1635          Vital Signs    Pre Systolic BP Rehab  120  -DM      Pre Treatment Diastolic BP  69  -DM     Intra Systolic BP Rehab  117  -DM     Intra Treatment Diastolic BP  67  -DM     Post Systolic BP Rehab  114  -DM     Post Treatment Diastolic BP  67  -DM     Pretreatment Heart Rate (beats/min)  54  -DM     Intratreatment Heart Rate (beats/min)  64  -DM     Posttreatment Heart Rate (beats/min)  61  -DM     Pre SpO2 (%)  96  -DM     O2 Delivery Pre Treatment  supplemental O2  -DM     O2 Delivery Intra Treatment  supplemental O2  -DM     O2 Delivery Post Treatment  supplemental O2  -DM     Pre Patient Position  Supine  -DM     Intra Patient Position  Side Lying  -DM     Row Name 02/11/20 1635          Positioning and Restraints    Pre-Treatment Position  in bed  -DM     Post Treatment Position  bed  -DM     In Bed  notified nsg;call light within reach;fowlers;encouraged to call for assist;with family/caregiver;with nsg;waffle boots/both  -DM       User Key  (r) = Recorded By, (t) = Taken By, (c) = Cosigned By    Initials Name Provider Type    Vero Wilkins, PT Physical Therapist        Outcome Measures     Row Name 02/11/20 1635          How much help from another person do you currently need...    Turning from your back to your side while in flat bed without using bedrails?  2  -DM     Moving from lying on back to sitting on the side of a flat bed without bedrails?  2  -DM     Moving to and from a bed to a chair (including a wheelchair)?  2  -DM     Standing up from a chair using your arms (e.g., wheelchair, bedside chair)?  2  -DM     Climbing 3-5 steps with a railing?  1  -DM     To walk in hospital room?  1  -DM     AM-PAC 6 Clicks Score (PT)  10  -DM     Row Name 02/11/20 1635          Functional Assessment    Outcome Measure Options  AM-PAC 6 Clicks Basic Mobility (PT)  -DM       User Key  (r) = Recorded By, (t) = Taken By, (c) = Cosigned By    Initials Name Provider Type    Vero Wilkins, PT Physical Therapist          PT Recommendation and Plan     Outcome  Summary/Treatment Plan (PT)  Anticipated Discharge Disposition (PT): skilled nursing facility  Plan of Care Reviewed With: patient, spouse  Progress: improving  Outcome Summary: Able to roll L/R w/ max A using bedrail & draw sheet, scoot to HOB x 2 w/ max 2A using draw sheet (pt attempting bridging to assist), perform ther exer all extrem in sup. w/ mult rests d/t diffic staying focused on task, & eyes drifting closed d/t lethargy & fatigue, & performed sit bal activ in norton's posn. (holding trunk in midline while bringing milkshake to mouth/taking pill; holding head erect; focusing at object held in front of face); def. transf.to chair per nsg req.;limited by impaired cognit(baseline Dem.), mich (HR in low 50's), elev , low HGB (9.9), weakness from not eating & poor sleep (will start CPAP)     Time Calculation:   PT Charges     Row Name 02/11/20 1745             Time Calculation    Start Time  1635  -DM      PT Received On  02/11/20  -DM      PT Goal Re-Cert Due Date  02/19/20  -DM         Time Calculation- PT    Total Timed Code Minutes- PT  41 minute(s)  -DM         Timed Charges    16322 - PT Therapeutic Exercise Minutes  24  -DM      18317 - PT Therapeutic Activity Minutes  17  -DM        User Key  (r) = Recorded By, (t) = Taken By, (c) = Cosigned By    Initials Name Provider Type    DM Vero Bernabe, PT Physical Therapist        Therapy Charges for Today     Code Description Service Date Service Provider Modifiers Qty    98470219733 HC PT THER PROC EA 15 MIN 2/11/2020 Vero Bernabe, PT GP 2    47588860500 HC PT THERAPEUTIC ACT EA 15 MIN 2/11/2020 Vero Bernabe, PT GP 1          PT G-Codes  Outcome Measure Options: AM-PAC 6 Clicks Basic Mobility (PT)  AM-PAC 6 Clicks Score (PT): 10  AM-PAC 6 Clicks Score (OT): 13    Vero Bernabe PT  2/11/2020

## 2020-02-11 NOTE — PROGRESS NOTES
[facility]       LOS: 4 days   Patient Care Team:  Diana Null APRN as PCP - General (Nurse Practitioner)  Diana Null APRN as PCP - Claims Attributed  Bruno Garvin MD as Consulting Physician (Neurology)  Hollis Ugarte MD as Consulting Physician (Cardiology)        Subjective       Patient Denies:  Cp, sob, palpitations.      Vital Signs  Temp:  [97.7 °F (36.5 °C)-98.8 °F (37.1 °C)] 98 °F (36.7 °C)  Heart Rate:  [54-64] 55  Resp:  [16-18] 18  BP: (111-136)/(52-73) 120/69    Intake/Output Summary (Last 24 hours) at 2/11/2020 1017  Last data filed at 2/11/2020 0300  Gross per 24 hour   Intake --   Output 550 ml   Net -550 ml     No intake/output data recorded.    Physical Exam:     General Appearance:    Alert, cooperative, in no acute distress       Neck:   No adenopathy, supple, trachea midline, no thyromegaly, no JVD       Lungs:     Fine bibasilar crackles to ausculltation,respirations regular, even and unlabored    Heart:    Regular rhythm and normal rate, normal S1 and S2, no            murmur, no gallop, no rub, no click   Chest Wall:    No abnormalities observed   Abdomen:     Normal bowel sounds, no masses, no organomegaly, soft        non-tender, non-distended       Extremities:   Moves all extremities well, no edema, no cyanosis, no             redness   Pulses:   Pulses palpable and equal bilaterally     Results Review:     I reviewed the patient's new clinical results.      WBC WBC   Date/Time Value Ref Range Status   02/10/2020 0402 4.81 3.40 - 10.80 10*3/mm3 Final   02/09/2020 0354 4.80 3.40 - 10.80 10*3/mm3 Final            HGB Hemoglobin   Date/Time Value Ref Range Status   02/10/2020 0402 9.9 (L) 13.0 - 17.7 g/dL Final   02/09/2020 0354 10.3 (L) 13.0 - 17.7 g/dL Final           HCT Hematocrit   Date/Time Value Ref Range Status   02/10/2020 0402 31.0 (L) 37.5 - 51.0 % Final   02/09/2020 0354 32.5 (L) 37.5 - 51.0  % Final            Platlets Platelets   Date/Time Value Ref Range Status   02/10/2020 0402 143 140 - 450 10*3/mm3 Final   02/09/2020 0354 136 (L) 140 - 450 10*3/mm3 Final     Sodium  Sodium   Date/Time Value Ref Range Status   02/10/2020 0402 138 136 - 145 mmol/L Final   02/09/2020 0354 141 136 - 145 mmol/L Final     Potassium  Potassium   Date/Time Value Ref Range Status   02/10/2020 0402 3.4 (L) 3.5 - 5.2 mmol/L Final   02/09/2020 0354 3.7 3.5 - 5.2 mmol/L Final     Chloride  Chloride   Date/Time Value Ref Range Status   02/10/2020 0402 101 98 - 107 mmol/L Final   02/09/2020 0354 102 98 - 107 mmol/L Final     BicarbonateNo results found for: PLASMABICARB    BUN BUN   Date/Time Value Ref Range Status   02/10/2020 0402 18 8 - 23 mg/dL Final   02/09/2020 0354 19 8 - 23 mg/dL Final      Creatinine Creatinine   Date/Time Value Ref Range Status   02/10/2020 0402 1.58 (H) 0.76 - 1.27 mg/dL Final   02/09/2020 0354 1.55 (H) 0.76 - 1.27 mg/dL Final      Calcium Calcium   Date/Time Value Ref Range Status   02/10/2020 0402 8.2 8.2 - 9.6 mg/dL Final   02/09/2020 0354 8.4 8.2 - 9.6 mg/dL Final      Mag @RESULFAST(MG:3)@        PT/INR:       Lab Results   Component Value Date    PROTIME 14.6 (H) 02/24/2019    INR 1.20 (H) 02/24/2019      Troponin I:    Lab Results   Component Value Date    TROPONINI 1.664 (C) 02/25/2019    TROPONINI 2.198 (C) 02/24/2019    TROPONINI 1.733 (C) 02/24/2019    Lab Results   Component Value Date    POCRTROPI 0.05 02/24/2019    TROPONINT 0.011 12/27/2019         amiodarone 200 mg Oral Daily   aspirin 81 mg Oral Daily   carbidopa-levodopa 1 tablet Oral TID   castor oil-balsam peru  Topical Q12H   clopidogrel 75 mg Oral Daily   diclofenac 2 g Topical BID   finasteride 5 mg Oral Daily   furosemide 20 mg Oral Daily   heparin (porcine) 5,000 Units Subcutaneous Q8H   ipratropium-albuterol 3 mL Nebulization 4x Daily - RT   levothyroxine 75 mcg Oral Daily   melatonin 2.5 mg Oral Q24H   memantine 10 mg Oral  BID   multivitamin with minerals 1 tablet Oral Daily   pantoprazole 40 mg Oral QAM   pharmacy consult - MTM  Does not apply Daily   pravastatin 20 mg Oral Nightly   sacubitril-valsartan 0.5 tablet Oral Q12H   sodium chloride 10 mL Intravenous Q12H          Assessment/Plan     Patient Active Problem List   Diagnosis Code   • Dementia (CMS/HCC) F03.90   • Hypothyroid E03.9   • CKD (chronic kidney disease) stage 3, GFR 30-59 ml/min (CMS/HCC) N18.3   • Coronary artery disease involving native coronary artery of native heart without angina pectoris I25.10   • Debility R53.81   • A-fib (CMS/HCC) I48.91   • Benign prostatic hyperplasia without lower urinary tract symptoms N40.0   • Gastroesophageal reflux disease K21.9   • Ventricular tachycardia (CMS/HCC) I47.2   • Parkinson's disease (CMS/HCC) G20   • ANGELA on CPAP G47.33, Z99.89   • Cardiomyopathy (EF 30%) I42.9   • Mixed hyperlipidemia E78.2   • Pancreatitis K85.90   • Sepsis (CMS/HCC) A41.9   • Bacteremia due to Streptococcus R78.81, B95.5   • Acute renal failure superimposed on stage 3 chronic kidney disease (CMS/HCC) N17.9, N18.3   • Acute cholecystitis K81.0   • Acute on chronic systolic CHF (congestive heart failure) (CMS/HCC) I50.23   • Hypoxia R09.02   • Acute systolic heart failure (CMS/HCC) I50.21   • Deep tissue injury, right heel and right lateral ankle pressure ulcers T14.8XXA   • COPD (chronic obstructive pulmonary disease) (CMS/HCC) J44.9     CV stable/no change  EF~30%  Continue Entresto  Continue support    GUI Mendoza  02/11/20  10:17 AM

## 2020-02-11 NOTE — PLAN OF CARE
VSS overnight on 1L.  Pt easily aroused, but lethargic throughout shift.  Pt's daughter concerned as pt has been sleeping a lot the past few days.  Pt's intake and UOP also decreased (pt voided 20 via urinal this shift).  Cardiology to see this a.m.  No acute distress or complaints to report at this time.

## 2020-02-11 NOTE — PROGRESS NOTES
Clinical Nutrition   Reason For Visit: Follow-up protocol    Patient Name: Murali Dennis  YOB: 1929  MRN: [id number]  Date of Encounter: 02/11/20 11:17 AM  Admission date: 2/7/2020    Nutrition Assessment     Admission Problem List:  Dyspnea  Acute on chronic CHF  Hypoxia  Debility  DTPI to right heel and right lateral ankle  Recent C. Diff    Applicable PMH:  CHF  Dementia  Warren's esophagus  COPD  CVA  GERD  CM  PAF  CKD stage 3  Parkinson's disease  Pancreatitis  Cholangitis    Applicable Nutrition-Related Information:        Applicable medical tests/procedures since admission:      Reported/Observed/Food/Nutrition Related History     Patient on Bipap at time of visit, family at bedside, reports patient does not have much of an appetite.  She is encouraging him to eat for all meals.  Eating magic cup. Kitchen staff has been able to obtain prefs from family for meals.  No requests at time of visit, family aware of alternate options.     Anthropometrics   Height: 70 in  Weight: 178 lbs (MDOV standing 2/7 per EMR)  BMI: 25.5  BMI classification: Overweight: 25.0-29.9kg/m2    IBW: 166 lbs    UBW: 175 lbs (MDOV standing wt 1.5 weeks ago, patient was retaining fluid at the time per dtr).  Weight change: RD unable to determine at this time 2/2 fluid retention.    Labs reviewed   Labs reviewed: Yes    Medications reviewed   Medications reviewed: Yes  Pertinent: sinemet, MVI, protonix, lasix, synthroid, namenda    Current Nutrition Prescription   PO: Diet Soft Texture; Ground; Thin; Cardiac  Supplement: Mighty shake QD, Magic cup BID, HS snack     Evaluation of Received Nutrient/Fluid Intake: 25% / 6meals: 100% of snack     Nutrition Diagnosis   2/9 Updated 2/11  Problem Inadequate oral intake   Etiology Poor appetite   Signs/Symptoms PO intake: 21% / 5 meals   Status - on going   Intervention   Intervention: Follow treatment progress, Care plan reviewed, Advise alternate selection, Advised available  snacks, Interview for preferences, Menu provided, Menu adjusted, Encourage intake, Supplement provided       Goal:   General: Nutrition support treatment  PO: Increase intake    Monitoring/Evaluation:       Monitoring/Evaluation: Per protocol, PO intake, Supplement intake, Pertinent labs, Weight, Skin status  Will Continue to follow per protocol  Mireya Kramer RD  Time Spent: 20 min

## 2020-02-11 NOTE — NURSING NOTE
Patient Name:  Murali Dennis  :  1929  DOS:  20    Active Hospital Problems    Diagnosis   • **Acute on chronic systolic CHF (congestive heart failure) (CMS/[facility])   • Hypoxia   • Acute systolic heart failure (CMS/[facility])   • Deep tissue injury, right heel and right lateral ankle pressure ulcers   • COPD (chronic obstructive pulmonary disease) (CMS/[facility])   • Cardiomyopathy (EF 30%)   • ANGELA on CPAP   • Parkinson's disease (CMS/[facility])   • Gastroesophageal reflux disease   • A-fib (CMS/[facility])     · Postoperative 2003     • CKD (chronic kidney disease) stage 3, GFR 30-59 ml/min (CMS/[facility])   • Coronary artery disease involving native coronary artery of native heart without angina pectoris   • Debility   • Dementia (CMS/[facility])       Consult has been received.  Medical records have been reviewed.  Patient is a good candidate for the [facility].  No education due to patient's lethargy.   Patient to be scheduled follow up 1 week post discharge    .Echo Results: 2019  · The left ventricular cavity is moderately dilated.  · Right ventricular cavity is mild-to-moderately dilated.  · Left atrial cavity size is moderate-to-severely dilated.  · Moderate aortic valve regurgitation is present.  · Moderate mitral valve regurgitation is present.  · Mild to moderate tricuspid valve regurgitation is present.  · Calculated right ventricular systolic pressure from tricuspid regurgitation is 35 mmHg.  · Estimated EF = 30%.  · Left ventricular systolic function is severely decreased.  · Left ventricular diastolic dysfunction (grade II) consistent with pseudonormalization.  · The findings are consistent with dilated cardiomyopathy.  · Mild pulmonary hypertension is present.  · There is no evidence of pericardial effusion.  · The aortic valve exhibits moderate sclerosis without stenosis.           NYHA Class: III    Heart Failure Quality Measures    ACE I, ARB, ARNI (if EF <40%)     Yes      Evidence-based  Beta Blocker (EF<40%)    (Bisoprolol, Carvedilol, Metoprolol Succinate)  If no contraindications:  Bradycardia    Aldosterone Antagonist (EF <40%)    Hypotension          If EF < 35%  Life expectancy less than one year    Atrial fibrillation / Atrial flutter:  Quality Measure    CHADS-VASc Score:   CHADS-VASc Risk Assessment            6       Total Score        1 CHF    1 Hypertension    2 Age >/= 75    2 PRIOR STROKE/TIA/THROMBO        Criteria that do not apply:    DM    Vascular Disease    Age 65-74    Sex: Female            Anticoagulation for CHADS-VASc >/=2    If no contraindications:  Fall risk    Statin Therapy (for patients with a history of CAD, CVA/TIA, PVD, DM)  Yes

## 2020-02-12 NOTE — PROGRESS NOTES
[facility] Medicine Services  PROGRESS NOTE    Patient Name: Murali Dennis  : 1929  MRN: [id number]    Date of Admission: 2020  Primary Care Physician: Diana Null APRN    Subjective   Subjective     CC:  Follow-up shortness of breath    HPI:  Nursing reported yesterday evening scant hemoptysis with sputum which was one-time dose.  Prophylactic heparin was held and no further hemoptysis noted and no worsening respiratory status.  Rested a little better last night.  Somnolence has gradually improved.  Still far from baseline and family is concerned about his sleepiness.  Eye drainage resolved.  Able to eat small amounts but appetite still quite poor.  Only drink 2 milkshakes yesterday and no other food reported.  No diarrhea.  Conversation limited due to dementia.  Patient currently well below his baseline per family.    Review of Systems  Unable to assess due to dementia.    Objective   Objective     Vital Signs:   Temp:  [98 °F (36.7 °C)-98.1 °F (36.7 °C)] 98.1 °F (36.7 °C)  Heart Rate:  [] 57  Resp:  [16] 16  BP: (109-135)/(61-76) 131/67        Physical Exam:  Constitutional: Somewhat somnolent, but awakes to touch  Eyes:  Resolving periocular erythema and eye drainage,  pupils equal, sclerae anicteric, no conjunctival injection  HENT: NCAT, mucous membranes moist  Neck: Supple, no thyromegaly, no lymphadenopathy, trachea midline  Respiratory:  Only mild Rales at the bases bilaterally, more clear at the apex bilaterally, normal respirations  Cardiovascular: Regular, telemetry appears sinus rhythm, palpable radial pulses bilaterally  Gastrointestinal: Positive bowel sounds, soft, nontender, nondistended  Musculoskeletal: Elderly and frail, debilitated in appearance, BMI is 25, trace bilateral lower extremity edema  Psychiatric: Demented affect, cooperative  Neurologic: Not currently oriented and more somnolent today, follows simple commands and answer simple  questions however not always appropriately, no slurred speech or facial droop    Skin: No rashes or jaundice      Results Reviewed:  Results from last 7 days   Lab Units 02/10/20  0402 02/09/20  0354 02/08/20  0506   WBC 10*3/mm3 4.81 4.80 5.31   HEMOGLOBIN g/dL 9.9* 10.3* 10.9*   HEMATOCRIT % 31.0* 32.5* 35.3*   PLATELETS 10*3/mm3 143 136* 159     Results from last 7 days   Lab Units 02/11/20  0936 02/10/20  0402 02/09/20  0354  02/07/20  1401   SODIUM mmol/L 137 138 141   < > 140   POTASSIUM mmol/L 3.8 3.4* 3.7   < > 3.5   CHLORIDE mmol/L 102 101 102   < > 103   CO2 mmol/L 22.0 25.0 23.0   < > 23.0   BUN mg/dL 21 18 19   < > 20   CREATININE mg/dL 1.65* 1.58* 1.55*   < > 1.53*   GLUCOSE mg/dL 146* 122* 120*   < > 124*   CALCIUM mg/dL 8.5 8.2 8.4   < > 8.9   ALT (SGPT) U/L  --   --   --   --  9   AST (SGOT) U/L  --   --   --   --  39   PROBNP pg/mL  --   --  13,604.0*  --  12,195.0*    < > = values in this interval not displayed.     Estimated Creatinine Clearance: 34 mL/min (A) (by C-G formula based on SCr of 1.65 mg/dL (H)).    Microbiology Results Abnormal     Procedure Component Value - Date/Time    Blood Culture - Blood, Hand, Right [[id number]] Collected:  02/07/20 1401    Lab Status:  Preliminary result Specimen:  Blood from Hand, Right Updated:  02/11/20 1431     Blood Culture No growth at 4 days    Narrative:       Aerobic bottle only      Blood Culture - Blood, Arm, Left [[id number]] Collected:  02/07/20 1401    Lab Status:  Preliminary result Specimen:  Blood from Arm, Left Updated:  02/11/20 1431     Blood Culture No growth at 4 days    Urine Culture - Urine, Urine, Clean Catch [[id number]]  (Abnormal)  (Susceptibility) Collected:  02/07/20 1905    Lab Status:  Final result Specimen:  Urine, Clean Catch Updated:  02/09/20 0147     Urine Culture 25,000 CFU/mL Escherichia coli    Susceptibility      Escherichia coli     AMY     Ampicillin Susceptible     Ampicillin + Sulbactam Susceptible     Cefazolin  Susceptible     Cefepime Susceptible     Ceftazidime Susceptible     Ceftriaxone Susceptible     Gentamicin Susceptible     Levofloxacin Susceptible     Nitrofurantoin Susceptible     Piperacillin + Tazobactam Susceptible     Tetracycline Susceptible     Trimethoprim + Sulfamethoxazole Susceptible                    Respiratory Panel, PCR - Swab, Nasopharynx [[id number]]  (Normal) Collected:  02/07/20 1545    Lab Status:  Final result Specimen:  Swab from Nasopharynx Updated:  02/07/20 1737     ADENOVIRUS, PCR Not Detected     Coronavirus 229E Not Detected     Coronavirus HKU1 Not Detected     Coronavirus NL63 Not Detected     Coronavirus OC43 Not Detected     Human Metapneumovirus Not Detected     Human Rhinovirus/Enterovirus Not Detected     Influenza B PCR Not Detected     Parainfluenza Virus 1 Not Detected     Parainfluenza Virus 2 Not Detected     Parainfluenza Virus 3 Not Detected     Parainfluenza Virus 4 Not Detected     Bordetella pertussis pcr Not Detected     Influenza A H1 2009 PCR Not Detected     Chlamydophila pneumoniae PCR Not Detected     Mycoplasma pneumo by PCR Not Detected     Influenza A PCR Not Detected     Influenza A H3 Not Detected     Influenza A H1 Not Detected     RSV, PCR Not Detected     Bordetella parapertussis PCR Not Detected          Imaging Results (Last 24 Hours)     ** No results found for the last 24 hours. **          Results for orders placed during the hospital encounter of 12/27/19   Adult Transthoracic Echo Complete W/ Cont if Necessary Per Protocol    Narrative · The left ventricular cavity is moderately dilated.  · Right ventricular cavity is mild-to-moderately dilated.  · Left atrial cavity size is moderate-to-severely dilated.  · Moderate aortic valve regurgitation is present.  · Moderate mitral valve regurgitation is present.  · Mild to moderate tricuspid valve regurgitation is present.  · Calculated right ventricular systolic pressure from tricuspid   regurgitation is  35 mmHg.  · Estimated EF = 30%.  · Left ventricular systolic function is severely decreased.  · Left ventricular diastolic dysfunction (grade II) consistent with   pseudonormalization.  · The findings are consistent with dilated cardiomyopathy.  · Mild pulmonary hypertension is present.  · There is no evidence of pericardial effusion.  · The aortic valve exhibits moderate sclerosis without stenosis.          I have reviewed the medications:  Scheduled Meds:    amiodarone 200 mg Oral Daily   aspirin 81 mg Oral Daily   carbidopa-levodopa 1 tablet Oral TID   castor oil-balsam peru  Topical Q12H   clopidogrel 75 mg Oral Daily   diclofenac 2 g Topical BID   finasteride 5 mg Oral Daily   furosemide 20 mg Oral Daily   levothyroxine 75 mcg Oral Daily   memantine 10 mg Oral BID   multivitamin with minerals 1 tablet Oral Daily   pantoprazole 40 mg Oral QAM   pharmacy consult - MTM  Does not apply Daily   pravastatin 20 mg Oral Nightly   sacubitril-valsartan 0.5 tablet Oral Q12H   sodium chloride 10 mL Intravenous Q12H     Continuous Infusions:   PRN Meds:.•  acetaminophen **OR** acetaminophen **OR** acetaminophen  •  cetirizine  •  docusate sodium  •  famotidine  •  ipratropium-albuterol  •  labetalol  •  magnesium sulfate **OR** magnesium sulfate **OR** magnesium sulfate  •  melatonin  •  ondansetron **OR** ondansetron  •  Polyvinyl Alcohol-Povidone PF  •  QUEtiapine  •  simethicone  •  sodium chloride    Assessment/Plan   Assessment & Plan     Active Hospital Problems    Diagnosis  POA   • **Acute on chronic systolic CHF (congestive heart failure) (CMS/HCC) [I50.23]  Yes   • Hypoxia [R09.02]  Yes   • Acute systolic heart failure (CMS/HCC) [I50.21]  Yes   • Deep tissue injury, right heel and right lateral ankle pressure ulcers [T14.8XXA]  Yes   • COPD (chronic obstructive pulmonary disease) (CMS/HCC) [J44.9]  Yes   • Cardiomyopathy (EF 30%) [I42.9]  Yes   • ANGELA on CPAP [G47.33, Z99.89]  Not Applicable   • Parkinson's disease  (CMS/[facility]) [G20]  Yes   • Gastroesophageal reflux disease [K21.9]  Yes   • A-fib (CMS/[facility]) [I48.91]  Yes   • CKD (chronic kidney disease) stage 3, GFR 30-59 ml/min (CMS/[facility]) [N18.3]  Yes   • Coronary artery disease involving native coronary artery of native heart without angina pectoris [I25.10]  Yes   • Debility [R53.81]  Yes   • Dementia (CMS/[facility]) [F03.90]  Yes      Resolved Hospital Problems   No resolved problems to display.        Brief Hospital Course to date:  Murali Dennis is a 89 y.o. male presents the hospital with worsening dyspnea with acute on chronic systolic congestive heart failure exacerbation.  He had acute hypoxia and required oxygen that he is now been weaned off of.  Cardiology consult team assisted with diuresis for acute heart failure and he has been transition back to his home diuretics.  He continued nebulizers and has been of spirometer for his chronic COPD.  Patient's hospitalization has been complicated by intermittent somnolence, worsening debility from his baseline level of function, and poor appetite.    Plan: Continue off of prophylactic heparin due to scant hemoptysis on 2/11.  Laboratory studies reasonably stable.  Continue to encourage diet.  Family understand that it is possible that he will be ready to transfer to rehab tomorrow if his eating improves.  Continue eyedrops as needed for conjunctivitis. Repeat urinalysis negative for UTI.  Initial urinalysis with very small count of E. coli likely contaminant.  Patient is not felt to have urinary tract infection.  No diarrhea.  Repeat laboratory studies tomorrow.  Plan discussed with cardiology today they feel he is medically maximized and are hoping he can go to rehabilitation soon.  Case discussed on multidisciplinary rounds with case management, nursing, and pharmacist today as well.  Now on room air oxygen.       Acute on chronic systolic congestive heart failure exacerbation, EF 30%:  Entresto  IV diuresis completed  transition to oral diuretic  Cardiology consult team following as per family request, discussing case with them daily     Acute hypoxia:  Now resolving.  Due to CHF.  Oxygen as needed, wean as tolerated.  Initially 87% on room air and now much improved     COPD: Scheduled nebulizers.  Incentive spirometer.  Stable     Paroxysmal atrial fibrillation: Currently appears sinus rhythm.  Family state he is not on anticoagulation but takes aspirin and Plavix for coronary artery disease.  They have discussed this with cardiologist in the past.  Plan to continue home regimen for now.  Telemetry monitoring.  Amiodarone rhythm control.  Stable     Parkinson's disease:  Continue home medication.  Currently lower functioning than baseline per family.     Gastroesophageal reflux disease: Stable.  PPI.     History of impaired glucose tolerance: Family deny any recent hypoglycemia.  No clear indication for correction insulin currently.  Previous A1c's have been borderline.  Diet controlled.     Debility: PT OT.  Up with support only.     Stage III chronic kidney disease: Baseline creatinine appears to range from 1.2-1.6.  Initially 1.5 this day at baseline.  Monitor, currently at baseline     Dementia with sundowning: Continue home medication.  Supportive care.  PT OT.  Family requesting something if he becomes agitated at night, avoid Ativan and other benzodiazepine.  Seroquel low-dose use but caused somnolence.  Avoid Seroquel now.     Deep tissue injury, right heel and right lateral ankle pressure ulcers present on admission: Wound care to continue     Recent C. difficile colitis: Spore precaution.  No current diarrhea.  Monitor and avoid antibiotics unless absolutely necessary.    Probable viral conjunctivitis: Improved with eyedrops    One-time episode of scant hemoptysis on 2/11: Prophylactic heparin stopped.  Now resolved.  No worsening respiratory status.     DVT prophylaxis: Heparin     Disposition: I expect the patient to  be discharged location to be determined when improved    CODE STATUS:   Code Status and Medical Interventions:   Ordered at: 02/07/20 1402     Limited Support to NOT Include:    Intubation     Code Status:    No CPR     Medical Interventions (Level of Support Prior to Arrest):    Limited         Electronically signed by Jeremías Arshad MD, 02/12/20, 12:25 PM.

## 2020-02-12 NOTE — PROGRESS NOTES
Adult Nutrition  Assessment/PES    Patient Name:  Murali Dennis  YOB: 1929  MRN: [id number]  Admit Date:  2/7/2020    Assessment Date:  2/12/2020    Comments:      Reason for Assessment     Row Name 02/12/20 1135          Reason for Assessment    Reason For Assessment  other (see comments) RECEIVED CONSULT RE: WANTS DIFFERENT SUPPLEMENT. WIFE REPORTS PT DOES NOT LIKE ORANGE FLAVOR MAGIC CUP & LIKES ALL OTHER FLAVORS & ALL FLAVORS OR MIGHTY SHAKE. WILL ARRANGE.                             Problem/Interventions:                    Electronically signed by:  Mayra Patricio RD  02/12/20 11:36 AM

## 2020-02-12 NOTE — PLAN OF CARE
Problem: Patient Care Overview  Goal: Plan of Care Review  Outcome: Ongoing (interventions implemented as appropriate)  Flowsheets (Taken 2/12/2020 1306)  Plan of Care Reviewed With: patient; daughter  Note:   Patient follow up for right heel DTPI and right lateral ankle unstageable. Have been treating with Venelex. Heel appears to be resolving a bit-dry-some blanching noted. Right lateral ankle appears to be clementina a bit. Overall both areas are improving-will continue POC. Patient on dolphin bed. Red blanchable area with scab centrally noted on anterior great toe applied Z guard and left MENA. WOC will continue to follow.

## 2020-02-12 NOTE — THERAPY TREATMENT NOTE
Acute Care - Occupational Therapy Treatment Note  [facility]     Patient Name: Murali Dennis  : 1929  MRN: [id number]  Today's Date: 2020  Onset of Illness/Injury or Date of Surgery: 20  Date of Referral to OT: 20       Admit Date: 2020       ICD-10-CM ICD-9-CM   1. Impaired mobility and ADLs Z74.09 799.89     Patient Active Problem List   Diagnosis   • Dementia (CMS/[facility])   • Hypothyroid   • CKD (chronic kidney disease) stage 3, GFR 30-59 ml/min (CMS/[facility])   • Coronary artery disease involving native coronary artery of native heart without angina pectoris   • Debility   • A-fib (CMS/[facility])   • Benign prostatic hyperplasia without lower urinary tract symptoms   • Gastroesophageal reflux disease   • Ventricular tachycardia (CMS/[facility])   • Parkinson's disease (CMS/[facility])   • ANGELA on CPAP   • Cardiomyopathy (EF 30%)   • Mixed hyperlipidemia   • Pancreatitis   • Sepsis (CMS/[facility])   • Bacteremia due to Streptococcus   • Acute renal failure superimposed on stage 3 chronic kidney disease (CMS/[facility])   • Acute cholecystitis   • Acute on chronic systolic CHF (congestive heart failure) (CMS/[facility])   • Hypoxia   • Acute systolic heart failure (CMS/[facility])   • Deep tissue injury, right heel and right lateral ankle pressure ulcers   • COPD (chronic obstructive pulmonary disease) (CMS/[facility])     Past Medical History:   Diagnosis Date   • Alzheimer disease (CMS/[facility])    • Arthritis    • Warren esophagus     followed  with GI in [place]   • Benign prostatic hyperplasia without lower urinary tract symptoms 10/17/2018   • Chronic congestive heart failure (CMS/[facility]) 2017     echo report from 17 EF 60-65%, mild TR, mild AR ([place], [place]) Echocardiogram 17: EF 28%, mild to moderate MR    • COPD (chronic obstructive pulmonary disease) (CMS/[facility])    • Coronary artery disease    • Dementia (CMS/[facility])    • Diarrhea 2019   • Environmental allergies 10/17/2018   • Essential  hypertension 10/17/2018   • Gastroesophageal reflux disease 11/19/2018   • GERD (gastroesophageal reflux disease)    • Hypothyroid     started after amiodarone use in 2003   • Sleep apnea     wears cpap   • Stroke (CMS/HCC) 2003   • TIA (transient ischemic attack) 12/29/2017   • Unintended weight loss 10/17/2018     Past Surgical History:   Procedure Laterality Date   • CARDIAC CATHETERIZATION  2003   • CARDIAC CATHETERIZATION N/A 1/19/2018    Procedure: Left Heart Cath;  Surgeon: Hollis Ugarte MD;  Location:  [facility];  Service:    • CARPAL TUNNEL RELEASE Right    • CATARACT EXTRACTION Bilateral    • CORONARY ARTERY BYPASS GRAFT  2003    Triple Bypass, @ [facility] @ [place], [place]   • ERCP N/A 12/30/2019    Procedure: ENDOSCOPIC RETROGRADE CHOLANGIOPANCREATOGRAPHY;  Surgeon: Arsh White MD;  Location:  [facility];  Service: Gastroenterology   • HEMORRHOIDECTOMY     • HERNIA REPAIR         Therapy Treatment    Rehabilitation Treatment Summary     Row Name 02/12/20 0800             Treatment Time/Intention    Discipline  occupational therapist  -KF      Document Type  therapy note (daily note)  -KF      Subjective Information  complains of;weakness;fatigue  -KF      Mode of Treatment  occupational therapy  -KF      Care Plan Review  evaluation/treatment results reviewed;care plan/treatment goals reviewed;risks/benefits reviewed;current/potential barriers reviewed;patient/other agree to care plan  -KF      Care Plan Review, Other Participant(s)  spouse  -KF      Therapy Frequency (OT Eval)  daily  -KF      Patient Effort  good  -KF      Existing Precautions/Restrictions  fall;oxygen therapy device and L/min;other (see comments) dem/PD   -KF      Patient Response to Treatment  tolerated improved with repetition  -KF      Recorded by [KF] Wanda Peace OT 02/12/20 0851      Row Name 02/12/20 0800             Vital Signs    Pre Systolic BP Rehab  -- RN  cleared VSS  -KF      O2 Delivery Pre Treatment  supplemental O2  -KF      O2 Delivery Intra Treatment  supplemental O2  -KF      O2 Delivery Post Treatment  supplemental O2 above 90% throughout   -KF      Pre Patient Position  Supine  -KF      Intra Patient Position  Standing  -KF      Post Patient Position  Sitting  -KF      Rest Breaks   1  -KF      Recorded by [KF] Wanda Peace, OT 02/12/20 0851      Row Name 02/12/20 0800             Cognitive Assessment/Intervention    Additional Documentation  Cognitive Assessment/Intervention (Group)  -KF      Recorded by [KF] Wanda Peace, OT 02/12/20 0851      Row Name 02/12/20 0800             Cognitive Assessment/Intervention- PT/OT    Affect/Mental Status (Cognitive)  confused  -KF      Orientation Status (Cognition)  oriented to;person  -KF      Follows Commands (Cognition)  follows one step commands;75-90% accuracy;repetition of directions required;verbal cues/prompting required  -KF      Cognitive Function (Cognitive)  safety deficit;executive function deficit  -KF      Executive Function Deficit (Cognition)  moderate deficit;insight/awareness of deficits  -KF      Safety Deficit (Cognitive)  moderate deficit;awareness of need for assistance;insight into deficits/self awareness;judgment;safety precautions awareness  -KF      Cognitive Interventions (Cognitive)  occupation/activity based interventions;process/task specific training  -KF      Recorded by [KF] Wanda Peace OT 02/12/20 0851      Row Name 02/12/20 0800             Safety Issues, Functional Mobility    Safety Issues Affecting Function (Mobility)  safety precaution awareness;judgment;insight into deficits/self awareness;awareness of need for assistance  -KF      Impairments Affecting Function (Mobility)  cognition;endurance/activity tolerance;strength;balance;coordination;postural/trunk control;motor control  -KF      Recorded by [KF] Wanda Peace OT 02/12/20 0851      Row Name  02/12/20 0800             Bed Mobility Assessment/Treatment    Bed Mobility Assessment/Treatment  rolling right;supine-sit;scooting/bridging  -KF      Rolling Right [place] (Bed Mobility)  contact guard;verbal cues  -KF      Scooting/Bridging [place] (Bed Mobility)  contact guard;verbal cues  -KF      Supine-Sit [place] (Bed Mobility)  contact guard;verbal cues  -KF      Bed Mobility, Safety Issues  decreased use of arms for pushing/pulling;decreased use of legs for bridging/pushing;cognitive deficits limit understanding  -KF      Assistive Device (Bed Mobility)  bed rails;draw sheet  -KF      Comment (Bed Mobility)  CGA throughout with VC's for seq  -KF      Recorded by [KF] Wanda Peace, OT 02/12/20 0851      Row Name 02/12/20 0800             Functional Mobility    Functional Mobility- Ind. Level  moderate assist (50% patient effort);2 person assist required;nonverbal cues required (demo/gesture);verbal cues required  -KF      Functional Mobility- Device  other (see comments) BUE support   -KF      Functional Mobility- Comment  deferred mobility to PT, only functional tf   -KF      Recorded by [KF] Wanda Peace, OT 02/12/20 0851      Row Name 02/12/20 0800             Transfer Assessment/Treatment    Transfer Assessment/Treatment  sit-stand transfer;stand-sit transfer;bed-chair transfer;toilet transfer  -KF      Comment (Transfers)  cues for seq and HP; progressed to min Ax2 during session   -KF      Recorded by [KF] Wanda Peace, OT 02/12/20 0851      Row Name 02/12/20 0800             Bed-Chair Transfer    Bed-Chair [place] (Transfers)  moderate assist (50% patient effort);2 person assist;verbal cues;nonverbal cues (demo/gesture)  -KF      Assistive Device (Bed-Chair Transfers)  other (see comments) BUE support   -KF      Recorded by [KF] Wanda Peace, OT 02/12/20 0851      Row Name 02/12/20 0800             Sit-Stand Transfer    Sit-Stand [place] (Transfers)   moderate assist (50% patient effort);minimum assist (75% patient effort);2 person assist  -KF      Assistive Device (Sit-Stand Transfers)  other (see comments) BUE support   -KF      Recorded by [KF] Wanda Peace, OT 02/12/20 0851      Row Name 02/12/20 0800             Stand-Sit Transfer    Stand-Sit [place] (Transfers)  minimum assist (75% patient effort);moderate assist (50% patient effort);2 person assist  -KF      Recorded by [KF] Wanda Peace, OT 02/12/20 0851      Row Name 02/12/20 0800             Toilet Transfer    Type (Toilet Transfer)  stand pivot/stand step  -KF      [place] Level (Toilet Transfer)  moderate assist (50% patient effort);2 person assist;verbal cues;nonverbal cues (demo/gesture)  -KF      Assistive Device (Toilet Transfer)  other (see comments) BUE support   -KF      Recorded by [KF] Wanda Peace, OT 02/12/20 0851      Row Name 02/12/20 0800             ADL Assessment/Intervention    77230 - OT Self Care/Mgmt Minutes  15  -KF      BADL Assessment/Intervention  upper body dressing;toileting;grooming;feeding  -KF      Recorded by [KF] Wanda Peace, OT 02/12/20 0851      Row Name 02/12/20 0800             Upper Body Dressing Assessment/Training    Upper Body Dressing [place] Level  don;doff;front opening garment;moderate assist (50% patient effort);verbal cues;nonverbal cues (demo/gesture)  -KF      Upper Body Dressing Position  supported sitting  -KF      Comment (Upper Body Dressing)  from BSC   -KF      Recorded by [KF] Wanda Peace, OT 02/12/20 0851      Row Name 02/12/20 0800             Grooming Assessment/Training    [place] Level (Grooming)  hair care, combing/brushing;wash face, hands;set up;supervision;verbal cues  -KF      Grooming Position  supported sitting  -KF      Recorded by [KF] Wanda Peace, OT 02/12/20 0851      Row Name 02/12/20 0800             Self-Feeding Assessment/Training    [place] Level (Feeding)   prepare tray/open items;maximum assist (25% patient effort);scoop food and bring to mouth;set up  -KF      Self-Feeding Assess/Train, Spillage Amount  minimal  -KF      Comment (Feeding)  able to use regular utensils well today to bring to mouth   -KF      Recorded by [KF] Wanda Peace, OT 02/12/20 0851      Row Name 02/12/20 0800             Toileting Assessment/Training    [place] Level (Toileting)  toileting skills;adjust/manage clothing;perform perineal hygiene;two person assist required;dependent (less than 25% patient effort)  -KF      Assistive Devices (Toileting)  commode, bedside without drop arms  -KF      Toileting Position  supported sitting;supported standing  -KF      Comment (Toileting)  requires 1 assist to maintain standing balance during toileting tasks UE support required   -KF      Recorded by [KF] Wanda Peace, OT 02/12/20 0851      Row Name 02/12/20 0800             BADL Safety/Performance    Impairments, BADL Safety/Performance  balance;cognition;endurance/activity tolerance;strength;trunk/postural control;coordination  -KF      Cognitive Impairments, BADL Safety/Performance  awareness, need for assistance;insight into deficits/self awareness;judgment;problem solving/reasoning;safety precaution awareness  -KF      Skilled BADL Treatment/Intervention  BADL process/adaptation training;cognitive/safety deficit modifications  -KF      Progress in BADL Status  improvement noted  -KF      Recorded by [KF] Wanda Peace, OT 02/12/20 0851      Row Name 02/12/20 0800             Motor Skills Assessment/Interventions    Additional Documentation  Balance Interventions (Group);Balance (Group)  -KF      Recorded by [KF] Wanda Peace, OT 02/12/20 0851      Row Name 02/12/20 0800             Balance    Balance  static sitting balance;static standing balance;dynamic sitting balance;dynamic standing balance  -KF      Recorded by [KF] Wanda Peace, OT 02/12/20 0851      Row  Name 02/12/20 0800             Static Sitting Balance    Level of [place] (Unsupported Sitting, Static Balance)  contact guard assist  -KF      Sitting Position (Unsupported Sitting, Static Balance)  sitting on edge of bed  -KF      Time Able to Maintain Position (Unsupported Sitting, Static Balance)  2 to 3 minutes  -KF      Recorded by [KF] Wanda Peace, OT 02/12/20 0851      Row Name 02/12/20 0800             Dynamic Sitting Balance    Level of [place], Reaches Outside Midline (Sitting, Dynamic Balance)  contact guard assist;minimal assist, 75% patient effort  -KF      Sitting Position, Reaches Outside Midline (Sitting, Dynamic Balance)  sitting on edge of bed  -KF      Comment, Reaches Outside Midline (Sitting, Dynamic Balance)  progressed to CGA with cues   -KF      Recorded by [KF] Wanda Peace, OT 02/12/20 0851      Row Name 02/12/20 0800             Static Standing Balance    Level of [place] (Supported Standing, Static Balance)  moderate assist, 50 to 74% patient effort;minimal assist, 75% patient effort;2 person assist  -KF      Time Able to Maintain Position (Supported Standing, Static Balance)  1 to 2 minutes  -KF      Assistive Device Utilized (Supported Standing, Static Balance)  other (see comments) BUE support  -KF      Comment (Supported Standing, Static Balance)  progressed to min A x2 with time and blocking BLEs with cues for WS   -KF      Recorded by [KF] Wanda Peace, OT 02/12/20 0851      Row Name 02/12/20 0800             Dynamic Standing Balance    Level of [place], Reaches Outside Midline (Standing, Dynamic Balance)  moderate assist, 50 to 74% patient effort;2 person assist  -KF      Time Able to Maintain Position, Reaches Outside Midline (Standing, Dynamic Balance)  30 to 45 seconds  -KF      Assistive Device Utilized (Supported Standing, Dynamic Balance)  other (see comments) BUE support   -KF      Recorded by [KF] Wanda Peace, OT 02/12/20 0851       Row Name 02/12/20 0800             Positioning and Restraints    Pre-Treatment Position  in bed  -KF      Post Treatment Position  chair  -KF      In Chair  notified nsg;reclined;sitting;call light within reach;encouraged to call for assist;exit alarm on;with family/caregiver;waffle cushion;legs elevated  -KF      Recorded by [KF] Wanda Peace, OT 02/12/20 0851      Row Name 02/12/20 0800             Pain Assessment    Additional Documentation  Pain Scale: FACES Pre/Post-Treatment (Group)  -KF      Recorded by [KF] Wanda Peace, OT 02/12/20 0851      Row Name 02/12/20 0800             Pain Scale: Numbers Pre/Post-Treatment    Pain Scale: Numbers, Pretreatment  0/10 - no pain  -KF      Pain Scale: Numbers, Post-Treatment  0/10 - no pain  -KF      Recorded by [KF] Wanda Peace, OT 02/12/20 0851      Row Name 02/12/20 0800             Pain Scale: FACES Pre/Post-Treatment    Pain: FACES Scale, Pretreatment  0-->no hurt  -KF      Pain: FACES Scale, Post-Treatment  0-->no hurt  -KF      Recorded by [KF] Wanda Peace, OT 02/12/20 0851      Row Name                Wound 02/07/20 1345 Right lateral malleolus Pressure Injury    Wound - Properties Group Date first assessed: 02/07/20 [MR] Time first assessed: 1345 [MR] Present on Hospital Admission: Y [MR] Side: Right [MR] Orientation: lateral [MR] Location: malleolus [MR] Primary Wound Type: Pressure inj [MR] Stage, Pressure Injury: unstageable [MR] Recorded by:  [MR] Stephenie Crowder RN 02/07/20 1428    Row Name                Wound 02/07/20 1345 Right heel Pressure Injury    Wound - Properties Group Date first assessed: 02/07/20 [MR] Time first assessed: 1345 [MR] Present on Hospital Admission: Y [MR] Side: Right [MR] Location: heel [MR] Primary Wound Type: Pressure inj [MR] Stage, Pressure Injury: deep tissue injury [MR] Recorded by:  [MR] Stephenie Crowder RN 02/07/20 1431    Row Name 02/12/20 0800             Plan of Care Review    Plan of  Care Reviewed With  patient;spouse  -KF      Progress  improving  -KF      Recorded by [KF] Wanda Peace, OT 02/12/20 0851      Row Name 02/12/20 0800             Outcome Summary/Treatment Plan (OT)    Daily Summary of Progress (OT)  progress toward functional goals is good  -KF      Recorded by [KF] Wanda Peace, OT 02/12/20 0851        User Key  (r) = Recorded By, (t) = Taken By, (c) = Cosigned By    Initials Name Effective Dates Discipline    MR Crowder Anitha, RN 06/16/16 -  Nurse    Wanda Beltran, OT 04/03/18 -  OT        Wound 02/07/20 1345 Right lateral malleolus Pressure Injury (Active)   Dressing Appearance open to air 2/11/2020  8:00 PM   Closure Open to air 2/11/2020  8:00 PM   Base yellow;red;pink 2/11/2020  8:00 PM   Periwound dry;pink;blanchable 2/11/2020  8:00 PM   Periwound Temperature warm 2/11/2020  8:00 PM   Periwound Skin Turgor soft 2/11/2020  8:00 PM   Drainage Amount none 2/11/2020  8:00 PM   Care, Wound other (see comments) 2/11/2020  8:00 PM   Dressing Care, Wound low-adherent;foam;dressing changed 2/11/2020  8:00 PM   Periwound Care, Wound dry periwound area maintained 2/11/2020  8:00 PM       Wound 02/07/20 1345 Right heel Pressure Injury (Active)   Dressing Appearance open to air 2/11/2020  8:00 PM   Closure Open to air 2/11/2020  8:00 PM   Base maroon/purple 2/11/2020  8:00 PM   Periwound dry;pink;warm;blanchable 2/11/2020  8:00 PM   Periwound Temperature warm 2/11/2020  8:00 PM   Periwound Skin Turgor soft 2/11/2020  8:00 PM   Care, Wound other (see comments) 2/11/2020  8:00 PM   Dressing Care, Wound low-adherent;foam;dressing changed 2/11/2020  8:00 PM   Periwound Care, Wound dry periwound area maintained 2/11/2020  8:00 PM           OT Recommendation and Plan  Outcome Summary/Treatment Plan (OT)  Daily Summary of Progress (OT): progress toward functional goals is good  Therapy Frequency (OT Eval): daily  Daily Summary of Progress (OT): progress toward functional  goals is good  Plan of Care Review  Plan of Care Reviewed With: patient, spouse  Plan of Care Reviewed With: patient, spouse  Outcome Summary: Pt completed STS progressing from modAx2 to min Ax2, mod A x2 stand pivot functional transfers to BSC and chair, dependent toileting progressed to min Ax2 static standing balance to progress toileting tasks, CGA bed mob throughout, progressing cont IPOT per POC  Outcome Measures     Row Name 02/12/20 0800 02/09/20 1036          How much help from another is currently needed...    Putting on and taking off regular lower body clothing?  1  -KF  1  -LC     Bathing (including washing, rinsing, and drying)  2  -KF  2  -LC     Toileting (which includes using toilet bed pan or urinal)  2  -KF  2  -LC     Putting on and taking off regular upper body clothing  2  -KF  2  -LC     Taking care of personal grooming (such as brushing teeth)  3  -KF  3  -LC     Eating meals  3  -KF  3  -LC     AM-PAC 6 Clicks Score (OT)  13  -KF  13  -LC        Functional Assessment    Outcome Measure Options  AM-PAC 6 Clicks Daily Activity (OT)  -KF  AM-PAC 6 Clicks Daily Activity (OT)  -LC       User Key  (r) = Recorded By, (t) = Taken By, (c) = Cosigned By    Initials Name Provider Type    Wanda Beltran OT Occupational Therapist    Jocelyn Zaman OT Occupational Therapist           Time Calculation:   Time Calculation- OT     Row Name 02/12/20 0800             Time Calculation- OT    OT Start Time  0800  -KF      Total Timed Code Minutes- OT  15 minute(s)  -KF      OT Received On  02/12/20  -      OT Goal Re-Cert Due Date  02/19/20  -         Timed Charges    77196 - OT Self Care/Mgmt Minutes  15  -KF        User Key  (r) = Recorded By, (t) = Taken By, (c) = Cosigned By    Initials Name Provider Type    Wanda Beltran OT Occupational Therapist        Therapy Charges for Today     Code Description Service Date Service Provider Modifiers Qty    [id number] HC OT SELF  CARE/MGMT/TRAIN EA 15 MIN 2/12/2020 Wanda Peace, OT GO 1               Wanda Peace, RAUL  2/12/2020

## 2020-02-12 NOTE — PLAN OF CARE
Pt has been up in chair majority of shift. Pt more alert, but has been resting in between care. Pt oriented to self and place. Pt has had two episodes of loose stool. Pt denies any pain or discomforts currently. Poor PO intake continues. VSS. HR in the 50s. Pt has been RA throughout shift and sats have been in upper 90s.  Will cont to monitor.

## 2020-02-12 NOTE — PLAN OF CARE
Problem: Patient Care Overview  Goal: Plan of Care Review  Flowsheets (Taken 2/12/2020 0800)  Outcome Summary: Pt completed STS progressing from modAx2 to min Ax2, mod A x2 stand pivot functional transfers to BSC and chair, dependent toileting progressed to min Ax2 static standing balance to progress toileting tasks, CGA bed mob throughout, progressing cont IPOT per POC

## 2020-02-12 NOTE — PROGRESS NOTES
Discharge Planning Assessment  [facility]     Patient Name: Murali Dennis  MRN: [id number]  Today's Date: 2/12/2020    Admit Date: 2/7/2020    Discharge Needs Assessment    No documentation.       Discharge Plan     Row Name 02/12/20 1211       Plan    Plan  ONGOING    Patient/Family in Agreement with Plan  yes    Plan Comments  Met with pt and wife at bedside to f/u DCP.  [facility] and [facility] have delined to make a bed offer.  There are no male beds at [facility], [facility], [place], or [facility].  CM made additional, proactive referrals to [facility], [facility], and [facility].  Left a VM to f/u on referral at [facility].  CM will cont to follow.    Final Discharge Disposition Code  03 - skilled nursing facility (SNF)        Destination      Service Provider Request Status Selected Services Address Phone Number Fax Number    [facility] Pending - Request Sent N/A [address] [id number] [phone]    [facility] Pending - Request Sent N/A [address] [address] [phone]    [facility] Pending - Request Sent N/A [address] [address] [phone]    [facility] Pending - No Request Sent N/A [address] [id number] [phone]    [facility] Pending - No [facility] Sent N/A [address] [facility] [facility] [address] [id number] [phone]    [facility] Declined  Unable to Meet Patient Needs N/A [address] [place] [id number] [phone]    [facility] Declined  Unable to Meet Patient Needs N/A [address] [phone]    [facility] Declined  No Bed Available N/A [address] [place] [address] [phone]    [facility] Declined  No Bed Available N/A [address], [facility] [address] [phone]    [facility] Declined  No Bed Available N/A [address], [facility] [address] [phone]      Durable Medical Equipment      Coordination has not been started for this encounter.      Dialysis/Infusion      Coordination has not been started for this encounter.      Home Medical Care      Coordination has not been started for this encounter.      Therapy      Coordination has not been started for this encounter.      Community Resources      Coordination has not been started for this encounter.          Demographic Summary    No documentation.       Functional Status    No documentation.       Psychosocial    No documentation.       Abuse/Neglect    No documentation.       Legal    No documentation.       Substance Abuse    No documentation.       Patient Forms    No documentation.           Mouna Garcia RN

## 2020-02-12 NOTE — PROGRESS NOTES
[facility]       LOS: 5 days   Patient Care Team:  Diana Null APRN as PCP - General (Nurse Practitioner)  Diana Null APRN as PCP - Claims Attributed  Bruno Garvin MD as Consulting Physician (Neurology)  Hollis Ugarte MD as Consulting Physician (Cardiology)        Subjective       Patient Denies:  Cp, sob, palpitations.  Up in chair      Vital Signs  Temp:  [98 °F (36.7 °C)-98.1 °F (36.7 °C)] 98.1 °F (36.7 °C)  Heart Rate:  [] 57  Resp:  [16] 16  BP: (109-135)/(61-76) 131/67    Intake/Output Summary (Last 24 hours) at 2/12/2020 1116  Last data filed at 2/12/2020 0223  Gross per 24 hour   Intake --   Output 575 ml   Net -575 ml     No intake/output data recorded.    Physical Exam:     General Appearance:    Alert, cooperative, in no acute distress       Neck:   No adenopathy, supple, trachea midline, no thyromegaly, no JVD       Lungs:     Fine bibasilar crackles to ausculltation,respirations regular, even and unlabored    Heart:    Regular rhythm and normal rate, normal S1 and S2, no            murmur, no gallop, no rub, no click   Chest Wall:    No abnormalities observed   Abdomen:     Normal bowel sounds, no masses, no organomegaly, soft        non-tender, non-distended       Extremities:   Moves all extremities well, no edema, no cyanosis, no             redness   Pulses:   Pulses palpable and equal bilaterally     Results Review:     I reviewed the patient's new clinical results.      WBC WBC   Date/Time Value Ref Range Status   02/10/2020 0402 4.81 3.40 - 10.80 10*3/mm3 Final            HGB Hemoglobin   Date/Time Value Ref Range Status   02/10/2020 0402 9.9 (L) 13.0 - 17.7 g/dL Final           HCT Hematocrit   Date/Time Value Ref Range Status   02/10/2020 0402 31.0 (L) 37.5 - 51.0 % Final            Platlets Platelets   Date/Time Value Ref Range Status   02/10/2020 0402 143 140 - 450 10*3/mm3 Final      Sodium  Sodium   Date/Time Value Ref Range Status   02/11/2020 0936 137 136 - 145 mmol/L Final   02/10/2020 0402 138 136 - 145 mmol/L Final     Potassium  Potassium   Date/Time Value Ref Range Status   02/11/2020 0936 3.8 3.5 - 5.2 mmol/L Final   02/10/2020 0402 3.4 (L) 3.5 - 5.2 mmol/L Final     Chloride  Chloride   Date/Time Value Ref Range Status   02/11/2020 0936 102 98 - 107 mmol/L Final   02/10/2020 0402 101 98 - 107 mmol/L Final     BicarbonateNo results found for: PLASMABICARB    BUN BUN   Date/Time Value Ref Range Status   02/11/2020 0936 21 8 - 23 mg/dL Final   02/10/2020 0402 18 8 - 23 mg/dL Final      Creatinine Creatinine   Date/Time Value Ref Range Status   02/11/2020 0936 1.65 (H) 0.76 - 1.27 mg/dL Final   02/10/2020 0402 1.58 (H) 0.76 - 1.27 mg/dL Final      Calcium Calcium   Date/Time Value Ref Range Status   02/11/2020 0936 8.5 8.2 - 9.6 mg/dL Final   02/10/2020 0402 8.2 8.2 - 9.6 mg/dL Final      Mag @RESULFAST(MG:3)@        PT/INR:       Lab Results   Component Value Date    PROTIME 14.6 (H) 02/24/2019    INR 1.20 (H) 02/24/2019      Troponin I:    Lab Results   Component Value Date    TROPONINI 1.664 (C) 02/25/2019    TROPONINI 2.198 (C) 02/24/2019    TROPONINI 1.733 (C) 02/24/2019      Lab Results   Component Value Date    POCRTROPI 0.05 02/24/2019    TROPONINT 0.011 12/27/2019         amiodarone 200 mg Oral Daily   aspirin 81 mg Oral Daily   carbidopa-levodopa 1 tablet Oral TID   castor oil-balsam peru  Topical Q12H   clopidogrel 75 mg Oral Daily   diclofenac 2 g Topical BID   finasteride 5 mg Oral Daily   furosemide 20 mg Oral Daily   levothyroxine 75 mcg Oral Daily   memantine 10 mg Oral BID   multivitamin with minerals 1 tablet Oral Daily   pantoprazole 40 mg Oral QAM   pharmacy consult - MTM  Does not apply Daily   pravastatin 20 mg Oral Nightly   sacubitril-valsartan 0.5 tablet Oral Q12H   sodium chloride 10 mL Intravenous Q12H          Assessment/Plan     Patient Active Problem List    Diagnosis Code   • Dementia (CMS/[facility]) F03.90   • Hypothyroid E03.9   • CKD (chronic kidney disease) stage 3, GFR 30-59 ml/min (CMS/[facility]) N18.3   • Coronary artery disease involving native coronary artery of native heart without angina pectoris I25.10   • Debility R53.81   • A-fib (CMS/[facility]) I48.91   • Benign prostatic hyperplasia without lower urinary tract symptoms N40.0   • Gastroesophageal reflux disease K21.9   • Ventricular tachycardia (CMS/[facility]) I47.2   • Parkinson's disease (CMS/[facility]) G20   • ANGELA on CPAP G47.33, Z99.89   • Cardiomyopathy (EF 30%) I42.9   • Mixed hyperlipidemia E78.2   • Pancreatitis K85.90   • Sepsis (CMS/[facility]) A41.9   • Bacteremia due to Streptococcus R78.81, B95.5   • Acute renal failure superimposed on stage 3 chronic kidney disease (CMS/[facility]) N17.9, N18.3   • Acute cholecystitis K81.0   • Acute on chronic systolic CHF (congestive heart failure) (CMS/[facility]) I50.23   • Hypoxia R09.02   • Acute systolic heart failure (CMS/[facility]) I50.21   • Deep tissue injury, right heel and right lateral ankle pressure ulcers T14.8XXA   • COPD (chronic obstructive pulmonary disease) (CMS/[facility]) J44.9     CV stable/no change  EF~30%  Continue Entresto; heart rate too slow for beta-blocker.  Continue support  Awaiting placement  Will standby.  Call if needed.      MD Bruno Mccormick PA  02/12/20  11:16 AM

## 2020-02-12 NOTE — PLAN OF CARE
Problem: Patient Care Overview  Goal: Plan of Care Review  Outcome: Ongoing (interventions implemented as appropriate)  Flowsheets (Taken 2/12/2020 0915)  Progress: improving  Plan of Care Reviewed With: patient  Outcome Summary: patient required less assistance for bed mobility CGA, improved sit<>stand and SPS transfers from mod assist x2 < min assist x2 with B UE support and gait belt, progress limited by weakness, fatigue, SOA and impaired balance/coordination.

## 2020-02-12 NOTE — THERAPY TREATMENT NOTE
Patient Name: Murali Dennis  : 1929    MRN: [id number]                              Today's Date: 2020       Admit Date: 2020    Visit Dx:     ICD-10-CM ICD-9-CM   1. Impaired mobility and ADLs Z74.09 799.89     Patient Active Problem List   Diagnosis   • Dementia (CMS/[facility])   • Hypothyroid   • CKD (chronic kidney disease) stage 3, GFR 30-59 ml/min (CMS/[facility])   • Coronary artery disease involving native coronary artery of native heart without angina pectoris   • Debility   • A-fib (CMS/[facility])   • Benign prostatic hyperplasia without lower urinary tract symptoms   • Gastroesophageal reflux disease   • Ventricular tachycardia (CMS/[facility])   • Parkinson's disease (CMS/[facility])   • ANGELA on CPAP   • Cardiomyopathy (EF 30%)   • Mixed hyperlipidemia   • Pancreatitis   • Sepsis (CMS/[facility])   • Bacteremia due to Streptococcus   • Acute renal failure superimposed on stage 3 chronic kidney disease (CMS/[facility])   • Acute cholecystitis   • Acute on chronic systolic CHF (congestive heart failure) (CMS/[facility])   • Hypoxia   • Acute systolic heart failure (CMS/[facility])   • Deep tissue injury, right heel and right lateral ankle pressure ulcers   • COPD (chronic obstructive pulmonary disease) (CMS/[facility])     Past Medical History:   Diagnosis Date   • Alzheimer disease (CMS/[facility])    • Arthritis    • Warren esophagus     followed  with GI in [place]   • Benign prostatic hyperplasia without lower urinary tract symptoms 10/17/2018   • Chronic congestive heart failure (CMS/[facility]) 2017     echo report from 17 EF 60-65%, mild TR, mild AR ([place], [place]) Echocardiogram 17: EF 28%, mild to moderate MR    • COPD (chronic obstructive pulmonary disease) (CMS/[facility])    • Coronary artery disease    • Dementia (CMS/[facility])    • Diarrhea 2019   • Environmental allergies 10/17/2018   • Essential hypertension 10/17/2018   • Gastroesophageal reflux disease 2018   • GERD (gastroesophageal reflux disease)    •  Hypothyroid     started after amiodarone use in 2003   • Sleep apnea     wears cpap   • Stroke (CMS/[facility]) 2003   • TIA (transient ischemic attack) 12/29/2017   • Unintended weight loss 10/17/2018     Past Surgical History:   Procedure Laterality Date   • CARDIAC CATHETERIZATION  2003   • CARDIAC CATHETERIZATION N/A 1/19/2018    Procedure: Left Heart Cath;  Surgeon: Hollis Ugarte MD;  Location:  [facility];  Service:    • CARPAL TUNNEL RELEASE Right    • CATARACT EXTRACTION Bilateral    • CORONARY ARTERY BYPASS GRAFT  2003    Triple Bypass, @ [facility] [place], [place]   • ERCP N/A 12/30/2019    Procedure: ENDOSCOPIC RETROGRADE CHOLANGIOPANCREATOGRAPHY;  Surgeon: Arsh White MD;  Location:  [facility];  Service: Gastroenterology   • HEMORRHOIDECTOMY     • HERNIA REPAIR       General Information     Row Name 02/12/20 0909          PT Evaluation Time/Intention    Document Type  therapy note (daily note)  -AS     Mode of Treatment  physical therapy  -AS     Row Name 02/12/20 0909          General Information    Patient Profile Reviewed?  yes  -AS     Existing Precautions/Restrictions  fall;oxygen therapy device and L/min dementia, Parkinsons, spore/contact precautions  -AS     Barriers to Rehab  medically complex;previous functional deficit;cognitive status;hearing deficit hearing aides bilateral  -AS     Row Name 02/12/20 0909          Cognitive Assessment/Intervention- PT/OT    Orientation Status (Cognition)  oriented to;person;disoriented to;place  -AS     Cognitive Assessment/Intervention Comment  patient alert, following commands  -AS     Row Name 02/12/20 0909          Safety Issues, Functional Mobility    Safety Issues Affecting Function (Mobility)  safety precaution awareness;safety precautions follow-through/compliance;awareness of need for assistance  -AS     Impairments Affecting Function (Mobility)  cognition;endurance/activity  tolerance;strength;balance;coordination;postural/trunk control;motor control  -AS       User Key  (r) = Recorded By, (t) = Taken By, (c) = Cosigned By    Initials Name Provider Type    AS Vicky Rose PTA Physical Therapy Assistant        Mobility     Row Name 02/12/20 0911          Bed Mobility Assessment/Treatment    Supine-Sit [place] (Bed Mobility)  verbal cues;contact guard  -AS     Assistive Device (Bed Mobility)  bed rails;head of bed elevated  -AS     Comment (Bed Mobility)  cues for sequencing, CGA throughout transfer to EOB  -AS     Row Name 02/12/20 0911          Transfer Assessment/Treatment    Comment (Transfers)  verbal cues for hadn and feet placement, assist to block B LE from buckling. SPS BED>recliner>BSC>recliner  -AS     [facility] Name 02/12/20 0911          Bed-Chair Transfer    Bed-Chair [place] (Transfers)  verbal cues;moderate assist (50% patient effort);2 person assist  -AS     Assistive Device (Bed-Chair Transfers)  -- B UE support, gait belt  -AS     [facility] Name 02/12/20 0911          Sit-Stand Transfer    Sit-Stand [place] (Transfers)  verbal cues;moderate assist (50% patient effort);2 person assist 5 sit<>stand from mutiple surfaces, progressed from mod assist x2 to min assist x2 with B UE support and gait belt  -AS     Assistive Device (Sit-Stand Transfers)  -- B UE support, gait belt.  -AS     Row Name 02/12/20 0911          Gait/Stairs Assessment/Training    Comment (Gait/Stairs)  steps from bed>recliner>BSC>recliner  -AS       User Key  (r) = Recorded By, (t) = Taken By, (c) = Cosigned By    Initials Name Provider Type    AS Vicky Rose PTA Physical Therapy Assistant        Obj/Interventions     Row Name 02/12/20 0914          Therapeutic Exercise    Lower Extremity (Therapeutic Exercise)  LAQ (long arc quad), bilateral;marching while seated  -AS     Lower Extremity Range of Motion (Therapeutic Exercise)  ankle dorsiflexion/plantar flexion, bilateral  -AS      Exercise Type (Therapeutic Exercise)  AROM (active range of motion)  -AS     Position (Therapeutic Exercise)  seated  -AS     Sets/Reps (Therapeutic Exercise)  1/10  -AS       User Key  (r) = Recorded By, (t) = Taken By, (c) = Cosigned By    Initials Name Provider Type    AS Vicky Rose PTA Physical Therapy Assistant        Goals/Plan    No documentation.       Clinical Impression     Row Name 02/12/20 0914          Pain Assessment    Additional Documentation  Pain Scale: Numbers Pre/Post-Treatment (Group)  -AS     Row Name 02/12/20 0914          Pain Scale: Numbers Pre/Post-Treatment    Pain Scale: Numbers, Pretreatment  0/10 - no pain  -AS     Pain Scale: Numbers, Post-Treatment  0/10 - no pain  -AS       User Key  (r) = Recorded By, (t) = Taken By, (c) = Cosigned By    Initials Name Provider Type    AS Vicky Rose PTA Physical Therapy Assistant        Outcome Measures     [facility] Name 02/12/20 0914          How much help from another person do you currently need...    Turning from your back to your side while in flat bed without using bedrails?  3  -AS     Moving from lying on back to sitting on the side of a flat bed without bedrails?  3  -AS     Moving to and from a bed to a chair (including a wheelchair)?  2  -AS     Standing up from a chair using your arms (e.g., wheelchair, bedside chair)?  2  -AS     Climbing 3-5 steps with a railing?  1  -AS     To walk in hospital room?  1  -AS     AM-PAC 6 Clicks Score (PT)  12  -AS     Row Name 02/12/20 0914          Functional Assessment    Outcome Measure Options  AM-PAC 6 Clicks Basic Mobility (PT)  -AS       User Key  (r) = Recorded By, (t) = Taken By, (c) = Cosigned By    Initials Name Provider Type    AS Vicky Rose PTA Physical Therapy Assistant          PT Recommendation and Plan     Plan of Care Reviewed With: patient  Progress: improving  Outcome Summary: patient required less assistance for bed mobility CGA, improved sit<>stand and SPS  transfers from mod assist x2 < min assist x2 with B UE support and gait belt, progress limited by weakness, fatigue, SOA and impaired balance/coordination.     Time Calculation:   PT Charges     Row Name 02/12/20 0917             Time Calculation    Start Time  0805  -AS      PT Received On  02/12/20  -AS      PT Goal Re-Cert Due Date  02/19/20  -AS         Timed Charges    21058 - PT Therapeutic Exercise Minutes  8  -AS      39174 - PT Therapeutic Activity Minutes  15  -AS        User Key  (r) = Recorded By, (t) = Taken By, (c) = Cosigned By    Initials Name Provider Type    AS Vicky Rose PTA Physical Therapy Assistant        Therapy Charges for Today     Code Description Service Date Service Provider Modifiers Qty    72432100458 HC PT THER PROC EA 15 MIN 2/12/2020 Vicky Rose PTA GP 1    53592118242 HC PT THERAPEUTIC ACT EA 15 MIN 2/12/2020 Vicky Rose PTA GP 1          PT G-Codes  Outcome Measure Options: AM-PAC 6 Clicks Basic Mobility (PT)  AM-PAC 6 Clicks Score (PT): 12  AM-PAC 6 Clicks Score (OT): 13    Vicky Rose PTA  2/12/2020

## 2020-02-13 NOTE — PROGRESS NOTES
Case Management Discharge Note      Final Note: Spoke with Francesca at [facility].  Pt has been offered a skilled bed on Friday, 2/14, which pt/family has accepted.  RN to call report to [phone] and fax DC summary to [phone].  [facility] to transport.  Pt to be at the Maternity Entrance of the [facility] on Fri at 1315.   Pt will have a private room at [facility].    Provided post acute provider list?: Yes  Post Acute Provider List: Nursing Home, Inpatient Rehab  Post Acute Provider Quality & Resource List: Yes  Delivered To: Support Person  Support Person: wife  Method of Delivery: In person    Destination - Selection Complete      Service Provider Request Status Selected Services Address Phone Number Fax Number    [facility] Selected Skilled Nursing [address] [place] [address] [phone]      Durable Medical Equipment      No service has been selected for the patient.      Dialysis/Infusion      No service has been selected for the patient.      Home Medical Care      No service has been selected for the patient.      Therapy      No service has been selected for the patient.      Community Resources      No service has been selected for the patient.        Transportation Services  W/C Van: ([facility])    Final Discharge Disposition Code: 03 - skilled nursing facility (SNF)

## 2020-02-13 NOTE — PROGRESS NOTES
[facility] Medicine Services  PROGRESS NOTE    Patient Name: Murali Dennis  : 1929  MRN: [id number]    Date of Admission: 2020  Primary Care Physician: Diana Null APRN    Subjective   Subjective     CC:  Follow-up shortness of breath    HPI:  Feels ok, denies pain.  According to spouse, patient ate well this morning.  Full to go to rehab soon    Review of Systems  Able to obtain due to underlying dementia    Objective   Objective     Vital Signs:   Temp:  [97.8 °F (36.6 °C)-98.2 °F (36.8 °C)] 98.2 °F (36.8 °C)  Heart Rate:  [51-59] 56  Resp:  [16-18] 16  BP: (127-143)/(66-71) 140/66        Physical Exam:  Constitutional -no acute distress, non toxic, in bed, frail gentleman  HEENT-NCAT, mucous membranes moist  CV-RRR, S1 S2 normal, no m/r/g  Resp-grossly clear bilaterally  Abd-soft, non-tender, non-distended, normo active bowel sounds  Ext-No lower extremity cyanosis, clubbing or edema bilaterally  Neuro-alert but mild confusion, speech clear, moves all extremities   Psych-normal affect   Skin- No rash on exposed UE or LE bilaterally, abrasion right ankle        Results Reviewed:  Results from last 7 days   Lab Units 02/13/20  0454 02/10/20  0402 20  0354   WBC 10*3/mm3 5.41 4.81 4.80   HEMOGLOBIN g/dL 10.9* 9.9* 10.3*   HEMATOCRIT % 33.9* 31.0* 32.5*   PLATELETS 10*3/mm3 150 143 136*     Results from last 7 days   Lab Units 20  0454 20  0936 02/10/20  0402 20  0354  20  1401   SODIUM mmol/L 138 137 138 141   < > 140   POTASSIUM mmol/L 3.6 3.8 3.4* 3.7   < > 3.5   CHLORIDE mmol/L 99 102 101 102   < > 103   CO2 mmol/L 26.0 22.0 25.0 23.0   < > 23.0   BUN mg/dL 21 21 18 19   < > 20   CREATININE mg/dL 1.45* 1.65* 1.58* 1.55*   < > 1.53*   GLUCOSE mg/dL 133* 146* 122* 120*   < > 124*   CALCIUM mg/dL 8.4 8.5 8.2 8.4   < > 8.9   ALT (SGPT) U/L  --   --   --   --   --  9   AST (SGOT) U/L  --   --   --   --   --  39   PROBNP pg/mL  --   --   --   13,604.0*  --  12,195.0*    < > = values in this interval not displayed.     Estimated Creatinine Clearance: 38.6 mL/min (A) (by C-G formula based on SCr of 1.45 mg/dL (H)).    Microbiology Results Abnormal     Procedure Component Value - Date/Time    Blood Culture - Blood, Hand, Right [[id number]] Collected:  02/07/20 1401    Lab Status:  Final result Specimen:  Blood from Hand, Right Updated:  02/12/20 1431     Blood Culture No growth at 5 days    Narrative:       Aerobic bottle only      Blood Culture - Blood, Arm, Left [[id number]] Collected:  02/07/20 1401    Lab Status:  Final result Specimen:  Blood from Arm, Left Updated:  02/12/20 1431     Blood Culture No growth at 5 days    Urine Culture - Urine, Urine, Clean Catch [[id number]]  (Abnormal)  (Susceptibility) Collected:  02/07/20 1905    Lab Status:  Final result Specimen:  Urine, Clean Catch Updated:  02/09/20 0147     Urine Culture 25,000 CFU/mL Escherichia coli    Susceptibility      Escherichia coli     AMY     Ampicillin Susceptible     Ampicillin + Sulbactam Susceptible     Cefazolin Susceptible     Cefepime Susceptible     Ceftazidime Susceptible     Ceftriaxone Susceptible     Gentamicin Susceptible     Levofloxacin Susceptible     Nitrofurantoin Susceptible     Piperacillin + Tazobactam Susceptible     Tetracycline Susceptible     Trimethoprim + Sulfamethoxazole Susceptible                    Respiratory Panel, PCR - Swab, Nasopharynx [[id number]]  (Normal) Collected:  02/07/20 1545    Lab Status:  Final result Specimen:  Swab from Nasopharynx Updated:  02/07/20 1731     ADENOVIRUS, PCR Not Detected     Coronavirus 229E Not Detected     Coronavirus HKU1 Not Detected     Coronavirus NL63 Not Detected     Coronavirus OC43 Not Detected     Human Metapneumovirus Not Detected     Human Rhinovirus/Enterovirus Not Detected     Influenza B PCR Not Detected     Parainfluenza Virus 1 Not Detected     Parainfluenza Virus 2 Not Detected     Parainfluenza Virus 3  Not Detected     Parainfluenza Virus 4 Not Detected     Bordetella pertussis pcr Not Detected     Influenza A H1 2009 PCR Not Detected     Chlamydophila pneumoniae PCR Not Detected     Mycoplasma pneumo by PCR Not Detected     Influenza A PCR Not Detected     Influenza A H3 Not Detected     Influenza A H1 Not Detected     RSV, PCR Not Detected     Bordetella parapertussis PCR Not Detected          Imaging Results (Last 24 Hours)     ** No results found for the last 24 hours. **          Results for orders placed during the hospital encounter of 12/27/19   Adult Transthoracic Echo Complete W/ Cont if Necessary Per Protocol    Narrative · The left ventricular cavity is moderately dilated.  · Right ventricular cavity is mild-to-moderately dilated.  · Left atrial cavity size is moderate-to-severely dilated.  · Moderate aortic valve regurgitation is present.  · Moderate mitral valve regurgitation is present.  · Mild to moderate tricuspid valve regurgitation is present.  · Calculated right ventricular systolic pressure from tricuspid   regurgitation is 35 mmHg.  · Estimated EF = 30%.  · Left ventricular systolic function is severely decreased.  · Left ventricular diastolic dysfunction (grade II) consistent with   pseudonormalization.  · The findings are consistent with dilated cardiomyopathy.  · Mild pulmonary hypertension is present.  · There is no evidence of pericardial effusion.  · The aortic valve exhibits moderate sclerosis without stenosis.          I have reviewed the medications:  Scheduled Meds:    amiodarone 200 mg Oral Daily   aspirin 81 mg Oral Daily   carbidopa-levodopa 1 tablet Oral TID   castor oil-balsam peru  Topical Q12H   clopidogrel 75 mg Oral Daily   diclofenac 2 g Topical BID   finasteride 5 mg Oral Daily   furosemide 20 mg Oral Daily   levothyroxine 75 mcg Oral Daily   memantine 10 mg Oral BID   multivitamin with minerals 1 tablet Oral Daily   pantoprazole 40 mg Oral QAM   pharmacy consult - [facility]   Does not apply Daily   pravastatin 20 mg Oral Nightly   sacubitril-valsartan 0.5 tablet Oral Q12H   sodium chloride 10 mL Intravenous Q12H     Continuous Infusions:   PRN Meds:.•  acetaminophen **OR** acetaminophen **OR** acetaminophen  •  cetirizine  •  docusate sodium  •  famotidine  •  ipratropium-albuterol  •  labetalol  •  magnesium sulfate **OR** magnesium sulfate **OR** magnesium sulfate  •  melatonin  •  ondansetron **OR** ondansetron  •  Polyvinyl Alcohol-Povidone PF  •  QUEtiapine  •  simethicone  •  sodium chloride    Assessment/Plan   Assessment & Plan     Active Hospital Problems    Diagnosis  POA   • **Acute on chronic systolic CHF (congestive heart failure) (CMS/[facility]) [I50.23]  Yes   • Hypoxia [R09.02]  Yes   • Acute systolic heart failure (CMS/[facility]) [I50.21]  Yes   • Deep tissue injury, right heel and right lateral ankle pressure ulcers [T14.8XXA]  Yes   • COPD (chronic obstructive pulmonary disease) (CMS/[facility]) [J44.9]  Yes   • Cardiomyopathy (EF 30%) [I42.9]  Yes   • ANGELA on CPAP [G47.33, Z99.89]  Not Applicable   • Parkinson's disease (CMS/[facility]) [G20]  Yes   • Gastroesophageal reflux disease [K21.9]  Yes   • A-fib (CMS/[facility]) [I48.91]  Yes   • CKD (chronic kidney disease) stage 3, GFR 30-59 ml/min (CMS/[facility]) [N18.3]  Yes   • Coronary artery disease involving native coronary artery of native heart without angina pectoris [I25.10]  Yes   • Debility [R53.81]  Yes   • Dementia (CMS/[facility]) [F03.90]  Yes      Resolved Hospital Problems   No resolved problems to display.        Brief Hospital Course to date:  Murali Dennis is a 89 y.o. male presents the hospital with worsening dyspnea with acute on chronic systolic congestive heart failure exacerbation.  He had acute hypoxia and required oxygen that he is now been weaned off of.  Cardiology consult team assisted with diuresis for acute heart failure and he has been transition back to his home diuretics.  He continued nebulizers and has been of spirometer for  his chronic COPD.  Patient's hospitalization has been complicated by intermittent somnolence, worsening debility from his baseline level of function, and poor appetite.     Acute on chronic systolic congestive heart failure exacerbation, EF 30%:  -Continue Entresto  -Diuretics now oral  -Heart rate too slow for beta-blocker    Acute hypoxia:  -Resolved, secondary to acute heart failure     COPD: Scheduled nebulizers.  Incentive spirometer.  Stable     Paroxysmal atrial fibrillation:   - Currently appears sinus rhythm.  According to partner, family states he is not on anticoagulation but takes aspirin and Plavix for coronary artery disease.  They have discussed this with cardiologist in the past.       Parkinson's disease:  Continue home medication.  Currently lower functioning than baseline per family.     Gastroesophageal reflux disease: Stable.  PPI.     History of impaired glucose tolerance: Family deny any recent hypoglycemia.  No clear indication for correction insulin currently.  Previous A1c's have been borderline.  Diet controlled.     Debility: PT OT.  Up with support only.     Stage III chronic kidney disease:   -Creatinine today 1.45       Dementia with sundowning: Continue home medication.  Supportive care.  PT OT.  Family requesting something if he becomes agitated at night, avoid Ativan and other benzodiazepine.  Seroquel low-dose use but caused somnolence.  Avoiding further Seroquel as patient alert today.     Deep tissue injury, right heel and right lateral ankle pressure ulcers present on admission:   -Wound care to continue     Recent C. difficile colitis: Spore precaution.  No current diarrhea.  Monitor and avoid antibiotics unless absolutely necessary.    Probable viral conjunctivitis: Improved with eyedrops    One-time episode of scant hemoptysis on 2/11:   - Prophylactic heparin stopped.  Now resolved.  No worsening respiratory status.     DVT prophylaxis:  Mechanical     Disposition: Patient  appears medically ready for transfer to rehab.   discussing possible locations with family today.     CODE STATUS:   Code Status and Medical Interventions:   Ordered at: 02/07/20 1402     Limited Support to NOT Include:    Intubation     Code Status:    No CPR     Medical Interventions (Level of Support Prior to Arrest):    Limited         Electronically signed by Trevin Souza MD, 02/13/20, 1:48 PM.

## 2020-02-13 NOTE — PLAN OF CARE
VSS. Patient has rested well tonight, with no complaints of any kind. Case management is currently seeking rehab facilities for the patient. No distress noted, will continue to monitor patient at this time.

## 2020-02-13 NOTE — THERAPY TREATMENT NOTE
Patient Name: Murali Dennis  : 1929    MRN: [id number]                              Today's Date: 2020       Admit Date: 2020    Visit Dx:     ICD-10-CM ICD-9-CM   1. Impaired mobility and ADLs Z74.09 799.89     Patient Active Problem List   Diagnosis   • Dementia (CMS/[facility])   • Hypothyroid   • CKD (chronic kidney disease) stage 3, GFR 30-59 ml/min (CMS/[facility])   • Coronary artery disease involving native coronary artery of native heart without angina pectoris   • Debility   • A-fib (CMS/[facility])   • Benign prostatic hyperplasia without lower urinary tract symptoms   • Gastroesophageal reflux disease   • Ventricular tachycardia (CMS/[facility])   • Parkinson's disease (CMS/[facility])   • ANGELA on CPAP   • Cardiomyopathy (EF 30%)   • Mixed hyperlipidemia   • Pancreatitis   • Sepsis (CMS/[facility])   • Bacteremia due to Streptococcus   • Acute renal failure superimposed on stage 3 chronic kidney disease (CMS/[facility])   • Acute cholecystitis   • Acute on chronic systolic CHF (congestive heart failure) (CMS/[facility])   • Hypoxia   • Acute systolic heart failure (CMS/[facility])   • Deep tissue injury, right heel and right lateral ankle pressure ulcers   • COPD (chronic obstructive pulmonary disease) (CMS/[facility])     Past Medical History:   Diagnosis Date   • Alzheimer disease (CMS/[facility])    • Arthritis    • [place] esophagus     followed  with GI in [place]   • Benign prostatic hyperplasia without lower urinary tract symptoms 10/17/2018   • Chronic congestive heart failure (CMS/[facility]) 2017     echo report from 17 EF 60-65%, mild TR, mild AR ([place], [place]) Echocardiogram 17: EF 28%, mild to moderate MR    • COPD (chronic obstructive pulmonary disease) (CMS/[facility])    • Coronary artery disease    • Dementia (CMS/[facility])    • Diarrhea 2019   • Environmental allergies 10/17/2018   • Essential hypertension 10/17/2018   • Gastroesophageal reflux disease 2018   • GERD (gastroesophageal reflux disease)    •  Hypothyroid     started after amiodarone use in 2003   • Sleep apnea     wears cpap   • Stroke (CMS/HCC) 2003   • TIA (transient ischemic attack) 12/29/2017   • Unintended weight loss 10/17/2018     Past Surgical History:   Procedure Laterality Date   • CARDIAC CATHETERIZATION  2003   • CARDIAC CATHETERIZATION N/A 1/19/2018    Procedure: Left Heart Cath;  Surgeon: Hollis Ugarte MD;  Location:  [facility] CATH INVASIVE LOCATION;  Service:    • CARPAL TUNNEL RELEASE Right    • CATARACT EXTRACTION Bilateral    • CORONARY ARTERY BYPASS GRAFT  2003    Triple Bypass, @ [facility] @ [place], [place]   • ERCP N/A 12/30/2019    Procedure: ENDOSCOPIC RETROGRADE CHOLANGIOPANCREATOGRAPHY;  Surgeon: Arsh White MD;  Location:  [facility];  Service: Gastroenterology   • HEMORRHOIDECTOMY     • HERNIA REPAIR       General Information     Row Name 02/13/20 1450          PT Evaluation Time/Intention    Document Type  therapy note (daily note)  -CS     Mode of Treatment  physical therapy  -CS     Row Name 02/13/20 1453          General Information    Patient Profile Reviewed?  yes  -CS     Existing Precautions/Restrictions  fall;other (see comments) Parkinson's, contact/spore precautions  -CS     Row Name 02/13/20 1456          Cognitive Assessment/Intervention- PT/OT    Orientation Status (Cognition)  oriented to;person;disoriented to;place;situation;time  -CS     Row Name 02/13/20 1459          Safety Issues, Functional Mobility    Safety Issues Affecting Function (Mobility)  safety precautions follow-through/compliance;safety precaution awareness;awareness of need for assistance;insight into deficits/self awareness  -CS     Impairments Affecting Function (Mobility)  balance;cognition;endurance/activity tolerance;strength;motor control  -CS       User Key  (r) = Recorded By, (t) = Taken By, (c) = Cosigned By    Initials Name Provider Type    CS Tracey Arguello, PT Physical Therapist         Mobility     Row Name 02/13/20 1450          Bed Mobility Assessment/Treatment    Bed Mobility Assessment/Treatment  supine-sit  -CS     Supine-Sit [place] (Bed Mobility)  verbal cues;contact guard  -CS     Assistive Device (Bed Mobility)  head of bed elevated;bed rails  -CS     Comment (Bed Mobility)  VC's to move LE's to EOB and once pt able to move LE's was able to perform transfer with CGA.   -CS     Row Name 02/13/20 1450          Transfer Assessment/Treatment    Comment (Transfers)  VC's to push off of bed to stand and to reach back for chair to sit. VC's for hand placement once standing. Pt able to take 4 steps from bed to chair with B UE support with no knee buckling with min assist. Pt required mod assist for STS.   -CS     Row Name 02/13/20 1450          Bed-Chair Transfer    Bed-Chair [place] (Transfers)  verbal cues;minimum assist (75% patient effort)  -CS     Assistive Device (Bed-Chair Transfers)  other (see comments) BUE support and support at gait belt  -     Row Name 02/13/20 1450          Sit-Stand Transfer    Sit-Stand [place] (Transfers)  verbal cues;moderate assist (50% patient effort)  -CS     Assistive Device (Sit-Stand Transfers)  other (see comments) B UE support and support at gait belt  -     Row Name 02/13/20 1450          Gait/Stairs Assessment/Training    [place] Level (Gait)  unable to assess  -CS     Comment (Gait/Stairs)  4 steps from bed to recliner  -CS       User Key  (r) = Recorded By, (t) = Taken By, (c) = Cosigned By    Initials Name Provider Type    Tracey Merrill, PT Physical Therapist        Obj/Interventions     Row Name 02/13/20 1450          Therapeutic Exercise    Upper Extremity Range of Motion (Therapeutic Exercise)  shoulder flexion/extension, bilateral;elbow flexion/extension, bilateral  -CS     Lower Extremity (Therapeutic Exercise)  LAQ (long arc quad), bilateral;marching while seated;hamstring sets, bilateral  -CS     Lower  Extremity Range of Motion (Therapeutic Exercise)  hip abduction/adduction, bilateral;ankle dorsiflexion/plantar flexion, bilateral  -     Exercise Type (Therapeutic Exercise)  isometric contraction, static;AAROM (active assistive range of motion);AROM (active range of motion);isotonic contraction, concentric  -CS     Position (Therapeutic Exercise)  seated  -CS     Sets/Reps (Therapeutic Exercise)  10 reps each   -CS     Comment (Therapeutic Exercise)  VC's on technique.   -[facility] Name 02/13/20 1450          Static Sitting Balance    Level of [place] (Unsupported Sitting, Static Balance)  contact guard assist  -CS     Sitting Position (Unsupported Sitting, Static Balance)  sitting on edge of bed  -CS     Time Able to Maintain Position (Unsupported Sitting, Static Balance)  2 to 3 minutes  -CS     Comment (Unsupported Sitting, Static Balance)  Posterior right lean, VC's to come to midline.   -     Row Name 02/13/20 8190          Static Standing Balance    Level of [place] (Supported Standing, Static Balance)  minimal assist, 75% patient effort  -CS     Time Able to Maintain Position (Supported Standing, Static Balance)  1 to 2 minutes  -CS     Assistive Device Utilized (Supported Standing, Static Balance)  other (see comments) B UE support   -CS       User Key  (r) = Recorded By, (t) = Taken By, (c) = Cosigned By    Initials Name Provider Type    Tracey Merrill PT Physical Therapist        Goals/Plan    No documentation.       Clinical Impression     [facility] Name 02/13/20 6527          Pain Assessment    Additional Documentation  Pain Scale: FACES Pre/Post-Treatment (Group)  -CS     Row Name 02/13/20 1312          Pain Scale: Numbers Pre/Post-Treatment    Pain Intervention(s)  Repositioned;Ambulation/increased activity  -[facility] Name 02/13/20 7343          Pain Scale: FACES Pre/Post-Treatment    Pain: FACES Scale, Pretreatment  0-->no hurt  -CS     Pain: FACES Scale, Post-Treatment  0-->no  hurt  -CS     Row Name 02/13/20 1450          Plan of Care Review    Plan of Care Reviewed With  patient;daughter  -CS     Progress  improving  -CS     Outcome Summary  Pt required CGA for bed mobility. Required mod assist for STS from the bed. Pt able to take 4 small steps from bed to chair with B UE support and support at gait belt with min assist. Pt tolerated exercises well.   -CS     Row Name 02/13/20 1450          Physical Therapy Clinical Impression    Criteria for Skilled Interventions Met (PT Clinical Impression)  yes;treatment indicated  -CS     Rehab Potential (PT Clinical Summary)  good, to achieve stated therapy goals  -CS     Row Name 02/13/20 1450          Vital Signs    Pretreatment Heart Rate (beats/min)  54  -CS     Posttreatment Heart Rate (beats/min)  55  -CS     Pre SpO2 (%)  95  -CS     O2 Delivery Pre Treatment  room air  -CS     Post SpO2 (%)  95  -CS     O2 Delivery Post Treatment  room air  -     Row Name 02/13/20 1450          Positioning and Restraints    Pre-Treatment Position  in bed  -CS     Post Treatment Position  chair  -CS     In Chair  notified nsg;reclined;call light within reach;encouraged to call for assist;exit alarm on;with family/caregiver;legs elevated;waffle cushion  -CS       User Key  (r) = Recorded By, (t) = Taken By, (c) = Cosigned By    Initials Name Provider Type    CS Tracey Arguello, PT Physical Therapist        Outcome Measures     Row Name 02/13/20 1450          How much help from another person do you currently need...    Turning from your back to your side while in flat bed without using bedrails?  3  -CS     Moving from lying on back to sitting on the side of a flat bed without bedrails?  3  -CS     Moving to and from a bed to a chair (including a wheelchair)?  3  -CS     Standing up from a chair using your arms (e.g., wheelchair, bedside chair)?  2  -CS     Climbing 3-5 steps with a railing?  1  -CS     To walk in hospital room?  2  -CS     AM-PAC 6  Clicks Score (PT)  14  -CS     Row Name 02/13/20 1450          Functional Assessment    Outcome Measure Options  AM-PAC 6 Clicks Basic Mobility (PT)  -CS       User Key  (r) = Recorded By, (t) = Taken By, (c) = Cosigned By    Initials Name Provider Type    CS Tracey Arguello PT Physical Therapist          PT Recommendation and Plan  Planned Therapy Interventions (PT Eval): balance training, bed mobility training, gait training, home exercise program, neuromuscular re-education, patient/family education, strengthening, transfer training  Plan of Care Reviewed With: patient, daughter  Progress: improving  Outcome Summary: Pt required CGA for bed mobility. Required mod assist for STS from the bed. Pt able to take 4 small steps from bed to chair with B UE support and support at gait belt with min assist. Pt tolerated exercises well.      Time Calculation:   PT Charges     Row Name 02/13/20 1450             Time Calculation    Start Time  1450  -      PT Received On  02/13/20  -         Time Calculation- PT    Total Timed Code Minutes- PT  19 minute(s)  -CS         Timed Charges    95999 - PT Therapeutic Exercise Minutes  8  -CS      99063 - PT Therapeutic Activity Minutes  11  -CS        User Key  (r) = Recorded By, (t) = Taken By, (c) = Cosigned By    Initials Name Provider Type    Tracey Merrill PT Physical Therapist        Therapy Charges for Today     Code Description Service Date Service Provider Modifiers Qty    [id number]  PT THERAPEUTIC ACT EA 15 MIN 2/13/2020 Tracey Arguello, PT GP 1          PT G-Codes  Outcome Measure Options: AM-PAC 6 Clicks Basic Mobility (PT)  AM-PAC 6 Clicks Score (PT): 14  AM-PAC 6 Clicks Score (OT): 13    Tracey Arguello PT  2/13/2020

## 2020-02-13 NOTE — PLAN OF CARE
Problem: Patient Care Overview  Goal: Plan of Care Review  Outcome: Ongoing (interventions implemented as appropriate)  Flowsheets  Taken 2/13/2020 1637  Progress: improving  Taken 2/13/2020 2470  Plan of Care Reviewed With: patient;daughter  Outcome Summary: Pt required CGA for bed mobility. Required mod assist for STS from the bed. Pt able to take 4 small steps from bed to chair with B UE support and support at gait belt with min assist. Pt tolerated exercises well.

## 2020-02-14 PROBLEM — I50.21 ACUTE SYSTOLIC HEART FAILURE (HCC): Status: RESOLVED | Noted: 2020-01-01 | Resolved: 2020-01-01

## 2020-02-14 PROBLEM — I50.23 ACUTE ON CHRONIC SYSTOLIC CHF (CONGESTIVE HEART FAILURE) (HCC): Status: RESOLVED | Noted: 2020-01-01 | Resolved: 2020-01-01

## 2020-02-14 PROBLEM — N18.30 CKD (CHRONIC KIDNEY DISEASE) STAGE 3, GFR 30-59 ML/MIN (HCC): Status: RESOLVED | Noted: 2017-12-08 | Resolved: 2020-01-01

## 2020-02-14 PROBLEM — I48.91 A-FIB (HCC): Status: RESOLVED | Noted: 2017-12-29 | Resolved: 2020-01-01

## 2020-02-14 PROBLEM — J44.9 COPD (CHRONIC OBSTRUCTIVE PULMONARY DISEASE) (HCC): Status: RESOLVED | Noted: 2020-01-01 | Resolved: 2020-01-01

## 2020-02-14 PROBLEM — R09.02 HYPOXIA: Status: RESOLVED | Noted: 2020-01-01 | Resolved: 2020-01-01

## 2020-02-14 PROBLEM — Z99.89 OSA ON CPAP: Status: RESOLVED | Noted: 2019-02-24 | Resolved: 2020-01-01

## 2020-02-14 PROBLEM — I42.9 CARDIOMYOPATHY (HCC): Status: RESOLVED | Noted: 2019-02-26 | Resolved: 2020-01-01

## 2020-02-14 PROBLEM — K21.9 GASTROESOPHAGEAL REFLUX DISEASE: Status: RESOLVED | Noted: 2018-11-19 | Resolved: 2020-01-01

## 2020-02-14 PROBLEM — F03.90 DEMENTIA (HCC): Status: RESOLVED | Noted: 2017-12-07 | Resolved: 2020-01-01

## 2020-02-14 PROBLEM — I25.10 CORONARY ARTERY DISEASE INVOLVING NATIVE CORONARY ARTERY OF NATIVE HEART WITHOUT ANGINA PECTORIS: Status: RESOLVED | Noted: 2017-12-08 | Resolved: 2020-01-01

## 2020-02-14 PROBLEM — G20 PARKINSON'S DISEASE (HCC): Status: RESOLVED | Noted: 2019-02-24 | Resolved: 2020-01-01

## 2020-02-14 PROBLEM — G20.A1 PARKINSON'S DISEASE: Status: RESOLVED | Noted: 2019-02-24 | Resolved: 2020-01-01

## 2020-02-14 PROBLEM — T14.8XXA DEEP TISSUE INJURY: Status: RESOLVED | Noted: 2020-01-01 | Resolved: 2020-01-01

## 2020-02-14 PROBLEM — G47.33 OSA ON CPAP: Status: RESOLVED | Noted: 2019-02-24 | Resolved: 2020-01-01

## 2020-02-14 NOTE — DISCHARGE PLACEMENT REQUEST
"Lakisha Barnes (89 y.o. Male)     Date of Birth Social Security Number Address Home Phone MRN    04/20/1929  [address] [place] [id number] [phone] [id number]    [facility]          None        Admission Date Admission Type Admitting Provider Attending Provider Department, Room/Bed    2/7/20 Urgent Trevin Souza MD Sloan, Walker E, MD [facility] 4G, S457/1    Discharge Date Discharge Disposition Discharge Destination         Rehab Facility or Unit (DC - External)              Attending Provider:  Trevin Souza MD    Allergies:  Benzodiazepines, Valium [Diazepam], Codeine, Fluoxetine, Lipitor [Atorvastatin], Metoclopramide, Nabumetone, Penicillins, Tramadol, Prozac [Fluoxetine Hcl], Sulfa Antibiotics    Isolation:  Spore   Infection:  C.difficile (02/10/20)   Code Status:  No CPR    Ht:  177.8 cm (70\")   Wt:  80.7 kg (178 lb)    Admission Cmt:  None   Principal Problem:  Acute on chronic systolic CHF (congestive heart failure) (CMS/[facility]) [I50.23]                 Active Insurance as of 2/7/2020     Primary Coverage     Payor Plan Insurance Group Employer/Plan Group    MEDICARE MEDICARE A & B      Payor Plan Address Payor Plan Phone Number Payor Plan Fax Number Effective Dates    [address] [phone]  4/1/1994 - None Entered    [facility] [id number]       Subscriber Name Subscriber Birth Date Member ID       LAKISHA BARNES 4/20/1929 [id number]           Secondary Coverage     Payor Plan Insurance Group Employer/Plan Group    ANTHEM BLUE CROSS ANTHEM BLUE CROSS BLUE SHIELD PPO [id number]     Payor Plan Address Payor Plan Phone Number Payor Plan Fax Number Effective Dates    [address] [phone]  7/1/2019 - None Entered    [facility] [id number]       Subscriber Name Subscriber Birth Date Member ID       LAKISHA BARNES 4/20/1929 [id number]                 Emergency Contacts      (Rel.) Home Phone Work Phone Mobile Phone    " Nelly Dennis (Power of ) [phone] -- [phone]    Santa Talavera (Daughter) [phone] -- --               Discharge Summary      Connie Youngblood APRN at 20 0822              [facility] Medicine Services  DISCHARGE SUMMARY    Patient Name: Murali Dennis  : 1929  MRN: [id number]    Date of Admission: 2020  1:00 PM  Date of Discharge:  2020  Primary Care Physician: Diana Null APRN    Consults     Date and Time Order Name Status Description    2020 1402 Inpatient Cardiology Consult Completed     1/3/2020 0756 Inpatient Palliative Care MD Consult Completed     2020 1135 Inpatient Urology Consult Completed     2019 0030 Inpatient Infectious Diseases Consult Completed     2019 0057 Inpatient Gastroenterology Consult Completed     2019 0057 Inpatient General Surgery Consult Completed           Hospital Course     Presenting Problem:   Acute systolic heart failure (CMS/[facility]) [I50.21]    Active Hospital Problems    Diagnosis  POA   • Debility [R53.81]  Yes      Resolved Hospital Problems    Diagnosis Date Resolved POA   • **Acute on chronic systolic CHF (congestive heart failure) (CMS/[facility]) [I50.23] 2020 Yes   • Hypoxia [R09.02] 2020 Yes   • Acute systolic heart failure (CMS/[facility]) [I50.21] 2020 Yes   • Deep tissue injury, right heel and right lateral ankle pressure ulcers [T14.8XXA] 2020 Yes   • COPD (chronic obstructive pulmonary disease) (CMS/[facility]) [J44.9] 2020 Yes   • Cardiomyopathy (EF 30%) [I42.9] 2020 Yes   • ANGELA on CPAP [G47.33, Z99.89] 2020 Not Applicable   • Parkinson's disease (CMS/[facility]) [G20] 2020 Yes   • Gastroesophageal reflux disease [K21.9] 2020 Yes   • A-fib (CMS/[facility]) [I48.91] 2020 Yes   • CKD (chronic kidney disease) stage 3, GFR 30-59 ml/min (CMS/[facility]) [N18.3] 2020 Yes   • Coronary artery disease involving native coronary artery of  native heart without angina pectoris [I25.10] 02/14/2020 Yes   • Dementia (CMS/[facility]) [F03.90] 02/14/2020 Yes          Hospital Course:  Murali Dennis is a 89 y.o. male presents the hospital with worsening dyspnea with acute on chronic systolic congestive heart failure exacerbation.  He had acute hypoxia and required oxygen that he is now been weaned off of. Patient will need cpap at .  Cardiology consult team assisted with diuresis for acute heart failure and he has been transition back to his home diuretics.  He continued nebulizers and has been of spirometer for his chronic COPD.  Patient's hospitalization has been complicated by intermittent somnolence, worsening debility from his baseline level of function, and poor appetite.    Acute on chronic systolic congestive heart failure exacerbation, EF 30%:  -Continue Entresto  -Diuretics now oral  -Heart rate too slow for beta-blocker  -- follow up with Cardiology and heart and valve clinic after discharge      Acute hypoxia:  -Resolved, secondary to acute heart failure     COPD: Scheduled nebulizers.  Incentive spirometer.  Stable     Paroxysmal atrial fibrillation:   - Currently appears sinus rhythm.  According to partner, family states he is not on anticoagulation but takes aspirin and Plavix for coronary artery disease.  They have discussed this with cardiologist in the past.       Parkinson's disease:  Continue home medication.  Currently lower functioning than baseline per family.     Gastroesophageal reflux disease: Stable.  PPI.     History of impaired glucose tolerance: Family deny any recent hypoglycemia.  No clear indication for correction insulin currently.  Previous A1c's have been borderline.  Diet controlled.     Debility: PT OT.  Up with support only.  -- pt will need rehab to help with strength and mobility      Stage III chronic kidney disease:   -Creatinine today 1.45  -- bmp in 2 days patient may need potassium supplement added back but will  defer to facility provider based on bmp results       Dementia with sundowning: Continue home medication.  Supportive care.  PT OT.  Family requesting something if he becomes agitated at night, avoid Ativan and other benzodiazepine.  Seroquel low-dose use but caused somnolence.  Avoiding further Seroquel as patient alert and no further agitation      Deep tissue injury, right heel and right lateral ankle pressure ulcers present on admission:   -Wound care has followed patient while in hospital and wound care will need to follow patient at facility. Overall both areas have been improving with venelex      Recent C. difficile colitis: Spore precaution.  No current diarrhea.  Monitor and avoid antibiotics unless absolutely necessary.     Probable viral conjunctivitis: Improved with eyedrops     One-time episode of scant hemoptysis on 2/11:   - Prophylactic heparin stopped.  Now resolved.  No worsening respiratory status.     Patient has remained clinically stable and will be discharged to rehab today. Patient will need to follow up with PCP after d/c from rehab. Follow up with heart and valve clinic one week. Follow up with Cardiology in 2-3 weeks.     Discharge Follow Up Recommendations for outpatient labs/diagnostics:   PCP after d.c from rehab   Follow up with heart and valve clinic one week  Follow up with Cardiology 2-3 weeks   Bmp in 2 days     Day of Discharge     HPI:   Patient is resting in bed in NAD with family at bedside. He slept well. Appetite fair. Has been working with PT. Plan for rehab today and patient is agreeable. No acute events overnight per nursing     Review of Systems  Gen- No fevers, chills  CV- No chest pain, palpitations  Resp- No cough, dyspnea  GI- No N/V/D, abd pain        Vital Signs:   Temp:  [97.7 °F (36.5 °C)] 97.7 °F (36.5 °C)  Heart Rate:  [51-93] 55  Resp:  [20] 20  BP: (122-140)/(66-69) 122/69     Physical Exam:  Constitutional -no acute distress, non toxic, in bed, frail  gentleman  HEENT-NCAT, mucous membranes moist  CV-RRR, S1 S2 normal, no m/r/g  Resp-grossly clear bilaterally, 2L 98%, non labored   Abd-soft, non-tender, non-distended, normo active bowel sounds  Ext-No lower extremity cyanosis, clubbing or edema bilaterally  Neuro-alert but mild confusion, speech clear, moves all extremities   Psych-normal affect   Skin- No rash on exposed UE or LE bilaterally, abrasion right ankle    Pertinent  and/or Most Recent Results     Results from last 7 days   Lab Units 02/13/20  0454 02/11/20  0936 02/10/20  0402 02/09/20  0354 02/08/20  0506 02/07/20  1401   WBC 10*3/mm3 5.41  --  4.81 4.80 5.31 5.34   HEMOGLOBIN g/dL 10.9*  --  9.9* 10.3* 10.9* 11.6*   HEMATOCRIT % 33.9*  --  31.0* 32.5* 35.3* 36.9*   PLATELETS 10*3/mm3 150  --  143 136* 159 149   SODIUM mmol/L 138 137 138 141 139 140   POTASSIUM mmol/L 3.6 3.8 3.4* 3.7 3.9 3.5   CHLORIDE mmol/L 99 102 101 102 103 103   CO2 mmol/L 26.0 22.0 25.0 23.0 23.0 23.0   BUN mg/dL 21 21 18 19 19 20   CREATININE mg/dL 1.45* 1.65* 1.58* 1.55* 1.49* 1.53*   GLUCOSE mg/dL 133* 146* 122* 120* 133* 124*   CALCIUM mg/dL 8.4 8.5 8.2 8.4 9.0 8.9     Results from last 7 days   Lab Units 02/07/20  1401   BILIRUBIN mg/dL 1.0   ALK PHOS U/L 237*   ALT (SGPT) U/L 9   AST (SGOT) U/L 39           Invalid input(s): TG, LDLCALC, LDLREALC  Results from last 7 days   Lab Units 02/09/20  0354 02/07/20  1401   TSH uIU/mL  --  2.500   PROBNP pg/mL 13,604.0* 12,195.0*   LACTATE mmol/L  --  1.8       Brief Urine Lab Results  (Last result in the past 365 days)      Color   Clarity   Blood   Leuk Est   Nitrite   Protein   CREAT   Urine HCG        02/09/20 0957 Yellow Clear Negative Negative Negative Negative               Microbiology Results Abnormal     Procedure Component Value - Date/Time    Blood Culture - Blood, Hand, Right [[id number]] Collected:  02/07/20 1401    Lab Status:  Final result Specimen:  Blood from Hand, Right Updated:  02/12/20 1431     Blood Culture  No growth at 5 days    Narrative:       Aerobic bottle only      Blood Culture - Blood, Arm, Left [[id number]] Collected:  02/07/20 1401    Lab Status:  Final result Specimen:  Blood from Arm, Left Updated:  02/12/20 1431     Blood Culture No growth at 5 days    Urine Culture - Urine, Urine, Clean Catch [[id number]]  (Abnormal)  (Susceptibility) Collected:  02/07/20 1905    Lab Status:  Final result Specimen:  Urine, Clean Catch Updated:  02/09/20 0147     Urine Culture 25,000 CFU/mL Escherichia coli    Susceptibility      Escherichia coli     AMY     Ampicillin Susceptible     Ampicillin + Sulbactam Susceptible     Cefazolin Susceptible     Cefepime Susceptible     Ceftazidime Susceptible     Ceftriaxone Susceptible     Gentamicin Susceptible     Levofloxacin Susceptible     Nitrofurantoin Susceptible     Piperacillin + Tazobactam Susceptible     Tetracycline Susceptible     Trimethoprim + Sulfamethoxazole Susceptible                    Respiratory Panel, PCR - Swab, Nasopharynx [[id number]]  (Normal) Collected:  02/07/20 1545    Lab Status:  Final result Specimen:  Swab from Nasopharynx Updated:  02/07/20 1731     ADENOVIRUS, PCR Not Detected     Coronavirus 229E Not Detected     Coronavirus HKU1 Not Detected     Coronavirus NL63 Not Detected     Coronavirus OC43 Not Detected     Human Metapneumovirus Not Detected     Human Rhinovirus/Enterovirus Not Detected     Influenza B PCR Not Detected     Parainfluenza Virus 1 Not Detected     Parainfluenza Virus 2 Not Detected     Parainfluenza Virus 3 Not Detected     Parainfluenza Virus 4 Not Detected     Bordetella pertussis pcr Not Detected     Influenza A H1 2009 PCR Not Detected     Chlamydophila pneumoniae PCR Not Detected     Mycoplasma pneumo by PCR Not Detected     Influenza A PCR Not Detected     Influenza A H3 Not Detected     Influenza A H1 Not Detected     RSV, PCR Not Detected     Bordetella parapertussis PCR Not Detected          Imaging Results (All)      Procedure Component Value Units Date/Time    XR Chest 1 View [[id number]] Collected:  02/07/20 1436     Updated:  02/08/20 2012    Narrative:       EXAMINATION: XR CHEST 1 VW-02/07/2020:     INDICATION: Hypoxia, CHF.     COMPARISON: 01/02/2020.     FINDINGS: The heart shadow appears mildly enlarged. There is increasing  bilateral perihilar disease, suggesting interval development of CHF.  Patchy atelectasis in the medial left lower lobe appears increased. Mild  new right basilar discoid atelectasis is also noted. No pneumothorax is  seen. Right upper extremity PICC line has apparently been removed.       Impression:       Interval development of congestive heart failure.     D:  02/07/2020  E:  02/07/2020            This report was finalized on 2/8/2020 8:09 PM by Dr. Stiven Benedict MD.             Results for orders placed during the hospital encounter of 12/07/17   Duplex Carotid Ultrasound CAR    Narrative · No obstructive carotid stenosis bilaterally  · Antegrade bilateral vertebral flow.          Results for orders placed during the hospital encounter of 12/07/17   Duplex Carotid Ultrasound CAR    Narrative · No obstructive carotid stenosis bilaterally  · Antegrade bilateral vertebral flow.          Results for orders placed during the hospital encounter of 12/27/19   Adult Transthoracic Echo Complete W/ Cont if Necessary Per Protocol    Narrative · The left ventricular cavity is moderately dilated.  · Right ventricular cavity is mild-to-moderately dilated.  · Left atrial cavity size is moderate-to-severely dilated.  · Moderate aortic valve regurgitation is present.  · Moderate mitral valve regurgitation is present.  · Mild to moderate tricuspid valve regurgitation is present.  · Calculated right ventricular systolic pressure from tricuspid   regurgitation is 35 mmHg.  · Estimated EF = 30%.  · Left ventricular systolic function is severely decreased.  · Left ventricular diastolic dysfunction (grade II) consistent  with   pseudonormalization.  · The findings are consistent with dilated cardiomyopathy.  · Mild pulmonary hypertension is present.  · There is no evidence of pericardial effusion.  · The aortic valve exhibits moderate sclerosis without stenosis.          Plan for Follow-up of Pending Labs/Results: elias in 2 days     Discharge Details        Discharge Medications      New Medications      Instructions Start Date   castor oil-balsam peru ointment   5 g, Topical, Every 12 Hours Scheduled         Changes to Medications      Instructions Start Date   furosemide 20 MG tablet  Commonly known as:  LASIX  What changed:    · when to take this  · reasons to take this   20 mg, Oral, Daily      pravastatin 20 MG tablet  Commonly known as:  PRAVACHOL  What changed:  when to take this   TAKE 1 TABLET BY MOUTH EVERY DAY         Continue These Medications      Instructions Start Date   acetaminophen 325 MG tablet  Commonly known as:  TYLENOL   650 mg, Oral, Nightly PRN      amiodarone 200 MG tablet  Commonly known as:  PACERONE   200 mg, Oral, Daily      aspirin 81 MG chewable tablet   81 mg, Oral, Daily      carbidopa-levodopa  MG per tablet  Commonly known as:  SINEMET   1 tablet, Oral, 3 Times Daily      clopidogrel 75 MG tablet  Commonly known as:  PLAVIX   75 mg, Oral, Daily      diclofenac 1 % gel gel  Commonly known as:  VOLTAREN   No dose, route, or frequency recorded.      dutasteride 0.5 MG capsule  Commonly known as:  AVODART   TAKE 1 CAPSULE BY MOUTH EVERY DAY      ENTRESTO 24-26 MG tablet  Generic drug:  sacubitril-valsartan   0.5 tablets, Oral, 2 Times Daily      ipratropium-albuterol 0.5-2.5 mg/3 ml nebulizer  Commonly known as:  DUO-NEB   3 mL, Nebulization, Every 4 Hours PRN      levothyroxine 75 MCG tablet  Commonly known as:  SYNTHROID, LEVOTHROID   TAKE 1 TABLET BY MOUTH EVERY DAY      Loratadine 10 MG capsule   1 capsule, Oral, Daily PRN      memantine 10 MG tablet  Commonly known as:  NAMENDA   10 mg,  Oral, 2 Times Daily      MULTIVITAL PO   1 tablet, Oral, Daily      nitroglycerin 0.4 MG SL tablet  Commonly known as:  NITROSTAT   0.4 mg, Sublingual, Every 5 Minutes PRN, Take no more than 3 doses in 15 minutes.       omeprazole 20 MG capsule  Commonly known as:  priLOSEC   TAKE 1 CAPSULE BY MOUTH EVERY DAY      simethicone 80 MG chewable tablet  Commonly known as:  MYLICON   80 mg, Oral, 4 Times Daily PRN         Stop These Medications    KLOR-CON 20 MEQ CR tablet  Generic drug:  potassium chloride            Allergies   Allergen Reactions   • Benzodiazepines Other (See Comments)     Paradoxical response   • Valium [Diazepam] Other (See Comments)     Paradoxical response     • Codeine Other (See Comments)     unknown   • Fluoxetine Other (See Comments)     Paradoxical response   • Lipitor [Atorvastatin] Other (See Comments)     Myalgias     • Metoclopramide Other (See Comments)     Unknown   • Nabumetone Other (See Comments)     Unknown     • Penicillins Swelling and Other (See Comments)     Has tolerated cefepime 12/2019   • Tramadol Other (See Comments)     unknown   • Prozac [Fluoxetine Hcl] Unknown - Low Severity   • Sulfa Antibiotics Rash         Discharge Disposition:  Rehab Facility or Unit (DC - External)    Diet:  Hospital:  Diet Order   Procedures   • Diet Soft Texture; Ground; Thin; Cardiac       Activity:  Activity Instructions     Activity as Tolerated      Measure Blood Pressure      Measure Weight            Restrictions or Other Recommendations:         CODE STATUS:    Code Status and Medical Interventions:   Ordered at: 02/07/20 1402     Limited Support to NOT Include:    Intubation     Code Status:    No CPR     Medical Interventions (Level of Support Prior to Arrest):    Limited       Future Appointments   Date Time Provider Department Center   2/20/2020 11:20 AM Agusto Reddy MD [facility] None   5/1/2020 10:30 AM Francesca Hardy PA-C [facility] None       Additional Instructions for  the Follow-ups that You Need to Schedule     Discharge Follow-up with PCP   As directed       Currently Documented PCP:    Diana Null APRN    PCP Phone Number:    [phone]     Follow Up Details:  pcp after d/c from rehab         Discharge Follow-up with Specified Provider: follow up with Cardiology   As directed      To:  follow up with Cardiology    Follow Up Details:  2-3 weeks         Discharge Follow-up with Specified Provider: follow up with heart and valve clinic; 1 Week   As directed      To:  follow up with heart and valve clinic    Follow Up:  1 Week               Time Spent on Discharge:  35 minutes    Electronically signed by ARCADIO Marie, 02/14/20, 8:22 AM.        Electronically signed by Connie Youngblood APRN at 02/14/20 0843

## 2020-02-14 NOTE — PLAN OF CARE
VSS. Patient rested well tonight with no complaints of any kind. Tentatively planning to be transferred to [facility] today. Will continue to monitor patient at this time.

## 2020-02-14 NOTE — DISCHARGE SUMMARY
[facility] Medicine Services  DISCHARGE SUMMARY    Patient Name: Murali Dennis  : 1929  MRN: [id number]    Date of Admission: 2020  1:00 PM  Date of Discharge:  2020  Primary Care Physician: Diana Null APRN    Consults     Date and Time Order Name Status Description    2020 1402 Inpatient Cardiology Consult Completed     1/3/2020 0756 Inpatient Palliative Care MD Consult Completed     2020 1135 Inpatient Urology Consult Completed     2019 0030 Inpatient Infectious Diseases Consult Completed     2019 005 Inpatient Gastroenterology Consult Completed     2019 005 Inpatient General Surgery Consult Completed           Hospital Course     Presenting Problem:   Acute systolic heart failure (CMS/[facility]) [I50.21]    Active Hospital Problems    Diagnosis  POA   • Debility [R53.81]  Yes      Resolved Hospital Problems    Diagnosis Date Resolved POA   • **Acute on chronic systolic CHF (congestive heart failure) (CMS/[facility]) [I50.23] 2020 Yes   • Hypoxia [R09.02] 2020 Yes   • Acute systolic heart failure (CMS/[facility]) [I50.21] 2020 Yes   • Deep tissue injury, right heel and right lateral ankle pressure ulcers [T14.8XXA] 2020 Yes   • COPD (chronic obstructive pulmonary disease) (CMS/[facility]) [J44.9] 2020 Yes   • Cardiomyopathy (EF 30%) [I42.9] 2020 Yes   • ANGELA on CPAP [G47.33, Z99.89] 2020 Not Applicable   • Parkinson's disease (CMS/[facility]) [G20] 2020 Yes   • Gastroesophageal reflux disease [K21.9] 2020 Yes   • A-fib (CMS/[facility]) [I48.91] 2020 Yes   • CKD (chronic kidney disease) stage 3, GFR 30-59 ml/min (CMS/[facility]) [N18.3] 2020 Yes   • Coronary artery disease involving native coronary artery of native heart without angina pectoris [I25.10] 2020 Yes   • Dementia (CMS/[facility]) [F03.90] 2020 Yes          Hospital Course:  Murali Dennis is a 89 y.o. male presents the hospital with worsening  dyspnea with acute on chronic systolic congestive heart failure exacerbation.  He had acute hypoxia and required oxygen that he is now been weaned off of. Patient will need cpap at HS.  Cardiology consult team assisted with diuresis for acute heart failure and he has been transition back to his home diuretics.  He continued nebulizers and has been of spirometer for his chronic COPD.  Patient's hospitalization has been complicated by intermittent somnolence, worsening debility from his baseline level of function, and poor appetite.    Acute on chronic systolic congestive heart failure exacerbation, EF 30%:  -Continue Entresto  -Diuretics now oral  -Heart rate too slow for beta-blocker  -- follow up with Cardiology and heart and valve clinic after discharge      Acute hypoxia:  -Resolved, secondary to acute heart failure     COPD: Scheduled nebulizers.  Incentive spirometer.  Stable     Paroxysmal atrial fibrillation:   - Currently appears sinus rhythm.  According to partner, family states he is not on anticoagulation but takes aspirin and Plavix for coronary artery disease.  They have discussed this with cardiologist in the past.       Parkinson's disease:  Continue home medication.  Currently lower functioning than baseline per family.     Gastroesophageal reflux disease: Stable.  PPI.     History of impaired glucose tolerance: Family deny any recent hypoglycemia.  No clear indication for correction insulin currently.  Previous A1c's have been borderline.  Diet controlled.     Debility: PT OT.  Up with support only.  -- pt will need rehab to help with strength and mobility      Stage III chronic kidney disease:   -Creatinine today 1.45  -- bmp in 2 days patient may need potassium supplement added back but will defer to facility provider based on bmp results       Dementia with sundowning: Continue home medication.  Supportive care.  PT OT.  Family requesting something if he becomes agitated at night, avoid Ativan  and other benzodiazepine.  Seroquel low-dose use but caused somnolence.  Avoiding further Seroquel as patient alert and no further agitation      Deep tissue injury, right heel and right lateral ankle pressure ulcers present on admission:   -Wound care has followed patient while in hospital and wound care will need to follow patient at facility. Overall both areas have been improving with venelex      Recent C. difficile colitis: Spore precaution.  No current diarrhea.  Monitor and avoid antibiotics unless absolutely necessary.     Probable viral conjunctivitis: Improved with eyedrops     One-time episode of scant hemoptysis on 2/11:   - Prophylactic heparin stopped.  Now resolved.  No worsening respiratory status.     Patient has remained clinically stable and will be discharged to rehab today. Patient will need to follow up with PCP after d/c from rehab. Follow up with heart and valve clinic one week. Follow up with Cardiology in 2-3 weeks.     Discharge Follow Up Recommendations for outpatient labs/diagnostics:   PCP after d.c from rehab   Follow up with heart and valve clinic one week  Follow up with Cardiology 2-3 weeks   Bmp in 2 days     Day of Discharge     HPI:   Patient is resting in bed in NAD with family at bedside. He slept well. Appetite fair. Has been working with PT. Plan for rehab today and patient is agreeable. No acute events overnight per nursing     Review of Systems  Gen- No fevers, chills  CV- No chest pain, palpitations  Resp- No cough, dyspnea  GI- No N/V/D, abd pain        Vital Signs:   Temp:  [97.7 °F (36.5 °C)] 97.7 °F (36.5 °C)  Heart Rate:  [51-93] 55  Resp:  [20] 20  BP: (122-140)/(66-69) 122/69     Physical Exam:  Constitutional -no acute distress, non toxic, in bed, frail gentleman  HEENT-NCAT, mucous membranes moist  CV-RRR, S1 S2 normal, no m/r/g  Resp-grossly clear bilaterally, 2L 98%, non labored   Abd-soft, non-tender, non-distended, normo active bowel sounds  Ext-No lower  extremity cyanosis, clubbing or edema bilaterally  Neuro-alert but mild confusion, speech clear, moves all extremities   Psych-normal affect   Skin- No rash on exposed UE or LE bilaterally, abrasion right ankle    Pertinent  and/or Most Recent Results     Results from last 7 days   Lab Units 02/13/20  0454 02/11/20  0936 02/10/20  0402 02/09/20  0354 02/08/20  0506 02/07/20  1401   WBC 10*3/mm3 5.41  --  4.81 4.80 5.31 5.34   HEMOGLOBIN g/dL 10.9*  --  9.9* 10.3* 10.9* 11.6*   HEMATOCRIT % 33.9*  --  31.0* 32.5* 35.3* 36.9*   PLATELETS 10*3/mm3 150  --  143 136* 159 149   SODIUM mmol/L 138 137 138 141 139 140   POTASSIUM mmol/L 3.6 3.8 3.4* 3.7 3.9 3.5   CHLORIDE mmol/L 99 102 101 102 103 103   CO2 mmol/L 26.0 22.0 25.0 23.0 23.0 23.0   BUN mg/dL 21 21 18 19 19 20   CREATININE mg/dL 1.45* 1.65* 1.58* 1.55* 1.49* 1.53*   GLUCOSE mg/dL 133* 146* 122* 120* 133* 124*   CALCIUM mg/dL 8.4 8.5 8.2 8.4 9.0 8.9     Results from last 7 days   Lab Units 02/07/20  1401   BILIRUBIN mg/dL 1.0   ALK PHOS U/L 237*   ALT (SGPT) U/L 9   AST (SGOT) U/L 39           Invalid input(s): TG, LDLCALC, LDLREALC  Results from last 7 days   Lab Units 02/09/20  0354 02/07/20  1401   TSH uIU/mL  --  2.500   PROBNP pg/mL 13,604.0* 12,195.0*   LACTATE mmol/L  --  1.8       Brief Urine Lab Results  (Last result in the past 365 days)      Color   Clarity   Blood   Leuk Est   Nitrite   Protein   CREAT   Urine HCG        02/09/20 0957 Yellow Clear Negative Negative Negative Negative               Microbiology Results Abnormal     Procedure Component Value - Date/Time    Blood Culture - Blood, Hand, Right [[id number]] Collected:  02/07/20 1401    Lab Status:  Final result Specimen:  Blood from Hand, Right Updated:  02/12/20 1431     Blood Culture No growth at 5 days    Narrative:       Aerobic bottle only      Blood Culture - Blood, Arm, Left [[id number]] Collected:  02/07/20 1401    Lab Status:  Final result Specimen:  Blood from Arm, Left Updated:   02/12/20 1431     Blood Culture No growth at 5 days    Urine Culture - Urine, Urine, Clean Catch [[id number]]  (Abnormal)  (Susceptibility) Collected:  02/07/20 1905    Lab Status:  Final result Specimen:  Urine, Clean Catch Updated:  02/09/20 0147     Urine Culture 25,000 CFU/mL Escherichia coli    Susceptibility      Escherichia coli     AMY     Ampicillin Susceptible     Ampicillin + Sulbactam Susceptible     Cefazolin Susceptible     Cefepime Susceptible     Ceftazidime Susceptible     Ceftriaxone Susceptible     Gentamicin Susceptible     Levofloxacin Susceptible     Nitrofurantoin Susceptible     Piperacillin + Tazobactam Susceptible     Tetracycline Susceptible     Trimethoprim + Sulfamethoxazole Susceptible                    Respiratory Panel, PCR - Swab, Nasopharynx [[id number]]  (Normal) Collected:  02/07/20 1545    Lab Status:  Final result Specimen:  Swab from Nasopharynx Updated:  02/07/20 1731     ADENOVIRUS, PCR Not Detected     Coronavirus 229E Not Detected     Coronavirus HKU1 Not Detected     Coronavirus NL63 Not Detected     Coronavirus OC43 Not Detected     Human Metapneumovirus Not Detected     Human Rhinovirus/Enterovirus Not Detected     Influenza B PCR Not Detected     Parainfluenza Virus 1 Not Detected     Parainfluenza Virus 2 Not Detected     Parainfluenza Virus 3 Not Detected     Parainfluenza Virus 4 Not Detected     Bordetella pertussis pcr Not Detected     Influenza A H1 2009 PCR Not Detected     Chlamydophila pneumoniae PCR Not Detected     Mycoplasma pneumo by PCR Not Detected     Influenza A PCR Not Detected     Influenza A H3 Not Detected     Influenza A H1 Not Detected     RSV, PCR Not Detected     Bordetella parapertussis PCR Not Detected          Imaging Results (All)     Procedure Component Value Units Date/Time    XR Chest 1 View [[id number]] Collected:  02/07/20 1436     Updated:  02/08/20 2012    Narrative:       EXAMINATION: XR CHEST 1 VW-02/07/2020:     INDICATION: Hypoxia,  CHF.     COMPARISON: 01/02/2020.     FINDINGS: The heart shadow appears mildly enlarged. There is increasing  bilateral perihilar disease, suggesting interval development of CHF.  Patchy atelectasis in the medial left lower lobe appears increased. Mild  new right basilar discoid atelectasis is also noted. No pneumothorax is  seen. Right upper extremity PICC line has apparently been removed.       Impression:       Interval development of congestive heart failure.     D:  02/07/2020  E:  02/07/2020            This report was finalized on 2/8/2020 8:09 PM by Dr. Stiven Benedict MD.             Results for orders placed during the hospital encounter of 12/07/17   Duplex Carotid Ultrasound CAR    Narrative · No obstructive carotid stenosis bilaterally  · Antegrade bilateral vertebral flow.          Results for orders placed during the hospital encounter of 12/07/17   Duplex Carotid Ultrasound CAR    Narrative · No obstructive carotid stenosis bilaterally  · Antegrade bilateral vertebral flow.          Results for orders placed during the hospital encounter of 12/27/19   Adult Transthoracic Echo Complete W/ Cont if Necessary Per Protocol    Narrative · The left ventricular cavity is moderately dilated.  · Right ventricular cavity is mild-to-moderately dilated.  · Left atrial cavity size is moderate-to-severely dilated.  · Moderate aortic valve regurgitation is present.  · Moderate mitral valve regurgitation is present.  · Mild to moderate tricuspid valve regurgitation is present.  · Calculated right ventricular systolic pressure from tricuspid   regurgitation is 35 mmHg.  · Estimated EF = 30%.  · Left ventricular systolic function is severely decreased.  · Left ventricular diastolic dysfunction (grade II) consistent with   pseudonormalization.  · The findings are consistent with dilated cardiomyopathy.  · Mild pulmonary hypertension is present.  · There is no evidence of pericardial effusion.  · The aortic valve exhibits  moderate sclerosis without stenosis.          Plan for Follow-up of Pending Labs/Results: bmp in 2 days     Discharge Details        Discharge Medications      New Medications      Instructions Start Date   castor oil-balsam peru ointment   5 g, Topical, Every 12 Hours Scheduled         Changes to Medications      Instructions Start Date   furosemide 20 MG tablet  Commonly known as:  LASIX  What changed:    · when to take this  · reasons to take this   20 mg, Oral, Daily      pravastatin 20 MG tablet  Commonly known as:  PRAVACHOL  What changed:  when to take this   TAKE 1 TABLET BY MOUTH EVERY DAY         Continue These Medications      Instructions Start Date   acetaminophen 325 MG tablet  Commonly known as:  TYLENOL   650 mg, Oral, Nightly PRN      amiodarone 200 MG tablet  Commonly known as:  PACERONE   200 mg, Oral, Daily      aspirin 81 MG chewable tablet   81 mg, Oral, Daily      carbidopa-levodopa  MG per tablet  Commonly known as:  SINEMET   1 tablet, Oral, 3 Times Daily      clopidogrel 75 MG tablet  Commonly known as:  PLAVIX   75 mg, Oral, Daily      diclofenac 1 % gel gel  Commonly known as:  VOLTAREN   No dose, route, or frequency recorded.      dutasteride 0.5 MG capsule  Commonly known as:  AVODART   TAKE 1 CAPSULE BY MOUTH EVERY DAY      ENTRESTO 24-26 MG tablet  Generic drug:  sacubitril-valsartan   0.5 tablets, Oral, 2 Times Daily      ipratropium-albuterol 0.5-2.5 mg/3 ml nebulizer  Commonly known as:  DUO-NEB   3 mL, Nebulization, Every 4 Hours PRN      levothyroxine 75 MCG tablet  Commonly known as:  SYNTHROID, LEVOTHROID   TAKE 1 TABLET BY MOUTH EVERY DAY      Loratadine 10 MG capsule   1 capsule, Oral, Daily PRN      memantine 10 MG tablet  Commonly known as:  NAMENDA   10 mg, Oral, 2 Times Daily      MULTIVITAL PO   1 tablet, Oral, Daily      nitroglycerin 0.4 MG SL tablet  Commonly known as:  NITROSTAT   0.4 mg, Sublingual, Every 5 Minutes PRN, Take no more than 3 doses in 15 minutes.        omeprazole 20 MG capsule  Commonly known as:  priLOSEC   TAKE 1 CAPSULE BY MOUTH EVERY DAY      simethicone 80 MG chewable tablet  Commonly known as:  MYLICON   80 mg, Oral, 4 Times Daily PRN         Stop These Medications    KLOR-CON 20 MEQ CR tablet  Generic drug:  potassium chloride            Allergies   Allergen Reactions   • Benzodiazepines Other (See Comments)     Paradoxical response   • Valium [Diazepam] Other (See Comments)     Paradoxical response     • Codeine Other (See Comments)     unknown   • Fluoxetine Other (See Comments)     Paradoxical response   • Lipitor [Atorvastatin] Other (See Comments)     Myalgias     • Metoclopramide Other (See Comments)     Unknown   • Nabumetone Other (See Comments)     Unknown     • Penicillins Swelling and Other (See Comments)     Has tolerated cefepime 12/2019   • Tramadol Other (See Comments)     unknown   • Prozac [Fluoxetine Hcl] Unknown - Low Severity   • Sulfa Antibiotics Rash         Discharge Disposition:  Rehab Facility or Unit (DC - External)    Diet:  Hospital:  Diet Order   Procedures   • Diet Soft Texture; Ground; Thin; Cardiac       Activity:  Activity Instructions     Activity as Tolerated      Measure Blood Pressure      Measure Weight            Restrictions or Other Recommendations:         CODE STATUS:    Code Status and Medical Interventions:   Ordered at: 02/07/20 1402     Limited Support to NOT Include:    Intubation     Code Status:    No CPR     Medical Interventions (Level of Support Prior to Arrest):    Limited       Future Appointments   Date Time Provider Department Center   2/20/2020 11:20 AM Agusto Reddy MD [facility] None   5/1/2020 10:30 AM Francesca Hardy PA-C [facility] None       Additional Instructions for the Follow-ups that You Need to Schedule     Discharge Follow-up with PCP   As directed       Currently Documented PCP:    Diana Null APRN    PCP Phone Number:    [phone]     Follow Up Details:  pcp after  d/c from rehab         Discharge Follow-up with Specified Provider: follow up with Cardiology   As directed      To:  follow up with Cardiology    Follow Up Details:  2-3 weeks         Discharge Follow-up with Specified Provider: follow up with heart and valve clinic; 1 Week   As directed      To:  follow up with heart and valve clinic    Follow Up:  1 Week               Time Spent on Discharge:  35 minutes    Electronically signed by ARCADIO Marie, 02/14/20, 8:22 AM.

## 2020-02-15 PROBLEM — R73.9 HYPERGLYCEMIA: Status: ACTIVE | Noted: 2020-01-01

## 2020-02-15 PROBLEM — F02.80 PARKINSON'S DISEASE DEMENTIA (HCC): Status: ACTIVE | Noted: 2020-01-01

## 2020-02-15 PROBLEM — I50.22 CHRONIC SYSTOLIC CHF (CONGESTIVE HEART FAILURE) (HCC): Status: ACTIVE | Noted: 2020-01-01

## 2020-02-15 PROBLEM — G20.A1 PARKINSON'S DISEASE DEMENTIA: Status: ACTIVE | Noted: 2020-01-01

## 2020-02-15 PROBLEM — G47.30 SLEEP APNEA: Status: ACTIVE | Noted: 2019-02-24

## 2020-02-15 PROBLEM — R13.10 DYSPHAGIA: Status: ACTIVE | Noted: 2020-01-01

## 2020-02-15 PROBLEM — I48.0 PAF (PAROXYSMAL ATRIAL FIBRILLATION) (HCC): Status: ACTIVE | Noted: 2017-12-29

## 2020-02-15 PROBLEM — E87.6 HYPOKALEMIA: Status: ACTIVE | Noted: 2020-01-01

## 2020-02-15 PROBLEM — G20 PARKINSON'S DISEASE DEMENTIA (HCC): Status: ACTIVE | Noted: 2020-01-01

## 2020-02-15 PROBLEM — R91.8 LUNG INFILTRATE: Status: ACTIVE | Noted: 2020-01-01

## 2020-02-15 PROBLEM — I21.4 NSTEMI (NON-ST ELEVATED MYOCARDIAL INFARCTION) (HCC): Status: ACTIVE | Noted: 2020-01-01

## 2020-02-15 PROBLEM — R23.8 SKIN BREAKDOWN: Status: ACTIVE | Noted: 2020-01-01

## 2020-02-16 PROBLEM — I20.0 UNSTABLE ANGINA (HCC): Status: ACTIVE | Noted: 2020-01-01

## 2020-02-16 NOTE — ED PROVIDER NOTES
Subjective   Mr. Murali Dennis is a 89 y.o male presenting to the emergency department with complaints of chest pain. He is accompanied by his family who provides the history. He was admitted to the hospital from 02/07/2020 to 02/14/2020 for acute on chronic systolic congestive heart failure. His cardiologist is Dr. Ugarte and his family confirms he received an echocardiogram 10 days ago showing an EF of 45 percent. His EF was measured at approximately 30 percent on 02/11/2020. Over the past week, his family notes he is not as responsive compared to his baseline. His family noted he was rubbing his chest in his sleep earlier today. After waking up, he complained to his family about central chest pain. He received Nitroglycerin tonight at 1830 and the pain ceased for a short time but came back worsened. His family confirms he has shortness of breath and cough. He coughed up blood during his last day at the hospital and he was taken off of Heparin. He is still taking Aspirin and Plavix. He has a history of atrial fibrillation, dementia, Parkinson's disease, chronic obstructive pulmonary disease, triple bypass several years ago, and a heart stent placed 3 years ago. There are no other acute symptoms at this time.      History provided by:  Relative and patient  History limited by:  Dementia  Chest Pain   Pain location:  Substernal area  Pain severity:  Unable to specify  Onset quality:  Unable to specify  Duration:  1 day (per family)  Timing:  Unable to specify  Progression:  Worsening  Chronicity:  New  Relieved by: Nitroglycerin (temporarily)  Worsened by:  Nothing  Associated symptoms: cough, shortness of breath and weakness    Risk factors: coronary artery disease and male sex  Smoker: former.        Review of Systems   Unable to perform ROS: Dementia   Constitutional:        Partial unresponsive   Respiratory: Positive for cough and shortness of breath.    Cardiovascular: Positive for chest pain.    Neurological: Positive for weakness.       Past Medical History:   Diagnosis Date   • Alzheimer disease (CMS/Formerly [facility])    • Arthritis    • Warren esophagus     followed  with GI in [place]   • Benign prostatic hyperplasia without lower urinary tract symptoms 10/17/2018   • Chronic congestive heart failure (CMS/Formerly [facility]) 12/8/2017     echo report from 8/2/17 EF 60-65%, mild TR, mild AR ([place], [place]) Echocardiogram 12/9/17: EF 28%, mild to moderate MR    • COPD (chronic obstructive pulmonary disease) (CMS/Formerly [facility])    • Coronary artery disease 2003   • Dementia (CMS/HCC) 2003   • Diarrhea 2/24/2019   • Environmental allergies 10/17/2018   • Essential hypertension 10/17/2018   • Gastroesophageal reflux disease 11/19/2018   • GERD (gastroesophageal reflux disease)    • Hypothyroid     started after amiodarone use in 2003   • Sleep apnea     wears cpap   • Stroke (CMS/Formerly [facility]) 2003   • TIA (transient ischemic attack) 12/29/2017   • Unintended weight loss 10/17/2018       Allergies   Allergen Reactions   • Benzodiazepines Other (See Comments)     Paradoxical response   • Valium [Diazepam] Other (See Comments)     Paradoxical response     • Codeine Other (See Comments)     unknown   • Fluoxetine Other (See Comments)     Paradoxical response   • Lipitor [Atorvastatin] Other (See Comments)     Myalgias     • Metoclopramide Other (See Comments)     Unknown   • Nabumetone Other (See Comments)     Unknown     • Penicillins Swelling and Other (See Comments)     Has tolerated cefepime 12/2019   • Tramadol Other (See Comments)     unknown   • Prozac [Fluoxetine Hcl] Unknown - Low Severity   • Sulfa Antibiotics Rash       Past Surgical History:   Procedure Laterality Date   • CARDIAC CATHETERIZATION  2003   • CARDIAC CATHETERIZATION N/A 1/19/2018    Procedure: Left Heart Cath;  Surgeon: Hollis Ugarte MD;  Location: [facility] CATH INVASIVE LOCATION;  Service:    • CARPAL TUNNEL RELEASE Right    • CATARACT EXTRACTION  Bilateral    • CORONARY ARTERY BYPASS GRAFT      Triple Bypass, @ [facility] @ [place], [place]   • ERCP N/A 2019    Procedure: ENDOSCOPIC RETROGRADE CHOLANGIOPANCREATOGRAPHY;  Surgeon: Arsh White MD;  [facility];  Service: Gastroenterology   • HEMORRHOIDECTOMY     • HERNIA REPAIR         Family History   Problem Relation Age of Onset   • Cancer Mother    • Heart disease Sister    • Diabetes Sister    • Heart disease Brother    • Cancer Brother    • Stroke Sister        Social History     Socioeconomic History   • Marital status:      Spouse name: Not on file   • Number of children: Not on file   • Years of education: Not on file   • Highest education level: Not on file   Occupational History   • Occupation: Retired   Tobacco Use   • Smoking status: Former Smoker     Last attempt to quit: 1967     Years since quittin.1   • Smokeless tobacco: Never Used   Substance and Sexual Activity   • Alcohol use: No   • Drug use: No   • Sexual activity: Defer         Objective   Physical Exam   Constitutional: He is oriented to person, place, and time. He appears well-developed and well-nourished. No distress.   Debilitated elderly gentleman. His family does all of the talking for him secondary to his severe advanced dementia.   HENT:   Head: Normocephalic and atraumatic.   Eyes: Conjunctivae are normal. No scleral icterus.   Cardiovascular: Normal rate, regular rhythm and normal heart sounds.   No murmur heard.  Pulses:       Radial pulses are 2+ on the right side, and 2+ on the left side.   Pulmonary/Chest: Effort normal and breath sounds normal. No respiratory distress. He has no wheezes. He exhibits no tenderness.   Chest wall is nontender to palpation.    Abdominal: Soft. There is no tenderness. There is no rebound and no guarding.   Musculoskeletal: He exhibits no edema or deformity.   No peripheral edema.   Neurological: He is alert and oriented to  person, place, and time.   Skin: Skin is warm and dry. He is not diaphoretic.   Nursing note and vitals reviewed.      Procedures         ED Course     Recent Results (from the past 24 hour(s))   Troponin    Collection Time: 02/15/20  9:27 PM   Result Value Ref Range    Troponin T 0.036 (C) 0.000 - 0.030 ng/mL   Comprehensive Metabolic Panel    Collection Time: 02/15/20  9:27 PM   Result Value Ref Range    Glucose 174 (H) 65 - 99 mg/dL    BUN 18 8 - 23 mg/dL    Creatinine 1.61 (H) 0.76 - 1.27 mg/dL    Sodium 144 136 - 145 mmol/L    Potassium 3.4 (L) 3.5 - 5.2 mmol/L    Chloride 101 98 - 107 mmol/L    CO2 30.0 (H) 22.0 - 29.0 mmol/L    Calcium 8.8 8.2 - 9.6 mg/dL    Total Protein 6.8 6.0 - 8.5 g/dL    Albumin 3.50 3.50 - 5.20 g/dL    ALT (SGPT) 17 1 - 41 U/L    AST (SGOT) 38 1 - 40 U/L    Alkaline Phosphatase 226 (H) 39 - 117 U/L    Total Bilirubin 0.8 0.2 - 1.2 mg/dL    eGFR Non African Amer 41 (L) >60 mL/min/1.73    Globulin 3.3 gm/dL    A/G Ratio 1.1 g/dL    BUN/Creatinine Ratio 11.2 7.0 - 25.0    Anion Gap 13.0 5.0 - 15.0 mmol/L   Lipase    Collection Time: 02/15/20  9:27 PM   Result Value Ref Range    Lipase 15 13 - 60 U/L   BNP    Collection Time: 02/15/20  9:27 PM   Result Value Ref Range    proBNP 4,564.0 (H) 5.0-1,800.0 pg/mL   Light Blue Top    Collection Time: 02/15/20  9:27 PM   Result Value Ref Range    Extra Tube hold for add-on    Green Top (Gel)    Collection Time: 02/15/20  9:27 PM   Result Value Ref Range    Extra Tube Hold for add-ons.    Lavender Top    Collection Time: 02/15/20  9:27 PM   Result Value Ref Range    Extra Tube hold for add-on    Gold Top - SST    Collection Time: 02/15/20  9:27 PM   Result Value Ref Range    Extra Tube Hold for add-ons.    CBC Auto Differential    Collection Time: 02/15/20  9:27 PM   Result Value Ref Range    WBC 5.15 3.40 - 10.80 10*3/mm3    RBC 4.00 (L) 4.14 - 5.80 10*6/mm3    Hemoglobin 13.4 13.0 - 17.7 g/dL    Hematocrit 41.7 37.5 - 51.0 %    .3 (H) 79.0  - 97.0 fL    MCH 33.5 (H) 26.6 - 33.0 pg    MCHC 32.1 31.5 - 35.7 g/dL    RDW 14.7 12.3 - 15.4 %    RDW-SD 57.2 (H) 37.0 - 54.0 fl    MPV 10.9 6.0 - 12.0 fL    Platelets 157 140 - 450 10*3/mm3    Neutrophil % 56.4 42.7 - 76.0 %    Lymphocyte % 29.7 19.6 - 45.3 %    Monocyte % 8.2 5.0 - 12.0 %    Eosinophil % 4.7 0.3 - 6.2 %    Basophil % 0.8 0.0 - 1.5 %    Immature Grans % 0.2 0.0 - 0.5 %    Neutrophils, Absolute 2.91 1.70 - 7.00 10*3/mm3    Lymphocytes, Absolute 1.53 0.70 - 3.10 10*3/mm3    Monocytes, Absolute 0.42 0.10 - 0.90 10*3/mm3    Eosinophils, Absolute 0.24 0.00 - 0.40 10*3/mm3    Basophils, Absolute 0.04 0.00 - 0.20 10*3/mm3    Immature Grans, Absolute 0.01 0.00 - 0.05 10*3/mm3    nRBC 0.0 0.0 - 0.2 /100 WBC     Note: In addition to lab results from this visit, the labs listed above may include labs taken at another facility or during a different encounter within the last 24 hours. Please correlate lab times with ED admission and discharge times for further clarification of the services performed during this visit.    XR Chest 1 View   Final Result   Small left pleural effusion with atelectasis or infiltrate left lower lobe.      Signer Name: Isak Sofia MD    Signed: 2/15/2020 9:34 PM    Workstation Name: [id number]     [facility]        Vitals:    02/15/20 2200 02/15/20 2215 02/15/20 2230 02/15/20 2245   BP: 111/58 112/59 108/60 112/59   BP Location:       Patient Position:       Pulse: 52 52 51 51   Resp:       Temp:       TempSrc:       SpO2: 95% 96% 96% 97%   Weight:       Height:         Medications   sodium chloride 0.9 % flush 10 mL (10 mL Intravenous Given 2/15/20 2135)   nitroglycerin 50 mg/250 mL (0.2 mg/mL) infusion (5 mcg/min Intravenous New Bag 2/15/20 2153)     ECG/EMG Results (last 24 hours)     Procedure Component Value Units Date/Time    ECG 12 Lead [[id number]] Collected:  02/15/20 2038     Updated:  02/15/20 2046    Narrative:       Test Reason : chest  pain  Blood Pressure : **/** mmHG  Vent. Rate : 055 BPM     Atrial Rate : 055 BPM     P-R Int : 230 ms          QRS Dur : 134 ms      QT Int : 582 ms       P-R-T Axes : 027 071 004 degrees     QTc Int : 556 ms    Sinus bradycardia with 1st degree AV block  Nonspecific intraventricular block  Cannot rule out Inferior infarct (cited on or before 07-DEC-2017)  T wave abnormality, consider anterior ischemia  Abnormal ECG  When compared with ECG of 03-JAN-2020 06:20,  T wave inversion now evident in Anterior leads  Confirmed by CARA GEE MD ([id number]) on 2/15/2020 8:46:34 PM    Referred By:  EDMD           Confirmed By:CARA GEE MD        ECG 12 Lead   Final Result   Test Reason : chest pain   Blood Pressure : **/** mmHG   Vent. Rate : 055 BPM     Atrial Rate : 055 BPM      P-R Int : 230 ms          QRS Dur : 134 ms       QT Int : 582 ms       P-R-T Axes : 027 071 004 degrees      QTc Int : 556 ms      Sinus bradycardia with 1st degree AV block   Nonspecific intraventricular block   Cannot rule out Inferior infarct (cited on or before 07-DEC-2017)   T wave abnormality, consider anterior ischemia   Abnormal ECG   When compared with ECG of 03-JAN-2020 06:20,   T wave inversion now evident in Anterior leads   Confirmed by CARA GEE MD ([id number]) on 2/15/2020 8:46:34 PM      Referred By:  EDMD           Confirmed By:CARA GEE MD                                                     MDM    Final diagnoses:   Nonspecific chest pain   History of coronary artery disease   Elevated troponin       Documentation assistance provided by karla Vaca.  Information recorded by the scribe was done at my direction and has been verified and validated by me.     Yoli Vaca  02/15/20 [id number]       Cara Gee MD  02/16/20 1210

## 2020-02-16 NOTE — PLAN OF CARE
Problem: Patient Care Overview  Goal: Plan of Care Review  Outcome: Ongoing (interventions implemented as appropriate)  Flowsheets (Taken 2/16/2020 1130)  Progress: no change  Plan of Care Reviewed With: patient; spouse  Note:   SLP evaluation completed. Will sign-off dysphagia - pt appears safe for soft diet and thin liquids. Please see note for further details and recommendations.

## 2020-02-16 NOTE — H&P
[facility]  HISTORY AND PHYSICAL    Patient Name: Murali Dennis  : 1929  MRN: [id number]  Primary Care Physician: Diana Null APRN  Date of admission: 2/15/2020      Subjective   Subjective     Chief Complaint:  Chest pain     HPI:  Murali Dennis is a 89 y.o. male with a medical hx significant for CAD, systolic CHF, HLD, COPD, Hypothyroidism, CKD III, PAF, BPH and Parkinson's disease dementia was brought to [facility] ED for c/o chest pain. The patient was discharged yesterday  from [facility] after a 1 week stay for acute hypoxia due to CHF exacerbation and COPD. The patient was transferred to [facility] rehab facility yesterday afternoon. The patient's family provides the history due to the patient's severe dementia. The patient's wife reports the patient began c/o chest pain today. It has been intermittent throughout the day. Located on the left side of his chest with radiation to his ribs. They report the patient has been coughing and choked on a sip of water while in the ED. No fever or chills. No vomiting or diarrhea. No increased work of breathing or audible wheezing. The patient is followed by cardiologist, Dr. Ugarte. The family states they had gotten hospice/palliative involved several weeks/months ago when the patient was refusing to eat however the patient subsequently rebounded and they did not move forward with hospice care.  Remote tobacco use. No alcohol use. The patient resides with his wife who is his primary caretaker.     While in the ED, the patient's vitals were stable. Labs revealed troponin 0 0.036, proBNP 4,564, glucose 174, creatinine 1.61, potassium 3.4, CO2 30, alk phos 226.  ECG shows sinus bradycardia with first-degree AV block, slight ST elevation in lead II (looks to be old), and anterior T wave inversion (new).  CXR shows small left pleural effusion with atelectasis versus infiltrate in left lower lobe.  The patient was  started on a nitroglycerin infusion in the ED.  He will be admitted to the hospitalist service for further medical management.    Review of Systems   Unable to perform ROS: Dementia      All other systems reviewed and are negative.     Personal History     Past Medical History:   Diagnosis Date   • Alzheimer disease (CMS/[facility])    • Arthritis    • Warren esophagus     followed  with GI in [place]   • Benign prostatic hyperplasia without lower urinary tract symptoms 10/17/2018   • Chronic congestive heart failure (CMS/[facility]) 12/8/2017     echo report from 8/2/17 EF 60-65%, mild TR, mild AR ([place], [place]) Echocardiogram 12/9/17: EF 28%, mild to moderate MR    • COPD (chronic obstructive pulmonary disease) (CMS/[facility])    • Coronary artery disease 2003   • Dementia (CMS/[facility]) 2003   • Diarrhea 2/24/2019   • Environmental allergies 10/17/2018   • Essential hypertension 10/17/2018   • Gastroesophageal reflux disease 11/19/2018   • GERD (gastroesophageal reflux disease)    • Hypothyroid     started after amiodarone use in 2003   • Sleep apnea     wears cpap   • Stroke (CMS/[facility]) 2003   • TIA (transient ischemic attack) 12/29/2017   • Unintended weight loss 10/17/2018       Past Surgical History:   Procedure Laterality Date   • CARDIAC CATHETERIZATION  2003   • CARDIAC CATHETERIZATION N/A 1/19/2018    Procedure: Left Heart Cath;  Surgeon: Hollis Ugarte MD;  Location:  [facility] CATH INVASIVE LOCATION;  Service:    • CARPAL TUNNEL RELEASE Right    • CATARACT EXTRACTION Bilateral    • CORONARY ARTERY BYPASS GRAFT  2003    Triple Bypass, @ [facility] @ [place], [place]   • ERCP N/A 12/30/2019    Procedure: ENDOSCOPIC RETROGRADE CHOLANGIOPANCREATOGRAPHY;  Surgeon: Arsh White MD;  Location:  [facility] ENDOSCOPY;  Service: Gastroenterology   • HEMORRHOIDECTOMY     • HERNIA REPAIR         Family History: family history includes Cancer in his brother and mother; Diabetes in his sister;  Heart disease in his brother and sister; Stroke in his sister. Otherwise pertinent FHx was reviewed and unremarkable.     Social History:  reports that he quit smoking about 52 years ago. He has never used smokeless tobacco. He reports that he does not drink alcohol or use drugs.  Social History     Social History Narrative   • Not on file       Medications:  Available home medication information reviewed.    (Not in a hospital admission)    Allergies   Allergen Reactions   • Benzodiazepines Other (See Comments)     Paradoxical response   • Valium [Diazepam] Other (See Comments)     Paradoxical response     • Codeine Other (See Comments)     unknown   • Fluoxetine Other (See Comments)     Paradoxical response   • Lipitor [Atorvastatin] Other (See Comments)     Myalgias     • Metoclopramide Other (See Comments)     Unknown   • Nabumetone Other (See Comments)     Unknown     • Penicillins Swelling and Other (See Comments)     Has tolerated cefepime 12/2019   • Tramadol Other (See Comments)     unknown   • Prozac [Fluoxetine Hcl] Unknown - Low Severity   • Sulfa Antibiotics Rash       Objective   Objective     Vital Signs:   Temp:  [97.6 °F (36.4 °C)] 97.6 °F (36.4 °C)  Heart Rate:  [52-55] 52  Resp:  [12] 12  BP: ()/(53-62) 105/56        Physical Exam   Constitutional: Awake, alert, lying in bed   Eyes: PERRLA, sclerae anicteric, no conjunctival injection  HENT: NCAT, mucous membranes moist  Neck: Supple, no thyromegaly, no lymphadenopathy, trachea midline  Respiratory: LLL crackles, no wheezes, nonlabored respirations   Cardiovascular: RR, bradycardic, no murmurs appreciated, palpable pedal pulses bilaterally  Gastrointestinal: Positive bowel sounds, soft, nontender, no distention or guarding   Musculoskeletal: No bilateral ankle edema, no clubbing or cyanosis to extremities  Psychiatric: Appropriate affect, cooperative  Neurologic:  Cranial Nerves grossly intact to confrontation, speech clear, not orientated      Skin: erythematous, nonblanchable skin breakdown of right heal     Results Reviewed:  I have personally reviewed current lab and radiology data.    Results from last 7 days   Lab Units 02/15/20  2127   WBC 10*3/mm3 5.15   HEMOGLOBIN g/dL 13.4   HEMATOCRIT % 41.7   PLATELETS 10*3/mm3 157     Results from last 7 days   Lab Units 02/15/20  2127   SODIUM mmol/L 144   POTASSIUM mmol/L 3.4*   CHLORIDE mmol/L 101   CO2 mmol/L 30.0*   BUN mg/dL 18   CREATININE mg/dL 1.61*   GLUCOSE mg/dL 174*   CALCIUM mg/dL 8.8   ALT (SGPT) U/L 17   AST (SGOT) U/L 38   TROPONIN T ng/mL 0.036*   PROBNP pg/mL 4,564.0*     Estimated Creatinine Clearance: 34.8 mL/min (A) (by C-G formula based on SCr of 1.61 mg/dL (H)).  Brief Urine Lab Results  (Last result in the past 365 days)      Color   Clarity   Blood   Leuk Est   Nitrite   Protein   CREAT   Urine HCG        02/09/20 0957 Yellow Clear Negative Negative Negative Negative             Imaging Results (Last 24 Hours)     Procedure Component Value Units Date/Time    XR Chest 1 View [[id number]] Collected:  02/15/20 2134     Updated:  02/15/20 2136    Narrative:       CR Chest 1 Vw 2/15/2020    INDICATION:   Acute onset of chest pain beginning today with shortness of breath. Congestive heart failure. COPD exacerbation. Atrial fibrillation and coronary artery disease.     COMPARISON:    2/7/2020    FINDINGS:  Single portable AP view(s) of the chest.  The cardiac size is stable following median sternotomy. Small left pleural effusion with infiltrate or atelectasis in the left lower lobe. Discoid atelectasis right base. The lungs are otherwise clear. No  pneumothorax.      Impression:       Small left pleural effusion with atelectasis or infiltrate left lower lobe.    Signer Name: Isak Sofia MD   Signed: 2/15/2020 9:34 PM   Workstation Name: [id number]    [facility]        Results for orders placed during the hospital encounter of 12/27/19   Adult Transthoracic Echo  Complete W/ Cont if Necessary Per Protocol    Narrative · The left ventricular cavity is moderately dilated.  · Right ventricular cavity is mild-to-moderately dilated.  · Left atrial cavity size is moderate-to-severely dilated.  · Moderate aortic valve regurgitation is present.  · Moderate mitral valve regurgitation is present.  · Mild to moderate tricuspid valve regurgitation is present.  · Calculated right ventricular systolic pressure from tricuspid   regurgitation is 35 mmHg.  · Estimated EF = 30%.  · Left ventricular systolic function is severely decreased.  · Left ventricular diastolic dysfunction (grade II) consistent with   pseudonormalization.  · The findings are consistent with dilated cardiomyopathy.  · Mild pulmonary hypertension is present.  · There is no evidence of pericardial effusion.  · The aortic valve exhibits moderate sclerosis without stenosis.          Assessment/Plan   Assessment & Plan     Active Hospital Problems    Diagnosis POA   • **NSTEMI (non-ST elevated myocardial infarction) (CMS/[facility]) [I21.4] Yes   • Hypokalemia [E87.6] Yes   • Parkinson's disease dementia (CMS/[facility]) [G20, F02.80] Yes   • Lung infiltrate [R91.8] Yes   • Hyperglycemia [R73.9] Yes   • Skin breakdown [L90.9] Yes   • Dysphagia [R13.10] Yes   • Chronic systolic CHF (congestive heart failure) (CMS/[facility]) [I50.22] Yes   • COPD (chronic obstructive pulmonary disease) (CMS/[facility]) [J44.9] Yes   • Mixed hyperlipidemia [E78.2] Yes   • Sleep apnea [G47.30] Yes   • Benign prostatic hyperplasia without lower urinary tract symptoms [N40.0] Yes   • PAF (paroxysmal atrial fibrillation) (CMS/[facility]) [I48.0] Yes     · Postoperative 2003     • Debility [R53.81] Yes   • CKD (chronic kidney disease) stage 3, GFR 30-59 ml/min (CMS/[facility]) [N18.3] Yes   • CAD (coronary artery disease) [I25.10] Yes   • Hypothyroid [E03.9] Yes     Murali eDnnis is a 89 y.o. male with PMHx of CAD, systolic CHF, HLD, COPD, Hypothyroidism, CKD III, PAF (no AC), BPH and  Parkinson's disease dementia presents w/ chest pain x 1 day and an elevated troponin.     NSTEMI, CAD, HLD  - initial troponin 0.036, ECG sinus mich with 1st degree AV block, old ST elevation in lead II and new T wave inversion in anterior leads  - consult cardiology, followed by Dr. Ugarte   - continue NTG drip (with BP parameters), ASA, Plavix and statin  - trend troponin, repeat ECG in AM, keep NPO    LLL infiltrate vs. Atelectasis   - crackles in LLL on exam   - obtain CT chest and check procal   - will hold off on abx due to recent hx of C.Diff   - low threshold for starting antibiotics if pt develops fever, leukocytosis etc    Hyperglycemia, Impaired glucose intolerance  - last A1c 6.40, low dose SSI for now     Right heal and sacral skin breakdown   - consult WOC    Chronic systolic CHF  - appears euvolemic, BNP has come down  - continue Lasix and Entresto, daily weights, strict I&Os    COPD  - nebs q4h prn, monitor pulse ox     CKD III  - creatinine near baseline, monitor chemistry     Hypokalemia  - replace per protocol, restart oral potassium prior to discharge    PAF  - currently in sinus, continue amiodarone  - no anticoagulation due to risk factors     Hypothyroidism  - continue synthroid     BPH  - continue Avodart, strict I&Os    Sleep apnea   - CPAP at HS    Dysphagia, Parkinson's disease, Dementia   - consult SLP/PT/OT/CM, fall and aspiration precautions  - continue Sinemet, Namenda     DVT prophylaxis:  SCDs    CODE STATUS:    Code Status and Medical Interventions:   Ordered at: 02/15/20 8199     Limited Support to NOT Include:    Intubation     Level Of Support Discussed With:    Health Care Surrogate     Code Status:    No CPR     Medical Interventions (Level of Support Prior to Arrest):    Limited     Admission Status:  I believe this patient meets INPATIENT status due to .  I feel patient’s risk for adverse outcomes and need for care warrant INPATIENT evaluation and I predict the patient’s  care encounter to likely last beyond 2 midnights.    Electronically signed by Val Beth PA-C, 02/15/20, 10:14 PM.        Attending   Admission Attestation       I have seen and examined the patient, performing an independent face-to-face diagnostic evaluation with plan of care reviewed and developed with the advanced practice clinician (APC).      Brief Summary Statement:   Murali Dennis is a 89 y.o. male with past medical history of coronary artery disease, systolic CHF, hypothyroidism, COPD, CKD stage III, paroxysmal atrial fibrillation, Parkinson disease, hyperlipidemia, and dementia who presents to the ER with complaints of chest pain.    Patient was recently admitted to [facility] and discharged yesterday after a 1 week stay for acute hypoxia and COPD/CHF exacerbation.  Patient was doing well at discharge and was discharged to [facility] rehab facility.  Unfortunately, patient began to develop chest pain today.  Located on the left side of his chest with radiation down the left side of his rib cage.  Family reports that today he also started having coughing/choking episode after sip of water while in the emergency department.    They deny any reports of fever or chills.  No increased cough from baseline.  Patient's chest pain was relieved with nitroglycerin.  He is currently chest pain-free.  Patient will be admitted for further evaluation by Dr. Ugarte    Remainder of detailed HPI is as noted by APC and has been reviewed and/or edited by me for completeness.    Attending Physical Exam:  Constitutional: Awake, alert  Eyes: PERRLA, sclerae anicteric, no conjunctival injection  HENT: NCAT, mucous membranes moist  Neck: Supple, no thyromegaly, no lymphadenopathy, trachea midline  Respiratory: LLL crackles, nonlabored respirations   Cardiovascular: RRR, rubs, or gallops, palpable pedal pulses bilaterally  Gastrointestinal: Positive bowel sounds, soft, nontender,  nondistended  Musculoskeletal: No bilateral ankle edema, no clubbing or cyanosis to extremities  Psychiatric: Appropriate affect, cooperative  Neurologic: Oriented to person, strength symmetric in all extremities, Cranial Nerves grossly intact to confrontation, speech clear  Skin: No rashes      Brief Assessment/Plan :  See detailed assessment and plan developed with APC which I have reviewed and/or edited for completeness.    Electronically signed by Samuel Singleton DO, 02/16/20, 4:49 AM.

## 2020-02-16 NOTE — PROGRESS NOTES
[facility] Medicine Services  PROGRESS NOTE    Patient Name: Murali Dennis  : 1929  MRN: [id number]    Date of Admission: 2/15/2020  Primary Care Physician: Diana Null APRN    Subjective   Subjective     CC:  Chest pain    HPI:  Wife at bedside reports that the patient's chest pain is improved.  He is on CPAP this morning.  No events overnight    Review of Systems  General: denies fevers or chills  CV: Pain resolved  Resp: denies shortness of breath  Abd: denies abd pain, nausea      Objective   Objective     Vital Signs:   Temp:  [96 °F (35.6 °C)-97.8 °F (36.6 °C)] 97.8 °F (36.6 °C)  Heart Rate:  [50-55] 52  Resp:  [12-18] 18  BP: ()/(53-72) 129/68        Physical Exam:  Constitutional: No acute distress, awake, alert  Respiratory: Clear to auscultation bilaterally, respiratory effort normal wearing BiPAP  Cardiovascular: RRR, no murmurs, rubs, or gallops, palpable pedal pulses bilaterally  Gastrointestinal: Positive bowel sounds, soft, nontender, nondistended  Musculoskeletal: No bilateral ankle edema   Nerves grossly intact to confrontation, speech clear    Results Reviewed:  Results from last 7 days   Lab Units 02/16/20  0251 02/15/20  2127 02/13/20  0454   WBC 10*3/mm3 5.07 5.15 5.41   HEMOGLOBIN g/dL 12.2* 13.4 10.9*   HEMATOCRIT % 39.7 41.7 33.9*   PLATELETS 10*3/mm3 141 157 150   INR  1.29*  --   --    PROCALCITONIN ng/mL  --  0.10  --      Results from last 7 days   Lab Units 02/16/20  0251 02/15/20  2127 02/13/20  0454   SODIUM mmol/L 141 144 138   POTASSIUM mmol/L 3.7 3.4* 3.6   CHLORIDE mmol/L 102 101 99   CO2 mmol/L 26.0 30.0* 26.0   BUN mg/dL 19 18 21   CREATININE mg/dL 1.55* 1.61* 1.45*   GLUCOSE mg/dL 130* 174* 133*   CALCIUM mg/dL 8.4 8.8 8.4   ALT (SGPT) U/L  --  17  --    AST (SGOT) U/L  --  38  --    TROPONIN T ng/mL 0.032* 0.036*  --    PROBNP pg/mL  --  4,564.0*  --      Estimated Creatinine Clearance: 36.4 mL/min (A) (by C-G formula based on  SCr of 1.55 mg/dL (H)).    Microbiology Results Abnormal     None          Imaging Results (Last 24 Hours)     Procedure Component Value Units Date/Time    CT Chest Without Contrast [[id number]] Collected:  02/16/20 0033     Updated:  02/16/20 0035    Narrative:       CT Chest WO    INDICATION:   New onset cough    TECHNIQUE:   CT of the thorax without IV contrast. Coronal and sagittal reconstructions were obtained.  Radiation dose reduction techniques included automated exposure control or exposure modulation based on body size. Count of known CT and cardiac nuc med studies  performed in previous 12 months: 1.     COMPARISON:   None    FINDINGS:  Is a moderate right pleural effusion with mild right base atelectasis. There is a small left pleural effusion both effusions are layering dependently. There is a moderate hiatal hernia. Otherwise upper abdominal images are normal. Aorta is normal in  size. There is no adenopathy. Thyroid gland is normal. There is mild interstitial prominence in the left base and posterior aspect of the right upper lobe.      Impression:       Moderate right pleural effusion and small left pleural effusion. Both effusions appear mobile. The right one measures up to about 5 cm in thickness.    Mild bibasilar atelectasis with interstitial prominence or infiltrate in the right upper lobe posteriorly.    Sternotomy wires are present.    Signer Name: Everette Morris MD   Signed: 2/16/2020 12:33 AM   Workstation Name: [id number]    [facility]    XR Chest 1 View [[id number]] Collected:  02/15/20 2134     Updated:  02/15/20 2136    Narrative:       CR Chest 1 Vw 2/15/2020    INDICATION:   Acute onset of chest pain beginning today with shortness of breath. Congestive heart failure. COPD exacerbation. Atrial fibrillation and coronary artery disease.     COMPARISON:    2/7/2020    FINDINGS:  Single portable AP view(s) of the chest.  The cardiac size is stable following median  sternotomy. Small left pleural effusion with infiltrate or atelectasis in the left lower lobe. Discoid atelectasis right base. The lungs are otherwise clear. No  pneumothorax.      Impression:       Small left pleural effusion with atelectasis or infiltrate left lower lobe.    Signer Name: Isak Sofia MD   Signed: 2/15/2020 9:34 PM   Workstation Name: [id number]    [facility]          Results for orders placed during the hospital encounter of 12/27/19   Adult Transthoracic Echo Complete W/ Cont if Necessary Per Protocol    Narrative · The left ventricular cavity is moderately dilated.  · Right ventricular cavity is mild-to-moderately dilated.  · Left atrial cavity size is moderate-to-severely dilated.  · Moderate aortic valve regurgitation is present.  · Moderate mitral valve regurgitation is present.  · Mild to moderate tricuspid valve regurgitation is present.  · Calculated right ventricular systolic pressure from tricuspid   regurgitation is 35 mmHg.  · Estimated EF = 30%.  · Left ventricular systolic function is severely decreased.  · Left ventricular diastolic dysfunction (grade II) consistent with   pseudonormalization.  · The findings are consistent with dilated cardiomyopathy.  · Mild pulmonary hypertension is present.  · There is no evidence of pericardial effusion.  · The aortic valve exhibits moderate sclerosis without stenosis.          I have reviewed the medications:  Scheduled Meds:  amiodarone 200 mg Oral Daily   aspirin 81 mg Oral Daily   carbidopa-levodopa 1 tablet Oral TID   castor oil-balsam peru 1 each Topical Q12H   cetirizine 10 mg Oral Daily   clopidogrel 75 mg Oral Daily   finasteride 5 mg Oral Daily   furosemide 20 mg Oral Daily   insulin lispro 0-7 Units Subcutaneous Q6H   levothyroxine 75 mcg Oral Daily   memantine 10 mg Oral BID   multivitamin with minerals 1 tablet Oral Daily   pantoprazole 40 mg Oral Daily Before Lunch   pharmacy consult - MTM  Does not apply  Daily   pravastatin 20 mg Oral Nightly   sacubitril-valsartan 0.5 tablet Oral BID   sodium chloride 10 mL Intravenous Q12H     Continuous Infusions:  nitroglycerin 5-200 mcg/min Last Rate: Stopped (02/16/20 0953)     PRN Meds:.•  acetaminophen  •  dextrose  •  dextrose  •  glucagon (human recombinant)  •  ipratropium-albuterol  •  melatonin  •  potassium chloride **OR** potassium chloride **OR** potassium chloride  •  simethicone  •  sodium chloride  •  sodium chloride    Assessment/Plan   Assessment & Plan     Active Hospital Problems    Diagnosis  POA   • **NSTEMI (non-ST elevated myocardial infarction) (CMS/[facility]) [I21.4]  Yes   • Unstable angina (CMS/[facility]) [I20.0]  Yes   • Hypokalemia [E87.6]  Yes   • Parkinson's disease dementia (CMS/[facility]) [G20, F02.80]  Yes   • Lung infiltrate [R91.8]  Yes   • Hyperglycemia [R73.9]  Yes   • Skin breakdown [L90.9]  Yes   • Dysphagia [R13.10]  Yes   • Chronic systolic CHF (congestive heart failure) (CMS/[facility]) [I50.22]  Yes   • COPD (chronic obstructive pulmonary disease) (CMS/[facility]) [J44.9]  Yes   • Mixed hyperlipidemia [E78.2]  Yes   • Sleep apnea [G47.30]  Yes   • Benign prostatic hyperplasia without lower urinary tract symptoms [N40.0]  Yes   • PAF (paroxysmal atrial fibrillation) (CMS/[facility]) [I48.0]  Yes   • Debility [R53.81]  Yes   • CKD (chronic kidney disease) stage 3, GFR 30-59 ml/min (CMS/[facility]) [N18.3]  Yes   • CAD (coronary artery disease) [I25.10]  Yes   • Hypothyroid [E03.9]  Yes      Resolved Hospital Problems   No resolved problems to display.        Brief Hospital Course to date:  Murali Dennis is a 89 y.o. male with a medical hx significant for CAD, systolic CHF, HLD, COPD, Hypothyroidism, CKD III, PAF, BPH and Parkinson's disease dementia was brought to [facility] ED for c/o chest pain.      NSTEMI, CAD, HLD  -Troponin is trending down.  Reviewed EKG and without any changes concerning for ischemia  -Patient is followed by Dr. Ugarte.  We will place consult for Dr. Ugarte  tomorrow morning.  -Patient was started on a nitroglycerin drip.  Will discontinue this for now and monitor for chest pain  -Discontinue heparin drip     LLL infiltrate vs. Atelectasis   - crackles in LLL on exam   -Low suspicion for pneumonia, suspect atelectasis.  Procalcitonin negative.  No indication for antibiotics.  - low threshold for starting antibiotics if pt develops fever, leukocytosis etc     Hyperglycemia, Impaired glucose intolerance  - last A1c 6.40, low dose SSI for now      Right heal and sacral skin breakdown   - consult WO     Chronic systolic CHF  - appears euvolemic  - continue Lasix and Entresto, daily weights, strict I&Os     COPD  - nebs q4h prn, monitor pulse ox      CKD III  - creatinine near baseline, monitor chemistry      Hypokalemia  - replace per protocol, restart oral potassium prior to discharge     PAF  - currently in sinus, continue amiodarone  - no anticoagulation due to risk factors      Hypothyroidism  - continue synthroid      BPH  - continue Avodart, strict I&Os     Sleep apnea   - CPAP at HS     Dysphagia, Parkinson's disease, Dementia   - consult SLP/PT/OT/CM, fall and aspiration precautions  - continue Sinemet, Namenda      DVT prophylaxis:  SCDs    Disposition: I expect the patient to be discharged TBD.    CODE STATUS:   Code Status and Medical Interventions:   Ordered at: 02/15/20 7076     Limited Support to NOT Include:    Intubation     Level Of Support Discussed With:    Health Care Surrogate     Code Status:    No CPR     Medical Interventions (Level of Support Prior to Arrest):    Limited         Electronically signed by Nesha Jordan MD, 02/16/20, 10:21 AM.

## 2020-02-16 NOTE — CONSULTS
"                  Clinical Nutrition     Nutrition Assessment  Reason for Visit:   Physician consult, MST score 2+, Unintentional weight loss, Reduced oral intake      Patient Name: Murali Dennis  YOB: 1929  MRN: [id number]  Date of Encounter: 02/16/20 10:59 AM  Admission date: 2/15/2020    Nutrition Assessment   Assessment       Admission diagnosis  Chest pain  NSTEMI (non-ST elevated myocardial infarction) (CMS/[facility])    Additional applicable diagnosis/conditions/procedures this adm:  Lung infiltrate  Hyperglycemia  Skin breakdown  Rt lateral malleolus PI  Medial gluteal PI  Rt heel PI  WOCN pending   Dysphagia-SLP consult pending     Applicable PMH/PSxH:   Hypothyroid  CKD (chronic kidney disease) stage 3, GFR 30-59 ml/min (CMS/[facility])  CAD (coronary artery disease)  Debility  PAF (paroxysmal atrial fibrillation) (CMS/[facility])  Benign prostatic hyperplasia without lower urinary tract symptoms  Sleep apnea  Mixed hyperlipidemia  Chronic systolic CHF (congestive heart failure) (CMS/[facility])  COPD (chronic obstructive pulmonary disease) (CMS/[facility])  Hypokalemia  Parkinson's disease dementia (CMS/[facility])    Reported/Observed/Food/Nutrition Related History:      RD notes Palliative consult also pending. RD notes SLP has now changed PO diet to soft, whole, with thin liquids. RD notes pt recently discharged, RD had been following him, interventions noted.     RD note 2/9/2020  RD changed regular textures diet order to soft textures/ground meats diet order (per dtr's request).  -RD ordered chocolate Magic Cup 2x daily and chocolate Mighty Shake 1x daily.  -RD ordered daily bedtime snack (cottage cheese and peaches).  -RD ordered nursing assistance with meals when family not present.         Anthropometrics     Height: 177.8 cm (70\")  Last filed wt: Weight: 81.2 kg (179 lb) (02/16/20 0341)  Weight Method: Bed scale    BMI: BMI (Calculated): 25.5  Overweight: 25.0-29.9kg/m2     Ideal Body Weight (IBW) (kg): 76.48    Last " 15 Recorded Weights   Weight Weight (kg) Weight (lbs) Weight Method   2/16/2020 81.194 kg 179 lb Bed scale   2/15/2020 80.74 kg 178 lb Estimated   2/7/2020 80.74 kg 178 lb -   2/7/2020 (No Data)  (No Data)  -   2/5/2020 79.379 kg 175 lb Stated   1/24/2020 (No Data)  (No Data)  -   1/3/2020 87.091 kg 192 lb Bed scale   1/2/2020 85.14 kg 187 lb 11.2 oz -   12/30/2019 77.111 kg 170 lb Bed scale   12/29/2019 78.019 kg 172 lb -   12/29/2019 78.382 kg 172 lb 12.8 oz Bed scale   12/27/2019 72.576 kg 160 lb Estimated   11/1/2019 74.39 kg 164 lb -         Labs reviewed     Results from last 7 days   Lab Units 02/16/20  0251 02/15/20  2127 02/13/20  0454   GLUCOSE mg/dL 130* 174* 133*   BUN mg/dL 19 18 21   CREATININE mg/dL 1.55* 1.61* 1.45*   SODIUM mmol/L 141 144 138   CHLORIDE mmol/L 102 101 99   POTASSIUM mmol/L 3.7 3.4* 3.6   MAGNESIUM mg/dL 1.7  --   --    ALT (SGPT) U/L  --  17  --      Results from last 7 days   Lab Units 02/15/20  2127   ALBUMIN g/dL 3.50         Results from last 7 days   Lab Units 02/16/20  0543 02/16/20  0101   GLUCOSE mg/dL 113 131*     Lab Results   Lab Value Date/Time    HGBA1C 6.40 (H) 12/29/2019 0657    HGBA1C 5.9 05/08/2019 1027    HGBA1C 6.0 02/08/2019 1018    HGBA1C 6.90 (H) 10/25/2018 0934         Medications reviewed   Pertinent:  Lasix, SSI, MVI, protonix       Intake/Ouptut 24 hrs (7:00AM - 6:59 AM)     Intake & Output (last day)     None          Current Nutrition Prescription     PO: Diet Soft Texture; Whole Foods; Thin; Cardiac    Intake: insuff data            Nutrition Diagnosis     2/16  Problem Predicted suboptimal energy intake   Etiology PO intake trend per previous RD note   Signs/Symptoms Less than adequate PO intake noted     Problem Increased energy and protein needs   Etiology Skin integrity   Signs/Symptoms Multiple PI noted        Nutrition Intervention   1.  Follow treatment progress, Care plan reviewed    2. ONS added per previous admission/pts noted preferences      Magic cup twice daily (no orange flavor), mighty shake once daily  HS snack ordered: cottage cheese and peaches       Goal:     General: Nutrition support treatment  PO: Establish PO  Additional goals:  Assist in wound healing promotion     Monitoring/Evaluation:   Per protocol, PO intake, Supplement intake, Weight, Skin status, POC/GOC      Will Continue to follow per protocol      Meagan Sherman RDN, LD, CNSC  Time Spent: 30min

## 2020-02-16 NOTE — PLAN OF CARE
Problem: Patient Care Overview  Goal: Plan of Care Review  Outcome: Ongoing (interventions implemented as appropriate)  Flowsheets (Taken 2/16/2020 0544)  Progress: no change  Plan of Care Reviewed With: patient; spouse  Outcome Summary: VSS. Patient admitted from ED d/t chest pain but no complaints of chest pain overnight. Patient on nitro and heparin gtts. Multiple skin issues - WOC consult in place. Patient's family discussed possibly making patient palliative and./or hospice. Consult in place.

## 2020-02-16 NOTE — THERAPY EVALUATION
Acute Care - Speech Language Pathology   Swallow Initial Evaluation [facility]     Patient Name: Murali Dennis  : 1929  MRN: [id number]  Today's Date: 2020               Admit Date: 2/15/2020    Visit Dx:     ICD-10-CM ICD-9-CM   1. Nonspecific chest pain R07.9 786.50   2. History of coronary artery disease Z86.79 V12.59   3. Elevated troponin R79.89 790.6     Patient Active Problem List   Diagnosis   • Hypothyroid   • CKD (chronic kidney disease) stage 3, GFR 30-59 ml/min (CMS/[facility])   • CAD (coronary artery disease)   • Debility   • PAF (paroxysmal atrial fibrillation) (CMS/[facility])   • Benign prostatic hyperplasia without lower urinary tract symptoms   • Ventricular tachycardia (CMS/[facility])   • Sleep apnea   • Mixed hyperlipidemia   • Pancreatitis   • Sepsis (CMS/[facility])   • Bacteremia due to Streptococcus   • Acute renal failure superimposed on stage 3 chronic kidney disease (CMS/[facility])   • Acute cholecystitis   • Chronic systolic CHF (congestive heart failure) (CMS/[facility])   • COPD (chronic obstructive pulmonary disease) (CMS/[facility])   • Hypokalemia   • Parkinson's disease dementia (CMS/[facility])   • NSTEMI (non-ST elevated myocardial infarction) (CMS/[facility])   • Lung infiltrate   • Hyperglycemia   • Skin breakdown   • Dysphagia   • Unstable angina (CMS/[facility])     Past Medical History:   Diagnosis Date   • Alzheimer disease (CMS/[facility])    • Arthritis    • Warern esophagus     followed  with GI in [place]   • Benign prostatic hyperplasia without lower urinary tract symptoms 10/17/2018   • Chronic congestive heart failure (CMS/[facility]) 2017     echo report from 17 EF 60-65%, mild TR, mild AR ([place], [place]) Echocardiogram 17: EF 28%, mild to moderate MR    • COPD (chronic obstructive pulmonary disease) (CMS/[facility])    • Coronary artery disease    • Dementia (CMS/[facility])    • Diarrhea 2019   • Environmental allergies 10/17/2018   • Essential hypertension 10/17/2018   • Gastroesophageal  reflux disease 11/19/2018   • GERD (gastroesophageal reflux disease)    • Hypothyroid     started after amiodarone use in 2003   • Sleep apnea     wears cpap   • Stroke (CMS/HCC) 2003   • TIA (transient ischemic attack) 12/29/2017   • Unintended weight loss 10/17/2018     Past Surgical History:   Procedure Laterality Date   • CARDIAC CATHETERIZATION  2003   • CARDIAC CATHETERIZATION N/A 1/19/2018    Procedure: Left Heart Cath;  Surgeon: Hollis Ugarte MD;  Location:  [facility] CATH INVASIVE LOCATION;  Service:    • CARPAL TUNNEL RELEASE Right    • CATARACT EXTRACTION Bilateral    • CORONARY ARTERY BYPASS GRAFT  2003    Triple Bypass, @ [facility] @ [place], [place]   • ERCP N/A 12/30/2019    Procedure: ENDOSCOPIC RETROGRADE CHOLANGIOPANCREATOGRAPHY;  Surgeon: Arsh White MD;  Location:  [facility] ENDOSCOPY;  Service: Gastroenterology   • HEMORRHOIDECTOMY     • HERNIA REPAIR          SWALLOW EVALUATION (last 72 hours)      SLP Adult Swallow Evaluation     Row Name 02/16/20 1100                   Rehab Evaluation    Document Type  evaluation  -AW        Subjective Information  no complaints  -AW        Patient Observations  alert;cooperative  -AW        Patient/Family Observations  wife at bedside  -AW        Patient Effort  good  -AW        Symptoms Noted During/After Treatment  none  -AW           General Information    Patient Profile Reviewed  yes  -AW        Pertinent History Of Current Problem  nstemi, PD, CHF, left on 2/14 and re-admit 2/15 with CP.   -AW        Current Method of Nutrition  regular textures;thin liquids;other (see comments) was d/c on ground diet  but not seen by SLP last admit  -AW        Precautions/Limitations, Vision  WFL  -AW        Precautions/Limitations, Hearing  WFL  -AW        Prior Level of Function-Communication  cognitive-linguistic impairment  -AW        Prior Level of Function-Swallowing  mechanical soft textures  -AW        Plans/Goals Discussed with   spouse/S.O.  -AW        Barriers to Rehab  medically complex palliative being consulted  -AW        Patient's Goals for Discharge  patient could not state  -AW        Family Goals for Discharge  other (see comments) return home and no more hospital admissions per wife  -AW           Pain Assessment    Additional Documentation  Pain Scale: FACES Pre/Post-Treatment (Group)  -AW           Pain Scale: FACES Pre/Post-Treatment    Pain: FACES Scale, Pretreatment  0-->no hurt  -AW        Pain: FACES Scale, Post-Treatment  0-->no hurt  -AW           Oral Motor and Function    Dentition Assessment  teeth are in poor condition some teeth are ground down on bottom  -AW        Secretion Management  WNL/WFL  -AW        Volitional Swallow  WFL  -AW        Volitional Cough  unable to elicit  -AW           Oral Musculature and Cranial Nerve Assessment    Oral Motor General Assessment  WFL  -AW           General Eating/Swallowing Observations    Eating/Swallowing Skills  fed by SLP;other (see comments) pt can self feed  -AW        Positioning During Eating  upright in bed;upright 90 degree  -AW        Utensils Used  spoon;straw  -AW        Consistencies Trialed  regular textures;thin liquids;pureed  -AW           Respiratory    Respiratory Status  WFL  -AW           Clinical Swallow Eval    Oral Prep Phase  impaired  -AW        Oral Transit  WFL  -AW        Oral Residue  WFL  -AW        Pharyngeal Phase  no overt signs/symptoms of pharyngeal impairment  -AW        Esophageal Phase  unremarkable  -AW        Clinical Swallow Evaluation Summary  Pt seen for dysphagia eval. Wife said got choked up with water in ED but was also in a reclined position. Pt was positoned at 90 degrees in the bed and showed no overt s/s aspiration with any consistency.   -AW           Oral Prep Concerns    Oral Prep Concerns  poor rotary chew  -AW        Poor Rotary Chew  regular consistencies  -AW           Clinical Impression    SLP Swallowing Diagnosis   functional oral phase;functional pharyngeal phase  -AW        Functional Impact  no impact on function  -AW        Rehab Potential/Prognosis, Swallowing  re-evaluate goals as necessary  -AW        Swallow Criteria for Skilled Therapeutic Interventions Met  baseline status;no problems identified which require skilled intervention  -AW           Recommendations    Therapy Frequency (Swallow)  evaluation only  -AW        SLP Diet Recommendation  soft textures;whole;thin liquids  -AW        Recommended Precautions and Strategies  upright posture during/after eating;small bites of food and sips of liquid  -AW        SLP Rec. for Method of Medication Administration  meds whole;with thin liquids;with pudding or applesauce;as tolerated  -AW        Monitor for Signs of Aspiration  notify SLP if any concerns  -AW        Anticipated Dischage Disposition  home with home health  -AW          User Key  (r) = Recorded By, (t) = Taken By, (c) = Cosigned By    Initials Name Effective Dates    Vicky Osullivan, MS CCC-SLP 06/22/15 -           EDUCATION  The patient has been educated in the following areas:   Dysphagia (Swallowing Impairment).    SLP Recommendation and Plan  SLP Swallowing Diagnosis: functional oral phase, functional pharyngeal phase  SLP Diet Recommendation: soft textures, whole, thin liquids  Recommended Precautions and Strategies: upright posture during/after eating, small bites of food and sips of liquid  SLP Rec. for Method of Medication Administration: meds whole, with thin liquids, with pudding or applesauce, as tolerated     Monitor for Signs of Aspiration: notify SLP if any concerns     Swallow Criteria for Skilled Therapeutic Interventions Met: baseline status, no problems identified which require skilled intervention  Anticipated Dischage Disposition: home with home health  Rehab Potential/Prognosis, Swallowing: re-evaluate goals as necessary  Therapy Frequency (Swallow): evaluation only          Plan of  Care Reviewed With: patient, spouse  Progress: no change           Time Calculation:   Time Calculation- SLP     Row Name 02/16/20 1131             Time Calculation- SLP    SLP Start Time  1000  -AW      SLP Received On  02/16/20  -        User Key  (r) = Recorded By, (t) = Taken By, (c) = Cosigned By    Initials Name Provider Type    Vicky Osullivan, MS CCC-SLP Speech and Language Pathologist          Therapy Charges for Today     Code Description Service Date Service Provider Modifiers Qty    [id number]  ST EVAL ORAL PHARYNG SWALLOW 4 2/16/2020 Vicky Hughes, MS CCC-SLP GN 1               Vicky Hughes MS CCC-SLP  2/16/2020

## 2020-02-16 NOTE — CONSULTS
Palliative Care Initial Consult Note    Patient Name:  Murali Dennis   : 1929   Sex: male    Patient Care Team:  Diana Null APRN as PCP - General (Nurse Practitioner)  Diana Null APRN as PCP - Claims Attributed  BharathiBruno MD as Consulting Physician (Neurology)  Hollis Ugarte MD as Consulting Physician (Cardiology)    Advance care planning discussed: yes  Code Status: DNR  Advance Directive: no document  Surrogate decision maker: wife    Referring Provider: Samuel Singleton DO  Reason for Consult: GOC    Subjective   Murali Dennis is a 89 y.o. male with a medical hx significant for CAD, systolic CHF, HLD, COPD, Hypothyroidism, CKD III, PAF, BPH and Parkinson's disease dementia was brought to [facility] ED for c/o chest pain.  The patient was discharged yesterday  from [facility] after a 1 week stay for acute hypoxia due to CHF exacerbation and COPD. The patient was transferred to [facility] rehab facility yesterday afternoon. The patient's family provides the history due to the patient's severe dementia.   Spoke to patient's wife, who was at bedside. We reviewed the course of the last 2 weeks and his recent hospitalization and now subsequent rehospitalization. Wife realizes that he has been on a constant decline over the last 6 months. Especially in the last 3-4 weeks. Before his last hospitalization he was up walking with a cane in the home. Now he is not strong enough to ambulate. He is not appropriate for rehabilitation. Wife would like to take the patient home. I discussed with her the difference between palliative care and hospice. She was open to discussing hospice and I reviewed all the services. I feel that the patient is appropriate. She states she is not ready for this step, at least today. She would like to see how the remainder of the hospitalization goes. She will have the help of a daughter and granddaughter to care for the patient in the home. She would like a home  palliative consult.   Patient sleeps calmly through entire visit. No signs of discomfort, labored breathing, nausea. Patient appears to be resting comfortably.             Review of Systems  Review of Systems   Unable to perform ROS: Mental status change         History  Past Medical History:   Diagnosis Date   • Alzheimer disease (CMS/[facility])    • Arthritis    • Warren esophagus     followed  with GI in [place]   • Benign prostatic hyperplasia without lower urinary tract symptoms 10/17/2018   • Chronic congestive heart failure (CMS/[facility]) 12/8/2017     echo report from 8/2/17 EF 60-65%, mild TR, mild AR ([place], [place]) Echocardiogram 12/9/17: EF 28%, mild to moderate MR    • COPD (chronic obstructive pulmonary disease) (CMS/[facility])    • Coronary artery disease 2003   • Dementia (CMS/HCC) 2003   • Diarrhea 2/24/2019   • Environmental allergies 10/17/2018   • Essential hypertension 10/17/2018   • Gastroesophageal reflux disease 11/19/2018   • GERD (gastroesophageal reflux disease)    • Hypothyroid     started after amiodarone use in 2003   • Sleep apnea     wears cpap   • Stroke (CMS/[facility]) 2003   • TIA (transient ischemic attack) 12/29/2017   • Unintended weight loss 10/17/2018     Past Surgical History:   Procedure Laterality Date   • CARDIAC CATHETERIZATION  2003   • CARDIAC CATHETERIZATION N/A 1/19/2018    Procedure: Left Heart Cath;  Surgeon: Hollis Ugarte MD;  Location:  [facility] CATH INVASIVE LOCATION;  Service:    • CARPAL TUNNEL RELEASE Right    • CATARACT EXTRACTION Bilateral    • CORONARY ARTERY BYPASS GRAFT  2003    Triple Bypass, @ [facility] [place], [place]   • ERCP N/A 12/30/2019    Procedure: ENDOSCOPIC RETROGRADE CHOLANGIOPANCREATOGRAPHY;  Surgeon: Arsh White MD;  Location: [facility];  Service: Gastroenterology   • HEMORRHOIDECTOMY     • HERNIA REPAIR       Current Facility-Administered Medications   Medication Dose Route Frequency Provider Last  Rate Last Dose   • acetaminophen (TYLENOL) tablet 650 mg  650 mg Oral Nightly PRN Val Beth PA-C       • amiodarone (PACERONE) tablet 200 mg  200 mg Oral Daily Val Beth PA-C   200 mg at 02/16/20 0959   • aspirin chewable tablet 81 mg  81 mg Oral Daily Val Beth PA-C   81 mg at 02/16/20 0959   • carbidopa-levodopa (SINEMET)  MG per tablet 1 tablet  1 tablet Oral TID Val Beth PA-C   1 tablet at 02/16/20 0959   • castor oil-balsam peru (VENELEX) ointment 5 g  1 each Topical Q12H Val Beth PA-C   5 g at 02/16/20 1001   • cetirizine (zyrTEC) tablet 10 mg  10 mg Oral Daily Val Beth PA-C   10 mg at 02/16/20 0959   • clopidogrel (PLAVIX) tablet 75 mg  75 mg Oral Daily Val Beth PA-C   75 mg at 02/16/20 0959   • dextrose (D50W) 25 g/ 50mL Intravenous Solution 25 g  25 g Intravenous Q15 Min PRN Val Beth PA-C       • dextrose (GLUTOSE) oral gel 15 g  15 g Oral Q15 Min PRN Val Beth PA-C       • finasteride (PROSCAR) tablet 5 mg  5 mg Oral Daily Val Beth PA-C   5 mg at 02/16/20 0959   • furosemide (LASIX) tablet 20 mg  20 mg Oral Daily Val Beth PA-C   Stopped at 02/16/20 0959   • glucagon (human recombinant) (GLUCAGEN DIAGNOSTIC) injection 1 mg  1 mg Subcutaneous Q15 Min PRN Val Beth PA-C       • insulin lispro (humaLOG) injection 0-7 Units  0-7 Units Subcutaneous Q6H Val Beth PA-C       • ipratropium-albuterol (DUO-NEB) nebulizer solution 3 mL  3 mL Nebulization Q4H PRN Val Beth PA-C       • levothyroxine (SYNTHROID, LEVOTHROID) tablet 75 mcg  75 mcg Oral Daily Val Beth PA-C   75 mcg at 02/16/20 0636   • melatonin tablet 5 mg  5 mg Oral Nightly PRN Val Beth PA-C       • memantine (NAMENDA) tablet 10 mg  10 mg Oral BID Val Beth PA-C   10 mg at 02/16/20 1004   • multivitamin with minerals 1 tablet  1 tablet Oral Daily Val Beth PA-C   1 tablet at 02/16/20 0959   • nitroglycerin 50 mg/250  mL (0.2 mg/mL) infusion  5-200 mcg/min Intravenous Titrated Val Beth PA-C   Stopped at 02/16/20 0953   • pantoprazole (PROTONIX) EC tablet 40 mg  40 mg Oral Daily Before Lunch Val Beth PA-C       • Pharmacy Consult - MTM   Does not apply Daily Ana Liu, [facility]       • potassium chloride (MICRO-K) CR capsule 40 mEq  40 mEq Oral PRN Val Beth PA-C        Or   • potassium chloride (KLOR-CON) packet 40 mEq  40 mEq Oral PRN Val Beth PA-C        Or   • potassium chloride 10 mEq in 100 mL IVPB  10 mEq Intravenous Q1H PRN Val Beth PA-C       • pravastatin (PRAVACHOL) tablet 20 mg  20 mg Oral Nightly Val Beth PA-C   20 mg at 02/16/20 0057   • sacubitril-valsartan (ENTRESTO) 24-26 MG tablet 0.5 tablet  0.5 tablet Oral BID Val Beth PA-C   0.5 tablet at 02/16/20 1008   • simethicone (MYLICON) chewable tablet 80 mg  80 mg Oral 4x Daily PRN Val Beth PA-C       • sodium chloride 0.9 % flush 10 mL  10 mL Intravenous PRN Val Beth PA-C   10 mL at 02/15/20 2135   • sodium chloride 0.9 % flush 10 mL  10 mL Intravenous Q12H Val Beth PA-C   10 mL at 02/16/20 1001   • sodium chloride 0.9 % flush 10 mL  10 mL Intravenous PRN Val Beth PA-C           nitroglycerin 5-200 mcg/min Last Rate: Stopped (02/16/20 0953)     •  acetaminophen  •  dextrose  •  dextrose  •  glucagon (human recombinant)  •  ipratropium-albuterol  •  melatonin  •  potassium chloride **OR** potassium chloride **OR** potassium chloride  •  simethicone  •  sodium chloride  •  sodium chloride  Allergies   Allergen Reactions   • Benzodiazepines Other (See Comments)     Paradoxical response   • Valium [Diazepam] Other (See Comments)     Paradoxical response     • Codeine Other (See Comments)     unknown   • Fluoxetine Other (See Comments)     Paradoxical response   • Lipitor [Atorvastatin] Other (See Comments)     Myalgias     • Metoclopramide Other (See Comments)     Unknown   • Nabumetone  Other (See Comments)     Unknown     • Penicillins Swelling and Other (See Comments)     Has tolerated cefepime 2019   • Tramadol Other (See Comments)     unknown   • Prozac [Fluoxetine Hcl] Unknown - Low Severity   • Sulfa Antibiotics Rash     Family History   Problem Relation Age of Onset   • Cancer Mother    • Heart disease Sister    • Diabetes Sister    • Heart disease Brother    • Cancer Brother    • Stroke Sister      Social History     Socioeconomic History   • Marital status:      Spouse name: Not on file   • Number of children: Not on file   • Years of education: Not on file   • Highest education level: Not on file   Occupational History   • Occupation: Retired   Tobacco Use   • Smoking status: Former Smoker     Last attempt to quit: 1967     Years since quittin.1   • Smokeless tobacco: Never Used   Substance and Sexual Activity   • Alcohol use: No   • Drug use: No   • Sexual activity: Defer       Objective     Vital Signs  Temp:  [96 °F (35.6 °C)-97.8 °F (36.6 °C)] 97.8 °F (36.6 °C)  Heart Rate:  [50-55] 52  Resp:  [12-18] 18  BP: ()/(53-72) 129/68    PPS: Palliative Performance Scale score as of 2020, 3:24 PM is 20% based on the following measures:   Ambulation: Totally bed bound  Activity and Evidence of Disease: Unable to do any work, extensive evidence of disease  Self-Care: Total care  Intake:Minimal sips  LOC: Full, drowsy or confusion    Physical Exam:  Constitutional:lying in bed, eyes closed   Eyes: PERRLA, sclerae anicteric, no conjunctival injection  HENT: NCAT, mucous membranes moist  Neck: Supple, trachea midline  Respiratory: nonlabored respirations   Cardiovascular: RR, bradycardic, no murmurs appreciated, palpable pedal pulses bilaterally  Gastrointestinal: Positive bowel sounds, soft, nontender, no distention or guarding   Musculoskeletal: No bilateral ankle edema, no clubbing or cyanosis to extremities  Psychiatric: Asleep  Neurologic: not orientated.  Baseline dementia  Skin: skin breakdown of right heal     Results Review:   Lab Results   Component Value Date    HGBA1C 6.40 (H) 12/29/2019       Lab Results   Component Value Date    GLUCOSE 130 (H) 02/16/2020    BUN 19 02/16/2020    CREATININE 1.55 (H) 02/16/2020    EGFRIFNONA 42 (L) 02/16/2020    BCR 12.3 02/16/2020    K 3.7 02/16/2020    CO2 26.0 02/16/2020    CALCIUM 8.4 02/16/2020    ALBUMIN 3.50 02/15/2020    LABIL2 1.50 04/09/2018    AST 38 02/15/2020    ALT 17 02/15/2020       WBC   Date Value Ref Range Status   02/16/2020 5.07 3.40 - 10.80 10*3/mm3 Final     RBC   Date Value Ref Range Status   02/16/2020 3.64 (L) 4.14 - 5.80 10*6/mm3 Final     Hemoglobin   Date Value Ref Range Status   02/16/2020 12.2 (L) 13.0 - 17.7 g/dL Final     Hematocrit   Date Value Ref Range Status   02/16/2020 39.7 37.5 - 51.0 % Final     MCV   Date Value Ref Range Status   02/16/2020 109.1 (H) 79.0 - 97.0 fL Final     MCH   Date Value Ref Range Status   02/16/2020 33.5 (H) 26.6 - 33.0 pg Final     MCHC   Date Value Ref Range Status   02/16/2020 30.7 (L) 31.5 - 35.7 g/dL Final     RDW   Date Value Ref Range Status   02/16/2020 14.8 12.3 - 15.4 % Final     RDW-SD   Date Value Ref Range Status   02/16/2020 60.0 (H) 37.0 - 54.0 fl Final     MPV   Date Value Ref Range Status   02/16/2020 11.3 6.0 - 12.0 fL Final     Platelets   Date Value Ref Range Status   02/16/2020 141 140 - 450 10*3/mm3 Final     Neutrophil %   Date Value Ref Range Status   02/15/2020 56.4 42.7 - 76.0 % Final     Lymphocyte %   Date Value Ref Range Status   02/15/2020 29.7 19.6 - 45.3 % Final     Monocyte %   Date Value Ref Range Status   02/15/2020 8.2 5.0 - 12.0 % Final     Eosinophil %   Date Value Ref Range Status   02/15/2020 4.7 0.3 - 6.2 % Final     Basophil %   Date Value Ref Range Status   02/15/2020 0.8 0.0 - 1.5 % Final     Immature Grans %   Date Value Ref Range Status   02/15/2020 0.2 0.0 - 0.5 % Final     Neutrophils Absolute   Date Value Ref Range  Status   02/16/2020 3.09 1.70 - 7.00 10*3/mm3 Final     Neutrophils, Absolute   Date Value Ref Range Status   02/15/2020 2.91 1.70 - 7.00 10*3/mm3 Final     Lymphocytes, Absolute   Date Value Ref Range Status   02/15/2020 1.53 0.70 - 3.10 10*3/mm3 Final     Monocytes, Absolute   Date Value Ref Range Status   02/15/2020 0.42 0.10 - 0.90 10*3/mm3 Final     Eosinophils Absolute   Date Value Ref Range Status   02/16/2020 0.25 0.00 - 0.40 10*3/mm3 Final     Eosinophils, Absolute   Date Value Ref Range Status   02/15/2020 0.24 0.00 - 0.40 10*3/mm3 Final     Basophils Absolute   Date Value Ref Range Status   02/16/2020 0.00 0.00 - 0.20 10*3/mm3 Final     Basophils, Absolute   Date Value Ref Range Status   02/15/2020 0.04 0.00 - 0.20 10*3/mm3 Final     Immature Grans, Absolute   Date Value Ref Range Status   02/15/2020 0.01 0.00 - 0.05 10*3/mm3 Final     nRBC   Date Value Ref Range Status   02/15/2020 0.0 0.0 - 0.2 /100 WBC Final           NSTEMI (non-ST elevated myocardial infarction) (CMS/[facility])    Hypothyroid    CKD (chronic kidney disease) stage 3, GFR 30-59 ml/min (CMS/HCC)    CAD (coronary artery disease)    Debility    PAF (paroxysmal atrial fibrillation) (CMS/HCC)    Benign prostatic hyperplasia without lower urinary tract symptoms    Sleep apnea    Mixed hyperlipidemia    Chronic systolic CHF (congestive heart failure) (CMS/[facility])    COPD (chronic obstructive pulmonary disease) (CMS/HCC)    Hypokalemia    Parkinson's disease dementia (CMS/HCC)    Lung infiltrate    Hyperglycemia    Skin breakdown    Dysphagia    Unstable angina (CMS/[facility])      Assessment/Plan   Assessment/Plan:   Impression: 89 year old male that presented to ER from [facility] with right sided chest pain d/t NSTEMI. He has discharged from [facility] one day prior.     Palliative care APRN met with wife to review plan of care and discuss GOC. At present she appreciates palliative to follow to monitor for symptoms management and to provide  support if additional medical decisions need to be made. At current, wife is not ready for hospice. She is open to home palliative on discharge. I did discuss that if he should continue to decline palliative care can transition patient to hospice inpatient.     Total Visit Time: 45 minutes   Face to Face Time: 30 minutes     Esha ERVIN  [facility]  Hospice and Palliative Care Nurse Practitioner  02/16/20  10:24 AM

## 2020-02-16 NOTE — NURSING NOTE
WOC nurse for Coccyx and right heel.  Patient coccyx sacrum is clean dry intact and blanchable.  Use z guard to prevent skin breakdown. Right ankle with unstageable pressure injury.  Use Venelex and z guard then cover with dry foam dressing change daily.  Dolphin mattress ordered from [employer] [phone].  WO  Nurse to follow as needed.  Thank you

## 2020-02-17 NOTE — PROGRESS NOTES
[facility]       LOS: 1 day   Patient Care Team:  Diana Null APRN as PCP - General (Nurse Practitioner)  Diana Null APRN as PCP - Claims Attributed  Bruno Garvin MD as Consulting Physician (Neurology)  Hollis Ugarte MD as Consulting Physician (Cardiology)        Subjective : [facility]/15/[facility].      Patient Denies:  Cp, sob, palpitations.  Resting comfortably in chair.      Vital Signs  Temp:  [97.5 °F (36.4 °C)-98.7 °F (37.1 °C)] 98.7 °F (37.1 °C)  Heart Rate:  [49-53] 50  Resp:  [16-19] 16  BP: (105-137)/(56-69) 137/69    Intake/Output Summary (Last 24 hours) at 2/17/2020 0951  Last data filed at 2/17/2020 0600  Gross per 24 hour   Intake 480 ml   Output 1100 ml   Net -620 ml     No intake/output data recorded.    Physical Exam:     General Appearance:    Alert, cooperative, in no acute distress       Neck:   No adenopathy, supple, trachea midline, no thyromegaly, no JVD       Lungs:     Clear to auscultation,respirations regular, even and                  unlabored    Heart:    Regular rhythm and normal rate, normal S1 and S2, no            murmur, no gallop, no rub, no click   Chest Wall:    No abnormalities observed   Abdomen:     Normal bowel sounds, no masses, no organomegaly, soft        non-tender, non-distended       Extremities:   Moves all extremities well, no edema, no cyanosis, no             redness   Pulses:   Pulses palpable and equal bilaterally     Results Review:     I reviewed the patient's new clinical results.      WBC WBC   Date/Time Value Ref Range Status   02/16/2020 0251 5.07 3.40 - 10.80 10*3/mm3 Final   02/15/2020 2127 5.15 3.40 - 10.80 10*3/mm3 Final            HGB Hemoglobin   Date/Time Value Ref Range Status   02/16/2020 0251 12.2 (L) 13.0 - 17.7 g/dL Final   02/15/2020 2127 13.4 13.0 - 17.7 g/dL Final           HCT Hematocrit   Date/Time  Value Ref Range Status   02/16/2020 0251 39.7 37.5 - 51.0 % Final   02/15/2020 2127 41.7 37.5 - 51.0 % Final            Platlets Platelets   Date/Time Value Ref Range Status   02/16/2020 0251 141 140 - 450 10*3/mm3 Final   02/15/2020 2127 157 140 - 450 10*3/mm3 Final     Sodium  Sodium   Date/Time Value Ref Range Status   02/16/2020 0251 141 136 - 145 mmol/L Final   02/15/2020 2127 144 136 - 145 mmol/L Final     Potassium  Potassium   Date/Time Value Ref Range Status   02/16/2020 0251 3.7 3.5 - 5.2 mmol/L Final   02/15/2020 2127 3.4 (L) 3.5 - 5.2 mmol/L Final     Chloride  Chloride   Date/Time Value Ref Range Status   02/16/2020 0251 102 98 - 107 mmol/L Final   02/15/2020 2127 101 98 - 107 mmol/L Final     BicarbonateNo results found for: PLASMABICARB    BUN BUN   Date/Time Value Ref Range Status   02/16/2020 0251 19 8 - 23 mg/dL Final   02/15/2020 2127 18 8 - 23 mg/dL Final      Creatinine Creatinine   Date/Time Value Ref Range Status   02/16/2020 0251 1.55 (H) 0.76 - 1.27 mg/dL Final   02/15/2020 2127 1.61 (H) 0.76 - 1.27 mg/dL Final      Calcium Calcium   Date/Time Value Ref Range Status   02/16/2020 0251 8.4 8.2 - 9.6 mg/dL Final   02/15/2020 2127 8.8 8.2 - 9.6 mg/dL Final      Mag @RESULFAST(MG:3)@        PT/INR:       Lab Results   Component Value Date    PROTIME 15.5 (H) 02/16/2020    PROTIME 14.6 (H) 02/24/2019    INR 1.29 (H) 02/16/2020    INR 1.20 (H) 02/24/2019      Troponin I:    Lab Results   Component Value Date    TROPONINI 1.664 (C) 02/25/2019    TROPONINI 2.198 (C) 02/24/2019    TROPONINI 1.733 (C) 02/24/2019    Lab Results   Component Value Date    POCRTROPI 0.05 02/24/2019    TROPONINT 0.032 (C) 02/16/2020         amiodarone 200 mg Oral Daily   aspirin 81 mg Oral Daily   carbidopa-levodopa 1 tablet Oral TID   castor oil-balsam peru 1 each Topical Q12H   cetirizine 10 mg Oral Daily   clopidogrel 75 mg Oral Daily   finasteride 5 mg Oral Daily   furosemide 20 mg Oral Daily   insulin lispro 0-7 Units  Subcutaneous Q6H   levothyroxine 75 mcg Oral Daily   memantine 10 mg Oral BID   multivitamin with minerals 1 tablet Oral Daily   pantoprazole 40 mg Oral Daily Before Lunch   pharmacy consult - MTM  Does not apply Daily   pravastatin 20 mg Oral Nightly   sacubitril-valsartan 0.5 tablet Oral BID   sodium chloride 10 mL Intravenous Q12H       nitroglycerin 5-200 mcg/min Last Rate: Stopped (02/16/20 0953)       Assessment/Plan     Patient Active Problem List   Diagnosis Code   • Hypothyroid E03.9   • CKD (chronic kidney disease) stage 3, GFR 30-59 ml/min (CMS/[facility]) N18.3   • CAD (coronary artery disease) I25.10   • Debility R53.81   • PAF (paroxysmal atrial fibrillation) (CMS/HCC) I48.0   • Benign prostatic hyperplasia without lower urinary tract symptoms N40.0   • Ventricular tachycardia (CMS/HCC) I47.2   • Sleep apnea G47.30   • Mixed hyperlipidemia E78.2   • Pancreatitis K85.90   • Sepsis (CMS/HCC) A41.9   • Bacteremia due to Streptococcus R78.81, B95.5   • Acute renal failure superimposed on stage 3 chronic kidney disease (CMS/[facility]) N17.9, N18.3   • Acute cholecystitis K81.0   • Chronic systolic CHF (congestive heart failure) (CMS/HCC) I50.22   • COPD (chronic obstructive pulmonary disease) (CMS/HCC) J44.9   • Hypokalemia E87.6   • Parkinson's disease dementia (CMS/HCC) G20, F02.80   • NSTEMI (non-ST elevated myocardial infarction) (CMS/HCC) I21.4   • Lung infiltrate R91.8   • Hyperglycemia R73.9   • Skin breakdown L90.9   • Dysphagia R13.10   • Unstable angina (CMS/[facility]) I20.0     CV no change  EF~30%  Palliative care following  Continue medical therapy  Consider hospice  Nothing to add.  Call if needed.      GUI Mendoza  02/17/20  9:51 AM

## 2020-02-17 NOTE — PROGRESS NOTES
[facility] Medicine Services  PROGRESS NOTE    Patient Name: Murali Dennis  : 1929  MRN: [id number]    Date of Admission: 2/15/2020  Primary Care Physician: Diana Null APRN    Subjective   Subjective     CC:  Chest pain    HPI:  Wife at bedside reports that the patient's chest pain is improved and he had none overnight.  Overall he is deconditioned and family is concerned about him getting better.  He is having back pain from laying in bed.    Review of Systems  General: denies fevers or chills  CV: No chest pain overnight  Resp: denies shortness of breath  Abd: denies abd pain, nausea      Objective   Objective     Vital Signs:   Temp:  [97.6 °F (36.4 °C)-98.7 °F (37.1 °C)] 97.8 °F (36.6 °C)  Heart Rate:  [49-53] 51  Resp:  [14-19] 14  BP: (117-137)/(60-69) 121/63        Physical Exam:  Constitutional: No acute distress, sleep  Respiratory: Clear to auscultation bilaterally, respiratory effort normal wearing BiPAP  Cardiovascular: RRR, no murmurs, rubs, or gallops, palpable pedal pulses bilaterally  Gastrointestinal: Positive bowel sounds, soft, nontender, nondistended  Musculoskeletal: No bilateral ankle edema    Results Reviewed:  Results from last 7 days   Lab Units 02/16/20  0251 02/15/20  2127 02/13/20  0454   WBC 10*3/mm3 5.07 5.15 5.41   HEMOGLOBIN g/dL 12.2* 13.4 10.9*   HEMATOCRIT % 39.7 41.7 33.9*   PLATELETS 10*3/mm3 141 157 150   INR  1.29*  --   --    PROCALCITONIN ng/mL  --  0.10  --      Results from last 7 days   Lab Units 20  0251 02/15/20  2127 02/13/20  0454   SODIUM mmol/L 141 144 138   POTASSIUM mmol/L 3.7 3.4* 3.6   CHLORIDE mmol/L 102 101 99   CO2 mmol/L 26.0 30.0* 26.0   BUN mg/dL 19 18 21   CREATININE mg/dL 1.55* 1.61* 1.45*   GLUCOSE mg/dL 130* 174* 133*   CALCIUM mg/dL 8.4 8.8 8.4   ALT (SGPT) U/L  --  17  --    AST (SGOT) U/L  --  38  --    TROPONIN T ng/mL 0.032* 0.036*  --    PROBNP pg/mL  --  4,564.0*  --      Estimated Creatinine  Clearance: 36.4 mL/min (A) (by C-G formula based on SCr of 1.55 mg/dL (H)).    Microbiology Results Abnormal     None          Imaging Results (Last 24 Hours)     ** No results found for the last 24 hours. **          Results for orders placed during the hospital encounter of 12/27/19   Adult Transthoracic Echo Complete W/ Cont if Necessary Per Protocol    Narrative · The left ventricular cavity is moderately dilated.  · Right ventricular cavity is mild-to-moderately dilated.  · Left atrial cavity size is moderate-to-severely dilated.  · Moderate aortic valve regurgitation is present.  · Moderate mitral valve regurgitation is present.  · Mild to moderate tricuspid valve regurgitation is present.  · Calculated right ventricular systolic pressure from tricuspid   regurgitation is 35 mmHg.  · Estimated EF = 30%.  · Left ventricular systolic function is severely decreased.  · Left ventricular diastolic dysfunction (grade II) consistent with   pseudonormalization.  · The findings are consistent with dilated cardiomyopathy.  · Mild pulmonary hypertension is present.  · There is no evidence of pericardial effusion.  · The aortic valve exhibits moderate sclerosis without stenosis.          I have reviewed the medications:  Scheduled Meds:    amiodarone 200 mg Oral Daily   aspirin 81 mg Oral Daily   carbidopa-levodopa 1 tablet Oral TID   castor oil-balsam peru 1 each Topical Q12H   cetirizine 10 mg Oral Daily   clopidogrel 75 mg Oral Daily   finasteride 5 mg Oral Daily   furosemide 20 mg Oral Daily   insulin lispro 0-7 Units Subcutaneous Q6H   levothyroxine 75 mcg Oral Daily   memantine 10 mg Oral BID   multivitamin with minerals 1 tablet Oral Daily   pantoprazole 40 mg Oral Daily Before Lunch   pharmacy consult - MTM  Does not apply Daily   pravastatin 20 mg Oral Nightly   sacubitril-valsartan 0.5 tablet Oral BID   sodium chloride 10 mL Intravenous Q12H     Continuous Infusions:    nitroglycerin 5-200 mcg/min Last Rate:  Stopped (02/16/20 0953)     PRN Meds:.•  acetaminophen  •  dextrose  •  dextrose  •  glucagon (human recombinant)  •  HYDROcodone-acetaminophen  •  ipratropium-albuterol  •  melatonin  •  potassium chloride **OR** potassium chloride **OR** potassium chloride  •  simethicone  •  sodium chloride  •  sodium chloride    Assessment/Plan   Assessment & Plan     Active Hospital Problems    Diagnosis  POA   • **NSTEMI (non-ST elevated myocardial infarction) (CMS/[facility]) [I21.4]  Yes   • Unstable angina (CMS/[facility]) [I20.0]  Yes   • Hypokalemia [E87.6]  Yes   • Parkinson's disease dementia (CMS/[facility]) [G20, F02.80]  Yes   • Lung infiltrate [R91.8]  Yes   • Hyperglycemia [R73.9]  Yes   • Skin breakdown [L90.9]  Yes   • Dysphagia [R13.10]  Yes   • Chronic systolic CHF (congestive heart failure) (CMS/[facility]) [I50.22]  Yes   • COPD (chronic obstructive pulmonary disease) (CMS/[facility]) [J44.9]  Yes   • Mixed hyperlipidemia [E78.2]  Yes   • Sleep apnea [G47.30]  Yes   • Benign prostatic hyperplasia without lower urinary tract symptoms [N40.0]  Yes   • PAF (paroxysmal atrial fibrillation) (CMS/[facility]) [I48.0]  Yes   • Debility [R53.81]  Yes   • CKD (chronic kidney disease) stage 3, GFR 30-59 ml/min (CMS/[facility]) [N18.3]  Yes   • CAD (coronary artery disease) [I25.10]  Yes   • Hypothyroid [E03.9]  Yes      Resolved Hospital Problems   No resolved problems to display.        Brief Hospital Course to date:  Murali Dennis is a 89 y.o. male with a medical hx significant for CAD, systolic CHF, HLD, COPD, Hypothyroidism, CKD III, PAF, BPH and Parkinson's disease dementia was brought to [facility] ED for c/o chest pain.      Long discussion with wife and daughter at the bedside.  They are concerned that he is not going to get better.  I agree with them.  I discussed that given his multiple comorbidities, recent hospitalizations and diagnosis of dementia a lot of times we see continued decline with each hospitalization.  I do not think that cardiac work-up at this  time would reveal anything that would change the current situation.  Palliative care following.  Family interested in hospice so consult placed.  Got a call from Vero with hospice apparent plan is to go home with hospice tomorrow.    NSTEMI, CAD, HLD  -Troponin is trending down.  Reviewed EKG and without any changes concerning for ischemia  -Cardiology consulted.  Nothing to add in his current management     LLL infiltrate vs. Atelectasis   - crackles in LLL on exam.  No clinical signs or symptoms of pneumonia so suspect this is likely atelectasis.  Antibiotics discontinued and patient has been stable    Hyperglycemia, Impaired glucose intolerance  - last A1c 6.40, low dose SSI for now      Right heal and sacral skin breakdown   - consult [facility]     Chronic systolic CHF  - appears euvolemic  - continue Lasix and Entresto, daily weights, strict I&Os     COPD  - nebs q4h prn, monitor pulse ox      CKD III  - creatinine near baseline, monitor chemistry      Hypokalemia  - replace per protocol, restart oral potassium prior to discharge     PAF  - currently in sinus, continue amiodarone  - no anticoagulation due to risk factors      Hypothyroidism  - continue synthroid      BPH  - continue Avodart, strict I&Os     Sleep apnea   - CPAP at HS     Dysphagia, Parkinson's disease, Dementia   - consult SLP/PT/OT/CM, fall and aspiration precautions  - continue Sinemet, Namenda      DVT prophylaxis:  SCDs    Disposition: I expect the patient to be discharged TBD.    CODE STATUS:   Code Status and Medical Interventions:   Ordered at: 02/15/20 9418     Limited Support to NOT Include:    Intubation     Level Of Support Discussed With:    Health Care Surrogate     Code Status:    No CPR     Medical Interventions (Level of Support Prior to Arrest):    Limited     More than 50% of time spent counseling on current illness and plan of care. Case discussed with: patient, family at bedside. Vero with Megan lincoln with palliterell Mariano  time spent coordinating care was 30 minutes.  Total time of the encounter was 40 minutes.        Electronically signed by Nesha Jordan MD, 02/17/20, 2:47 PM.

## 2020-02-17 NOTE — NURSING NOTE
Order received for Phase II Cardiac Rehab. Patient is under care of the Palliative Team and not appropriate for outpatient Cardiac Rehab at this time.  Staff available if further consultation needed.

## 2020-02-17 NOTE — DISCHARGE PLACEMENT REQUEST
"Murali Dennis (89 y.o. Male)   Referred 2/17/2020 by Dr. HANK Jordan  PCP-Leigh THOMASON    Date of Birth Social Security Number Address Home Phone MRN    04/20/1929  [id number] [facility] [address] [phone] [id number]    Confucianist Marital Status          None        Admission Date Admission Type Admitting Provider Attending Provider Department, Room/Bed    2/15/20 Emergency Nesha Jordan MD Seward, Kathryn L, MD [facility] 6A, N619/1    Discharge Date Discharge Disposition Discharge Destination                       Attending Provider:  Nesha Jordan MD    Allergies:  Benzodiazepines, Valium [Diazepam], Codeine, Fluoxetine, Lipitor [Atorvastatin], Metoclopramide, Nabumetone, Penicillins, Tramadol, Prozac [Fluoxetine Hcl], Sulfa Antibiotics    Isolation:  Spore   Infection:  C.difficile (02/10/20)   Code Status:  No CPR    Ht:  177.8 cm (70\")   Wt:  81.2 kg (179 lb)    Admission Cmt:  None   Principal Problem:  NSTEMI (non-ST elevated myocardial infarction) (CMS/[facility]) [I21.4]                 Active Insurance as of 2/15/2020     Primary Coverage     Payor Plan Insurance Group Employer/Plan Group    MEDICARE MEDICARE A & B      Payor Plan Address Payor Plan Phone Number Payor Plan Fax Number Effective Dates    [address] [phone]  4/1/1994 - None Entered    [facility] [id number]       Subscriber Name Subscriber Birth Date Member ID       MURALI DENNIS 4/20/1929 [id number]           Secondary Coverage     Payor Plan Insurance Group Employer/Plan Group    ANTHEM BLUE CROSS ANTHEM BLUE CROSS BLUE SHIELD PPO [id number]     Payor Plan Address Payor Plan Phone Number Payor Plan Fax Number Effective Dates    [address] [phone]  7/1/2019 - None Entered    [facility] [id number]       Subscriber Name Subscriber Birth Date Member ID       MURALI DENNIS 4/20/1929 [id number]                 Emergency Contacts      (Rel.) Home Phone Work " Phone Mobile Phone    Nelly Dennis (Power of ) [phone] -- [phone]    Santa Talavera (Daughter) [phone] -- --            Insurance Information                MEDICARE/MEDICARE A & B Phone: [phone]    Subscriber: Murali Dennis Subscriber#: [id number]    Group#:  Precert#:         ANTHEM BLUE CROSS/ANTHEM BLUE CROSS BLUE SHIELD PPO Phone: [phone]    Subscriber: Murali Dennis Subscriber#: [id number]    Group#: [id number] Precert#:           Problem List           Codes Noted - Resolved       Hospital    Unstable angina (CMS/[facility]) ICD-10-CM: I20.0  ICD-9-CM: 411.1 2/16/2020 - Present    Hypokalemia ICD-10-CM: E87.6  ICD-9-CM: 276.8 2/15/2020 - Present    Parkinson's disease dementia (CMS/[facility]) ICD-10-CM: G20, F02.80  ICD-9-CM: 332.0, 294.10 2/15/2020 - Present    * (Principal) NSTEMI (non-ST elevated myocardial infarction) (CMS/[facility]) ICD-10-CM: I21.4  ICD-9-CM: 410.70 2/15/2020 - Present    Lung infiltrate ICD-10-CM: R91.8  ICD-9-CM: 793.19 2/15/2020 - Present    Hyperglycemia ICD-10-CM: R73.9  ICD-9-CM: 790.29 2/15/2020 - Present    Skin breakdown ICD-10-CM: L90.9  ICD-9-CM: 701.9 2/15/2020 - Present    Dysphagia ICD-10-CM: R13.10  ICD-9-CM: 787.20 2/15/2020 - Present    Chronic systolic CHF (congestive heart failure) (CMS/[facility]) ICD-10-CM: I50.22  ICD-9-CM: 428.22, 428.0 2/7/2020 - Present    COPD (chronic obstructive pulmonary disease) (CMS/[facility]) ICD-10-CM: J44.9  ICD-9-CM: 496 2/7/2020 - Present    Mixed hyperlipidemia ICD-10-CM: E78.2  ICD-9-CM: 272.2 7/28/2019 - Present    Sleep apnea ICD-10-CM: G47.30  ICD-9-CM: 780.57 2/24/2019 - Present    Benign prostatic hyperplasia without lower urinary tract symptoms ICD-10-CM: N40.0  ICD-9-CM: 600.00 10/17/2018 - Present    PAF (paroxysmal atrial fibrillation) (CMS/[facility]) ICD-10-CM: I48.0  ICD-9-CM: 427.31 12/29/2017 - Present    CKD (chronic kidney disease) stage 3, GFR 30-59 ml/min (CMS/[facility]) ICD-10-CM: N18.3  ICD-9-CM: 585.3  2017 - Present    CAD (coronary artery disease) ICD-10-CM: I25.10  ICD-9-CM: 414.00 2017 - Present    Debility ICD-10-CM: R53.81  ICD-9-CM: 799.3 2017 - Present    Hypothyroid ICD-10-CM: E03.9  ICD-9-CM: 244.9 2017 - Present       Non-Hospital    Bacteremia due to Streptococcus ICD-10-CM: R78.81, B95.5  ICD-9-CM: 790.7, 041.00 2019 - Present    Acute renal failure superimposed on stage 3 chronic kidney disease (CMS/HCC) ICD-10-CM: N17.9, N18.3  ICD-9-CM: 584.9, 585.3 2019 - Present    Acute cholecystitis ICD-10-CM: K81.0  ICD-9-CM: 575.0 2019 - Present    Sepsis (CMS/HCC) ICD-10-CM: A41.9  ICD-9-CM: 038.9, 995.91 2019 - Present    Pancreatitis ICD-10-CM: K85.90  ICD-9-CM: 577.0 2019 - Present    Ventricular tachycardia (CMS/HCC) ICD-10-CM: I47.2  ICD-9-CM: 427.1 2019 - Present             History & Physical      Samuel Singleton DO at 02/15/20 [address] [place] [address] Medicine Services  HISTORY AND PHYSICAL    Patient Name: Murali Dennis  : 1929  MRN: [id number]  Primary Care Physician: Diana Null APRN  Date of admission: 2/15/2020      Subjective   Subjective     Chief Complaint:  Chest pain     HPI:  Murali Dennis is a 89 y.o. male with a medical hx significant for CAD, systolic CHF, HLD, COPD, Hypothyroidism, CKD III, PAF, BPH and Parkinson's disease dementia was brought to [facility] ED for c/o chest pain. The patient was discharged yesterday  from [facility] after a 1 week stay for acute hypoxia due to CHF exacerbation and COPD. The patient was transferred to [facility] rehab facility yesterday afternoon. The patient's family provides the history due to the patient's severe dementia. The patient's wife reports the patient began c/o chest pain today. It has been intermittent throughout the day. Located on the left side of his chest with radiation to his ribs. They report the patient has been coughing and choked on a  sip of water while in the ED. No fever or chills. No vomiting or diarrhea. No increased work of breathing or audible wheezing. The patient is followed by cardiologist, Dr. Ugarte. The family states they had gotten hospice/palliative involved several weeks/months ago when the patient was refusing to eat however the patient subsequently rebounded and they did not move forward with hospice care.  Remote tobacco use. No alcohol use. The patient resides with his wife who is his primary caretaker.     While in the ED, the patient's vitals were stable. Labs revealed troponin 0 0.036, proBNP 4,564, glucose 174, creatinine 1.61, potassium 3.4, CO2 30, alk phos 226.  ECG shows sinus bradycardia with first-degree AV block, slight ST elevation in lead II (looks to be old), and anterior T wave inversion (new).  CXR shows small left pleural effusion with atelectasis versus infiltrate in left lower lobe.  The patient was started on a nitroglycerin infusion in the ED.  He will be admitted to the hospitalist service for further medical management.    Review of Systems   Unable to perform ROS: Dementia      All other systems reviewed and are negative.     Personal History     Past Medical History:   Diagnosis Date   • Alzheimer disease (CMS/[facility])    • Arthritis    • Warren esophagus     followed  with GI in [place]   • Benign prostatic hyperplasia without lower urinary tract symptoms 10/17/2018   • Chronic congestive heart failure (CMS/[facility]) 12/8/2017     echo report from 8/2/17 EF 60-65%, mild TR, mild AR ([place], [place]) Echocardiogram 12/9/17: EF 28%, mild to moderate MR    • COPD (chronic obstructive pulmonary disease) (CMS/[facility])    • Coronary artery disease 2003   • Dementia (CMS/[facility]) 2003   • Diarrhea 2/24/2019   • Environmental allergies 10/17/2018   • Essential hypertension 10/17/2018   • Gastroesophageal reflux disease 11/19/2018   • GERD (gastroesophageal reflux disease)    • Hypothyroid     started after  amiodarone use in 2003   • Sleep apnea     wears cpap   • Stroke (CMS/HCC) 2003   • TIA (transient ischemic attack) 12/29/2017   • Unintended weight loss 10/17/2018       Past Surgical History:   Procedure Laterality Date   • CARDIAC CATHETERIZATION  2003   • CARDIAC CATHETERIZATION N/A 1/19/2018    Procedure: Left Heart Cath;  Surgeon: Hollis Ugarte MD;  Location:  [facility] CATH INVASIVE LOCATION;  Service:    • CARPAL TUNNEL RELEASE Right    • CATARACT EXTRACTION Bilateral    • CORONARY ARTERY BYPASS GRAFT  2003    Triple Bypass, @ [facility] [place], [place]   • ERCP N/A 12/30/2019    Procedure: ENDOSCOPIC RETROGRADE CHOLANGIOPANCREATOGRAPHY;  Surgeon: Arsh White MD;  Location:  [facility] ENDOSCOPY;  Service: Gastroenterology   • HEMORRHOIDECTOMY     • HERNIA REPAIR         Family History: family history includes Cancer in his brother and mother; Diabetes in his sister; Heart disease in his brother and sister; Stroke in his sister. Otherwise pertinent FHx was reviewed and unremarkable.     Social History:  reports that he quit smoking about 52 years ago. He has never used smokeless tobacco. He reports that he does not drink alcohol or use drugs.  Social History     Social History Narrative   • Not on file       Medications:  Available home medication information reviewed.    (Not in a hospital admission)    Allergies   Allergen Reactions   • Benzodiazepines Other (See Comments)     Paradoxical response   • Valium [Diazepam] Other (See Comments)     Paradoxical response     • Codeine Other (See Comments)     unknown   • Fluoxetine Other (See Comments)     Paradoxical response   • Lipitor [Atorvastatin] Other (See Comments)     Myalgias     • Metoclopramide Other (See Comments)     Unknown   • Nabumetone Other (See Comments)     Unknown     • Penicillins Swelling and Other (See Comments)     Has tolerated cefepime 12/2019   • Tramadol Other (See Comments)     unknown   • Prozac  [Fluoxetine Hcl] Unknown - Low Severity   • Sulfa Antibiotics Rash       Objective   Objective     Vital Signs:   Temp:  [97.6 °F (36.4 °C)] 97.6 °F (36.4 °C)  Heart Rate:  [52-55] 52  Resp:  [12] 12  BP: ()/(53-62) 105/56        Physical Exam   Constitutional: Awake, alert, lying in bed   Eyes: PERRLA, sclerae anicteric, no conjunctival injection  HENT: NCAT, mucous membranes moist  Neck: Supple, no thyromegaly, no lymphadenopathy, trachea midline  Respiratory: LLL crackles, no wheezes, nonlabored respirations   Cardiovascular: RR, bradycardic, no murmurs appreciated, palpable pedal pulses bilaterally  Gastrointestinal: Positive bowel sounds, soft, nontender, no distention or guarding   Musculoskeletal: No bilateral ankle edema, no clubbing or cyanosis to extremities  Psychiatric: Appropriate affect, cooperative  Neurologic:  Cranial Nerves grossly intact to confrontation, speech clear, not orientated   Skin: erythematous, nonblanchable skin breakdown of right heal     Results Reviewed:  I have personally reviewed current lab and radiology data.    Results from last 7 days   Lab Units 02/15/20  2127   WBC 10*3/mm3 5.15   HEMOGLOBIN g/dL 13.4   HEMATOCRIT % 41.7   PLATELETS 10*3/mm3 157     Results from last 7 days   Lab Units 02/15/20  2127   SODIUM mmol/L 144   POTASSIUM mmol/L 3.4*   CHLORIDE mmol/L 101   CO2 mmol/L 30.0*   BUN mg/dL 18   CREATININE mg/dL 1.61*   GLUCOSE mg/dL 174*   CALCIUM mg/dL 8.8   ALT (SGPT) U/L 17   AST (SGOT) U/L 38   TROPONIN T ng/mL 0.036*   PROBNP pg/mL 4,564.0*     Estimated Creatinine Clearance: 34.8 mL/min (A) (by C-G formula based on SCr of 1.61 mg/dL (H)).  Brief Urine Lab Results  (Last result in the past 365 days)      Color   Clarity   Blood   Leuk Est   Nitrite   Protein   CREAT   Urine HCG        02/09/20 0957 Yellow Clear Negative Negative Negative Negative             Imaging Results (Last 24 Hours)     Procedure Component Value Units Date/Time    XR Chest 1 View  [[id number]] Collected:  02/15/20 2134     Updated:  02/15/20 2136    Narrative:       CR Chest 1 Vw 2/15/2020    INDICATION:   Acute onset of chest pain beginning today with shortness of breath. Congestive heart failure. COPD exacerbation. Atrial fibrillation and coronary artery disease.     COMPARISON:    2/7/2020    FINDINGS:  Single portable AP view(s) of the chest.  The cardiac size is stable following median sternotomy. Small left pleural effusion with infiltrate or atelectasis in the left lower lobe. Discoid atelectasis right base. The lungs are otherwise clear. No  pneumothorax.      Impression:       Small left pleural effusion with atelectasis or infiltrate left lower lobe.    Signer Name: Isak Sofia MD   Signed: 2/15/2020 9:34 PM   Workstation Name: [email or web address]    [facility]        Results for orders placed during the hospital encounter of 12/27/19   Adult Transthoracic Echo Complete W/ Cont if Necessary Per Protocol    Narrative · The left ventricular cavity is moderately dilated.  · Right ventricular cavity is mild-to-moderately dilated.  · Left atrial cavity size is moderate-to-severely dilated.  · Moderate aortic valve regurgitation is present.  · Moderate mitral valve regurgitation is present.  · Mild to moderate tricuspid valve regurgitation is present.  · Calculated right ventricular systolic pressure from tricuspid   regurgitation is 35 mmHg.  · Estimated EF = 30%.  · Left ventricular systolic function is severely decreased.  · Left ventricular diastolic dysfunction (grade II) consistent with   pseudonormalization.  · The findings are consistent with dilated cardiomyopathy.  · Mild pulmonary hypertension is present.  · There is no evidence of pericardial effusion.  · The aortic valve exhibits moderate sclerosis without stenosis.          Assessment/Plan   Assessment & Plan     Active Hospital Problems    Diagnosis POA   • **NSTEMI (non-ST elevated myocardial infarction)  (CMS/[facility]) [I21.4] Yes   • Hypokalemia [E87.6] Yes   • Parkinson's disease dementia (CMS/[facility]) [G20, F02.80] Yes   • Lung infiltrate [R91.8] Yes   • Hyperglycemia [R73.9] Yes   • Skin breakdown [L90.9] Yes   • Dysphagia [R13.10] Yes   • Chronic systolic CHF (congestive heart failure) (CMS/[facility]) [I50.22] Yes   • COPD (chronic obstructive pulmonary disease) (CMS/[facility]) [J44.9] Yes   • Mixed hyperlipidemia [E78.2] Yes   • Sleep apnea [G47.30] Yes   • Benign prostatic hyperplasia without lower urinary tract symptoms [N40.0] Yes   • PAF (paroxysmal atrial fibrillation) (CMS/[facility]) [I48.0] Yes     · Postoperative 2003     • Debility [R53.81] Yes   • CKD (chronic kidney disease) stage 3, GFR 30-59 ml/min (CMS/[facility]) [N18.3] Yes   • CAD (coronary artery disease) [I25.10] Yes   • Hypothyroid [E03.9] Yes     Murali Dennis is a 89 y.o. male with PMHx of CAD, systolic CHF, HLD, COPD, Hypothyroidism, CKD III, PAF (no AC), BPH and Parkinson's disease dementia presents w/ chest pain x 1 day and an elevated troponin.     NSTEMI, CAD, HLD  - initial troponin 0.036, ECG sinus mich with 1st degree AV block, old ST elevation in lead II and new T wave inversion in anterior leads  - consult cardiology, followed by Dr. Ugarte   - continue NTG drip (with BP parameters), ASA, Plavix and statin  - trend troponin, repeat ECG in AM, keep NPO    LLL infiltrate vs. Atelectasis   - crackles in LLL on exam   - obtain CT chest and check procal   - will hold off on abx due to recent hx of C.Diff   - low threshold for starting antibiotics if pt develops fever, leukocytosis etc    Hyperglycemia, Impaired glucose intolerance  - last A1c 6.40, low dose SSI for now     Right heal and sacral skin breakdown   - consult WOC    Chronic systolic CHF  - appears euvolemic, BNP has come down  - continue Lasix and Entresto, daily weights, strict I&Os    COPD  - nebs q4h prn, monitor pulse ox     CKD III  - creatinine near baseline, monitor chemistry      Hypokalemia  - replace per protocol, restart oral potassium prior to discharge    PAF  - currently in sinus, continue amiodarone  - no anticoagulation due to risk factors     Hypothyroidism  - continue synthroid     BPH  - continue Avodart, strict I&Os    Sleep apnea   - CPAP at HS    Dysphagia, Parkinson's disease, Dementia   - consult SLP/PT/OT/CM, fall and aspiration precautions  - continue Sinemet, Namenda     DVT prophylaxis:  SCDs    CODE STATUS:    Code Status and Medical Interventions:   Ordered at: 02/15/20 2391     Limited Support to NOT Include:    Intubation     Level Of Support Discussed With:    Health Care Surrogate     Code Status:    No CPR     Medical Interventions (Level of Support Prior to Arrest):    Limited     Admission Status:  I believe this patient meets INPATIENT status due to .  I feel patient’s risk for adverse outcomes and need for care warrant INPATIENT evaluation and I predict the patient’s care encounter to likely last beyond 2 midnights.    Electronically signed by Val Beth PA-C, 02/15/20, 10:14 PM.        Attending   Admission Attestation       I have seen and examined the patient, performing an independent face-to-face diagnostic evaluation with plan of care reviewed and developed with the advanced practice clinician (APC).      Brief Summary Statement:   Murali Dennis is a 89 y.o. male with past medical history of coronary artery disease, systolic CHF, hypothyroidism, COPD, CKD stage III, paroxysmal atrial fibrillation, Parkinson disease, hyperlipidemia, and dementia who presents to the ER with complaints of chest pain.    Patient was recently admitted to [facility] and discharged yesterday after a 1 week stay for acute hypoxia and COPD/CHF exacerbation.  Patient was doing well at discharge and was discharged to [facility] rehab facility.  Unfortunately, patient began to develop chest pain today.  Located on the left side of his chest with radiation  down the left side of his rib cage.  Family reports that today he also started having coughing/choking episode after sip of water while in the emergency department.    They deny any reports of fever or chills.  No increased cough from baseline.  Patient's chest pain was relieved with nitroglycerin.  He is currently chest pain-free.  Patient will be admitted for further evaluation by Dr. Ugarte    Remainder of detailed HPI is as noted by APC and has been reviewed and/or edited by me for completeness.    Attending Physical Exam:  Constitutional: Awake, alert  Eyes: PERRLA, sclerae anicteric, no conjunctival injection  HENT: NCAT, mucous membranes moist  Neck: Supple, no thyromegaly, no lymphadenopathy, trachea midline  Respiratory: LLL crackles, nonlabored respirations   Cardiovascular: RRR, rubs, or gallops, palpable pedal pulses bilaterally  Gastrointestinal: Positive bowel sounds, soft, nontender, nondistended  Musculoskeletal: No bilateral ankle edema, no clubbing or cyanosis to extremities  Psychiatric: Appropriate affect, cooperative  Neurologic: Oriented to person, strength symmetric in all extremities, Cranial Nerves grossly intact to confrontation, speech clear  Skin: No rashes      Brief Assessment/Plan :  See detailed assessment and plan developed with APC which I have reviewed and/or edited for completeness.    Electronically signed by Samuel Singleton DO, 02/16/20, 4:49 AM.                Electronically signed by Samuel Singleton DO at 02/16/20 0501          Emergency Department Notes      Ish Lora MD at 02/15/20 2135          Subjective   Mr. Murali Dennis is a 89 y.o male presenting to the emergency department with complaints of chest pain. He is accompanied by his family who provides the history. He was admitted to the hospital from 02/07/2020 to 02/14/2020 for acute on chronic systolic congestive heart failure. His cardiologist is Dr. Ugarte and his family confirms he received an  echocardiogram 10 days ago showing an EF of 45 percent. His EF was measured at approximately 30 percent on 02/11/2020. Over the past week, his family notes he is not as responsive compared to his baseline. His family noted he was rubbing his chest in his sleep earlier today. After waking up, he complained to his family about central chest pain. He received Nitroglycerin tonight at 1830 and the pain ceased for a short time but came back worsened. His family confirms he has shortness of breath and cough. He coughed up blood during his last day at the hospital and he was taken off of Heparin. He is still taking Aspirin and Plavix. He has a history of atrial fibrillation, dementia, Parkinson's disease, chronic obstructive pulmonary disease, triple bypass several years ago, and a heart stent placed 3 years ago. There are no other acute symptoms at this time.      History provided by:  Relative and patient  History limited by:  Dementia  Chest Pain   Pain location:  Substernal area  Pain severity:  Unable to specify  Onset quality:  Unable to specify  Duration:  1 day (per family)  Timing:  Unable to specify  Progression:  Worsening  Chronicity:  New  Relieved by: Nitroglycerin (temporarily)  Worsened by:  Nothing  Associated symptoms: cough, shortness of breath and weakness    Risk factors: coronary artery disease and male sex  Smoker: former.        Review of Systems   Unable to perform ROS: Dementia   Constitutional:        Partial unresponsive   Respiratory: Positive for cough and shortness of breath.    Cardiovascular: Positive for chest pain.   Neurological: Positive for weakness.       Past Medical History:   Diagnosis Date   • Alzheimer disease (CMS/[facility])    • Arthritis    • Warren esophagus     followed  with GI in [place]   • Benign prostatic hyperplasia without lower urinary tract symptoms 10/17/2018   • Chronic congestive heart failure (CMS/[facility]) 12/8/2017     echo report from 8/2/17 EF 60-65%, mild TR, mild  AR ([place], [place]) Echocardiogram 12/9/17: EF 28%, mild to moderate MR    • COPD (chronic obstructive pulmonary disease) (CMS/[facility])    • Coronary artery disease 2003   • Dementia (CMS/[facility]) 2003   • Diarrhea 2/24/2019   • Environmental allergies 10/17/2018   • Essential hypertension 10/17/2018   • Gastroesophageal reflux disease 11/19/2018   • GERD (gastroesophageal reflux disease)    • Hypothyroid     started after amiodarone use in 2003   • Sleep apnea     wears cpap   • Stroke (CMS/[facility]) 2003   • TIA (transient ischemic attack) 12/29/2017   • Unintended weight loss 10/17/2018       Allergies   Allergen Reactions   • Benzodiazepines Other (See Comments)     Paradoxical response   • Valium [Diazepam] Other (See Comments)     Paradoxical response     • Codeine Other (See Comments)     unknown   • Fluoxetine Other (See Comments)     Paradoxical response   • Lipitor [Atorvastatin] Other (See Comments)     Myalgias     • Metoclopramide Other (See Comments)     Unknown   • Nabumetone Other (See Comments)     Unknown     • Penicillins Swelling and Other (See Comments)     Has tolerated cefepime 12/2019   • Tramadol Other (See Comments)     unknown   • Prozac [Fluoxetine Hcl] Unknown - Low Severity   • Sulfa Antibiotics Rash       Past Surgical History:   Procedure Laterality Date   • CARDIAC CATHETERIZATION  2003   • CARDIAC CATHETERIZATION N/A 1/19/2018    Procedure: Left Heart Cath;  Surgeon: Hollis Ugarte MD;  Location:  [facility] CATH INVASIVE LOCATION;  Service:    • CARPAL TUNNEL RELEASE Right    • CATARACT EXTRACTION Bilateral    • CORONARY ARTERY BYPASS GRAFT  2003    Triple Bypass, @ [facility] [place], [place]   • ERCP N/A 12/30/2019    Procedure: ENDOSCOPIC RETROGRADE CHOLANGIOPANCREATOGRAPHY;  Surgeon: Arsh White MD;  Location:  [facility] ENDOSCOPY;  Service: Gastroenterology   • HEMORRHOIDECTOMY     • [facility]         Family History   Problem Relation Age of  Onset   • Cancer Mother    • Heart disease Sister    • Diabetes Sister    • Heart disease Brother    • Cancer Brother    • Stroke Sister        Social History     Socioeconomic History   • Marital status:      Spouse name: Not on file   • Number of children: Not on file   • Years of education: Not on file   • Highest education level: Not on file   Occupational History   • Occupation: Retired   Tobacco Use   • Smoking status: Former Smoker     Last attempt to quit: 1967     Years since quittin.1   • Smokeless tobacco: Never Used   Substance and Sexual Activity   • Alcohol use: No   • Drug use: No   • Sexual activity: Defer         Objective   Physical Exam   Constitutional: He is oriented to person, place, and time. He appears well-developed and well-nourished. No distress.   Debilitated elderly gentleman. His family does all of the talking for him secondary to his severe advanced dementia.   HENT:   Head: Normocephalic and atraumatic.   Eyes: Conjunctivae are normal. No scleral icterus.   Cardiovascular: Normal rate, regular rhythm and normal heart sounds.   No murmur heard.  Pulses:       Radial pulses are 2+ on the right side, and 2+ on the left side.   Pulmonary/Chest: Effort normal and breath sounds normal. No respiratory distress. He has no wheezes. He exhibits no tenderness.   Chest wall is nontender to palpation.    Abdominal: Soft. There is no tenderness. There is no rebound and no guarding.   Musculoskeletal: He exhibits no edema or deformity.   No peripheral edema.   Neurological: He is alert and oriented to person, place, and time.   Skin: Skin is warm and dry. He is not diaphoretic.   Nursing note and vitals reviewed.      Procedures        ED Course     Recent Results (from the past 24 hour(s))   Troponin    Collection Time: 02/15/20  9:27 PM   Result Value Ref Range    Troponin T 0.036 (C) 0.000 - 0.030 ng/mL   Comprehensive Metabolic Panel    Collection Time: 02/15/20  9:27 PM    Result Value Ref Range    Glucose 174 (H) 65 - 99 mg/dL    BUN 18 8 - 23 mg/dL    Creatinine 1.61 (H) 0.76 - 1.27 mg/dL    Sodium 144 136 - 145 mmol/L    Potassium 3.4 (L) 3.5 - 5.2 mmol/L    Chloride 101 98 - 107 mmol/L    CO2 30.0 (H) 22.0 - 29.0 mmol/L    Calcium 8.8 8.2 - 9.6 mg/dL    Total Protein 6.8 6.0 - 8.5 g/dL    Albumin 3.50 3.50 - 5.20 g/dL    ALT (SGPT) 17 1 - 41 U/L    AST (SGOT) 38 1 - 40 U/L    Alkaline Phosphatase 226 (H) 39 - 117 U/L    Total Bilirubin 0.8 0.2 - 1.2 mg/dL    eGFR Non African Amer 41 (L) >60 mL/min/1.73    Globulin 3.3 gm/dL    A/G Ratio 1.1 g/dL    BUN/Creatinine Ratio 11.2 7.0 - 25.0    Anion Gap 13.0 5.0 - 15.0 mmol/L   Lipase    Collection Time: 02/15/20  9:27 PM   Result Value Ref Range    Lipase 15 13 - 60 U/L   BNP    Collection Time: 02/15/20  9:27 PM   Result Value Ref Range    proBNP 4,564.0 (H) 5.0-1,800.0 pg/mL   Light Blue Top    Collection Time: 02/15/20  9:27 PM   Result Value Ref Range    Extra Tube hold for add-on    Green Top (Gel)    Collection Time: 02/15/20  9:27 PM   Result Value Ref Range    Extra Tube Hold for add-ons.    Lavender Top    Collection Time: 02/15/20  9:27 PM   Result Value Ref Range    Extra Tube hold for add-on    Gold Top - SST    Collection Time: 02/15/20  9:27 PM   Result Value Ref Range    Extra Tube Hold for add-ons.    CBC Auto Differential    Collection Time: 02/15/20  9:27 PM   Result Value Ref Range    WBC 5.15 3.40 - 10.80 10*3/mm3    RBC 4.00 (L) 4.14 - 5.80 10*6/mm3    Hemoglobin 13.4 13.0 - 17.7 g/dL    Hematocrit 41.7 37.5 - 51.0 %    .3 (H) 79.0 - 97.0 fL    MCH 33.5 (H) 26.6 - 33.0 pg    MCHC 32.1 31.5 - 35.7 g/dL    RDW 14.7 12.3 - 15.4 %    RDW-SD 57.2 (H) 37.0 - 54.0 fl    MPV 10.9 6.0 - 12.0 fL    Platelets 157 140 - 450 10*3/mm3    Neutrophil % 56.4 42.7 - 76.0 %    Lymphocyte % 29.7 19.6 - 45.3 %    Monocyte % 8.2 5.0 - 12.0 %    Eosinophil % 4.7 0.3 - 6.2 %    Basophil % 0.8 0.0 - 1.5 %    Immature Grans % 0.2  0.0 - 0.5 %    Neutrophils, Absolute 2.91 1.70 - 7.00 10*3/mm3    Lymphocytes, Absolute 1.53 0.70 - 3.10 10*3/mm3    Monocytes, Absolute 0.42 0.10 - 0.90 10*3/mm3    Eosinophils, Absolute 0.24 0.00 - 0.40 10*3/mm3    Basophils, Absolute 0.04 0.00 - 0.20 10*3/mm3    Immature Grans, Absolute 0.01 0.00 - 0.05 10*3/mm3    nRBC 0.0 0.0 - 0.2 /100 WBC     Note: In addition to lab results from this visit, the labs listed above may include labs taken at another facility or during a different encounter within the last 24 hours. Please correlate lab times with ED admission and discharge times for further clarification of the services performed during this visit.    XR Chest 1 View   Final Result   Small left pleural effusion with atelectasis or infiltrate left lower lobe.      Signer Name: Isak Sofia MD    Signed: 2/15/2020 9:34 PM    Workstation Name: [email or web address]     [facility]        Vitals:    02/15/20 2200 02/15/20 2215 02/15/20 2230 02/15/20 2245   BP: 111/58 112/59 108/60 112/59   BP Location:       Patient Position:       Pulse: 52 52 51 51   Resp:       Temp:       TempSrc:       SpO2: 95% 96% 96% 97%   Weight:       Height:         Medications   sodium chloride 0.9 % flush 10 mL (10 mL Intravenous Given 2/15/20 2135)   nitroglycerin 50 mg/250 mL (0.2 mg/mL) infusion (5 mcg/min Intravenous New Bag 2/15/20 2153)     ECG/EMG Results (last 24 hours)     Procedure Component Value Units Date/Time    ECG 12 Lead [[id number]] Collected:  02/15/20 2038     Updated:  02/15/20 2046    Narrative:       Test Reason : chest pain  Blood Pressure : **/** mmHG  Vent. Rate : 055 BPM     Atrial Rate : 055 BPM     P-R Int : 230 ms          QRS Dur : 134 ms      QT Int : 582 ms       P-R-T Axes : 027 071 004 degrees     QTc Int : 556 ms    Sinus bradycardia with 1st degree AV block  Nonspecific intraventricular block  Cannot rule out Inferior infarct (cited on or before 07-DEC-2017)  T wave abnormality, consider  anterior ischemia  Abnormal ECG  When compared with ECG of 03-JAN-2020 06:20,  T wave inversion now evident in Anterior leads  Confirmed by CARA LORA MD ([id number]) on 2/15/2020 8:46:34 PM    Referred By:  SUDHA           Confirmed By:CARA LORA MD        ECG 12 Lead   Final Result   Test Reason : chest pain   Blood Pressure : **/** mmHG   Vent. Rate : 055 BPM     Atrial Rate : 055 BPM      P-R Int : 230 ms          QRS Dur : 134 ms       QT Int : 582 ms       P-R-T Axes : 027 071 004 degrees      QTc Int : 556 ms      Sinus bradycardia with 1st degree AV block   Nonspecific intraventricular block   Cannot rule out Inferior infarct (cited on or before 07-DEC-2017)   T wave abnormality, consider anterior ischemia   Abnormal ECG   When compared with ECG of 03-JAN-2020 06:20,   T wave inversion now evident in Anterior leads   Confirmed by CARA LORA MD ([id number]) on 2/15/2020 8:46:34 PM      Referred By:  EDMD           Confirmed By:CARA LORA MD                                                     MDM    Final diagnoses:   Nonspecific chest pain   History of coronary artery disease   Elevated troponin       Documentation assistance provided by karla Vaca.  Information recorded by the karla was done at my direction and has been verified and validated by me.     Yoli Vaca  02/15/20 2303       Cara Lora MD  02/16/20 1210      Electronically signed by Cara Lora MD at 02/16/20 1210         Current Facility-Administered Medications   Medication Dose Route Frequency Provider Last Rate Last Dose   • acetaminophen (TYLENOL) tablet 650 mg  650 mg Oral Nightly PRN Val Beth PA-C   650 mg at 02/16/20 1738   • amiodarone (PACERONE) tablet 200 mg  200 mg Oral Daily Val Beth PA-C   200 mg at 02/16/20 0959   • aspirin chewable tablet 81 mg  81 mg Oral Daily Val Beth PA-C   81 mg at 02/17/20 0935   • carbidopa-levodopa (SINEMET)  MG per tablet 1 tablet  1 tablet Oral TID  Val Beth PA-C   1 tablet at 02/17/20 0935   • castor oil-balsam peru (VENELEX) ointment 5 g  1 each Topical Q12H Val Beth PA-C   5 g at 02/17/20 0935   • cetirizine (zyrTEC) tablet 10 mg  10 mg Oral Daily Val Beth PA-C   10 mg at 02/17/20 0935   • clopidogrel (PLAVIX) tablet 75 mg  75 mg Oral Daily Val Beth PA-C   75 mg at 02/17/20 0935   • dextrose (D50W) 25 g/ 50mL Intravenous Solution 25 g  25 g Intravenous Q15 Min PRN Val Beth PA-C       • dextrose (GLUTOSE) oral gel 15 g  15 g Oral Q15 Min PRN Val Beth PA-C       • finasteride (PROSCAR) tablet 5 mg  5 mg Oral Daily Val Beth PA-C   5 mg at 02/17/20 0934   • furosemide (LASIX) tablet 20 mg  20 mg Oral Daily Val Beth PA-C   20 mg at 02/17/20 0935   • glucagon (human recombinant) (GLUCAGEN DIAGNOSTIC) injection 1 mg  1 mg Subcutaneous Q15 Min PRN Vla Beth PA-C       • HYDROcodone-acetaminophen (NORCO) 5-325 MG per tablet 1 tablet  1 tablet Oral Q8H PRN Nesha Jordan MD   1 tablet at 02/16/20 1802   • insulin lispro (humaLOG) injection 0-7 Units  0-7 Units Subcutaneous Q6H Val Beth PA-C   3 Units at 02/16/20 1310   • ipratropium-albuterol (DUO-NEB) nebulizer solution 3 mL  3 mL Nebulization Q4H PRN Val Beth PA-C       • levothyroxine (SYNTHROID, LEVOTHROID) tablet 75 mcg  75 mcg Oral Daily Val Beht PA-C   75 mcg at 02/17/20 0614   • melatonin tablet 5 mg  5 mg Oral Nightly PRN Val Beth PA-C       • memantine (NAMENDA) tablet 10 mg  10 mg Oral BID Val Beth PA-C   10 mg at 02/17/20 0935   • multivitamin with minerals 1 tablet  1 tablet Oral Daily Val Beth PA-C   1 tablet at 02/17/20 0935   • nitroglycerin 50 mg/250 mL (0.2 mg/mL) infusion  5-200 mcg/min Intravenous Titrated Val Beth PA-C   Stopped at 02/16/20 0953   • pantoprazole (PROTONIX) EC tablet 40 mg  40 mg Oral Daily Before Lunch Val Beth PA-C   40 mg at 02/16/20 1310   •  Pharmacy Consult - MTM   Does not apply Daily Ana Liu RPH       • potassium chloride (MICRO-K) CR capsule 40 mEq  40 mEq Oral PRN BethVal mcqueen, PA-C        Or   • potassium chloride (KLOR-CON) packet 40 mEq  40 mEq Oral PRN Beth, Val, PA-C        Or   • potassium chloride 10 mEq in 100 mL IVPB  10 mEq Intravenous Q1H PRN BethCiro mcqueenae, PA-C       • pravastatin (PRAVACHOL) tablet 20 mg  20 mg Oral Nightly BethVal mcqueen, PA-C   20 mg at 02/16/20 2244   • sacubitril-valsartan (ENTRESTO) 24-26 MG tablet 0.5 tablet  0.5 tablet Oral BID Val Beth, PA-C   0.5 tablet at 02/17/20 0935   • simethicone (MYLICON) chewable tablet 80 mg  80 mg Oral 4x Daily PRN BethCiro mcqueenae, PA-C       • sodium chloride 0.9 % flush 10 mL  10 mL Intravenous PRN Beth Val, PA-C   10 mL at 02/15/20 2135   • sodium chloride 0.9 % flush 10 mL  10 mL Intravenous Q12H Beth, Val, PA-C   10 mL at 02/16/20 2013   • sodium chloride 0.9 % flush 10 mL  10 mL Intravenous PRN Beth, Val, PA-C            Physician Progress Notes (last 72 hours) (Notes from 02/14/20 1202 through 02/17/20 1202)      Brnuo España PA at 02/17/20 0951     Attestation signed by Chucho Bueno MD at 02/17/20 1036    I have reviewed the documentation above and agree.                                                                   [facility]       LOS: 1 day   Patient Care Team:  Diana Null APRN as PCP - General (Nurse Practitioner)  Diana Null APRN as PCP - Claims Attributed  Bruno Garvin MD as Consulting Physician (Neurology)  Hollis Ugarte MD as Consulting Physician (Cardiology)        Subjective : Patient readmitted from [facility] rehab due to chest pain on 2/15/2019.      Patient Denies:  Cp, sob, palpitations.  Resting comfortably in chair.      Vital Signs  Temp:  [97.5 °F (36.4 °C)-98.7 °F (37.1 °C)] 98.7 °F (37.1 °C)  Heart Rate:  [49-53] 50  Resp:  [16-19] 16  BP:  (105-137)/(56-69) 137/69    Intake/Output Summary (Last 24 hours) at 2/17/2020 0951  Last data filed at 2/17/2020 0600  Gross per 24 hour   Intake 480 ml   Output 1100 ml   Net -620 ml     No intake/output data recorded.    Physical Exam:     General Appearance:    Alert, cooperative, in no acute distress       Neck:   No adenopathy, supple, trachea midline, no thyromegaly, no JVD       Lungs:     Clear to auscultation,respirations regular, even and                  unlabored    Heart:    Regular rhythm and normal rate, normal S1 and S2, no            murmur, no gallop, no rub, no click   Chest Wall:    No abnormalities observed   Abdomen:     Normal bowel sounds, no masses, no organomegaly, soft        non-tender, non-distended       Extremities:   Moves all extremities well, no edema, no cyanosis, no             redness   Pulses:   Pulses palpable and equal bilaterally     Results Review:     I reviewed the patient's new clinical results.      WBC WBC   Date/Time Value Ref Range Status   02/16/2020 0251 5.07 3.40 - 10.80 10*3/mm3 Final   02/15/2020 2127 5.15 3.40 - 10.80 10*3/mm3 Final            HGB Hemoglobin   Date/Time Value Ref Range Status   02/16/2020 0251 12.2 (L) 13.0 - 17.7 g/dL Final   02/15/2020 2127 13.4 13.0 - 17.7 g/dL Final           HCT Hematocrit   Date/Time Value Ref Range Status   02/16/2020 0251 39.7 37.5 - 51.0 % Final   02/15/2020 2127 41.7 37.5 - 51.0 % Final            Platlets Platelets   Date/Time Value Ref Range Status   02/16/2020 0251 141 140 - 450 10*3/mm3 Final   02/15/2020 2127 157 140 - 450 10*3/mm3 Final     Sodium  Sodium   Date/Time Value Ref Range Status   02/16/2020 0251 141 136 - 145 mmol/L Final   02/15/2020 2127 144 136 - 145 mmol/L Final     Potassium  Potassium   Date/Time Value Ref Range Status   02/16/2020 0251 3.7 3.5 - 5.2 mmol/L Final   02/15/2020 2127 3.4 (L) 3.5 - 5.2 mmol/L Final     Chloride  Chloride   Date/Time Value Ref Range Status   02/16/2020 0251 102 98 -  107 mmol/L Final   02/15/2020 2127 101 98 - 107 mmol/L Final     BicarbonateNo results found for: PLASMABICARB    BUN BUN   Date/Time Value Ref Range Status   02/16/2020 0251 19 8 - 23 mg/dL Final   02/15/2020 2127 18 8 - 23 mg/dL Final      Creatinine Creatinine   Date/Time Value Ref Range Status   02/16/2020 0251 1.55 (H) 0.76 - 1.27 mg/dL Final   02/15/2020 2127 1.61 (H) 0.76 - 1.27 mg/dL Final      Calcium Calcium   Date/Time Value Ref Range Status   02/16/2020 0251 8.4 8.2 - 9.6 mg/dL Final   02/15/2020 2127 8.8 8.2 - 9.6 mg/dL Final      Mag @RESULFAST(MG:3)@        PT/INR:       Lab Results   Component Value Date    PROTIME 15.5 (H) 02/16/2020    PROTIME 14.6 (H) 02/24/2019    INR 1.29 (H) 02/16/2020    INR 1.20 (H) 02/24/2019      Troponin I:    Lab Results   Component Value Date    TROPONINI 1.664 (C) 02/25/2019    TROPONINI 2.198 (C) 02/24/2019    TROPONINI 1.733 (C) 02/24/2019    Lab Results   Component Value Date    POCRTROPI 0.05 02/24/2019    TROPONINT 0.032 (C) 02/16/2020         amiodarone 200 mg Oral Daily   aspirin 81 mg Oral Daily   carbidopa-levodopa 1 tablet Oral TID   castor oil-balsam peru 1 each Topical Q12H   cetirizine 10 mg Oral Daily   clopidogrel 75 mg Oral Daily   finasteride 5 mg Oral Daily   furosemide 20 mg Oral Daily   insulin lispro 0-7 Units Subcutaneous Q6H   levothyroxine 75 mcg Oral Daily   memantine 10 mg Oral BID   multivitamin with minerals 1 tablet Oral Daily   pantoprazole 40 mg Oral Daily Before Lunch   pharmacy consult - MTM  Does not apply Daily   pravastatin 20 mg Oral Nightly   sacubitril-valsartan 0.5 tablet Oral BID   sodium chloride 10 mL Intravenous Q12H       nitroglycerin 5-200 mcg/min Last Rate: Stopped (02/16/20 0953)       Assessment/Plan     Patient Active Problem List   Diagnosis Code   • Hypothyroid E03.9   • CKD (chronic kidney disease) stage 3, GFR 30-59 ml/min (CMS/[facility]) N18.3   • CAD (coronary artery disease) I25.10   • Debility R53.81   • PAF  (paroxysmal atrial fibrillation) (CMS/HCC) I48.0   • Benign prostatic hyperplasia without lower urinary tract symptoms N40.0   • Ventricular tachycardia (CMS/HCC) I47.2   • Sleep apnea G47.30   • Mixed hyperlipidemia E78.2   • Pancreatitis K85.90   • Sepsis (CMS/HCC) A41.9   • Bacteremia due to Streptococcus R78.81, B95.5   • Acute renal failure superimposed on stage 3 chronic kidney disease (CMS/HCC) N17.9, N18.3   • Acute cholecystitis K81.0   • Chronic systolic CHF (congestive heart failure) (CMS/HCC) I50.22   • COPD (chronic obstructive pulmonary disease) (CMS/HCC) J44.9   • Hypokalemia E87.6   • Parkinson's disease dementia (CMS/HCC) G20, F02.80   • NSTEMI (non-ST elevated myocardial infarction) (CMS/HCC) I21.4   • Lung infiltrate R91.8   • Hyperglycemia R73.9   • Skin breakdown L90.9   • Dysphagia R13.10   • Unstable angina (CMS/HCC) I20.0     CV no change  EF~30%  Palliative care following  Continue medical therapy  Consider hospice  Nothing to add.  Call if needed.      GUI Mendoza  20  9:51 AM          Electronically signed by Chucho Bueno MD at 20 1036     Nesha Jordan MD at 20 1021              [facility] Medicine Services  PROGRESS NOTE    Patient Name: Murali Dennis  : 1929  MRN: [id number]    Date of Admission: 2/15/2020  Primary Care Physician: Diana Null APRN    Subjective   Subjective     CC:  Chest pain    HPI:  Wife at bedside reports that the patient's chest pain is improved.  He is on CPAP this morning.  No events overnight    Review of Systems  General: denies fevers or chills  CV: Pain resolved  Resp: denies shortness of breath  Abd: denies abd pain, nausea      Objective   Objective     Vital Signs:   Temp:  [96 °F (35.6 °C)-97.8 °F (36.6 °C)] 97.8 °F (36.6 °C)  Heart Rate:  [50-55] 52  Resp:  [12-18] 18  BP: ()/(53-72) 129/68        Physical Exam:  Constitutional: No acute distress, awake,  alert  Respiratory: Clear to auscultation bilaterally, respiratory effort normal wearing BiPAP  Cardiovascular: RRR, no murmurs, rubs, or gallops, palpable pedal pulses bilaterally  Gastrointestinal: Positive bowel sounds, soft, nontender, nondistended  Musculoskeletal: No bilateral ankle edema   Nerves grossly intact to confrontation, speech clear    Results Reviewed:  Results from last 7 days   Lab Units 02/16/20  0251 02/15/20  2127 02/13/20  0454   WBC 10*3/mm3 5.07 5.15 5.41   HEMOGLOBIN g/dL 12.2* 13.4 10.9*   HEMATOCRIT % 39.7 41.7 33.9*   PLATELETS 10*3/mm3 141 157 150   INR  1.29*  --   --    PROCALCITONIN ng/mL  --  0.10  --      Results from last 7 days   Lab Units 02/16/20  0251 02/15/20  2127 02/13/20  0454   SODIUM mmol/L 141 144 138   POTASSIUM mmol/L 3.7 3.4* 3.6   CHLORIDE mmol/L 102 101 99   CO2 mmol/L 26.0 30.0* 26.0   BUN mg/dL 19 18 21   CREATININE mg/dL 1.55* 1.61* 1.45*   GLUCOSE mg/dL 130* 174* 133*   CALCIUM mg/dL 8.4 8.8 8.4   ALT (SGPT) U/L  --  17  --    AST (SGOT) U/L  --  38  --    TROPONIN T ng/mL 0.032* 0.036*  --    PROBNP pg/mL  --  4,564.0*  --      Estimated Creatinine Clearance: 36.4 mL/min (A) (by C-G formula based on SCr of 1.55 mg/dL (H)).    Microbiology Results Abnormal     None          Imaging Results (Last 24 Hours)     Procedure Component Value Units Date/Time    CT Chest Without Contrast [[id number]] Collected:  02/16/20 0033     Updated:  02/16/20 0035    Narrative:       CT Chest WO    INDICATION:   New onset cough    TECHNIQUE:   CT of the thorax without IV contrast. Coronal and sagittal reconstructions were obtained.  Radiation dose reduction techniques included automated exposure control or exposure modulation based on body size. Count of known CT and cardiac nuc med studies  performed in previous 12 months: 1.     COMPARISON:   None    FINDINGS:  Is a moderate right pleural effusion with mild right base atelectasis. There is a small left pleural effusion both  effusions are layering dependently. There is a moderate hiatal hernia. Otherwise upper abdominal images are normal. Aorta is normal in  size. There is no adenopathy. Thyroid gland is normal. There is mild interstitial prominence in the left base and posterior aspect of the right upper lobe.      Impression:       Moderate right pleural effusion and small left pleural effusion. Both effusions appear mobile. The right one measures up to about 5 cm in thickness.    Mild bibasilar atelectasis with interstitial prominence or infiltrate in the right upper lobe posteriorly.    Sternotomy wires are present.    Signer Name: Everette Morris MD   Signed: 2/16/2020 12:33 AM   Workstation Name: [id number]    [facility]    XR Chest 1 View [[id number]] Collected:  02/15/20 2134     Updated:  02/15/20 2136    Narrative:       CR Chest 1 Vw 2/15/2020    INDICATION:   Acute onset of chest pain beginning today with shortness of breath. Congestive heart failure. COPD exacerbation. Atrial fibrillation and coronary artery disease.     COMPARISON:    2/7/2020    FINDINGS:  Single portable AP view(s) of the chest.  The cardiac size is stable following median sternotomy. Small left pleural effusion with infiltrate or atelectasis in the left lower lobe. Discoid atelectasis right base. The lungs are otherwise clear. No  pneumothorax.      Impression:       Small left pleural effusion with atelectasis or infiltrate left lower lobe.    Signer Name: Isak Sofia MD   Signed: 2/15/2020 9:34 PM   Workstation Name: [email or web address]    [facility]          Results for orders placed during the hospital encounter of 12/27/19   Adult Transthoracic Echo Complete W/ Cont if Necessary Per Protocol    Narrative · The left ventricular cavity is moderately dilated.  · Right ventricular cavity is mild-to-moderately dilated.  · Left atrial cavity size is moderate-to-severely dilated.  · Moderate aortic valve regurgitation  is present.  · Moderate mitral valve regurgitation is present.  · Mild to moderate tricuspid valve regurgitation is present.  · Calculated right ventricular systolic pressure from tricuspid   regurgitation is 35 mmHg.  · Estimated EF = 30%.  · Left ventricular systolic function is severely decreased.  · Left ventricular diastolic dysfunction (grade II) consistent with   pseudonormalization.  · The findings are consistent with dilated cardiomyopathy.  · Mild pulmonary hypertension is present.  · There is no evidence of pericardial effusion.  · The aortic valve exhibits moderate sclerosis without stenosis.          I have reviewed the medications:  Scheduled Meds:  amiodarone 200 mg Oral Daily   aspirin 81 mg Oral Daily   carbidopa-levodopa 1 tablet Oral TID   castor oil-balsam peru 1 each Topical Q12H   cetirizine 10 mg Oral Daily   clopidogrel 75 mg Oral Daily   finasteride 5 mg Oral Daily   furosemide 20 mg Oral Daily   insulin lispro 0-7 Units Subcutaneous Q6H   levothyroxine 75 mcg Oral Daily   memantine 10 mg Oral BID   multivitamin with minerals 1 tablet Oral Daily   pantoprazole 40 mg Oral Daily Before Lunch   pharmacy consult - MTM  Does not apply Daily   pravastatin 20 mg Oral Nightly   sacubitril-valsartan 0.5 tablet Oral BID   sodium chloride 10 mL Intravenous Q12H     Continuous Infusions:  nitroglycerin 5-200 mcg/min Last Rate: Stopped (02/16/20 0953)     PRN Meds:.•  acetaminophen  •  dextrose  •  dextrose  •  glucagon (human recombinant)  •  ipratropium-albuterol  •  melatonin  •  potassium chloride **OR** potassium chloride **OR** potassium chloride  •  simethicone  •  sodium chloride  •  sodium chloride    Assessment/Plan   Assessment & Plan     Active Hospital Problems    Diagnosis  POA   • **NSTEMI (non-ST elevated myocardial infarction) (CMS/HCC) [I21.4]  Yes   • Unstable angina (CMS/[facility]) [I20.0]  Yes   • Hypokalemia [E87.6]  Yes   • Parkinson's disease dementia (CMS/[facility]) [G20, F02.80]  Yes   •  Lung infiltrate [R91.8]  Yes   • Hyperglycemia [R73.9]  Yes   • Skin breakdown [L90.9]  Yes   • Dysphagia [R13.10]  Yes   • Chronic systolic CHF (congestive heart failure) (CMS/[facility]) [I50.22]  Yes   • COPD (chronic obstructive pulmonary disease) (CMS/[facility]) [J44.9]  Yes   • Mixed hyperlipidemia [E78.2]  Yes   • Sleep apnea [G47.30]  Yes   • Benign prostatic hyperplasia without lower urinary tract symptoms [N40.0]  Yes   • PAF (paroxysmal atrial fibrillation) (CMS/[facility]) [I48.0]  Yes   • Debility [R53.81]  Yes   • CKD (chronic kidney disease) stage 3, GFR 30-59 ml/min (CMS/HCC) [N18.3]  Yes   • CAD (coronary artery disease) [I25.10]  Yes   • Hypothyroid [E03.9]  Yes      Resolved Hospital Problems   No resolved problems to display.        Brief Hospital Course to date:  Murali Dennis is a 89 y.o. male with a medical hx significant for CAD, systolic CHF, HLD, COPD, Hypothyroidism, CKD III, PAF, BPH and Parkinson's disease dementia was brought to [facility] ED for c/o chest pain.      NSTEMI, CAD, HLD  -Troponin is trending down.  Reviewed EKG and without any changes concerning for ischemia  -Patient is followed by Dr. Ugarte.  We will place consult for Dr. Ugarte tomorrow morning.  -Patient was started on a nitroglycerin drip.  Will discontinue this for now and monitor for chest pain  -Discontinue heparin drip     LLL infiltrate vs. Atelectasis   - crackles in LLL on exam   -Low suspicion for pneumonia, suspect atelectasis.  Procalcitonin negative.  No indication for antibiotics.  - low threshold for starting antibiotics if pt develops fever, leukocytosis etc     Hyperglycemia, Impaired glucose intolerance  - last A1c 6.40, low dose SSI for now      Right heal and sacral skin breakdown   - consult WOC     Chronic systolic CHF  - appears euvolemic  - continue Lasix and Entresto, daily weights, strict I&Os     COPD  - nebs q4h prn, monitor pulse ox      CKD III  - creatinine near baseline, monitor chemistry       Hypokalemia  - replace per protocol, restart oral potassium prior to discharge     PAF  - currently in sinus, continue amiodarone  - no anticoagulation due to risk factors      Hypothyroidism  - continue synthroid      BPH  - continue Avodart, strict I&Os     Sleep apnea   - CPAP at      Dysphagia, Parkinson's disease, Dementia   - consult SLP/PT/OT/CM, fall and aspiration precautions  - continue Sinemet, Namenda      DVT prophylaxis:  SCDs    Disposition: I expect the patient to be discharged TBD.    CODE STATUS:   Code Status and Medical Interventions:   Ordered at: 02/15/20 2318     Limited Support to NOT Include:    Intubation     Level Of Support Discussed With:    Health Care Surrogate     Code Status:    No CPR     Medical Interventions (Level of Support Prior to Arrest):    Limited         Electronically signed by Nesha Jordan MD, 20, 10:21 AM.        Electronically signed by Nesha Jordan MD at 20 1025          Consult Notes (last 72 hours) (Notes from 20 1202 through 20 1202)      Esha Bond APRN at 20 1024          Palliative Care Initial Consult Note    Patient Name:  Murali Dennis   : 1929   Sex: male    Patient Care Team:  Diana Null APRN as PCP - General (Nurse Practitioner)  Diana Null APRN as PCP - Claims Attributed  Bruno Garvin MD as Consulting Physician (Neurology)  oHllis Ugarte MD as Consulting Physician (Cardiology)    Advance care planning discussed: yes  Code Status: DNR  Advance Directive: no document  Surrogate decision maker: wife    Referring Provider: Samuel Singleton DO  Reason for Consult: GOC    Subjective   Murali Dennis is a 89 y.o. male with a medical hx significant for CAD, systolic CHF, HLD, COPD, Hypothyroidism, CKD III, PAF, BPH and Parkinson's disease dementia was brought to [facility] ED for c/o chest pain.  The patient was discharged yesterday  from [facility] after a 1 week stay for  acute hypoxia due to CHF exacerbation and COPD. The patient was transferred to [facility] rehab facility yesterday afternoon. The patient's family provides the history due to the patient's severe dementia.   Spoke to patient's wife, who was at bedside. We reviewed the course of the last 2 weeks and his recent hospitalization and now subsequent rehospitalization. Wife realizes that he has been on a constant decline over the last 6 months. Especially in the last 3-4 weeks. Before his last hospitalization he was up walking with a cane in the home. Now he is not strong enough to ambulate. He is not appropriate for rehabilitation. Wife would like to take the patient home. I discussed with her the difference between palliative care and hospice. She was open to discussing hospice and I reviewed all the services. I feel that the patient is appropriate. She states she is not ready for this step, at least today. She would like to see how the remainder of the hospitalization goes. She will have the help of a daughter and granddaughter to care for the patient in the home. She would like a home palliative consult.   Patient sleeps calmly through entire visit. No signs of discomfort, labored breathing, nausea. Patient appears to be resting comfortably.             Review of Systems  Review of Systems   Unable to perform ROS: Mental status change         History  Past Medical History:   Diagnosis Date   • Alzheimer disease (CMS/[facility])    • Arthritis    • Warren esophagus     followed  with GI in [place]   • Benign prostatic hyperplasia without lower urinary tract symptoms 10/17/2018   • Chronic congestive heart failure (CMS/[facility]) 12/8/2017     echo report from 8/2/17 EF 60-65%, mild TR, mild AR ([place], [place]) Echocardiogram 12/9/17: EF 28%, mild to moderate MR    • COPD (chronic obstructive pulmonary disease) (CMS/[facility])    • Coronary artery disease 2003   • Dementia (CMS/[facility]) 2003   • Diarrhea 2/24/2019   •  Environmental allergies 10/17/2018   • Essential hypertension 10/17/2018   • Gastroesophageal reflux disease 11/19/2018   • GERD (gastroesophageal reflux disease)    • Hypothyroid     started after amiodarone use in 2003   • Sleep apnea     wears cpap   • Stroke (CMS/HCC) 2003   • TIA (transient ischemic attack) 12/29/2017   • Unintended weight loss 10/17/2018     Past Surgical History:   Procedure Laterality Date   • CARDIAC CATHETERIZATION  2003   • CARDIAC CATHETERIZATION N/A 1/19/2018    Procedure: Left Heart Cath;  Surgeon: Hollis Ugarte MD;  Location:  [facility];  Service:    • CARPAL TUNNEL RELEASE Right    • CATARACT EXTRACTION Bilateral    • CORONARY ARTERY BYPASS GRAFT  2003    Triple Bypass, @ [facility] @ [place], [place]   • ERCP N/A 12/30/2019    Procedure: ENDOSCOPIC RETROGRADE CHOLANGIOPANCREATOGRAPHY;  Surgeon: Arsh White MD;  Location:  [facility];  Service: Gastroenterology   • HEMORRHOIDECTOMY     • HERNIA REPAIR       Current Facility-Administered Medications   Medication Dose Route Frequency Provider Last Rate Last Dose   • acetaminophen (TYLENOL) tablet 650 mg  650 mg Oral Nightly PRN Val Beth PA-C       • amiodarone (PACERONE) tablet 200 mg  200 mg Oral Daily Val Beth PA-C   200 mg at 02/16/20 0959   • aspirin chewable tablet 81 mg  81 mg Oral Daily Vla Beth PA-C   81 mg at 02/16/20 0959   • carbidopa-levodopa (SINEMET)  MG per tablet 1 tablet  1 tablet Oral TID Val Beth PA-C   1 tablet at 02/16/20 0959   • castor oil-balsam peru (VENELEX) ointment 5 g  1 each Topical Q12H Val Beth PA-C   5 g at 02/16/20 1001   • cetirizine (zyrTEC) tablet 10 mg  10 mg Oral Daily Val Beth PA-C   10 mg at 02/16/20 0959   • clopidogrel (PLAVIX) tablet 75 mg  75 mg Oral Daily Val Beth PA-C   75 mg at 02/16/20 0959   • dextrose (D50W) 25 g/ 50mL Intravenous Solution 25 g  25 g Intravenous Q15  Min PRN Val Beth PA-C       • dextrose (GLUTOSE) oral gel 15 g  15 g Oral Q15 Min PRN Val Beth PA-C       • finasteride (PROSCAR) tablet 5 mg  5 mg Oral Daily Val Beth PA-C   5 mg at 02/16/20 0959   • furosemide (LASIX) tablet 20 mg  20 mg Oral Daily Val Beth PA-C   Stopped at 02/16/20 0959   • glucagon (human recombinant) (GLUCAGEN DIAGNOSTIC) injection 1 mg  1 mg Subcutaneous Q15 Min PRN Val Beth PA-C       • insulin lispro (humaLOG) injection 0-7 Units  0-7 Units Subcutaneous Q6H Val Beth PA-C       • ipratropium-albuterol (DUO-NEB) nebulizer solution 3 mL  3 mL Nebulization Q4H PRN Val Beth PA-C       • levothyroxine (SYNTHROID, LEVOTHROID) tablet 75 mcg  75 mcg Oral Daily Val Beth PA-C   75 mcg at 02/16/20 0636   • melatonin tablet 5 mg  5 mg Oral Nightly PRN Val Beth PA-C       • memantine (NAMENDA) tablet 10 mg  10 mg Oral BID Val Beth PA-C   10 mg at 02/16/20 1004   • multivitamin with minerals 1 tablet  1 tablet Oral Daily Val Beth PA-C   1 tablet at 02/16/20 0959   • nitroglycerin 50 mg/250 mL (0.2 mg/mL) infusion  5-200 mcg/min Intravenous Titrated Val Beth PA-C   Stopped at 02/16/20 0953   • pantoprazole (PROTONIX) EC tablet 40 mg  40 mg Oral Daily Before Lunch Val Beth PA-C       • Pharmacy Consult - MTM   Does not apply Daily Ana Liu RPH       • potassium chloride (MICRO-K) CR capsule 40 mEq  40 mEq Oral PRN Val Beth PA-C        Or   • potassium chloride (KLOR-CON) packet 40 mEq  40 mEq Oral PRN Val Beth PA-C        Or   • potassium chloride 10 mEq in 100 mL IVPB  10 mEq Intravenous Q1H PRN Val Beth PA-C       • pravastatin (PRAVACHOL) tablet 20 mg  20 mg Oral Nightly Val Beth PA-C   20 mg at 02/16/20 0057   • sacubitril-valsartan (ENTRESTO) 24-26 MG tablet 0.5 tablet  0.5 tablet Oral BID Val Beth PA-C   0.5 tablet at 02/16/20 1008   • simethicone  (MYLICON) chewable tablet 80 mg  80 mg Oral 4x Daily PRN BethVal mcqueen, PA-C       • sodium chloride 0.9 % flush 10 mL  10 mL Intravenous PRN BethCiroae, PA-C   10 mL at 02/15/20 2135   • sodium chloride 0.9 % flush 10 mL  10 mL Intravenous Q12H BethCiro mcqueenae, PA-C   10 mL at 20 1001   • sodium chloride 0.9 % flush 10 mL  10 mL Intravenous PRN Val Beth, PA-C           nitroglycerin 5-200 mcg/min Last Rate: Stopped (20 0953)     •  acetaminophen  •  dextrose  •  dextrose  •  glucagon (human recombinant)  •  ipratropium-albuterol  •  melatonin  •  potassium chloride **OR** potassium chloride **OR** potassium chloride  •  simethicone  •  sodium chloride  •  sodium chloride  Allergies   Allergen Reactions   • Benzodiazepines Other (See Comments)     Paradoxical response   • Valium [Diazepam] Other (See Comments)     Paradoxical response     • Codeine Other (See Comments)     unknown   • Fluoxetine Other (See Comments)     Paradoxical response   • Lipitor [Atorvastatin] Other (See Comments)     Myalgias     • Metoclopramide Other (See Comments)     Unknown   • Nabumetone Other (See Comments)     Unknown     • Penicillins Swelling and Other (See Comments)     Has tolerated cefepime 2019   • Tramadol Other (See Comments)     unknown   • Prozac [Fluoxetine Hcl] Unknown - Low Severity   • Sulfa Antibiotics Rash     Family History   Problem Relation Age of Onset   • Cancer Mother    • Heart disease Sister    • Diabetes Sister    • Heart disease Brother    • Cancer Brother    • Stroke Sister      Social History     Socioeconomic History   • Marital status:      Spouse name: Not on file   • Number of children: Not on file   • Years of education: Not on file   • Highest education level: Not on file   Occupational History   • Occupation: Retired   Tobacco Use   • Smoking status: Former Smoker     Last attempt to quit: 1967     Years since quittin.1   • Smokeless tobacco: Never Used    Substance and Sexual Activity   • Alcohol use: No   • Drug use: No   • Sexual activity: Defer       Objective     Vital Signs  Temp:  [96 °F (35.6 °C)-97.8 °F (36.6 °C)] 97.8 °F (36.6 °C)  Heart Rate:  [50-55] 52  Resp:  [12-18] 18  BP: ()/(53-72) 129/68    PPS: Palliative Performance Scale score as of 2/16/2020, 3:24 PM is 20% based on the following measures:   Ambulation: Totally bed bound  Activity and Evidence of Disease: Unable to do any work, extensive evidence of disease  Self-Care: Total care  Intake:Minimal sips  LOC: Full, drowsy or confusion    Physical Exam:  Constitutional:lying in bed, eyes closed   Eyes: PERRLA, sclerae anicteric, no conjunctival injection  HENT: NCAT, mucous membranes moist  Neck: Supple, trachea midline  Respiratory: nonlabored respirations   Cardiovascular: RR, bradycardic, no murmurs appreciated, palpable pedal pulses bilaterally  Gastrointestinal: Positive bowel sounds, soft, nontender, no distention or guarding   Musculoskeletal: No bilateral ankle edema, no clubbing or cyanosis to extremities  Psychiatric: Asleep  Neurologic: not orientated. Baseline dementia  Skin: skin breakdown of right heal     Results Review:   Lab Results   Component Value Date    HGBA1C 6.40 (H) 12/29/2019       Lab Results   Component Value Date    GLUCOSE 130 (H) 02/16/2020    BUN 19 02/16/2020    CREATININE 1.55 (H) 02/16/2020    EGFRIFNONA 42 (L) 02/16/2020    BCR 12.3 02/16/2020    K 3.7 02/16/2020    CO2 26.0 02/16/2020    CALCIUM 8.4 02/16/2020    ALBUMIN 3.50 02/15/2020    LABIL2 1.50 04/09/2018    AST 38 02/15/2020    ALT 17 02/15/2020       WBC   Date Value Ref Range Status   02/16/2020 5.07 3.40 - 10.80 10*3/mm3 Final     RBC   Date Value Ref Range Status   02/16/2020 3.64 (L) 4.14 - 5.80 10*6/mm3 Final     Hemoglobin   Date Value Ref Range Status   02/16/2020 12.2 (L) 13.0 - 17.7 g/dL Final     Hematocrit   Date Value Ref Range Status   02/16/2020 39.7 37.5 - 51.0 % Final     MCV    Date Value Ref Range Status   02/16/2020 109.1 (H) 79.0 - 97.0 fL Final     MCH   Date Value Ref Range Status   02/16/2020 33.5 (H) 26.6 - 33.0 pg Final     MCHC   Date Value Ref Range Status   02/16/2020 30.7 (L) 31.5 - 35.7 g/dL Final     RDW   Date Value Ref Range Status   02/16/2020 14.8 12.3 - 15.4 % Final     RDW-SD   Date Value Ref Range Status   02/16/2020 60.0 (H) 37.0 - 54.0 fl Final     MPV   Date Value Ref Range Status   02/16/2020 11.3 6.0 - 12.0 fL Final     Platelets   Date Value Ref Range Status   02/16/2020 141 140 - 450 10*3/mm3 Final     Neutrophil %   Date Value Ref Range Status   02/15/2020 56.4 42.7 - 76.0 % Final     Lymphocyte %   Date Value Ref Range Status   02/15/2020 29.7 19.6 - 45.3 % Final     Monocyte %   Date Value Ref Range Status   02/15/2020 8.2 5.0 - 12.0 % Final     Eosinophil %   Date Value Ref Range Status   02/15/2020 4.7 0.3 - 6.2 % Final     Basophil %   Date Value Ref Range Status   02/15/2020 0.8 0.0 - 1.5 % Final     Immature Grans %   Date Value Ref Range Status   02/15/2020 0.2 0.0 - 0.5 % Final     Neutrophils Absolute   Date Value Ref Range Status   02/16/2020 3.09 1.70 - 7.00 10*3/mm3 Final     Neutrophils, Absolute   Date Value Ref Range Status   02/15/2020 2.91 1.70 - 7.00 10*3/mm3 Final     Lymphocytes, Absolute   Date Value Ref Range Status   02/15/2020 1.53 0.70 - 3.10 10*3/mm3 Final     Monocytes, Absolute   Date Value Ref Range Status   02/15/2020 0.42 0.10 - 0.90 10*3/mm3 Final     Eosinophils Absolute   Date Value Ref Range Status   02/16/2020 0.25 0.00 - 0.40 10*3/mm3 Final     Eosinophils, Absolute   Date Value Ref Range Status   02/15/2020 0.24 0.00 - 0.40 10*3/mm3 Final     Basophils Absolute   Date Value Ref Range Status   02/16/2020 0.00 0.00 - 0.20 10*3/mm3 Final     Basophils, Absolute   Date Value Ref Range Status   02/15/2020 0.04 0.00 - 0.20 10*3/mm3 Final     Immature Grans, Absolute   Date Value Ref Range Status   02/15/2020 0.01 0.00 - 0.05  10*3/mm3 Final     [facility]   Date Value Ref Range Status   02/15/2020 0.0 0.0 - 0.2 /100 WBC Final           NSTEMI (non-ST elevated myocardial infarction) (CMS/[facility])    Hypothyroid    CKD (chronic kidney disease) stage 3, GFR 30-59 ml/min (CMS/[facility])    CAD (coronary artery disease)    Debility    PAF (paroxysmal atrial fibrillation) (CMS/[facility])    Benign prostatic hyperplasia without lower urinary tract symptoms    Sleep apnea    Mixed hyperlipidemia    Chronic systolic CHF (congestive heart failure) (CMS/[facility])    COPD (chronic obstructive pulmonary disease) (CMS/[facility])    Hypokalemia    Parkinson's disease dementia (CMS/HCC)    Lung infiltrate    Hyperglycemia    Skin breakdown    Dysphagia    Unstable angina (CMS/[facility])      Assessment/Plan   Assessment/Plan:   Impression: 89 year old male that presented to ER from [facility] with right sided chest pain d/t NSTEMI. He has discharged from [facility] one day prior.     Palliative care APRN met with wife to review plan of care and discuss GOC. At present she appreciates palliative to follow to monitor for symptoms management and to provide support if additional medical decisions need to be made. At current, wife is not ready for hospice. She is open to home palliative on discharge. I did discuss that if he should continue to decline palliative care can transition patient to hospice inpatient.     Total Visit Time: 45 minutes   Face to Face Time: 30 minutes     Esha ERVIN  [facility]  Hospice and Palliative Care Nurse Practitioner  02/16/20  10:24 AM    Electronically signed by Esha Bond APRN at 02/16/20 7971

## 2020-02-17 NOTE — PLAN OF CARE
Problem: Patient Care Overview  Goal: Interprofessional Rounds/Family Conf  Outcome: Ongoing (interventions implemented as appropriate)  Flowsheets (Taken 2/17/2020 1419)  Summary: 1330: Palliative Team Conference: JOSEF Houser RN, PN; KIM Cardoso [facility]; THANG Campbell, DO; JENNIFER Schwarz RN, CHPN; CELENA David RN, CHPN  Note:   Palliative consult for GOC/ACP, support to family. Pt denied pain or discomfort; noted to doze most of the time, family reported eats/drinks only when prompted and assisted. Advised to provide food/drink as pt requests, treat dry mouth from open-mouth breathing. Seen by Hospice Liaison, plan for home tomorrow with Hospice.     Problem: Palliative Care (Adult)  Goal: Maximized Comfort  Outcome: Ongoing (interventions implemented as appropriate)  Flowsheets (Taken 2/17/2020 1419)  Maximized Comfort: making progress toward outcome  Goal: Enhanced Quality of Life  Outcome: Ongoing (interventions implemented as appropriate)  Flowsheets (Taken 2/17/2020 1419)  Enhanced Quality of Life: making progress toward outcome     Problem: Palliative Care (Adult)  Goal: Identify Related Risk Factors and Signs and Symptoms  Outcome: Outcome(s) achieved  Flowsheets (Taken 2/17/2020 1419)  Palliative Care: Related Risk Factors: advanced age; chronic illness; condition is progressive; end-stage disease; worsening symptoms  Palliative Care: Signs and Symptoms: breathlessness; caregiver strain; confusion; fatigue; loss of appetite; pain     Problem: Palliative Care (Adult)  Intervention: Minimize Discomfort  Flowsheets (Taken 2/17/2020 1419)  Pain Management Interventions: care clustered; pain management plan reviewed with patient/caregiver  Intervention: Optimize Function  Flowsheets (Taken 2/17/2020 1419)  Fatigue Management: frequent rest breaks encouraged  Sleep/Rest Enhancement: family presence promoted  Intervention: Promote Informed Decision Making and Goal Setting  Flowsheets (Taken 2/17/2020 1419)  Life  Transition/Adjustment: decision-making facilitated; palliative care discussed; palliative care initiated; other (see comments) (Hospice consult for home services)  Intervention: Support/Optimize Psychosocial Response  Flowsheets (Taken 2/17/2020 5768)  Spiritual Activities Assistance: other (see comments) (Spiritual Care consult)  Family/Support System Care: caregiver stress acknowledged; family care conference arranged; involvement promoted; presence promoted; self-care encouraged; support provided

## 2020-02-17 NOTE — THERAPY EVALUATION
Acute Care - Occupational Therapy Initial Evaluation  [facility]     Patient Name: Murali Dennis  : 1929  MRN: [id number]  Today's Date: 2020             Admit Date: 2/15/2020       ICD-10-CM ICD-9-CM   1. Nonspecific chest pain R07.9 786.50   2. History of coronary artery disease Z86.79 V12.59   3. Elevated troponin R79.89 790.6   4. Impaired mobility and ADLs Z74.09 799.89     Patient Active Problem List   Diagnosis   • Hypothyroid   • CKD (chronic kidney disease) stage 3, GFR 30-59 ml/min (CMS/[facility])   • CAD (coronary artery disease)   • Debility   • PAF (paroxysmal atrial fibrillation) (CMS/[facility])   • Benign prostatic hyperplasia without lower urinary tract symptoms   • Ventricular tachycardia (CMS/[facility])   • Sleep apnea   • Mixed hyperlipidemia   • Pancreatitis   • Sepsis (CMS/[facility])   • Bacteremia due to Streptococcus   • Acute renal failure superimposed on stage 3 chronic kidney disease (CMS/[facility])   • Acute cholecystitis   • Chronic systolic CHF (congestive heart failure) (CMS/[facility])   • COPD (chronic obstructive pulmonary disease) (CMS/[facility])   • Hypokalemia   • Parkinson's disease dementia (CMS/[facility])   • NSTEMI (non-ST elevated myocardial infarction) (CMS/[facility])   • Lung infiltrate   • Hyperglycemia   • Skin breakdown   • Dysphagia   • Unstable angina (CMS/[facility])     Past Medical History:   Diagnosis Date   • Alzheimer disease (CMS/[facility])    • Arthritis    • Warren esophagus     followed  with GI in [place]   • Benign prostatic hyperplasia without lower urinary tract symptoms 10/17/2018   • Chronic congestive heart failure (CMS/[facility]) 2017     echo report from 17 EF 60-65%, mild TR, mild AR ([place], [place]) Echocardiogram 17: EF 28%, mild to moderate MR    • COPD (chronic obstructive pulmonary disease) (CMS/[facility])    • Coronary artery disease    • Dementia (CMS/[facility])    • Diarrhea 2019   • Environmental allergies 10/17/2018   • Essential hypertension 10/17/2018   •  Gastroesophageal reflux disease 11/19/2018   • GERD (gastroesophageal reflux disease)    • Hypothyroid     started after amiodarone use in 2003   • Sleep apnea     wears cpap   • Stroke (CMS/HCC) 2003   • TIA (transient ischemic attack) 12/29/2017   • Unintended weight loss 10/17/2018     Past Surgical History:   Procedure Laterality Date   • CARDIAC CATHETERIZATION  2003   • CARDIAC CATHETERIZATION N/A 1/19/2018    Procedure: Left Heart Cath;  Surgeon: Hollis Ugarte MD;  Location:  [facility] CATH INVASIVE LOCATION;  Service:    • CARPAL TUNNEL RELEASE Right    • CATARACT EXTRACTION Bilateral    • CORONARY ARTERY BYPASS GRAFT  2003    Triple Bypass, @ [facility] @ [place], [place]   • ERCP N/A 12/30/2019    Procedure: ENDOSCOPIC RETROGRADE CHOLANGIOPANCREATOGRAPHY;  Surgeon: rAsh White MD;  Location:  [facility] ENDOSCOPY;  Service: Gastroenterology   • HEMORRHOIDECTOMY     • HERNIA REPAIR            OT ASSESSMENT FLOWSHEET (last 12 hours)      Occupational Therapy Evaluation     Row Name 02/17/20 0759                   OT Evaluation Time/Intention    Subjective Information  no complaints  -CLIVE        Document Type  evaluation  -CLIVE        Mode of Treatment  individual therapy;occupational therapy  -CLIVE        Patient Effort  good  -CLIVE        Symptoms Noted During/After Treatment  none  -CLIVE           General Information    Patient Profile Reviewed?  yes  -CLIVE        Patient Observations  alert;cooperative;agree to therapy  -CLIVE        Patient/Family Observations  wife and dtr in room  -CLIVE        General Observations of Patient  Pt. supine in bed sleeping, but easily aroused.  -CLIVE        Prior Level of Function  min assist:;feeding;grooming;max assist:;dressing;bathing;dependent:;home management;driving;shopping mod to max mobility  -CLIVE        Equipment Currently Used at Home  walker, rolling;wheelchair;shower chair;raised toilet;grab bar;cane, straight  -CLIVE        Existing  Precautions/Restrictions  (S) fall;other (see comments) skin integrity, spore  -CLIVE        Limitations/Impairments  safety/cognitive  -CLIVE        Risks Reviewed  patient and family:;LOB;dizziness;increased discomfort;change in vital signs;lines disloged  -CLIVE        Benefits Reviewed  patient and family:;improve function;increase independence;increase strength;increase balance;increase knowledge  -CLIVE        Barriers to Rehab  previous functional deficit;cognitive status  -CLIVE           Relationship/Environment    Primary Source of Support/Comfort  spouse;child(shelley)  -CLIVE        Lives With  spouse  -CLIVE        Family Caregiver if Needed  spouse;child(shelley), adult  -CLIVE           Resource/Environmental Concerns    Current Living Arrangements  home/apartment/condo  -CLIVE           Home Main Entrance    Number of Stairs, Main Entrance  two 1-2 rails  -CLIVE           Cognitive Assessment/Intervention- PT/OT    Orientation Status (Cognition)  oriented to;disoriented to;place;situation;time said wife's birthday when asked his  -CLIVE        Follows Commands (Cognition)  follows one step commands;over 90% accuracy;increased processing time needed;initiation impaired;physical/tactile prompts required;repetition of directions required  -CLIVE        Safety Deficit (Cognitive)  mild deficit;moderate deficit;ability to follow commands;insight into deficits/self awareness;awareness of need for assistance;judgment;problem solving;safety precautions awareness  -CLIVE        Cognitive Assessment/Intervention Comment  pleasant throughout, flat affect, but attempts all task asked of him  -CLIVE           Safety Issues, Functional Mobility    Safety Issues Affecting Function (Mobility)  ability to follow commands;insight into deficits/self awareness;safety precaution awareness;awareness of need for assistance;sequencing abilities  -CLIVE        Impairments Affecting Function (Mobility)  balance;cognition;endurance/activity tolerance;postural/trunk  control;strength  -           Bed Mobility Assessment/Treatment    Bed Mobility Assessment/Treatment  supine-sit;scooting/bridging  -        Scooting/Bridging [place] (Bed Mobility)  moderate assist (50% patient effort);verbal cues;nonverbal cues (demo/gesture)  -        Supine-Sit [place] (Bed Mobility)  moderate assist (50% patient effort);nonverbal cues (demo/gesture);verbal cues  -CLIVE        Bed Mobility, Safety Issues  decreased use of legs for bridging/pushing;impaired trunk control for bed mobility  -        Assistive Device (Bed Mobility)  bed rails;head of bed elevated;draw sheet  -        Comment (Bed Mobility)  steps by step cues needed and assist to guide UE's to rail.  Pt. needed assist with LE's and trunk.  -           Functional Mobility    Functional Mobility- Ind. Level  moderate assist (50% patient effort);2 person assist required;nonverbal cues required (demo/gesture);verbal cues required  -        Functional Mobility- Device  other (see comments) gait belt and BUE support  -        Functional Mobility-Distance (Feet)  2  -CLIVE        Functional Mobility- Safety Issues  step length decreased;weight-shifting ability decreased;balance decreased during turns  -        Functional Mobility- Comment  pt. stood and took several small turn steps over to recliner  -           Transfer Assessment/Treatment    Transfer Assessment/Treatment  sit-stand transfer;stand-sit transfer  -           Sit-Stand Transfer    Sit-Stand [place] (Transfers)  moderate assist (50% patient effort);2 person assist;nonverbal cues (demo/gesture);verbal cues  -        Assistive Device (Sit-Stand Transfers)  other (see comments) gait belt and BUE support  -           Stand-Sit Transfer    Stand-Sit [place] (Transfers)  moderate assist (50% patient effort);2 person assist  -CLIVE        Assistive Device (Stand-Sit Transfers)  other (see comments) gait belt and BUE support  -CLIVE           ADL  Assessment/Intervention    BADL Assessment/Intervention  feeding;grooming;lower body dressing  -CLIVE           Lower Body Dressing Assessment/Training    Lower Body Dressing [place] Level  don;socks;dependent (less than 25% patient effort)  -CLIVE        Lower Body Dressing Position  supine  -CLIVE           Grooming Assessment/Training    [place] Level (Grooming)  wash face, hands;supervision;set up;verbal cues;hair care, combing/brushing;minimum assist (75% patient effort)  -CLIVE        Grooming Position  supported sitting  -CLIVE        Comment (Grooming)  cues needed to intiate task  -CLIVE           Self-Feeding Assessment/Training    [place] Level (Feeding)  liquids to mouth;scoop food and bring to mouth;minimum assist (75% patient effort);verbal cues;nonverbal cues (demo/gesture)  -CLIVE        Position (Self-Feeding)  supported sitting  -CLIVE        Comment (Feeding)  assist to setup up and get started with food then able to keep feeding self  -CLIVE           BADL Safety/Performance    Impairments, BADL Safety/Performance  balance;cognition;endurance/activity tolerance;strength;trunk/postural control  -CLIVE        Cognitive Impairments, BADL Safety/Performance  awareness, need for assistance;insight into deficits/self awareness;judgment;problem solving/reasoning;safety precaution awareness;sequencing abilities  -CLIVE        Skilled BADL Treatment/Intervention  cognitive/safety deficit modifications;compensatory training;BADL process/adaptation training  -CLIVE           General ROM    GENERAL ROM COMMENTS  WFL AROM BUE  -CLIVE           MMT (Manual Muscle Testing)    General MMT Comments  BUE grossly 4+/5  -CLIVE           Motor Assessment/Interventions    Additional Documentation  Balance (Group);Balance Interventions (Group);Therapeutic Exercise (Group);Therapeutic Exercise Interventions (Group)  -CLIVE           Balance    Balance  static sitting balance;static standing balance;dynamic sitting balance;dynamic standing balance   -CLIVE           Static Sitting Balance    Level of [place] (Unsupported Sitting, Static Balance)  contact guard assist  -CLIVE        Sitting Position (Unsupported Sitting, Static Balance)  sitting on edge of bed  -CLIVE           Dynamic Sitting Balance    Level of [place], Reaches Outside Midline (Sitting, Dynamic Balance)  supervision tends to lean to right  -CLIVE        Sitting Position, Reaches Outside Midline (Sitting, Dynamic Balance)  sitting in chair  -CLIVE           Static Standing Balance    Level of [place] (Supported Standing, Static Balance)  minimal assist, 75% patient effort;2 person assist  -CLIVE        Time Able to Maintain Position (Supported Standing, Static Balance)  15 to 30 seconds  -CLIVE        Assistive Device Utilized (Supported Standing, Static Balance)  other (see comments) BUE support and gait belt  -CLIVE           Dynamic Standing Balance    Level of [place], Reaches Outside Midline (Standing, Dynamic Balance)  moderate assist, 50 to 74% patient effort;2 person assist  -CLIVE        Time Able to Maintain Position, Reaches Outside Midline (Standing, Dynamic Balance)  15 to 30 seconds  -CLIVE        Assistive Device Utilized (Supported Standing, Dynamic Balance)  other (see comments) BUE support and gait belt  -CLIVE           Sensory Assessment/Intervention    Sensory General Assessment  no sensation deficits identified with observation of grooming tasks  -CLIVE           Positioning and Restraints    Pre-Treatment Position  in bed  -CLIVE        Post Treatment Position  chair  -CLIVE        In Chair  reclined;call light within reach;encouraged to call for assist;exit alarm on;with family/caregiver;with other staff;heels elevated  -CLIVE           Pain Assessment    Additional Documentation  Pain Scale: Numbers Pre/Post-Treatment (Group)  -CLIVE           Pain Scale: Numbers Pre/Post-Treatment    Pain Scale: Numbers, Pretreatment  0/10 - no pain  -CLIVE           Pain Scale: FACES Pre/Post-Treatment    Pain:  FACES Scale, Post-Treatment  0-->no hurt  -CLIVE           Wound 02/07/20 1345 Right heel Pressure Injury    Wound - Properties Group Date first assessed: 02/07/20  -MR Time first assessed: 1345  -MR Present on Hospital Admission: Y  -MR Side: Right  -MR Location: heel  -MR Primary Wound Type: Pressure inj  -MR Stage, Pressure Injury: deep tissue injury  -MR       Wound 02/07/20 1345 Right lateral malleolus Pressure Injury    Wound - Properties Group Date first assessed: 02/07/20  -MR Time first assessed: 1345  -MR Present on Hospital Admission: Y  -MR Side: Right  -MR Orientation: lateral  -MR Location: malleolus  -MR Primary Wound Type: Pressure inj  -MR Stage, Pressure Injury: unstageable  -MR       Wound 01/08/20 0700 Bilateral coccyx MASD ([place] associated skin damage)    Wound - Properties Group Date first assessed: 01/08/20  -LG Time first assessed: 0700  -LG Present on Hospital Admission: N  -LG Side: Bilateral  -LG Location: coccyx  -LG Primary Wound Type: MASD  -LG       Clinical Impression (OT)    OT Diagnosis  impaired ADL indendence  -CLIVE        Patient/Family Goals Statement (OT Eval)  return to as high a level of function as able  -CLIVE        Criteria for Skilled Therapeutic Interventions Met (OT Eval)  yes;treatment indicated  -CLIVE        Rehab Potential (OT Eval)  good, to achieve stated therapy goals  -CLIVE        Therapy Frequency (OT Eval)  daily  -CLIVE        Care Plan Review (OT)  evaluation/treatment results reviewed;risks/benefits reviewed  -CLIVE        Care Plan Review, Other Participant (OT Eval)  spouse;daughter  -CLIVE        Anticipated Discharge Disposition (OT)  home with home health;home with assist family wants pt. home per chart  -CLIVE           Vital Signs    Pre Systolic BP Rehab  137  -CLIVE        Pre Treatment Diastolic BP  69  -CLIVE        Post Systolic BP Rehab  135  -CLIVE        Post Treatment Diastolic BP  67  -CLIVE        Pretreatment Heart Rate (beats/min)  54  -CLIVE        Posttreatment Heart  Rate (beats/min)  55  -CLIVE        Pre SpO2 (%)  97  -CLIVE        O2 Delivery Pre Treatment  room air  -CLIVE        Post SpO2 (%)  97  -CLIVE        O2 Delivery Post Treatment  room air  -CLIVE        Pre Patient Position  Supine  -CLIVE        Intra Patient Position  Standing  -CLIVE        Post Patient Position  Sitting  -CLIVE           Planned OT Interventions    Planned Therapy Interventions (OT Eval)  activity tolerance training;BADL retraining;cognitive/visual perception retraining;functional balance retraining;occupation/activity based interventions;patient/caregiver education/training;transfer/mobility retraining;ROM/therapeutic exercise  -CLIVE           OT Goals    Bed Mobility Goal Selection (OT)  bed mobility, OT goal 1  -CLIVE        Transfer Goal Selection (OT)  transfer, OT goal 1  -CLIVE        Toileting Goal Selection (OT)  toileting, OT goal 1  -CLIVE        Grooming Goal Selection (OT)  grooming, OT goal 1  -CLIVE        Balance Goal Selection (OT)  balance, OT goal 1  -CLIVE        Additional Documentation  Grooming Goal Selection (OT) (Row);Balance Goal Selection (OT) (Row)  -CLIVE           Bed Mobility Goal 1 (OT)    Activity/Assistive Device (Bed Mobility Goal 1, OT)  bed mobility activities, all;bed rails  -CLIVE        [place] Level/Cues Needed (Bed Mobility Goal 1, OT)  minimum assist (75% or more patient effort)  -CLIVE        Time Frame (Bed Mobility Goal 1, OT)  long term goal (LTG);10 days  -CLIVE        Progress/Outcomes (Bed Mobility Goal 1, OT)  goal ongoing  -CLIVE           Transfer Goal 1 (OT)    Activity/Assistive Device (Transfer Goal 1, OT)  sit-to-stand/stand-to-sit;bed-to-chair/chair-to-bed;toilet;commode, bedside without drop arms;walker, rolling  -CLIVE        [place] Level/Cues Needed (Transfer Goal 1, OT)  minimum assist (75% or more patient effort);2 person assist  -CLIVE        Time Frame (Transfer Goal 1, OT)  long term goal (LTG);10 days  -CLIVE        Progress/Outcome (Transfer Goal 1, OT)  goal ongoing  -CLIVE            Toileting Goal 1 (OT)    Activity/Device (Toileting Goal 1, OT)  toileting skills, all;commode, bedside without drop arms;grab bar/safety frame;raised toilet seat  -CLIVE        [place] Level/Cues Needed (Toileting Goal 1, OT)  moderate assist (50-74% patient effort)  -CLIVE        Time Frame (Toileting Goal 1, OT)  long term goal (LTG);10 days  -CLIVE        Progress/Outcome (Toileting Goal 1, OT)  goal ongoing  -CLIVE           Grooming Goal 1 (OT)    Activity/Device (Grooming Goal 1, OT)  oral care  -CLIVE        [place] (Grooming Goal 1, OT)  minimum assist (75% or more patient effort);verbal cues required;tactile cues required  -CLIVE        Time Frame (Grooming Goal 1, OT)  long term goal (LTG);10 days  -CLIVE        Progress/Outcome (Grooming Goal 1, OT)  goal ongoing  -CLIVE           Balance Goal 1 (OT)    Activity/Assistive Device (Balance Goal 1, OT)  standing, dynamic;walker, rolling;sitting, dynamic  -CLIVE        [place] Level/Cues Needed (Balance Goal 1, OT)  minimum assist (75% or more patient effort);contact guard assist min standing, CGA sitting  -CLIVE        Time Frame (Balance Goal 1, OT)  long term goal (LTG);10 days  -CLIVE        Progress/Outcomes (Balance Goal 1, OT)  goal ongoing  -CLIVE           Living Environment    Home Accessibility  stairs to enter home  -CLIVE          User Key  (r) = Recorded By, (t) = Taken By, (c) = Cosigned By    Initials Name Effective Dates    Sonal Tolbert, OT 06/08/18 -     Stephenie López RN 06/16/16 -     Evy Porter RN 06/17/19 -                OT Recommendation and Plan  Outcome Summary/Treatment Plan (OT)  Anticipated Discharge Disposition (OT): home with home health, home with assist(family wants pt. home per chart)  Planned Therapy Interventions (OT Eval): activity tolerance training, BADL retraining, cognitive/visual perception retraining, functional balance retraining, occupation/activity based interventions, patient/caregiver education/training,  transfer/mobility retraining, ROM/therapeutic exercise  Therapy Frequency (OT Eval): daily  Plan of Care Review  Plan of Care Reviewed With: patient, spouse, daughter  Plan of Care Reviewed With: patient, spouse, daughter  Outcome Summary: Patient oreiented to self only and stated wife's birthday as his own.  Pt. mod of 1-2 to EOB, sit to stand and stepping to recliner.  Pt. setup and SBA with washing face and hands and min A combing hair.  Pt. min A overall with self feeding.  Pt. with decreased strength, balance, endurance and cognition impacting ADL independence.  Pt. appropriate for skilled OT services.  Family hopes to take pt. home.  Recommend HH OT at discharge.    Outcome Measures     Row Name 02/17/20 0759             How much help from another is currently needed...    Putting on and taking off regular lower body clothing?  2  -CLIVE      Bathing (including washing, rinsing, and drying)  2  -CLIVE      Toileting (which includes using toilet bed pan or urinal)  2  -CLIVE      Putting on and taking off regular upper body clothing  2  -CLIVE      Taking care of personal grooming (such as brushing teeth)  3  -CLIVE      Eating meals  3  -CLIVE      AM-PAC 6 Clicks Score (OT)  14  -CLIVE         Functional Assessment    Outcome Measure Options  AM-PAC 6 Clicks Daily Activity (OT)  -CLIVE        User Key  (r) = Recorded By, (t) = Taken By, (c) = Cosigned By    Initials Name Provider Type    Sonal Tolbert OT Occupational Therapist          Time Calculation:   Time Calculation- OT     Row Name 02/17/20 0913             Time Calculation- OT    OT Start Time  0759  -CLIVE      OT Received On  02/17/20  -CLIVE      OT Goal Re-Cert Due Date  02/27/20  -CLIVE        User Key  (r) = Recorded By, (t) = Taken By, (c) = Cosigned By    Initials Name Provider Type    Sonal Tolbert OT Occupational Therapist        Therapy Charges for Today     Code Description Service Date Service Provider Modifiers Qty    [id number]  OT EVAL LOW COMPLEXITY 4  2/17/2020 Sonal Glass, OT GO 1               Sonal Glass, OT  2/17/2020

## 2020-02-17 NOTE — PROGRESS NOTES
Case Management Readmission Assessment Note       Case Management Readmission Assessment (all recorded)      Readmission Interview     Row Name 02/17/20 1237             Readmission Indications    What was the reason you were admitted?  NSTEMI      Row Name 02/17/20 1237             Recommendation for rehospitalization    Did you speak with your physician prior to coming to the hospital  No      Who recommended you return to the hospital?  Other (comment) SNF       Did you seek care elsewhere prior to coming to the hospital?  No      Row Name 02/17/20 1237             Follow up appointment    Do you have a PCP?  Yes      Row Name 02/17/20 1237             Medications    Did you have newly prescribed medications at discharge?  Yes      Row Name 02/17/20 1237             Discharge Instructions    Did you understand your discharge instructions?  Yes      Did your family/caregiver hear your instructions?  Yes      Row Name 02/17/20 1237             Index discharge location/services    Where did you go upon discharge?  Skilled Nursing Facility

## 2020-02-17 NOTE — PROGRESS NOTES
Discharge Planning Assessment  [facility]     Patient Name: Murali Dennis  MRN: [id number]  Today's Date: 2/17/2020    Admit Date: 2/15/2020    Discharge Needs Assessment     Row Name 02/17/20 1240       Living Environment    Lives With  spouse    Current Living Arrangements  home/apartment/condo    Primary Care Provided by  spouse/significant other    Provides Primary Care For  no one, unable/limited ability to care for self    Family Caregiver if Needed  child(shelley), adult;spouse    Quality of Family Relationships  helpful;involved;supportive    Able to Return to Prior Arrangements  yes       Resource/Environmental Concerns    Resource/Environmental Concerns  none       Transition Planning    Patient/Family Anticipates Transition to  home with help/services    Patient/Family Anticipated Services at Transition  hospice care    Transportation Anticipated  health plan transportation       Discharge Needs Assessment    Readmission Within the Last 30 Days  previous discharge plan unsuccessful Admitted 2/7-2/14/20 for a/c CHF. Discharged to SNF and readmitted with NSTEMI.     Concerns to be Addressed  discharge planning    Equipment Needed After Discharge  bipap/cpap;wheelchair, manual;walker, rolling;lift device CPAP    Current Discharge Risk  chronically ill;dependent with mobility/activities of daily living;physical impairment        Discharge Plan     Row Name 02/17/20 1242       Plan    Plan  Home with Hospice    Patient/Family in Agreement with Plan  yes    Plan Comments  Met with patient and family in the room to initiate discharge planning. Patient lives with his wife in a home in [facility]. He is dependent with ADLs and uses a RW or WC with mobility. Patient is a readmission and was discharged 2/14/20 to [facility]. He returned to [facility] the following day. Per family and per Luis Angel Winn CM, the discharge goal is home with Hospice. Per Tatum, she will arrange ambulance transportation when patient is  closer to medical readiness for discharge. CM will contiunue to follow.     Final Discharge Disposition Code  50 - home with hospice    Row Name 02/17/20 1149       Plan    Plan  Home with [facility]    Plan Comments  Hospice referral received, chart reviewed. Visit made to pt, pt with eyes closed, appears to be sleeping. Pt's wife and daughter present. Family stated pt wants to go home, family has opted to take pt home with hospice. Teaching done on hospice services, goals of care. Family stated goals of care are comfort measures at home with no further hospitilazation. Spoke with Dr. Jordan regarding discharge for pt, Dr. Jordan stated pt may discharge home tomorrow. Equipment-hospital bed, overbed ta        Destination      Service Provider Request Status Selected Services Address Phone Number Fax Number    [facility] [employer] [facility] Pending - Request Sent N/A [id number] [facility] [id number] [phone]      Durable Medical Equipment      Coordination has not been started for this encounter.      Dialysis/Infusion      Coordination has not been started for this encounter.      Home Medical Care      Coordination has not been started for this encounter.      Therapy      Coordination has not been started for this encounter.      Community Resources      Coordination has not been started for this encounter.        Expected Discharge Date and Time     Expected Discharge Date Expected Discharge Time    Feb 20, 2020         Demographic Summary     Row Name 02/17/20 1238       General Information    Admission Type  inpatient    Referral Source  admission list    Reason for Consult  discharge planning    General Information Comments  PCP is Diana Null       Contact Information    Permission Granted to Share Info With  ;family/designee wife/POA, Nelly Dennis; daughter, Santa Talavera        Functional Status     Row Name 02/17/20 1239       Functional Status     Usual Activity Tolerance  fair       Functional Status, IADL    Medications  assistive person    Meal Preparation  assistive person    Housekeeping  assistive person    Laundry  assistive person    Shopping  assistive person       Employment/    Employment/ Comments  Confirmed with patient's wife that he has medical coverage through [employer] and [place] [employer] and rx coverage through [employer].         Psychosocial    No documentation.       Abuse/Neglect    No documentation.       Legal    No documentation.       Substance Abuse    No documentation.       Patient Forms    No documentation.           Barbara Bender RN

## 2020-02-17 NOTE — PLAN OF CARE
VSS. Pt remains in sinus bradycardia with a 1st degree AVB and on RA. The pt sat up in the chair for almost 2 hours this AM. He will be D/C'd home tomorrow with Hospice. He seems to be comfortable at this time. Will continue to monitor.

## 2020-02-17 NOTE — PROGRESS NOTES
Continued Stay Note  [facility]     Patient Name: Murali Dennis  MRN: [id number]  Today's Date: 2/17/2020    Admit Date: 2/15/2020    Discharge Plan     Row Name 02/17/20 1149       Plan    Plan  Home with [facility]    Plan Comments  Hospice referral received, chart reviewed. Visit made to pt, pt with eyes closed, appears to be sleeping. Pt's wife and daughter present. Family stated pt wants to go home, family has opted to take pt home with hospice. Teaching done on hospice services, goals of care. Family stated goals of care are comfort measures at home with no further hospitilazation. Spoke with Dr. Jordan regarding discharge for pt, Dr. Jordan stated pt may discharge home tomorrow. Equipment-hospital bed, overbed table, oxygen and BSC- ordered to be delivered to pt's home tomorrow morning. Family stated pt may be able to travel home via private vehicle, though agreeable for this writer to set up an ambulance in case pt is too weak to get in and out of the car. Ambulance transportation arranged for 1300 tomorrow. Will continue to follow. Please call [phone] if can be of further assistance.         Discharge Codes    No documentation.       Expected Discharge Date and Time     Expected Discharge Date Expected Discharge Time    Feb 20, 2020             Vero David RN

## 2020-02-17 NOTE — PLAN OF CARE
Problem: Patient Care Overview  Goal: Plan of Care Review  Outcome: Ongoing (interventions implemented as appropriate)  Flowsheets (Taken 2/17/2020 0910)  Progress: no change  Plan of Care Reviewed With: patient; spouse; daughter  Outcome Summary: Patient oreiented to self only and stated wife's birthday as his own.  Pt. mod of 1-2 to EOB, sit to stand and stepping to recliner.  Pt. setup and SBA with washing face and hands and min A combing hair.  Pt. min A overall with self feeding.  Pt. with decreased strength, balance, endurance and cognition impacting ADL independence.  Pt. appropriate for skilled OT services.  Family hopes to take pt. home.  Recommend HH OT at discharge.

## 2020-02-17 NOTE — PLAN OF CARE
Patient confused to time & situation during the evening; VSS; put on CPAP around 2100, and kept it on all night; bradycardic on the monitor; no UOP, bladder scanned at 0400, revealed less than 300 ml.  Will encourage urination again at 0600 & in/out cath if unsuccessful; seemed to rest well through the night; continue to monitor.

## 2020-02-18 PROBLEM — E87.6 HYPOKALEMIA: Status: RESOLVED | Noted: 2020-01-01 | Resolved: 2020-01-01

## 2020-02-18 PROBLEM — I20.0 UNSTABLE ANGINA (HCC): Status: RESOLVED | Noted: 2020-01-01 | Resolved: 2020-01-01

## 2020-02-18 PROBLEM — R91.8 LUNG INFILTRATE: Status: RESOLVED | Noted: 2020-01-01 | Resolved: 2020-01-01

## 2020-02-18 PROBLEM — R73.9 HYPERGLYCEMIA: Status: RESOLVED | Noted: 2020-01-01 | Resolved: 2020-01-01

## 2020-02-18 PROBLEM — I21.4 NSTEMI (NON-ST ELEVATED MYOCARDIAL INFARCTION) (HCC): Status: RESOLVED | Noted: 2020-01-01 | Resolved: 2020-01-01

## 2020-02-18 NOTE — PROGRESS NOTES
Continued Stay Note  [facility]     Patient Name: Murali Dennis  MRN: [id number]  Today's Date: 2/18/2020    Admit Date: 2/15/2020    Discharge Plan     Row Name 02/18/20 1518       Plan    Plan  Home with [facility]    Final Discharge Disposition Code  50 - home with hospice    Final Note  Visit made to pt, pt's wife present. Wife stated plan remains to transport pt home today via private vehicle. Wife has hospice 24 hr number to call when pt arrives home; wife stated has no further questions at this time regarding hospice.         Discharge Codes    No documentation.       Expected Discharge Date and Time     Expected Discharge Date Expected Discharge Time    Feb 18, 2020             Vero David RN

## 2020-02-18 NOTE — PLAN OF CARE
Patient alert during the evening; answering questions appropriately; RA; used CPAP for most of the night; asked to remove it around 0400- required 2L of oxygen while sleeping; SB with 1 degree AV block; up to the bsc with assist of 2; continue to monitor and prepare for discharge.

## 2020-02-18 NOTE — DISCHARGE SUMMARY
[facility] Medicine Services  DISCHARGE SUMMARY    Patient Name: Murali Dennis  : 1929  MRN: [id number]    Date of Admission: 2/15/2020  8:32 PM  Date of Discharge:  2020  Primary Care Physician: Diana Null APRN    Consults     Date and Time Order Name Status Description    2020 1026 Inpatient Cardiology Consult      2020 0133 Inpatient Palliative Care MD Consult Completed     2020 1402 Inpatient Cardiology Consult Completed           Hospital Course     Presenting Problem:   Unstable angina (CMS/[facility]) [I20.0]  NSTEMI (non-ST elevated myocardial infarction) (CMS/[facility]) [I21.4]    Active Hospital Problems    Diagnosis  POA   • Parkinson's disease dementia (CMS/[facility]) [G20, F02.80]  Yes   • Skin breakdown [L90.9]  Yes   • Dysphagia [R13.10]  Yes   • Chronic systolic CHF (congestive heart failure) (CMS/[facility]) [I50.22]  Yes   • COPD (chronic obstructive pulmonary disease) (CMS/[facility]) [J44.9]  Yes   • Mixed hyperlipidemia [E78.2]  Yes   • Sleep apnea [G47.30]  Yes   • Benign prostatic hyperplasia without lower urinary tract symptoms [N40.0]  Yes   • PAF (paroxysmal atrial fibrillation) (CMS/[facility]) [I48.0]  Yes   • Debility [R53.81]  Yes   • CKD (chronic kidney disease) stage 3, GFR 30-59 ml/min (CMS/[facility]) [N18.3]  Yes   • CAD (coronary artery disease) [I25.10]  Yes   • Hypothyroid [E03.9]  Yes      Resolved Hospital Problems    Diagnosis Date Resolved POA   • **NSTEMI (non-ST elevated myocardial infarction) (CMS/[facility]) [I21.4] 2020 Yes   • Unstable angina (CMS/[facility]) [I20.0] 2020 Yes   • Hypokalemia [E87.6] 2020 Yes   • Lung infiltrate [R91.8] 2020 Yes   • Hyperglycemia [R73.9] 2020 Yes          Hospital Course:  uMrali Dennis is a 89 y.o. male with a medical hx significant for CAD, systolic CHF, HLD, COPD, Hypothyroidism, CKD III, PAF, BPH and Parkinson's disease dementia was brought to [facility] ED for c/o chest pain.  The patient had been  discharged from [facility] on 214 after a 1 week stay for acute hypoxia due to COPD and CHF exacerbation.  The patient was later transferred to [facility] rehab facility.  History was provided by patient's family as he has dementia.  Patient's wife reported that he complained of chest pain.  Located on the left side with radiation into his ribs.  I also reported the patient had been coughing and choked on some water while in the emergency department.  He had no fevers or chills.  No vomiting or diarrhea.  Patient follows with cardiologist Dr. Ugarte.  In the ED, patient noted to have sinus bradycardia with first-degree AV block, slight ST elevation in lead II which looked to be old and anterior T wave inversions which appeared to be new.  Chest x-ray showed a small left pleural effusion with atelectasis versus infiltrate in the left lower lobe.  He was started on a nitroglycerin drip in the ED and was admitted to the hospital service for further evaluation and treatment.  Patient was admitted to the medical floor with cardiology consult.  Antibiotics were held due to recent history of C. difficile.  His procalcitonin was negative.  Suspect more atelectasis.  His troponin trended down.  Patient was seen in consult by cardiology and it was felt there was no change in his cardiovascular state.  Ejection fraction was about 30%.  They had nothing further to add to patient's case.  They suggested palliative care and hospice.  Patient was seen by the  for hospice and have decided to go home today with hospice services.  Pain medication will be written by palliative medicine.  Patient will be discharged home today in stable condition.    Discharge Follow Up Recommendations for outpatient labs/diagnostics:  -Follow-up with PCP Dr. Null in 3-5 days    Day of Discharge     HPI:   Patient seen and examined.  Nursing notes reviewed.  No acute events overnight.  Patient denies any acute complaints this  morning.  Wife is present at bedside.  Ready to go home.    Review of Systems  Unable to obtain-Dementia    Vital Signs:   Temp:  [95.2 °F (35.1 °C)-97.8 °F (36.6 °C)] 95.2 °F (35.1 °C)  Heart Rate:  [48-54] 54  Resp:  [14-16] 14  BP: (107-151)/(54-73) 151/73     Physical Exam:  Constitutional: No acute distress, awake, alert  HENT: NCAT, mucous membranes moist  Respiratory: Clear to auscultation bilaterally, respiratory effort normal   Cardiovascular: RRR, no murmurs, rubs, or gallops, palpable pedal pulses bilaterally  Gastrointestinal: Positive bowel sounds, soft, nontender, nondistended  Musculoskeletal: No bilateral ankle edema  Psychiatric: Flat affect, cooperative  Neurologic: Oriented x 1, strength symmetric in all extremities, Cranial Nerves grossly intact to confrontation, speech clear  Skin: No rashes    Pertinent  and/or Most Recent Results     Results from last 7 days   Lab Units 02/16/20  0251 02/15/20  2127 02/13/20  0454   WBC 10*3/mm3 5.07 5.15 5.41   HEMOGLOBIN g/dL 12.2* 13.4 10.9*   HEMATOCRIT % 39.7 41.7 33.9*   PLATELETS 10*3/mm3 141 157 150   SODIUM mmol/L 141 144 138   POTASSIUM mmol/L 3.7 3.4* 3.6   CHLORIDE mmol/L 102 101 99   CO2 mmol/L 26.0 30.0* 26.0   BUN mg/dL 19 18 21   CREATININE mg/dL 1.55* 1.61* 1.45*   GLUCOSE mg/dL 130* 174* 133*   CALCIUM mg/dL 8.4 8.8 8.4     Results from last 7 days   Lab Units 02/16/20  0251 02/15/20  2127   BILIRUBIN mg/dL  --  0.8   ALK PHOS U/L  --  226*   ALT (SGPT) U/L  --  17   AST (SGOT) U/L  --  38   PROTIME Seconds 15.5*  --    INR  1.29*  --    APTT seconds 31.2*  --            Invalid input(s): TG, LDLCALC, LDLREALC  Results from last 7 days   Lab Units 02/16/20  0251 02/15/20  2127   PROBNP pg/mL  --  4,564.0*   TROPONIN T ng/mL 0.032* 0.036*   PROCALCITONIN ng/mL  --  0.10       Brief Urine Lab Results  (Last result in the past 365 days)      Color   Clarity   Blood   Leuk Est   Nitrite   Protein   CREAT   Urine HCG        02/09/20 0957 Yellow  Clear Negative Negative Negative Negative               Microbiology Results Abnormal     None          Imaging Results (All)     Procedure Component Value Units Date/Time    CT Chest Without Contrast [[id number]] Collected:  02/16/20 0033     Updated:  02/16/20 0035    Narrative:       CT Chest WO    INDICATION:   New onset cough    TECHNIQUE:   CT of the thorax without IV contrast. Coronal and sagittal reconstructions were obtained.  Radiation dose reduction techniques included automated exposure control or exposure modulation based on body size. Count of known CT and cardiac nuc med studies  performed in previous 12 months: 1.     COMPARISON:   None    FINDINGS:  Is a moderate right pleural effusion with mild right base atelectasis. There is a small left pleural effusion both effusions are layering dependently. There is a moderate hiatal hernia. Otherwise upper abdominal images are normal. Aorta is normal in  size. There is no adenopathy. Thyroid gland is normal. There is mild interstitial prominence in the left base and posterior aspect of the right upper lobe.      Impression:       Moderate right pleural effusion and small left pleural effusion. Both effusions appear mobile. The right one measures up to about 5 cm in thickness.    Mild bibasilar atelectasis with interstitial prominence or infiltrate in the right upper lobe posteriorly.    Sternotomy wires are present.    Signer Name: Everette Morris MD   Signed: 2/16/2020 12:33 AM   Workstation Name: [id number]    [facility]    XR Chest 1 View [[id number]] Collected:  02/15/20 2134     Updated:  02/15/20 2136    Narrative:       CR Chest 1 Vw 2/15/2020    INDICATION:   Acute onset of chest pain beginning today with shortness of breath. Congestive heart failure. COPD exacerbation. Atrial fibrillation and coronary artery disease.     COMPARISON:    2/7/2020    FINDINGS:  Single portable AP view(s) of the chest.  The cardiac size is stable  following median sternotomy. Small left pleural effusion with infiltrate or atelectasis in the left lower lobe. Discoid atelectasis right base. The lungs are otherwise clear. No  pneumothorax.      Impression:       Small left pleural effusion with atelectasis or infiltrate left lower lobe.    Signer Name: Isak Sofia MD   Signed: 2/15/2020 9:34 PM   Workstation Name: [id number]    [facility]          Results for orders placed during the hospital encounter of 12/07/17   Duplex Carotid Ultrasound CAR    Narrative · No obstructive carotid stenosis bilaterally  · Antegrade bilateral vertebral flow.          Results for orders placed during the hospital encounter of 12/07/17   Duplex Carotid Ultrasound CAR    Narrative · No obstructive carotid stenosis bilaterally  · Antegrade bilateral vertebral flow.          Results for orders placed during the hospital encounter of 12/27/19   Adult Transthoracic Echo Complete W/ Cont if Necessary Per Protocol    Narrative · The left ventricular cavity is moderately dilated.  · Right ventricular cavity is mild-to-moderately dilated.  · Left atrial cavity size is moderate-to-severely dilated.  · Moderate aortic valve regurgitation is present.  · Moderate mitral valve regurgitation is present.  · Mild to moderate tricuspid valve regurgitation is present.  · Calculated right ventricular systolic pressure from tricuspid   regurgitation is 35 mmHg.  · Estimated EF = 30%.  · Left ventricular systolic function is severely decreased.  · Left ventricular diastolic dysfunction (grade II) consistent with   pseudonormalization.  · The findings are consistent with dilated cardiomyopathy.  · Mild pulmonary hypertension is present.  · There is no evidence of pericardial effusion.  · The aortic valve exhibits moderate sclerosis without stenosis.          Plan for Follow-up of Pending Labs/Results: PCP  [unfilled]  Discharge Details        Discharge Medications      New  Medications      Instructions Start Date   HYDROcodone-acetaminophen 5-325 MG per tablet  Commonly known as:  NORCO   1 tablet, Oral, Every 8 Hours PRN         Changes to Medications      Instructions Start Date   pravastatin 20 MG tablet  Commonly known as:  PRAVACHOL  What changed:  when to take this   TAKE 1 TABLET BY MOUTH EVERY DAY         Continue These Medications      Instructions Start Date   acetaminophen 325 MG tablet  Commonly known as:  TYLENOL   650 mg, Oral, Nightly PRN      amiodarone 200 MG tablet  Commonly known as:  PACERONE   200 mg, Oral, Daily      aspirin 81 MG chewable tablet   81 mg, Oral, Daily      carbidopa-levodopa  MG per tablet  Commonly known as:  SINEMET   1 tablet, Oral, 3 Times Daily      castor oil-balsam peru ointment   5 g, Topical, Every 12 Hours Scheduled      clopidogrel 75 MG tablet  Commonly known as:  PLAVIX   75 mg, Oral, Daily      diclofenac 1 % gel gel  Commonly known as:  VOLTAREN   No dose, route, or frequency recorded.      dutasteride 0.5 MG capsule  Commonly known as:  AVODART   TAKE 1 CAPSULE BY MOUTH EVERY DAY      ENTRESTO 24-26 MG tablet  Generic drug:  sacubitril-valsartan   0.5 tablets, Oral, 2 Times Daily      furosemide 20 MG tablet  Commonly known as:  LASIX   20 mg, Oral, Daily      ipratropium-albuterol 0.5-2.5 mg/3 ml nebulizer  Commonly known as:  DUO-NEB   3 mL, Nebulization, Every 4 Hours PRN      levothyroxine 75 MCG tablet  Commonly known as:  SYNTHROID, LEVOTHROID   TAKE 1 TABLET BY MOUTH EVERY DAY      Loratadine 10 MG capsule   1 capsule, Oral, Daily PRN      memantine 10 MG tablet  Commonly known as:  NAMENDA   10 mg, Oral, 2 Times Daily      MULTIVITAL PO   1 tablet, Oral, Daily      nitroglycerin 0.4 MG SL tablet  Commonly known as:  NITROSTAT   0.4 mg, Sublingual, Every 5 Minutes PRN, Take no more than 3 doses in 15 minutes.       omeprazole 20 MG capsule  Commonly known as:  priLOSEC   TAKE 1 CAPSULE BY MOUTH EVERY DAY         simethicone 80 MG chewable tablet  Commonly known as:  MYLICON   80 mg, Oral, 4 Times Daily PRN             Allergies   Allergen Reactions   • Benzodiazepines Other (See Comments)     Paradoxical response   • Valium [Diazepam] Other (See Comments)     Paradoxical response     • Codeine Other (See Comments)     unknown   • Fluoxetine Other (See Comments)     Paradoxical response   • Lipitor [Atorvastatin] Other (See Comments)     Myalgias     • Metoclopramide Other (See Comments)     Unknown   • Nabumetone Other (See Comments)     Unknown     • Penicillins Swelling and Other (See Comments)     Has tolerated cefepime 12/2019   • Tramadol Other (See Comments)     unknown   • Prozac [Fluoxetine Hcl] Unknown - Low Severity   • Sulfa Antibiotics Rash         Discharge Disposition:  Hospice/Medical Facility (DC - External)    Diet:  Hospital:  Diet Order   Procedures   • Diet Soft Texture; [employer]; Thin       Activity:      Restrictions or Other Recommendations:       CODE STATUS:    Code Status and Medical Interventions:   Ordered at: 02/15/20 6674     Limited Support to NOT Include:    Intubation     Level Of Support Discussed With:    Health Care Surrogate     Code Status:    No CPR     Medical Interventions (Level of Support Prior to Arrest):    Limited       Future Appointments   Date Time Provider Department Center   2/28/2020  1:15 PM Emely Box APRN [facility]   5/1/2020 10:30 AM Francesca Hardy PA-C [facility]       [unfilled]    Time Spent on Discharge:  40 minutes, 25 minutes spent with patient face to face/counseling, remaining time spent coordinating care.     Electronically signed by Cindy Harper DO, 02/18/20, 10:12 AM.

## 2020-02-18 NOTE — DISCHARGE PLACEMENT REQUEST
"Lakisha Dennis (89 y.o. Male)   DC SUMMARY      Date of Birth Social Security Number Address Home Phone MRN    04/20/1929  [address] [place] [id number] [phone] [id number]    Yazidism Marital Status          None        Admission Date Admission Type Admitting Provider Attending Provider Department, Room/Bed    2/15/20 Emergency Cindy Harper, Cindy Winston,  [facility] 6A, N619/1    Discharge Date Discharge Disposition Discharge Destination         Hospice/Medical Facility (DC - External)              Attending Provider:  Cindy Harper,     Allergies:  Benzodiazepines, Valium [Diazepam], Codeine, Fluoxetine, Lipitor [Atorvastatin], Metoclopramide, Nabumetone, Penicillins, Tramadol, Prozac [Fluoxetine Hcl], Sulfa Antibiotics    Isolation:  Spore   Infection:  C.difficile (02/10/20)   Code Status:  No CPR    Ht:  177.8 cm (70\")   Wt:  68 kg (150 lb)    Admission Cmt:  None   Principal Problem:  NSTEMI (non-ST elevated myocardial infarction) (CMS/Formerly [facility]) [I21.4]                 Active Insurance as of 2/15/2020     Primary Coverage     Payor Plan Insurance Group Employer/Plan Group    MEDICARE MEDICARE A & B      Payor Plan Address Payor Plan Phone Number Payor Plan Fax Number Effective Dates    [address] [phone]  4/1/1994 - None Entered    [facility] [id number]       Subscriber Name Subscriber Birth Date Member ID       LAKISHA DENNIS 4/20/1929 [id number]           Secondary Coverage     Payor Plan Insurance Group Employer/Plan Group    ANTHEM BLUE CROSS ANTHEM BLUE CROSS BLUE SHIELD PPO [id number]     Payor Plan Address Payor Plan Phone Number Payor Plan Fax Number Effective Dates    [address] [phone]  7/1/2019 - None Entered    [facility] [id number]       Subscriber Name Subscriber Birth Date Member ID       LAKISHA DENNIS 4/20/1929 [id number]                 Emergency Contacts      (Rel.) Home Phone Work Phone " Mobile Phone    Nelly Dennis (Power of ) [phone] -- [phone]    Santa Talavera (Daughter) [phone] -- --            Discharge Summary    No notes of this type exist for this encounter.

## 2020-02-18 NOTE — PLAN OF CARE
VSS. SB on monitor and currently RA. Pt is going home today with Hospice. Discharge instructions reviewed with patient's wife and she has no current questions.

## 2020-02-18 NOTE — PROGRESS NOTES
Continued Stay Note  [facility]     Patient Name: Murali Dennis  MRN: [id number]  Today's Date: 2/18/2020    Admit Date: 2/15/2020    Discharge Plan     Row Name 02/18/20 1209       Plan    Plan  Home with Hospice    Plan Comments  Hospice plan of care and goals of care discussed.  Questions and concerns addressed.  Overview of people services provided. Pt will admit to hospice with a diagnosis of CHF and Parkinson's Dementia.  Prescriptions will be sent to [facility] on [address], wife to .  Pt will transport home via private car.  DME has already been delivered today.  Family given 24h number and verbalized understanding to call once pt has arrived home in order to be admitted to Hospice. Please, call [phone] if I can be of further assistance.         Gene Sofia RN

## 2020-02-25 NOTE — TELEPHONE ENCOUNTER
PATIENT AND MARCELA WERE DISCONNECTED*    TO ADD TO MARCELA'S NOTE:    PATIENT WAS DISCHARGED ON 02/21/2020 WITH HOSPICE COMING HOME.     WIFE WANTED TO LET RADHA GUTIERREZ KNOW WHAT WAS GOING ON.    TIFFANY'S CALLBACK # [phone]

## 2020-02-25 NOTE — TELEPHONE ENCOUNTER
PT WIFE CALLED AND STATED THAT PT RECEIVED A LETTER THAT HE HAD MISSED APPOINTMENT ON  2/21/2020 AND SHE STATED THAT SHE HAD CALLED AND LEFT A VOICEMAIL ABOUT CANCELLING APPOINTMENT MAURICE

## 2020-02-26 NOTE — TELEPHONE ENCOUNTER
FYI.....it looks like patient was d/c'd from the hospital with Hospice.  All future appts with us have been cancelled.

## 2020-04-20 NOTE — TELEPHONE ENCOUNTER
PT WIFE (TIFFANY) CALLED AND REQUESTING REFILL ON: (PHARMACY ALSO SENT A REQUEST)    levothyroxine (SYNTHROID, LEVOTHROID) 75 MCG tablet    [facility]    PT WIFE CALLBACK [phone]

## 2020-05-01 DIAGNOSIS — K21.9 GASTROESOPHAGEAL REFLUX DISEASE, ESOPHAGITIS PRESENCE NOT SPECIFIED: ICD-10-CM

## 2020-05-01 RX ORDER — OMEPRAZOLE 20 MG/1
CAPSULE, DELAYED RELEASE ORAL
Qty: 30 CAPSULE | Refills: 0 | OUTPATIENT
Start: 2020-05-01

## 2020-05-12 RX ORDER — LEVOTHYROXINE SODIUM 0.07 MG/1
TABLET ORAL
Qty: 30 TABLET | Refills: 0 | OUTPATIENT
Start: 2020-05-12

## 2020-09-07 NOTE — TELEPHONE ENCOUNTER
PA was submitted for patient's Diclofenac Sodium 1% Gel on 07/30/2019 via [employer]. 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 Waiting on determination.     Prescription Benefits:   ID: [id number]  BIN: [id number]  PCN: [id number]  RXGroup: [id number]   99.5

## 2021-03-21 NOTE — TELEPHONE ENCOUNTER
----- Message from ARCADIO Mccrary sent at 5/15/2019  9:46 AM EDT -----  Please let him know that labs are stable, renal function slightly improved compared to last labs.  Anemia has improved as well.  
Pt wife, Nelly, has been notified of results.  She stated understanding  
No

## (undated) DEVICE — GLIDESHEATH SLENDER STAINLESS STEEL KIT: Brand: GLIDESHEATH SLENDER

## (undated) DEVICE — KODAMA CATHETER, P/N 701470CONTENTS: IMAGING CATHETER, 3 ML SYRINGE, 10 ML SYRINGE, EXTENSION SET, STERILE BAG, IFU: Brand: ACIST KODAMA® CORONARY IMAGING CATHETER

## (undated) DEVICE — CATH DIAG EXPO .045 AL1 5F 100CM

## (undated) DEVICE — GW JAG STR .035IN 450CM

## (undated) DEVICE — SYR LUERLOK 50ML

## (undated) DEVICE — RETRIEVAL BALLOON CATHETER: Brand: EXTRACTOR™ PRO RX

## (undated) DEVICE — GW INQWIRE FC PTFE STD J/1.5 .035 260

## (undated) DEVICE — DEV COMP RAD PRELUDESYNC 24CM

## (undated) DEVICE — TRIPLE LUMEN SPHINCTEROTOME: Brand: ULTRATOME XL

## (undated) DEVICE — SYR LUERLOK 20CC BX/50

## (undated) DEVICE — GUIDE CATHETER: Brand: MACH1™

## (undated) DEVICE — DEV INFL MONARCH 25W

## (undated) DEVICE — CATH F5 INF IM 100CM: Brand: INFINITI

## (undated) DEVICE — MODEL BT2000 P/N 700287-012KIT CONTENTS: MANIFOLD WITH SALINE AND CONTRAST PORTS, SALINE TUBING WITH SPIKE AND HAND SYRINGE, TRANSDUCER: Brand: BT2000 AUTOMATED MANIFOLD KIT

## (undated) DEVICE — TRIPLE LUMEN ERCP CANNULA: Brand: TANDEM XL

## (undated) DEVICE — MODEL AT P54, P/N 700608-035KIT CONTENTS: HAND CONTROLLER, 3-WAY HIGH-PRESSURE STOPCOCK WITH ROTATING END AND PREMIUM HIGH-PRESSURE TUBING: Brand: ANGIOTOUCH® KIT

## (undated) DEVICE — ST INF PRI SMRTSTE 20DRP 2VLV 24ML 117

## (undated) DEVICE — A2000 MULTI-USE SYRINGE KIT, P/N 701277-003KIT CONTENTS: 100ML CONTRAST RESERVOIR AND TUBING WITH CONTRAST SPIKE AND CLAMP: Brand: A2000 MULTI-USE SYRINGE KIT

## (undated) DEVICE — PK CATH CARD 10

## (undated) DEVICE — ERBE NESSY®PLATE 170 SPLIT; 168CM²; CABLE 3M: Brand: ERBE

## (undated) DEVICE — CATH DIAG EXPO .045 FL3  5F 100CM

## (undated) DEVICE — BOWL UTIL STRL 32OZ

## (undated) DEVICE — EMBOLIC PROTECTION SYSTEM: Brand: FILTERWIRE EZ™